# Patient Record
Sex: MALE | Race: WHITE | Employment: FULL TIME | URBAN - METROPOLITAN AREA
[De-identification: names, ages, dates, MRNs, and addresses within clinical notes are randomized per-mention and may not be internally consistent; named-entity substitution may affect disease eponyms.]

---

## 2018-08-07 ENCOUNTER — APPOINTMENT (OUTPATIENT)
Dept: CT IMAGING | Facility: HOSPITAL | Age: 43
DRG: 440 | End: 2018-08-07
Attending: EMERGENCY MEDICINE
Payer: COMMERCIAL

## 2018-08-07 ENCOUNTER — HOSPITAL ENCOUNTER (OUTPATIENT)
Age: 43
Discharge: ANOTHER HOSPITAL | End: 2018-08-07
Attending: FAMILY MEDICINE
Payer: COMMERCIAL

## 2018-08-07 VITALS
TEMPERATURE: 98 F | HEIGHT: 68 IN | OXYGEN SATURATION: 98 % | HEART RATE: 120 BPM | BODY MASS INDEX: 22.73 KG/M2 | DIASTOLIC BLOOD PRESSURE: 100 MMHG | SYSTOLIC BLOOD PRESSURE: 177 MMHG | WEIGHT: 150 LBS | RESPIRATION RATE: 16 BRPM

## 2018-08-07 DIAGNOSIS — R10.11 RUQ ABDOMINAL PAIN: Primary | ICD-10-CM

## 2018-08-07 PROBLEM — R73.9 HYPERGLYCEMIA: Status: ACTIVE | Noted: 2018-08-07

## 2018-08-07 PROBLEM — E87.6 HYPOKALEMIA: Status: ACTIVE | Noted: 2018-08-07

## 2018-08-07 PROBLEM — K85.20 ALCOHOL-INDUCED ACUTE PANCREATITIS, UNSPECIFIED COMPLICATION STATUS: Status: ACTIVE | Noted: 2018-08-07

## 2018-08-07 PROBLEM — K85.20 ALCOHOL-INDUCED ACUTE PANCREATITIS (HCC): Status: ACTIVE | Noted: 2018-08-07

## 2018-08-07 PROBLEM — R10.13 ABDOMINAL PAIN, EPIGASTRIC: Status: ACTIVE | Noted: 2018-08-07

## 2018-08-07 PROBLEM — K85.20 ALCOHOL-INDUCED ACUTE PANCREATITIS, UNSPECIFIED COMPLICATION STATUS (HCC): Status: ACTIVE | Noted: 2018-08-07

## 2018-08-07 PROBLEM — K85.20 ALCOHOL-INDUCED ACUTE PANCREATITIS: Status: ACTIVE | Noted: 2018-08-07

## 2018-08-07 PROCEDURE — 99202 OFFICE O/P NEW SF 15 MIN: CPT

## 2018-08-07 PROCEDURE — 74177 CT ABD & PELVIS W/CONTRAST: CPT | Performed by: EMERGENCY MEDICINE

## 2018-08-07 NOTE — CONSULTS
REFERRING PHYSICIAN: Dr. Esparza ref. provider found    HPI:         Thank you very much for requesting me to see the patient. As you know, Teresita Palacios is a 44year old male who presented to ER today with c/o 1 mo of upper abdominal pain.  Relates constant p injection 2 mg 2 mg Intravenous Q1H PRN   HYDROmorphone HCl (DILAUDID) 1 MG/ML injection 0.5 mg 0.5 mg Intravenous Q2H PRN   HYDROcodone-acetaminophen (NORCO) 5-325 MG per tab 1 tablet 1 tablet Oral Q4H PRN       No Known Allergies    A comprehensive 10 po

## 2018-08-07 NOTE — ED INITIAL ASSESSMENT (HPI)
Pt reports RUQ abdominal pain for \"several weeks\" becoming worse in the last couple of days. Pt states pain is worse with movement, when applying pressure to R side or when sleeping on R side. +intermittent nausea and vomiting. +body aches.  Denies fever

## 2018-08-07 NOTE — ED PROVIDER NOTES
Patient Seen in: [facility] Immediate Care In [address] [place] [address]    History   Patient presents with:  Abdomen/Flank Pain (GI/)    Stated Complaint: stomach pain    HPI    Epigastric and RUQ abd pain   X 1 week   Worse today   Dull to sharp pain   5/10 when pleasant, there is no agitation          ED Course   Labs Reviewed - No data to display    MDM     DIFFERENTIAL DIAGNOSIS: After history and physical exam differential diagnosis was considered for  Acute Cholecystitis, Acute Pancreatitis, PUD, biliary coli

## 2018-08-07 NOTE — ED PROVIDER NOTES
Patient Seen in: [facility] AND [facility] Emergency Department    History   Patient presents with:  Abdomen/Flank Pain (GI/)    Stated Complaint: mid to right abd pain x 1 month    HPI    Patient presents the emergency department complaining of 1 month of abd normal.   Neck: Normal range of motion. Neck supple. Cardiovascular: Normal rate and regular rhythm. No murmur heard. Pulmonary/Chest: Effort normal and breath sounds normal. No respiratory distress. Abdominal: Soft. He exhibits no distension.  Ther Abnormal            Final result                 Please view results for these tests on the individual orders.    [address]   RAINBOW DRAW DARK GREEN   RAINBOW DRAW LIGHT GREEN   RAINBOW [address] [facility]   LAUREN Sanders am    Follow-up:  No follow-up provider specified. Medications Prescribed:  There are no discharge medications for this patient.       Present on Admission           ICD-10-CM Noted POA    Alcohol-induced acute pancreatitis K85.20 8/7/2018 Unknown    H

## 2018-08-08 NOTE — PROGRESS NOTES
[place] [place] [facility]    Progress Note    Saige Pate Patient Status:  Inpatient    1975 MRN [id number]   Location [facility] 5SW/SE Attending Agnes Arguelles MD   Hosp Day # 1 PCP Beckie Apgar, MD       Subjective:   Saige Pate is a 44 pancreatitis  -NPO  -Normal saline at 150 cc/h  -Norco as needed for pain  -Protonix while NPO  -GI planning for eventual EGD    Alcohol abuse  -CIWA protocol out of concern for withdrawal  -Ativan ordered as needed, but has not required since admission

## 2018-08-08 NOTE — PLAN OF CARE
Problem: Patient/Family Goals  Goal: Patient/Family Long Term Goal  Patient's Long Term Goal: \"I want to stop losing weight\"    Interventions:  - follow POC  - meds as ordered  - See additional Care Plan goals for specific interventions    Outcome: Progr medication profile   Outcome: Progressing    Goal: Maintains adequate nutritional intake (undernourished)  INTERVENTIONS:  - Monitor percentage of each meal consumed  - Identify factors contributing to decreased intake, treat as appropriate  - Assist with

## 2018-08-08 NOTE — PAYOR COMM NOTE
--------------  ADMISSION REVIEW     Payor: Carolann RUBI/KAELYN  Subscriber #:  [id number]  Authorization Number: [id number]    Admit date: 8/7/18  Admit time: 3628         Patient Seen in: [facility] Emergency Department    History   Patient present no guarding. Musculoskeletal: Normal range of motion. He exhibits no tenderness. Neurological: He is alert and oriented to person, place, and time. Skin: Skin is warm and dry. No rash noted. Nursing note and vitals reviewed.       Labs Reviewed   BA nonspecific but can be seen with chronic inflammation. Hepatic steatosis.     Clinical Impression:  Alcohol-induced acute pancreatitis, unspecified complication status  (primary encounter diagnosis)  Abdominal pain, epigastric    Disposition:  Admit      HI awake, alert, and cooperative, in no acute distress. He is somewhat fidgety and anxious but able to give accurate history. VITAL SIGNS:  Blood pressure is 140/89, temperature is 98.9, pulse is 91, respiration 18, SpO2 is 97%. HEENT:  EOMI. NIRAV.   Scl is also hepatic steatosis and inflammatory extension to the right lower quadrant with mild wall thickening of the cecum, likely reactive, with primary colitis less likely. IMPRESSION AND PLAN:    1.    Acute pancreatitis, with groove pancreatitis and duo Oral Gary Gaspar RN      iopamidol (ISOVUE-M) 76 % injection 100 mL     Date Action Dose Route User    8/7/2018 1025 Given 85 mL Intravenous Lauryn Javier      dextrose 5 % and 0.45 % NaCl with KCl 20 mEq infusion     Date Action Dose Route User    8/8

## 2018-08-08 NOTE — DOWNTIME EVENT NOTE
The EMR was down for approximately 1 hour on 8/8/2018.     This writer was responsible for completing the paper charting during this time period.      The following information was re-entered into the system by Fior SIMMS     The following informati

## 2018-08-08 NOTE — PROGRESS NOTES
GI  PROGRESS NOTE    SUBJECTIVE: relates better. Willing to try clear liquid diet.        OBJECTIVE:  Temp:  [98.4 °F (36.9 °C)-99 °F (37.2 °C)] 98.4 °F (36.9 °C)  Pulse:  [81-98] 87  Resp:  [18-20] 18  BP: (126-151)/() 136/88  Exam  Gen: No acute d _______________________________________________________________

## 2018-08-08 NOTE — PLAN OF CARE
Problem: Patient/Family Goals  Goal: Patient/Family Long Term Goal  Patient's Long Term Goal: \"I want to stop losing weight\"    Interventions:  - follow POC  - meds as ordered  - See additional Care Plan goals for specific interventions   Outcome: Progre review patient's medication profile   Outcome: Progressing    Goal: Maintains adequate nutritional intake (undernourished)  INTERVENTIONS:  - Monitor percentage of each meal consumed  - Identify factors contributing to decreased intake, treat as appropriat

## 2018-08-08 NOTE — H&P
[facility]    PATIENT'S NAME: Yunior Mace   ATTENDING PHYSICIAN: Keyla Meza MD   PATIENT ACCOUNT#:   [id number]    LOCATION:  [address] [place] [address] RECORD #:   [id number]       YOB: 1975  ADMISSION DATE:       08/07/2018 is awake, alert, and cooperative, in no acute distress. He is somewhat fidgety and anxious but able to give accurate history. VITAL SIGNS:  Blood pressure is 140/89, temperature is 98.9, pulse is 91, respiration 18, SpO2 is 97%. HEENT:  CORINNA MAGUIRE There is also hepatic steatosis and inflammatory extension to the right lower quadrant with mild wall thickening of the cecum, likely reactive, with primary colitis less likely. IMPRESSION AND PLAN:    1.    Acute pancreatitis, with groove pancreatitis a

## 2018-08-08 NOTE — PLAN OF CARE
Problem: Patient/Family Goals  Goal: Patient/Family Long Term Goal  Patient's Long Term Goal: \"I want to stop losing weight\"    Interventions:  - follow POC  - meds as ordered  - See additional Care Plan goals for specific interventions    Outcome: Progr Encourage mobilization and activity  - Obtain nutritional consult as needed  - Establish a toileting routine/schedule  - Consider collaborating with pharmacy to review patient's medication profile   Outcome: Progressing    Goal: Maintains adequate nutritio

## 2018-08-09 ENCOUNTER — SURGERY (OUTPATIENT)
Age: 43
End: 2018-08-09

## 2018-08-09 ENCOUNTER — ANESTHESIA (OUTPATIENT)
Dept: ENDOSCOPY | Facility: HOSPITAL | Age: 43
DRG: 440 | End: 2018-08-09
Payer: COMMERCIAL

## 2018-08-09 ENCOUNTER — ANESTHESIA EVENT (OUTPATIENT)
Dept: ENDOSCOPY | Facility: HOSPITAL | Age: 43
DRG: 440 | End: 2018-08-09
Payer: COMMERCIAL

## 2018-08-09 RX ORDER — LIDOCAINE HYDROCHLORIDE 10 MG/ML
INJECTION, SOLUTION EPIDURAL; INFILTRATION; INTRACAUDAL; PERINEURAL AS NEEDED
Status: DISCONTINUED | OUTPATIENT
Start: 2018-08-09 | End: 2018-08-09 | Stop reason: SURG

## 2018-08-09 RX ORDER — MIDAZOLAM HYDROCHLORIDE 1 MG/ML
INJECTION INTRAMUSCULAR; INTRAVENOUS AS NEEDED
Status: DISCONTINUED | OUTPATIENT
Start: 2018-08-09 | End: 2018-08-09 | Stop reason: SURG

## 2018-08-09 RX ORDER — SODIUM CHLORIDE, SODIUM LACTATE, POTASSIUM CHLORIDE, CALCIUM CHLORIDE 600; 310; 30; 20 MG/100ML; MG/100ML; MG/100ML; MG/100ML
INJECTION, SOLUTION INTRAVENOUS CONTINUOUS PRN
Status: DISCONTINUED | OUTPATIENT
Start: 2018-08-09 | End: 2018-08-09 | Stop reason: SURG

## 2018-08-09 RX ADMIN — MIDAZOLAM HYDROCHLORIDE 2 MG: 1 INJECTION INTRAMUSCULAR; INTRAVENOUS at 14:01:00

## 2018-08-09 RX ADMIN — LIDOCAINE HYDROCHLORIDE 20 MG: 10 INJECTION, SOLUTION EPIDURAL; INFILTRATION; INTRACAUDAL; PERINEURAL at 14:03:00

## 2018-08-09 RX ADMIN — SODIUM CHLORIDE, SODIUM LACTATE, POTASSIUM CHLORIDE, CALCIUM CHLORIDE: 600; 310; 30; 20 INJECTION, SOLUTION INTRAVENOUS at 14:25:00

## 2018-08-09 RX ADMIN — SODIUM CHLORIDE, SODIUM LACTATE, POTASSIUM CHLORIDE, CALCIUM CHLORIDE: 600; 310; 30; 20 INJECTION, SOLUTION INTRAVENOUS at 13:45:00

## 2018-08-09 NOTE — INTERVAL H&P NOTE
Pre-op Diagnosis: abdominal pain    The above referenced H&P was reviewed by Marquis Hannon MD on 8/9/2018, the patient was examined and no significant changes have occurred in the patient's condition since the H&P was performed.   I discussed with the patient

## 2018-08-09 NOTE — ANESTHESIA POSTPROCEDURE EVALUATION
Patient: Taylor Caballero    Procedure Summary     Date:  08/09/18 Room / Location:  [address] [place] [address] ENDOSCOPY 05 / [address] [place] [address] ENDOSCOPY    Anesthesia Start:  1400 Anesthesia Stop:  2409    Procedure:  ESOPHAGOGASTRODUODENOSCOPY (EGD) (N/A ) Diagnosis:  (Hiatal hernia, gastritis

## 2018-08-09 NOTE — ANESTHESIA PREPROCEDURE EVALUATION
Anesthesia PreOp Note    HPI:     Tamara Nicole is a 44year old male who presents for preoperative consultation requested by: Melissa Ahn MD    Date of Surgery: 8/7/2018 - 8/9/2018    Procedure(s):  ESOPHAGOGASTRODUODENOSCOPY (EGD)  Indication: None given MG/ML injection 0.5 mg 0.5 mg Intravenous Q2H PRN Sami López MD    HYDROcodone-acetaminophen (NORCO) 5-325 MG per tab 1 tablet 1 tablet Oral Q4H PRN Sami López MD 1 tablet at 08/09/18 0553   AmLODIPine Besylate (NORVASC) tab 5 mg 5 mg Oral Daily Baub Location: Left arm Left arm Left arm Left arm   Pulse: 90 90  100   Resp: 16 16 16 19   Temp: 98.7 °F (37.1 °C) 98.4 °F (36.9 °C) 98.4 °F (36.9 °C)    TempSrc: Oral Oral Oral    SpO2: 98% 98% 98% 96%   Weight:       Height:            Anesthesia ROS/Med Hx

## 2018-08-09 NOTE — OPERATIVE REPORT
ENDOSCOPY OPERATIVE REPORT    Patient Name: Earl Davila Record #: [id number]  YOB: 1975  Date of Procedure: 8/9/2018    Preoperative Diagnosis: upper abdominal pain; pancreatitis.    Postoperative Diagnosis:    1.) 3 cm hiatal hernia stable. COMPLICATIONS: None. PLAN:  1.) await biopsy results. 2.) full liquid diet. Stephen Garcia MD  [facility].

## 2018-08-09 NOTE — PLAN OF CARE
DRUG ABUSE/DETOX    • Will have no detox symptoms and will verbalize plan for changing drug-related behavior Progressing        GASTROINTESTINAL - ADULT    • Minimal or absence of nausea and vomiting Progressing    • Maintains or returns to baseline bowel

## 2018-08-09 NOTE — H&P (VIEW-ONLY)
REFERRING PHYSICIAN: Dr. Esparza ref. provider found    HPI:         Thank you very much for requesting me to see the patient. As you know, Ayesha Garza is a 44year old male who presented to ER today with c/o 1 mo of upper abdominal pain.  Relates constant p injection 2 mg 2 mg Intravenous Q1H PRN   HYDROmorphone HCl (DILAUDID) 1 MG/ML injection 0.5 mg 0.5 mg Intravenous Q2H PRN   HYDROcodone-acetaminophen (NORCO) 5-325 MG per tab 1 tablet 1 tablet Oral Q4H PRN       No Known Allergies    A comprehensive 10 po

## 2018-08-09 NOTE — PROGRESS NOTES
[facility]    Progress Note    Spike Pereira Patient Status:  Inpatient    1975 MRN [id number]   Location [facility] 5SW/SE Attending Natalia Medina MD   Hosp Day # 2 PCP Jean-Claude Beltrán MD       Subjective:   Spike Pereira is a(n) NA  138  138  134*  135*   K  3.1*  3.6  3.6  3.8   CL  98  97  102  103   CO2  30  29  27  27   MG   --   1.7*   --    --    ALKPHO  237*  246*  175*   --    AST  70*  68*  31   --    ALT  54  53  35   --    BILT  0.9  0.9  0.9   --    TP  6.4  6.8  5.

## 2018-08-09 NOTE — PAYOR COMM NOTE
--------------  ADMISSION REVIEW     Payor: [employer]MCNP  Subscriber #:  [id number]  Authorization Number: [id number]    Admit date: 8/7/18  Admit time: 8140         Patient Seen in: [facility] Emergency Department    History   Patient present exhibits no tenderness. Neurological: He is alert and oriented to person, place, and time. Skin: Skin is warm and dry. No rash noted. Nursing note and vitals reviewed.     Labs Reviewed   BASIC METABOLIC PANEL (8) - Abnormal; Notable for the following steatosis.           Clinical Impression:  Acute pancreatitis, unspecified complication status  (primary encounter diagnosis)  Abdominal pain, epigastric    Disposition:  Admit        HISTORY AND PHYSICAL EXAMINATION    CHIEF COMPLAINT:  Epigastric abdomina palpable nodules. Carotids are 3+ bilaterally without bruits. LUNGS:  Clear to auscultation. There are no wheezes, rales, or rhonchi. There is no dullness to percussion. There is no CVA tenderness.   HEART:  A regular rate and rhythm with a forceful S1 The patient is on IV fluids for rehydration, IV and p.o. pain medications which are controlling pain, and detoxification protocol for alcohol withdrawal.  The patient has been seen by GI, and recommendation is as above plus abstinence of alcohol, which I a Family hx of IBD.       PLAN: 1.) trial of clear liquid diet.                 2.) EGD with MAC tomorrow to exclude UGI pathology -- The indication and risks of the procedure, including cardiopulmonary complications of sedation, bleeding and perforation disc

## 2018-08-09 NOTE — PLAN OF CARE
Problem: Patient/Family Goals  Goal: Patient/Family Long Term Goal  Patient's Long Term Goal: \"I want to stop losing weight\"    Interventions:  - follow POC  - meds as ordered  - See additional Care Plan goals for specific interventions    Outcome: Progr review patient's medication profile   Outcome: Progressing    Goal: Maintains adequate nutritional intake (undernourished)  INTERVENTIONS:  - Monitor percentage of each meal consumed  - Identify factors contributing to decreased intake, treat as appropriat

## 2018-08-09 NOTE — DIETARY NOTE
ADULT NUTRITION INITIAL ASSESSMENT    Pt is at moderate nutrition risk. Pt does not meet malnutrition criteria. RECOMMENDATIONS TO MD:   ·  Recommend MVi, Folic acid and Thiamine given known Etoh abuse.     · Advance diet as safe and add Oral suppleme unspecified complication status [N46.42]   PERTINENT PAST MEDICAL HISTORY: Anxiety, removal of lymph nodes, Etoh abuse, active smoker, others as documented.     ANTHROPOMETRICS:  HT: 172.7 cm (5' 8\")       WT: 66.6 kg (146 lb 14.4 oz)  BMI: Body mass index (ABW))    MONITOR AND EVALUATE/NUTRITION GOALS:  - Food and Nutrient Intake:   Monitor: for PO initiation and for PO diet advancement.   - Anthropometric Measurement:   Monitor: wt and wt change  - Nutrition Goals: halt wt loss, true wt gain, diet beyond cl

## 2018-08-10 PROBLEM — K85.20 ALCOHOL-INDUCED ACUTE PANCREATITIS (HCC): Status: RESOLVED | Noted: 2018-08-07 | Resolved: 2018-08-10

## 2018-08-10 PROBLEM — R73.9 HYPERGLYCEMIA: Status: RESOLVED | Noted: 2018-08-07 | Resolved: 2018-08-10

## 2018-08-10 PROBLEM — E87.6 HYPOKALEMIA: Status: RESOLVED | Noted: 2018-08-07 | Resolved: 2018-08-10

## 2018-08-10 PROBLEM — R10.13 ABDOMINAL PAIN, EPIGASTRIC: Status: RESOLVED | Noted: 2018-08-07 | Resolved: 2018-08-10

## 2018-08-10 PROBLEM — K85.20 ALCOHOL-INDUCED ACUTE PANCREATITIS, UNSPECIFIED COMPLICATION STATUS (HCC): Status: RESOLVED | Noted: 2018-08-07 | Resolved: 2018-08-10

## 2018-08-10 PROBLEM — K85.20 ALCOHOL-INDUCED ACUTE PANCREATITIS: Status: RESOLVED | Noted: 2018-08-07 | Resolved: 2018-08-10

## 2018-08-10 PROBLEM — K85.20 ALCOHOL-INDUCED ACUTE PANCREATITIS, UNSPECIFIED COMPLICATION STATUS: Status: RESOLVED | Noted: 2018-08-07 | Resolved: 2018-08-10

## 2018-08-10 NOTE — PLAN OF CARE
DISCHARGE PLANNING    • Discharge to home or other facility with appropriate resources Adequate for Discharge        DRUG ABUSE/DETOX    • Will have no detox symptoms and will verbalize plan for changing drug-related behavior Adequate for Discharge

## 2018-08-10 NOTE — PROGRESS NOTES
[place] [facility]    Progress Note    Saige Booker Patient Status:  Inpatient    1975 MRN [id number]   Location [facility] 5SW/SE Attending Agnes Arguelles MD   Hosp Day # 3 PCP Beckie Apgar, MD       Subjective:   Saige Pate is a(n) 175*   --    --    AST  70*  68*  31   --    --    ALT  54  53  35   --    --    BILT  0.9  0.9  0.9   --    --    TP  6.4  6.8  5.7*   --    --        Recent Labs   Lab  08/08/18   0623   LIP  58*   OLAYINKA  79       No results for input(s): TROP, CK in the l

## 2018-08-10 NOTE — PLAN OF CARE
DISCHARGE PLANNING    • Discharge to home or other facility with appropriate resources Progressing        DRUG ABUSE/DETOX    • Will have no detox symptoms and will verbalize plan for changing drug-related behavior Progressing        GASTROINTESTINAL - DANYEL

## 2018-08-12 NOTE — DISCHARGE SUMMARY
[facility]    PATIENT'S NAME: Vivien Matos   ATTENDING PHYSICIAN: Ludmila Espana MD   PATIENT ACCOUNT#:   [id number]    LOCATION:  [address] [place] [address] RECORD #:   [id number]       YOB: 1975  ADMISSION DATE:       08/07/2018 fluids, n.p.o. state, p.o. and IV push of pain medications. Pain has resolved gradually over a couple of days. He was set up for EGD by Dr. Den Reagan. EGD was performed on 08/09/2018 and revealed:    1. A 3 cm hiatal hernia.   2.   Mild pangastritis with bio

## 2018-08-14 NOTE — PAYOR COMM NOTE
REF: [id number] INITIAL CLINICALS FAXED ON 8/9/18 FOR 8/7/18 AND 8/8/18- REQUESTING ADDITIONAL DAYS -    8/9/18    Subjective:   Xavier May is a(n) 44year old male who is feeling better.   Used Norco this AM  For back pain, not belly pain  Yesterday at n 27  27   MG   --   1.7*   --    --    ALKPHO  237*  246*  175*   --    AST  70*  68*  31   --    ALT  54  53  35   --    BILT  0.9  0.9  0.9   --    TP  6.4  6.8  5.7*   --              Recent Labs   Lab  08/08/18   0623   LIP  58*   OLAYINKA  79            Re

## 2018-08-15 NOTE — PROGRESS NOTES
Here are the biopsy/pathology findings from your recent EGD (Upper  Endoscopy): negative. If you need any further assistance, please feel free to call [phone]. Thank you for letting us care for you.      Sincerely,     Michaela Gutierrez MD  [facility]

## 2019-01-15 ENCOUNTER — HOSPITAL ENCOUNTER (INPATIENT)
Facility: HOSPITAL | Age: 44
LOS: 2 days | Discharge: HOME | DRG: 440 | End: 2019-01-17
Attending: EMERGENCY MEDICINE | Admitting: INTERNAL MEDICINE
Payer: COMMERCIAL

## 2019-01-15 ENCOUNTER — APPOINTMENT (OUTPATIENT)
Dept: CT IMAGING | Facility: HOSPITAL | Age: 44
DRG: 440 | End: 2019-01-15
Attending: EMERGENCY MEDICINE
Payer: COMMERCIAL

## 2019-01-15 DIAGNOSIS — K85.90 ACUTE PANCREATITIS, UNSPECIFIED COMPLICATION STATUS, UNSPECIFIED PANCREATITIS TYPE: Primary | ICD-10-CM

## 2019-01-15 DIAGNOSIS — K29.80 DUODENITIS: ICD-10-CM

## 2019-01-15 LAB
ALBUMIN SERPL BCP-MCNC: 4.2 G/DL (ref 3.5–4.8)
ALP SERPL-CCNC: 142 U/L (ref 32–100)
ALT SERPL-CCNC: 19 U/L (ref 17–63)
ANION GAP SERPL CALC-SCNC: 12 MMOL/L (ref 0–18)
AST SERPL-CCNC: 17 U/L (ref 15–41)
BASOPHILS # BLD: 0.1 K/UL (ref 0–0.2)
BASOPHILS NFR BLD: 1 %
BILIRUB DIRECT SERPL-MCNC: 0.1 MG/DL (ref 0–0.2)
BILIRUB SERPL-MCNC: 0.6 MG/DL (ref 0.3–1.2)
BUN SERPL-MCNC: 8 MG/DL (ref 8–20)
BUN/CREAT SERPL: 11.1 (ref 10–20)
CALCIUM SERPL-MCNC: 9.4 MG/DL (ref 8.5–10.5)
CHLORIDE SERPL-SCNC: 99 MMOL/L (ref 95–110)
CO2 SERPL-SCNC: 27 MMOL/L (ref 22–32)
CREAT SERPL-MCNC: 0.72 MG/DL (ref 0.5–1.5)
EOSINOPHIL # BLD: 0.1 K/UL (ref 0–0.7)
EOSINOPHIL NFR BLD: 1 %
ERYTHROCYTE [DISTWIDTH] IN BLOOD BY AUTOMATED COUNT: 12.5 % (ref 11–15)
GLUCOSE SERPL-MCNC: 125 MG/DL (ref 70–99)
HCT VFR BLD AUTO: 50.2 % (ref 41–52)
HGB BLD-MCNC: 17.4 G/DL (ref 13.5–17.5)
LIPASE SERPL-CCNC: 122 U/L (ref 22–51)
LYMPHOCYTES # BLD: 1.1 K/UL (ref 1–4)
LYMPHOCYTES NFR BLD: 10 %
MCH RBC QN AUTO: 30.9 PG (ref 27–32)
MCHC RBC AUTO-ENTMCNC: 34.8 G/DL (ref 32–37)
MCV RBC AUTO: 88.9 FL (ref 80–100)
MONOCYTES # BLD: 0.4 K/UL (ref 0–1)
MONOCYTES NFR BLD: 4 %
NEUTROPHILS # BLD AUTO: 8.6 K/UL (ref 1.8–7.7)
NEUTROPHILS NFR BLD: 84 %
OSMOLALITY UR CALC.SUM OF ELEC: 286 MOSM/KG (ref 275–295)
PLATELET # BLD AUTO: 357 K/UL (ref 140–400)
PMV BLD AUTO: 7.9 FL (ref 7.4–10.3)
POTASSIUM SERPL-SCNC: 4.2 MMOL/L (ref 3.3–5.1)
PROT SERPL-MCNC: 7.4 G/DL (ref 5.9–8.4)
RBC # BLD AUTO: 5.64 M/UL (ref 4.5–5.9)
SODIUM SERPL-SCNC: 138 MMOL/L (ref 136–144)
WBC # BLD AUTO: 10.3 K/UL (ref 4–11)

## 2019-01-15 PROCEDURE — 80076 HEPATIC FUNCTION PANEL: CPT | Performed by: EMERGENCY MEDICINE

## 2019-01-15 PROCEDURE — 96374 THER/PROPH/DIAG INJ IV PUSH: CPT

## 2019-01-15 PROCEDURE — 96361 HYDRATE IV INFUSION ADD-ON: CPT

## 2019-01-15 PROCEDURE — 74177 CT ABD & PELVIS W/CONTRAST: CPT | Performed by: EMERGENCY MEDICINE

## 2019-01-15 PROCEDURE — 83690 ASSAY OF LIPASE: CPT | Performed by: EMERGENCY MEDICINE

## 2019-01-15 PROCEDURE — C9113 INJ PANTOPRAZOLE SODIUM, VIA: HCPCS | Performed by: INTERNAL MEDICINE

## 2019-01-15 PROCEDURE — 99285 EMERGENCY DEPT VISIT HI MDM: CPT

## 2019-01-15 PROCEDURE — 80048 BASIC METABOLIC PNL TOTAL CA: CPT | Performed by: EMERGENCY MEDICINE

## 2019-01-15 PROCEDURE — 85025 COMPLETE CBC W/AUTO DIFF WBC: CPT | Performed by: EMERGENCY MEDICINE

## 2019-01-15 PROCEDURE — 96375 TX/PRO/DX INJ NEW DRUG ADDON: CPT

## 2019-01-15 PROCEDURE — 96376 TX/PRO/DX INJ SAME DRUG ADON: CPT

## 2019-01-15 RX ORDER — MORPHINE SULFATE 4 MG/ML
4 INJECTION, SOLUTION INTRAMUSCULAR; INTRAVENOUS ONCE
Status: COMPLETED | OUTPATIENT
Start: 2019-01-15 | End: 2019-01-15

## 2019-01-15 RX ORDER — ONDANSETRON 2 MG/ML
4 INJECTION INTRAMUSCULAR; INTRAVENOUS ONCE
Status: COMPLETED | OUTPATIENT
Start: 2019-01-15 | End: 2019-01-15

## 2019-01-15 RX ORDER — DEXTROSE, SODIUM CHLORIDE, AND POTASSIUM CHLORIDE 5; .45; .15 G/100ML; G/100ML; G/100ML
INJECTION INTRAVENOUS CONTINUOUS
Status: DISCONTINUED | OUTPATIENT
Start: 2019-01-15 | End: 2019-01-15

## 2019-01-15 RX ORDER — SODIUM CHLORIDE, SODIUM LACTATE, POTASSIUM CHLORIDE, CALCIUM CHLORIDE 600; 310; 30; 20 MG/100ML; MG/100ML; MG/100ML; MG/100ML
INJECTION, SOLUTION INTRAVENOUS CONTINUOUS
Status: DISCONTINUED | OUTPATIENT
Start: 2019-01-15 | End: 2019-01-17

## 2019-01-15 RX ORDER — MORPHINE SULFATE 4 MG/ML
4 INJECTION, SOLUTION INTRAMUSCULAR; INTRAVENOUS EVERY 2 HOUR PRN
Status: DISCONTINUED | OUTPATIENT
Start: 2019-01-15 | End: 2019-01-17

## 2019-01-15 RX ORDER — ONDANSETRON 2 MG/ML
8 INJECTION INTRAMUSCULAR; INTRAVENOUS EVERY 6 HOURS PRN
Status: DISCONTINUED | OUTPATIENT
Start: 2019-01-15 | End: 2019-01-17

## 2019-01-15 RX ORDER — MORPHINE SULFATE 2 MG/ML
2 INJECTION, SOLUTION INTRAMUSCULAR; INTRAVENOUS EVERY 2 HOUR PRN
Status: DISCONTINUED | OUTPATIENT
Start: 2019-01-15 | End: 2019-01-17

## 2019-01-15 RX ORDER — SODIUM CHLORIDE 9 MG/ML
INJECTION, SOLUTION INTRAVENOUS CONTINUOUS
Status: DISCONTINUED | OUTPATIENT
Start: 2019-01-15 | End: 2019-01-15

## 2019-01-15 RX ORDER — PANTOPRAZOLE SODIUM 40 MG/1
40 INJECTION, POWDER, FOR SOLUTION INTRAVENOUS EVERY 24 HOURS
Status: DISCONTINUED | OUTPATIENT
Start: 2019-01-15 | End: 2019-01-17

## 2019-01-15 NOTE — ED NOTES
Pt is c/o right sided abdominal pain with pain radiating ro his back. Pt states he has vomited 30 times, no diarrhea, fever or urinary sx. Pt is awake and alert.

## 2019-01-15 NOTE — ED PROVIDER NOTES
Patient Seen in: [facility] AND [facility] Emergency Department    History   Patient presents with:  Abdomen/Flank Pain (GI/)    Stated Complaint: abd pain +n/v    HPI    50-year-old male with past medical history significant for anxiety, pancreatitis, duodeni kg/m²         Physical Exam    Physical Exam   Constitutional: AAOx3, well nourished, NAD  Head: Normocephalic and atraumatic.    Ears: TM's clear b/l  Nose: Nose normal.   Mouth/Throat: MMM, post OP clear with no exudates  Eyes: Conjunctivae and EOM are no RAINBOW DRAW BLUE   RAINBOW DRAW LAVENDER   RAINBOW DRAW DARK GREEN   RAINBOW DRAW LIGHT GREEN   RAINBOW DRAW GOLD   RAINBOW DRAW LAVENDER TALL (BNP)          Ct Abdomen Pelvis Iv Contrast, No Oral (er)    Result Date: 1/15/2019  CONCLUSION:   1.  Meche Garcia admission for further management. Patient required second dose of morphine and states he is pain-free currently. He no longer has nausea or vomiting.     Critical care time spent on this patient was 40 min for taking history from patient examining patient

## 2019-01-15 NOTE — ED INITIAL ASSESSMENT (HPI)
RUQ pain since last night, + n/v. No diarrhea, no fever. Hx of pancreatitis with recent hospital visit.

## 2019-01-15 NOTE — CONSULTS
[facility]    Report of Consultation    Rivka Chasepedro luis Bal Patient Status:  Inpatient    1975 MRN [id number]   Location [facility] Attending Zoe Grant MD   Hosp Day # 0 PCP Fco Robertson MD     Date of Admission:  1/15/2019 GENERAL HEALTH: denies fever or weight loss  SKIN: denies any unusual skin lesions or rashes  EYES: no visual complaints or deficits  HEENT: denies mouth sores  RESPIRATORY: no shortness of breath   CARDIOVASCULAR:no chest pain   GI: Refer to HPI,   : quadrant pain, nausea and vomiting 1 day. History of  pancreatitis. TECHNIQUE: Multidetector CT images of the abdomen and pelvis were obtained with non-ionic intravenous contrast material. Automated exposure control for dose reduction was used.  Adjustment BONES:   No significant bony lesion or fracture. ABDOMINAL WALL: No mass or hernia is perceived. OTHER: Trace free fluid in the pelvis. No free intraperitoneal air. CONCLUSION:   1.  Extensive inflammatory stranding centered in the pancreaticoduoden

## 2019-01-16 LAB
ALBUMIN SERPL BCP-MCNC: 3.2 G/DL (ref 3.5–4.8)
ALBUMIN SERPL BCP-MCNC: 3.2 G/DL (ref 3.5–4.8)
ALP SERPL-CCNC: 108 U/L (ref 32–100)
ALT SERPL-CCNC: 12 U/L (ref 17–63)
ANION GAP SERPL CALC-SCNC: 8 MMOL/L (ref 0–18)
AST SERPL-CCNC: 14 U/L (ref 15–41)
BASOPHILS # BLD: 0 K/UL (ref 0–0.2)
BASOPHILS NFR BLD: 0 %
BILIRUB DIRECT SERPL-MCNC: 0.1 MG/DL (ref 0–0.2)
BILIRUB SERPL-MCNC: 0.8 MG/DL (ref 0.3–1.2)
BUN SERPL-MCNC: 3 MG/DL (ref 8–20)
BUN/CREAT SERPL: 4 (ref 10–20)
CALCIUM SERPL-MCNC: 8.5 MG/DL (ref 8.5–10.5)
CHLORIDE SERPL-SCNC: 104 MMOL/L (ref 95–110)
CO2 SERPL-SCNC: 24 MMOL/L (ref 22–32)
CREAT SERPL-MCNC: 0.75 MG/DL (ref 0.5–1.5)
EOSINOPHIL # BLD: 0.2 K/UL (ref 0–0.7)
EOSINOPHIL NFR BLD: 3 %
ERYTHROCYTE [DISTWIDTH] IN BLOOD BY AUTOMATED COUNT: 12.2 % (ref 11–15)
GLUCOSE SERPL-MCNC: 100 MG/DL (ref 70–99)
HCT VFR BLD AUTO: 43 % (ref 41–52)
HGB BLD-MCNC: 14.7 G/DL (ref 13.5–17.5)
INR BLD: 1.1 (ref 0.9–1.2)
LIPASE SERPL-CCNC: 132 U/L (ref 22–51)
LYMPHOCYTES # BLD: 1.3 K/UL (ref 1–4)
LYMPHOCYTES NFR BLD: 21 %
MCH RBC QN AUTO: 30.6 PG (ref 27–32)
MCHC RBC AUTO-ENTMCNC: 34.1 G/DL (ref 32–37)
MCV RBC AUTO: 89.6 FL (ref 80–100)
MONOCYTES # BLD: 0.4 K/UL (ref 0–1)
MONOCYTES NFR BLD: 7 %
NEUTROPHILS # BLD AUTO: 4.5 K/UL (ref 1.8–7.7)
NEUTROPHILS NFR BLD: 70 %
OSMOLALITY UR CALC.SUM OF ELEC: 279 MOSM/KG (ref 275–295)
PHOSPHATE SERPL-MCNC: 3.3 MG/DL (ref 2.4–4.7)
PLATELET # BLD AUTO: 256 K/UL (ref 140–400)
PMV BLD AUTO: 8.2 FL (ref 7.4–10.3)
POTASSIUM SERPL-SCNC: 3.8 MMOL/L (ref 3.3–5.1)
PROT SERPL-MCNC: 5.6 G/DL (ref 5.9–8.4)
PROTHROMBIN TIME: 13.7 SECONDS (ref 11.8–14.5)
RBC # BLD AUTO: 4.8 M/UL (ref 4.5–5.9)
SODIUM SERPL-SCNC: 136 MMOL/L (ref 136–144)
WBC # BLD AUTO: 6.4 K/UL (ref 4–11)

## 2019-01-16 PROCEDURE — A4216 STERILE WATER/SALINE, 10 ML: HCPCS

## 2019-01-16 PROCEDURE — 85025 COMPLETE CBC W/AUTO DIFF WBC: CPT | Performed by: INTERNAL MEDICINE

## 2019-01-16 PROCEDURE — 85610 PROTHROMBIN TIME: CPT | Performed by: INTERNAL MEDICINE

## 2019-01-16 PROCEDURE — 80069 RENAL FUNCTION PANEL: CPT | Performed by: INTERNAL MEDICINE

## 2019-01-16 PROCEDURE — 83690 ASSAY OF LIPASE: CPT | Performed by: INTERNAL MEDICINE

## 2019-01-16 PROCEDURE — 80076 HEPATIC FUNCTION PANEL: CPT | Performed by: INTERNAL MEDICINE

## 2019-01-16 PROCEDURE — C9113 INJ PANTOPRAZOLE SODIUM, VIA: HCPCS | Performed by: INTERNAL MEDICINE

## 2019-01-16 RX ORDER — 0.9 % SODIUM CHLORIDE 0.9 %
VIAL (ML) INJECTION
Status: DISPENSED
Start: 2019-01-16 | End: 2019-01-17

## 2019-01-16 RX ORDER — 0.9 % SODIUM CHLORIDE 0.9 %
VIAL (ML) INJECTION
Status: COMPLETED
Start: 2019-01-16 | End: 2019-01-16

## 2019-01-16 NOTE — PLAN OF CARE
Problem: Patient Centered Care  Goal: Patient preferences are identified and integrated in the patient's plan of care  Interventions:  - What would you like us to know as we care for you?  \"I have had pancreatic problems in the past.\"  - Provide timely, c pre-medicate as appropriate  Outcome: Progressing  No c/o pain thus far since arriving on [facility].     Problem: SAFETY ADULT - FALL  Goal: Free from fall injury  INTERVENTIONS:  - Assess pt frequently for physical needs  - Identify cognitive and physical defic

## 2019-01-16 NOTE — PAYOR COMM NOTE
--------------  ADMISSION REVIEW     Payor: Maikol RUBI/KAELYN  Subscriber #:  [id number]  Authorization Number: [id number]    Admit date: 1/15/19  Admit time: 1       Admitting Physician: Sarika Gilbert MD  Attending Physician:  Sarika Gilbert MD  [facility] Abnormal; Notable for the following components:    Neutrophil Absolute 8.6 (*)        Ct Abdomen Pelvis Iv Contrast, No Oral (er)  Result Date: 1/15/2019  CONCLUSION:   1.  Extensive inflammatory stranding centered in the pancreaticoduodenal groove with lucina LOCATION:  [address] [place] [address]  MEDICAL RECORD #:   [id number]       YOB: 1975  ADMISSION DATE:       01/15/2019    HISTORY AND PHYSICAL EXAMINATION    PRINCIPAL DIAGNOSIS:  Right upper quadrant abdominal pain.     HISTORY OF PRESENT ILLNESS: infusion     Date Action Dose Route User    1/16/2019 0320 New Bag (none) Intravenous Suly Garcia RN    1/15/2019 2026 New Bag (none) Intravenous Adriana Malhotra RN      morphINE sulfate (PF) 4 MG/ML injection 4 mg     Date Action Dose Route User    1/1

## 2019-01-16 NOTE — PROGRESS NOTES
[facility]  Gastroenterology Progress Note    Xavier May Patient Status:  Inpatient    1975 MRN [id number]   Location [facility] 5SW/SE Attending Sung Woodruff MD   Hosp Day # 1 PCP Dora Mao MD     Subjective:  Xavier May is a followup MRCP assessment as clinically warranted. Notably, overall imaging appearance appears progressed/worsened since August, 2018. 2. Small hiatal hernia. Findings that can be seen with gastroesophageal reflux disease.   3. Trace free fluid in the pelvi

## 2019-01-16 NOTE — DIETARY NOTE
ADULT NUTRITION INITIAL ASSESSMENT    Pt is at moderate nutrition risk. Pt does not meet malnutrition criteria. RECOMMENDATIONS TO MD:  Advance diet as safe to Low fat diet, if not tolerated consider Nutrition support.       NUTRITION DIAGNOSIS/PROBLE Classification: 19-24.9 kg/m2 - WNL  IBW: 154#         90% IBW       Usual Body Wt: 146# in Aug 2018, but pt reported 160# prior        87% UBW  WEIGHT HISTORY:   Wt Readings from Last 6 Encounters:  01/16/19 : 63.2 kg (139 lb 4.8 oz)  12/04/18 : 63.5 kg (

## 2019-01-16 NOTE — H&P
[facility]    PATIENT'S NAME: Vasyl Orozco PHYSICIAN: Ifeanyi Paulson MD   PATIENT ACCOUNT#:   [id number]    LOCATION:  [address] [place] [address]  MEDICAL RECORD #:   [id number]       YOB: 1975  ADMISSION DATE:       01/15/2019 He does not drink alcohol, last drink was on Thanksgiving. PHYSICAL EXAMINATION:    GENERAL:  This is a well-nourished, well-developed, white male who is in mild distress secondary to abdominal pain.     VITAL SIGNS:  Blood pressure 118/84, pulse ox 96%, a pseudocyst or walled-off necrosis. There is a subtle heterogeneous focus of enhancement involving the posterior pancreatic head. Consider followup MRCP assessment as clinically warranted.   Notably, overall imaging appearance appears progressed/worsened

## 2019-01-16 NOTE — PROGRESS NOTES
[facility]    Progress Note    Cachorro Quezada Patient Status:  Inpatient    1975 MRN [id number]   Location [facility] 5SW/SE Attending Chato Henson MD   Hosp Day # 1 PCP Juan M Riley MD       Subjective:   Cachorro Quezada is a 44 5.6 (L) 01/16/2019    AST 14 (L) 01/16/2019    ALT 12 (L) 01/16/2019    PTT 27.8 08/07/2018    INR 1.1 01/16/2019    OLAYINKA 79 08/08/2018     (H) 01/16/2019    MG 1.7 (L) 08/07/2018    PHOS 3.3 01/16/2019       Ct Abdomen Pelvis Iv Contrast, No Oral (e

## 2019-01-17 VITALS
BODY MASS INDEX: 20.08 KG/M2 | OXYGEN SATURATION: 97 % | RESPIRATION RATE: 16 BRPM | TEMPERATURE: 98 F | HEART RATE: 68 BPM | HEIGHT: 69.6 IN | DIASTOLIC BLOOD PRESSURE: 74 MMHG | WEIGHT: 138.69 LBS | SYSTOLIC BLOOD PRESSURE: 113 MMHG

## 2019-01-17 LAB
ALBUMIN SERPL BCP-MCNC: 3.1 G/DL (ref 3.5–4.8)
ALBUMIN/GLOB SERPL: 1.3 {RATIO} (ref 1–2)
ALP SERPL-CCNC: 99 U/L (ref 32–100)
ALT SERPL-CCNC: 12 U/L (ref 17–63)
ANION GAP SERPL CALC-SCNC: 11 MMOL/L (ref 0–18)
AST SERPL-CCNC: 11 U/L (ref 15–41)
BASOPHILS # BLD: 0 K/UL (ref 0–0.2)
BASOPHILS NFR BLD: 0 %
BILIRUB SERPL-MCNC: 0.8 MG/DL (ref 0.3–1.2)
BUN SERPL-MCNC: 4 MG/DL (ref 8–20)
BUN/CREAT SERPL: 4.9 (ref 10–20)
CALCIUM SERPL-MCNC: 8.6 MG/DL (ref 8.5–10.5)
CHLORIDE SERPL-SCNC: 100 MMOL/L (ref 95–110)
CO2 SERPL-SCNC: 27 MMOL/L (ref 22–32)
CREAT SERPL-MCNC: 0.81 MG/DL (ref 0.5–1.5)
EOSINOPHIL # BLD: 0.2 K/UL (ref 0–0.7)
EOSINOPHIL NFR BLD: 3 %
ERYTHROCYTE [DISTWIDTH] IN BLOOD BY AUTOMATED COUNT: 12.2 % (ref 11–15)
GLOBULIN PLAS-MCNC: 2.3 G/DL (ref 2.5–3.7)
GLUCOSE SERPL-MCNC: 96 MG/DL (ref 70–99)
HCT VFR BLD AUTO: 41.4 % (ref 41–52)
HGB BLD-MCNC: 14.1 G/DL (ref 13.5–17.5)
LYMPHOCYTES # BLD: 1.7 K/UL (ref 1–4)
LYMPHOCYTES NFR BLD: 23 %
MCH RBC QN AUTO: 30.3 PG (ref 27–32)
MCHC RBC AUTO-ENTMCNC: 34.2 G/DL (ref 32–37)
MCV RBC AUTO: 88.6 FL (ref 80–100)
MONOCYTES # BLD: 0.6 K/UL (ref 0–1)
MONOCYTES NFR BLD: 8 %
NEUTROPHILS # BLD AUTO: 5 K/UL (ref 1.8–7.7)
NEUTROPHILS NFR BLD: 66 %
OSMOLALITY UR CALC.SUM OF ELEC: 283 MOSM/KG (ref 275–295)
PLATELET # BLD AUTO: 242 K/UL (ref 140–400)
PMV BLD AUTO: 8.6 FL (ref 7.4–10.3)
POTASSIUM SERPL-SCNC: 3.8 MMOL/L (ref 3.3–5.1)
PROT SERPL-MCNC: 5.4 G/DL (ref 5.9–8.4)
RBC # BLD AUTO: 4.67 M/UL (ref 4.5–5.9)
SODIUM SERPL-SCNC: 138 MMOL/L (ref 136–144)
WBC # BLD AUTO: 7.6 K/UL (ref 4–11)

## 2019-01-17 PROCEDURE — 85025 COMPLETE CBC W/AUTO DIFF WBC: CPT | Performed by: INTERNAL MEDICINE

## 2019-01-17 PROCEDURE — A4216 STERILE WATER/SALINE, 10 ML: HCPCS

## 2019-01-17 PROCEDURE — 80053 COMPREHEN METABOLIC PANEL: CPT | Performed by: INTERNAL MEDICINE

## 2019-01-17 RX ORDER — HYDROCODONE BITARTRATE AND ACETAMINOPHEN 5; 325 MG/1; MG/1
1-2 TABLET ORAL EVERY 4 HOURS PRN
Qty: 15 TABLET | Refills: 0 | Status: SHIPPED | OUTPATIENT
Start: 2019-01-17

## 2019-01-17 RX ORDER — 0.9 % SODIUM CHLORIDE 0.9 %
VIAL (ML) INJECTION
Status: DISCONTINUED
Start: 2019-01-17 | End: 2019-01-17

## 2019-01-17 NOTE — PLAN OF CARE
Problem: Patient Centered Care  Goal: Patient preferences are identified and integrated in the patient's plan of care  Interventions:  - What would you like us to know as we care for you?  \"I have had pancreatic problems in the past.\"  - Provide timely, c pre-medicate as appropriate  Outcome: Progressing  No c/o pain.     Problem: SAFETY ADULT - FALL  Goal: Free from fall injury  INTERVENTIONS:  - Assess pt frequently for physical needs  - Identify cognitive and physical deficits and behaviors that affect ri

## 2019-01-17 NOTE — PROGRESS NOTES
[facility]  Gastroenterology Progress Note    Ana Johnson Patient Status:  Inpatient    1975 MRN [id number]   Location [facility] 5SW/SE Attending Wynette Felty, MD   Hosp Day # 2 PCP Graham Chin MD     Subjective:  Ana Johnson is a Small hiatal hernia. Findings that can be seen with gastroesophageal reflux disease. 3. Trace free fluid in the pelvis, most likely reactive in nature. 4. Fatty liver. 5. Lesser incidental findings as above.   Dictated by (CST): Luz Griffith MD on 1/1

## 2019-01-17 NOTE — PROGRESS NOTES
[facility]    Progress Note    Teresita Palacios Patient Status:  Inpatient    1975 MRN [id number]   Location [facility] 5SW/SE Attending José Merritt MD   Hosp Day # 2 PCP Ethan Hankins MD       Subjective:   Teresita Palacios is a 44 INR 1.1 01/16/2019    OLAYINKA 80 08/08/2018     (H) 01/16/2019    MG 1.7 (L) 08/07/2018    PHOS 3.3 01/16/2019       Ct Abdomen Pelvis Iv Contrast, No Oral (er)    Result Date: 1/15/2019  CONCLUSION:   1.  Extensive inflammatory stranding centered in

## 2019-01-17 NOTE — PLAN OF CARE
Problem: Patient Centered Care  Goal: Patient preferences are identified and integrated in the patient's plan of care  Interventions:  - What would you like us to know as we care for you?  \"I have had pancreatic problems in the past.\"  - Provide timely, c FALL  Goal: Free from fall injury  INTERVENTIONS:  - Assess pt frequently for physical needs  - Identify cognitive and physical deficits and behaviors that affect risk of falls.   - [place] fall precautions as indicated by assessment.  - Educate pt/family

## 2019-01-17 NOTE — PROGRESS NOTES
Discharge paperwork and prescriptions provided to patient with Father at bedside. Picked up by father. All belongings with patient.

## 2019-01-17 NOTE — PLAN OF CARE
Problem: Patient Centered Care  Goal: Patient preferences are identified and integrated in the patient's plan of care  Interventions:  - What would you like us to know as we care for you?  \"I have had pancreatic problems in the past.\"  - Provide timely, c Free from fall injury  INTERVENTIONS:  - Assess pt frequently for physical needs  - Identify cognitive and physical deficits and behaviors that affect risk of falls.   - [place] fall precautions as indicated by assessment.  - Educate pt/family on patient

## 2019-01-17 NOTE — PAYOR COMM NOTE
--------------  CONTINUED STAY REVIEW    Payor: Ghislaine RUBI/KAELYN  Subscriber #:  [id number]  Authorization Number: [id number]    Admit date: 1/15/19  Admit time: 1    Admitting Physician: Man Jones MD  Attending Physician:  Man Jones MD  [facility]  01/17/2019     CO2 27 01/17/2019     GLU 96 01/17/2019     CA 8.6 01/17/2019     ALB 3.1 01/17/2019     ALKPHO 99 01/17/2019     BILT 0.8 01/17/2019     TP 5.4 01/17/2019     AST 11 01/17/2019     ALT 12 01/17/2019         Reviewed imaging with Dr Avelino Herrera

## 2019-01-23 NOTE — DISCHARGE SUMMARY
[facility]    PATIENT'S NAME: Aemlia Cohen   ATTENDING PHYSICIAN: Rawland Councilman, MD   PATIENT ACCOUNT#:   [id number]    LOCATION:  [address] [place] [address] RECORD #:   [id number]       YOB: 1975  ADMISSION DATE:       01/15/2019 Lucinda Salmon, Gastroenterology did not believe the collection was related to necrosis. It was felt that this abdominal pain and nausea and vomiting were related to pancreatitis with intermittent symptoms, but the patient denied alcohol use since Thanksgiving.

## 2019-05-06 ENCOUNTER — HOSPITAL ENCOUNTER (OUTPATIENT)
Dept: CT IMAGING | Facility: HOSPITAL | Age: 44
Discharge: HOME | End: 2019-05-06
Attending: INTERNAL MEDICINE
Payer: COMMERCIAL

## 2019-05-06 DIAGNOSIS — K86.1 CHRONIC PANCREATITIS, UNSPECIFIED PANCREATITIS TYPE (HCC): ICD-10-CM

## 2019-05-06 PROCEDURE — 82565 ASSAY OF CREATININE: CPT

## 2019-05-06 PROCEDURE — 74177 CT ABD & PELVIS W/CONTRAST: CPT | Performed by: INTERNAL MEDICINE

## 2020-05-23 NOTE — LETTER
[address]  Authorization for Surgical Operation and Procedure                                                                                           I hereby authorize Ronnell Goodson MD, my physician and his/her assistants (if applicable), which may include medical students, residents, and/or fellows, to perform the following surgical operation/ procedure and administer such anesthesia as may be determined necessary by my physician: Operation/Procedure name (s) ESOPHAGOGASTRODUODENOSCOPY (EGD) with pancreatic stent removal on Jarrett Bal   2.   I recognize that during the surgical operation/procedure, unforeseen conditions may necessitate additional or different procedures than those listed above. I, therefore, further authorize and request that the above-named surgeon, assistants, or designees perform such procedures as are, in their judgment, necessary and desirable. 3.   My surgeon/physician has discussed prior to my surgery the potential benefits, risks and side effects of this procedure; the likelihood of achieving goals; and potential problems that might occur during recuperation. They also discussed reasonable alternatives to the procedure, including risks, benefits, and side effects related to the alternatives and risks related to not receiving this procedure. I have had all my questions answered and I acknowledge that no guarantee has been made as to the result that may be obtained. 4.   Should the need arise during my operation/procedure, which includes change of level of care prior to discharge, I also consent to the administration of blood and/or blood products. Further, I understand that despite careful testing and screening of blood or blood products by collecting agencies, I may still be subject to ill effects as a result of receiving a blood transfusion and/or blood products.   The following are some, but not all, of the potential risks that can occur: fever and allergic reactions, hemolytic reactions, transmission of diseases such as Hepatitis, AIDS and Cytomegalovirus (CMV) and fluid overload. In the event that I wish to have an autologous transfusion of my own blood, or a directed donor transfusion, I will discuss this with my physician. Check only if Refusing Blood or Blood Products  I understand refusal of blood or blood products as deemed necessary by my physician may have serious consequences to my condition to include possible death. I hereby assume responsibility for my refusal and release the hospital, its personnel, and my physicians from any responsibility for the consequences of my refusal.    o  Refuse   5. I authorize the use of any specimen, organs, tissues, body parts or foreign objects that may be removed from my body during the operation/procedure for diagnosis, research or teaching purposes and their subsequent disposal by hospital authorities. I also authorize the release of specimen test results and/or written reports to my treating physician on the hospital medical staff or other referring or consulting physicians involved in my care, at the discretion of the Pathologist or my treating physician. 6.   I consent to the photographing or videotaping of the operations or procedures to be performed, including appropriate portions of my body for medical, scientific, or educational purposes, provided my identity is not revealed by the pictures or by descriptive texts accompanying them. If the procedure has been photographed/videotaped, the surgeon will obtain the original picture, image, videotape or CD. The hospital will not be responsible for storage, release or maintenance of the picture, image, tape or CD.    7.   I consent to the presence of a  or observers in the operating room as deemed necessary by my physician or their designees.     8.   I recognize that in the event my procedure results in extended X-Ray/fluoroscopy time, I may develop a skin reaction. 9. If I have a Do Not Attempt Resuscitation (DNAR) order in place, that status will be suspended while in the operating room, procedural suite, and during the recovery period unless otherwise explicitly stated by me (or a person authorized to consent on my behalf). The surgeon or my attending physician will determine when the applicable recovery period ends for purposes of reinstating the DNAR order. 10. Patients having a sterilization procedure: I understand that if the procedure is successful the results will be permanent and it will therefore be impossible for me to inseminate, conceive, or bear children. I also understand that the procedure is intended to result in sterility, although the result has not been guaranteed. 11. I acknowledge that my physician has explained sedation/analgesia administration to me including the risk and benefits I consent to the administration of sedation/analgesia as may be necessary or desirable in the judgment of my physician. I CERTIFY THAT I HAVE READ AND FULLY UNDERSTAND THE ABOVE CONSENT TO OPERATION and/or OTHER PROCEDURE.     _________________________________________ _________________________________     ___________________________________  Signature of Patient     Signature of Responsible Person                   Printed Name of Responsible Person                              _________________________________________ ______________________________        ___________________________________  Signature of Witness         Date  Time         Relationship to Patient    STATEMENT OF PHYSICIAN My signature below affirms that prior to the time of the procedure; I have explained to the patient and/or his/her legal representative, the risks and benefits involved in the proposed treatment and any reasonable alternative to the proposed treatment.  I have also explained the risks and benefits involved in refusal of the proposed treatment and alternatives to the proposed treatment and have answered the patient's questions.  If I have a significant financial interest in a co-management agreement or a significant financial interest in any product or implant, or other significant relationship used in this procedure/surgery, I have disclosed this and had a discussion with my patient.     _______________________________________________________________ _____________________________  Jason Barn of Physician)                                                                                         (Date)                                   (Time)  Patient Name: Destin Perez    : 1975   Printed: 2023      Medical Record #: [id number]                                              Page 1 of 1 - - -

## 2020-06-30 ENCOUNTER — HOSPITAL ENCOUNTER (EMERGENCY)
Facility: HOSPITAL | Age: 45
Discharge: HOME | End: 2020-06-30
Attending: EMERGENCY MEDICINE
Payer: COMMERCIAL

## 2020-06-30 VITALS
TEMPERATURE: 98 F | RESPIRATION RATE: 16 BRPM | HEART RATE: 99 BPM | BODY MASS INDEX: 21.22 KG/M2 | HEIGHT: 68 IN | OXYGEN SATURATION: 98 % | WEIGHT: 140 LBS | DIASTOLIC BLOOD PRESSURE: 103 MMHG | SYSTOLIC BLOOD PRESSURE: 138 MMHG

## 2020-06-30 DIAGNOSIS — K29.00 ACUTE GASTRITIS WITHOUT HEMORRHAGE, UNSPECIFIED GASTRITIS TYPE: Primary | ICD-10-CM

## 2020-06-30 LAB
ALBUMIN SERPL-MCNC: 4.1 G/DL (ref 3.4–5)
ALP LIVER SERPL-CCNC: 123 U/L (ref 45–117)
ALT SERPL-CCNC: 36 U/L (ref 16–61)
ANION GAP SERPL CALC-SCNC: 7 MMOL/L (ref 0–18)
AST SERPL-CCNC: 35 U/L (ref 15–37)
BASOPHILS # BLD AUTO: 0.07 X10(3) UL (ref 0–0.2)
BASOPHILS NFR BLD AUTO: 0.7 %
BILIRUB DIRECT SERPL-MCNC: 0.3 MG/DL (ref 0–0.2)
BILIRUB SERPL-MCNC: 0.9 MG/DL (ref 0.1–2)
BUN BLD-MCNC: 10 MG/DL (ref 7–18)
BUN/CREAT SERPL: 10.6 (ref 10–20)
CALCIUM BLD-MCNC: 8.7 MG/DL (ref 8.5–10.1)
CHLORIDE SERPL-SCNC: 104 MMOL/L (ref 98–112)
CO2 SERPL-SCNC: 30 MMOL/L (ref 21–32)
CREAT BLD-MCNC: 0.94 MG/DL (ref 0.7–1.3)
DEPRECATED RDW RBC AUTO: 41.4 FL (ref 35.1–46.3)
EOSINOPHIL # BLD AUTO: 0.05 X10(3) UL (ref 0–0.7)
EOSINOPHIL NFR BLD AUTO: 0.5 %
ERYTHROCYTE [DISTWIDTH] IN BLOOD BY AUTOMATED COUNT: 11.9 % (ref 11–15)
GLUCOSE BLD-MCNC: 88 MG/DL (ref 70–99)
HCT VFR BLD AUTO: 48.2 % (ref 39–53)
HGB BLD-MCNC: 17.2 G/DL (ref 13–17.5)
IMM GRANULOCYTES # BLD AUTO: 0.02 X10(3) UL (ref 0–1)
IMM GRANULOCYTES NFR BLD: 0.2 %
LIPASE SERPL-CCNC: 361 U/L (ref 73–393)
LYMPHOCYTES # BLD AUTO: 1.47 X10(3) UL (ref 1–4)
LYMPHOCYTES NFR BLD AUTO: 13.7 %
M PROTEIN MFR SERPL ELPH: 8 G/DL (ref 6.4–8.2)
MCH RBC QN AUTO: 33.9 PG (ref 26–34)
MCHC RBC AUTO-ENTMCNC: 35.7 G/DL (ref 31–37)
MCV RBC AUTO: 95.1 FL (ref 80–100)
MONOCYTES # BLD AUTO: 0.63 X10(3) UL (ref 0.1–1)
MONOCYTES NFR BLD AUTO: 5.9 %
NEUTROPHILS # BLD AUTO: 8.52 X10 (3) UL (ref 1.5–7.7)
NEUTROPHILS # BLD AUTO: 8.52 X10(3) UL (ref 1.5–7.7)
NEUTROPHILS NFR BLD AUTO: 79 %
OSMOLALITY SERPL CALC.SUM OF ELEC: 290 MOSM/KG (ref 275–295)
PLATELET # BLD AUTO: 316 10(3)UL (ref 150–450)
POTASSIUM SERPL-SCNC: 3.5 MMOL/L (ref 3.5–5.1)
RBC # BLD AUTO: 5.07 X10(6)UL (ref 4.3–5.7)
SODIUM SERPL-SCNC: 141 MMOL/L (ref 136–145)
WBC # BLD AUTO: 10.8 X10(3) UL (ref 4–11)

## 2020-06-30 PROCEDURE — 85025 COMPLETE CBC W/AUTO DIFF WBC: CPT

## 2020-06-30 PROCEDURE — 99285 EMERGENCY DEPT VISIT HI MDM: CPT

## 2020-06-30 PROCEDURE — 83690 ASSAY OF LIPASE: CPT

## 2020-06-30 PROCEDURE — 80048 BASIC METABOLIC PNL TOTAL CA: CPT

## 2020-06-30 PROCEDURE — 96375 TX/PRO/DX INJ NEW DRUG ADDON: CPT

## 2020-06-30 PROCEDURE — 85025 COMPLETE CBC W/AUTO DIFF WBC: CPT | Performed by: EMERGENCY MEDICINE

## 2020-06-30 PROCEDURE — 96376 TX/PRO/DX INJ SAME DRUG ADON: CPT

## 2020-06-30 PROCEDURE — 80076 HEPATIC FUNCTION PANEL: CPT | Performed by: EMERGENCY MEDICINE

## 2020-06-30 PROCEDURE — 96361 HYDRATE IV INFUSION ADD-ON: CPT

## 2020-06-30 PROCEDURE — 80076 HEPATIC FUNCTION PANEL: CPT

## 2020-06-30 PROCEDURE — 96374 THER/PROPH/DIAG INJ IV PUSH: CPT

## 2020-06-30 PROCEDURE — 80048 BASIC METABOLIC PNL TOTAL CA: CPT | Performed by: EMERGENCY MEDICINE

## 2020-06-30 PROCEDURE — S0028 INJECTION, FAMOTIDINE, 20 MG: HCPCS | Performed by: EMERGENCY MEDICINE

## 2020-06-30 PROCEDURE — 83690 ASSAY OF LIPASE: CPT | Performed by: EMERGENCY MEDICINE

## 2020-06-30 RX ORDER — FAMOTIDINE 20 MG/1
20 TABLET ORAL 2 TIMES DAILY PRN
Qty: 30 TABLET | Refills: 0 | Status: SHIPPED | OUTPATIENT
Start: 2020-06-30 | End: 2020-07-30

## 2020-06-30 RX ORDER — ONDANSETRON 2 MG/ML
4 INJECTION INTRAMUSCULAR; INTRAVENOUS ONCE
Status: COMPLETED | OUTPATIENT
Start: 2020-06-30 | End: 2020-06-30

## 2020-06-30 RX ORDER — FAMOTIDINE 10 MG/ML
20 INJECTION, SOLUTION INTRAVENOUS ONCE
Status: COMPLETED | OUTPATIENT
Start: 2020-06-30 | End: 2020-06-30

## 2020-06-30 RX ORDER — ONDANSETRON 4 MG/1
4 TABLET, ORALLY DISINTEGRATING ORAL EVERY 4 HOURS PRN
Qty: 10 TABLET | Refills: 0 | Status: SHIPPED | OUTPATIENT
Start: 2020-06-30 | End: 2020-07-07

## 2020-06-30 RX ORDER — MORPHINE SULFATE 4 MG/ML
4 INJECTION, SOLUTION INTRAMUSCULAR; INTRAVENOUS ONCE
Status: COMPLETED | OUTPATIENT
Start: 2020-06-30 | End: 2020-06-30

## 2020-06-30 RX ORDER — HYDROCODONE BITARTRATE AND ACETAMINOPHEN 5; 325 MG/1; MG/1
1 TABLET ORAL EVERY 6 HOURS PRN
Qty: 16 TABLET | Refills: 0 | Status: SHIPPED | OUTPATIENT
Start: 2020-06-30 | End: 2020-07-07

## 2020-07-01 NOTE — ED PROVIDER NOTES
Patient Seen in: [facility] AND [facility] Emergency Department      History   Patient presents with:  Abdomen/Flank Pain    Stated Complaint: abd pain     HPI    46-year-old male with past medical history significant for anxiety, duodenitis, pancreatitis, prese 142/98   Pulse 91   Temp 98.2 °F (36.8 °C) (Oral)   Resp 20   Ht 172.7 cm (5' 8\")   Wt 63.5 kg   SpO2 97%   BMI 21.29 kg/m²         Physical Exam    Physical Exam   Constitutional: AAOx3, well nourished, NAD  Head: Normocephalic and atraumatic.    Ears: TM RAINBOW DRAW LAVENDER   RAINBOW DRAW LIGHT GREEN   RAINBOW DRAW GOLD                  MDM     Patient's laboratory work-up is unremarkable. He is feeling better after morphine, Pepcid, Zofran. He likely is having symptoms of gastritis or duodenitis.   Harrison Yu

## 2020-07-01 NOTE — ED NOTES
Patient verbalizes understanding of discharge instructions and prescriptions. Patient ambulatory with a steady gait upon discharge. Patient's father to drive him home.

## 2020-07-01 NOTE — ED INITIAL ASSESSMENT (HPI)
Patient presents to ER with c/o right sided abdominal pain since noon. HX of pancreatitis.  States this feels similar

## 2020-11-03 ENCOUNTER — APPOINTMENT (OUTPATIENT)
Dept: LAB | Age: 45
End: 2020-11-03
Attending: INTERNAL MEDICINE
Payer: COMMERCIAL

## 2020-11-03 DIAGNOSIS — Z20.822 CLOSE EXPOSURE TO COVID-19 VIRUS: ICD-10-CM

## 2022-10-04 NOTE — LETTER
[address]  Authorization for Surgical Operation and Procedure                                                                                           I hereby authorize Katie Cisneros MD, my physician and his/her assistants (if applicable), which may include medical students, residents, and/or fellows, to perform the following surgical operation/ procedure and administer such anesthesia as may be determined necessary by my physician: Operation/Procedure name (s) ESOPHAGOGASTRODUODENOSCOPY (EGD) on [address]   2.   I recognize that during the surgical operation/procedure, unforeseen conditions may necessitate additional or different procedures than those listed above. I, therefore, further authorize and request that the above-named surgeon, assistants, or designees perform such procedures as are, in their judgment, necessary and desirable. 3.   My surgeon/physician has discussed prior to my surgery the potential benefits, risks and side effects of this procedure; the likelihood of achieving goals; and potential problems that might occur during recuperation. They also discussed reasonable alternatives to the procedure, including risks, benefits, and side effects related to the alternatives and risks related to not receiving this procedure. I have had all my questions answered and I acknowledge that no guarantee has been made as to the result that may be obtained. 4.   Should the need arise during my operation/procedure, which includes change of level of care prior to discharge, I also consent to the administration of blood and/or blood products. Further, I understand that despite careful testing and screening of blood or blood products by collecting agencies, I may still be subject to ill effects as a result of receiving a blood transfusion and/or blood products.   The following are some, but not all, of the potential risks that can occur: fever and Patient had a fall several weeks ago, x-ray with no fracture, arthritic changes, patient has some difficulty with movement, on Flexeril, Motrin as needed   allergic reactions, hemolytic reactions, transmission of diseases such as Hepatitis, AIDS and Cytomegalovirus (CMV) and fluid overload. In the event that I wish to have an autologous transfusion of my own blood, or a directed donor transfusion, I will discuss this with my physician. Check only if Refusing Blood or Blood Products  I understand refusal of blood or blood products as deemed necessary by my physician may have serious consequences to my condition to include possible death. I hereby assume responsibility for my refusal and release the hospital, its personnel, and my physicians from any responsibility for the consequences of my refusal.    o  Refuse   5. I authorize the use of any specimen, organs, tissues, body parts or foreign objects that may be removed from my body during the operation/procedure for diagnosis, research or teaching purposes and their subsequent disposal by hospital authorities. I also authorize the release of specimen test results and/or written reports to my treating physician on the hospital medical staff or other referring or consulting physicians involved in my care, at the discretion of the Pathologist or my treating physician. 6.   I consent to the photographing or videotaping of the operations or procedures to be performed, including appropriate portions of my body for medical, scientific, or educational purposes, provided my identity is not revealed by the pictures or by descriptive texts accompanying them. If the procedure has been photographed/videotaped, the surgeon will obtain the original picture, image, videotape or CD. The hospital will not be responsible for storage, release or maintenance of the picture, image, tape or CD.    7.   I consent to the presence of a  or observers in the operating room as deemed necessary by my physician or their designees.     8.   I recognize that in the event my procedure results in extended X-Ray/fluoroscopy time, I may develop a skin reaction. 9. If I have a Do Not Attempt Resuscitation (DNAR) order in place, that status will be suspended while in the operating room, procedural suite, and during the recovery period unless otherwise explicitly stated by me (or a person authorized to consent on my behalf). The surgeon or my attending physician will determine when the applicable recovery period ends for purposes of reinstating the DNAR order. 10. Patients having a sterilization procedure: I understand that if the procedure is successful the results will be permanent and it will therefore be impossible for me to inseminate, conceive, or bear children. I also understand that the procedure is intended to result in sterility, although the result has not been guaranteed. 11. I acknowledge that my physician has explained sedation/analgesia administration to me including the risk and benefits I consent to the administration of sedation/analgesia as may be necessary or desirable in the judgment of my physician. I CERTIFY THAT I HAVE READ AND FULLY UNDERSTAND THE ABOVE CONSENT TO OPERATION and/or OTHER PROCEDURE.     _________________________________________ _________________________________     ___________________________________  Signature of Patient     Signature of Responsible Person                   Printed Name of Responsible Person                              _________________________________________ ______________________________        ___________________________________  Signature of Witness         Date  Time         Relationship to Patient    STATEMENT OF PHYSICIAN My signature below affirms that prior to the time of the procedure; I have explained to the patient and/or his/her legal representative, the risks and benefits involved in the proposed treatment and any reasonable alternative to the proposed treatment.  I have also explained the risks and benefits involved in refusal of the proposed treatment and alternatives to the proposed treatment and have answered the patient's questions.  If I have a significant financial interest in a co-management agreement or a significant financial interest in any product or implant, or other significant relationship used in this procedure/surgery, I have disclosed this and had a discussion with my patient.     _______________________________________________________________ _____________________________  Carol Alamo of Physician)                                                                                         (Date)                                   (Time)  Patient Name: Otis Baptiste    : 1975   Printed: 2023      Medical Record #: [id number]                                              Page 1 of 1

## 2023-04-12 ENCOUNTER — HOSPITAL ENCOUNTER (INPATIENT)
Facility: HOSPITAL | Age: 48
LOS: 3 days | Discharge: HOME | End: 2023-04-15
Attending: STUDENT IN AN ORGANIZED HEALTH CARE EDUCATION/TRAINING PROGRAM | Admitting: INTERNAL MEDICINE
Payer: COMMERCIAL

## 2023-04-12 ENCOUNTER — APPOINTMENT (OUTPATIENT)
Dept: CT IMAGING | Facility: HOSPITAL | Age: 48
End: 2023-04-12
Attending: STUDENT IN AN ORGANIZED HEALTH CARE EDUCATION/TRAINING PROGRAM
Payer: COMMERCIAL

## 2023-04-12 DIAGNOSIS — K31.89 GASTRIC MASS: Primary | ICD-10-CM

## 2023-04-12 LAB
ALBUMIN SERPL-MCNC: 3.5 G/DL (ref 3.4–5)
ALBUMIN/GLOB SERPL: 0.9 {RATIO} (ref 1–2)
ALP LIVER SERPL-CCNC: 142 U/L
ALT SERPL-CCNC: 24 U/L
ANION GAP SERPL CALC-SCNC: 14 MMOL/L (ref 0–18)
AST SERPL-CCNC: 15 U/L (ref 15–37)
BASOPHILS # BLD AUTO: 0.02 X10(3) UL (ref 0–0.2)
BASOPHILS NFR BLD AUTO: 0.2 %
BILIRUB SERPL-MCNC: 0.7 MG/DL (ref 0.1–2)
BILIRUB UR QL CFM: NEGATIVE
BUN BLD-MCNC: 16 MG/DL (ref 7–18)
BUN/CREAT SERPL: 17 (ref 10–20)
CALCIUM BLD-MCNC: 9.4 MG/DL (ref 8.5–10.1)
CHLORIDE SERPL-SCNC: 97 MMOL/L (ref 98–112)
CLARITY UR: CLEAR
CO2 SERPL-SCNC: 32 MMOL/L (ref 21–32)
COLOR UR: YELLOW
CREAT BLD-MCNC: 0.94 MG/DL
DEPRECATED RDW RBC AUTO: 45.1 FL (ref 35.1–46.3)
EOSINOPHIL # BLD AUTO: 0.05 X10(3) UL (ref 0–0.7)
EOSINOPHIL NFR BLD AUTO: 0.5 %
ERYTHROCYTE [DISTWIDTH] IN BLOOD BY AUTOMATED COUNT: 12.4 % (ref 11–15)
GFR SERPLBLD BASED ON 1.73 SQ M-ARVRAT: 101 ML/MIN/1.73M2 (ref 60–?)
GLOBULIN PLAS-MCNC: 3.7 G/DL (ref 2.8–4.4)
GLUCOSE BLD-MCNC: 111 MG/DL (ref 70–99)
GLUCOSE UR-MCNC: 30 MG/DL
HCT VFR BLD AUTO: 49.1 %
HGB BLD-MCNC: 16.6 G/DL
HGB UR QL STRIP.AUTO: NEGATIVE
HYALINE CASTS #/AREA URNS AUTO: PRESENT /LPF
IMM GRANULOCYTES # BLD AUTO: 0.03 X10(3) UL (ref 0–1)
IMM GRANULOCYTES NFR BLD: 0.3 %
KETONES UR-MCNC: >150 MG/DL
LEUKOCYTE ESTERASE UR QL STRIP.AUTO: NEGATIVE
LIPASE SERPL-CCNC: 227 U/L (ref 13–75)
LYMPHOCYTES # BLD AUTO: 0.94 X10(3) UL (ref 1–4)
LYMPHOCYTES NFR BLD AUTO: 9.1 %
MCH RBC QN AUTO: 33.3 PG (ref 26–34)
MCHC RBC AUTO-ENTMCNC: 33.8 G/DL (ref 31–37)
MCV RBC AUTO: 98.6 FL
MONOCYTES # BLD AUTO: 0.75 X10(3) UL (ref 0.1–1)
MONOCYTES NFR BLD AUTO: 7.3 %
NEUTROPHILS # BLD AUTO: 8.54 X10 (3) UL (ref 1.5–7.7)
NEUTROPHILS # BLD AUTO: 8.54 X10(3) UL (ref 1.5–7.7)
NEUTROPHILS NFR BLD AUTO: 82.6 %
NITRITE UR QL STRIP.AUTO: NEGATIVE
OSMOLALITY SERPL CALC.SUM OF ELEC: 298 MOSM/KG (ref 275–295)
PH UR: 6 [PH] (ref 5–8)
PLATELET # BLD AUTO: 348 10(3)UL (ref 150–450)
POTASSIUM SERPL-SCNC: 3.2 MMOL/L (ref 3.5–5.1)
PROT SERPL-MCNC: 7.2 G/DL (ref 6.4–8.2)
PROT UR-MCNC: 200 MG/DL
RBC # BLD AUTO: 4.98 X10(6)UL
SODIUM SERPL-SCNC: 143 MMOL/L (ref 136–145)
SP GR UR STRIP: >1.03 (ref 1–1.03)
UROBILINOGEN UR STRIP-ACNC: 4
WBC # BLD AUTO: 10.3 X10(3) UL (ref 4–11)

## 2023-04-12 PROCEDURE — 74177 CT ABD & PELVIS W/CONTRAST: CPT | Performed by: STUDENT IN AN ORGANIZED HEALTH CARE EDUCATION/TRAINING PROGRAM

## 2023-04-12 RX ORDER — ONDANSETRON 2 MG/ML
4 INJECTION INTRAMUSCULAR; INTRAVENOUS ONCE
Status: COMPLETED | OUTPATIENT
Start: 2023-04-12 | End: 2023-04-12

## 2023-04-12 RX ORDER — DICYCLOMINE HCL 20 MG
20 TABLET ORAL 4 TIMES DAILY PRN
COMMUNITY

## 2023-04-12 RX ORDER — ONDANSETRON 2 MG/ML
4 INJECTION INTRAMUSCULAR; INTRAVENOUS EVERY 6 HOURS PRN
Status: DISCONTINUED | OUTPATIENT
Start: 2023-04-12 | End: 2023-04-15

## 2023-04-12 RX ORDER — SODIUM CHLORIDE 9 MG/ML
INJECTION, SOLUTION INTRAVENOUS ONCE
Status: COMPLETED | OUTPATIENT
Start: 2023-04-12 | End: 2023-04-12

## 2023-04-12 RX ORDER — MORPHINE SULFATE 4 MG/ML
4 INJECTION, SOLUTION INTRAMUSCULAR; INTRAVENOUS ONCE
Status: DISCONTINUED | OUTPATIENT
Start: 2023-04-12 | End: 2023-04-15

## 2023-04-12 RX ORDER — FAMOTIDINE 20 MG/1
20 TABLET, FILM COATED ORAL 2 TIMES DAILY
COMMUNITY
End: 2023-04-15

## 2023-04-12 RX ORDER — MORPHINE SULFATE 2 MG/ML
1 INJECTION, SOLUTION INTRAMUSCULAR; INTRAVENOUS EVERY 4 HOURS PRN
Status: DISCONTINUED | OUTPATIENT
Start: 2023-04-12 | End: 2023-04-15

## 2023-04-12 RX ORDER — PROCHLORPERAZINE EDISYLATE 5 MG/ML
5 INJECTION INTRAMUSCULAR; INTRAVENOUS EVERY 8 HOURS PRN
Status: DISCONTINUED | OUTPATIENT
Start: 2023-04-12 | End: 2023-04-15

## 2023-04-12 RX ORDER — SODIUM CHLORIDE 9 MG/ML
INJECTION, SOLUTION INTRAVENOUS CONTINUOUS
Status: DISCONTINUED | OUTPATIENT
Start: 2023-04-12 | End: 2023-04-15

## 2023-04-12 RX ORDER — MORPHINE SULFATE 4 MG/ML
4 INJECTION, SOLUTION INTRAMUSCULAR; INTRAVENOUS ONCE
Status: COMPLETED | OUTPATIENT
Start: 2023-04-12 | End: 2023-04-12

## 2023-04-12 NOTE — ED QUICK NOTES
Orders for admission, patient is aware of plan and ready to go upstairs. Any questions, please call ED RN Madan at extension [phone].      Patient Covid vaccination status: Partially vaccinated     COVID Test Ordered in ED: None    COVID Suspicion at Admission: N/A    Running Infusions:      Mental Status/LOC at time of transport: a/o x 4    Other pertinent information:   CIWA score: N/A   NIH score:  N/A

## 2023-04-12 NOTE — ED INITIAL ASSESSMENT (HPI)
Patient ambulatory to triage, c/o nausea, vomiting and lower right sided abd pain that started Monday. Patient unable to keep anything down. Hx of pancreatitis, patient stated this does not feel the same.

## 2023-04-13 ENCOUNTER — ANESTHESIA EVENT (OUTPATIENT)
Dept: ENDOSCOPY | Facility: HOSPITAL | Age: 48
End: 2023-04-13
Payer: COMMERCIAL

## 2023-04-13 ENCOUNTER — ANESTHESIA (OUTPATIENT)
Dept: ENDOSCOPY | Facility: HOSPITAL | Age: 48
End: 2023-04-13
Payer: COMMERCIAL

## 2023-04-13 LAB
ANION GAP SERPL CALC-SCNC: 14 MMOL/L (ref 0–18)
BASOPHILS # BLD AUTO: 0.02 X10(3) UL (ref 0–0.2)
BASOPHILS NFR BLD AUTO: 0.2 %
BUN BLD-MCNC: 13 MG/DL (ref 7–18)
BUN/CREAT SERPL: 21.7 (ref 10–20)
CALCIUM BLD-MCNC: 8.4 MG/DL (ref 8.5–10.1)
CHLORIDE SERPL-SCNC: 105 MMOL/L (ref 98–112)
CO2 SERPL-SCNC: 24 MMOL/L (ref 21–32)
CREAT BLD-MCNC: 0.6 MG/DL
DEPRECATED RDW RBC AUTO: 45.5 FL (ref 35.1–46.3)
EOSINOPHIL # BLD AUTO: 0.05 X10(3) UL (ref 0–0.7)
EOSINOPHIL NFR BLD AUTO: 0.5 %
ERYTHROCYTE [DISTWIDTH] IN BLOOD BY AUTOMATED COUNT: 12.2 % (ref 11–15)
GFR SERPLBLD BASED ON 1.73 SQ M-ARVRAT: 120 ML/MIN/1.73M2 (ref 60–?)
GLUCOSE BLD-MCNC: 72 MG/DL (ref 70–99)
HCT VFR BLD AUTO: 40.4 %
HGB BLD-MCNC: 13.4 G/DL
IMM GRANULOCYTES # BLD AUTO: 0.04 X10(3) UL (ref 0–1)
IMM GRANULOCYTES NFR BLD: 0.4 %
INR BLD: 0.95 (ref 0.85–1.16)
LYMPHOCYTES # BLD AUTO: 1.15 X10(3) UL (ref 1–4)
LYMPHOCYTES NFR BLD AUTO: 10.6 %
MAGNESIUM SERPL-MCNC: 1.8 MG/DL (ref 1.6–2.6)
MCH RBC QN AUTO: 33.5 PG (ref 26–34)
MCHC RBC AUTO-ENTMCNC: 33.2 G/DL (ref 31–37)
MCV RBC AUTO: 101 FL
MONOCYTES # BLD AUTO: 0.75 X10(3) UL (ref 0.1–1)
MONOCYTES NFR BLD AUTO: 6.9 %
NEUTROPHILS # BLD AUTO: 8.83 X10 (3) UL (ref 1.5–7.7)
NEUTROPHILS # BLD AUTO: 8.83 X10(3) UL (ref 1.5–7.7)
NEUTROPHILS NFR BLD AUTO: 81.4 %
OSMOLALITY SERPL CALC.SUM OF ELEC: 295 MOSM/KG (ref 275–295)
PLATELET # BLD AUTO: 248 10(3)UL (ref 150–450)
POTASSIUM SERPL-SCNC: 3.4 MMOL/L (ref 3.5–5.1)
PROTHROMBIN TIME: 12.6 SECONDS (ref 11.6–14.8)
RBC # BLD AUTO: 4 X10(6)UL
SARS-COV-2 RNA RESP QL NAA+PROBE: NOT DETECTED
SODIUM SERPL-SCNC: 143 MMOL/L (ref 136–145)
WBC # BLD AUTO: 10.8 X10(3) UL (ref 4–11)

## 2023-04-13 PROCEDURE — 0DB68ZX EXCISION OF STOMACH, VIA NATURAL OR ARTIFICIAL OPENING ENDOSCOPIC, DIAGNOSTIC: ICD-10-PCS | Performed by: INTERNAL MEDICINE

## 2023-04-13 PROCEDURE — 0DB98ZX EXCISION OF DUODENUM, VIA NATURAL OR ARTIFICIAL OPENING ENDOSCOPIC, DIAGNOSTIC: ICD-10-PCS | Performed by: INTERNAL MEDICINE

## 2023-04-13 PROCEDURE — 99223 1ST HOSP IP/OBS HIGH 75: CPT | Performed by: INTERNAL MEDICINE

## 2023-04-13 RX ORDER — LIDOCAINE HYDROCHLORIDE 20 MG/ML
INJECTION, SOLUTION EPIDURAL; INFILTRATION; INTRACAUDAL; PERINEURAL AS NEEDED
Status: DISCONTINUED | OUTPATIENT
Start: 2023-04-13 | End: 2023-04-13 | Stop reason: SURG

## 2023-04-13 RX ORDER — MAGNESIUM OXIDE 400 MG/1
400 TABLET ORAL ONCE
Status: COMPLETED | OUTPATIENT
Start: 2023-04-13 | End: 2023-04-13

## 2023-04-13 RX ORDER — SODIUM CHLORIDE, SODIUM LACTATE, POTASSIUM CHLORIDE, CALCIUM CHLORIDE 600; 310; 30; 20 MG/100ML; MG/100ML; MG/100ML; MG/100ML
INJECTION, SOLUTION INTRAVENOUS CONTINUOUS PRN
Status: DISCONTINUED | OUTPATIENT
Start: 2023-04-13 | End: 2023-04-13 | Stop reason: SURG

## 2023-04-13 RX ORDER — GLYCOPYRROLATE 0.2 MG/ML
INJECTION, SOLUTION INTRAMUSCULAR; INTRAVENOUS AS NEEDED
Status: DISCONTINUED | OUTPATIENT
Start: 2023-04-13 | End: 2023-04-13 | Stop reason: SURG

## 2023-04-13 RX ADMIN — LIDOCAINE HYDROCHLORIDE 40 MG: 20 INJECTION, SOLUTION EPIDURAL; INFILTRATION; INTRACAUDAL; PERINEURAL at 14:05:00

## 2023-04-13 RX ADMIN — GLYCOPYRROLATE 0.2 MG: 0.2 INJECTION, SOLUTION INTRAMUSCULAR; INTRAVENOUS at 14:05:00

## 2023-04-13 RX ADMIN — SODIUM CHLORIDE, SODIUM LACTATE, POTASSIUM CHLORIDE, CALCIUM CHLORIDE: 600; 310; 30; 20 INJECTION, SOLUTION INTRAVENOUS at 13:52:00

## 2023-04-13 RX ADMIN — SODIUM CHLORIDE, SODIUM LACTATE, POTASSIUM CHLORIDE, CALCIUM CHLORIDE: 600; 310; 30; 20 INJECTION, SOLUTION INTRAVENOUS at 14:20:00

## 2023-04-13 NOTE — PLAN OF CARE
AXO4. RA. On remote tele. Tolerating clear liquid diet. PRN morphine for pain. Voiding freely. Self ambulates. IV fluids infusing. Plan for GED today. Consent signed. NPO at midnight. Call light within reach. Problem: Patient Centered Care  Goal: Patient preferences are identified and integrated in the patient's plan of care  Description: Interventions:  - What would you like us to know as we care for you?   - Provide timely, complete, and accurate information to patient/family  - Incorporate patient and family knowledge, values, beliefs, and cultural backgrounds into the planning and delivery of care  - Encourage patient/family to participate in care and decision-making at the level they choose  - Honor patient and family perspectives and choices  Outcome: Progressing     Problem: RISK FOR INFECTION - ADULT  Goal: Absence of fever/infection during anticipated neutropenic period  Description: INTERVENTIONS  - Monitor WBC  - Administer growth factors as ordered  - Implement neutropenic guidelines  Outcome: Progressing     Problem: SAFETY ADULT - FALL  Goal: Free from fall injury  Description: INTERVENTIONS:  - Assess pt frequently for physical needs  - Identify cognitive and physical deficits and behaviors that affect risk of falls.   - [place] fall precautions as indicated by assessment.  - Educate pt/family on patient safety including physical limitations  - Instruct pt to call for assistance with activity based on assessment  - Modify environment to reduce risk of injury  - Provide assistive devices as appropriate  - Consider OT/PT consult to assist with strengthening/mobility  - Encourage toileting schedule  Outcome: Progressing

## 2023-04-13 NOTE — ANESTHESIA POSTPROCEDURE EVALUATION
Patient: Anthony Hester    Procedure Summary     Date: 04/13/23 Room / Location: [address] [place] [address] ENDOSCOPY 04 / 300 [facility] ENDOSCOPY    Anesthesia Start: 8915 Anesthesia Stop:     Procedure: ESOPHAGOGASTRODUODENOSCOPY (EGD) Diagnosis: (duodenitis; severe erosive esophagitis; submucosal stomach lesion)    Surgeons: Kerry Cochran MD Anesthesiologist: Jonelle Horowitz CRNA    Anesthesia Type: general, MAC ASA Status: 3          Anesthesia Type: general, MAC    Vitals Value Taken Time   /88 04/13/23 1425   Temp  04/13/23 1425   Pulse 84 04/13/23 1425   Resp 20 04/13/23 1425   SpO2 100 % 04/13/23 1425       [facility] AN Post Evaluation:   Patient Evaluated in Patient location: Endo recovery.   Patient Participation: complete - patient participated  Level of Consciousness: awake and alert  Pain Score: 0  Pain Management: adequate  Airway Patency:patent  Dental exam unchanged from preop  Yes    Cardiovascular Status: acceptable  Respiratory Status: acceptable  Postoperative Hydration acceptable      Cleve Phan CRNA  4/13/2023 2:25 PM

## 2023-04-14 LAB
MAGNESIUM SERPL-MCNC: 1.7 MG/DL (ref 1.6–2.6)
POTASSIUM SERPL-SCNC: 3.3 MMOL/L (ref 3.5–5.1)

## 2023-04-14 PROCEDURE — 99233 SBSQ HOSP IP/OBS HIGH 50: CPT | Performed by: HOSPITALIST

## 2023-04-14 RX ORDER — MAGNESIUM SULFATE HEPTAHYDRATE 40 MG/ML
2 INJECTION, SOLUTION INTRAVENOUS ONCE
Status: COMPLETED | OUTPATIENT
Start: 2023-04-14 | End: 2023-04-14

## 2023-04-14 NOTE — PLAN OF CARE
A/ox4 on RA, tolerating clears, denies nausea, received PRN Morphine for pain management, IVF continued, up self, ambulates room independently, x1 BM overnight soft/loose, patient is passing gas, voiding freely, vital signs stable, no acute changes overnight. Call light within reach, safety measures in place, frequent rounding done, plan of care continued. Problem: Patient Centered Care  Goal: Patient preferences are identified and integrated in the patient's plan of care  Description: Interventions:  - What would you like us to know as we care for you?  - Provide timely, complete, and accurate information to patient/family  - Incorporate patient and family knowledge, values, beliefs, and cultural backgrounds into the planning and delivery of care  - Encourage patient/family to participate in care and decision-making at the level they choose  - Honor patient and family perspectives and choices  Outcome: Progressing     Problem: Patient/Family Goals  Goal: Patient/Family Long Term Goal  Description: Patient's Long Term Goal:     Interventions:  -   - See additional Care Plan goals for specific interventions  Outcome: Progressing  Goal: Patient/Family Short Term Goal  Description: Patient's Short Term Goal:     Interventions:   -   - See additional Care Plan goals for specific interventions  Outcome: Progressing     Problem: RISK FOR INFECTION - ADULT  Goal: Absence of fever/infection during anticipated neutropenic period  Description: INTERVENTIONS  - Monitor WBC  - Administer growth factors as ordered  - Implement neutropenic guidelines  Outcome: Progressing     Problem: SAFETY ADULT - FALL  Goal: Free from fall injury  Description: INTERVENTIONS:  - Assess pt frequently for physical needs  - Identify cognitive and physical deficits and behaviors that affect risk of falls.   - [place] fall precautions as indicated by assessment.  - Educate pt/family on patient safety including physical limitations  - Instruct pt to call for assistance with activity based on assessment  - Modify environment to reduce risk of injury  - Provide assistive devices as appropriate  - Consider OT/PT consult to assist with strengthening/mobility  - Encourage toileting schedule  Outcome: Progressing

## 2023-04-14 NOTE — PLAN OF CARE
Plan of care reviewed with Ashley Andrea. Patient went for EGD today. Pain managed with IVP Morphine as needed. IVP Protonix BID as well for symptom management. Denies nausea. Re-started on clear liquids after procedure and tolerating. IVF. Safety measures in place and call light within reach.

## 2023-04-15 VITALS
TEMPERATURE: 99 F | WEIGHT: 120 LBS | DIASTOLIC BLOOD PRESSURE: 89 MMHG | HEIGHT: 68 IN | BODY MASS INDEX: 18.19 KG/M2 | HEART RATE: 73 BPM | SYSTOLIC BLOOD PRESSURE: 135 MMHG | OXYGEN SATURATION: 97 % | RESPIRATION RATE: 20 BRPM

## 2023-04-15 LAB
ALBUMIN SERPL-MCNC: 2.2 G/DL (ref 3.4–5)
ANION GAP SERPL CALC-SCNC: 6 MMOL/L (ref 0–18)
BASOPHILS # BLD AUTO: 0.02 X10(3) UL (ref 0–0.2)
BASOPHILS NFR BLD AUTO: 0.2 %
BUN BLD-MCNC: 5 MG/DL (ref 7–18)
BUN/CREAT SERPL: 9.3 (ref 10–20)
CALCIUM BLD-MCNC: 7.9 MG/DL (ref 8.5–10.1)
CHLORIDE SERPL-SCNC: 101 MMOL/L (ref 98–112)
CO2 SERPL-SCNC: 29 MMOL/L (ref 21–32)
CREAT BLD-MCNC: 0.54 MG/DL
DEPRECATED RDW RBC AUTO: 41.3 FL (ref 35.1–46.3)
EOSINOPHIL # BLD AUTO: 0.13 X10(3) UL (ref 0–0.7)
EOSINOPHIL NFR BLD AUTO: 1.5 %
ERYTHROCYTE [DISTWIDTH] IN BLOOD BY AUTOMATED COUNT: 11.9 % (ref 11–15)
GFR SERPLBLD BASED ON 1.73 SQ M-ARVRAT: 124 ML/MIN/1.73M2 (ref 60–?)
GLUCOSE BLD-MCNC: 97 MG/DL (ref 70–99)
HCT VFR BLD AUTO: 37.4 %
HGB BLD-MCNC: 13.1 G/DL
IMM GRANULOCYTES # BLD AUTO: 0.03 X10(3) UL (ref 0–1)
IMM GRANULOCYTES NFR BLD: 0.3 %
LYMPHOCYTES # BLD AUTO: 0.98 X10(3) UL (ref 1–4)
LYMPHOCYTES NFR BLD AUTO: 11.2 %
MAGNESIUM SERPL-MCNC: 1.7 MG/DL (ref 1.6–2.6)
MAGNESIUM SERPL-MCNC: 1.7 MG/DL (ref 1.6–2.6)
MCH RBC QN AUTO: 33.5 PG (ref 26–34)
MCHC RBC AUTO-ENTMCNC: 35 G/DL (ref 31–37)
MCV RBC AUTO: 95.7 FL
MONOCYTES # BLD AUTO: 0.59 X10(3) UL (ref 0.1–1)
MONOCYTES NFR BLD AUTO: 6.7 %
NEUTROPHILS # BLD AUTO: 7 X10 (3) UL (ref 1.5–7.7)
NEUTROPHILS # BLD AUTO: 7 X10(3) UL (ref 1.5–7.7)
NEUTROPHILS NFR BLD AUTO: 80.1 %
OSMOLALITY SERPL CALC.SUM OF ELEC: 279 MOSM/KG (ref 275–295)
PHOSPHATE SERPL-MCNC: 2.7 MG/DL (ref 2.5–4.9)
PLATELET # BLD AUTO: 193 10(3)UL (ref 150–450)
POTASSIUM SERPL-SCNC: 3.5 MMOL/L (ref 3.5–5.1)
POTASSIUM SERPL-SCNC: 3.5 MMOL/L (ref 3.5–5.1)
RBC # BLD AUTO: 3.91 X10(6)UL
SODIUM SERPL-SCNC: 136 MMOL/L (ref 136–145)
WBC # BLD AUTO: 8.8 X10(3) UL (ref 4–11)

## 2023-04-15 PROCEDURE — 99239 HOSP IP/OBS DSCHRG MGMT >30: CPT | Performed by: HOSPITALIST

## 2023-04-15 RX ORDER — HYDROCODONE BITARTRATE AND ACETAMINOPHEN 5; 325 MG/1; MG/1
1-2 TABLET ORAL EVERY 4 HOURS PRN
Qty: 15 TABLET | Refills: 0 | Status: SHIPPED | OUTPATIENT
Start: 2023-04-15

## 2023-04-15 RX ORDER — PANTOPRAZOLE SODIUM 40 MG/1
40 TABLET, DELAYED RELEASE ORAL
Qty: 60 TABLET | Refills: 0 | Status: SHIPPED | OUTPATIENT
Start: 2023-04-15

## 2023-04-15 RX ORDER — MAGNESIUM OXIDE 400 MG/1
400 TABLET ORAL ONCE
Status: COMPLETED | OUTPATIENT
Start: 2023-04-15 | End: 2023-04-15

## 2023-04-15 NOTE — PLAN OF CARE
Tolerating soft diet, morphine given for pain control, potassium and magnesium replaced per protocol, voiding freely, +BM, ambulating self care. Plan for discharge home tomorrow. Problem: Patient Centered Care  Goal: Patient preferences are identified and integrated in the patient's plan of care  Description: Interventions:  - What would you like us to know as we care for you? -  - Provide timely, complete, and accurate information to patient/family  - Incorporate patient and family knowledge, values, beliefs, and cultural backgrounds into the planning and delivery of care  - Encourage patient/family to participate in care and decision-making at the level they choose  - Honor patient and family perspectives and choices  Outcome: Progressing     Problem: Patient/Family Goals  Goal: Patient/Family Long Term Goal  Description: Patient's Long Term Goal: -    Interventions:  - -  - See additional Care Plan goals for specific interventions  Outcome: Progressing  Goal: Patient/Family Short Term Goal  Description: Patient's Short Term Goal: -    Interventions:   - -  - See additional Care Plan goals for specific interventions  Outcome: Progressing     Problem: RISK FOR INFECTION - ADULT  Goal: Absence of fever/infection during anticipated neutropenic period  Description: INTERVENTIONS  - Monitor WBC  - Administer growth factors as ordered  - Implement neutropenic guidelines  Outcome: Progressing     Problem: SAFETY ADULT - FALL  Goal: Free from fall injury  Description: INTERVENTIONS:  - Assess pt frequently for physical needs  - Identify cognitive and physical deficits and behaviors that affect risk of falls.   - [place] fall precautions as indicated by assessment.  - Educate pt/family on patient safety including physical limitations  - Instruct pt to call for assistance with activity based on assessment  - Modify environment to reduce risk of injury  - Provide assistive devices as appropriate  - Consider OT/PT consult to assist with strengthening/mobility  - Encourage toileting schedule  Outcome: Progressing

## 2023-04-15 NOTE — PLAN OF CARE
A/ox4 on RA, tolerating diet denies nausea, received PRN Morphine for pain management, up self, ambulates room independently, patient is passing gas, voiding freely, vital signs stable, no acute changes overnight. Call light within reach, safety measures in place, frequent rounding done, plan of care continued. Problem: Patient Centered Care  Goal: Patient preferences are identified and integrated in the patient's plan of care  Description: Interventions:  - What would you like us to know as we care for you?   - Provide timely, complete, and accurate information to patient/family  - Incorporate patient and family knowledge, values, beliefs, and cultural backgrounds into the planning and delivery of care  - Encourage patient/family to participate in care and decision-making at the level they choose  - Honor patient and family perspectives and choices  Outcome: Progressing     Problem: Patient/Family Goals  Goal: Patient/Family Long Term Goal  Description: Patient's Long Term Goal:     Interventions:  -   - See additional Care Plan goals for specific interventions  Outcome: Progressing  Goal: Patient/Family Short Term Goal  Description: Patient's Short Term Goal:     Interventions:   -   - See additional Care Plan goals for specific interventions  Outcome: Progressing     Problem: RISK FOR INFECTION - ADULT  Goal: Absence of fever/infection during anticipated neutropenic period  Description: INTERVENTIONS  - Monitor WBC  - Administer growth factors as ordered  - Implement neutropenic guidelines  Outcome: Progressing     Problem: SAFETY ADULT - FALL  Goal: Free from fall injury  Description: INTERVENTIONS:  - Assess pt frequently for physical needs  - Identify cognitive and physical deficits and behaviors that affect risk of falls.   - [place] fall precautions as indicated by assessment.  - Educate pt/family on patient safety including physical limitations  - Instruct pt to call for assistance with activity based on assessment  - Modify environment to reduce risk of injury  - Provide assistive devices as appropriate  - Consider OT/PT consult to assist with strengthening/mobility  - Encourage toileting schedule  Outcome: Progressing

## 2023-04-27 ENCOUNTER — ANESTHESIA EVENT (OUTPATIENT)
Dept: ENDOSCOPY | Facility: HOSPITAL | Age: 48
DRG: 438 | End: 2023-04-27
Payer: COMMERCIAL

## 2023-04-27 ENCOUNTER — HOSPITAL ENCOUNTER (INPATIENT)
Facility: HOSPITAL | Age: 48
LOS: 11 days | Discharge: HOME HEALTH | DRG: 438 | End: 2023-05-08
Attending: INTERNAL MEDICINE | Admitting: INTERNAL MEDICINE
Payer: COMMERCIAL

## 2023-04-27 ENCOUNTER — APPOINTMENT (OUTPATIENT)
Dept: GENERAL RADIOLOGY | Facility: HOSPITAL | Age: 48
DRG: 438 | End: 2023-04-27
Attending: HOSPITALIST
Payer: COMMERCIAL

## 2023-04-27 ENCOUNTER — ANESTHESIA (OUTPATIENT)
Dept: ENDOSCOPY | Facility: HOSPITAL | Age: 48
DRG: 438 | End: 2023-04-27
Payer: COMMERCIAL

## 2023-04-27 DIAGNOSIS — K31.89 GASTRIC MASS: ICD-10-CM

## 2023-04-27 PROBLEM — K31.1 GASTRIC OUTLET OBSTRUCTION: Status: ACTIVE | Noted: 2023-04-27

## 2023-04-27 PROBLEM — K31.1 GASTRIC OUTLET OBSTRUCTION (HCC): Status: ACTIVE | Noted: 2023-04-27

## 2023-04-27 LAB
AMYLASE FLD-CCNC: NORMAL U/L
CEA FLD-MCNC: 9.4 NG/ML
GLUCOSE FLD-MCNC: 46 MG/DL

## 2023-04-27 PROCEDURE — 71045 X-RAY EXAM CHEST 1 VIEW: CPT | Performed by: HOSPITALIST

## 2023-04-27 PROCEDURE — 0DH98UZ INSERTION OF FEEDING DEVICE INTO DUODENUM, VIA NATURAL OR ARTIFICIAL OPENING ENDOSCOPIC: ICD-10-PCS | Performed by: INTERNAL MEDICINE

## 2023-04-27 PROCEDURE — 0D968ZX DRAINAGE OF STOMACH, VIA NATURAL OR ARTIFICIAL OPENING ENDOSCOPIC, DIAGNOSTIC: ICD-10-PCS | Performed by: INTERNAL MEDICINE

## 2023-04-27 RX ORDER — SODIUM CHLORIDE, SODIUM LACTATE, POTASSIUM CHLORIDE, CALCIUM CHLORIDE 600; 310; 30; 20 MG/100ML; MG/100ML; MG/100ML; MG/100ML
INJECTION, SOLUTION INTRAVENOUS CONTINUOUS
Status: DISCONTINUED | OUTPATIENT
Start: 2023-04-27 | End: 2023-05-02 | Stop reason: ALTCHOICE

## 2023-04-27 RX ORDER — GLYCOPYRROLATE 0.2 MG/ML
INJECTION, SOLUTION INTRAMUSCULAR; INTRAVENOUS AS NEEDED
Status: DISCONTINUED | OUTPATIENT
Start: 2023-04-27 | End: 2023-04-27 | Stop reason: SURG

## 2023-04-27 RX ORDER — MORPHINE SULFATE 2 MG/ML
1 INJECTION, SOLUTION INTRAMUSCULAR; INTRAVENOUS EVERY 2 HOUR PRN
Status: DISCONTINUED | OUTPATIENT
Start: 2023-04-27 | End: 2023-05-08

## 2023-04-27 RX ORDER — MORPHINE SULFATE 4 MG/ML
4 INJECTION, SOLUTION INTRAMUSCULAR; INTRAVENOUS EVERY 2 HOUR PRN
Status: DISCONTINUED | OUTPATIENT
Start: 2023-04-27 | End: 2023-05-08

## 2023-04-27 RX ORDER — LIDOCAINE HYDROCHLORIDE 10 MG/ML
INJECTION, SOLUTION EPIDURAL; INFILTRATION; INTRACAUDAL; PERINEURAL AS NEEDED
Status: DISCONTINUED | OUTPATIENT
Start: 2023-04-27 | End: 2023-04-27 | Stop reason: SURG

## 2023-04-27 RX ORDER — PROCHLORPERAZINE EDISYLATE 5 MG/ML
5 INJECTION INTRAMUSCULAR; INTRAVENOUS EVERY 8 HOURS PRN
Status: DISCONTINUED | OUTPATIENT
Start: 2023-04-27 | End: 2023-05-08

## 2023-04-27 RX ORDER — HEPARIN SODIUM 5000 [USP'U]/ML
5000 INJECTION, SOLUTION INTRAVENOUS; SUBCUTANEOUS EVERY 12 HOURS SCHEDULED
Status: DISCONTINUED | OUTPATIENT
Start: 2023-04-27 | End: 2023-05-08

## 2023-04-27 RX ORDER — DEXTROSE, SODIUM CHLORIDE, AND POTASSIUM CHLORIDE 5; .45; .15 G/100ML; G/100ML; G/100ML
INJECTION INTRAVENOUS CONTINUOUS
Status: DISCONTINUED | OUTPATIENT
Start: 2023-04-27 | End: 2023-05-08

## 2023-04-27 RX ORDER — ONDANSETRON 2 MG/ML
4 INJECTION INTRAMUSCULAR; INTRAVENOUS EVERY 6 HOURS PRN
Status: DISCONTINUED | OUTPATIENT
Start: 2023-04-27 | End: 2023-05-08

## 2023-04-27 RX ORDER — MORPHINE SULFATE 2 MG/ML
2 INJECTION, SOLUTION INTRAMUSCULAR; INTRAVENOUS EVERY 2 HOUR PRN
Status: DISCONTINUED | OUTPATIENT
Start: 2023-04-27 | End: 2023-05-08

## 2023-04-27 RX ADMIN — LIDOCAINE HYDROCHLORIDE 50 MG: 10 INJECTION, SOLUTION EPIDURAL; INFILTRATION; INTRACAUDAL; PERINEURAL at 14:46:00

## 2023-04-27 RX ADMIN — GLYCOPYRROLATE 0.2 MG: 0.2 INJECTION, SOLUTION INTRAMUSCULAR; INTRAVENOUS at 14:46:00

## 2023-04-27 NOTE — ANESTHESIA POSTPROCEDURE EVALUATION
Patient: Allan Dahl    Procedure Summary     Date: 04/27/23 Room / Location: [address] [place] [address] ENDOSCOPY 01 / [facility] ENDOSCOPY    Anesthesia Start: 4058 Anesthesia Stop:     Procedures:       ENDOSCOPIC ULTRASOUND (EUS) upper with possible fine neddle aspiration      ESOPHAGOGASTRODUODENOSCOPY (EGD) Diagnosis:       Gastric mass      (esophagitis, gastric wall cyst)    Surgeons: Dean Melton MD Anesthesiologist: Spencer Roberts MD    Anesthesia Type: general, MAC ASA Status: 2          Anesthesia Type: general, MAC    Vitals Value Taken Time   BP 90/68 04/27/23 1532   Temp 98 04/27/23 1532   Pulse 115 04/27/23 1532   Resp 12 04/27/23 1532   SpO2 95 04/27/23 1532       [facility] AN Post Evaluation:   Patient Evaluated in PACU  Patient Participation: complete - patient participated  Level of Consciousness: sleepy but conscious  Pain Score: 0  Pain Management: adequate  Airway Patency:patent  Dental exam unchanged from preop  Yes    Cardiovascular Status: acceptable  Respiratory Status: acceptable  Postoperative Hydration acceptable      Haylie Simms CRNA  4/27/2023 3:32 PM

## 2023-04-27 NOTE — OPERATIVE REPORT
OPERATIVE REPORT   PATIENT NAME: Finesse Llanos  MRN: [id number]  DATE OF OPERATION: 4/27/2023  PREOPERATIVE DIAGNOSIS:   1. History of pancreatitis secondary to alcohol misuse and cigarette smoking, with abnormal CT scan that showed antral mass. Patient with persistent vomiting and weight loss. POSTOPERATIVE DIAGNOSES:  1. Gastric outlet obstruction secondary to gastric antral and duodenal bulb inflammation as well as fluid collection in the wall of the gastric antrum secondary to groove pancreatitis  PROCEDURE PERFORMED:  1. Upper endoscopy with placement of nasal duodenal feeding tube  2. Endoscopic ultrasound with fine-needle aspiration  SURGEON: Omkar Brock MD   MEDICATIONS: Zosyn 3.375 g intravenously was given as to minimize procedure related infection    ANESTHESIA: MAC anesthesia  CONSENT: The potential risks including but not limited to perforation, bleeding, infection, medication reaction, complication leading up to prolonged hospital stay needing surgery all discussed with the patient in details. He was given opportunity to ask questions and all his questions were answered. Alternatives and options were all discussed with him. He signed forms consent  SPECIMEN: Gastric/duodenal wall cyst fluid for CEA, amylase, glucose, and cytology  COMPLICATIONS: None immediately apparent  PROCEDURE AND FINDINGS:     After achieving adequate sedation and placing the patient in the left lateral decubitus position the Olympus upper scope was introduced under direct visualization in the esophagus passed into the stomach. Upon entering the stomach large amount of fluid was seen approximately 1600 cc was suctioned and the stomach was collapsed. The stomach appeared to be distended. There was edema noted in the prepyloric area however we able to advance the scope into the duodenal bulb and second portion of the duodenum without any resistance. The gastric mucosa without any ulceration.   Some mild duodenal bulb inflammation. No stricture was encountered in the duodenum. The second portion of the duodenum appeared unremarkable. The scope at this point was removed. The scope was exchanged for the Olympus linear echoendoscope. The scope was advanced into the esophagus further into the stomach and second portion of the duodenum. Inflammation was seen surrounding the pancreatic head extending from the C-sweep up to the prepyloric area with a fluid collection in the wall of the antrum measuring approximately 3 x 1 cm. The fluid collection was aspirated with a 19-gauge [employer] fine-needle aspiration, with aspiration approximately 10 cc of brownish thin fluid. The cyst collapsed post aspiration. The pancreatic body and tail appeared unremarkable without pancreatic ductal dilation. The bile duct itself was unremarkable. Given the gastric outlet obstruction and the weight loss we elected to proceed with feeding tube placement. A Dobbhoff was advanced through the left nostril. The upper scope was introduced alongside the Dobbhoff. The tip of the feeding tube was grasped with rat-tooth forceps and advanced into the distal duodenum. The scope was then removed while maintaining the feeding tube in position. IMPRESSION:  1. Findings discussed above  RECOMMENDATIONS:  1. Prophylactic antibiotics for 3 days. 2. N.p.o. except for ice chips. 3. Abdominal x-ray to determine the feeding tube position. Once tube position is confirmed to be in the small bowel start tube feeding. Bowel rest for 4 weeks and then repeat CT scan. 4. Discussed with the patient the importance of cigarette smoking cessation and alcohol abstinence.   Tristin Desai MD

## 2023-04-28 ENCOUNTER — APPOINTMENT (OUTPATIENT)
Dept: GENERAL RADIOLOGY | Facility: HOSPITAL | Age: 48
DRG: 438 | End: 2023-04-28
Attending: INTERNAL MEDICINE
Payer: COMMERCIAL

## 2023-04-28 LAB
ALBUMIN SERPL-MCNC: 2.5 G/DL (ref 3.4–5)
ALBUMIN/GLOB SERPL: 0.8 {RATIO} (ref 1–2)
ALP LIVER SERPL-CCNC: 80 U/L
ALT SERPL-CCNC: 11 U/L
ANION GAP SERPL CALC-SCNC: 9 MMOL/L (ref 0–18)
AST SERPL-CCNC: 12 U/L (ref 15–37)
BASOPHILS # BLD AUTO: 0.08 X10(3) UL (ref 0–0.2)
BASOPHILS NFR BLD AUTO: 0.8 %
BILIRUB SERPL-MCNC: 0.6 MG/DL (ref 0.1–2)
BUN BLD-MCNC: 12 MG/DL (ref 7–18)
BUN/CREAT SERPL: 14.6 (ref 10–20)
CALCIUM BLD-MCNC: 8.1 MG/DL (ref 8.5–10.1)
CHLORIDE SERPL-SCNC: 89 MMOL/L (ref 98–112)
CO2 SERPL-SCNC: 39 MMOL/L (ref 21–32)
CREAT BLD-MCNC: 0.82 MG/DL
DEPRECATED RDW RBC AUTO: 40.8 FL (ref 35.1–46.3)
EOSINOPHIL # BLD AUTO: 0.23 X10(3) UL (ref 0–0.7)
EOSINOPHIL NFR BLD AUTO: 2.4 %
ERYTHROCYTE [DISTWIDTH] IN BLOOD BY AUTOMATED COUNT: 11.8 % (ref 11–15)
GFR SERPLBLD BASED ON 1.73 SQ M-ARVRAT: 109 ML/MIN/1.73M2 (ref 60–?)
GLOBULIN PLAS-MCNC: 3 G/DL (ref 2.8–4.4)
GLUCOSE BLD-MCNC: 120 MG/DL (ref 70–99)
GLUCOSE BLDC GLUCOMTR-MCNC: 129 MG/DL (ref 70–99)
GLUCOSE BLDC GLUCOMTR-MCNC: 133 MG/DL (ref 70–99)
HCT VFR BLD AUTO: 40.2 %
HGB BLD-MCNC: 13.9 G/DL
IMM GRANULOCYTES # BLD AUTO: 0.03 X10(3) UL (ref 0–1)
IMM GRANULOCYTES NFR BLD: 0.3 %
LIPASE SERPL-CCNC: 989 U/L (ref 13–75)
LYMPHOCYTES # BLD AUTO: 1.58 X10(3) UL (ref 1–4)
LYMPHOCYTES NFR BLD AUTO: 16.5 %
MAGNESIUM SERPL-MCNC: 1.8 MG/DL (ref 1.6–2.6)
MCH RBC QN AUTO: 32.9 PG (ref 26–34)
MCHC RBC AUTO-ENTMCNC: 34.6 G/DL (ref 31–37)
MCV RBC AUTO: 95 FL
MONOCYTES # BLD AUTO: 0.66 X10(3) UL (ref 0.1–1)
MONOCYTES NFR BLD AUTO: 6.9 %
NEUTROPHILS # BLD AUTO: 6.98 X10 (3) UL (ref 1.5–7.7)
NEUTROPHILS # BLD AUTO: 6.98 X10(3) UL (ref 1.5–7.7)
NEUTROPHILS NFR BLD AUTO: 73.1 %
OSMOLALITY SERPL CALC.SUM OF ELEC: 285 MOSM/KG (ref 275–295)
PHOSPHATE SERPL-MCNC: 3.6 MG/DL (ref 2.5–4.9)
PLATELET # BLD AUTO: 314 10(3)UL (ref 150–450)
POTASSIUM SERPL-SCNC: 3.2 MMOL/L (ref 3.5–5.1)
PROT SERPL-MCNC: 5.5 G/DL (ref 6.4–8.2)
RBC # BLD AUTO: 4.23 X10(6)UL
SODIUM SERPL-SCNC: 137 MMOL/L (ref 136–145)
WBC # BLD AUTO: 9.6 X10(3) UL (ref 4–11)

## 2023-04-28 PROCEDURE — 71045 X-RAY EXAM CHEST 1 VIEW: CPT | Performed by: INTERNAL MEDICINE

## 2023-04-28 PROCEDURE — 99232 SBSQ HOSP IP/OBS MODERATE 35: CPT | Performed by: INTERNAL MEDICINE

## 2023-04-28 RX ORDER — MAGNESIUM SULFATE HEPTAHYDRATE 40 MG/ML
2 INJECTION, SOLUTION INTRAVENOUS ONCE
Status: COMPLETED | OUTPATIENT
Start: 2023-04-28 | End: 2023-04-28

## 2023-04-28 RX ORDER — SODIUM BICARBONATE 650 MG/1
325 TABLET ORAL AS NEEDED
Status: DISCONTINUED | OUTPATIENT
Start: 2023-04-28 | End: 2023-05-08

## 2023-04-28 NOTE — H&P
[facility]    PATIENT'S NAME: Nelida Munson   ATTENDING PHYSICIAN: Linda Jackson MD   PATIENT ACCOUNT#:   [id number]    [place] [address] RECORD #:   [id number]       YOB: 1975  ADMISSION DATE:       04/27/2023    HISTORY AND PHYSICAL EXAMINATION    CHIEF COMPLAINT:  Gastric outlet obstruction. HISTORY OF PRESENT ILLNESS:  Patient is a 60-year-old  male with chronic history of alcohol abuse. He has been having abdominal pain with difficulty with his oral intake, nausea and vomiting for the last several weeks. Was admitted to the hospital earlier this month and diagnosed with gastric outlet obstruction. Imaging studies at that time suggestive of gastric outlet mural mass concerning for malignancy. He had an upper endoscopy which showed no malignancy or ulceration. He had erosive esophagitis, gastritis, and duodenitis. He was stabilized and discharged home. Today, he was brought in for upper endoscopy with ultrasound-guided endoscopic procedure done by Dr. Kranthi Britton, and he was found to have perigastric antrum pseudocyst formation with external compression. He had aspiration of his pseudocyst endoscopically, and he had a Dobbhoff tube placed in the duodenum past the obstructive area. He will be admitted to the hospital for further management and prophylactic antibiotic use. PAST MEDICAL HISTORY:  Alcohol abuse and alcoholic pancreatitis. Patient denies any other medical problems. PAST SURGICAL HISTORY:  None. MEDICATIONS:  Please see medication reconciliation list.     ALLERGIES:  No known drug allergies. FAMILY HISTORY:  Mother has dementia. Father had hypertension. SOCIAL HISTORY:  Smokes 1/2 pack a day. History of heavy alcohol use but currently has not been drinking. No drug use. Lives with his family.       REVIEW OF SYSTEMS:  Patient has had several weeks to 1 month of progressive difficulty with his bowel movements with nausea, vomiting, weight loss. He has been cutting down on his alcohol intake because he simply could not tolerate it anymore. His last alcoholic drink was 5 days ago. Other 12-point review of systems is negative. Pain in the epigastric and right upper quadrant area as dull sensation. No radiation to his back. PHYSICAL EXAMINATION:    GENERAL:  Alert and oriented to time, place and person. Moderate distress. Overall, patient appears underweight and slightly cachectic. VITAL SIGNS:  Temperature 98.0, pulse 72, respiratory rate 16, blood pressure 120/100, pulse ox 100% on room air. HEENT:  Atraumatic. Oropharynx clear. Dry mucous membranes. Ears and nose normal.  Eyes:  Anicteric sclerae. NECK:  Supple. No lymphadenopathy. LUNGS:  Clear to auscultation bilaterally. Normal respiratory effort. HEART:  Regular rate and rhythm. S1 and S2 auscultated. No murmur. ABDOMEN:  Soft, scaphoid. Discomfort to palpation, epigastric area. No guarding or rebound tenderness. Positive bowel sounds. EXTREMITIES:  No peripheral edema, clubbing or cyanosis. NEUROLOGIC:  Motor and sensory intact. ASSESSMENT:    1. 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 2.   Severe gastritis and duodenitis. 3.   Alcohol abuse. PLAN:  Patient will be admitted to general medical floor, status post Dobbhoff tube placement and pseudocyst aspiration endoscopically. We will start him on IV Zosyn. Dobbhoff feeding tube per Dietary services. IV fluids. N.p.o.  Pain control. Gastroenterology consult. He will need repeat imaging studies to evaluate the pseudocyst status. Further recommendations to follow.      Dictated By Chase Monk MD  d: 04/27/2023 16:53:01  t: 04/27/2023 17:26:09  Job [id number]  TZ/

## 2023-04-28 NOTE — PLAN OF CARE
Pt. A/O x 4 on R/A, Vitals stable. Pt. has Left papa Souza,  Per Dr. Claudette Andes, Advanced 5 cm. Will order repeat x ray in the morning. PT. NPO except for ice chips. Ambulating, Voiding freely, no BM or passing gas. R-hand IV Fluids infusing. Safety measures in place . Will continue present monitoring. Problem: Patient Centered Care  Goal: Patient preferences are identified and integrated in the patient's plan of care  Description: Interventions:  - What would you like us to know as we care for you?   I'm from home - Provide timely, complete, and accurate information to patient/family  - Incorporate patient and family knowledge, values, beliefs, and cultural backgrounds into the planning and delivery of care  - Encourage patient/family to participate in care and decision-making at the level they choose  - Honor patient and family perspectives and choices  Outcome: Progressing     Problem: Patient/Family Goals  Goal: Patient/Family Long Term Goal  Description: Patient's Long Term Goal:     Interventions:  -   - See additional Care Plan goals for specific interventions  Outcome: Progressing  Goal: Patient/Family Short Term Goal  Description: Patient's Short Term Goal:     Interventions:   -   - See additional Care Plan goals for specific interventions  Outcome: Progressing     Problem: PAIN - ADULT  Goal: Verbalizes/displays adequate comfort level or patient's stated pain goal  Description: INTERVENTIONS:  - Encourage pt to monitor pain and request assistance  - Assess pain using appropriate pain scale  - Administer analgesics based on type and severity of pain and evaluate response  - Implement non-pharmacological measures as appropriate and evaluate response  - Consider cultural and social influences on pain and pain management  - Manage/alleviate anxiety  - Utilize distraction and/or relaxation techniques  - Monitor for opioid side effects  - Notify MD/LIP if interventions unsuccessful or patient reports new pain  - Anticipate increased pain with activity and pre-medicate as appropriate  Outcome: Progressing     Problem: SAFETY ADULT - FALL  Goal: Free from fall injury  Description: INTERVENTIONS:  - Assess pt frequently for physical needs  - Identify cognitive and physical deficits and behaviors that affect risk of falls.   - [place] fall precautions as indicated by assessment.  - Educate pt/family on patient safety including physical limitations  - Instruct pt to call for assistance with activity based on assessment  - Modify environment to reduce risk of injury  - Provide assistive devices as appropriate  - Consider OT/PT consult to assist with strengthening/mobility  - Encourage toileting schedule  Outcome: Progressing     Problem: DISCHARGE PLANNING  Goal: Discharge to home or other facility with appropriate resources  Description: INTERVENTIONS:  - Identify barriers to discharge w/pt and caregiver  - Include patient/family/discharge partner in discharge planning  - Arrange for needed discharge resources and transportation as appropriate  - Identify discharge learning needs (meds, wound care, etc)  - Arrange for interpreters to assist at discharge as needed  - Consider post-discharge preferences of patient/family/discharge partner  - Complete POLST form as appropriate  - Assess patient's ability to be responsible for managing their own health  - Refer to Case Management Department for coordinating discharge planning if the patient needs post-hospital services based on physician/LIP order or complex needs related to functional status, cognitive ability or social support system  Outcome: Progressing

## 2023-04-28 NOTE — CM/SW NOTE
Patient discussed in rounds. Possible need for TF at home. Tentative referrals pending in [place] for home health and home infusion to check cost/coverage. Will need final orders for TF and home health. *Need to clarify nare dobhoff as patients do not typically go home with this.        BERNADETTE Mora, [facility]    [id number]

## 2023-04-28 NOTE — PLAN OF CARE
Started tube feeding today/ titrate per order, npo, tolerating ice chips, morphine and zofran given, voiding freely, md aware of lab results, follow up labs in am. Zosyn, potassium and magnesium replaced per order. Patient updated on care plan.     Problem: Patient Centered Care  Goal: Patient preferences are identified and integrated in the patient's plan of care  Description: Interventions:  - What would you like us to know as we care for you? -  - Provide timely, complete, and accurate information to patient/family  - Incorporate patient and family knowledge, values, beliefs, and cultural backgrounds into the planning and delivery of care  - Encourage patient/family to participate in care and decision-making at the level they choose  - Honor patient and family perspectives and choices  Outcome: Progressing     Problem: Patient/Family Goals  Goal: Patient/Family Long Term Goal  Description: Patient's Long Term Goal: -    Interventions:  - -  - See additional Care Plan goals for specific interventions  Outcome: Progressing  Goal: Patient/Family Short Term Goal  Description: Patient's Short Term Goal: -    Interventions:   - -  - See additional Care Plan goals for specific interventions  Outcome: Progressing     Problem: PAIN - ADULT  Goal: Verbalizes/displays adequate comfort level or patient's stated pain goal  Description: INTERVENTIONS:  - Encourage pt to monitor pain and request assistance  - Assess pain using appropriate pain scale  - Administer analgesics based on type and severity of pain and evaluate response  - Implement non-pharmacological measures as appropriate and evaluate response  - Consider cultural and social influences on pain and pain management  - Manage/alleviate anxiety  - Utilize distraction and/or relaxation techniques  - Monitor for opioid side effects  - Notify MD/LIP if interventions unsuccessful or patient reports new pain  - Anticipate increased pain with activity and pre-medicate as appropriate  Outcome: Progressing     Problem: SAFETY ADULT - FALL  Goal: Free from fall injury  Description: INTERVENTIONS:  - Assess pt frequently for physical needs  - Identify cognitive and physical deficits and behaviors that affect risk of falls.   - [place] fall precautions as indicated by assessment.  - Educate pt/family on patient safety including physical limitations  - Instruct pt to call for assistance with activity based on assessment  - Modify environment to reduce risk of injury  - Provide assistive devices as appropriate  - Consider OT/PT consult to assist with strengthening/mobility  - Encourage toileting schedule  Outcome: Progressing     Problem: DISCHARGE PLANNING  Goal: Discharge to home or other facility with appropriate resources  Description: INTERVENTIONS:  - Identify barriers to discharge w/pt and caregiver  - Include patient/family/discharge partner in discharge planning  - Arrange for needed discharge resources and transportation as appropriate  - Identify discharge learning needs (meds, wound care, etc)  - Arrange for interpreters to assist at discharge as needed  - Consider post-discharge preferences of patient/family/discharge partner  - Complete POLST form as appropriate  - Assess patient's ability to be responsible for managing their own health  - Refer to Case Management Department for coordinating discharge planning if the patient needs post-hospital services based on physician/LIP order or complex needs related to functional status, cognitive ability or social support system  Outcome: Progressing

## 2023-04-29 LAB
ANION GAP SERPL CALC-SCNC: 6 MMOL/L (ref 0–18)
BASOPHILS # BLD AUTO: 0.04 X10(3) UL (ref 0–0.2)
BASOPHILS NFR BLD AUTO: 0.5 %
BUN BLD-MCNC: 7 MG/DL (ref 7–18)
BUN/CREAT SERPL: 11.3 (ref 10–20)
CALCIUM BLD-MCNC: 7.6 MG/DL (ref 8.5–10.1)
CHLORIDE SERPL-SCNC: 96 MMOL/L (ref 98–112)
CO2 SERPL-SCNC: 33 MMOL/L (ref 21–32)
CREAT BLD-MCNC: 0.62 MG/DL
DEPRECATED RDW RBC AUTO: 41.9 FL (ref 35.1–46.3)
EOSINOPHIL # BLD AUTO: 0.2 X10(3) UL (ref 0–0.7)
EOSINOPHIL NFR BLD AUTO: 2.6 %
ERYTHROCYTE [DISTWIDTH] IN BLOOD BY AUTOMATED COUNT: 11.6 % (ref 11–15)
GFR SERPLBLD BASED ON 1.73 SQ M-ARVRAT: 119 ML/MIN/1.73M2 (ref 60–?)
GLUCOSE BLD-MCNC: 116 MG/DL (ref 70–99)
GLUCOSE BLDC GLUCOMTR-MCNC: 107 MG/DL (ref 70–99)
GLUCOSE BLDC GLUCOMTR-MCNC: 112 MG/DL (ref 70–99)
GLUCOSE BLDC GLUCOMTR-MCNC: 128 MG/DL (ref 70–99)
GLUCOSE BLDC GLUCOMTR-MCNC: 137 MG/DL (ref 70–99)
HCT VFR BLD AUTO: 39.7 %
HGB BLD-MCNC: 13.3 G/DL
IMM GRANULOCYTES # BLD AUTO: 0.03 X10(3) UL (ref 0–1)
IMM GRANULOCYTES NFR BLD: 0.4 %
LYMPHOCYTES # BLD AUTO: 1.2 X10(3) UL (ref 1–4)
LYMPHOCYTES NFR BLD AUTO: 15.3 %
MAGNESIUM SERPL-MCNC: 2.2 MG/DL (ref 1.6–2.6)
MAGNESIUM SERPL-MCNC: 2.2 MG/DL (ref 1.6–2.6)
MCH RBC QN AUTO: 32.7 PG (ref 26–34)
MCHC RBC AUTO-ENTMCNC: 33.5 G/DL (ref 31–37)
MCV RBC AUTO: 97.5 FL
MONOCYTES # BLD AUTO: 0.47 X10(3) UL (ref 0.1–1)
MONOCYTES NFR BLD AUTO: 6 %
NEUTROPHILS # BLD AUTO: 5.9 X10 (3) UL (ref 1.5–7.7)
NEUTROPHILS # BLD AUTO: 5.9 X10(3) UL (ref 1.5–7.7)
NEUTROPHILS NFR BLD AUTO: 75.2 %
OSMOLALITY SERPL CALC.SUM OF ELEC: 279 MOSM/KG (ref 275–295)
PHOSPHATE SERPL-MCNC: 3.1 MG/DL (ref 2.5–4.9)
PLATELET # BLD AUTO: 271 10(3)UL (ref 150–450)
POTASSIUM SERPL-SCNC: 3.1 MMOL/L (ref 3.5–5.1)
POTASSIUM SERPL-SCNC: 3.1 MMOL/L (ref 3.5–5.1)
RBC # BLD AUTO: 4.07 X10(6)UL
SODIUM SERPL-SCNC: 135 MMOL/L (ref 136–145)
WBC # BLD AUTO: 7.8 X10(3) UL (ref 4–11)

## 2023-04-29 PROCEDURE — 99233 SBSQ HOSP IP/OBS HIGH 50: CPT | Performed by: INTERNAL MEDICINE

## 2023-04-29 RX ORDER — POTASSIUM CHLORIDE 1.5 G/1.77G
40 POWDER, FOR SOLUTION ORAL ONCE
Status: DISCONTINUED | OUTPATIENT
Start: 2023-04-29 | End: 2023-05-02 | Stop reason: ALTCHOICE

## 2023-04-29 RX ORDER — TRAMADOL HYDROCHLORIDE 50 MG/1
50 TABLET ORAL EVERY 6 HOURS PRN
Status: DISCONTINUED | OUTPATIENT
Start: 2023-04-29 | End: 2023-05-08

## 2023-04-29 NOTE — PLAN OF CARE
Patient c/o increased pain, discomfort and nausea today. Requesting to not increase tube feeds over current rate of 40ml/hr as he suspects this was the cause of his discomfort. Dr. Mely Black notified and agreed to maintain current rate for now. Problem: Patient Centered Care  Goal: Patient preferences are identified and integrated in the patient's plan of care  Description: Interventions:  - What would you like us to know as we care for you?  N/A  - Provide timely, complete, and accurate information to patient/family  - Incorporate patient and family knowledge, values, beliefs, and cultural backgrounds into the planning and delivery of care  - Encourage patient/family to participate in care and decision-making at the level they choose  - Honor patient and family perspectives and choices  Outcome: Progressing     Problem: Patient/Family Goals  Goal: Patient/Family Long Term Goal  Description: Patient's Long Term Goal: get tube out    Interventions:  - home with tube feeds, per GI  - See additional Care Plan goals for specific interventions  Outcome: Progressing  Goal: Patient/Family Short Term Goal  Description: Patient's Short Term Goal: go home, control pain    Interventions:   - PRN pain medication  - home 4/30 per GI  - See additional Care Plan goals for specific interventions  Outcome: Progressing     Problem: PAIN - ADULT  Goal: Verbalizes/displays adequate comfort level or patient's stated pain goal  Description: INTERVENTIONS:  - Encourage pt to monitor pain and request assistance  - Assess pain using appropriate pain scale  - Administer analgesics based on type and severity of pain and evaluate response  - Implement non-pharmacological measures as appropriate and evaluate response  - Consider cultural and social influences on pain and pain management  - Manage/alleviate anxiety  - Utilize distraction and/or relaxation techniques  - Monitor for opioid side effects  - Notify MD/LIP if interventions unsuccessful or patient reports new pain  - Anticipate increased pain with activity and pre-medicate as appropriate  Outcome: Progressing     Problem: SAFETY ADULT - FALL  Goal: Free from fall injury  Description: INTERVENTIONS:  - Assess pt frequently for physical needs  - Identify cognitive and physical deficits and behaviors that affect risk of falls.   - [place] fall precautions as indicated by assessment.  - Educate pt/family on patient safety including physical limitations  - Instruct pt to call for assistance with activity based on assessment  - Modify environment to reduce risk of injury  - Provide assistive devices as appropriate  - Consider OT/PT consult to assist with strengthening/mobility  - Encourage toileting schedule  Outcome: Progressing     Problem: DISCHARGE PLANNING  Goal: Discharge to home or other facility with appropriate resources  Description: INTERVENTIONS:  - Identify barriers to discharge w/pt and caregiver  - Include patient/family/discharge partner in discharge planning  - Arrange for needed discharge resources and transportation as appropriate  - Identify discharge learning needs (meds, wound care, etc)  - Arrange for interpreters to assist at discharge as needed  - Consider post-discharge preferences of patient/family/discharge partner  - Complete POLST form as appropriate  - Assess patient's ability to be responsible for managing their own health  - Refer to Case Management Department for coordinating discharge planning if the patient needs post-hospital services based on physician/LIP order or complex needs related to functional status, cognitive ability or social support system  Outcome: Progressing

## 2023-04-29 NOTE — PLAN OF CARE
Problem: Patient Centered Care  Goal: Patient preferences are identified and integrated in the patient's plan of care  Description: Interventions:  - What would you like us to know as we care for you?   - Provide timely, complete, and accurate information to patient/family  - Incorporate patient and family knowledge, values, beliefs, and cultural backgrounds into the planning and delivery of care  - Encourage patient/family to participate in care and decision-making at the level they choose  - Honor patient and family perspectives and choices  Outcome: Progressing    Problem: PAIN - ADULT  Goal: Verbalizes/displays adequate comfort level or patient's stated pain goal  Description: INTERVENTIONS:  - Encourage pt to monitor pain and request assistance  - Assess pain using appropriate pain scale  - Administer analgesics based on type and severity of pain and evaluate response  - Implement non-pharmacological measures as appropriate and evaluate response  - Consider cultural and social influences on pain and pain management  - Manage/alleviate anxiety  - Utilize distraction and/or relaxation techniques  - Monitor for opioid side effects  - Notify MD/LIP if interventions unsuccessful or patient reports new pain  - Anticipate increased pain with activity and pre-medicate as appropriate  Outcome: Progressing     Problem: SAFETY ADULT - FALL  Goal: Free from fall injury  Description: INTERVENTIONS:  - Assess pt frequently for physical needs  - Identify cognitive and physical deficits and behaviors that affect risk of falls.   - [place] fall precautions as indicated by assessment.  - Educate pt/family on patient safety including physical limitations  - Instruct pt to call for assistance with activity based on assessment  - Modify environment to reduce risk of injury  - Provide assistive devices as appropriate  - Consider OT/PT consult to assist with strengthening/mobility  - Encourage toileting schedule  Outcome: Progressing     Problem: DISCHARGE PLANNING  Goal: Discharge to home or other facility with appropriate resources  Description: INTERVENTIONS:  - Identify barriers to discharge w/pt and caregiver  - Include patient/family/discharge partner in discharge planning  - Arrange for needed discharge resources and transportation as appropriate  - Identify discharge learning needs (meds, wound care, etc)  - Arrange for interpreters to assist at discharge as needed  - Consider post-discharge preferences of patient/family/discharge partner  - Complete POLST form as appropriate  - Assess patient's ability to be responsible for managing their own health  - Refer to Case Management Department for coordinating discharge planning if the patient needs post-hospital services based on physician/LIP order or complex needs related to functional status, cognitive ability or social support system  Outcome: Progressing     Pt A/O x 4, on RA. Increased tube feed to 30ml at 2200, pt tolerating well. Next increase will be at 8am to 40ml. Q6 Accucheck. Pain managed with Morphine Prn. IV ABX continued. Call light within reach, pt calls appropriately.

## 2023-04-30 LAB
ALBUMIN SERPL-MCNC: 2.4 G/DL (ref 3.4–5)
ALBUMIN/GLOB SERPL: 0.8 {RATIO} (ref 1–2)
ALP LIVER SERPL-CCNC: 75 U/L
ALT SERPL-CCNC: 12 U/L
ANION GAP SERPL CALC-SCNC: 4 MMOL/L (ref 0–18)
AST SERPL-CCNC: 10 U/L (ref 15–37)
BASOPHILS # BLD AUTO: 0.02 X10(3) UL (ref 0–0.2)
BASOPHILS NFR BLD AUTO: 0.3 %
BILIRUB SERPL-MCNC: 0.3 MG/DL (ref 0.1–2)
BUN BLD-MCNC: 2 MG/DL (ref 7–18)
BUN/CREAT SERPL: 3.2 (ref 10–20)
CALCIUM BLD-MCNC: 8.5 MG/DL (ref 8.5–10.1)
CHLORIDE SERPL-SCNC: 101 MMOL/L (ref 98–112)
CO2 SERPL-SCNC: 32 MMOL/L (ref 21–32)
CREAT BLD-MCNC: 0.62 MG/DL
DEPRECATED RDW RBC AUTO: 40.9 FL (ref 35.1–46.3)
EOSINOPHIL # BLD AUTO: 0.2 X10(3) UL (ref 0–0.7)
EOSINOPHIL NFR BLD AUTO: 3.3 %
ERYTHROCYTE [DISTWIDTH] IN BLOOD BY AUTOMATED COUNT: 11.5 % (ref 11–15)
GFR SERPLBLD BASED ON 1.73 SQ M-ARVRAT: 119 ML/MIN/1.73M2 (ref 60–?)
GLOBULIN PLAS-MCNC: 2.9 G/DL (ref 2.8–4.4)
GLUCOSE BLD-MCNC: 116 MG/DL (ref 70–99)
GLUCOSE BLDC GLUCOMTR-MCNC: 110 MG/DL (ref 70–99)
GLUCOSE BLDC GLUCOMTR-MCNC: 96 MG/DL (ref 70–99)
HCT VFR BLD AUTO: 39.6 %
HGB BLD-MCNC: 13.5 G/DL
IMM GRANULOCYTES # BLD AUTO: 0.04 X10(3) UL (ref 0–1)
IMM GRANULOCYTES NFR BLD: 0.7 %
LYMPHOCYTES # BLD AUTO: 1.07 X10(3) UL (ref 1–4)
LYMPHOCYTES NFR BLD AUTO: 17.4 %
MAGNESIUM SERPL-MCNC: 1.9 MG/DL (ref 1.6–2.6)
MCH RBC QN AUTO: 32.8 PG (ref 26–34)
MCHC RBC AUTO-ENTMCNC: 34.1 G/DL (ref 31–37)
MCV RBC AUTO: 96.4 FL
MONOCYTES # BLD AUTO: 0.54 X10(3) UL (ref 0.1–1)
MONOCYTES NFR BLD AUTO: 8.8 %
NEUTROPHILS # BLD AUTO: 4.27 X10 (3) UL (ref 1.5–7.7)
NEUTROPHILS # BLD AUTO: 4.27 X10(3) UL (ref 1.5–7.7)
NEUTROPHILS NFR BLD AUTO: 69.5 %
OSMOLALITY SERPL CALC.SUM OF ELEC: 281 MOSM/KG (ref 275–295)
PHOSPHATE SERPL-MCNC: 3.1 MG/DL (ref 2.5–4.9)
PLATELET # BLD AUTO: 243 10(3)UL (ref 150–450)
POTASSIUM SERPL-SCNC: 3.8 MMOL/L (ref 3.5–5.1)
POTASSIUM SERPL-SCNC: 3.8 MMOL/L (ref 3.5–5.1)
PROT SERPL-MCNC: 5.3 G/DL (ref 6.4–8.2)
RBC # BLD AUTO: 4.11 X10(6)UL
SODIUM SERPL-SCNC: 137 MMOL/L (ref 136–145)
WBC # BLD AUTO: 6.1 X10(3) UL (ref 4–11)

## 2023-04-30 PROCEDURE — 99233 SBSQ HOSP IP/OBS HIGH 50: CPT | Performed by: INTERNAL MEDICINE

## 2023-04-30 RX ORDER — LISINOPRIL 10 MG/1
10 TABLET ORAL DAILY
Status: DISCONTINUED | OUTPATIENT
Start: 2023-04-30 | End: 2023-05-03

## 2023-04-30 RX ORDER — TRAMADOL HYDROCHLORIDE 50 MG/1
50 TABLET ORAL EVERY 6 HOURS PRN
Qty: 10 TABLET | Refills: 0 | Status: SHIPPED | OUTPATIENT
Start: 2023-04-30

## 2023-04-30 NOTE — PLAN OF CARE
Patient alert and oriented. Pain intermittent with cramping to abdomen. [place] to left nare, tube feed restarted, declining adjustment to higher rate due to bloating, IVF discontinued and water flushes increased per dietary recommendations. BM x2. Tolerating ice chips. Up ad abdiaziz. Call light within reach, using appropriately. Problem: PAIN - ADULT  Goal: Verbalizes/displays adequate comfort level or patient's stated pain goal  Description: INTERVENTIONS:  - Encourage pt to monitor pain and request assistance  - Assess pain using appropriate pain scale  - Administer analgesics based on type and severity of pain and evaluate response  - Implement non-pharmacological measures as appropriate and evaluate response  - Consider cultural and social influences on pain and pain management  - Manage/alleviate anxiety  - Utilize distraction and/or relaxation techniques  - Monitor for opioid side effects  - Notify MD/LIP if interventions unsuccessful or patient reports new pain  - Anticipate increased pain with activity and pre-medicate as appropriate  Outcome: Progressing     Problem: SAFETY ADULT - FALL  Goal: Free from fall injury  Description: INTERVENTIONS:  - Assess pt frequently for physical needs  - Identify cognitive and physical deficits and behaviors that affect risk of falls.   - [place] fall precautions as indicated by assessment.  - Educate pt/family on patient safety including physical limitations  - Instruct pt to call for assistance with activity based on assessment  - Modify environment to reduce risk of injury  - Provide assistive devices as appropriate  - Consider OT/PT consult to assist with strengthening/mobility  - Encourage toileting schedule  Outcome: Progressing     Problem: DISCHARGE PLANNING  Goal: Discharge to home or other facility with appropriate resources  Description: INTERVENTIONS:  - Identify barriers to discharge w/pt and caregiver  - Include patient/family/discharge partner in discharge planning  - Arrange for needed discharge resources and transportation as appropriate  - Identify discharge learning needs (meds, wound care, etc)  - Arrange for interpreters to assist at discharge as needed  - Consider post-discharge preferences of patient/family/discharge partner  - Complete POLST form as appropriate  - Assess patient's ability to be responsible for managing their own health  - Refer to Case Management Department for coordinating discharge planning if the patient needs post-hospital services based on physician/LIP order or complex needs related to functional status, cognitive ability or social support system  Outcome: Progressing

## 2023-04-30 NOTE — PLAN OF CARE
Problem: Patient Centered Care  Goal: Patient preferences are identified and integrated in the patient's plan of care  Description: Interventions:  - What would you like us to know as we care   - Provide timely, complete, and accurate information to patient/family  - Incorporate patient and family knowledge, values, beliefs, and cultural backgrounds into the planning and delivery of care  - Encourage patient/family to participate in care and decision-making at the level they choose  - Honor patient and family perspectives and choices  Outcome: Progressing     Problem: PAIN - ADULT  Goal: Verbalizes/displays adequate comfort level or patient's stated pain goal  Description: INTERVENTIONS:  - Encourage pt to monitor pain and request assistance  - Assess pain using appropriate pain scale  - Administer analgesics based on type and severity of pain and evaluate response  - Implement non-pharmacological measures as appropriate and evaluate response  - Consider cultural and social influences on pain and pain management  - Manage/alleviate anxiety  - Utilize distraction and/or relaxation techniques  - Monitor for opioid side effects  - Notify MD/LIP if interventions unsuccessful or patient reports new pain  - Anticipate increased pain with activity and pre-medicate as appropriate  Outcome: Progressing     Problem: SAFETY ADULT - FALL  Goal: Free from fall injury  Description: INTERVENTIONS:  - Assess pt frequently for physical needs  - Identify cognitive and physical deficits and behaviors that affect risk of falls.   - [place] fall precautions as indicated by assessment.  - Educate pt/family on patient safety including physical limitations  - Instruct pt to call for assistance with activity based on assessment  - Modify environment to reduce risk of injury  - Provide assistive devices as appropriate  - Consider OT/PT consult to assist with strengthening/mobility  - Encourage toileting schedule  Outcome: Progressing Problem: DISCHARGE PLANNING  Goal: Discharge to home or other facility with appropriate resources  Description: INTERVENTIONS:  - Identify barriers to discharge w/pt and caregiver  - Include patient/family/discharge partner in discharge planning  - Arrange for needed discharge resources and transportation as appropriate  - Identify discharge learning needs (meds, wound care, etc)  - Arrange for interpreters to assist at discharge as needed  - Consider post-discharge preferences of patient/family/discharge partner  - Complete POLST form as appropriate  - Assess patient's ability to be responsible for managing their own health  - Refer to Case Management Department for coordinating discharge planning if the patient needs post-hospital services based on physician/LIP order or complex needs related to functional status, cognitive ability or social support system  Outcome: Progressing     Pt A/O x 4, on RA. Independent in room. Tube feed at 40 ml/hr. this morning, pt c/o feeling full/discomfort. MD notified. Tube feeds for now. Pain managed with Morphine IVP. Zofran given Prn for nausea. IVF and IV Abx continued. Call light within reach; pt calls appropriately.

## 2023-05-01 LAB
BASOPHILS # BLD AUTO: 0.04 X10(3) UL (ref 0–0.2)
BASOPHILS NFR BLD AUTO: 0.6 %
DEPRECATED RDW RBC AUTO: 41.7 FL (ref 35.1–46.3)
EOSINOPHIL # BLD AUTO: 0.23 X10(3) UL (ref 0–0.7)
EOSINOPHIL NFR BLD AUTO: 3.6 %
ERYTHROCYTE [DISTWIDTH] IN BLOOD BY AUTOMATED COUNT: 11.7 % (ref 11–15)
HCT VFR BLD AUTO: 44.2 %
HGB BLD-MCNC: 15 G/DL
IMM GRANULOCYTES # BLD AUTO: 0.02 X10(3) UL (ref 0–1)
IMM GRANULOCYTES NFR BLD: 0.3 %
LYMPHOCYTES # BLD AUTO: 1.46 X10(3) UL (ref 1–4)
LYMPHOCYTES NFR BLD AUTO: 23 %
MCH RBC QN AUTO: 32.9 PG (ref 26–34)
MCHC RBC AUTO-ENTMCNC: 33.9 G/DL (ref 31–37)
MCV RBC AUTO: 96.9 FL
MONOCYTES # BLD AUTO: 0.56 X10(3) UL (ref 0.1–1)
MONOCYTES NFR BLD AUTO: 8.8 %
NEUTROPHILS # BLD AUTO: 4.05 X10 (3) UL (ref 1.5–7.7)
NEUTROPHILS # BLD AUTO: 4.05 X10(3) UL (ref 1.5–7.7)
NEUTROPHILS NFR BLD AUTO: 63.7 %
PLATELET # BLD AUTO: 263 10(3)UL (ref 150–450)
RBC # BLD AUTO: 4.56 X10(6)UL
WBC # BLD AUTO: 6.4 X10(3) UL (ref 4–11)

## 2023-05-01 PROCEDURE — 99232 SBSQ HOSP IP/OBS MODERATE 35: CPT | Performed by: HOSPITALIST

## 2023-05-01 NOTE — PLAN OF CARE
Problem: Patient Centered Care  Goal: Patient preferences are identified and integrated in the patient's plan of care  Description: Interventions:  - Provide timely, complete, and accurate information to patient/family  - Incorporate patient and family knowledge, values, beliefs, and cultural backgrounds into the planning and delivery of care  - Encourage patient/family to participate in care and decision-making at the level they choose  - Honor patient and family perspectives and choices  Outcome: Progressing       Problem: PAIN - ADULT  Goal: Verbalizes/displays adequate comfort level or patient's stated pain goal  Description: INTERVENTIONS:  - Encourage pt to monitor pain and request assistance  - Assess pain using appropriate pain scale  - Administer analgesics based on type and severity of pain and evaluate response  - Implement non-pharmacological measures as appropriate and evaluate response  - Consider cultural and social influences on pain and pain management  - Manage/alleviate anxiety  - Utilize distraction and/or relaxation techniques  - Monitor for opioid side effects  - Notify MD/LIP if interventions unsuccessful or patient reports new pain  - Anticipate increased pain with activity and pre-medicate as appropriate  Outcome: Progressing     Problem: SAFETY ADULT - FALL  Goal: Free from fall injury  Description: INTERVENTIONS:  - Assess pt frequently for physical needs  - Identify cognitive and physical deficits and behaviors that affect risk of falls.   - [place] fall precautions as indicated by assessment.  - Educate pt/family on patient safety including physical limitations  - Instruct pt to call for assistance with activity based on assessment  - Modify environment to reduce risk of injury  - Provide assistive devices as appropriate  - Consider OT/PT consult to assist with strengthening/mobility  - Encourage toileting schedule  Outcome: Progressing     Problem: DISCHARGE PLANNING  Goal: Discharge to home or other facility with appropriate resources  Description: INTERVENTIONS:  - Identify barriers to discharge w/pt and caregiver  - Include patient/family/discharge partner in discharge planning  - Arrange for needed discharge resources and transportation as appropriate  - Identify discharge learning needs (meds, wound care, etc)  - Arrange for interpreters to assist at discharge as needed  - Consider post-discharge preferences of patient/family/discharge partner  - Complete POLST form as appropriate  - Assess patient's ability to be responsible for managing their own health  - Refer to Case Management Department for coordinating discharge planning if the patient needs post-hospital services based on physician/LIP order or complex needs related to functional status, cognitive ability or social support system  Outcome: Progressing    A/O x 4, on RA. Ambulates w/ no device. Elevated BP,  notified MD and obtained order for Lisinopril- see Mar. Pt described worsening discomfort & nausea with tube feeding (30ml/hr), held TF overnight - MD aware. PRN Morphine and Zofran given. IVF at running, IV ABX on.

## 2023-05-01 NOTE — CM/SW NOTE
05/01/23 1200   CM/SW Referral Data   Referral Source Social Work (self-referral)   Reason for Referral Discharge planning   Informant Patient   Medical Hx   Does patient have an established PCP? Yes   Patient Info   Patient's Current Mental Status at Time of Assessment Alert;Oriented   Patient's [address]   Number of Levels in Home 1   Number of Stair in Home 0   Patient lives with Alone   Patient Status Prior to Admission   Independent with ADLs and Mobility Yes   Discharge Needs   Anticipated D/C needs Home health care; Infusion care       Patient discussed in rounds, patient likely to DC with NG, TF at home. As it is very rare for patient's to DC home with NG TF, it will be difficult to secure an accepting home health RN. SW met with patient at bedside, introduced self and role. Patient alert and oriented at time of visit. Patient lives alone at (address correct on face sheet). Patient independent with ADL's at time of visit. Patient states that his parents live close by. Confirmed that Dr. Harriett Lauren is his active PCP. Patient aware that he will DC home with TF. SW discussed home TF process, notified patient that he will be given training by home health RN, but will mostly be independently responsible for TF regimen and care. Patient expressed understanding. Patient does not have further questions or concerns at this time. Per review of [facility], patient accepted by [facility], will need final TF home order to check cost coverage. No accepting home health at this time, extended referral. SW requested [facility] and Leann Day to assist with finding a home health agency. PLAN: Home alone, will have TF via NG. Referrals pending in [facility] for home health (orders/f2f pl) and home infusion, need final TF orders to check coverage. *May have barriers to home health due to NG.       BERNADETTE Kulkarni, [facility]    [id number]

## 2023-05-02 LAB
ANION GAP SERPL CALC-SCNC: 6 MMOL/L (ref 0–18)
BASOPHILS # BLD AUTO: 0.05 X10(3) UL (ref 0–0.2)
BASOPHILS NFR BLD AUTO: 0.8 %
BUN BLD-MCNC: 4 MG/DL (ref 7–18)
BUN/CREAT SERPL: 5.5 (ref 10–20)
CALCIUM BLD-MCNC: 8.9 MG/DL (ref 8.5–10.1)
CHLORIDE SERPL-SCNC: 99 MMOL/L (ref 98–112)
CO2 SERPL-SCNC: 34 MMOL/L (ref 21–32)
CREAT BLD-MCNC: 0.73 MG/DL
DEPRECATED RDW RBC AUTO: 40.9 FL (ref 35.1–46.3)
EOSINOPHIL # BLD AUTO: 0.25 X10(3) UL (ref 0–0.7)
EOSINOPHIL NFR BLD AUTO: 4.1 %
ERYTHROCYTE [DISTWIDTH] IN BLOOD BY AUTOMATED COUNT: 11.7 % (ref 11–15)
GFR SERPLBLD BASED ON 1.73 SQ M-ARVRAT: 113 ML/MIN/1.73M2 (ref 60–?)
GLUCOSE BLD-MCNC: 84 MG/DL (ref 70–99)
HCT VFR BLD AUTO: 40.9 %
HGB BLD-MCNC: 14 G/DL
IMM GRANULOCYTES # BLD AUTO: 0.02 X10(3) UL (ref 0–1)
IMM GRANULOCYTES NFR BLD: 0.3 %
LYMPHOCYTES # BLD AUTO: 1.43 X10(3) UL (ref 1–4)
LYMPHOCYTES NFR BLD AUTO: 23.5 %
MAGNESIUM SERPL-MCNC: 2 MG/DL (ref 1.6–2.6)
MCH RBC QN AUTO: 32.7 PG (ref 26–34)
MCHC RBC AUTO-ENTMCNC: 34.2 G/DL (ref 31–37)
MCV RBC AUTO: 95.6 FL
MONOCYTES # BLD AUTO: 0.66 X10(3) UL (ref 0.1–1)
MONOCYTES NFR BLD AUTO: 10.8 %
NEUTROPHILS # BLD AUTO: 3.68 X10 (3) UL (ref 1.5–7.7)
NEUTROPHILS # BLD AUTO: 3.68 X10(3) UL (ref 1.5–7.7)
NEUTROPHILS NFR BLD AUTO: 60.5 %
OSMOLALITY SERPL CALC.SUM OF ELEC: 284 MOSM/KG (ref 275–295)
PHOSPHATE SERPL-MCNC: 4.4 MG/DL (ref 2.5–4.9)
PLATELET # BLD AUTO: 263 10(3)UL (ref 150–450)
POTASSIUM SERPL-SCNC: 3.4 MMOL/L (ref 3.5–5.1)
RBC # BLD AUTO: 4.28 X10(6)UL
SODIUM SERPL-SCNC: 139 MMOL/L (ref 136–145)
WBC # BLD AUTO: 6.1 X10(3) UL (ref 4–11)

## 2023-05-02 PROCEDURE — 99232 SBSQ HOSP IP/OBS MODERATE 35: CPT | Performed by: HOSPITALIST

## 2023-05-02 NOTE — PLAN OF CARE
Pt is A/O X 4 on R/A. Voiding freely . Passing gas. Ambulating self . NPO , Mray green came out as Pt vomited. PRN Zofran given. Glynn Davila is aware and will see pt. tomorrow. Pt appears to be sleeping peacefully . R-Hand IV Infusing. Safety measures in place. Will continue present monitoring.     Problem: Patient Centered Care  Goal: Patient preferences are identified and integrated in the patient's plan of care  Description: Interventions:  - What would you like us to know as we care for you?   - Provide timely, complete, and accurate information to patient/family  - Incorporate patient and family knowledge, values, beliefs, and cultural backgrounds into the planning and delivery of care  - Encourage patient/family to participate in care and decision-making at the level they choose  - Honor patient and family perspectives and choices  Outcome: Progressing     Problem: Patient/Family Goals  Goal: Patient/Family Long Term Goal  Description: Patient's Long Term Goal:     Interventions:  -   - See additional Care Plan goals for specific interventions  Outcome: Progressing  Goal: Patient/Family Short Term Goal  Description: Patient's Short Term Goal:     Interventions:   -   - See additional Care Plan goals for specific interventions  Outcome: Progressing     Problem: PAIN - ADULT  Goal: Verbalizes/displays adequate comfort level or patient's stated pain goal  Description: INTERVENTIONS:  - Encourage pt to monitor pain and request assistance  - Assess pain using appropriate pain scale  - Administer analgesics based on type and severity of pain and evaluate response  - Implement non-pharmacological measures as appropriate and evaluate response  - Consider cultural and social influences on pain and pain management  - Manage/alleviate anxiety  - Utilize distraction and/or relaxation techniques  - Monitor for opioid side effects  - Notify MD/LIP if interventions unsuccessful or patient reports new pain  - Anticipate increased pain with activity and pre-medicate as appropriate  Outcome: Progressing     Problem: SAFETY ADULT - FALL  Goal: Free from fall injury  Description: INTERVENTIONS:  - Assess pt frequently for physical needs  - Identify cognitive and physical deficits and behaviors that affect risk of falls.   - [place] fall precautions as indicated by assessment.  - Educate pt/family on patient safety including physical limitations  - Instruct pt to call for assistance with activity based on assessment  - Modify environment to reduce risk of injury  - Provide assistive devices as appropriate  - Consider OT/PT consult to assist with strengthening/mobility  - Encourage toileting schedule  Outcome: Progressing     Problem: DISCHARGE PLANNING  Goal: Discharge to home or other facility with appropriate resources  Description: INTERVENTIONS:  - Identify barriers to discharge w/pt and caregiver  - Include patient/family/discharge partner in discharge planning  - Arrange for needed discharge resources and transportation as appropriate  - Identify discharge learning needs (meds, wound care, etc)  - Arrange for interpreters to assist at discharge as needed  - Consider post-discharge preferences of patient/family/discharge partner  - Complete POLST form as appropriate  - Assess patient's ability to be responsible for managing their own health  - Refer to Case Management Department for coordinating discharge planning if the patient needs post-hospital services based on physician/LIP order or complex needs related to functional status, cognitive ability or social support system  Outcome: Progressing

## 2023-05-02 NOTE — PLAN OF CARE
Tolerating clear liquids, little nausea in afternoon but passed, no medication given, up to chair and ambulating self care, no complaints of pain/ little cramping, voiding freely, +bm and gas, potassium replaced per protocol, zosyn. Possible tube placement tomorrow for tube feeding if not tolerating diet. Follow blood work in am.  Patient aware of plan care, all questions answered.        Large emesis,   zofran given      Problem: Patient Centered Care  Goal: Patient preferences are identified and integrated in the patient's plan of care  Description: Interventions:  - What would you like us to know as we care for you? -  - Provide timely, complete, and accurate information to patient/family  - Incorporate patient and family knowledge, values, beliefs, and cultural backgrounds into the planning and delivery of care  - Encourage patient/family to participate in care and decision-making at the level they choose  - Honor patient and family perspectives and choices  Outcome: Progressing     Problem: Patient/Family Goals  Goal: Patient/Family Long Term Goal  Description: Patient's Long Term Goal: -    Interventions:  - -  - See additional Care Plan goals for specific interventions  Outcome: Progressing  Goal: Patient/Family Short Term Goal  Description: Patient's Short Term Goal: -    Interventions:   - -  - See additional Care Plan goals for specific interventions  Outcome: Progressing     Problem: PAIN - ADULT  Goal: Verbalizes/displays adequate comfort level or patient's stated pain goal  Description: INTERVENTIONS:  - Encourage pt to monitor pain and request assistance  - Assess pain using appropriate pain scale  - Administer analgesics based on type and severity of pain and evaluate response  - Implement non-pharmacological measures as appropriate and evaluate response  - Consider cultural and social influences on pain and pain management  - Manage/alleviate anxiety  - Utilize distraction and/or relaxation techniques  - Monitor for opioid side effects  - Notify MD/LIP if interventions unsuccessful or patient reports new pain  - Anticipate increased pain with activity and pre-medicate as appropriate  Outcome: Progressing     Problem: SAFETY ADULT - FALL  Goal: Free from fall injury  Description: INTERVENTIONS:  - Assess pt frequently for physical needs  - Identify cognitive and physical deficits and behaviors that affect risk of falls.   - [place] fall precautions as indicated by assessment.  - Educate pt/family on patient safety including physical limitations  - Instruct pt to call for assistance with activity based on assessment  - Modify environment to reduce risk of injury  - Provide assistive devices as appropriate  - Consider OT/PT consult to assist with strengthening/mobility  - Encourage toileting schedule  Outcome: Progressing     Problem: DISCHARGE PLANNING  Goal: Discharge to home or other facility with appropriate resources  Description: INTERVENTIONS:  - Identify barriers to discharge w/pt and caregiver  - Include patient/family/discharge partner in discharge planning  - Arrange for needed discharge resources and transportation as appropriate  - Identify discharge learning needs (meds, wound care, etc)  - Arrange for interpreters to assist at discharge as needed  - Consider post-discharge preferences of patient/family/discharge partner  - Complete POLST form as appropriate  - Assess patient's ability to be responsible for managing their own health  - Refer to Case Management Department for coordinating discharge planning if the patient needs post-hospital services based on physician/LIP order or complex needs related to functional status, cognitive ability or social support system  Outcome: Progressing

## 2023-05-03 ENCOUNTER — ANESTHESIA EVENT (OUTPATIENT)
Dept: ENDOSCOPY | Facility: HOSPITAL | Age: 48
DRG: 438 | End: 2023-05-03
Payer: COMMERCIAL

## 2023-05-03 ENCOUNTER — APPOINTMENT (OUTPATIENT)
Dept: GENERAL RADIOLOGY | Facility: HOSPITAL | Age: 48
DRG: 438 | End: 2023-05-03
Attending: INTERNAL MEDICINE
Payer: COMMERCIAL

## 2023-05-03 ENCOUNTER — ANESTHESIA (OUTPATIENT)
Dept: ENDOSCOPY | Facility: HOSPITAL | Age: 48
DRG: 438 | End: 2023-05-03
Payer: COMMERCIAL

## 2023-05-03 ENCOUNTER — APPOINTMENT (OUTPATIENT)
Dept: CT IMAGING | Facility: HOSPITAL | Age: 48
DRG: 438 | End: 2023-05-03
Attending: INTERNAL MEDICINE
Payer: COMMERCIAL

## 2023-05-03 LAB
ANION GAP SERPL CALC-SCNC: 8 MMOL/L (ref 0–18)
BASOPHILS # BLD AUTO: 0.06 X10(3) UL (ref 0–0.2)
BASOPHILS NFR BLD AUTO: 1 %
BUN BLD-MCNC: 6 MG/DL (ref 7–18)
BUN/CREAT SERPL: 6.3 (ref 10–20)
CALCIUM BLD-MCNC: 9 MG/DL (ref 8.5–10.1)
CHLORIDE SERPL-SCNC: 98 MMOL/L (ref 98–112)
CO2 SERPL-SCNC: 37 MMOL/L (ref 21–32)
CREAT BLD-MCNC: 0.96 MG/DL
DEPRECATED RDW RBC AUTO: 43.5 FL (ref 35.1–46.3)
EOSINOPHIL # BLD AUTO: 0.11 X10(3) UL (ref 0–0.7)
EOSINOPHIL NFR BLD AUTO: 1.8 %
ERYTHROCYTE [DISTWIDTH] IN BLOOD BY AUTOMATED COUNT: 11.9 % (ref 11–15)
GFR SERPLBLD BASED ON 1.73 SQ M-ARVRAT: 98 ML/MIN/1.73M2 (ref 60–?)
GLUCOSE BLD-MCNC: 106 MG/DL (ref 70–99)
GLUCOSE BLDC GLUCOMTR-MCNC: 112 MG/DL (ref 70–99)
GLUCOSE BLDC GLUCOMTR-MCNC: 69 MG/DL (ref 70–99)
HBV SURFACE AG SER-ACNC: <0.1 [IU]/L
HBV SURFACE AG SERPL QL IA: NONREACTIVE
HCT VFR BLD AUTO: 45.9 %
HCV AB SERPL QL IA: NONREACTIVE
HGB BLD-MCNC: 15.4 G/DL
HIV 1+2 AB+HIV1 P24 AG SERPL QL IA: NONREACTIVE
IMM GRANULOCYTES # BLD AUTO: 0.02 X10(3) UL (ref 0–1)
IMM GRANULOCYTES NFR BLD: 0.3 %
LYMPHOCYTES # BLD AUTO: 1.7 X10(3) UL (ref 1–4)
LYMPHOCYTES NFR BLD AUTO: 27.9 %
MAGNESIUM SERPL-MCNC: 2.1 MG/DL (ref 1.6–2.6)
MCH RBC QN AUTO: 32.7 PG (ref 26–34)
MCHC RBC AUTO-ENTMCNC: 33.6 G/DL (ref 31–37)
MCV RBC AUTO: 97.5 FL
MONOCYTES # BLD AUTO: 0.88 X10(3) UL (ref 0.1–1)
MONOCYTES NFR BLD AUTO: 14.4 %
NEUTROPHILS # BLD AUTO: 3.33 X10 (3) UL (ref 1.5–7.7)
NEUTROPHILS # BLD AUTO: 3.33 X10(3) UL (ref 1.5–7.7)
NEUTROPHILS NFR BLD AUTO: 54.6 %
OSMOLALITY SERPL CALC.SUM OF ELEC: 294 MOSM/KG (ref 275–295)
PHOSPHATE SERPL-MCNC: 4.9 MG/DL (ref 2.5–4.9)
PLATELET # BLD AUTO: 305 10(3)UL (ref 150–450)
POTASSIUM SERPL-SCNC: 3.2 MMOL/L (ref 3.5–5.1)
POTASSIUM SERPL-SCNC: 3.2 MMOL/L (ref 3.5–5.1)
RBC # BLD AUTO: 4.71 X10(6)UL
SODIUM SERPL-SCNC: 143 MMOL/L (ref 136–145)
WBC # BLD AUTO: 6.1 X10(3) UL (ref 4–11)

## 2023-05-03 PROCEDURE — 74018 RADEX ABDOMEN 1 VIEW: CPT | Performed by: INTERNAL MEDICINE

## 2023-05-03 PROCEDURE — 74160 CT ABDOMEN W/CONTRAST: CPT | Performed by: INTERNAL MEDICINE

## 2023-05-03 PROCEDURE — 99233 SBSQ HOSP IP/OBS HIGH 50: CPT | Performed by: INTERNAL MEDICINE

## 2023-05-03 PROCEDURE — 0DHA8UZ INSERTION OF FEEDING DEVICE INTO JEJUNUM, VIA NATURAL OR ARTIFICIAL OPENING ENDOSCOPIC: ICD-10-PCS | Performed by: INTERNAL MEDICINE

## 2023-05-03 RX ORDER — SODIUM CHLORIDE, SODIUM LACTATE, POTASSIUM CHLORIDE, CALCIUM CHLORIDE 600; 310; 30; 20 MG/100ML; MG/100ML; MG/100ML; MG/100ML
INJECTION, SOLUTION INTRAVENOUS CONTINUOUS PRN
Status: DISCONTINUED | OUTPATIENT
Start: 2023-05-03 | End: 2023-05-03 | Stop reason: SURG

## 2023-05-03 RX ORDER — LIDOCAINE HYDROCHLORIDE 10 MG/ML
INJECTION, SOLUTION EPIDURAL; INFILTRATION; INTRACAUDAL; PERINEURAL AS NEEDED
Status: DISCONTINUED | OUTPATIENT
Start: 2023-05-03 | End: 2023-05-03 | Stop reason: SURG

## 2023-05-03 RX ORDER — DEXTROSE MONOHYDRATE 25 G/50ML
INJECTION, SOLUTION INTRAVENOUS
Status: COMPLETED
Start: 2023-05-03 | End: 2023-05-03

## 2023-05-03 RX ADMIN — LIDOCAINE HYDROCHLORIDE 50 MG: 10 INJECTION, SOLUTION EPIDURAL; INFILTRATION; INTRACAUDAL; PERINEURAL at 13:43:00

## 2023-05-03 RX ADMIN — SODIUM CHLORIDE, SODIUM LACTATE, POTASSIUM CHLORIDE, CALCIUM CHLORIDE: 600; 310; 30; 20 INJECTION, SOLUTION INTRAVENOUS at 13:41:00

## 2023-05-03 NOTE — CM/SW NOTE
Patient accepted by [facility] for home TF. Will need finalized TF order and home regimine to complete set up.        BERNADETTE Chavira, [facility]    [id number]

## 2023-05-03 NOTE — OPERATIVE REPORT
OPERATIVE REPORT   PATIENT NAME: Donia Olszewski  MRN: [id number]  DATE OF OPERATION:   5/3/2023  PREOPERATIVE DIAGNOSIS:   1. 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 POSTOPERATIVE DIAGNOSES:  1. Erosive esophagitis, LA grade D  2. Distended stomach with large amount of retained gastric secretions. 3. Gastric antral and duodenal bulb edema without fixed fibrotic stricture  PROCEDURE PERFORMED: Upper endoscopy with placement of 10 Cymraes nasojejunal tube  SURGEON: Reanna Correia MD   MEDICATIONS:  none    ANESTHESIA: MAC  CONSENT: Informed and obtained from the patient  SPECIMEN: None  COMPLICATIONS: None immediately apparent  PROCEDURE AND FINDINGS:     The Olympus upper scope was introduced under direct visualization in the esophagus passed into the stomach and further into the second/third portion of the duodenum. Erosive esophagitis LA grade D was noted in the distal part of the esophagus. Upon entering the stomach large amount of retained liquid/gastric secretion encountered. Approximately 1 L of fluid was aspirated collapsing the stomach. Edema in the antrum was noted, without any fixed fibrotic stricture. The overlying gastric mucosa appeared to be slightly hyperemic. I was able to advance the scope across the pyloric channel into the second/third portion of the duodenal junction. Sharp angulation was encountered during advancement of the scope across the pyloric channel. Edema and slight thickening of the C-sweep duodenal mucosa was noted without obstruction. At this point the scope was removed    And the Olympus therapeutic double channel scope was introduced under direct visualization in the esophagus further into the stomach and distal duodenum. A 10 Cymraes nasojejunal feeding tube was advanced under direct endoscopic visualization. Exchange maneuver was performed while maintaining the tube in place.   The tube was then transferred to the left nostril and secured in position. The tube was flushed with water without any resistance. IMPRESSION:  1. Findings described above  RECOMMENDATIONS:  1. We will obtain abdominal x-ray to assess tube position. 2. Start tube feeding once tube position confirmed to be in the small bowel. 3. Monitor for recurrent signs of gastric outlet obstruction. If recurrent symptoms we might consider endoscopic percutaneous gastrostomy tube for venting with jejunal tube feeding. 4. Repeat CT scan in 4 weeks.   5. PPI bid  Leslee Vines MD

## 2023-05-03 NOTE — PLAN OF CARE
Patient NPO, naso jejunal tube placed, placement verified with x-ray, tube feeding started at 20 ml/hr (see orders regarding increasing the rate), pt can ice chips as needed, K 3.2 replaced per protocol, continue IV fluids and IV ABX. Problem: Patient Centered Care  Goal: Patient preferences are identified and integrated in the patient's plan of care  Description: Interventions:  - What would you like us to know as we care for you?  From home with parents  - Provide timely, complete, and accurate information to patient/family  - Incorporate patient and family knowledge, values, beliefs, and cultural backgrounds into the planning and delivery of care  - Encourage patient/family to participate in care and decision-making at the level they choose  - Honor patient and family perspectives and choices  Outcome: Progressing

## 2023-05-03 NOTE — PLAN OF CARE
Patient is alert and oriented x4, on RA. Getting heparin subcutaneous. Protonix added and given for heart burn. Trialing clear liquid, monitoring NV, antiemetic Zofran Prn given. Voiding up to bathroom, 1x-assist/self. Pain/cramping managed with morphine prn. IV Zosyn abx. IVF infusing with D5.45 with 20K at 50 ml/hr. Plan pending. Problem: Patient Centered Care  Goal: Patient preferences are identified and integrated in the patient's plan of care  Description: Interventions:  - What would you like us to know as we care for you?  From home with parents  - Provide timely, complete, and accurate information to patient/family  - Incorporate patient and family knowledge, values, beliefs, and cultural backgrounds into the planning and delivery of care  - Encourage patient/family to participate in care and decision-making at the level they choose  - Honor patient and family perspectives and choices  Outcome: Progressing     Problem: Patient/Family Goals  Goal: Patient/Family Long Term Goal  Description: Patient's Long Term Goal: To manage condition, get better, and go home     Interventions:  -Monitor labs, results and vitals  -Administer medication as prescribed and prn  -Pain management   -Prevent infection  -Nausea management   -IVF  -IV abx  -Diet tolerance  -I&Os  -Safety, diet, act ab, and hygiene  -Consults  -Imaging/tests/procedure  -Follow plan of care  -Monitor for worsening condition or new onset of symptoms  - See additional Care Plan goals for specific interventions  Outcome: Progressing  Goal: Patient/Family Short Term Goal  Description: Patient's Short Term Goal: To manage NV, diet tolerance    Interventions:   -NV monitoring  -GI consult  -Diet tolerance with dietary on consult  -Tube feeding and Dobbhoff potential   -Antiemetics prn  -Follow plan of care  - See additional Care Plan goals for specific interventions  Outcome: Progressing     Problem: PAIN - ADULT  Goal: Verbalizes/displays adequate comfort level or patient's stated pain goal  Description: INTERVENTIONS:  - Encourage pt to monitor pain and request assistance  - Assess pain using appropriate pain scale  - Administer analgesics based on type and severity of pain and evaluate response  - Implement non-pharmacological measures as appropriate and evaluate response  - Consider cultural and social influences on pain and pain management  - Manage/alleviate anxiety  - Utilize distraction and/or relaxation techniques  - Monitor for opioid side effects  - Notify MD/LIP if interventions unsuccessful or patient reports new pain  - Anticipate increased pain with activity and pre-medicate as appropriate  Outcome: Progressing     Problem: SAFETY ADULT - FALL  Goal: Free from fall injury  Description: INTERVENTIONS:  - Assess pt frequently for physical needs  - Identify cognitive and physical deficits and behaviors that affect risk of falls.   - [place] fall precautions as indicated by assessment.  - Educate pt/family on patient safety including physical limitations  - Instruct pt to call for assistance with activity based on assessment  - Modify environment to reduce risk of injury  - Provide assistive devices as appropriate  - Consider OT/PT consult to assist with strengthening/mobility  - Encourage toileting schedule  Outcome: Progressing     Problem: RISK FOR INFECTION - ADULT  Goal: Absence of fever/infection during anticipated neutropenic period  Description: INTERVENTIONS  - Monitor WBC  - Administer growth factors as ordered  - Implement neutropenic guidelines  Outcome: Progressing     Problem: GASTROINTESTINAL - ADULT  Goal: Minimal or absence of nausea and vomiting  Description: INTERVENTIONS:  - Maintain adequate hydration with IV or PO as ordered and tolerated  - Nasogastric tube to low intermittent suction as ordered  - Evaluate effectiveness of ordered antiemetic medications  - Provide nonpharmacologic comfort measures as appropriate  - Advance diet as tolerated, if ordered  - Obtain nutritional consult as needed  - Evaluate fluid balance  Outcome: Progressing  Goal: Maintains or returns to baseline bowel function  Description: INTERVENTIONS:  - Assess bowel function  - Maintain adequate hydration with IV or PO as ordered and tolerated  - Evaluate effectiveness of GI medications  - Encourage mobilization and activity  - Obtain nutritional consult as needed  - Establish a toileting routine/schedule  - Consider collaborating with pharmacy to review patient's medication profile  Outcome: Progressing  Goal: Maintains adequate nutritional intake (undernourished)  Description: INTERVENTIONS:  - Monitor percentage of each meal consumed  - Identify factors contributing to decreased intake, treat as appropriate  - Assist with meals as needed  - Monitor I&O, WT and lab values  - Obtain nutritional consult as needed  - Optimize oral hygiene and moisture  - Encourage food from home; allow for food preferences  - Enhance eating environment  Outcome: Progressing     Problem: METABOLIC/FLUID AND ELECTROLYTES - ADULT  Goal: Electrolytes maintained within normal limits  Description: INTERVENTIONS:  - Monitor labs and rhythm and assess patient for signs and symptoms of electrolyte imbalances  - Administer electrolyte replacement as ordered  - Monitor response to electrolyte replacements, including rhythm and repeat lab results as appropriate  - Fluid restriction as ordered  - Instruct patient on fluid and nutrition restrictions as appropriate  Outcome: Progressing     Problem: SKIN/TISSUE INTEGRITY - ADULT  Goal: Skin integrity remains intact  Description: INTERVENTIONS  - Assess and document risk factors for pressure ulcer development  - Assess and document skin integrity  - Monitor for areas of redness and/or skin breakdown  - Initiate interventions, skin care algorithm/standards of care as needed  Outcome: Progressing  Goal: Incision(s), wounds(s) or drain site(s) healing without S/S of infection  Description: INTERVENTIONS:  - Assess and document risk factors for pressure ulcer development  - Assess and document skin integrity  - Assess and document dressing/incision, wound bed, drain sites and surrounding tissue  - Implement wound care per orders  - Initiate isolation precautions as appropriate  - Initiate Pressure Ulcer prevention bundle as indicated  Outcome: Progressing     Problem: HEMATOLOGIC - ADULT  Goal: Maintains hematologic stability  Description: INTERVENTIONS  - Assess for signs and symptoms of bleeding or hemorrhage  - Monitor labs and vital signs for trends  - Administer supportive blood products/factors, fluids and medications as ordered and appropriate  - Administer supportive blood products/factors as ordered and appropriate  Outcome: Progressing  Goal: Free from bleeding injury  Description: (Example usage: patient with low platelets)  INTERVENTIONS:  - Avoid intramuscular injections, enemas and rectal medication administration  - Ensure safe mobilization of patient  - Hold pressure on venipuncture sites to achieve adequate hemostasis  - Assess for signs and symptoms of internal bleeding  - Monitor lab trends  - Patient is to report abnormal signs of bleeding to staff  - Avoid use of toothpicks and dental floss  - Use electric shaver for shaving  - Use soft bristle tooth brush  - Limit straining and forceful nose blowing  Outcome: Progressing

## 2023-05-04 ENCOUNTER — ANESTHESIA (OUTPATIENT)
Dept: ENDOSCOPY | Facility: HOSPITAL | Age: 48
DRG: 438 | End: 2023-05-04
Payer: COMMERCIAL

## 2023-05-04 ENCOUNTER — APPOINTMENT (OUTPATIENT)
Dept: GENERAL RADIOLOGY | Facility: HOSPITAL | Age: 48
DRG: 438 | End: 2023-05-04
Attending: INTERNAL MEDICINE
Payer: COMMERCIAL

## 2023-05-04 ENCOUNTER — ANESTHESIA EVENT (OUTPATIENT)
Dept: ENDOSCOPY | Facility: HOSPITAL | Age: 48
DRG: 438 | End: 2023-05-04
Payer: COMMERCIAL

## 2023-05-04 LAB
ANION GAP SERPL CALC-SCNC: 7 MMOL/L (ref 0–18)
BASOPHILS # BLD AUTO: 0.06 X10(3) UL (ref 0–0.2)
BASOPHILS NFR BLD AUTO: 1.1 %
BUN BLD-MCNC: 7 MG/DL (ref 7–18)
BUN/CREAT SERPL: 8.4 (ref 10–20)
CALCIUM BLD-MCNC: 8.3 MG/DL (ref 8.5–10.1)
CHLORIDE SERPL-SCNC: 101 MMOL/L (ref 98–112)
CO2 SERPL-SCNC: 33 MMOL/L (ref 21–32)
CREAT BLD-MCNC: 0.83 MG/DL
DEPRECATED RDW RBC AUTO: 42.5 FL (ref 35.1–46.3)
EOSINOPHIL # BLD AUTO: 0.32 X10(3) UL (ref 0–0.7)
EOSINOPHIL NFR BLD AUTO: 6 %
ERYTHROCYTE [DISTWIDTH] IN BLOOD BY AUTOMATED COUNT: 12 % (ref 11–15)
GFR SERPLBLD BASED ON 1.73 SQ M-ARVRAT: 109 ML/MIN/1.73M2 (ref 60–?)
GLUCOSE BLD-MCNC: 82 MG/DL (ref 70–99)
GLUCOSE BLDC GLUCOMTR-MCNC: 76 MG/DL (ref 70–99)
GLUCOSE BLDC GLUCOMTR-MCNC: 77 MG/DL (ref 70–99)
GLUCOSE BLDC GLUCOMTR-MCNC: 81 MG/DL (ref 70–99)
HCT VFR BLD AUTO: 39.6 %
HGB BLD-MCNC: 13.3 G/DL
IMM GRANULOCYTES # BLD AUTO: 0.01 X10(3) UL (ref 0–1)
IMM GRANULOCYTES NFR BLD: 0.2 %
LYMPHOCYTES # BLD AUTO: 1.92 X10(3) UL (ref 1–4)
LYMPHOCYTES NFR BLD AUTO: 36 %
MCH RBC QN AUTO: 32.3 PG (ref 26–34)
MCHC RBC AUTO-ENTMCNC: 33.6 G/DL (ref 31–37)
MCV RBC AUTO: 96.1 FL
MONOCYTES # BLD AUTO: 0.77 X10(3) UL (ref 0.1–1)
MONOCYTES NFR BLD AUTO: 14.4 %
NEUTROPHILS # BLD AUTO: 2.26 X10 (3) UL (ref 1.5–7.7)
NEUTROPHILS # BLD AUTO: 2.26 X10(3) UL (ref 1.5–7.7)
NEUTROPHILS NFR BLD AUTO: 42.3 %
OSMOLALITY SERPL CALC.SUM OF ELEC: 289 MOSM/KG (ref 275–295)
PLATELET # BLD AUTO: 269 10(3)UL (ref 150–450)
POTASSIUM SERPL-SCNC: 3.2 MMOL/L (ref 3.5–5.1)
POTASSIUM SERPL-SCNC: 3.2 MMOL/L (ref 3.5–5.1)
RBC # BLD AUTO: 4.12 X10(6)UL
SODIUM SERPL-SCNC: 141 MMOL/L (ref 136–145)
WBC # BLD AUTO: 5.3 X10(3) UL (ref 4–11)

## 2023-05-04 PROCEDURE — 0F7D8DZ DILATION OF PANCREATIC DUCT WITH INTRALUMINAL DEVICE, VIA NATURAL OR ARTIFICIAL OPENING ENDOSCOPIC: ICD-10-PCS | Performed by: INTERNAL MEDICINE

## 2023-05-04 PROCEDURE — 0D9680Z DRAINAGE OF STOMACH WITH DRAINAGE DEVICE, VIA NATURAL OR ARTIFICIAL OPENING ENDOSCOPIC: ICD-10-PCS | Performed by: INTERNAL MEDICINE

## 2023-05-04 PROCEDURE — 0DHA8UZ INSERTION OF FEEDING DEVICE INTO JEJUNUM, VIA NATURAL OR ARTIFICIAL OPENING ENDOSCOPIC: ICD-10-PCS | Performed by: INTERNAL MEDICINE

## 2023-05-04 PROCEDURE — 99233 SBSQ HOSP IP/OBS HIGH 50: CPT | Performed by: INTERNAL MEDICINE

## 2023-05-04 PROCEDURE — 74330 X-RAY BILE/PANC ENDOSCOPY: CPT | Performed by: INTERNAL MEDICINE

## 2023-05-04 DEVICE — ZIMMON PANCREATIC STENT
Type: IMPLANTABLE DEVICE | Status: FUNCTIONAL
Brand: ZIMMON

## 2023-05-04 RX ORDER — HYDROMORPHONE HYDROCHLORIDE 1 MG/ML
0.2 INJECTION, SOLUTION INTRAMUSCULAR; INTRAVENOUS; SUBCUTANEOUS EVERY 5 MIN PRN
Status: DISCONTINUED | OUTPATIENT
Start: 2023-05-04 | End: 2023-05-04 | Stop reason: HOSPADM

## 2023-05-04 RX ORDER — MAGNESIUM SULFATE HEPTAHYDRATE 500 MG/ML
INJECTION, SOLUTION INTRAMUSCULAR; INTRAVENOUS AS NEEDED
Status: DISCONTINUED | OUTPATIENT
Start: 2023-05-04 | End: 2023-05-04 | Stop reason: SURG

## 2023-05-04 RX ORDER — MORPHINE SULFATE 4 MG/ML
2 INJECTION, SOLUTION INTRAMUSCULAR; INTRAVENOUS EVERY 10 MIN PRN
Status: DISCONTINUED | OUTPATIENT
Start: 2023-05-04 | End: 2023-05-04 | Stop reason: HOSPADM

## 2023-05-04 RX ORDER — HYDROMORPHONE HYDROCHLORIDE 1 MG/ML
0.6 INJECTION, SOLUTION INTRAMUSCULAR; INTRAVENOUS; SUBCUTANEOUS EVERY 5 MIN PRN
Status: DISCONTINUED | OUTPATIENT
Start: 2023-05-04 | End: 2023-05-04 | Stop reason: HOSPADM

## 2023-05-04 RX ORDER — MORPHINE SULFATE 4 MG/ML
4 INJECTION, SOLUTION INTRAMUSCULAR; INTRAVENOUS EVERY 10 MIN PRN
Status: DISCONTINUED | OUTPATIENT
Start: 2023-05-04 | End: 2023-05-04 | Stop reason: HOSPADM

## 2023-05-04 RX ORDER — NALOXONE HYDROCHLORIDE 0.4 MG/ML
80 INJECTION, SOLUTION INTRAMUSCULAR; INTRAVENOUS; SUBCUTANEOUS AS NEEDED
Status: DISCONTINUED | OUTPATIENT
Start: 2023-05-04 | End: 2023-05-04 | Stop reason: HOSPADM

## 2023-05-04 RX ORDER — DEXAMETHASONE SODIUM PHOSPHATE 4 MG/ML
VIAL (ML) INJECTION AS NEEDED
Status: DISCONTINUED | OUTPATIENT
Start: 2023-05-04 | End: 2023-05-04 | Stop reason: SURG

## 2023-05-04 RX ORDER — PHENYLEPHRINE HCL 10 MG/ML
VIAL (ML) INJECTION AS NEEDED
Status: DISCONTINUED | OUTPATIENT
Start: 2023-05-04 | End: 2023-05-04 | Stop reason: SURG

## 2023-05-04 RX ORDER — ONDANSETRON 2 MG/ML
INJECTION INTRAMUSCULAR; INTRAVENOUS AS NEEDED
Status: DISCONTINUED | OUTPATIENT
Start: 2023-05-04 | End: 2023-05-04 | Stop reason: SURG

## 2023-05-04 RX ORDER — MORPHINE SULFATE 10 MG/ML
6 INJECTION, SOLUTION INTRAMUSCULAR; INTRAVENOUS EVERY 10 MIN PRN
Status: DISCONTINUED | OUTPATIENT
Start: 2023-05-04 | End: 2023-05-04 | Stop reason: HOSPADM

## 2023-05-04 RX ORDER — LIDOCAINE HYDROCHLORIDE 10 MG/ML
INJECTION, SOLUTION EPIDURAL; INFILTRATION; INTRACAUDAL; PERINEURAL AS NEEDED
Status: DISCONTINUED | OUTPATIENT
Start: 2023-05-04 | End: 2023-05-04 | Stop reason: SURG

## 2023-05-04 RX ORDER — SODIUM CHLORIDE, SODIUM LACTATE, POTASSIUM CHLORIDE, CALCIUM CHLORIDE 600; 310; 30; 20 MG/100ML; MG/100ML; MG/100ML; MG/100ML
INJECTION, SOLUTION INTRAVENOUS CONTINUOUS PRN
Status: DISCONTINUED | OUTPATIENT
Start: 2023-05-04 | End: 2023-05-04 | Stop reason: SURG

## 2023-05-04 RX ORDER — ROCURONIUM BROMIDE 10 MG/ML
INJECTION, SOLUTION INTRAVENOUS AS NEEDED
Status: DISCONTINUED | OUTPATIENT
Start: 2023-05-04 | End: 2023-05-04 | Stop reason: SURG

## 2023-05-04 RX ORDER — ONDANSETRON 2 MG/ML
4 INJECTION INTRAMUSCULAR; INTRAVENOUS EVERY 6 HOURS PRN
Status: DISCONTINUED | OUTPATIENT
Start: 2023-05-04 | End: 2023-05-04 | Stop reason: HOSPADM

## 2023-05-04 RX ORDER — PROCHLORPERAZINE EDISYLATE 5 MG/ML
5 INJECTION INTRAMUSCULAR; INTRAVENOUS EVERY 8 HOURS PRN
Status: DISCONTINUED | OUTPATIENT
Start: 2023-05-04 | End: 2023-05-04 | Stop reason: HOSPADM

## 2023-05-04 RX ORDER — HYDROMORPHONE HYDROCHLORIDE 1 MG/ML
0.4 INJECTION, SOLUTION INTRAMUSCULAR; INTRAVENOUS; SUBCUTANEOUS EVERY 5 MIN PRN
Status: DISCONTINUED | OUTPATIENT
Start: 2023-05-04 | End: 2023-05-04 | Stop reason: HOSPADM

## 2023-05-04 RX ORDER — SODIUM CHLORIDE, SODIUM LACTATE, POTASSIUM CHLORIDE, CALCIUM CHLORIDE 600; 310; 30; 20 MG/100ML; MG/100ML; MG/100ML; MG/100ML
INJECTION, SOLUTION INTRAVENOUS CONTINUOUS
Status: DISCONTINUED | OUTPATIENT
Start: 2023-05-04 | End: 2023-05-04 | Stop reason: HOSPADM

## 2023-05-04 RX ADMIN — PHENYLEPHRINE HCL 100 MCG: 10 MG/ML VIAL (ML) INJECTION at 17:16:00

## 2023-05-04 RX ADMIN — LIDOCAINE HYDROCHLORIDE 50 MG: 10 INJECTION, SOLUTION EPIDURAL; INFILTRATION; INTRACAUDAL; PERINEURAL at 16:10:00

## 2023-05-04 RX ADMIN — MAGNESIUM SULFATE HEPTAHYDRATE 0.5 G: 500 INJECTION, SOLUTION INTRAMUSCULAR; INTRAVENOUS at 16:10:00

## 2023-05-04 RX ADMIN — ROCURONIUM BROMIDE 10 MG: 10 INJECTION, SOLUTION INTRAVENOUS at 16:10:00

## 2023-05-04 RX ADMIN — LIDOCAINE HYDROCHLORIDE 50 MG: 10 INJECTION, SOLUTION EPIDURAL; INFILTRATION; INTRACAUDAL; PERINEURAL at 16:04:00

## 2023-05-04 RX ADMIN — SODIUM CHLORIDE, SODIUM LACTATE, POTASSIUM CHLORIDE, CALCIUM CHLORIDE: 600; 310; 30; 20 INJECTION, SOLUTION INTRAVENOUS at 16:00:00

## 2023-05-04 RX ADMIN — MAGNESIUM SULFATE HEPTAHYDRATE 0.5 G: 500 INJECTION, SOLUTION INTRAMUSCULAR; INTRAVENOUS at 16:20:00

## 2023-05-04 RX ADMIN — SODIUM CHLORIDE, SODIUM LACTATE, POTASSIUM CHLORIDE, CALCIUM CHLORIDE: 600; 310; 30; 20 INJECTION, SOLUTION INTRAVENOUS at 16:48:00

## 2023-05-04 RX ADMIN — ONDANSETRON 4 MG: 2 INJECTION INTRAMUSCULAR; INTRAVENOUS at 16:04:00

## 2023-05-04 RX ADMIN — ROCURONIUM BROMIDE 20 MG: 10 INJECTION, SOLUTION INTRAVENOUS at 16:19:00

## 2023-05-04 RX ADMIN — MAGNESIUM SULFATE HEPTAHYDRATE 1 G: 500 INJECTION, SOLUTION INTRAMUSCULAR; INTRAVENOUS at 16:41:00

## 2023-05-04 RX ADMIN — DEXAMETHASONE SODIUM PHOSPHATE 8 MG: 4 MG/ML VIAL (ML) INJECTION at 16:13:00

## 2023-05-04 NOTE — PROGRESS NOTES
05/04/23 1408   Clinical Encounter Type   Routine Visit Introduction   Scientology Encounters   Spiritual Requests During Visit / Hospitalization Declines  Visit     Discussion:  offered visit due to pt's length of stay. Pt seen bedside, sitting up in bed, with visitor present. Pt declined visit.  provided written information on role and advised on  availability, for pt to request visit as needed.  follow-up visit available prn and can be contacted at [phone].     Silke Joy Chaplain Resident

## 2023-05-04 NOTE — OPERATIVE REPORT
OPERATIVE REPORT   PATIENT NAME: Deonte Rosa  MRN: [id number]  DATE OF OPERATION:  5/4/2023  PREOPERATIVE DIAGNOSIS:   1. 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 Follow-up CT scan after placement of nasojejunal tube for feeding revealed persisting gastric ulcer obstruction with progression of the gastric wall inflammation and groove pancreatitis. Scheduled today for ERCP to rule out disruption of the pancreatic duct and possible pancreatic duct stent placement as well as percutaneous endoscopic gastrostomy tube for venting and jejunostomy feeding tube extension placement. POSTOPERATIVE DIAGNOSES:  1. Erosive esophagitis. 2. Gastric obstruction with aspiration approximately 2500 cc of gastric content. 3. Gastric antral wall and duodenal bulb edema. 4. Prominent pancreatic duct without obvious leak  PROCEDURE PERFORMED:     ERCP with pancreatic sphincterotomy and placement of 7 Fr-7 cm long single pigtail,   Upper endoscopy and placement 24 Fr G tube and 12 Fr J-tube feeding extension   SURGEON: Mis Reilly MD   MEDICATIONS:  Ancef 1 gm, IVPB x 1, indocin 100 mg suppositories given to minimize procedure related pancreatitis    ANESTHESIA: general anesthesia   CONSENT:  The procedure and risks of procedure was discussed with the patient in detail including but not limited to bleeding, perforation, medication reaction, infection and pancreatitis which can be mild to severe with prolonged hospital stay leading up to complications and even death. Alternatives and options, as well as rationale was discussed. Patient verbalizes understanding. All questions answered.     SPECIMEN: none   COMPLICATIONS: None immediately apparent  PROCEDURE AND FINDINGS:   After patient the patient in the prone position the Olympus therapeutic duodenoscope was introduced under direct visualization in the esophagus passed into the stomach. The stomach appeared to be distended. Large amount of gastric content was encountered. The stomach was decompressed after aspirating approximately 2500 cc. The antral wall appeared to be edematous. Were able to navigate the scope across the pylorus into the second portion of the duodenum with some challenges. The major papilla appeared unremarkable. The minor papilla appeared to be prominent with surrounding edema and inflammation. No stricture was noted. Utilizing the true [address] catheter preloaded with 0.025 revolution angled tip Jagwire the major papilla was cannulated and retrograde contrast injection revealed prominent pancreatic duct up to the body and tail. No obvious extravasation of contrast seen. The catheter was advanced towards the tail of the pancreas and the catheter was then pulled back into sphincterotomy position. Pancreatic sphincterotomy was performed. Next over the guidewire for a 7 Western Cira- 7 cm long single pigtail pancreatic duct stent was placed with good contrast and pancreatic juice drainage. Next the scope was then removed. The patient was placed in supine position and the Olympus standard adult upper scope was introduced into the esophagus passed into the stomach. The site for the G-tube placement was selected by observing the gastric wall indentation and transillumination. The area was cleaned with antiseptic solution, infiltrated with lidocaine. The needle containing lidocaine was then advanced into the gastric lumen while applying negative pressure, bubbles were seen once the needle tip entered the gastric lumen. Next a small incision was made in the needle with trocar was advanced into the gastric lumen. The needle was removed leaving the trocar in place.   A guidewire was advanced into the gastric lumen grasped with snare polypectomy and pulled through the patient mouth. 25 [place] gastrostomy tube was connected to the guidewire and pulled through the abdominal wall incision site where the internal bolster rested against internal gastric wall. Next the 12 [place] jejunostomy tube was advanced through the 24 Western Cira G-tube. A 0.035 mm straight tip guidewire was advanced through the J-tube. The J-tube tip suture was grasped with rat-tooth forceps and the scope was advanced into the duodenum along with the G-tube. At this point under fluoroscopy a guidewire was advanced deep into the proximal jejunum. The scope was pulled back into the stomach and the J-tube was advanced further beyond the ligament of Treitz and secured in position. Contrast was injected through the jejunostomy tube and seen filling the jejunum. The J-tube was then flushed with water. Topical antibiotics were applied at the G-tube site. The scope was then removed. IMPRESSION:  1. Findings described above  RECOMMENDATIONS:  1. Connect G tube port to gravity  2. We will start J-tube feeding in am if doing well. 3. Most likely repeat CT scan in 4 weeks and assess the response to above treatment. We will decide on the timing of repeat ERCP, stent removal and advancing diet after follow-up imaging.   Valerie Lopez MD

## 2023-05-04 NOTE — ANESTHESIA PROCEDURE NOTES
Airway  Date/Time: 5/4/2023 4:12 PM  Urgency: elective    Airway not difficult    General Information and Staff    Patient location during procedure: endo  Resident/CRNA: Rimma Zaman CRNA  Performed: CRNA   Performed by: Rimma Zaman CRNA  Authorized by: Rimma Zaman CRNA      Indications and Patient Condition  Indications for airway management: airway protection and anesthesia  Sedation level: deep  Preoxygenated: yes  Patient position: sniffing  Mask difficulty assessment: 0 - not attempted    Final Airway Details  Final airway type: endotracheal airway      Successful airway: ETT  Cuffed: yes   Successful intubation technique: Video laryngoscopy  Facilitating devices/methods: cricoid pressure  Endotracheal tube insertion site: oral  Blade: GlideScope  Blade size: #4  ETT size (mm): 7.5    Cormack-Lehane Classification: grade I - full view of glottis  Placement verified by: chest auscultation   Cuff volume (mL): 8  Measured from: teeth  ETT to teeth (cm): 22    Additional Comments  Atraumatic insertion, dentition and soft structures as preop. Pediatric endo bite block placed by Endo RN. No bile or colored fluid noted during video laryngoscopy.

## 2023-05-04 NOTE — PLAN OF CARE
Patient is alert and oriented x4, on RA. Heparin subcutaneous on hold per MD. Nasoduodenal tube in place, receiving tube feeding, Jevity 1.2 at 30ml/hr now, increased by order by 10ml/hr Q10 till goal rate of 70m/hr, was at 20ml/hr at start of shift. Monitoring NV. Voiding up to bathroom, 1x-assist/self. Pain/cramping managed with morphine prn. IV Zosyn abx, finished dose overnight. IVF infusing with D5.45 with 20K at 50 ml/hr. Finish replacing K during shift. Went down for CT abdomen with contrast at start of shift. Plan pending. Problem: Patient Centered Care  Goal: Patient preferences are identified and integrated in the patient's plan of care  Description: Interventions:  - What would you like us to know as we care for you?  From home with parents  - Provide timely, complete, and accurate information to patient/family  - Incorporate patient and family knowledge, values, beliefs, and cultural backgrounds into the planning and delivery of care  - Encourage patient/family to participate in care and decision-making at the level they choose  - Honor patient and family perspectives and choices  Outcome: Progressing     Problem: Patient/Family Goals  Goal: Patient/Family Long Term Goal  Description: Patient's Long Term Goal: To manage condition, get better, and go home     Interventions:  -Monitor labs, results and vitals  -Administer medication as prescribed and prn  -Pain management   -Prevent infection  -Nausea management   -IVF  -IV abx  -Diet tolerance  -I&Os  -Safety, diet, act ab, and hygiene  -Consults  -Imaging/tests/procedure  -Follow plan of care  -Monitor for worsening condition or new onset of symptoms  - See additional Care Plan goals for specific interventions  Outcome: Progressing  Goal: Patient/Family Short Term Goal  Description: Patient's Short Term Goal: To manage NV, diet tolerance    Interventions:   -NV monitoring  -GI consult  -Diet tolerance with dietary on consult  -Tube feeding and [place] potential   -Antiemetics prn  -Follow plan of care  - See additional Care Plan goals for specific interventions  Outcome: Progressing     Problem: PAIN - ADULT  Goal: Verbalizes/displays adequate comfort level or patient's stated pain goal  Description: INTERVENTIONS:  - Encourage pt to monitor pain and request assistance  - Assess pain using appropriate pain scale  - Administer analgesics based on type and severity of pain and evaluate response  - Implement non-pharmacological measures as appropriate and evaluate response  - Consider cultural and social influences on pain and pain management  - Manage/alleviate anxiety  - Utilize distraction and/or relaxation techniques  - Monitor for opioid side effects  - Notify MD/LIP if interventions unsuccessful or patient reports new pain  - Anticipate increased pain with activity and pre-medicate as appropriate  Outcome: Progressing     Problem: SAFETY ADULT - FALL  Goal: Free from fall injury  Description: INTERVENTIONS:  - Assess pt frequently for physical needs  - Identify cognitive and physical deficits and behaviors that affect risk of falls.   - [place] fall precautions as indicated by assessment.  - Educate pt/family on patient safety including physical limitations  - Instruct pt to call for assistance with activity based on assessment  - Modify environment to reduce risk of injury  - Provide assistive devices as appropriate  - Consider OT/PT consult to assist with strengthening/mobility  - Encourage toileting schedule  Outcome: Progressing     Problem: RISK FOR INFECTION - ADULT  Goal: Absence of fever/infection during anticipated neutropenic period  Description: INTERVENTIONS  - Monitor WBC  - Administer growth factors as ordered  - Implement neutropenic guidelines  Outcome: Progressing     Problem: GASTROINTESTINAL - ADULT  Goal: Minimal or absence of nausea and vomiting  Description: INTERVENTIONS:  - Maintain adequate hydration with IV or PO as ordered and tolerated  - Nasogastric tube to low intermittent suction as ordered  - Evaluate effectiveness of ordered antiemetic medications  - Provide nonpharmacologic comfort measures as appropriate  - Advance diet as tolerated, if ordered  - Obtain nutritional consult as needed  - Evaluate fluid balance  Outcome: Progressing  Goal: Maintains or returns to baseline bowel function  Description: INTERVENTIONS:  - Assess bowel function  - Maintain adequate hydration with IV or PO as ordered and tolerated  - Evaluate effectiveness of GI medications  - Encourage mobilization and activity  - Obtain nutritional consult as needed  - Establish a toileting routine/schedule  - Consider collaborating with pharmacy to review patient's medication profile  Outcome: Progressing  Goal: Maintains adequate nutritional intake (undernourished)  Description: INTERVENTIONS:  - Monitor percentage of each meal consumed  - Identify factors contributing to decreased intake, treat as appropriate  - Assist with meals as needed  - Monitor I&O, WT and lab values  - Obtain nutritional consult as needed  - Optimize oral hygiene and moisture  - Encourage food from home; allow for food preferences  - Enhance eating environment  Outcome: Progressing     Problem: METABOLIC/FLUID AND ELECTROLYTES - ADULT  Goal: Electrolytes maintained within normal limits  Description: INTERVENTIONS:  - Monitor labs and rhythm and assess patient for signs and symptoms of electrolyte imbalances  - Administer electrolyte replacement as ordered  - Monitor response to electrolyte replacements, including rhythm and repeat lab results as appropriate  - Fluid restriction as ordered  - Instruct patient on fluid and nutrition restrictions as appropriate  Outcome: Progressing     Problem: SKIN/TISSUE INTEGRITY - ADULT  Goal: Skin integrity remains intact  Description: INTERVENTIONS  - Assess and document risk factors for pressure ulcer development  - Assess and document skin integrity  - Monitor for areas of redness and/or skin breakdown  - Initiate interventions, skin care algorithm/standards of care as needed  Outcome: Progressing  Goal: Incision(s), wounds(s) or drain site(s) healing without S/S of infection  Description: INTERVENTIONS:  - Assess and document risk factors for pressure ulcer development  - Assess and document skin integrity  - Assess and document dressing/incision, wound bed, drain sites and surrounding tissue  - Implement wound care per orders  - Initiate isolation precautions as appropriate  - Initiate Pressure Ulcer prevention bundle as indicated  Outcome: Progressing     Problem: HEMATOLOGIC - ADULT  Goal: Maintains hematologic stability  Description: INTERVENTIONS  - Assess for signs and symptoms of bleeding or hemorrhage  - Monitor labs and vital signs for trends  - Administer supportive blood products/factors, fluids and medications as ordered and appropriate  - Administer supportive blood products/factors as ordered and appropriate  Outcome: Progressing  Goal: Free from bleeding injury  Description: (Example usage: patient with low platelets)  INTERVENTIONS:  - Avoid intramuscular injections, enemas and rectal medication administration  - Ensure safe mobilization of patient  - Hold pressure on venipuncture sites to achieve adequate hemostasis  - Assess for signs and symptoms of internal bleeding  - Monitor lab trends  - Patient is to report abnormal signs of bleeding to staff  - Avoid use of toothpicks and dental floss  - Use electric shaver for shaving  - Use soft bristle tooth brush  - Limit straining and forceful nose blowing  Outcome: Progressing

## 2023-05-04 NOTE — ANESTHESIA POSTPROCEDURE EVALUATION
Patient: Ceasar Boston    Procedure Summary     Date: 05/04/23 Room / Location: [address] [place] [address] ENDOSCOPY 01 / [address] [place] [address] ENDOSCOPY    Anesthesia Start: 6800 Anesthesia Stop: 3972    Procedures:       ENDOSCOPIC RETROGRADE CHOLANGIOPANCREATOGRAPHY (ERCP)      PERCUTANEOUS ENDOSCOPIC GASTROSTOMY PLACEMENT/JEJUNOSTOMY TUBE PLACEMENT Diagnosis: (dilated pancreatic duct, stent placement, gastric outlet obstruction, esophagitis, jejnunal tube placement)    Surgeons: Sharon Martinez MD Anesthesiologist: Brittany Pearce CRNA    Anesthesia Type: general ASA Status: 3          Anesthesia Type: general    Vitals Value Taken Time   /92 05/04/23 1802   Temp 97 05/04/23 1803   Pulse 71 05/04/23 1802   Resp 9 05/04/23 1802   SpO2 100 % 05/04/23 1802   Vitals shown include unvalidated device data.     [address] [place] [address] AN Post Evaluation:   Patient Evaluated in PACU  Patient Participation: complete - patient participated  Level of Consciousness: awake  Pain Score: 0  Pain Management: adequate  Airway Patency:patent  Dental exam unchanged from preop  Yes    Cardiovascular Status: acceptable  Respiratory Status: acceptable  Postoperative Hydration acceptable      Alex Gonzalez CRNA  5/4/2023 6:03 PM

## 2023-05-05 LAB
ANION GAP SERPL CALC-SCNC: 7 MMOL/L (ref 0–18)
BASOPHILS # BLD AUTO: 0.02 X10(3) UL (ref 0–0.2)
BASOPHILS NFR BLD AUTO: 0.3 %
BUN BLD-MCNC: 9 MG/DL (ref 7–18)
BUN/CREAT SERPL: 13 (ref 10–20)
CALCIUM BLD-MCNC: 8.4 MG/DL (ref 8.5–10.1)
CHLORIDE SERPL-SCNC: 103 MMOL/L (ref 98–112)
CO2 SERPL-SCNC: 29 MMOL/L (ref 21–32)
CREAT BLD-MCNC: 0.69 MG/DL
DEPRECATED RDW RBC AUTO: 41.1 FL (ref 35.1–46.3)
EOSINOPHIL # BLD AUTO: 0.01 X10(3) UL (ref 0–0.7)
EOSINOPHIL NFR BLD AUTO: 0.2 %
ERYTHROCYTE [DISTWIDTH] IN BLOOD BY AUTOMATED COUNT: 11.6 % (ref 11–15)
GFR SERPLBLD BASED ON 1.73 SQ M-ARVRAT: 115 ML/MIN/1.73M2 (ref 60–?)
GLUCOSE BLD-MCNC: 103 MG/DL (ref 70–99)
GLUCOSE BLDC GLUCOMTR-MCNC: 102 MG/DL (ref 70–99)
GLUCOSE BLDC GLUCOMTR-MCNC: 81 MG/DL (ref 70–99)
HCT VFR BLD AUTO: 40 %
HGB BLD-MCNC: 13.4 G/DL
IMM GRANULOCYTES # BLD AUTO: 0.03 X10(3) UL (ref 0–1)
IMM GRANULOCYTES NFR BLD: 0.5 %
LYMPHOCYTES # BLD AUTO: 0.85 X10(3) UL (ref 1–4)
LYMPHOCYTES NFR BLD AUTO: 13.6 %
MCH RBC QN AUTO: 32.3 PG (ref 26–34)
MCHC RBC AUTO-ENTMCNC: 33.5 G/DL (ref 31–37)
MCV RBC AUTO: 96.4 FL
MONOCYTES # BLD AUTO: 0.69 X10(3) UL (ref 0.1–1)
MONOCYTES NFR BLD AUTO: 11.1 %
NEUTROPHILS # BLD AUTO: 4.64 X10 (3) UL (ref 1.5–7.7)
NEUTROPHILS # BLD AUTO: 4.64 X10(3) UL (ref 1.5–7.7)
NEUTROPHILS NFR BLD AUTO: 74.3 %
OSMOLALITY SERPL CALC.SUM OF ELEC: 287 MOSM/KG (ref 275–295)
PLATELET # BLD AUTO: 306 10(3)UL (ref 150–450)
POTASSIUM SERPL-SCNC: 3.9 MMOL/L (ref 3.5–5.1)
POTASSIUM SERPL-SCNC: 3.9 MMOL/L (ref 3.5–5.1)
RBC # BLD AUTO: 4.15 X10(6)UL
SODIUM SERPL-SCNC: 139 MMOL/L (ref 136–145)
WBC # BLD AUTO: 6.2 X10(3) UL (ref 4–11)

## 2023-05-05 PROCEDURE — 99233 SBSQ HOSP IP/OBS HIGH 50: CPT | Performed by: INTERNAL MEDICINE

## 2023-05-05 NOTE — PLAN OF CARE
Patient alert, K replaced per protocol, pt had endo procedure, placement of J-tube, NPO today may have ice chips, tube feeding to start tomorrow after GI approval, J-tube connected to stone bag to gravity, medicated for pain as ordered, continue IV fluids. Problem: Patient Centered Care  Goal: Patient preferences are identified and integrated in the patient's plan of care  Description: Interventions:  - What would you like us to know as we care for you?  From home with parents  - Provide timely, complete, and accurate information to patient/family  - Incorporate patient and family knowledge, values, beliefs, and cultural backgrounds into the planning and delivery of care  - Encourage patient/family to participate in care and decision-making at the level they choose  - Honor patient and family perspectives and choices  Outcome: Progressing

## 2023-05-05 NOTE — PLAN OF CARE
Patient alert oriented times 4. Ambulating and vioding freely. Pt had endo procedure, placement of J-tube 05/04/2023, Tube feeding to started today at 20ml/hr. No complains of N/V/D. J-tube connected to stone bag to gravity, Morphine for pain as ordered, continue IV fluids. Problem: Patient Centered Care  Goal: Patient preferences are identified and integrated in the patient's plan of care  Description: Interventions:  - What would you like us to know as we care for you?  From home with parents  - Provide timely, complete, and accurate information to patient/family  - Incorporate patient and family knowledge, values, beliefs, and cultural backgrounds into the planning and delivery of care  - Encourage patient/family to participate in care and decision-making at the level they choose  - Honor patient and family perspectives and choices  Outcome: Progressing     Problem: Patient/Family Goals  Goal: Patient/Family Long Term Goal  Description: Patient's Long Term Goal: To manage condition, get better, and go home     Interventions:  -Monitor labs, results and vitals  -Administer medication as prescribed and prn  -Pain management   -Prevent infection  -Nausea management   -IVF  -IV abx  -Diet tolerance  -I&Os  -Safety, diet, act ab, and hygiene  -Consults  -Imaging/tests/procedure  -Follow plan of care  -Monitor for worsening condition or new onset of symptoms  - See additional Care Plan goals for specific interventions  Outcome: Progressing  Goal: Patient/Family Short Term Goal  Description: Patient's Short Term Goal: To manage NV, diet tolerance    Interventions:   -NV monitoring  -GI consult  -Diet tolerance with dietary on consult  -Tube feeding and [place] potential   -Antiemetics prn  -Follow plan of care  - See additional Care Plan goals for specific interventions  Outcome: Progressing     Problem: PAIN - ADULT  Goal: Verbalizes/displays adequate comfort level or patient's stated pain goal  Description: INTERVENTIONS:  - Encourage pt to monitor pain and request assistance  - Assess pain using appropriate pain scale  - Administer analgesics based on type and severity of pain and evaluate response  - Implement non-pharmacological measures as appropriate and evaluate response  - Consider cultural and social influences on pain and pain management  - Manage/alleviate anxiety  - Utilize distraction and/or relaxation techniques  - Monitor for opioid side effects  - Notify MD/LIP if interventions unsuccessful or patient reports new pain  - Anticipate increased pain with activity and pre-medicate as appropriate  Outcome: Progressing     Problem: SAFETY ADULT - FALL  Goal: Free from fall injury  Description: INTERVENTIONS:  - Assess pt frequently for physical needs  - Identify cognitive and physical deficits and behaviors that affect risk of falls.   - [place] fall precautions as indicated by assessment.  - Educate pt/family on patient safety including physical limitations  - Instruct pt to call for assistance with activity based on assessment  - Modify environment to reduce risk of injury  - Provide assistive devices as appropriate  - Consider OT/PT consult to assist with strengthening/mobility  - Encourage toileting schedule  Outcome: Progressing     Problem: DISCHARGE PLANNING  Goal: Discharge to home or other facility with appropriate resources  Description: INTERVENTIONS:  - Identify barriers to discharge w/pt and caregiver  - Include patient/family/discharge partner in discharge planning  - Arrange for needed discharge resources and transportation as appropriate  - Identify discharge learning needs (meds, wound care, etc)  - Arrange for interpreters to assist at discharge as needed  - Consider post-discharge preferences of patient/family/discharge partner  - Complete POLST form as appropriate  - Assess patient's ability to be responsible for managing their own health  - Refer to Case Management Department for coordinating discharge planning if the patient needs post-hospital services based on physician/LIP order or complex needs related to functional status, cognitive ability or social support system  Outcome: Progressing     Problem: RISK FOR INFECTION - ADULT  Goal: Absence of fever/infection during anticipated neutropenic period  Description: INTERVENTIONS  - Monitor WBC  - Administer growth factors as ordered  - Implement neutropenic guidelines  Outcome: Progressing     Problem: GASTROINTESTINAL - ADULT  Goal: Minimal or absence of nausea and vomiting  Description: INTERVENTIONS:  - Maintain adequate hydration with IV or PO as ordered and tolerated  - Nasogastric tube to low intermittent suction as ordered  - Evaluate effectiveness of ordered antiemetic medications  - Provide nonpharmacologic comfort measures as appropriate  - Advance diet as tolerated, if ordered  - Obtain nutritional consult as needed  - Evaluate fluid balance  Outcome: Progressing  Goal: Maintains or returns to baseline bowel function  Description: INTERVENTIONS:  - Assess bowel function  - Maintain adequate hydration with IV or PO as ordered and tolerated  - Evaluate effectiveness of GI medications  - Encourage mobilization and activity  - Obtain nutritional consult as needed  - Establish a toileting routine/schedule  - Consider collaborating with pharmacy to review patient's medication profile  Outcome: Progressing  Goal: Maintains adequate nutritional intake (undernourished)  Description: INTERVENTIONS:  - Monitor percentage of each meal consumed  - Identify factors contributing to decreased intake, treat as appropriate  - Assist with meals as needed  - Monitor I&O, WT and lab values  - Obtain nutritional consult as needed  - Optimize oral hygiene and moisture  - Encourage food from home; allow for food preferences  - Enhance eating environment  Outcome: Progressing     Problem: METABOLIC/FLUID AND ELECTROLYTES - ADULT  Goal: Electrolytes maintained within normal limits  Description: INTERVENTIONS:  - Monitor labs and rhythm and assess patient for signs and symptoms of electrolyte imbalances  - Administer electrolyte replacement as ordered  - Monitor response to electrolyte replacements, including rhythm and repeat lab results as appropriate  - Fluid restriction as ordered  - Instruct patient on fluid and nutrition restrictions as appropriate  Outcome: Progressing     Problem: SKIN/TISSUE INTEGRITY - ADULT  Goal: Skin integrity remains intact  Description: INTERVENTIONS  - Assess and document risk factors for pressure ulcer development  - Assess and document skin integrity  - Monitor for areas of redness and/or skin breakdown  - Initiate interventions, skin care algorithm/standards of care as needed  Outcome: Progressing  Goal: Incision(s), wounds(s) or drain site(s) healing without S/S of infection  Description: INTERVENTIONS:  - Assess and document risk factors for pressure ulcer development  - Assess and document skin integrity  - Assess and document dressing/incision, wound bed, drain sites and surrounding tissue  - Implement wound care per orders  - Initiate isolation precautions as appropriate  - Initiate Pressure Ulcer prevention bundle as indicated  Outcome: Progressing     Problem: HEMATOLOGIC - ADULT  Goal: Maintains hematologic stability  Description: INTERVENTIONS  - Assess for signs and symptoms of bleeding or hemorrhage  - Monitor labs and vital signs for trends  - Administer supportive blood products/factors, fluids and medications as ordered and appropriate  - Administer supportive blood products/factors as ordered and appropriate  Outcome: Progressing  Goal: Free from bleeding injury  Description: (Example usage: patient with low platelets)  INTERVENTIONS:  - Avoid intramuscular injections, enemas and rectal medication administration  - Ensure safe mobilization of patient  - Hold pressure on venipuncture sites to achieve adequate hemostasis  - Assess for signs and symptoms of internal bleeding  - Monitor lab trends  - Patient is to report abnormal signs of bleeding to staff  - Avoid use of toothpicks and dental floss  - Use electric shaver for shaving  - Use soft bristle tooth brush  - Limit straining and forceful nose blowing  Outcome: Progressing

## 2023-05-05 NOTE — PLAN OF CARE
Problem: Patient Centered Care  Goal: Patient preferences are identified and integrated in the patient's plan of care  Description: Interventions:  - What would you like us to know as we care for you?  From home with parents  - Provide timely, complete, and accurate information to patient/family  - Incorporate patient and family knowledge, values, beliefs, and cultural backgrounds into the planning and delivery of care  - Encourage patient/family to participate in care and decision-making at the level they choose  - Honor patient and family perspectives and choices  Outcome: Progressing     Problem: Patient/Family Goals  Goal: Patient/Family Long Term Goal  Description: Patient's Long Term Goal: To manage condition, get better, and go home     Interventions:  -Monitor labs, results and vitals  -Administer medication as prescribed and prn  -Pain management   -Prevent infection  -Nausea management   -IVF  -IV abx  -Diet tolerance  -I&Os  -Safety, diet, act ab, and hygiene  -Consults  -Imaging/tests/procedure  -Follow plan of care  -Monitor for worsening condition or new onset of symptoms  - See additional Care Plan goals for specific interventions  Outcome: Progressing  Goal: Patient/Family Short Term Goal  Description: Patient's Short Term Goal: To manage NV, diet tolerance    Interventions:   -NV monitoring  -GI consult  -Diet tolerance with dietary on consult  -Tube feeding and Dobbhoff potential   -Antiemetics prn  -Follow plan of care  - See additional Care Plan goals for specific interventions  Outcome: Progressing     Problem: PAIN - ADULT  Goal: Verbalizes/displays adequate comfort level or patient's stated pain goal  Description: INTERVENTIONS:  - Encourage pt to monitor pain and request assistance  - Assess pain using appropriate pain scale  - Administer analgesics based on type and severity of pain and evaluate response  - Implement non-pharmacological measures as appropriate and evaluate response  - Consider cultural and social influences on pain and pain management  - Manage/alleviate anxiety  - Utilize distraction and/or relaxation techniques  - Monitor for opioid side effects  - Notify MD/LIP if interventions unsuccessful or patient reports new pain  - Anticipate increased pain with activity and pre-medicate as appropriate  Outcome: Progressing     Problem: SAFETY ADULT - FALL  Goal: Free from fall injury  Description: INTERVENTIONS:  - Assess pt frequently for physical needs  - Identify cognitive and physical deficits and behaviors that affect risk of falls.   - [place] fall precautions as indicated by assessment.  - Educate pt/family on patient safety including physical limitations  - Instruct pt to call for assistance with activity based on assessment  - Modify environment to reduce risk of injury  - Provide assistive devices as appropriate  - Consider OT/PT consult to assist with strengthening/mobility  - Encourage toileting schedule  Outcome: Progressing     Problem: DISCHARGE PLANNING  Goal: Discharge to home or other facility with appropriate resources  Description: INTERVENTIONS:  - Identify barriers to discharge w/pt and caregiver  - Include patient/family/discharge partner in discharge planning  - Arrange for needed discharge resources and transportation as appropriate  - Identify discharge learning needs (meds, wound care, etc)  - Arrange for interpreters to assist at discharge as needed  - Consider post-discharge preferences of patient/family/discharge partner  - Complete POLST form as appropriate  - Assess patient's ability to be responsible for managing their own health  - Refer to Case Management Department for coordinating discharge planning if the patient needs post-hospital services based on physician/LIP order or complex needs related to functional status, cognitive ability or social support system  Outcome: Progressing     Problem: RISK FOR INFECTION - ADULT  Goal: Absence of fever/infection during anticipated neutropenic period  Description: INTERVENTIONS  - Monitor WBC  - Administer growth factors as ordered  - Implement neutropenic guidelines  Outcome: Progressing     Problem: GASTROINTESTINAL - ADULT  Goal: Minimal or absence of nausea and vomiting  Description: INTERVENTIONS:  - Maintain adequate hydration with IV or PO as ordered and tolerated  - Nasogastric tube to low intermittent suction as ordered  - Evaluate effectiveness of ordered antiemetic medications  - Provide nonpharmacologic comfort measures as appropriate  - Advance diet as tolerated, if ordered  - Obtain nutritional consult as needed  - Evaluate fluid balance  Outcome: Progressing  Goal: Maintains or returns to baseline bowel function  Description: INTERVENTIONS:  - Assess bowel function  - Maintain adequate hydration with IV or PO as ordered and tolerated  - Evaluate effectiveness of GI medications  - Encourage mobilization and activity  - Obtain nutritional consult as needed  - Establish a toileting routine/schedule  - Consider collaborating with pharmacy to review patient's medication profile  Outcome: Progressing  Goal: Maintains adequate nutritional intake (undernourished)  Description: INTERVENTIONS:  - Monitor percentage of each meal consumed  - Identify factors contributing to decreased intake, treat as appropriate  - Assist with meals as needed  - Monitor I&O, WT and lab values  - Obtain nutritional consult as needed  - Optimize oral hygiene and moisture  - Encourage food from home; allow for food preferences  - Enhance eating environment  Outcome: Progressing     Problem: METABOLIC/FLUID AND ELECTROLYTES - ADULT  Goal: Electrolytes maintained within normal limits  Description: INTERVENTIONS:  - Monitor labs and rhythm and assess patient for signs and symptoms of electrolyte imbalances  - Administer electrolyte replacement as ordered  - Monitor response to electrolyte replacements, including rhythm and repeat lab results as appropriate  - Fluid restriction as ordered  - Instruct patient on fluid and nutrition restrictions as appropriate  Outcome: Progressing     Problem: SKIN/TISSUE INTEGRITY - ADULT  Goal: Skin integrity remains intact  Description: INTERVENTIONS  - Assess and document risk factors for pressure ulcer development  - Assess and document skin integrity  - Monitor for areas of redness and/or skin breakdown  - Initiate interventions, skin care algorithm/standards of care as needed  Outcome: Progressing  Goal: Incision(s), wounds(s) or drain site(s) healing without S/S of infection  Description: INTERVENTIONS:  - Assess and document risk factors for pressure ulcer development  - Assess and document skin integrity  - Assess and document dressing/incision, wound bed, drain sites and surrounding tissue  - Implement wound care per orders  - Initiate isolation precautions as appropriate  - Initiate Pressure Ulcer prevention bundle as indicated  Outcome: Progressing     Problem: HEMATOLOGIC - ADULT  Goal: Maintains hematologic stability  Description: INTERVENTIONS  - Assess for signs and symptoms of bleeding or hemorrhage  - Monitor labs and vital signs for trends  - Administer supportive blood products/factors, fluids and medications as ordered and appropriate  - Administer supportive blood products/factors as ordered and appropriate  Outcome: Progressing  Goal: Free from bleeding injury  Description: (Example usage: patient with low platelets)  INTERVENTIONS:  - Avoid intramuscular injections, enemas and rectal medication administration  - Ensure safe mobilization of patient  - Hold pressure on venipuncture sites to achieve adequate hemostasis  - Assess for signs and symptoms of internal bleeding  - Monitor lab trends  - Patient is to report abnormal signs of bleeding to staff  - Avoid use of toothpicks and dental floss  - Use electric shaver for shaving  - Use soft bristle tooth brush  - Limit straining and forceful nose blowing  Outcome: Progressing     GJ-tube in place connected to stone bag draining to gravity. PRN pain medication given with relief. Plan to start tube feedings tomorrow.

## 2023-05-06 LAB
ANION GAP SERPL CALC-SCNC: 5 MMOL/L (ref 0–18)
BASOPHILS # BLD AUTO: 0.02 X10(3) UL (ref 0–0.2)
BASOPHILS NFR BLD AUTO: 0.3 %
BUN BLD-MCNC: 12 MG/DL (ref 7–18)
BUN/CREAT SERPL: 19.7 (ref 10–20)
CALCIUM BLD-MCNC: 7.9 MG/DL (ref 8.5–10.1)
CHLORIDE SERPL-SCNC: 103 MMOL/L (ref 98–112)
CO2 SERPL-SCNC: 31 MMOL/L (ref 21–32)
CREAT BLD-MCNC: 0.61 MG/DL
DEPRECATED RDW RBC AUTO: 42.8 FL (ref 35.1–46.3)
EOSINOPHIL # BLD AUTO: 0.18 X10(3) UL (ref 0–0.7)
EOSINOPHIL NFR BLD AUTO: 2.9 %
ERYTHROCYTE [DISTWIDTH] IN BLOOD BY AUTOMATED COUNT: 11.9 % (ref 11–15)
GFR SERPLBLD BASED ON 1.73 SQ M-ARVRAT: 119 ML/MIN/1.73M2 (ref 60–?)
GLUCOSE BLD-MCNC: 100 MG/DL (ref 70–99)
GLUCOSE BLDC GLUCOMTR-MCNC: 82 MG/DL (ref 70–99)
GLUCOSE BLDC GLUCOMTR-MCNC: 92 MG/DL (ref 70–99)
GLUCOSE BLDC GLUCOMTR-MCNC: 95 MG/DL (ref 70–99)
HCT VFR BLD AUTO: 37.2 %
HGB BLD-MCNC: 12.4 G/DL
IMM GRANULOCYTES # BLD AUTO: 0.02 X10(3) UL (ref 0–1)
IMM GRANULOCYTES NFR BLD: 0.3 %
LYMPHOCYTES # BLD AUTO: 1.36 X10(3) UL (ref 1–4)
LYMPHOCYTES NFR BLD AUTO: 21.9 %
MCH RBC QN AUTO: 32.5 PG (ref 26–34)
MCHC RBC AUTO-ENTMCNC: 33.3 G/DL (ref 31–37)
MCV RBC AUTO: 97.6 FL
MONOCYTES # BLD AUTO: 0.98 X10(3) UL (ref 0.1–1)
MONOCYTES NFR BLD AUTO: 15.8 %
NEUTROPHILS # BLD AUTO: 3.64 X10 (3) UL (ref 1.5–7.7)
NEUTROPHILS # BLD AUTO: 3.64 X10(3) UL (ref 1.5–7.7)
NEUTROPHILS NFR BLD AUTO: 58.8 %
OSMOLALITY SERPL CALC.SUM OF ELEC: 288 MOSM/KG (ref 275–295)
PLATELET # BLD AUTO: 265 10(3)UL (ref 150–450)
POTASSIUM SERPL-SCNC: 3.6 MMOL/L (ref 3.5–5.1)
RBC # BLD AUTO: 3.81 X10(6)UL
SODIUM SERPL-SCNC: 139 MMOL/L (ref 136–145)
WBC # BLD AUTO: 6.2 X10(3) UL (ref 4–11)

## 2023-05-06 PROCEDURE — 99233 SBSQ HOSP IP/OBS HIGH 50: CPT | Performed by: INTERNAL MEDICINE

## 2023-05-06 NOTE — PLAN OF CARE
Patient alert, oriented, tube feeding increased to 50 ml/hr (goal is 70), pt tolerates feeding well, medicated for pain as needed with good results, pt up to bathroom, self, family present at bedside at times. Problem: Patient Centered Care  Goal: Patient preferences are identified and integrated in the patient's plan of care  Description: Interventions:  - What would you like us to know as we care for you?  From home with parents  - Provide timely, complete, and accurate information to patient/family  - Incorporate patient and family knowledge, values, beliefs, and cultural backgrounds into the planning and delivery of care  - Encourage patient/family to participate in care and decision-making at the level they choose  - Honor patient and family perspectives and choices  Outcome: Progressing

## 2023-05-06 NOTE — PLAN OF CARE
Problem: Patient Centered Care  Goal: Patient preferences are identified and integrated in the patient's plan of care  Description: Interventions:  - What would you like us to know as we care for you?  From home with parents  - Provide timely, complete, and accurate information to patient/family  - Incorporate patient and family knowledge, values, beliefs, and cultural backgrounds into the planning and delivery of care  - Encourage patient/family to participate in care and decision-making at the level they choose  - Honor patient and family perspectives and choices  Outcome: Progressing     Problem: Patient/Family Goals  Goal: Patient/Family Long Term Goal  Description: Patient's Long Term Goal: To manage condition, get better, and go home     Interventions:  -Monitor labs, results and vitals  -Administer medication as prescribed and prn  -Pain management   -Prevent infection  -Nausea management   -IVF  -IV abx  -Diet tolerance  -I&Os  -Safety, diet, act ab, and hygiene  -Consults  -Imaging/tests/procedure  -Follow plan of care  -Monitor for worsening condition or new onset of symptoms  - See additional Care Plan goals for specific interventions  Outcome: Progressing  Goal: Patient/Family Short Term Goal  Description: Patient's Short Term Goal: To manage NV, diet tolerance    Interventions:   -NV monitoring  -GI consult  -Diet tolerance with dietary on consult  -Tube feeding and Dobbhoff potential   -Antiemetics prn  -Follow plan of care  - See additional Care Plan goals for specific interventions  Outcome: Progressing     Problem: PAIN - ADULT  Goal: Verbalizes/displays adequate comfort level or patient's stated pain goal  Description: INTERVENTIONS:  - Encourage pt to monitor pain and request assistance  - Assess pain using appropriate pain scale  - Administer analgesics based on type and severity of pain and evaluate response  - Implement non-pharmacological measures as appropriate and evaluate response  - Consider cultural and social influences on pain and pain management  - Manage/alleviate anxiety  - Utilize distraction and/or relaxation techniques  - Monitor for opioid side effects  - Notify MD/LIP if interventions unsuccessful or patient reports new pain  - Anticipate increased pain with activity and pre-medicate as appropriate  Outcome: Progressing     Problem: SAFETY ADULT - FALL  Goal: Free from fall injury  Description: INTERVENTIONS:  - Assess pt frequently for physical needs  - Identify cognitive and physical deficits and behaviors that affect risk of falls.   - [place] fall precautions as indicated by assessment.  - Educate pt/family on patient safety including physical limitations  - Instruct pt to call for assistance with activity based on assessment  - Modify environment to reduce risk of injury  - Provide assistive devices as appropriate  - Consider OT/PT consult to assist with strengthening/mobility  - Encourage toileting schedule  Outcome: Progressing     Problem: DISCHARGE PLANNING  Goal: Discharge to home or other facility with appropriate resources  Description: INTERVENTIONS:  - Identify barriers to discharge w/pt and caregiver  - Include patient/family/discharge partner in discharge planning  - Arrange for needed discharge resources and transportation as appropriate  - Identify discharge learning needs (meds, wound care, etc)  - Arrange for interpreters to assist at discharge as needed  - Consider post-discharge preferences of patient/family/discharge partner  - Complete POLST form as appropriate  - Assess patient's ability to be responsible for managing their own health  - Refer to Case Management Department for coordinating discharge planning if the patient needs post-hospital services based on physician/LIP order or complex needs related to functional status, cognitive ability or social support system  Outcome: Progressing     Problem: RISK FOR INFECTION - ADULT  Goal: Absence of fever/infection during anticipated neutropenic period  Description: INTERVENTIONS  - Monitor WBC  - Administer growth factors as ordered  - Implement neutropenic guidelines  Outcome: Progressing     Problem: GASTROINTESTINAL - ADULT  Goal: Minimal or absence of nausea and vomiting  Description: INTERVENTIONS:  - Maintain adequate hydration with IV or PO as ordered and tolerated  - Nasogastric tube to low intermittent suction as ordered  - Evaluate effectiveness of ordered antiemetic medications  - Provide nonpharmacologic comfort measures as appropriate  - Advance diet as tolerated, if ordered  - Obtain nutritional consult as needed  - Evaluate fluid balance  Outcome: Progressing  Goal: Maintains or returns to baseline bowel function  Description: INTERVENTIONS:  - Assess bowel function  - Maintain adequate hydration with IV or PO as ordered and tolerated  - Evaluate effectiveness of GI medications  - Encourage mobilization and activity  - Obtain nutritional consult as needed  - Establish a toileting routine/schedule  - Consider collaborating with pharmacy to review patient's medication profile  Outcome: Progressing  Goal: Maintains adequate nutritional intake (undernourished)  Description: INTERVENTIONS:  - Monitor percentage of each meal consumed  - Identify factors contributing to decreased intake, treat as appropriate  - Assist with meals as needed  - Monitor I&O, WT and lab values  - Obtain nutritional consult as needed  - Optimize oral hygiene and moisture  - Encourage food from home; allow for food preferences  - Enhance eating environment  Outcome: Progressing     Problem: METABOLIC/FLUID AND ELECTROLYTES - ADULT  Goal: Electrolytes maintained within normal limits  Description: INTERVENTIONS:  - Monitor labs and rhythm and assess patient for signs and symptoms of electrolyte imbalances  - Administer electrolyte replacement as ordered  - Monitor response to electrolyte replacements, including rhythm and repeat lab results as appropriate  - Fluid restriction as ordered  - Instruct patient on fluid and nutrition restrictions as appropriate  Outcome: Progressing     Problem: SKIN/TISSUE INTEGRITY - ADULT  Goal: Skin integrity remains intact  Description: INTERVENTIONS  - Assess and document risk factors for pressure ulcer development  - Assess and document skin integrity  - Monitor for areas of redness and/or skin breakdown  - Initiate interventions, skin care algorithm/standards of care as needed  Outcome: Progressing  Goal: Incision(s), wounds(s) or drain site(s) healing without S/S of infection  Description: INTERVENTIONS:  - Assess and document risk factors for pressure ulcer development  - Assess and document skin integrity  - Assess and document dressing/incision, wound bed, drain sites and surrounding tissue  - Implement wound care per orders  - Initiate isolation precautions as appropriate  - Initiate Pressure Ulcer prevention bundle as indicated  Outcome: Progressing     Problem: HEMATOLOGIC - ADULT  Goal: Maintains hematologic stability  Description: INTERVENTIONS  - Assess for signs and symptoms of bleeding or hemorrhage  - Monitor labs and vital signs for trends  - Administer supportive blood products/factors, fluids and medications as ordered and appropriate  - Administer supportive blood products/factors as ordered and appropriate  Outcome: Progressing  Goal: Free from bleeding injury  Description: (Example usage: patient with low platelets)  INTERVENTIONS:  - Avoid intramuscular injections, enemas and rectal medication administration  - Ensure safe mobilization of patient  - Hold pressure on venipuncture sites to achieve adequate hemostasis  - Assess for signs and symptoms of internal bleeding  - Monitor lab trends  - Patient is to report abnormal signs of bleeding to staff  - Avoid use of toothpicks and dental floss  - Use electric shaver for shaving  - Use soft bristle tooth brush  - Limit straining and forceful nose blowing  Outcome: Progressing     Pt resting in bed. Independent in room. Morphine prn for pain. Tube feeding in place, increased to 30ml/hr per order. Accuchecks q6hr. No gas. Dressing to GJtube c/d/I. Abdominal binder in place. Safety measures maintained. Frequent rounding being done.

## 2023-05-07 LAB
ANION GAP SERPL CALC-SCNC: 6 MMOL/L (ref 0–18)
BASOPHILS # BLD AUTO: 0.03 X10(3) UL (ref 0–0.2)
BASOPHILS NFR BLD AUTO: 0.6 %
BUN BLD-MCNC: 9 MG/DL (ref 7–18)
BUN/CREAT SERPL: 15.8 (ref 10–20)
CALCIUM BLD-MCNC: 8 MG/DL (ref 8.5–10.1)
CHLORIDE SERPL-SCNC: 105 MMOL/L (ref 98–112)
CO2 SERPL-SCNC: 28 MMOL/L (ref 21–32)
CREAT BLD-MCNC: 0.57 MG/DL
DEPRECATED RDW RBC AUTO: 42.6 FL (ref 35.1–46.3)
EOSINOPHIL # BLD AUTO: 0.31 X10(3) UL (ref 0–0.7)
EOSINOPHIL NFR BLD AUTO: 6.2 %
ERYTHROCYTE [DISTWIDTH] IN BLOOD BY AUTOMATED COUNT: 12.1 % (ref 11–15)
GFR SERPLBLD BASED ON 1.73 SQ M-ARVRAT: 122 ML/MIN/1.73M2 (ref 60–?)
GLUCOSE BLD-MCNC: 92 MG/DL (ref 70–99)
GLUCOSE BLDC GLUCOMTR-MCNC: 103 MG/DL (ref 70–99)
GLUCOSE BLDC GLUCOMTR-MCNC: 91 MG/DL (ref 70–99)
GLUCOSE BLDC GLUCOMTR-MCNC: 92 MG/DL (ref 70–99)
GLUCOSE BLDC GLUCOMTR-MCNC: 94 MG/DL (ref 70–99)
HCT VFR BLD AUTO: 36.6 %
HGB BLD-MCNC: 12.3 G/DL
IMM GRANULOCYTES # BLD AUTO: 0.02 X10(3) UL (ref 0–1)
IMM GRANULOCYTES NFR BLD: 0.4 %
LYMPHOCYTES # BLD AUTO: 1.1 X10(3) UL (ref 1–4)
LYMPHOCYTES NFR BLD AUTO: 22.1 %
MCH RBC QN AUTO: 32.5 PG (ref 26–34)
MCHC RBC AUTO-ENTMCNC: 33.6 G/DL (ref 31–37)
MCV RBC AUTO: 96.6 FL
MONOCYTES # BLD AUTO: 0.76 X10(3) UL (ref 0.1–1)
MONOCYTES NFR BLD AUTO: 15.3 %
NEUTROPHILS # BLD AUTO: 2.76 X10 (3) UL (ref 1.5–7.7)
NEUTROPHILS # BLD AUTO: 2.76 X10(3) UL (ref 1.5–7.7)
NEUTROPHILS NFR BLD AUTO: 55.4 %
OSMOLALITY SERPL CALC.SUM OF ELEC: 286 MOSM/KG (ref 275–295)
PLATELET # BLD AUTO: 269 10(3)UL (ref 150–450)
POTASSIUM SERPL-SCNC: 3.6 MMOL/L (ref 3.5–5.1)
RBC # BLD AUTO: 3.79 X10(6)UL
SODIUM SERPL-SCNC: 139 MMOL/L (ref 136–145)
WBC # BLD AUTO: 5 X10(3) UL (ref 4–11)

## 2023-05-07 PROCEDURE — 99232 SBSQ HOSP IP/OBS MODERATE 35: CPT | Performed by: INTERNAL MEDICINE

## 2023-05-07 NOTE — DISCHARGE INSTRUCTIONS
Home enteral feeds of Jevity 1.5 (or Nutren 1.5-equivalent) at 75 ml/hr per G/J-tube . Cyclic feeds O95 hrs (on 3PM, off 7AM daily) or total volume = 1185 ml (=5 containers daily). Goal rate provides 1775 kcal, 76 grams protein, 900 ml total free water, and 100% RDI's, 100% calorie needs and 100% of protein needs. Provide H2O flushes of 200 ml q 4 hours (1200 ml from [facility], 2100 ml total free H2O daily). J-tube for feedings. Medications given in liquid form or crushed via J-tube. Venting g-tube. Feeding rate can be advanced/adjusted as tolerated for increased time off pump:   85 ml/hr x14 hrs (on 5PM, off 7AM daily) or total volume = 1185 ml (=5 containers daily). 100 ml/hr x12 hrs (on 7PM, off 7AM daily) or total volume = 1185 ml (=5 containers daily). Follow up with physicians as instructed, call their office to schedule an appointment. Seek medical attention if you develop any chest pain or shortness of breath; severe nausea vomiting, diarrhea or constipation; severe pain that is not controlled with your pain medication; increased redness, warmth and inflammation at or around drain incision site; drainage from drain incision site that is foul smelling or purulent; temperature greater than 100.5. [facility] will contact you to schedule a date and time for a nurse to come out to visit you at home. [employer] will deliver equipment and formula.

## 2023-05-07 NOTE — PLAN OF CARE
Patient alert, tube feeding changed to jevity 1.5, and rate to 75 ml/hr, flushes 200 ml Q 4 hr,  plan to discharge home when tolerating feeding well, iV fluids stopped  Problem: Patient Centered Care  Goal: Patient preferences are identified and integrated in the patient's plan of care  Description: Interventions:  - What would you like us to know as we care for you?  From home with parents  - Provide timely, complete, and accurate information to patient/family  - Incorporate patient and family knowledge, values, beliefs, and cultural backgrounds into the planning and delivery of care  - Encourage patient/family to participate in care and decision-making at the level they choose  - Honor patient and family perspectives and choices  Outcome: Progressing

## 2023-05-07 NOTE — CM/SW NOTE
1711:  TF order updated and sent to [address] [place] [address] Dr Tatiana Phillips for review. SW sent [address] [place] [address] Dr Simpson a message about pt's medical clearance and requested that his can be arranged for Monday DC.     ----------------  1117:  MD informed SW pt is cleared for DC today, pt is eager to DC. SW inquired about updated TF orders - messaged ISAIAH [facility] to ask about updated TF orders. She stated she'll review pt's chart and let SW know when updated TF orders are in place. SW to send TF order to [facility] once available. This will likely not be arranged by today (will try), anticipated for Monday DC due to weekend delay in arrangements. [facility]  [address]  Phone: [phone]  Fax: [phone]    [facility]  [address] [facility], [address], [address]  Phone: [phone]  Fax: [phone]    TANYA/RISHI to remain available for support and/or discharge planning.      Lakeisha Rivera, [facility] - [id number]  (05/07/23, [id number])

## 2023-05-07 NOTE — PLAN OF CARE
Problem: Patient Centered Care  Goal: Patient preferences are identified and integrated in the patient's plan of care  Description: Interventions:  - What would you like us to know as we care for you?  From home with parents  - Provide timely, complete, and accurate information to patient/family  - Incorporate patient and family knowledge, values, beliefs, and cultural backgrounds into the planning and delivery of care  - Encourage patient/family to participate in care and decision-making at the level they choose  - Honor patient and family perspectives and choices  Outcome: Progressing     Problem: Patient/Family Goals  Goal: Patient/Family Long Term Goal  Description: Patient's Long Term Goal: To manage condition, get better, and go home     Interventions:  -Monitor labs, results and vitals  -Administer medication as prescribed and prn  -Pain management   -Prevent infection  -Nausea management   -IVF  -IV abx  -Diet tolerance  -I&Os  -Safety, diet, act ab, and hygiene  -Consults  -Imaging/tests/procedure  -Follow plan of care  -Monitor for worsening condition or new onset of symptoms  - See additional Care Plan goals for specific interventions  Outcome: Progressing  Goal: Patient/Family Short Term Goal  Description: Patient's Short Term Goal: To manage NV, diet tolerance    Interventions:   -NV monitoring  -GI consult  -Diet tolerance with dietary on consult  -Tube feeding and Dobbhoff potential   -Antiemetics prn  -Follow plan of care  - See additional Care Plan goals for specific interventions  Outcome: Progressing     Problem: PAIN - ADULT  Goal: Verbalizes/displays adequate comfort level or patient's stated pain goal  Description: INTERVENTIONS:  - Encourage pt to monitor pain and request assistance  - Assess pain using appropriate pain scale  - Administer analgesics based on type and severity of pain and evaluate response  - Implement non-pharmacological measures as appropriate and evaluate response  - Consider cultural and social influences on pain and pain management  - Manage/alleviate anxiety  - Utilize distraction and/or relaxation techniques  - Monitor for opioid side effects  - Notify MD/LIP if interventions unsuccessful or patient reports new pain  - Anticipate increased pain with activity and pre-medicate as appropriate  Outcome: Progressing     Problem: SAFETY ADULT - FALL  Goal: Free from fall injury  Description: INTERVENTIONS:  - Assess pt frequently for physical needs  - Identify cognitive and physical deficits and behaviors that affect risk of falls.   - [place] fall precautions as indicated by assessment.  - Educate pt/family on patient safety including physical limitations  - Instruct pt to call for assistance with activity based on assessment  - Modify environment to reduce risk of injury  - Provide assistive devices as appropriate  - Consider OT/PT consult to assist with strengthening/mobility  - Encourage toileting schedule  Outcome: Progressing     Problem: RISK FOR INFECTION - ADULT  Goal: Absence of fever/infection during anticipated neutropenic period  Description: INTERVENTIONS  - Monitor WBC  - Administer growth factors as ordered  - Implement neutropenic guidelines  Outcome: Progressing     Problem: GASTROINTESTINAL - ADULT  Goal: Minimal or absence of nausea and vomiting  Description: INTERVENTIONS:  - Maintain adequate hydration with IV or PO as ordered and tolerated  - Nasogastric tube to low intermittent suction as ordered  - Evaluate effectiveness of ordered antiemetic medications  - Provide nonpharmacologic comfort measures as appropriate  - Advance diet as tolerated, if ordered  - Obtain nutritional consult as needed  - Evaluate fluid balance  Outcome: Progressing     Problem: METABOLIC/FLUID AND ELECTROLYTES - ADULT  Goal: Electrolytes maintained within normal limits  Description: INTERVENTIONS:  - Monitor labs and rhythm and assess patient for signs and symptoms of electrolyte imbalances  - Administer electrolyte replacement as ordered  - Monitor response to electrolyte replacements, including rhythm and repeat lab results as appropriate  - Fluid restriction as ordered  - Instruct patient on fluid and nutrition restrictions as appropriate  Outcome: Progressing     Problem: SKIN/TISSUE INTEGRITY - ADULT  Goal: Skin integrity remains intact  Description: INTERVENTIONS  - Assess and document risk factors for pressure ulcer development  - Assess and document skin integrity  - Monitor for areas of redness and/or skin breakdown  - Initiate interventions, skin care algorithm/standards of care as needed  Outcome: Progressing  Goal: Incision(s), wounds(s) or drain site(s) healing without S/S of infection  Description: INTERVENTIONS:  - Assess and document risk factors for pressure ulcer development  - Assess and document skin integrity  - Assess and document dressing/incision, wound bed, drain sites and surrounding tissue  - Implement wound care per orders  - Initiate isolation precautions as appropriate  - Initiate Pressure Ulcer prevention bundle as indicated  Outcome: Progressing     Problem: HEMATOLOGIC - ADULT  Goal: Maintains hematologic stability  Description: INTERVENTIONS  - Assess for signs and symptoms of bleeding or hemorrhage  - Monitor labs and vital signs for trends  - Administer supportive blood products/factors, fluids and medications as ordered and appropriate  - Administer supportive blood products/factors as ordered and appropriate  Outcome: Progressing  Goal: Free from bleeding injury  Description: (Example usage: patient with low platelets)  INTERVENTIONS:  - Avoid intramuscular injections, enemas and rectal medication administration  - Ensure safe mobilization of patient  - Hold pressure on venipuncture sites to achieve adequate hemostasis  - Assess for signs and symptoms of internal bleeding  - Monitor lab trends  - Patient is to report abnormal signs of bleeding to staff  - Avoid use of toothpicks and dental floss  - Use electric shaver for shaving  - Use soft bristle tooth brush  - Limit straining and forceful nose blowing  Outcome: Progressing     Pt independent in room. Denies nausea or SOB. Morphine prn for pain/spasms. NPO. Tube feeding continued, increased to 60 in AM-tolerating. Accuchecks Q 6hr, Dressing to drain site c/d/I. Abdominal binder in use. Voiding without difficulty. Gtube to gravity-thick/clear drainage. Safety measures in place. frequent rounding being done.

## 2023-05-08 VITALS
OXYGEN SATURATION: 100 % | HEART RATE: 85 BPM | TEMPERATURE: 98 F | RESPIRATION RATE: 18 BRPM | SYSTOLIC BLOOD PRESSURE: 132 MMHG | WEIGHT: 104.25 LBS | DIASTOLIC BLOOD PRESSURE: 88 MMHG | BODY MASS INDEX: 15.8 KG/M2 | HEIGHT: 68 IN

## 2023-05-08 LAB
ANION GAP SERPL CALC-SCNC: 5 MMOL/L (ref 0–18)
BASOPHILS # BLD AUTO: 0.05 X10(3) UL (ref 0–0.2)
BASOPHILS NFR BLD AUTO: 0.8 %
BUN BLD-MCNC: 9 MG/DL (ref 7–18)
BUN/CREAT SERPL: 20.5 (ref 10–20)
CALCIUM BLD-MCNC: 8.2 MG/DL (ref 8.5–10.1)
CHLORIDE SERPL-SCNC: 106 MMOL/L (ref 98–112)
CO2 SERPL-SCNC: 28 MMOL/L (ref 21–32)
CREAT BLD-MCNC: 0.44 MG/DL
DEPRECATED RDW RBC AUTO: 42.1 FL (ref 35.1–46.3)
EOSINOPHIL # BLD AUTO: 0.42 X10(3) UL (ref 0–0.7)
EOSINOPHIL NFR BLD AUTO: 6.4 %
ERYTHROCYTE [DISTWIDTH] IN BLOOD BY AUTOMATED COUNT: 11.6 % (ref 11–15)
GFR SERPLBLD BASED ON 1.73 SQ M-ARVRAT: 132 ML/MIN/1.73M2 (ref 60–?)
GLUCOSE BLD-MCNC: 79 MG/DL (ref 70–99)
HCT VFR BLD AUTO: 38.9 %
HGB BLD-MCNC: 12.8 G/DL
IMM GRANULOCYTES # BLD AUTO: 0.04 X10(3) UL (ref 0–1)
IMM GRANULOCYTES NFR BLD: 0.6 %
LYMPHOCYTES # BLD AUTO: 1.29 X10(3) UL (ref 1–4)
LYMPHOCYTES NFR BLD AUTO: 19.5 %
MCH RBC QN AUTO: 32.2 PG (ref 26–34)
MCHC RBC AUTO-ENTMCNC: 32.9 G/DL (ref 31–37)
MCV RBC AUTO: 97.7 FL
MONOCYTES # BLD AUTO: 0.67 X10(3) UL (ref 0.1–1)
MONOCYTES NFR BLD AUTO: 10.2 %
NEUTROPHILS # BLD AUTO: 4.13 X10 (3) UL (ref 1.5–7.7)
NEUTROPHILS # BLD AUTO: 4.13 X10(3) UL (ref 1.5–7.7)
NEUTROPHILS NFR BLD AUTO: 62.5 %
OSMOLALITY SERPL CALC.SUM OF ELEC: 286 MOSM/KG (ref 275–295)
PLATELET # BLD AUTO: 316 10(3)UL (ref 150–450)
POTASSIUM SERPL-SCNC: 3.7 MMOL/L (ref 3.5–5.1)
RBC # BLD AUTO: 3.98 X10(6)UL
SODIUM SERPL-SCNC: 139 MMOL/L (ref 136–145)
WBC # BLD AUTO: 6.6 X10(3) UL (ref 4–11)

## 2023-05-08 PROCEDURE — 99239 HOSP IP/OBS DSCHRG MGMT >30: CPT | Performed by: INTERNAL MEDICINE

## 2023-05-08 NOTE — CM/SW NOTE
Notified [facility] and [address] today, requested update on arrangements for delivery. Awaiting review and response. 926am  Notified by [employer] [place] at [employer] that she is working on scheduling a bedside teach and train for patient. [facility] ([place] states that as indicated last week, they do not have Jevity 1.5 formula. Inquired if it can be substituted for Jevity 1.2. TANYA sent message to ISAIAH.    954am  Notified by RD that formula can be sub for Nurtren 1.5- orders updated by RD. TANYA sent to [facility] OF [facility] and notified of change. 1109am  Notified by [employer] [place] at [employer] that RN is coming to do bedside teach and train at [facility].     1234pm  Spoke with Marlena Scott at [facility] to notify of DC today. 242pm  Patient completed bedside teach and train with [employer]. Aware of DC today, no further questions or concerns. PLAN: Home with [facility] and [employer].      BERNADETTE Beck, [facility]    [phone]

## 2023-05-08 NOTE — PLAN OF CARE
Problem: Patient Centered Care  Goal: Patient preferences are identified and integrated in the patient's plan of care  Description: Interventions:  - What would you like us to know as we care for you?  From home with parents  - Provide timely, complete, and accurate information to patient/family  - Incorporate patient and family knowledge, values, beliefs, and cultural backgrounds into the planning and delivery of care  - Encourage patient/family to participate in care and decision-making at the level they choose  - Honor patient and family perspectives and choices  Outcome: Progressing     Problem: Patient/Family Goals  Goal: Patient/Family Long Term Goal  Description: Patient's Long Term Goal: To manage condition, get better, and go home     Interventions:  -Monitor labs, results and vitals  -Administer medication as prescribed and prn  -Pain management   -Prevent infection  -Nausea management   -IVF  -IV abx  -Diet tolerance  -I&Os  -Safety, diet, act ab, and hygiene  -Consults  -Imaging/tests/procedure  -Follow plan of care  -Monitor for worsening condition or new onset of symptoms  - See additional Care Plan goals for specific interventions  Outcome: Progressing  Goal: Patient/Family Short Term Goal  Description: Patient's Short Term Goal: To manage NV, diet tolerance    Interventions:   -NV monitoring  -GI consult  -Diet tolerance with dietary on consult  -Tube feeding and Dobbhoff potential   -Antiemetics prn  -Follow plan of care  - See additional Care Plan goals for specific interventions  Outcome: Progressing     Problem: PAIN - ADULT  Goal: Verbalizes/displays adequate comfort level or patient's stated pain goal  Description: INTERVENTIONS:  - Encourage pt to monitor pain and request assistance  - Assess pain using appropriate pain scale  - Administer analgesics based on type and severity of pain and evaluate response  - Implement non-pharmacological measures as appropriate and evaluate response  - Consider cultural and social influences on pain and pain management  - Manage/alleviate anxiety  - Utilize distraction and/or relaxation techniques  - Monitor for opioid side effects  - Notify MD/LIP if interventions unsuccessful or patient reports new pain  - Anticipate increased pain with activity and pre-medicate as appropriate  Outcome: Progressing     Problem: SAFETY ADULT - FALL  Goal: Free from fall injury  Description: INTERVENTIONS:  - Assess pt frequently for physical needs  - Identify cognitive and physical deficits and behaviors that affect risk of falls.   - [place] fall precautions as indicated by assessment.  - Educate pt/family on patient safety including physical limitations  - Instruct pt to call for assistance with activity based on assessment  - Modify environment to reduce risk of injury  - Provide assistive devices as appropriate  - Consider OT/PT consult to assist with strengthening/mobility  - Encourage toileting schedule  Outcome: Progressing     Problem: DISCHARGE PLANNING  Goal: Discharge to home or other facility with appropriate resources  Description: INTERVENTIONS:  - Identify barriers to discharge w/pt and caregiver  - Include patient/family/discharge partner in discharge planning  - Arrange for needed discharge resources and transportation as appropriate  - Identify discharge learning needs (meds, wound care, etc)  - Arrange for interpreters to assist at discharge as needed  - Consider post-discharge preferences of patient/family/discharge partner  - Complete POLST form as appropriate  - Assess patient's ability to be responsible for managing their own health  - Refer to Case Management Department for coordinating discharge planning if the patient needs post-hospital services based on physician/LIP order or complex needs related to functional status, cognitive ability or social support system  Outcome: Progressing     Problem: RISK FOR INFECTION - ADULT  Goal: Absence of fever/infection during anticipated neutropenic period  Description: INTERVENTIONS  - Monitor WBC  - Administer growth factors as ordered  - Implement neutropenic guidelines  Outcome: Progressing     Problem: GASTROINTESTINAL - ADULT  Goal: Minimal or absence of nausea and vomiting  Description: INTERVENTIONS:  - Maintain adequate hydration with IV or PO as ordered and tolerated  - Nasogastric tube to low intermittent suction as ordered  - Evaluate effectiveness of ordered antiemetic medications  - Provide nonpharmacologic comfort measures as appropriate  - Advance diet as tolerated, if ordered  - Obtain nutritional consult as needed  - Evaluate fluid balance  Outcome: Progressing  Goal: Maintains or returns to baseline bowel function  Description: INTERVENTIONS:  - Assess bowel function  - Maintain adequate hydration with IV or PO as ordered and tolerated  - Evaluate effectiveness of GI medications  - Encourage mobilization and activity  - Obtain nutritional consult as needed  - Establish a toileting routine/schedule  - Consider collaborating with pharmacy to review patient's medication profile  Outcome: Progressing  Goal: Maintains adequate nutritional intake (undernourished)  Description: INTERVENTIONS:  - Monitor percentage of each meal consumed  - Identify factors contributing to decreased intake, treat as appropriate  - Assist with meals as needed  - Monitor I&O, WT and lab values  - Obtain nutritional consult as needed  - Optimize oral hygiene and moisture  - Encourage food from home; allow for food preferences  - Enhance eating environment  Outcome: Progressing     Problem: METABOLIC/FLUID AND ELECTROLYTES - ADULT  Goal: Electrolytes maintained within normal limits  Description: INTERVENTIONS:  - Monitor labs and rhythm and assess patient for signs and symptoms of electrolyte imbalances  - Administer electrolyte replacement as ordered  - Monitor response to electrolyte replacements, including rhythm and repeat lab results as appropriate  - Fluid restriction as ordered  - Instruct patient on fluid and nutrition restrictions as appropriate  Outcome: Progressing     Problem: SKIN/TISSUE INTEGRITY - ADULT  Goal: Skin integrity remains intact  Description: INTERVENTIONS  - Assess and document risk factors for pressure ulcer development  - Assess and document skin integrity  - Monitor for areas of redness and/or skin breakdown  - Initiate interventions, skin care algorithm/standards of care as needed  Outcome: Progressing  Goal: Incision(s), wounds(s) or drain site(s) healing without S/S of infection  Description: INTERVENTIONS:  - Assess and document risk factors for pressure ulcer development  - Assess and document skin integrity  - Assess and document dressing/incision, wound bed, drain sites and surrounding tissue  - Implement wound care per orders  - Initiate isolation precautions as appropriate  - Initiate Pressure Ulcer prevention bundle as indicated  Outcome: Progressing     Problem: HEMATOLOGIC - ADULT  Goal: Maintains hematologic stability  Description: INTERVENTIONS  - Assess for signs and symptoms of bleeding or hemorrhage  - Monitor labs and vital signs for trends  - Administer supportive blood products/factors, fluids and medications as ordered and appropriate  - Administer supportive blood products/factors as ordered and appropriate  Outcome: Progressing  Goal: Free from bleeding injury  Description: (Example usage: patient with low platelets)  INTERVENTIONS:  - Avoid intramuscular injections, enemas and rectal medication administration  - Ensure safe mobilization of patient  - Hold pressure on venipuncture sites to achieve adequate hemostasis  - Assess for signs and symptoms of internal bleeding  - Monitor lab trends  - Patient is to report abnormal signs of bleeding to staff  - Avoid use of toothpicks and dental floss  - Use electric shaver for shaving  - Use soft bristle tooth brush  - Limit straining and forceful nose blowing  Outcome: Progressing     Pt independent in room. Denies nausea, SOB. Pt states had mild cramping in AM. Tube feeding in place at goal rate. Pt states he stopped feeds around 545 because he felt full, did not tell RN. Tube flushed and capped in AM. Gtube to gravity-clear thick output. Mild cramping-pt did not want anything for it. Safety measure in place. Frequent rounding being done.

## 2023-05-09 NOTE — PAYOR COMM NOTE
--------------  DISCHARGE REVIEW    Payor: [employer]ila [employer] POS/KAELYN  Subscriber #:  [id number]  Authorization Number: [id number]    Admit date: 4/27/23  Admit time:   6:47 PM  Discharge Date: 5/8/2023  3:00 PM     Admitting Physician: Daniela Riggs MD  Attending Physician:  No att. providers found  Primary Care Physician: Mario Castellano MD

## 2023-05-19 ENCOUNTER — APPOINTMENT (OUTPATIENT)
Dept: GENERAL RADIOLOGY | Facility: HOSPITAL | Age: 48
End: 2023-05-19
Attending: EMERGENCY MEDICINE
Payer: COMMERCIAL

## 2023-05-19 ENCOUNTER — HOSPITAL ENCOUNTER (INPATIENT)
Facility: HOSPITAL | Age: 48
LOS: 10 days | Discharge: HOME HEALTH | End: 2023-05-29
Attending: EMERGENCY MEDICINE | Admitting: HOSPITALIST
Payer: COMMERCIAL

## 2023-05-19 ENCOUNTER — APPOINTMENT (OUTPATIENT)
Dept: CT IMAGING | Facility: HOSPITAL | Age: 48
End: 2023-05-19
Attending: EMERGENCY MEDICINE
Payer: COMMERCIAL

## 2023-05-19 DIAGNOSIS — I95.9 HYPOTENSION, UNSPECIFIED HYPOTENSION TYPE: ICD-10-CM

## 2023-05-19 DIAGNOSIS — R94.31 PROLONGED QT INTERVAL: Primary | ICD-10-CM

## 2023-05-19 DIAGNOSIS — R42 LIGHTHEADED: ICD-10-CM

## 2023-05-19 DIAGNOSIS — R53.1 WEAKNESS GENERALIZED: ICD-10-CM

## 2023-05-19 DIAGNOSIS — S09.90XA TRAUMATIC INJURY OF HEAD, INITIAL ENCOUNTER: ICD-10-CM

## 2023-05-19 LAB
ALBUMIN SERPL-MCNC: 3.1 G/DL (ref 3.4–5)
ALBUMIN/GLOB SERPL: 0.7 {RATIO} (ref 1–2)
ALP LIVER SERPL-CCNC: 124 U/L
ALT SERPL-CCNC: 19 U/L
ANION GAP SERPL CALC-SCNC: 4 MMOL/L (ref 0–18)
ANTIBODY SCREEN: NEGATIVE
AST SERPL-CCNC: 22 U/L (ref 15–37)
ATRIAL RATE: 101 BPM
BASOPHILS # BLD AUTO: 0.06 X10(3) UL (ref 0–0.2)
BASOPHILS NFR BLD AUTO: 0.4 %
BILIRUB SERPL-MCNC: 0.5 MG/DL (ref 0.1–2)
BILIRUB UR QL: NEGATIVE
BUN BLD-MCNC: 93 MG/DL (ref 7–18)
BUN BLD-MCNC: 96 MG/DL (ref 7–18)
BUN/CREAT SERPL: 33 (ref 10–20)
BUN/CREAT SERPL: 35 (ref 10–20)
CALCIUM BLD-MCNC: 10.2 MG/DL (ref 8.5–10.1)
CALCIUM BLD-MCNC: 8.9 MG/DL (ref 8.5–10.1)
CHLORIDE SERPL-SCNC: 66 MMOL/L (ref 98–112)
CHLORIDE SERPL-SCNC: <50 MMOL/L (ref 98–112)
CLARITY UR: CLEAR
CO2 SERPL-SCNC: 60 MMOL/L (ref 21–32)
CO2 SERPL-SCNC: 64 MMOL/L (ref 21–32)
CREAT BLD-MCNC: 2.66 MG/DL
CREAT BLD-MCNC: 2.91 MG/DL
DEPRECATED RDW RBC AUTO: 37.2 FL (ref 35.1–46.3)
EOSINOPHIL # BLD AUTO: 0.42 X10(3) UL (ref 0–0.7)
EOSINOPHIL NFR BLD AUTO: 2.8 %
ERYTHROCYTE [DISTWIDTH] IN BLOOD BY AUTOMATED COUNT: 11.5 % (ref 11–15)
GFR SERPLBLD BASED ON 1.73 SQ M-ARVRAT: 26 ML/MIN/1.73M2 (ref 60–?)
GFR SERPLBLD BASED ON 1.73 SQ M-ARVRAT: 29 ML/MIN/1.73M2 (ref 60–?)
GLOBULIN PLAS-MCNC: 4.6 G/DL (ref 2.8–4.4)
GLUCOSE BLD-MCNC: 141 MG/DL (ref 70–99)
GLUCOSE BLD-MCNC: 143 MG/DL (ref 70–99)
GLUCOSE BLDC GLUCOMTR-MCNC: 170 MG/DL (ref 70–99)
GLUCOSE UR-MCNC: NORMAL MG/DL
HCT VFR BLD AUTO: 45 %
HGB BLD-MCNC: 16.2 G/DL
HGB UR QL STRIP.AUTO: NEGATIVE
HYALINE CASTS #/AREA URNS AUTO: PRESENT /LPF
IMM GRANULOCYTES # BLD AUTO: 0.07 X10(3) UL (ref 0–1)
IMM GRANULOCYTES NFR BLD: 0.5 %
INR BLD: 0.96 (ref 0.85–1.16)
KETONES UR-MCNC: NEGATIVE MG/DL
LACTATE SERPL-SCNC: 1.5 MMOL/L (ref 0.4–2)
LACTATE SERPL-SCNC: 3 MMOL/L (ref 0.4–2)
LEUKOCYTE ESTERASE UR QL STRIP.AUTO: NEGATIVE
LIPASE SERPL-CCNC: 614 U/L (ref 13–75)
LYMPHOCYTES # BLD AUTO: 1.37 X10(3) UL (ref 1–4)
LYMPHOCYTES NFR BLD AUTO: 9.2 %
MAGNESIUM SERPL-MCNC: 3.6 MG/DL (ref 1.6–2.6)
MCH RBC QN AUTO: 32.3 PG (ref 26–34)
MCHC RBC AUTO-ENTMCNC: 36 G/DL (ref 31–37)
MCV RBC AUTO: 89.6 FL
MONOCYTES # BLD AUTO: 1.3 X10(3) UL (ref 0.1–1)
MONOCYTES NFR BLD AUTO: 8.7 %
NEUTROPHILS # BLD AUTO: 11.64 X10 (3) UL (ref 1.5–7.7)
NEUTROPHILS # BLD AUTO: 11.64 X10(3) UL (ref 1.5–7.7)
NEUTROPHILS NFR BLD AUTO: 78.4 %
NITRITE UR QL STRIP.AUTO: NEGATIVE
OSMOLALITY SERPL CALC.SUM OF ELEC: 284 MOSM/KG (ref 275–295)
OSMOLALITY SERPL CALC.SUM OF ELEC: 301 MOSM/KG (ref 275–295)
P AXIS: 83 DEGREES
P-R INTERVAL: 122 MS
PH UR: 8.5 [PH] (ref 5–8)
PLATELET # BLD AUTO: 476 10(3)UL (ref 150–450)
POTASSIUM SERPL-SCNC: 2.3 MMOL/L (ref 3.5–5.1)
POTASSIUM SERPL-SCNC: 2.3 MMOL/L (ref 3.5–5.1)
PROT SERPL-MCNC: 7.7 G/DL (ref 6.4–8.2)
PROT UR-MCNC: 70 MG/DL
PROTHROMBIN TIME: 12.7 SECONDS (ref 11.6–14.8)
Q-T INTERVAL: 464 MS
QRS DURATION: 74 MS
QTC CALCULATION (BEZET): 601 MS
R AXIS: -45 DEGREES
RBC # BLD AUTO: 5.02 X10(6)UL
RH BLOOD TYPE: POSITIVE
RH BLOOD TYPE: POSITIVE
SARS-COV-2 RNA RESP QL NAA+PROBE: NOT DETECTED
SODIUM SERPL-SCNC: 121 MMOL/L (ref 136–145)
SODIUM SERPL-SCNC: 130 MMOL/L (ref 136–145)
SP GR UR STRIP: 1.01 (ref 1–1.03)
T AXIS: 73 DEGREES
TROPONIN I HIGH SENSITIVITY: 17 NG/L
TROPONIN I HIGH SENSITIVITY: 17 NG/L
UROBILINOGEN UR STRIP-ACNC: NORMAL
VENTRICULAR RATE: 101 BPM
WBC # BLD AUTO: 14.9 X10(3) UL (ref 4–11)

## 2023-05-19 PROCEDURE — 71045 X-RAY EXAM CHEST 1 VIEW: CPT | Performed by: EMERGENCY MEDICINE

## 2023-05-19 PROCEDURE — 70450 CT HEAD/BRAIN W/O DYE: CPT | Performed by: EMERGENCY MEDICINE

## 2023-05-19 PROCEDURE — 99254 IP/OBS CNSLTJ NEW/EST MOD 60: CPT | Performed by: SURGERY

## 2023-05-19 PROCEDURE — 99223 1ST HOSP IP/OBS HIGH 75: CPT | Performed by: INTERNAL MEDICINE

## 2023-05-19 PROCEDURE — 99223 1ST HOSP IP/OBS HIGH 75: CPT | Performed by: HOSPITALIST

## 2023-05-19 PROCEDURE — 74176 CT ABD & PELVIS W/O CONTRAST: CPT | Performed by: EMERGENCY MEDICINE

## 2023-05-19 RX ORDER — SODIUM CHLORIDE, SODIUM LACTATE, POTASSIUM CHLORIDE, CALCIUM CHLORIDE 600; 310; 30; 20 MG/100ML; MG/100ML; MG/100ML; MG/100ML
INJECTION, SOLUTION INTRAVENOUS ONCE
Status: COMPLETED | OUTPATIENT
Start: 2023-05-19 | End: 2023-05-19

## 2023-05-19 RX ORDER — MORPHINE SULFATE 2 MG/ML
2 INJECTION, SOLUTION INTRAMUSCULAR; INTRAVENOUS EVERY 2 HOUR PRN
Status: DISCONTINUED | OUTPATIENT
Start: 2023-05-19 | End: 2023-05-29

## 2023-05-19 RX ORDER — POTASSIUM CHLORIDE 20 MEQ/1
40 TABLET, EXTENDED RELEASE ORAL ONCE
Status: DISCONTINUED | OUTPATIENT
Start: 2023-05-19 | End: 2023-05-19

## 2023-05-19 RX ORDER — POTASSIUM CHLORIDE 14.9 MG/ML
20 INJECTION INTRAVENOUS ONCE
Status: DISCONTINUED | OUTPATIENT
Start: 2023-05-19 | End: 2023-05-19

## 2023-05-19 RX ORDER — ONDANSETRON 2 MG/ML
4 INJECTION INTRAMUSCULAR; INTRAVENOUS EVERY 6 HOURS PRN
Status: DISCONTINUED | OUTPATIENT
Start: 2023-05-19 | End: 2023-05-29

## 2023-05-19 RX ORDER — DEXTROSE MONOHYDRATE 50 MG/ML
INJECTION, SOLUTION INTRAVENOUS CONTINUOUS
Status: DISCONTINUED | OUTPATIENT
Start: 2023-05-19 | End: 2023-05-20

## 2023-05-19 RX ORDER — MORPHINE SULFATE 4 MG/ML
4 INJECTION, SOLUTION INTRAMUSCULAR; INTRAVENOUS ONCE
Status: COMPLETED | OUTPATIENT
Start: 2023-05-19 | End: 2023-05-19

## 2023-05-19 RX ORDER — PROCHLORPERAZINE EDISYLATE 5 MG/ML
5 INJECTION INTRAMUSCULAR; INTRAVENOUS EVERY 8 HOURS PRN
Status: DISCONTINUED | OUTPATIENT
Start: 2023-05-19 | End: 2023-05-29

## 2023-05-19 RX ORDER — DEXTROSE, SODIUM CHLORIDE, AND POTASSIUM CHLORIDE 5; .45; .15 G/100ML; G/100ML; G/100ML
INJECTION INTRAVENOUS CONTINUOUS
Status: DISCONTINUED | OUTPATIENT
Start: 2023-05-19 | End: 2023-05-19

## 2023-05-19 RX ORDER — MORPHINE SULFATE 2 MG/ML
1 INJECTION, SOLUTION INTRAMUSCULAR; INTRAVENOUS EVERY 2 HOUR PRN
Status: DISCONTINUED | OUTPATIENT
Start: 2023-05-19 | End: 2023-05-29

## 2023-05-19 RX ORDER — POTASSIUM CHLORIDE 29.8 MG/ML
40 INJECTION INTRAVENOUS ONCE
Status: DISCONTINUED | OUTPATIENT
Start: 2023-05-19 | End: 2023-05-19

## 2023-05-19 RX ORDER — ACETAMINOPHEN 500 MG
500 TABLET ORAL EVERY 4 HOURS PRN
Status: DISCONTINUED | OUTPATIENT
Start: 2023-05-19 | End: 2023-05-29

## 2023-05-19 RX ORDER — MORPHINE SULFATE 4 MG/ML
4 INJECTION, SOLUTION INTRAMUSCULAR; INTRAVENOUS EVERY 2 HOUR PRN
Status: DISCONTINUED | OUTPATIENT
Start: 2023-05-19 | End: 2023-05-29

## 2023-05-19 RX ORDER — POTASSIUM CHLORIDE 14.9 MG/ML
20 INJECTION INTRAVENOUS ONCE
Status: COMPLETED | OUTPATIENT
Start: 2023-05-19 | End: 2023-05-19

## 2023-05-19 NOTE — ED QUICK NOTES
Pt reports he fell last night & hit head. Reports he hit his forehead on either the steps or vacuum. Pt unsure if he had any LOC, unable to get up for about 20 min due to weakness.

## 2023-05-19 NOTE — ED QUICK NOTES
Orders for admission, patient is aware of plan and ready to go upstairs. Any questions, please call ED RN Tab Corey at extension [phone].      Patient Covid vaccination status: Partially vaccinated     COVID Test Ordered in ED: Rapid SARS-CoV-2 by PCR    COVID Suspicion at Admission: N/A    Running Infusions:      Mental Status/LOC at time of transport: x4    Other pertinent information:   CIWA score: N/A   NIH score:  N/A

## 2023-05-19 NOTE — H&P
[facility]    PATIENT'S NAME: Keisha Hernandez   ATTENDING PHYSICIAN: Tessy Christianson MD   PATIENT ACCOUNT#:   [id number]    LOCATION:  [place] [address]  MEDICAL RECORD #:   [id number]       YOB: 1975  ADMISSION DATE:       05/19/2023    HISTORY AND PHYSICAL EXAMINATION    (Addendum added 05/19/2023)    CHIEF COMPLAINT:  Profound hypokalemia, dehydration, contraction alkalosis, and possible early sepsis. 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 Initial endoscopy showed no evidence of malignancy or ulceration. Imaging studies showed groove pancreatitis with external compression of the gastric outlet antrum. He had repeat upper endoscopy with Dobbhoff tube to the duodenum and drainage of the periantral pseudocyst fluid. He continued to have distention of his gastric area. He had repeat upper endoscopy, suction of gastric content, plus venting gastric tube and jejunal feeding tube insertion. The patient was discharged home in a relatively stable condition. Imaging studies as of May 2 have shown increased and worsening groove pancreatitis with lymphadenopathy and infiltration of the periantral area of the stomach. Today came back to the emergency department for evaluation with progressive hiccups, progressive nausea, generalized weakness, and fatigue. CBC showed white blood cell count of 14.9 with elevated platelets at 533. Chemistry showed glucose 143, sodium 121, potassium 2.3, chloride less than 50, bicarb 64, anion gap still pending. His GFR is 26 which is way below his baseline. Lactic acid is 3. Blood cultures were obtained. Chest x-ray and CT scan of brain negative. The patient was started empirically on IV fluid sepsis bolus and potassium replacement. He will be admitted to hospital for further management.     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 He had history of alcohol abuse and alcoholic pancreatitis in the past.  Denies any other medical problems. PAST SURGICAL HISTORY:  As mentioned above. MEDICATIONS:  Please see medication reconciliation list.    ALLERGIES:  No known drug allergies. FAMILY HISTORY:  Mother had dementia. Father had hypertension. SOCIAL HISTORY:  Ex-tobacco and heavy alcohol use. No current tobacco, alcohol, or drug use per his report. Lives with his family. REVIEW OF SYSTEMS:  The patient said the venting G-tube will fill around 1/2 bag every day. Also, he has been able to use the J-tube for feeding. He has been having progressive weakness, fatigue, generalized body aches, increased hiccups, and increased abdominal discomfort for the last 2 to 3 days. He was discharged from the hospital on May 8. Lipase today is 614. PHYSICAL EXAMINATION:    GENERAL:  Alert. Oriented to time, place, and person. Seems cachectic. Underweight and malnourished. Moderate distress. VITAL SIGNS:  Temperature 97.9, pulse 90, respiratory rate 16, blood pressure upon arrival 80/51, pulse ox 100% on room air. HEENT:  Atraumatic. Oropharynx clear, dry mucous membranes. Ears, nose normal.  Eyes:  Anicteric sclerae. Pupils equal, round, reactive. NECK:  Supple. No lymphadenopathy. Trachea midline. Full range of motion. LUNGS:  Clear to auscultation bilaterally. Normal respiratory effort. HEART:  Regular rate and rhythm. S1, S2 auscultated. No murmur. ABDOMEN:  With generalized guarding. G and J tubes in place without complications. Discomfort to palpation. Hypoactive bowel sounds. EXTREMITIES:  No peripheral edema, clubbing, or cyanosis. NEUROLOGIC:  Motor and sensory intact. ASSESSMENT:    1.   Dehydration with acute kidney injury.   2.   Severe contraction alkalosis with possible compensatory respiratory acidosis. 3.   Profound hypokalemia. 4.   Possible early sepsis. 5.   Groove pancreatitis with continued elevated lipase. PLAN:  The patient will be admitted to telemetry floor. Continue to monitor his rhythm. He does have QT prolongation on his EKG. Replace his potassium, aggressive IV fluid replacement, pain control, obtain nephrology and gastroenterology consults. The patient will probably need repeat imaging studies of his abdomen. We will leave it up to Gastroenterology if there is need for antibiotics. Overall poor prognosis and possible referral to tertiary care center. Further recommendations to follow. ADDENDUM ([id number]):    ASSESSMENT AND PLAN:    CT scan of the abdomen came back with findings consistent with perforation of the distal gastric antrum. Patient will be kept n.p.o.  Started on IV Zosyn. Will obtain General Surgery consult.     Dictated By Asher Nava MD  d: 05/19/2023 11:49:54  t: 05/19/2023 12:23:49  Job [id number]  FB/    cc: Dolly Benoit MD

## 2023-05-19 NOTE — ED QUICK NOTES
Pt unable to tolerate PO potassium replacement and IV potassium slowed down to 45 mL/hr. Dr. Saman Montes updated.

## 2023-05-19 NOTE — ED INITIAL ASSESSMENT (HPI)
Pt to ED A&O x 4 w/ c/o: Generalized weakness, fatigue & dizziness x a few weeks. Pt had G/J tube placed 2 weeks ago. Pt was DCd from here on 05/09/23 and has been becoming progressively more weak. (+) n/v, denies diarrhea. Denies fevers/chills.

## 2023-05-20 ENCOUNTER — APPOINTMENT (OUTPATIENT)
Dept: CT IMAGING | Facility: HOSPITAL | Age: 48
End: 2023-05-20
Attending: HOSPITALIST
Payer: COMMERCIAL

## 2023-05-20 LAB
ALBUMIN SERPL-MCNC: 2.3 G/DL (ref 3.4–5)
ANION GAP SERPL CALC-SCNC: 7 MMOL/L (ref 0–18)
ANION GAP SERPL CALC-SCNC: 8 MMOL/L (ref 0–18)
ANION GAP SERPL CALC-SCNC: 8 MMOL/L (ref 0–18)
ATRIAL RATE: 78 BPM
BASOPHILS # BLD AUTO: 0.06 X10(3) UL (ref 0–0.2)
BASOPHILS NFR BLD AUTO: 0.6 %
BUN BLD-MCNC: 70 MG/DL (ref 7–18)
BUN BLD-MCNC: 80 MG/DL (ref 7–18)
BUN BLD-MCNC: 87 MG/DL (ref 7–18)
BUN/CREAT SERPL: 29.5 (ref 10–20)
BUN/CREAT SERPL: 31.1 (ref 10–20)
BUN/CREAT SERPL: 33.9 (ref 10–20)
CALCIUM BLD-MCNC: 8.5 MG/DL (ref 8.5–10.1)
CALCIUM BLD-MCNC: 8.6 MG/DL (ref 8.5–10.1)
CALCIUM BLD-MCNC: 8.9 MG/DL (ref 8.5–10.1)
CHLORIDE SERPL-SCNC: 68 MMOL/L (ref 98–112)
CHLORIDE SERPL-SCNC: 70 MMOL/L (ref 98–112)
CHLORIDE SERPL-SCNC: 75 MMOL/L (ref 98–112)
CO2 SERPL-SCNC: 45 MMOL/L (ref 21–32)
CO2 SERPL-SCNC: 51 MMOL/L (ref 21–32)
CO2 SERPL-SCNC: 53 MMOL/L (ref 21–32)
CREAT BLD-MCNC: 2.37 MG/DL
CREAT BLD-MCNC: 2.57 MG/DL
CREAT BLD-MCNC: 2.57 MG/DL
DEPRECATED RDW RBC AUTO: 39.3 FL (ref 35.1–46.3)
EOSINOPHIL # BLD AUTO: 1.26 X10(3) UL (ref 0–0.7)
EOSINOPHIL NFR BLD AUTO: 12.5 %
ERYTHROCYTE [DISTWIDTH] IN BLOOD BY AUTOMATED COUNT: 11.6 % (ref 11–15)
GFR SERPLBLD BASED ON 1.73 SQ M-ARVRAT: 30 ML/MIN/1.73M2 (ref 60–?)
GFR SERPLBLD BASED ON 1.73 SQ M-ARVRAT: 30 ML/MIN/1.73M2 (ref 60–?)
GFR SERPLBLD BASED ON 1.73 SQ M-ARVRAT: 33 ML/MIN/1.73M2 (ref 60–?)
GLUCOSE BLD-MCNC: 113 MG/DL (ref 70–99)
GLUCOSE BLD-MCNC: 116 MG/DL (ref 70–99)
GLUCOSE BLD-MCNC: 83 MG/DL (ref 70–99)
HCT VFR BLD AUTO: 37.3 %
HGB BLD-MCNC: 12.8 G/DL
IMM GRANULOCYTES # BLD AUTO: 0.05 X10(3) UL (ref 0–1)
IMM GRANULOCYTES NFR BLD: 0.5 %
LYMPHOCYTES # BLD AUTO: 1.34 X10(3) UL (ref 1–4)
LYMPHOCYTES NFR BLD AUTO: 13.3 %
MAGNESIUM SERPL-MCNC: 2.9 MG/DL (ref 1.6–2.6)
MCH RBC QN AUTO: 31.8 PG (ref 26–34)
MCHC RBC AUTO-ENTMCNC: 34.3 G/DL (ref 31–37)
MCV RBC AUTO: 92.6 FL
MONOCYTES # BLD AUTO: 0.83 X10(3) UL (ref 0.1–1)
MONOCYTES NFR BLD AUTO: 8.2 %
NEUTROPHILS # BLD AUTO: 6.56 X10 (3) UL (ref 1.5–7.7)
NEUTROPHILS # BLD AUTO: 6.56 X10(3) UL (ref 1.5–7.7)
NEUTROPHILS NFR BLD AUTO: 64.9 %
OSMOLALITY SERPL CALC.SUM OF ELEC: 284 MOSM/KG (ref 275–295)
OSMOLALITY SERPL CALC.SUM OF ELEC: 293 MOSM/KG (ref 275–295)
OSMOLALITY SERPL CALC.SUM OF ELEC: 295 MOSM/KG (ref 275–295)
P-R INTERVAL: 84 MS
PHOSPHATE SERPL-MCNC: 3.8 MG/DL (ref 2.5–4.9)
PLATELET # BLD AUTO: 338 10(3)UL (ref 150–450)
POTASSIUM SERPL-SCNC: 2.6 MMOL/L (ref 3.5–5.1)
POTASSIUM SERPL-SCNC: 2.8 MMOL/L (ref 3.5–5.1)
POTASSIUM SERPL-SCNC: 2.8 MMOL/L (ref 3.5–5.1)
POTASSIUM SERPL-SCNC: 3.2 MMOL/L (ref 3.5–5.1)
Q-T INTERVAL: 474 MS
QRS DURATION: 80 MS
QTC CALCULATION (BEZET): 540 MS
R AXIS: -51 DEGREES
RBC # BLD AUTO: 4.03 X10(6)UL
SODIUM SERPL-SCNC: 127 MMOL/L (ref 136–145)
SODIUM SERPL-SCNC: 129 MMOL/L (ref 136–145)
SODIUM SERPL-SCNC: 129 MMOL/L (ref 136–145)
T AXIS: 61 DEGREES
VENTRICULAR RATE: 78 BPM
WBC # BLD AUTO: 10.1 X10(3) UL (ref 4–11)

## 2023-05-20 PROCEDURE — 99233 SBSQ HOSP IP/OBS HIGH 50: CPT | Performed by: HOSPITALIST

## 2023-05-20 PROCEDURE — 99232 SBSQ HOSP IP/OBS MODERATE 35: CPT | Performed by: SURGERY

## 2023-05-20 PROCEDURE — 74176 CT ABD & PELVIS W/O CONTRAST: CPT | Performed by: HOSPITALIST

## 2023-05-20 PROCEDURE — 99233 SBSQ HOSP IP/OBS HIGH 50: CPT | Performed by: INTERNAL MEDICINE

## 2023-05-20 RX ORDER — POTASSIUM CHLORIDE 1.5 G/1.77G
40 POWDER, FOR SOLUTION ORAL ONCE
Status: COMPLETED | OUTPATIENT
Start: 2023-05-20 | End: 2023-05-20

## 2023-05-20 RX ORDER — SODIUM CHLORIDE 450 MG/100ML
INJECTION, SOLUTION INTRAVENOUS CONTINUOUS
Status: DISCONTINUED | OUTPATIENT
Start: 2023-05-20 | End: 2023-05-20

## 2023-05-20 RX ORDER — SODIUM CHLORIDE 9 MG/ML
INJECTION, SOLUTION INTRAVENOUS CONTINUOUS
Status: DISCONTINUED | OUTPATIENT
Start: 2023-05-20 | End: 2023-05-25

## 2023-05-20 RX ORDER — POTASSIUM CHLORIDE 20 MEQ/1
40 TABLET, EXTENDED RELEASE ORAL ONCE
Status: COMPLETED | OUTPATIENT
Start: 2023-05-20 | End: 2023-05-20

## 2023-05-20 RX ORDER — TRAMADOL HYDROCHLORIDE 50 MG/1
50 TABLET ORAL EVERY 6 HOURS PRN
Status: DISCONTINUED | OUTPATIENT
Start: 2023-05-20 | End: 2023-05-29

## 2023-05-20 NOTE — PLAN OF CARE
Patient is a/ox4. RA. Up with standby assist. NPO except ice chips. IVF @ 100 ml/hr. Potassium replacement per nephrology. EKG and CT today; see results. G-J tube in place to gravity drain. IV antibiotics continued. Problem: Patient Centered Care  Goal: Patient preferences are identified and integrated in the patient's plan of care  Description: Interventions:  - What would you like us to know as we care for you?  \"I'm tired of this g-tube not connecting right\"  - Provide timely, complete, and accurate information to patient/family  - Incorporate patient and family knowledge, values, beliefs, and cultural backgrounds into the planning and delivery of care  - Encourage patient/family to participate in care and decision-making at the level they choose  - Honor patient and family perspectives and choices  5/20/2023 1819 by Fazal Baron  Outcome: Progressing  5/20/2023 1819 by Fazal Baron  Outcome: Progressing     Problem: PAIN - ADULT  Goal: Verbalizes/displays adequate comfort level or patient's stated pain goal  Description: INTERVENTIONS:  - Encourage pt to monitor pain and request assistance  - Assess pain using appropriate pain scale  - Administer analgesics based on type and severity of pain and evaluate response  - Implement non-pharmacological measures as appropriate and evaluate response  - Consider cultural and social influences on pain and pain management  - Manage/alleviate anxiety  - Utilize distraction and/or relaxation techniques  - Monitor for opioid side effects  - Notify MD/LIP if interventions unsuccessful or patient reports new pain  - Anticipate increased pain with activity and pre-medicate as appropriate  5/20/2023 1819 by Fazal Baron  Outcome: Progressing  5/20/2023 1819 by Fazal Baron  Outcome: Progressing     Problem: SAFETY ADULT - FALL  Goal: Free from fall injury  Description: INTERVENTIONS:  - Assess pt frequently for physical needs  - Identify cognitive and physical deficits and behaviors that affect risk of falls.   - [place] fall precautions as indicated by assessment.  - Educate pt/family on patient safety including physical limitations  - Instruct pt to call for assistance with activity based on assessment  - Modify environment to reduce risk of injury  - Provide assistive devices as appropriate  - Consider OT/PT consult to assist with strengthening/mobility  - Encourage toileting schedule  5/20/2023 1819 by Michael Amor  Outcome: Progressing  5/20/2023 1819 by Michael Amor  Outcome: Progressing     Problem: DISCHARGE PLANNING  Goal: Discharge to home or other facility with appropriate resources  Description: INTERVENTIONS:  - Identify barriers to discharge w/pt and caregiver  - Include patient/family/discharge partner in discharge planning  - Arrange for needed discharge resources and transportation as appropriate  - Identify discharge learning needs (meds, wound care, etc)  - Arrange for interpreters to assist at discharge as needed  - Consider post-discharge preferences of patient/family/discharge partner  - Complete POLST form as appropriate  - Assess patient's ability to be responsible for managing their own health  - Refer to Case Management Department for coordinating discharge planning if the patient needs post-hospital services based on physician/LIP order or complex needs related to functional status, cognitive ability or social support system  5/20/2023 1819 by Michael Amor  Outcome: Progressing  5/20/2023 1819 by Michael Amor  Outcome: Progressing     Problem: GASTROINTESTINAL - ADULT  Goal: Minimal or absence of nausea and vomiting  Description: INTERVENTIONS:  - Maintain adequate hydration with IV or PO as ordered and tolerated  - Nasogastric tube to low intermittent suction as ordered  - Evaluate effectiveness of ordered antiemetic medications  - Provide nonpharmacologic comfort measures as appropriate  - Advance diet as tolerated, if ordered  - Obtain nutritional consult as needed  - Evaluate fluid balance  Outcome: Progressing  Goal: Maintains or returns to baseline bowel function  Description: INTERVENTIONS:  - Assess bowel function  - Maintain adequate hydration with IV or PO as ordered and tolerated  - Evaluate effectiveness of GI medications  - Encourage mobilization and activity  - Obtain nutritional consult as needed  - Establish a toileting routine/schedule  - Consider collaborating with pharmacy to review patient's medication profile  Outcome: Progressing

## 2023-05-20 NOTE — PLAN OF CARE
From home alone. A&Ox4. RA. Urinal at bedside. Standby assist. - Patient does not use ambulatory aids at home. Bed in lowest position and locked. Call light in hand. Problem: Patient Centered Care  Goal: Patient preferences are identified and integrated in the patient's plan of care  Description: Interventions:  - What would you like us to know as we care for you? From home alone. - Provide timely, complete, and accurate information to patient/family  - Incorporate patient and family knowledge, values, beliefs, and cultural backgrounds into the planning and delivery of care  - Encourage patient/family to participate in care and decision-making at the level they choose  - Honor patient and family perspectives and choices  Outcome: Progressing     Problem: PAIN - ADULT  Goal: Verbalizes/displays adequate comfort level or patient's stated pain goal  Description: INTERVENTIONS:  - Encourage pt to monitor pain and request assistance  - Assess pain using appropriate pain scale  - Administer analgesics based on type and severity of pain and evaluate response  - Implement non-pharmacological measures as appropriate and evaluate response  - Consider cultural and social influences on pain and pain management  - Manage/alleviate anxiety  - Utilize distraction and/or relaxation techniques  - Monitor for opioid side effects  - Notify MD/LIP if interventions unsuccessful or patient reports new pain  - Anticipate increased pain with activity and pre-medicate as appropriate  Outcome: Progressing     Problem: SAFETY ADULT - FALL  Goal: Free from fall injury  Description: INTERVENTIONS:  - Assess pt frequently for physical needs  - Identify cognitive and physical deficits and behaviors that affect risk of falls.   - [place] fall precautions as indicated by assessment.  - Educate pt/family on patient safety including physical limitations  - Instruct pt to call for assistance with activity based on assessment  - Modify environment to reduce risk of injury  - Provide assistive devices as appropriate  - Consider OT/PT consult to assist with strengthening/mobility  - Encourage toileting schedule  Outcome: Progressing     Problem: DISCHARGE PLANNING  Goal: Discharge to home or other facility with appropriate resources  Description: INTERVENTIONS:  - Identify barriers to discharge w/pt and caregiver  - Include patient/family/discharge partner in discharge planning  - Arrange for needed discharge resources and transportation as appropriate  - Identify discharge learning needs (meds, wound care, etc)  - Arrange for interpreters to assist at discharge as needed  - Consider post-discharge preferences of patient/family/discharge partner  - Complete POLST form as appropriate  - Assess patient's ability to be responsible for managing their own health  - Refer to Case Management Department for coordinating discharge planning if the patient needs post-hospital services based on physician/LIP order or complex needs related to functional status, cognitive ability or social support system  Outcome: Progressing

## 2023-05-20 NOTE — PLAN OF CARE
Problem: Patient Centered Care  Goal: Patient preferences are identified and integrated in the patient's plan of care  Description: Interventions:  - What would you like us to know as we care for you? I am from home alone  - Provide timely, complete, and accurate information to patient/family  - Incorporate patient and family knowledge, values, beliefs, and cultural backgrounds into the planning and delivery of care  - Encourage patient/family to participate in care and decision-making at the level they choose  - Honor patient and family perspectives and choices  Outcome: Progressing     Problem: PAIN - ADULT  Goal: Verbalizes/displays adequate comfort level or patient's stated pain goal  Description: INTERVENTIONS:  - Encourage pt to monitor pain and request assistance  - Assess pain using appropriate pain scale  - Administer analgesics based on type and severity of pain and evaluate response  - Implement non-pharmacological measures as appropriate and evaluate response  - Consider cultural and social influences on pain and pain management  - Manage/alleviate anxiety  - Utilize distraction and/or relaxation techniques  - Monitor for opioid side effects  - Notify MD/LIP if interventions unsuccessful or patient reports new pain  - Anticipate increased pain with activity and pre-medicate as appropriate  Outcome: Progressing     Problem: SAFETY ADULT - FALL  Goal: Free from fall injury  Description: INTERVENTIONS:  - Assess pt frequently for physical needs  - Identify cognitive and physical deficits and behaviors that affect risk of falls.   - [place] fall precautions as indicated by assessment.  - Educate pt/family on patient safety including physical limitations  - Instruct pt to call for assistance with activity based on assessment  - Modify environment to reduce risk of injury  - Provide assistive devices as appropriate  - Consider OT/PT consult to assist with strengthening/mobility  - Encourage toileting schedule  Outcome: Progressing     Problem: DISCHARGE PLANNING  Goal: Discharge to home or other facility with appropriate resources  Description: INTERVENTIONS:  - Identify barriers to discharge w/pt and caregiver  - Include patient/family/discharge partner in discharge planning  - Arrange for needed discharge resources and transportation as appropriate  - Identify discharge learning needs (meds, wound care, etc)  - Arrange for interpreters to assist at discharge as needed  - Consider post-discharge preferences of patient/family/discharge partner  - Complete POLST form as appropriate  - Assess patient's ability to be responsible for managing their own health  - Refer to Case Management Department for coordinating discharge planning if the patient needs post-hospital services based on physician/LIP order or complex needs related to functional status, cognitive ability or social support system  Outcome: Progressing   Patient a&ox4. Sating well on RA. SR on tele. G/J tube draining well, however bag had leakage; bag and tubing exchanged overnight. UA sent. Potassium replaced overnight. NPO. Morphine given for pain management. All needs met at this time.

## 2023-05-21 ENCOUNTER — APPOINTMENT (OUTPATIENT)
Dept: CT IMAGING | Facility: HOSPITAL | Age: 48
End: 2023-05-21
Attending: INTERNAL MEDICINE
Payer: COMMERCIAL

## 2023-05-21 LAB
ANION GAP SERPL CALC-SCNC: 9 MMOL/L (ref 0–18)
BASOPHILS # BLD AUTO: 0.06 X10(3) UL (ref 0–0.2)
BASOPHILS NFR BLD AUTO: 0.6 %
BUN BLD-MCNC: 62 MG/DL (ref 7–18)
BUN/CREAT SERPL: 27.8 (ref 10–20)
CALCIUM BLD-MCNC: 8.3 MG/DL (ref 8.5–10.1)
CHLORIDE SERPL-SCNC: 85 MMOL/L (ref 98–112)
CO2 SERPL-SCNC: 38 MMOL/L (ref 21–32)
CREAT BLD-MCNC: 2.23 MG/DL
DEPRECATED RDW RBC AUTO: 38.7 FL (ref 35.1–46.3)
EOSINOPHIL # BLD AUTO: 1.55 X10(3) UL (ref 0–0.7)
EOSINOPHIL NFR BLD AUTO: 15.6 %
ERYTHROCYTE [DISTWIDTH] IN BLOOD BY AUTOMATED COUNT: 11.4 % (ref 11–15)
GFR SERPLBLD BASED ON 1.73 SQ M-ARVRAT: 36 ML/MIN/1.73M2 (ref 60–?)
GLUCOSE BLD-MCNC: 80 MG/DL (ref 70–99)
HCT VFR BLD AUTO: 34.4 %
HGB BLD-MCNC: 11.7 G/DL
IMM GRANULOCYTES # BLD AUTO: 0.03 X10(3) UL (ref 0–1)
IMM GRANULOCYTES NFR BLD: 0.3 %
LYMPHOCYTES # BLD AUTO: 0.94 X10(3) UL (ref 1–4)
LYMPHOCYTES NFR BLD AUTO: 9.5 %
MAGNESIUM SERPL-MCNC: 1.9 MG/DL (ref 1.6–2.6)
MCH RBC QN AUTO: 31.4 PG (ref 26–34)
MCHC RBC AUTO-ENTMCNC: 34 G/DL (ref 31–37)
MCV RBC AUTO: 92.2 FL
MONOCYTES # BLD AUTO: 0.81 X10(3) UL (ref 0.1–1)
MONOCYTES NFR BLD AUTO: 8.1 %
NEUTROPHILS # BLD AUTO: 6.55 X10 (3) UL (ref 1.5–7.7)
NEUTROPHILS # BLD AUTO: 6.55 X10(3) UL (ref 1.5–7.7)
NEUTROPHILS NFR BLD AUTO: 65.9 %
OSMOLALITY SERPL CALC.SUM OF ELEC: 291 MOSM/KG (ref 275–295)
PHOSPHATE SERPL-MCNC: 3.1 MG/DL (ref 2.5–4.9)
PLATELET # BLD AUTO: 334 10(3)UL (ref 150–450)
POTASSIUM SERPL-SCNC: 3.6 MMOL/L (ref 3.5–5.1)
POTASSIUM SERPL-SCNC: 3.6 MMOL/L (ref 3.5–5.1)
RBC # BLD AUTO: 3.73 X10(6)UL
SODIUM SERPL-SCNC: 132 MMOL/L (ref 136–145)
WBC # BLD AUTO: 9.9 X10(3) UL (ref 4–11)

## 2023-05-21 PROCEDURE — 99233 SBSQ HOSP IP/OBS HIGH 50: CPT | Performed by: HOSPITALIST

## 2023-05-21 PROCEDURE — 99233 SBSQ HOSP IP/OBS HIGH 50: CPT | Performed by: INTERNAL MEDICINE

## 2023-05-21 PROCEDURE — 99232 SBSQ HOSP IP/OBS MODERATE 35: CPT | Performed by: SURGERY

## 2023-05-21 RX ORDER — SODIUM BICARBONATE 650 MG/1
325 TABLET ORAL AS NEEDED
Status: DISCONTINUED | OUTPATIENT
Start: 2023-05-21 | End: 2023-05-29

## 2023-05-21 RX ORDER — POTASSIUM CHLORIDE 1.5 G/1.77G
40 POWDER, FOR SOLUTION ORAL ONCE
Status: DISCONTINUED | OUTPATIENT
Start: 2023-05-21 | End: 2023-05-21

## 2023-05-21 RX ORDER — POTASSIUM CHLORIDE 20 MEQ/1
40 TABLET, EXTENDED RELEASE ORAL ONCE
Status: DISCONTINUED | OUTPATIENT
Start: 2023-05-21 | End: 2023-05-21

## 2023-05-21 RX ORDER — POTASSIUM CHLORIDE 1.5 G/1.77G
40 POWDER, FOR SOLUTION ORAL 2 TIMES DAILY
Status: DISCONTINUED | OUTPATIENT
Start: 2023-05-21 | End: 2023-05-21

## 2023-05-21 NOTE — PLAN OF CARE
AOX4. On RA. PRN IV morphine given for ABD pain. K+ electrolyte replaced. Meds given via G/J tube. NPO, ice chips allowed. Call light within reach. Problem: PAIN - ADULT  Goal: Verbalizes/displays adequate comfort level or patient's stated pain goal  Description: INTERVENTIONS:  - Encourage pt to monitor pain and request assistance  - Assess pain using appropriate pain scale  - Administer analgesics based on type and severity of pain and evaluate response  - Implement non-pharmacological measures as appropriate and evaluate response  - Consider cultural and social influences on pain and pain management  - Manage/alleviate anxiety  - Utilize distraction and/or relaxation techniques  - Monitor for opioid side effects  - Notify MD/LIP if interventions unsuccessful or patient reports new pain  - Anticipate increased pain with activity and pre-medicate as appropriate  Outcome: Progressing     Problem: SAFETY ADULT - FALL  Goal: Free from fall injury  Description: INTERVENTIONS:  - Assess pt frequently for physical needs  - Identify cognitive and physical deficits and behaviors that affect risk of falls.   - [place] fall precautions as indicated by assessment.  - Educate pt/family on patient safety including physical limitations  - Instruct pt to call for assistance with activity based on assessment  - Modify environment to reduce risk of injury  - Provide assistive devices as appropriate  - Consider OT/PT consult to assist with strengthening/mobility  - Encourage toileting schedule  Outcome: Progressing     Problem: DISCHARGE PLANNING  Goal: Discharge to home or other facility with appropriate resources  Description: INTERVENTIONS:  - Identify barriers to discharge w/pt and caregiver  - Include patient/family/discharge partner in discharge planning  - Arrange for needed discharge resources and transportation as appropriate  - Identify discharge learning needs (meds, wound care, etc)  - Arrange for interpreters to assist at discharge as needed  - Consider post-discharge preferences of patient/family/discharge partner  - Complete POLST form as appropriate  - Assess patient's ability to be responsible for managing their own health  - Refer to Case Management Department for coordinating discharge planning if the patient needs post-hospital services based on physician/LIP order or complex needs related to functional status, cognitive ability or social support system  Outcome: Progressing     Problem: GASTROINTESTINAL - ADULT  Goal: Minimal or absence of nausea and vomiting  Description: INTERVENTIONS:  - Maintain adequate hydration with IV or PO as ordered and tolerated  - Nasogastric tube to low intermittent suction as ordered  - Evaluate effectiveness of ordered antiemetic medications  - Provide nonpharmacologic comfort measures as appropriate  - Advance diet as tolerated, if ordered  - Obtain nutritional consult as needed  - Evaluate fluid balance  Outcome: Progressing  Goal: Maintains or returns to baseline bowel function  Description: INTERVENTIONS:  - Assess bowel function  - Maintain adequate hydration with IV or PO as ordered and tolerated  - Evaluate effectiveness of GI medications  - Encourage mobilization and activity  - Obtain nutritional consult as needed  - Establish a toileting routine/schedule  - Consider collaborating with pharmacy to review patient's medication profile  Outcome: Progressing

## 2023-05-21 NOTE — PLAN OF CARE
AxOx4, RA, NSR, G/J tube with drain (drain attachment keeps popping off. Cont x2, Amb SBA. NPO ex ice chips. NO surgery planned. Restarting feeding throught J TUBE and to Monitor any leakage through G tube. Keep rate ONLY at 10cc/hr. Dietician to determine formula. Per pt \"Jev 1.5, cyclic from 7OU-9FV (total volume of 1185 mL/day). It was difficult to figure our how much water flushes he was receiving. \"   Problem: Patient Centered Care  Goal: Patient preferences are identified and integrated in the patient's plan of care  Description: Interventions:  - What would you like us to know as we care for you? From home Alone.  Tube drain keeps popping off  - Provide timely, complete, and accurate information to patient/family  - Incorporate patient and family knowledge, values, beliefs, and cultural backgrounds into the planning and delivery of care  - Encourage patient/family to participate in care and decision-making at the level they choose  - Honor patient and family perspectives and choices  Outcome: Progressing     Problem: PAIN - ADULT  Goal: Verbalizes/displays adequate comfort level or patient's stated pain goal  Description: INTERVENTIONS:  - Encourage pt to monitor pain and request assistance  - Assess pain using appropriate pain scale  - Administer analgesics based on type and severity of pain and evaluate response  - Implement non-pharmacological measures as appropriate and evaluate response  - Consider cultural and social influences on pain and pain management  - Manage/alleviate anxiety  - Utilize distraction and/or relaxation techniques  - Monitor for opioid side effects  - Notify MD/LIP if interventions unsuccessful or patient reports new pain  - Anticipate increased pain with activity and pre-medicate as appropriate  Outcome: Progressing     Problem: SAFETY ADULT - FALL  Goal: Free from fall injury  Description: INTERVENTIONS:  - Assess pt frequently for physical needs  - Identify cognitive and physical deficits and behaviors that affect risk of falls.   - [place] fall precautions as indicated by assessment.  - Educate pt/family on patient safety including physical limitations  - Instruct pt to call for assistance with activity based on assessment  - Modify environment to reduce risk of injury  - Provide assistive devices as appropriate  - Consider OT/PT consult to assist with strengthening/mobility  - Encourage toileting schedule  Outcome: Progressing     Problem: DISCHARGE PLANNING  Goal: Discharge to home or other facility with appropriate resources  Description: INTERVENTIONS:  - Identify barriers to discharge w/pt and caregiver  - Include patient/family/discharge partner in discharge planning  - Arrange for needed discharge resources and transportation as appropriate  - Identify discharge learning needs (meds, wound care, etc)  - Arrange for interpreters to assist at discharge as needed  - Consider post-discharge preferences of patient/family/discharge partner  - Complete POLST form as appropriate  - Assess patient's ability to be responsible for managing their own health  - Refer to Case Management Department for coordinating discharge planning if the patient needs post-hospital services based on physician/LIP order or complex needs related to functional status, cognitive ability or social support system  Outcome: Progressing     Problem: GASTROINTESTINAL - ADULT  Goal: Minimal or absence of nausea and vomiting  Description: INTERVENTIONS:  - Maintain adequate hydration with IV or PO as ordered and tolerated  - Nasogastric tube to low intermittent suction as ordered  - Evaluate effectiveness of ordered antiemetic medications  - Provide nonpharmacologic comfort measures as appropriate  - Advance diet as tolerated, if ordered  - Obtain nutritional consult as needed  - Evaluate fluid balance  Outcome: Progressing  Goal: Maintains or returns to baseline bowel function  Description: INTERVENTIONS:  - Assess bowel function  - Maintain adequate hydration with IV or PO as ordered and tolerated  - Evaluate effectiveness of GI medications  - Encourage mobilization and activity  - Obtain nutritional consult as needed  - Establish a toileting routine/schedule  - Consider collaborating with pharmacy to review patient's medication profile  Outcome: Progressing

## 2023-05-22 LAB
ALBUMIN SERPL-MCNC: 1.7 G/DL (ref 3.4–5)
ALBUMIN/GLOB SERPL: 0.5 {RATIO} (ref 1–2)
ALP LIVER SERPL-CCNC: 148 U/L
ALT SERPL-CCNC: 11 U/L
ANION GAP SERPL CALC-SCNC: 6 MMOL/L (ref 0–18)
AST SERPL-CCNC: 14 U/L (ref 15–37)
ATRIAL RATE: 96 BPM
BASOPHILS # BLD AUTO: 0.03 X10(3) UL (ref 0–0.2)
BASOPHILS NFR BLD AUTO: 0.3 %
BILIRUB SERPL-MCNC: 0.6 MG/DL (ref 0.1–2)
BUN BLD-MCNC: 43 MG/DL (ref 7–18)
BUN/CREAT SERPL: 20.9 (ref 10–20)
CALCIUM BLD-MCNC: 8 MG/DL (ref 8.5–10.1)
CHLORIDE SERPL-SCNC: 92 MMOL/L (ref 98–112)
CO2 SERPL-SCNC: 34 MMOL/L (ref 21–32)
CREAT BLD-MCNC: 2.06 MG/DL
DEPRECATED RDW RBC AUTO: 38.5 FL (ref 35.1–46.3)
EOSINOPHIL # BLD AUTO: 2.17 X10(3) UL (ref 0–0.7)
EOSINOPHIL NFR BLD AUTO: 24.4 %
ERYTHROCYTE [DISTWIDTH] IN BLOOD BY AUTOMATED COUNT: 11.5 % (ref 11–15)
GFR SERPLBLD BASED ON 1.73 SQ M-ARVRAT: 39 ML/MIN/1.73M2 (ref 60–?)
GLOBULIN PLAS-MCNC: 3.2 G/DL (ref 2.8–4.4)
GLUCOSE BLD-MCNC: 125 MG/DL (ref 70–99)
HCT VFR BLD AUTO: 31.7 %
HGB BLD-MCNC: 11 G/DL
IMM GRANULOCYTES # BLD AUTO: 0.03 X10(3) UL (ref 0–1)
IMM GRANULOCYTES NFR BLD: 0.3 %
LYMPHOCYTES # BLD AUTO: 0.83 X10(3) UL (ref 1–4)
LYMPHOCYTES NFR BLD AUTO: 9.3 %
MCH RBC QN AUTO: 31.7 PG (ref 26–34)
MCHC RBC AUTO-ENTMCNC: 34.7 G/DL (ref 31–37)
MCV RBC AUTO: 91.4 FL
MONOCYTES # BLD AUTO: 0.87 X10(3) UL (ref 0.1–1)
MONOCYTES NFR BLD AUTO: 9.8 %
NEUTROPHILS # BLD AUTO: 4.97 X10 (3) UL (ref 1.5–7.7)
NEUTROPHILS # BLD AUTO: 4.97 X10(3) UL (ref 1.5–7.7)
NEUTROPHILS NFR BLD AUTO: 55.9 %
OSMOLALITY SERPL CALC.SUM OF ELEC: 286 MOSM/KG (ref 275–295)
P AXIS: 63 DEGREES
P-R INTERVAL: 130 MS
PLATELET # BLD AUTO: 309 10(3)UL (ref 150–450)
POTASSIUM SERPL-SCNC: 3.3 MMOL/L (ref 3.5–5.1)
POTASSIUM SERPL-SCNC: 3.8 MMOL/L (ref 3.5–5.1)
PROT SERPL-MCNC: 4.9 G/DL (ref 6.4–8.2)
Q-T INTERVAL: 360 MS
QRS DURATION: 80 MS
QTC CALCULATION (BEZET): 454 MS
R AXIS: -34 DEGREES
RBC # BLD AUTO: 3.47 X10(6)UL
SODIUM SERPL-SCNC: 132 MMOL/L (ref 136–145)
T AXIS: 47 DEGREES
VENTRICULAR RATE: 96 BPM
WBC # BLD AUTO: 8.9 X10(3) UL (ref 4–11)

## 2023-05-22 PROCEDURE — 99233 SBSQ HOSP IP/OBS HIGH 50: CPT | Performed by: HOSPITALIST

## 2023-05-22 PROCEDURE — 99232 SBSQ HOSP IP/OBS MODERATE 35: CPT | Performed by: SURGERY

## 2023-05-22 PROCEDURE — 99233 SBSQ HOSP IP/OBS HIGH 50: CPT | Performed by: INTERNAL MEDICINE

## 2023-05-22 NOTE — PLAN OF CARE
AOX4, on RA. Jevity 1.5 infusing, increased Q4 hrs per order. Denies pain. NPO, ice chips allowed. Call light within reach. Problem: Patient Centered Care  Goal: Patient preferences are identified and integrated in the patient's plan of care  Description: Interventions:  - What would you like us to know as we care for you? From home Alone. Tube drain keeps popping off  - Provide timely, complete, and accurate information to patient/family  - Incorporate patient and family knowledge, values, beliefs, and cultural backgrounds into the planning and delivery of care  - Encourage patient/family to participate in care and decision-making at the level they choose  - Honor patient and family perspectives and choices  Outcome: Progressing     Problem: PAIN - ADULT  Goal: Verbalizes/displays adequate comfort level or patient's stated pain goal  Description: INTERVENTIONS:  - Encourage pt to monitor pain and request assistance  - Assess pain using appropriate pain scale  - Administer analgesics based on type and severity of pain and evaluate response  - Implement non-pharmacological measures as appropriate and evaluate response  - Consider cultural and social influences on pain and pain management  - Manage/alleviate anxiety  - Utilize distraction and/or relaxation techniques  - Monitor for opioid side effects  - Notify MD/LIP if interventions unsuccessful or patient reports new pain  - Anticipate increased pain with activity and pre-medicate as appropriate  Outcome: Progressing     Problem: SAFETY ADULT - FALL  Goal: Free from fall injury  Description: INTERVENTIONS:  - Assess pt frequently for physical needs  - Identify cognitive and physical deficits and behaviors that affect risk of falls.   - [place] fall precautions as indicated by assessment.  - Educate pt/family on patient safety including physical limitations  - Instruct pt to call for assistance with activity based on assessment  - Modify environment to reduce risk of injury  - Provide assistive devices as appropriate  - Consider OT/PT consult to assist with strengthening/mobility  - Encourage toileting schedule  Outcome: Progressing     Problem: DISCHARGE PLANNING  Goal: Discharge to home or other facility with appropriate resources  Description: INTERVENTIONS:  - Identify barriers to discharge w/pt and caregiver  - Include patient/family/discharge partner in discharge planning  - Arrange for needed discharge resources and transportation as appropriate  - Identify discharge learning needs (meds, wound care, etc)  - Arrange for interpreters to assist at discharge as needed  - Consider post-discharge preferences of patient/family/discharge partner  - Complete POLST form as appropriate  - Assess patient's ability to be responsible for managing their own health  - Refer to Case Management Department for coordinating discharge planning if the patient needs post-hospital services based on physician/LIP order or complex needs related to functional status, cognitive ability or social support system  Outcome: Progressing     Problem: GASTROINTESTINAL - ADULT  Goal: Minimal or absence of nausea and vomiting  Description: INTERVENTIONS:  - Maintain adequate hydration with IV or PO as ordered and tolerated  - Nasogastric tube to low intermittent suction as ordered  - Evaluate effectiveness of ordered antiemetic medications  - Provide nonpharmacologic comfort measures as appropriate  - Advance diet as tolerated, if ordered  - Obtain nutritional consult as needed  - Evaluate fluid balance  Outcome: Progressing  Goal: Maintains or returns to baseline bowel function  Description: INTERVENTIONS:  - Assess bowel function  - Maintain adequate hydration with IV or PO as ordered and tolerated  - Evaluate effectiveness of GI medications  - Encourage mobilization and activity  - Obtain nutritional consult as needed  - Establish a toileting routine/schedule  - Consider collaborating with pharmacy to review patient's medication profile  Outcome: Progressing

## 2023-05-22 NOTE — CM/SW NOTE
Department  notified of request for [facility] , aidin referrals started. Assigned CM/SW to follow up with pt/family on further discharge planning.      Julio César Richter   May 22, 2023   12:23

## 2023-05-22 NOTE — CM/SW NOTE
Met with patient and his dad Marion rOtiz for assessment. Patient was recently discharged from [facility] w/ a new GJ tube. Patient was set up with [address] [place] [address] for tube feeds. Patient lives alone in a 2 level home, patient stays on the first floor. Patient is typically independent but reports he has been weaker since last admission. He has 3 sons who stay w/ him every other weekend. .    Discussed discharge plan, patient hopes to return home once stable. Patient reports he only received 1 RN visit from [facility] after last admission and would like more services, message sent to [facility]. MD/RN to order PT/OT evaluations, will need F2F vs DES pending recommendations.     Herrera Velarde, [facility] Yolanda Dang

## 2023-05-22 NOTE — PLAN OF CARE
Alert x4 denies pain or discomfort. J/gtube intact. Drainage green in color no feeding in drainage. 60ml jevity. Tolerating well. Potassium replaced via iv. Remains on iv zosyn. Ekg done. Temp 100.9 tylenol given at this time. patient denies any discomfort. Caps to gtube loose needs to be reinforced with tape to prevent leakage. Problem: Patient Centered Care  Goal: Patient preferences are identified and integrated in the patient's plan of care  Description: Interventions:  - What would you like us to know as we care for you? From home Alone. Tube drain keeps popping off  - Provide timely, complete, and accurate information to patient/family  - Incorporate patient and family knowledge, values, beliefs, and cultural backgrounds into the planning and delivery of care  - Encourage patient/family to participate in care and decision-making at the level they choose  - Honor patient and family perspectives and choices  Outcome: Progressing     Problem: PAIN - ADULT  Goal: Verbalizes/displays adequate comfort level or patient's stated pain goal  Description: INTERVENTIONS:  - Encourage pt to monitor pain and request assistance  - Assess pain using appropriate pain scale  - Administer analgesics based on type and severity of pain and evaluate response  - Implement non-pharmacological measures as appropriate and evaluate response  - Consider cultural and social influences on pain and pain management  - Manage/alleviate anxiety  - Utilize distraction and/or relaxation techniques  - Monitor for opioid side effects  - Notify MD/LIP if interventions unsuccessful or patient reports new pain  - Anticipate increased pain with activity and pre-medicate as appropriate  Outcome: Progressing     Problem: SAFETY ADULT - FALL  Goal: Free from fall injury  Description: INTERVENTIONS:  - Assess pt frequently for physical needs  - Identify cognitive and physical deficits and behaviors that affect risk of falls.   - [place] fall precautions as indicated by assessment.  - Educate pt/family on patient safety including physical limitations  - Instruct pt to call for assistance with activity based on assessment  - Modify environment to reduce risk of injury  - Provide assistive devices as appropriate  - Consider OT/PT consult to assist with strengthening/mobility  - Encourage toileting schedule  Outcome: Progressing     Problem: DISCHARGE PLANNING  Goal: Discharge to home or other facility with appropriate resources  Description: INTERVENTIONS:  - Identify barriers to discharge w/pt and caregiver  - Include patient/family/discharge partner in discharge planning  - Arrange for needed discharge resources and transportation as appropriate  - Identify discharge learning needs (meds, wound care, etc)  - Arrange for interpreters to assist at discharge as needed  - Consider post-discharge preferences of patient/family/discharge partner  - Complete POLST form as appropriate  - Assess patient's ability to be responsible for managing their own health  - Refer to Case Management Department for coordinating discharge planning if the patient needs post-hospital services based on physician/LIP order or complex needs related to functional status, cognitive ability or social support system  Outcome: Progressing     Problem: GASTROINTESTINAL - ADULT  Goal: Minimal or absence of nausea and vomiting  Description: INTERVENTIONS:  - Maintain adequate hydration with IV or PO as ordered and tolerated  - Nasogastric tube to low intermittent suction as ordered  - Evaluate effectiveness of ordered antiemetic medications  - Provide nonpharmacologic comfort measures as appropriate  - Advance diet as tolerated, if ordered  - Obtain nutritional consult as needed  - Evaluate fluid balance  Outcome: Progressing  Goal: Maintains or returns to baseline bowel function  Description: INTERVENTIONS:  - Assess bowel function  - Maintain adequate hydration with IV or PO as ordered and tolerated  - Evaluate effectiveness of GI medications  - Encourage mobilization and activity  - Obtain nutritional consult as needed  - Establish a toileting routine/schedule  - Consider collaborating with pharmacy to review patient's medication profile  Outcome: Progressing

## 2023-05-23 LAB
ANION GAP SERPL CALC-SCNC: 4 MMOL/L (ref 0–18)
BASOPHILS # BLD AUTO: 0.02 X10(3) UL (ref 0–0.2)
BASOPHILS NFR BLD AUTO: 0.2 %
BUN BLD-MCNC: 31 MG/DL (ref 7–18)
BUN/CREAT SERPL: 16.5 (ref 10–20)
CALCIUM BLD-MCNC: 7.4 MG/DL (ref 8.5–10.1)
CHLORIDE SERPL-SCNC: 102 MMOL/L (ref 98–112)
CO2 SERPL-SCNC: 31 MMOL/L (ref 21–32)
CREAT BLD-MCNC: 1.88 MG/DL
DEPRECATED RDW RBC AUTO: 39.3 FL (ref 35.1–46.3)
EOSINOPHIL # BLD AUTO: 2.11 X10(3) UL (ref 0–0.7)
EOSINOPHIL NFR BLD AUTO: 22.7 %
ERYTHROCYTE [DISTWIDTH] IN BLOOD BY AUTOMATED COUNT: 11.7 % (ref 11–15)
GFR SERPLBLD BASED ON 1.73 SQ M-ARVRAT: 44 ML/MIN/1.73M2 (ref 60–?)
GLUCOSE BLD-MCNC: 91 MG/DL (ref 70–99)
GLUCOSE BLDC GLUCOMTR-MCNC: 115 MG/DL (ref 70–99)
GLUCOSE BLDC GLUCOMTR-MCNC: 116 MG/DL (ref 70–99)
GLUCOSE BLDC GLUCOMTR-MCNC: 121 MG/DL (ref 70–99)
GLUCOSE BLDC GLUCOMTR-MCNC: 135 MG/DL (ref 70–99)
GLUCOSE BLDC GLUCOMTR-MCNC: 151 MG/DL (ref 70–99)
GLUCOSE BLDC GLUCOMTR-MCNC: 68 MG/DL (ref 70–99)
GLUCOSE BLDC GLUCOMTR-MCNC: 79 MG/DL (ref 70–99)
HCT VFR BLD AUTO: 30.4 %
HGB BLD-MCNC: 10.5 G/DL
IMM GRANULOCYTES # BLD AUTO: 0.05 X10(3) UL (ref 0–1)
IMM GRANULOCYTES NFR BLD: 0.5 %
LYMPHOCYTES # BLD AUTO: 0.55 X10(3) UL (ref 1–4)
LYMPHOCYTES NFR BLD AUTO: 5.9 %
MAGNESIUM SERPL-MCNC: 1.4 MG/DL (ref 1.6–2.6)
MCH RBC QN AUTO: 31.6 PG (ref 26–34)
MCHC RBC AUTO-ENTMCNC: 34.5 G/DL (ref 31–37)
MCV RBC AUTO: 91.6 FL
MONOCYTES # BLD AUTO: 0.75 X10(3) UL (ref 0.1–1)
MONOCYTES NFR BLD AUTO: 8.1 %
NEUTROPHILS # BLD AUTO: 5.8 X10 (3) UL (ref 1.5–7.7)
NEUTROPHILS # BLD AUTO: 5.8 X10(3) UL (ref 1.5–7.7)
NEUTROPHILS NFR BLD AUTO: 62.6 %
OSMOLALITY SERPL CALC.SUM OF ELEC: 290 MOSM/KG (ref 275–295)
PHOSPHATE SERPL-MCNC: 2.4 MG/DL (ref 2.5–4.9)
PLATELET # BLD AUTO: 277 10(3)UL (ref 150–450)
POTASSIUM SERPL-SCNC: 3.6 MMOL/L (ref 3.5–5.1)
RBC # BLD AUTO: 3.32 X10(6)UL
SODIUM SERPL-SCNC: 137 MMOL/L (ref 136–145)
WBC # BLD AUTO: 9.3 X10(3) UL (ref 4–11)

## 2023-05-23 PROCEDURE — 99233 SBSQ HOSP IP/OBS HIGH 50: CPT | Performed by: INTERNAL MEDICINE

## 2023-05-23 PROCEDURE — 99233 SBSQ HOSP IP/OBS HIGH 50: CPT | Performed by: HOSPITALIST

## 2023-05-23 RX ORDER — MAGNESIUM OXIDE 400 MG/1
800 TABLET ORAL ONCE
Status: COMPLETED | OUTPATIENT
Start: 2023-05-23 | End: 2023-05-23

## 2023-05-23 RX ORDER — MAGNESIUM SULFATE HEPTAHYDRATE 40 MG/ML
2 INJECTION, SOLUTION INTRAVENOUS ONCE
Status: COMPLETED | OUTPATIENT
Start: 2023-05-23 | End: 2023-05-23

## 2023-05-23 RX ORDER — DEXTROSE MONOHYDRATE 25 G/50ML
50 INJECTION, SOLUTION INTRAVENOUS AS NEEDED
Status: DISCONTINUED | OUTPATIENT
Start: 2023-05-23 | End: 2023-05-29

## 2023-05-23 NOTE — OCCUPATIONAL THERAPY NOTE
05/23/23 1303   VISIT TYPE   OT Inpatient Visit Type (Documentation Required) Attempted Evaluation  (Pt is up ad abdiaziz; independent with ADL and mobility.  DC IP OT.)   OT Follow Up   Follow Up Needed (Documentation Required) No   OT Follow-up Date   (N/A, DC IP OT)       Thank you for your referral.    Niru Hinkle, OTR/L  [address]

## 2023-05-23 NOTE — PLAN OF CARE
Patient is aox4; RA; standby. J/G-tube intact; gastric port taped off. Feedings at 75 ml/hr, stopped at 1200 ml; reports stomach discomfort; PRN Tylenol given via JTube. IV Zosyn given. PT/OT needed for eval.     Problem: Patient Centered Care  Goal: Patient preferences are identified and integrated in the patient's plan of care  Description: Interventions:  - What would you like us to know as we care for you? From home Alone. Tube drain keeps popping off  - Provide timely, complete, and accurate information to patient/family  - Incorporate patient and family knowledge, values, beliefs, and cultural backgrounds into the planning and delivery of care  - Encourage patient/family to participate in care and decision-making at the level they choose  - Honor patient and family perspectives and choices  Outcome: Progressing     Problem: PAIN - ADULT  Goal: Verbalizes/displays adequate comfort level or patient's stated pain goal  Description: INTERVENTIONS:  - Encourage pt to monitor pain and request assistance  - Assess pain using appropriate pain scale  - Administer analgesics based on type and severity of pain and evaluate response  - Implement non-pharmacological measures as appropriate and evaluate response  - Consider cultural and social influences on pain and pain management  - Manage/alleviate anxiety  - Utilize distraction and/or relaxation techniques  - Monitor for opioid side effects  - Notify MD/LIP if interventions unsuccessful or patient reports new pain  - Anticipate increased pain with activity and pre-medicate as appropriate  Outcome: Progressing     Problem: SAFETY ADULT - FALL  Goal: Free from fall injury  Description: INTERVENTIONS:  - Assess pt frequently for physical needs  - Identify cognitive and physical deficits and behaviors that affect risk of falls.   - [place] fall precautions as indicated by assessment.  - Educate pt/family on patient safety including physical limitations  - Instruct pt to call for assistance with activity based on assessment  - Modify environment to reduce risk of injury  - Provide assistive devices as appropriate  - Consider OT/PT consult to assist with strengthening/mobility  - Encourage toileting schedule  Outcome: Progressing     Problem: DISCHARGE PLANNING  Goal: Discharge to home or other facility with appropriate resources  Description: INTERVENTIONS:  - Identify barriers to discharge w/pt and caregiver  - Include patient/family/discharge partner in discharge planning  - Arrange for needed discharge resources and transportation as appropriate  - Identify discharge learning needs (meds, wound care, etc)  - Arrange for interpreters to assist at discharge as needed  - Consider post-discharge preferences of patient/family/discharge partner  - Complete POLST form as appropriate  - Assess patient's ability to be responsible for managing their own health  - Refer to Case Management Department for coordinating discharge planning if the patient needs post-hospital services based on physician/LIP order or complex needs related to functional status, cognitive ability or social support system  Outcome: Progressing     Problem: GASTROINTESTINAL - ADULT  Goal: Minimal or absence of nausea and vomiting  Description: INTERVENTIONS:  - Maintain adequate hydration with IV or PO as ordered and tolerated  - Nasogastric tube to low intermittent suction as ordered  - Evaluate effectiveness of ordered antiemetic medications  - Provide nonpharmacologic comfort measures as appropriate  - Advance diet as tolerated, if ordered  - Obtain nutritional consult as needed  - Evaluate fluid balance  Outcome: Progressing  Goal: Maintains or returns to baseline bowel function  Description: INTERVENTIONS:  - Assess bowel function  - Maintain adequate hydration with IV or PO as ordered and tolerated  - Evaluate effectiveness of GI medications  - Encourage mobilization and activity  - Obtain nutritional consult as needed  - Establish a toileting routine/schedule  - Consider collaborating with pharmacy to review patient's medication profile  Outcome: Progressing

## 2023-05-23 NOTE — PLAN OF CARE
Patient alert x4 denies pain or discomfort. Jtube expressing bile at times. GI notified-monitor for now. Jtube flushed no residual. Feeding started at 3pm tolerating well. Remains on iv abx. Problem: Patient Centered Care  Goal: Patient preferences are identified and integrated in the patient's plan of care  Description: Interventions:  - What would you like us to know as we care for you? From home Alone. Tube drain keeps popping off  - Provide timely, complete, and accurate information to patient/family  - Incorporate patient and family knowledge, values, beliefs, and cultural backgrounds into the planning and delivery of care  - Encourage patient/family to participate in care and decision-making at the level they choose  - Honor patient and family perspectives and choices  Outcome: Progressing     Problem: PAIN - ADULT  Goal: Verbalizes/displays adequate comfort level or patient's stated pain goal  Description: INTERVENTIONS:  - Encourage pt to monitor pain and request assistance  - Assess pain using appropriate pain scale  - Administer analgesics based on type and severity of pain and evaluate response  - Implement non-pharmacological measures as appropriate and evaluate response  - Consider cultural and social influences on pain and pain management  - Manage/alleviate anxiety  - Utilize distraction and/or relaxation techniques  - Monitor for opioid side effects  - Notify MD/LIP if interventions unsuccessful or patient reports new pain  - Anticipate increased pain with activity and pre-medicate as appropriate  Outcome: Progressing     Problem: SAFETY ADULT - FALL  Goal: Free from fall injury  Description: INTERVENTIONS:  - Assess pt frequently for physical needs  - Identify cognitive and physical deficits and behaviors that affect risk of falls.   - [place] fall precautions as indicated by assessment.  - Educate pt/family on patient safety including physical limitations  - Instruct pt to call for assistance with activity based on assessment  - Modify environment to reduce risk of injury  - Provide assistive devices as appropriate  - Consider OT/PT consult to assist with strengthening/mobility  - Encourage toileting schedule  Outcome: Progressing     Problem: DISCHARGE PLANNING  Goal: Discharge to home or other facility with appropriate resources  Description: INTERVENTIONS:  - Identify barriers to discharge w/pt and caregiver  - Include patient/family/discharge partner in discharge planning  - Arrange for needed discharge resources and transportation as appropriate  - Identify discharge learning needs (meds, wound care, etc)  - Arrange for interpreters to assist at discharge as needed  - Consider post-discharge preferences of patient/family/discharge partner  - Complete POLST form as appropriate  - Assess patient's ability to be responsible for managing their own health  - Refer to Case Management Department for coordinating discharge planning if the patient needs post-hospital services based on physician/LIP order or complex needs related to functional status, cognitive ability or social support system  Outcome: Progressing     Problem: GASTROINTESTINAL - ADULT  Goal: Minimal or absence of nausea and vomiting  Description: INTERVENTIONS:  - Maintain adequate hydration with IV or PO as ordered and tolerated  - Nasogastric tube to low intermittent suction as ordered  - Evaluate effectiveness of ordered antiemetic medications  - Provide nonpharmacologic comfort measures as appropriate  - Advance diet as tolerated, if ordered  - Obtain nutritional consult as needed  - Evaluate fluid balance  Outcome: Progressing  Goal: Maintains or returns to baseline bowel function  Description: INTERVENTIONS:  - Assess bowel function  - Maintain adequate hydration with IV or PO as ordered and tolerated  - Evaluate effectiveness of GI medications  - Encourage mobilization and activity  - Obtain nutritional consult as needed  - Establish a toileting routine/schedule  - Consider collaborating with pharmacy to review patient's medication profile  Outcome: Progressing

## 2023-05-24 ENCOUNTER — APPOINTMENT (OUTPATIENT)
Dept: CT IMAGING | Facility: HOSPITAL | Age: 48
End: 2023-05-24
Attending: HOSPITALIST
Payer: COMMERCIAL

## 2023-05-24 LAB
ALBUMIN SERPL-MCNC: 1.7 G/DL (ref 3.4–5)
ALBUMIN/GLOB SERPL: 0.5 {RATIO} (ref 1–2)
ALP LIVER SERPL-CCNC: 131 U/L
ALT SERPL-CCNC: 10 U/L
ANION GAP SERPL CALC-SCNC: 6 MMOL/L (ref 0–18)
AST SERPL-CCNC: 11 U/L (ref 15–37)
BASOPHILS # BLD AUTO: 0.03 X10(3) UL (ref 0–0.2)
BASOPHILS # BLD: 0.11 X10(3) UL (ref 0–0.2)
BASOPHILS NFR BLD AUTO: 0.3 %
BASOPHILS NFR BLD: 1 %
BILIRUB SERPL-MCNC: 0.3 MG/DL (ref 0.1–2)
BUN BLD-MCNC: 25 MG/DL (ref 7–18)
BUN/CREAT SERPL: 13 (ref 10–20)
CALCIUM BLD-MCNC: 7.1 MG/DL (ref 8.5–10.1)
CHLORIDE SERPL-SCNC: 107 MMOL/L (ref 98–112)
CO2 SERPL-SCNC: 27 MMOL/L (ref 21–32)
CREAT BLD-MCNC: 1.93 MG/DL
DEPRECATED RDW RBC AUTO: 39.4 FL (ref 35.1–46.3)
EOSINOPHIL # BLD AUTO: 2.46 X10(3) UL (ref 0–0.7)
EOSINOPHIL # BLD: 1.93 X10(3) UL (ref 0–0.7)
EOSINOPHIL NFR BLD AUTO: 22.9 %
EOSINOPHIL NFR BLD: 18 %
ERYTHROCYTE [DISTWIDTH] IN BLOOD BY AUTOMATED COUNT: 11.5 % (ref 11–15)
GFR SERPLBLD BASED ON 1.73 SQ M-ARVRAT: 42 ML/MIN/1.73M2 (ref 60–?)
GLOBULIN PLAS-MCNC: 3.6 G/DL (ref 2.8–4.4)
GLUCOSE BLD-MCNC: 91 MG/DL (ref 70–99)
GLUCOSE BLDC GLUCOMTR-MCNC: 127 MG/DL (ref 70–99)
GLUCOSE BLDC GLUCOMTR-MCNC: 80 MG/DL (ref 70–99)
GLUCOSE BLDC GLUCOMTR-MCNC: 81 MG/DL (ref 70–99)
GLUCOSE BLDC GLUCOMTR-MCNC: 98 MG/DL (ref 70–99)
HCT VFR BLD AUTO: 34.3 %
HGB BLD-MCNC: 11.6 G/DL
IMM GRANULOCYTES # BLD AUTO: 0.04 X10(3) UL (ref 0–1)
IMM GRANULOCYTES NFR BLD: 0.4 %
LYMPHOCYTES # BLD AUTO: 0.87 X10(3) UL (ref 1–4)
LYMPHOCYTES NFR BLD AUTO: 8.1 %
LYMPHOCYTES NFR BLD: 0.21 X10(3) UL (ref 1–4)
LYMPHOCYTES NFR BLD: 2 %
MAGNESIUM SERPL-MCNC: 2.1 MG/DL (ref 1.6–2.6)
MCH RBC QN AUTO: 31.4 PG (ref 26–34)
MCHC RBC AUTO-ENTMCNC: 33.8 G/DL (ref 31–37)
MCV RBC AUTO: 92.7 FL
MONOCYTES # BLD AUTO: 0.78 X10(3) UL (ref 0.1–1)
MONOCYTES # BLD: 0.54 X10(3) UL (ref 0.1–1)
MONOCYTES NFR BLD AUTO: 7.3 %
MONOCYTES NFR BLD: 5 %
MORPHOLOGY: NORMAL
NEUTROPHILS # BLD AUTO: 6.56 X10 (3) UL (ref 1.5–7.7)
NEUTROPHILS # BLD AUTO: 6.56 X10(3) UL (ref 1.5–7.7)
NEUTROPHILS NFR BLD AUTO: 61 %
NEUTROPHILS NFR BLD: 74 %
NEUTS HYPERSEG # BLD: 7.92 X10(3) UL (ref 1.5–7.7)
OSMOLALITY SERPL CALC.SUM OF ELEC: 294 MOSM/KG (ref 275–295)
PHOSPHATE SERPL-MCNC: 2.5 MG/DL (ref 2.5–4.9)
PLATELET # BLD AUTO: 338 10(3)UL (ref 150–450)
PLATELET MORPHOLOGY: NORMAL
POTASSIUM SERPL-SCNC: 3.3 MMOL/L (ref 3.5–5.1)
POTASSIUM SERPL-SCNC: 3.3 MMOL/L (ref 3.5–5.1)
PROT SERPL-MCNC: 5.3 G/DL (ref 6.4–8.2)
RBC # BLD AUTO: 3.7 X10(6)UL
SODIUM SERPL-SCNC: 140 MMOL/L (ref 136–145)
TOTAL CELLS COUNTED BLD: 100
WBC # BLD AUTO: 10.7 X10(3) UL (ref 4–11)

## 2023-05-24 PROCEDURE — 74176 CT ABD & PELVIS W/O CONTRAST: CPT | Performed by: HOSPITALIST

## 2023-05-24 PROCEDURE — 99233 SBSQ HOSP IP/OBS HIGH 50: CPT | Performed by: HOSPITALIST

## 2023-05-24 PROCEDURE — 99233 SBSQ HOSP IP/OBS HIGH 50: CPT | Performed by: INTERNAL MEDICINE

## 2023-05-24 RX ORDER — POTASSIUM CHLORIDE 14.9 MG/ML
20 INJECTION INTRAVENOUS ONCE
Status: COMPLETED | OUTPATIENT
Start: 2023-05-24 | End: 2023-05-24

## 2023-05-24 NOTE — PLAN OF CARE
Jannet Davis is A&Ox4. Patient NPO on IV fluids and IV abx. Patient had fever today and ID aware. Tylenol given per orders. Patient see by ID and repeat CT abdomen order and blood cultures. Problem: Patient Centered Care  Goal: Patient preferences are identified and integrated in the patient's plan of care  Description: Interventions:  - What would you like us to know as we care for you? From home Alone. Tube drain keeps popping off  - Provide timely, complete, and accurate information to patient/family  - Incorporate patient and family knowledge, values, beliefs, and cultural backgrounds into the planning and delivery of care  - Encourage patient/family to participate in care and decision-making at the level they choose  - Honor patient and family perspectives and choices  Outcome: Progressing     Problem: PAIN - ADULT  Goal: Verbalizes/displays adequate comfort level or patient's stated pain goal  Description: INTERVENTIONS:  - Encourage pt to monitor pain and request assistance  - Assess pain using appropriate pain scale  - Administer analgesics based on type and severity of pain and evaluate response  - Implement non-pharmacological measures as appropriate and evaluate response  - Consider cultural and social influences on pain and pain management  - Manage/alleviate anxiety  - Utilize distraction and/or relaxation techniques  - Monitor for opioid side effects  - Notify MD/LIP if interventions unsuccessful or patient reports new pain  - Anticipate increased pain with activity and pre-medicate as appropriate  Outcome: Progressing     Problem: SAFETY ADULT - FALL  Goal: Free from fall injury  Description: INTERVENTIONS:  - Assess pt frequently for physical needs  - Identify cognitive and physical deficits and behaviors that affect risk of falls.   - [place] fall precautions as indicated by assessment.  - Educate pt/family on patient safety including physical limitations  - Instruct pt to call for assistance with activity based on assessment  - Modify environment to reduce risk of injury  - Provide assistive devices as appropriate  - Consider OT/PT consult to assist with strengthening/mobility  - Encourage toileting schedule  Outcome: Progressing     Problem: DISCHARGE PLANNING  Goal: Discharge to home or other facility with appropriate resources  Description: INTERVENTIONS:  - Identify barriers to discharge w/pt and caregiver  - Include patient/family/discharge partner in discharge planning  - Arrange for needed discharge resources and transportation as appropriate  - Identify discharge learning needs (meds, wound care, etc)  - Arrange for interpreters to assist at discharge as needed  - Consider post-discharge preferences of patient/family/discharge partner  - Complete POLST form as appropriate  - Assess patient's ability to be responsible for managing their own health  - Refer to Case Management Department for coordinating discharge planning if the patient needs post-hospital services based on physician/LIP order or complex needs related to functional status, cognitive ability or social support system  Outcome: Progressing     Problem: GASTROINTESTINAL - ADULT  Goal: Minimal or absence of nausea and vomiting  Description: INTERVENTIONS:  - Maintain adequate hydration with IV or PO as ordered and tolerated  - Nasogastric tube to low intermittent suction as ordered  - Evaluate effectiveness of ordered antiemetic medications  - Provide nonpharmacologic comfort measures as appropriate  - Advance diet as tolerated, if ordered  - Obtain nutritional consult as needed  - Evaluate fluid balance  Outcome: Progressing  Goal: Maintains or returns to baseline bowel function  Description: INTERVENTIONS:  - Assess bowel function  - Maintain adequate hydration with IV or PO as ordered and tolerated  - Evaluate effectiveness of GI medications  - Encourage mobilization and activity  - Obtain nutritional consult as needed  - Establish a toileting routine/schedule  - Consider collaborating with pharmacy to review patient's medication profile  Outcome: Progressing

## 2023-05-24 NOTE — PLAN OF CARE
Patient tolerated feeding all night. No n/v/abdominal pain. Feeding stopped at 7 AM per order. Given IV antibiotics for pancreatitis. Pt continue to have low grade fever. No chills or rigors reported. G/J Tube intact. Problem: Patient Centered Care  Goal: Patient preferences are identified and integrated in the patient's plan of care  Description: Interventions:  - What would you like us to know as we care for you? From home Alone. Tube drain keeps popping off  - Provide timely, complete, and accurate information to patient/family  - Incorporate patient and family knowledge, values, beliefs, and cultural backgrounds into the planning and delivery of care  - Encourage patient/family to participate in care and decision-making at the level they choose  - Honor patient and family perspectives and choices  Outcome: Progressing     Problem: PAIN - ADULT  Goal: Verbalizes/displays adequate comfort level or patient's stated pain goal  Description: INTERVENTIONS:  - Encourage pt to monitor pain and request assistance  - Assess pain using appropriate pain scale  - Administer analgesics based on type and severity of pain and evaluate response  - Implement non-pharmacological measures as appropriate and evaluate response  - Consider cultural and social influences on pain and pain management  - Manage/alleviate anxiety  - Utilize distraction and/or relaxation techniques  - Monitor for opioid side effects  - Notify MD/LIP if interventions unsuccessful or patient reports new pain  - Anticipate increased pain with activity and pre-medicate as appropriate  Outcome: Progressing     Problem: SAFETY ADULT - FALL  Goal: Free from fall injury  Description: INTERVENTIONS:  - Assess pt frequently for physical needs  - Identify cognitive and physical deficits and behaviors that affect risk of falls.   - [place] fall precautions as indicated by assessment.  - Educate pt/family on patient safety including physical limitations  - Instruct pt to call for assistance with activity based on assessment  - Modify environment to reduce risk of injury  - Provide assistive devices as appropriate  - Consider OT/PT consult to assist with strengthening/mobility  - Encourage toileting schedule  Outcome: Progressing     Problem: DISCHARGE PLANNING  Goal: Discharge to home or other facility with appropriate resources  Description: INTERVENTIONS:  - Identify barriers to discharge w/pt and caregiver  - Include patient/family/discharge partner in discharge planning  - Arrange for needed discharge resources and transportation as appropriate  - Identify discharge learning needs (meds, wound care, etc)  - Arrange for interpreters to assist at discharge as needed  - Consider post-discharge preferences of patient/family/discharge partner  - Complete POLST form as appropriate  - Assess patient's ability to be responsible for managing their own health  - Refer to Case Management Department for coordinating discharge planning if the patient needs post-hospital services based on physician/LIP order or complex needs related to functional status, cognitive ability or social support system  Outcome: Progressing     Problem: GASTROINTESTINAL - ADULT  Goal: Minimal or absence of nausea and vomiting  Description: INTERVENTIONS:  - Maintain adequate hydration with IV or PO as ordered and tolerated  - Nasogastric tube to low intermittent suction as ordered  - Evaluate effectiveness of ordered antiemetic medications  - Provide nonpharmacologic comfort measures as appropriate  - Advance diet as tolerated, if ordered  - Obtain nutritional consult as needed  - Evaluate fluid balance  Outcome: Progressing  Goal: Maintains or returns to baseline bowel function  Description: INTERVENTIONS:  - Assess bowel function  - Maintain adequate hydration with IV or PO as ordered and tolerated  - Evaluate effectiveness of GI medications  - Encourage mobilization and activity  - Obtain nutritional consult as needed  - Establish a toileting routine/schedule  - Consider collaborating with pharmacy to review patient's medication profile  Outcome: Progressing

## 2023-05-25 LAB
ALBUMIN SERPL-MCNC: 1.4 G/DL (ref 3.4–5)
ALBUMIN/GLOB SERPL: 0.4 {RATIO} (ref 1–2)
ALP LIVER SERPL-CCNC: 100 U/L
ALT SERPL-CCNC: 10 U/L
ANION GAP SERPL CALC-SCNC: 7 MMOL/L (ref 0–18)
AST SERPL-CCNC: 8 U/L (ref 15–37)
BASOPHILS # BLD AUTO: 0.02 X10(3) UL (ref 0–0.2)
BASOPHILS NFR BLD AUTO: 0.2 %
BILIRUB SERPL-MCNC: 0.3 MG/DL (ref 0.1–2)
BUN BLD-MCNC: 20 MG/DL (ref 7–18)
BUN/CREAT SERPL: 11.4 (ref 10–20)
CALCIUM BLD-MCNC: 7.2 MG/DL (ref 8.5–10.1)
CHLORIDE SERPL-SCNC: 111 MMOL/L (ref 98–112)
CO2 SERPL-SCNC: 22 MMOL/L (ref 21–32)
CREAT BLD-MCNC: 1.75 MG/DL
DEPRECATED RDW RBC AUTO: 41 FL (ref 35.1–46.3)
EOSINOPHIL # BLD AUTO: 2.5 X10(3) UL (ref 0–0.7)
EOSINOPHIL NFR BLD AUTO: 25.7 %
ERYTHROCYTE [DISTWIDTH] IN BLOOD BY AUTOMATED COUNT: 11.9 % (ref 11–15)
GFR SERPLBLD BASED ON 1.73 SQ M-ARVRAT: 48 ML/MIN/1.73M2 (ref 60–?)
GLOBULIN PLAS-MCNC: 3.3 G/DL (ref 2.8–4.4)
GLUCOSE BLD-MCNC: 104 MG/DL (ref 70–99)
GLUCOSE BLDC GLUCOMTR-MCNC: 102 MG/DL (ref 70–99)
GLUCOSE BLDC GLUCOMTR-MCNC: 137 MG/DL (ref 70–99)
GLUCOSE BLDC GLUCOMTR-MCNC: 82 MG/DL (ref 70–99)
GLUCOSE BLDC GLUCOMTR-MCNC: 88 MG/DL (ref 70–99)
HCT VFR BLD AUTO: 30.7 %
HGB BLD-MCNC: 10.3 G/DL
IMM GRANULOCYTES # BLD AUTO: 0.03 X10(3) UL (ref 0–1)
IMM GRANULOCYTES NFR BLD: 0.3 %
LYMPHOCYTES # BLD AUTO: 0.86 X10(3) UL (ref 1–4)
LYMPHOCYTES NFR BLD AUTO: 8.9 %
MAGNESIUM SERPL-MCNC: 1.9 MG/DL (ref 1.6–2.6)
MCH RBC QN AUTO: 31.7 PG (ref 26–34)
MCHC RBC AUTO-ENTMCNC: 33.6 G/DL (ref 31–37)
MCV RBC AUTO: 94.5 FL
MONOCYTES # BLD AUTO: 0.84 X10(3) UL (ref 0.1–1)
MONOCYTES NFR BLD AUTO: 8.7 %
NEUTROPHILS # BLD AUTO: 5.46 X10 (3) UL (ref 1.5–7.7)
NEUTROPHILS # BLD AUTO: 5.46 X10(3) UL (ref 1.5–7.7)
NEUTROPHILS NFR BLD AUTO: 56.2 %
OSMOLALITY SERPL CALC.SUM OF ELEC: 293 MOSM/KG (ref 275–295)
PHOSPHATE SERPL-MCNC: 3.3 MG/DL (ref 2.5–4.9)
PLATELET # BLD AUTO: 336 10(3)UL (ref 150–450)
POTASSIUM SERPL-SCNC: 4 MMOL/L (ref 3.5–5.1)
POTASSIUM SERPL-SCNC: 4 MMOL/L (ref 3.5–5.1)
PROT SERPL-MCNC: 4.7 G/DL (ref 6.4–8.2)
RBC # BLD AUTO: 3.25 X10(6)UL
SODIUM SERPL-SCNC: 140 MMOL/L (ref 136–145)
WBC # BLD AUTO: 9.7 X10(3) UL (ref 4–11)

## 2023-05-25 PROCEDURE — 99233 SBSQ HOSP IP/OBS HIGH 50: CPT | Performed by: HOSPITALIST

## 2023-05-25 PROCEDURE — 99232 SBSQ HOSP IP/OBS MODERATE 35: CPT | Performed by: INTERNAL MEDICINE

## 2023-05-25 NOTE — PLAN OF CARE
Kraig Degree is A&Ox4. Patient NPO on IV abx. Intermitted tube feedings. See orders. CT scan done. See results. Blood cultures pending. Problem: Patient Centered Care  Goal: Patient preferences are identified and integrated in the patient's plan of care  Description: Interventions:  - What would you like us to know as we care for you? From home Alone. Tube drain keeps popping off  - Provide timely, complete, and accurate information to patient/family  - Incorporate patient and family knowledge, values, beliefs, and cultural backgrounds into the planning and delivery of care  - Encourage patient/family to participate in care and decision-making at the level they choose  - Honor patient and family perspectives and choices  Outcome: Progressing     Problem: PAIN - ADULT  Goal: Verbalizes/displays adequate comfort level or patient's stated pain goal  Description: INTERVENTIONS:  - Encourage pt to monitor pain and request assistance  - Assess pain using appropriate pain scale  - Administer analgesics based on type and severity of pain and evaluate response  - Implement non-pharmacological measures as appropriate and evaluate response  - Consider cultural and social influences on pain and pain management  - Manage/alleviate anxiety  - Utilize distraction and/or relaxation techniques  - Monitor for opioid side effects  - Notify MD/LIP if interventions unsuccessful or patient reports new pain  - Anticipate increased pain with activity and pre-medicate as appropriate  Outcome: Progressing     Problem: SAFETY ADULT - FALL  Goal: Free from fall injury  Description: INTERVENTIONS:  - Assess pt frequently for physical needs  - Identify cognitive and physical deficits and behaviors that affect risk of falls.   - [place] fall precautions as indicated by assessment.  - Educate pt/family on patient safety including physical limitations  - Instruct pt to call for assistance with activity based on assessment  - Modify environment to reduce risk of injury  - Provide assistive devices as appropriate  - Consider OT/PT consult to assist with strengthening/mobility  - Encourage toileting schedule  Outcome: Progressing     Problem: DISCHARGE PLANNING  Goal: Discharge to home or other facility with appropriate resources  Description: INTERVENTIONS:  - Identify barriers to discharge w/pt and caregiver  - Include patient/family/discharge partner in discharge planning  - Arrange for needed discharge resources and transportation as appropriate  - Identify discharge learning needs (meds, wound care, etc)  - Arrange for interpreters to assist at discharge as needed  - Consider post-discharge preferences of patient/family/discharge partner  - Complete POLST form as appropriate  - Assess patient's ability to be responsible for managing their own health  - Refer to Case Management Department for coordinating discharge planning if the patient needs post-hospital services based on physician/LIP order or complex needs related to functional status, cognitive ability or social support system  Outcome: Progressing     Problem: GASTROINTESTINAL - ADULT  Goal: Minimal or absence of nausea and vomiting  Description: INTERVENTIONS:  - Maintain adequate hydration with IV or PO as ordered and tolerated  - Nasogastric tube to low intermittent suction as ordered  - Evaluate effectiveness of ordered antiemetic medications  - Provide nonpharmacologic comfort measures as appropriate  - Advance diet as tolerated, if ordered  - Obtain nutritional consult as needed  - Evaluate fluid balance  Outcome: Progressing  Goal: Maintains or returns to baseline bowel function  Description: INTERVENTIONS:  - Assess bowel function  - Maintain adequate hydration with IV or PO as ordered and tolerated  - Evaluate effectiveness of GI medications  - Encourage mobilization and activity  - Obtain nutritional consult as needed  - Establish a toileting routine/schedule  - Consider collaborating with pharmacy to review patient's medication profile  Outcome: Progressing

## 2023-05-25 NOTE — PLAN OF CARE
Problem: Patient Centered Care  Goal: Patient preferences are identified and integrated in the patient's plan of care  Description: Interventions:  - What would you like us to know as we care for you? From home Alone. Tube drain keeps popping off  - Provide timely, complete, and accurate information to patient/family  - Incorporate patient and family knowledge, values, beliefs, and cultural backgrounds into the planning and delivery of care  - Encourage patient/family to participate in care and decision-making at the level they choose  - Honor patient and family perspectives and choices  Outcome: Progressing     Problem: PAIN - ADULT  Goal: Verbalizes/displays adequate comfort level or patient's stated pain goal  Description: INTERVENTIONS:  - Encourage pt to monitor pain and request assistance  - Assess pain using appropriate pain scale  - Administer analgesics based on type and severity of pain and evaluate response  - Implement non-pharmacological measures as appropriate and evaluate response  - Consider cultural and social influences on pain and pain management  - Manage/alleviate anxiety  - Utilize distraction and/or relaxation techniques  - Monitor for opioid side effects  - Notify MD/LIP if interventions unsuccessful or patient reports new pain  - Anticipate increased pain with activity and pre-medicate as appropriate  Outcome: Progressing     Problem: SAFETY ADULT - FALL  Goal: Free from fall injury  Description: INTERVENTIONS:  - Assess pt frequently for physical needs  - Identify cognitive and physical deficits and behaviors that affect risk of falls.   - [place] fall precautions as indicated by assessment.  - Educate pt/family on patient safety including physical limitations  - Instruct pt to call for assistance with activity based on assessment  - Modify environment to reduce risk of injury  - Provide assistive devices as appropriate  - Consider OT/PT consult to assist with strengthening/mobility  - Encourage toileting schedule  Outcome: Progressing     Problem: DISCHARGE PLANNING  Goal: Discharge to home or other facility with appropriate resources  Description: INTERVENTIONS:  - Identify barriers to discharge w/pt and caregiver  - Include patient/family/discharge partner in discharge planning  - Arrange for needed discharge resources and transportation as appropriate  - Identify discharge learning needs (meds, wound care, etc)  - Arrange for interpreters to assist at discharge as needed  - Consider post-discharge preferences of patient/family/discharge partner  - Complete POLST form as appropriate  - Assess patient's ability to be responsible for managing their own health  - Refer to Case Management Department for coordinating discharge planning if the patient needs post-hospital services based on physician/LIP order or complex needs related to functional status, cognitive ability or social support system  Outcome: Progressing     Problem: GASTROINTESTINAL - ADULT  Goal: Minimal or absence of nausea and vomiting  Description: INTERVENTIONS:  - Maintain adequate hydration with IV or PO as ordered and tolerated  - Nasogastric tube to low intermittent suction as ordered  - Evaluate effectiveness of ordered antiemetic medications  - Provide nonpharmacologic comfort measures as appropriate  - Advance diet as tolerated, if ordered  - Obtain nutritional consult as needed  - Evaluate fluid balance  Outcome: Progressing  Goal: Maintains or returns to baseline bowel function  Description: INTERVENTIONS:  - Assess bowel function  - Maintain adequate hydration with IV or PO as ordered and tolerated  - Evaluate effectiveness of GI medications  - Encourage mobilization and activity  - Obtain nutritional consult as needed  - Establish a toileting routine/schedule  - Consider collaborating with pharmacy to review patient's medication profile  Outcome: Progressing     Patient a/o x4, room air, ambulating/self care. Cont.  TF Jevity 1.5 @ 75mL, IVF cont. @ 100ml/hr, IV abx. Zofran PRN given x1 for c/o nausea. G/J tube intact. Safety measure in place, call light within reach.

## 2023-05-26 LAB
ALBUMIN SERPL-MCNC: 1.6 G/DL (ref 3.4–5)
ALBUMIN/GLOB SERPL: 0.4 {RATIO} (ref 1–2)
ALP LIVER SERPL-CCNC: 99 U/L
ALT SERPL-CCNC: 10 U/L
ANION GAP SERPL CALC-SCNC: 6 MMOL/L (ref 0–18)
AST SERPL-CCNC: 10 U/L (ref 15–37)
BASOPHILS # BLD AUTO: 0.03 X10(3) UL (ref 0–0.2)
BASOPHILS NFR BLD AUTO: 0.3 %
BILIRUB SERPL-MCNC: 0.2 MG/DL (ref 0.1–2)
BUN BLD-MCNC: 22 MG/DL (ref 7–18)
BUN/CREAT SERPL: 11.5 (ref 10–20)
CALCIUM BLD-MCNC: 7.7 MG/DL (ref 8.5–10.1)
CHLORIDE SERPL-SCNC: 108 MMOL/L (ref 98–112)
CO2 SERPL-SCNC: 25 MMOL/L (ref 21–32)
CREAT BLD-MCNC: 1.91 MG/DL
DEPRECATED RDW RBC AUTO: 42.4 FL (ref 35.1–46.3)
EOSINOPHIL # BLD AUTO: 2.25 X10(3) UL (ref 0–0.7)
EOSINOPHIL NFR BLD AUTO: 19.8 %
ERYTHROCYTE [DISTWIDTH] IN BLOOD BY AUTOMATED COUNT: 11.9 % (ref 11–15)
GFR SERPLBLD BASED ON 1.73 SQ M-ARVRAT: 43 ML/MIN/1.73M2 (ref 60–?)
GLOBULIN PLAS-MCNC: 3.8 G/DL (ref 2.8–4.4)
GLUCOSE BLD-MCNC: 80 MG/DL (ref 70–99)
GLUCOSE BLDC GLUCOMTR-MCNC: 104 MG/DL (ref 70–99)
GLUCOSE BLDC GLUCOMTR-MCNC: 115 MG/DL (ref 70–99)
GLUCOSE BLDC GLUCOMTR-MCNC: 121 MG/DL (ref 70–99)
GLUCOSE BLDC GLUCOMTR-MCNC: 72 MG/DL (ref 70–99)
HCT VFR BLD AUTO: 36.6 %
HGB BLD-MCNC: 11.8 G/DL
IMM GRANULOCYTES # BLD AUTO: 0.05 X10(3) UL (ref 0–1)
IMM GRANULOCYTES NFR BLD: 0.4 %
LYMPHOCYTES # BLD AUTO: 1.19 X10(3) UL (ref 1–4)
LYMPHOCYTES NFR BLD AUTO: 10.5 %
MAGNESIUM SERPL-MCNC: 1.7 MG/DL (ref 1.6–2.6)
MCH RBC QN AUTO: 31.1 PG (ref 26–34)
MCHC RBC AUTO-ENTMCNC: 32.2 G/DL (ref 31–37)
MCV RBC AUTO: 96.6 FL
MONOCYTES # BLD AUTO: 1.02 X10(3) UL (ref 0.1–1)
MONOCYTES NFR BLD AUTO: 9 %
NEUTROPHILS # BLD AUTO: 6.83 X10 (3) UL (ref 1.5–7.7)
NEUTROPHILS # BLD AUTO: 6.83 X10(3) UL (ref 1.5–7.7)
NEUTROPHILS NFR BLD AUTO: 60 %
OSMOLALITY SERPL CALC.SUM OF ELEC: 290 MOSM/KG (ref 275–295)
PLATELET # BLD AUTO: 434 10(3)UL (ref 150–450)
POTASSIUM SERPL-SCNC: 4.1 MMOL/L (ref 3.5–5.1)
PROT SERPL-MCNC: 5.4 G/DL (ref 6.4–8.2)
RBC # BLD AUTO: 3.79 X10(6)UL
SODIUM SERPL-SCNC: 139 MMOL/L (ref 136–145)
WBC # BLD AUTO: 11.4 X10(3) UL (ref 4–11)

## 2023-05-26 PROCEDURE — 99233 SBSQ HOSP IP/OBS HIGH 50: CPT | Performed by: HOSPITALIST

## 2023-05-26 RX ORDER — MAGNESIUM SULFATE HEPTAHYDRATE 40 MG/ML
2 INJECTION, SOLUTION INTRAVENOUS ONCE
Status: COMPLETED | OUTPATIENT
Start: 2023-05-26 | End: 2023-05-26

## 2023-05-26 NOTE — CM/SW NOTE
Clinical update provided to [facility] and [address] [place] [address]. Both are reserved in [facility]. If the pt's tube feeds remain the same an order will need to be entered, \"resume home tube feeds. \"     Plan: home with [facility] and [address] [place] [address] for TF when medically cleared.       Fer GrossmanN[facility] ext [phone]

## 2023-05-26 NOTE — PLAN OF CARE
Mr. Kathryn Harry is A&O x 4. Lives at home alone with Helishopter. Independent assist with ADLs. Continent of bowel, J-tube intact and with clear light clear cream output. VSS:  stable, low grade temp 99.9 and tachycardic, MD aware. Denies pain. Plan:  Gtube feeding 3pm-7am, IV ABX, nephrology and ID on consult. Will continue to monitor and treat. Problem: Patient Centered Care  Goal: Patient preferences are identified and integrated in the patient's plan of care  Description: Interventions:  - What would you like us to know as we care for you? From home Alone.  Tube drain keeps popping off  - Provide timely, complete, and accurate information to patient/family  - Incorporate patient and family knowledge, values, beliefs, and cultural backgrounds into the planning and delivery of care  - Encourage patient/family to participate in care and decision-making at the level they choose  - Honor patient and family perspectives and choices  Outcome: Progressing     Problem: PAIN - ADULT  Goal: Verbalizes/displays adequate comfort level or patient's stated pain goal  Description: INTERVENTIONS:  - Encourage pt to monitor pain and request assistance  - Assess pain using appropriate pain scale  - Administer analgesics based on type and severity of pain and evaluate response  - Implement non-pharmacological measures as appropriate and evaluate response  - Consider cultural and social influences on pain and pain management  - Manage/alleviate anxiety  - Utilize distraction and/or relaxation techniques  - Monitor for opioid side effects  - Notify MD/LIP if interventions unsuccessful or patient reports new pain  - Anticipate increased pain with activity and pre-medicate as appropriate  Outcome: Progressing     Problem: SAFETY ADULT - FALL  Goal: Free from fall injury  Description: INTERVENTIONS:  - Assess pt frequently for physical needs  - Identify cognitive and physical deficits and behaviors that affect risk of falls.  - [place] fall precautions as indicated by assessment.  - Educate pt/family on patient safety including physical limitations  - Instruct pt to call for assistance with activity based on assessment  - Modify environment to reduce risk of injury  - Provide assistive devices as appropriate  - Consider OT/PT consult to assist with strengthening/mobility  - Encourage toileting schedule  Outcome: Progressing     Problem: DISCHARGE PLANNING  Goal: Discharge to home or other facility with appropriate resources  Description: INTERVENTIONS:  - Identify barriers to discharge w/pt and caregiver  - Include patient/family/discharge partner in discharge planning  - Arrange for needed discharge resources and transportation as appropriate  - Identify discharge learning needs (meds, wound care, etc)  - Arrange for interpreters to assist at discharge as needed  - Consider post-discharge preferences of patient/family/discharge partner  - Complete POLST form as appropriate  - Assess patient's ability to be responsible for managing their own health  - Refer to Case Management Department for coordinating discharge planning if the patient needs post-hospital services based on physician/LIP order or complex needs related to functional status, cognitive ability or social support system  Outcome: Progressing     Problem: GASTROINTESTINAL - ADULT  Goal: Minimal or absence of nausea and vomiting  Description: INTERVENTIONS:  - Maintain adequate hydration with IV or PO as ordered and tolerated  - Nasogastric tube to low intermittent suction as ordered  - Evaluate effectiveness of ordered antiemetic medications  - Provide nonpharmacologic comfort measures as appropriate  - Advance diet as tolerated, if ordered  - Obtain nutritional consult as needed  - Evaluate fluid balance  Outcome: Progressing  Goal: Maintains or returns to baseline bowel function  Description: INTERVENTIONS:  - Assess bowel function  - Maintain adequate hydration with IV or PO as ordered and tolerated  - Evaluate effectiveness of GI medications  - Encourage mobilization and activity  - Obtain nutritional consult as needed  - Establish a toileting routine/schedule  - Consider collaborating with pharmacy to review patient's medication profile  Outcome: Progressing  Goal: Maintains adequate nutritional intake (undernourished)  Description: INTERVENTIONS:  - Monitor percentage of each meal consumed  - Identify factors contributing to decreased intake, treat as appropriate  - Assist with meals as needed  - Monitor I&O, WT and lab values  - Obtain nutritional consult as needed  - Optimize oral hygiene and moisture  - Encourage food from home; allow for food preferences  - Enhance eating environment  Outcome: Progressing     Problem: METABOLIC/FLUID AND ELECTROLYTES - ADULT  Goal: Glucose maintained within prescribed range  Description: INTERVENTIONS:  - Monitor Blood Glucose as ordered  - Assess for signs and symptoms of hyperglycemia and hypoglycemia  - Administer ordered medications to maintain glucose within target range  - Assess barriers to adequate nutritional intake and initiate nutrition consult as needed  - Instruct patient on self management of diabetes  Outcome: Progressing  Goal: Electrolytes maintained within normal limits  Description: INTERVENTIONS:  - Monitor labs and rhythm and assess patient for signs and symptoms of electrolyte imbalances  - Administer electrolyte replacement as ordered  - Monitor response to electrolyte replacements, including rhythm and repeat lab results as appropriate  - Fluid restriction as ordered  - Instruct patient on fluid and nutrition restrictions as appropriate  Outcome: Progressing     Problem: SKIN/TISSUE INTEGRITY - ADULT  Goal: Incision(s), wounds(s) or drain site(s) healing without S/S of infection  Description: INTERVENTIONS:  - Assess and document risk factors for pressure ulcer development  - Assess and document skin integrity  - Assess and document dressing/incision, wound bed, drain sites and surrounding tissue  - Implement wound care per orders  - Initiate isolation precautions as appropriate  - Initiate Pressure Ulcer prevention bundle as indicated  Outcome: Progressing     Problem: Impaired Swallowing  Goal: Minimize aspiration risk  Description: Interventions:  - Patient should be alert and upright for all feedings (90 degrees preferred)  - Offer food and liquids at a slow rate  - No straws  - Encourage small bites of food and small sips of liquid  - Offer pills one at a time, crush or deliver with applesauce as needed  - Discontinue feeding and notify MD (or speech pathologist) if coughing or persistent throat clearing or wet/gurgly vocal quality is noted  Outcome: Progressing

## 2023-05-26 NOTE — PLAN OF CARE
Problem: Patient Centered Care  Goal: Patient preferences are identified and integrated in the patient's plan of care  Description: Interventions:  - What would you like us to know as we care for you? From home Alone. Tube drain keeps popping off  - Provide timely, complete, and accurate information to patient/family  - Incorporate patient and family knowledge, values, beliefs, and cultural backgrounds into the planning and delivery of care  - Encourage patient/family to participate in care and decision-making at the level they choose  - Honor patient and family perspectives and choices  Outcome: Progressing     Problem: PAIN - ADULT  Goal: Verbalizes/displays adequate comfort level or patient's stated pain goal  Description: INTERVENTIONS:  - Encourage pt to monitor pain and request assistance  - Assess pain using appropriate pain scale  - Administer analgesics based on type and severity of pain and evaluate response  - Implement non-pharmacological measures as appropriate and evaluate response  - Consider cultural and social influences on pain and pain management  - Manage/alleviate anxiety  - Utilize distraction and/or relaxation techniques  - Monitor for opioid side effects  - Notify MD/LIP if interventions unsuccessful or patient reports new pain  - Anticipate increased pain with activity and pre-medicate as appropriate  Outcome: Progressing     Problem: SAFETY ADULT - FALL  Goal: Free from fall injury  Description: INTERVENTIONS:  - Assess pt frequently for physical needs  - Identify cognitive and physical deficits and behaviors that affect risk of falls.   - [place] fall precautions as indicated by assessment.  - Educate pt/family on patient safety including physical limitations  - Instruct pt to call for assistance with activity based on assessment  - Modify environment to reduce risk of injury  - Provide assistive devices as appropriate  - Consider OT/PT consult to assist with strengthening/mobility  - Encourage toileting schedule  Outcome: Progressing     Problem: DISCHARGE PLANNING  Goal: Discharge to home or other facility with appropriate resources  Description: INTERVENTIONS:  - Identify barriers to discharge w/pt and caregiver  - Include patient/family/discharge partner in discharge planning  - Arrange for needed discharge resources and transportation as appropriate  - Identify discharge learning needs (meds, wound care, etc)  - Arrange for interpreters to assist at discharge as needed  - Consider post-discharge preferences of patient/family/discharge partner  - Complete POLST form as appropriate  - Assess patient's ability to be responsible for managing their own health  - Refer to Case Management Department for coordinating discharge planning if the patient needs post-hospital services based on physician/LIP order or complex needs related to functional status, cognitive ability or social support system  Outcome: Progressing     Problem: GASTROINTESTINAL - ADULT  Goal: Minimal or absence of nausea and vomiting  Description: INTERVENTIONS:  - Maintain adequate hydration with IV or PO as ordered and tolerated  - Nasogastric tube to low intermittent suction as ordered  - Evaluate effectiveness of ordered antiemetic medications  - Provide nonpharmacologic comfort measures as appropriate  - Advance diet as tolerated, if ordered  - Obtain nutritional consult as needed  - Evaluate fluid balance  Outcome: Progressing  Goal: Maintains or returns to baseline bowel function  Description: INTERVENTIONS:  - Assess bowel function  - Maintain adequate hydration with IV or PO as ordered and tolerated  - Evaluate effectiveness of GI medications  - Encourage mobilization and activity  - Obtain nutritional consult as needed  - Establish a toileting routine/schedule  - Consider collaborating with pharmacy to review patient's medication profile  Outcome: Progressing     Patient a/ox4, room air, NPO x ice chips. Cont.  IV abx, IVF discontinued. G/J tube intact. Intermittent TF @75ml, 190 q4h flushes. BC no growth x1 day. Tramadol PRN given for c/o stomach pain. Patient needs met, safety measure in place. Call light within reach.

## 2023-05-27 LAB
ALBUMIN SERPL-MCNC: 1.4 G/DL (ref 3.4–5)
ANION GAP SERPL CALC-SCNC: 6 MMOL/L (ref 0–18)
BASOPHILS # BLD AUTO: 0.03 X10(3) UL (ref 0–0.2)
BASOPHILS NFR BLD AUTO: 0.3 %
BUN BLD-MCNC: 20 MG/DL (ref 7–18)
BUN/CREAT SERPL: 12.5 (ref 10–20)
CALCIUM BLD-MCNC: 7.7 MG/DL (ref 8.5–10.1)
CHLORIDE SERPL-SCNC: 112 MMOL/L (ref 98–112)
CO2 SERPL-SCNC: 23 MMOL/L (ref 21–32)
CREAT BLD-MCNC: 1.6 MG/DL
DEPRECATED RDW RBC AUTO: 41.3 FL (ref 35.1–46.3)
EOSINOPHIL # BLD AUTO: 2.07 X10(3) UL (ref 0–0.7)
EOSINOPHIL NFR BLD AUTO: 20.5 %
ERYTHROCYTE [DISTWIDTH] IN BLOOD BY AUTOMATED COUNT: 11.9 % (ref 11–15)
GFR SERPLBLD BASED ON 1.73 SQ M-ARVRAT: 53 ML/MIN/1.73M2 (ref 60–?)
GLUCOSE BLD-MCNC: 136 MG/DL (ref 70–99)
GLUCOSE BLDC GLUCOMTR-MCNC: 104 MG/DL (ref 70–99)
GLUCOSE BLDC GLUCOMTR-MCNC: 121 MG/DL (ref 70–99)
GLUCOSE BLDC GLUCOMTR-MCNC: 126 MG/DL (ref 70–99)
GLUCOSE BLDC GLUCOMTR-MCNC: 78 MG/DL (ref 70–99)
HCT VFR BLD AUTO: 30.8 %
HGB BLD-MCNC: 10.1 G/DL
IMM GRANULOCYTES # BLD AUTO: 0.03 X10(3) UL (ref 0–1)
IMM GRANULOCYTES NFR BLD: 0.3 %
LYMPHOCYTES # BLD AUTO: 1.1 X10(3) UL (ref 1–4)
LYMPHOCYTES NFR BLD AUTO: 10.9 %
MAGNESIUM SERPL-MCNC: 2.3 MG/DL (ref 1.6–2.6)
MCH RBC QN AUTO: 30.9 PG (ref 26–34)
MCHC RBC AUTO-ENTMCNC: 32.8 G/DL (ref 31–37)
MCV RBC AUTO: 94.2 FL
MONOCYTES # BLD AUTO: 0.82 X10(3) UL (ref 0.1–1)
MONOCYTES NFR BLD AUTO: 8.1 %
NEUTROPHILS # BLD AUTO: 6.04 X10 (3) UL (ref 1.5–7.7)
NEUTROPHILS # BLD AUTO: 6.04 X10(3) UL (ref 1.5–7.7)
NEUTROPHILS NFR BLD AUTO: 59.9 %
OSMOLALITY SERPL CALC.SUM OF ELEC: 297 MOSM/KG (ref 275–295)
PHOSPHATE SERPL-MCNC: 3.8 MG/DL (ref 2.5–4.9)
PLATELET # BLD AUTO: 383 10(3)UL (ref 150–450)
POTASSIUM SERPL-SCNC: 4 MMOL/L (ref 3.5–5.1)
RBC # BLD AUTO: 3.27 X10(6)UL
SODIUM SERPL-SCNC: 141 MMOL/L (ref 136–145)
WBC # BLD AUTO: 10.1 X10(3) UL (ref 4–11)

## 2023-05-27 PROCEDURE — 99233 SBSQ HOSP IP/OBS HIGH 50: CPT | Performed by: HOSPITALIST

## 2023-05-27 NOTE — PLAN OF CARE
Mr. Saige Ross is A&O x 4. Lives at home alone with [employer]. Independent assist with ambulating and ADLs. Continent of bowel and bladder; Gjtube intact and with clear cream colored sludgy return. VSS:  stable. Denies pain. Plan:  monitor while off ABX, Gtube feedings. Will continue to monitor and treat. Problem: Patient Centered Care  Goal: Patient preferences are identified and integrated in the patient's plan of care  Description: Interventions:  - What would you like us to know as we care for you? From home Alone. Tube drain keeps popping off  - Provide timely, complete, and accurate information to patient/family  - Incorporate patient and family knowledge, values, beliefs, and cultural backgrounds into the planning and delivery of care  - Encourage patient/family to participate in care and decision-making at the level they choose  - Honor patient and family perspectives and choices  Outcome: Progressing     Problem: PAIN - ADULT  Goal: Verbalizes/displays adequate comfort level or patient's stated pain goal  Description: INTERVENTIONS:  - Encourage pt to monitor pain and request assistance  - Assess pain using appropriate pain scale  - Administer analgesics based on type and severity of pain and evaluate response  - Implement non-pharmacological measures as appropriate and evaluate response  - Consider cultural and social influences on pain and pain management  - Manage/alleviate anxiety  - Utilize distraction and/or relaxation techniques  - Monitor for opioid side effects  - Notify MD/LIP if interventions unsuccessful or patient reports new pain  - Anticipate increased pain with activity and pre-medicate as appropriate  Outcome: Progressing     Problem: SAFETY ADULT - FALL  Goal: Free from fall injury  Description: INTERVENTIONS:  - Assess pt frequently for physical needs  - Identify cognitive and physical deficits and behaviors that affect risk of falls.   - [place] fall precautions as indicated by assessment.  - Educate pt/family on patient safety including physical limitations  - Instruct pt to call for assistance with activity based on assessment  - Modify environment to reduce risk of injury  - Provide assistive devices as appropriate  - Consider OT/PT consult to assist with strengthening/mobility  - Encourage toileting schedule  Outcome: Progressing     Problem: DISCHARGE PLANNING  Goal: Discharge to home or other facility with appropriate resources  Description: INTERVENTIONS:  - Identify barriers to discharge w/pt and caregiver  - Include patient/family/discharge partner in discharge planning  - Arrange for needed discharge resources and transportation as appropriate  - Identify discharge learning needs (meds, wound care, etc)  - Arrange for interpreters to assist at discharge as needed  - Consider post-discharge preferences of patient/family/discharge partner  - Complete POLST form as appropriate  - Assess patient's ability to be responsible for managing their own health  - Refer to Case Management Department for coordinating discharge planning if the patient needs post-hospital services based on physician/LIP order or complex needs related to functional status, cognitive ability or social support system  Outcome: Progressing     Problem: GASTROINTESTINAL - ADULT  Goal: Minimal or absence of nausea and vomiting  Description: INTERVENTIONS:  - Maintain adequate hydration with IV or PO as ordered and tolerated  - Nasogastric tube to low intermittent suction as ordered  - Evaluate effectiveness of ordered antiemetic medications  - Provide nonpharmacologic comfort measures as appropriate  - Advance diet as tolerated, if ordered  - Obtain nutritional consult as needed  - Evaluate fluid balance  Outcome: Progressing  Goal: Maintains or returns to baseline bowel function  Description: INTERVENTIONS:  - Assess bowel function  - Maintain adequate hydration with IV or PO as ordered and tolerated  - Evaluate effectiveness of GI medications  - Encourage mobilization and activity  - Obtain nutritional consult as needed  - Establish a toileting routine/schedule  - Consider collaborating with pharmacy to review patient's medication profile  Outcome: Progressing  Goal: Maintains adequate nutritional intake (undernourished)  Description: INTERVENTIONS:  - Monitor percentage of each meal consumed  - Identify factors contributing to decreased intake, treat as appropriate  - Assist with meals as needed  - Monitor I&O, WT and lab values  - Obtain nutritional consult as needed  - Optimize oral hygiene and moisture  - Encourage food from home; allow for food preferences  - Enhance eating environment  Outcome: Progressing     Problem: METABOLIC/FLUID AND ELECTROLYTES - ADULT  Goal: Glucose maintained within prescribed range  Description: INTERVENTIONS:  - Monitor Blood Glucose as ordered  - Assess for signs and symptoms of hyperglycemia and hypoglycemia  - Administer ordered medications to maintain glucose within target range  - Assess barriers to adequate nutritional intake and initiate nutrition consult as needed  - Instruct patient on self management of diabetes  Outcome: Progressing  Goal: Electrolytes maintained within normal limits  Description: INTERVENTIONS:  - Monitor labs and rhythm and assess patient for signs and symptoms of electrolyte imbalances  - Administer electrolyte replacement as ordered  - Monitor response to electrolyte replacements, including rhythm and repeat lab results as appropriate  - Fluid restriction as ordered  - Instruct patient on fluid and nutrition restrictions as appropriate  Outcome: Progressing     Problem: SKIN/TISSUE INTEGRITY - ADULT  Goal: Incision(s), wounds(s) or drain site(s) healing without S/S of infection  Description: INTERVENTIONS:  - Assess and document risk factors for pressure ulcer development  - Assess and document skin integrity  - Assess and document dressing/incision, wound bed, drain sites and surrounding tissue  - Implement wound care per orders  - Initiate isolation precautions as appropriate  - Initiate Pressure Ulcer prevention bundle as indicated  Outcome: Progressing     Problem: Impaired Swallowing  Goal: Minimize aspiration risk  Description: Interventions:  - Patient should be alert and upright for all feedings (90 degrees preferred)  - Offer food and liquids at a slow rate  - No straws  - Encourage small bites of food and small sips of liquid  - Offer pills one at a time, crush or deliver with applesauce as needed  - Discontinue feeding and notify MD (or speech pathologist) if coughing or persistent throat clearing or wet/gurgly vocal quality is noted  Outcome: Progressing

## 2023-05-27 NOTE — PLAN OF CARE
Roger Navarrete is A/Ox4, RA, VSS. NPO except ice chips. Tube feed running without issue. Meds per MAR, no acute events. Safety measures in place. Problem: Patient Centered Care  Goal: Patient preferences are identified and integrated in the patient's plan of care  Description: Interventions:  - What would you like us to know as we care for you? From home Alone. Tube drain keeps popping off  - Provide timely, complete, and accurate information to patient/family  - Incorporate patient and family knowledge, values, beliefs, and cultural backgrounds into the planning and delivery of care  - Encourage patient/family to participate in care and decision-making at the level they choose  - Honor patient and family perspectives and choices  Outcome: Progressing     Problem: PAIN - ADULT  Goal: Verbalizes/displays adequate comfort level or patient's stated pain goal  Description: INTERVENTIONS:  - Encourage pt to monitor pain and request assistance  - Assess pain using appropriate pain scale  - Administer analgesics based on type and severity of pain and evaluate response  - Implement non-pharmacological measures as appropriate and evaluate response  - Consider cultural and social influences on pain and pain management  - Manage/alleviate anxiety  - Utilize distraction and/or relaxation techniques  - Monitor for opioid side effects  - Notify MD/LIP if interventions unsuccessful or patient reports new pain  - Anticipate increased pain with activity and pre-medicate as appropriate  Outcome: Progressing     Problem: SAFETY ADULT - FALL  Goal: Free from fall injury  Description: INTERVENTIONS:  - Assess pt frequently for physical needs  - Identify cognitive and physical deficits and behaviors that affect risk of falls.   - [place] fall precautions as indicated by assessment.  - Educate pt/family on patient safety including physical limitations  - Instruct pt to call for assistance with activity based on assessment  - Modify environment to reduce risk of injury  - Provide assistive devices as appropriate  - Consider OT/PT consult to assist with strengthening/mobility  - Encourage toileting schedule  Outcome: Progressing     Problem: DISCHARGE PLANNING  Goal: Discharge to home or other facility with appropriate resources  Description: INTERVENTIONS:  - Identify barriers to discharge w/pt and caregiver  - Include patient/family/discharge partner in discharge planning  - Arrange for needed discharge resources and transportation as appropriate  - Identify discharge learning needs (meds, wound care, etc)  - Arrange for interpreters to assist at discharge as needed  - Consider post-discharge preferences of patient/family/discharge partner  - Complete POLST form as appropriate  - Assess patient's ability to be responsible for managing their own health  - Refer to Case Management Department for coordinating discharge planning if the patient needs post-hospital services based on physician/LIP order or complex needs related to functional status, cognitive ability or social support system  Outcome: Progressing     Problem: GASTROINTESTINAL - ADULT  Goal: Minimal or absence of nausea and vomiting  Description: INTERVENTIONS:  - Maintain adequate hydration with IV or PO as ordered and tolerated  - Nasogastric tube to low intermittent suction as ordered  - Evaluate effectiveness of ordered antiemetic medications  - Provide nonpharmacologic comfort measures as appropriate  - Advance diet as tolerated, if ordered  - Obtain nutritional consult as needed  - Evaluate fluid balance  Outcome: Progressing  Goal: Maintains or returns to baseline bowel function  Description: INTERVENTIONS:  - Assess bowel function  - Maintain adequate hydration with IV or PO as ordered and tolerated  - Evaluate effectiveness of GI medications  - Encourage mobilization and activity  - Obtain nutritional consult as needed  - Establish a toileting routine/schedule  - Consider collaborating with pharmacy to review patient's medication profile  Outcome: Progressing  Goal: Maintains adequate nutritional intake (undernourished)  Description: INTERVENTIONS:  - Monitor percentage of each meal consumed  - Identify factors contributing to decreased intake, treat as appropriate  - Assist with meals as needed  - Monitor I&O, WT and lab values  - Obtain nutritional consult as needed  - Optimize oral hygiene and moisture  - Encourage food from home; allow for food preferences  - Enhance eating environment  Outcome: Progressing     Problem: METABOLIC/FLUID AND ELECTROLYTES - ADULT  Goal: Glucose maintained within prescribed range  Description: INTERVENTIONS:  - Monitor Blood Glucose as ordered  - Assess for signs and symptoms of hyperglycemia and hypoglycemia  - Administer ordered medications to maintain glucose within target range  - Assess barriers to adequate nutritional intake and initiate nutrition consult as needed  - Instruct patient on self management of diabetes  Outcome: Progressing  Goal: Electrolytes maintained within normal limits  Description: INTERVENTIONS:  - Monitor labs and rhythm and assess patient for signs and symptoms of electrolyte imbalances  - Administer electrolyte replacement as ordered  - Monitor response to electrolyte replacements, including rhythm and repeat lab results as appropriate  - Fluid restriction as ordered  - Instruct patient on fluid and nutrition restrictions as appropriate  Outcome: Progressing     Problem: SKIN/TISSUE INTEGRITY - ADULT  Goal: Incision(s), wounds(s) or drain site(s) healing without S/S of infection  Description: INTERVENTIONS:  - Assess and document risk factors for pressure ulcer development  - Assess and document skin integrity  - Assess and document dressing/incision, wound bed, drain sites and surrounding tissue  - Implement wound care per orders  - Initiate isolation precautions as appropriate  - Initiate Pressure Ulcer prevention bundle as indicated  Outcome: Progressing     Problem: Impaired Swallowing  Goal: Minimize aspiration risk  Description: Interventions:  - Patient should be alert and upright for all feedings (90 degrees preferred)  - Offer food and liquids at a slow rate  - No straws  - Encourage small bites of food and small sips of liquid  - Offer pills one at a time, crush or deliver with applesauce as needed  - Discontinue feeding and notify MD (or speech pathologist) if coughing or persistent throat clearing or wet/gurgly vocal quality is noted  Outcome: Progressing

## 2023-05-28 LAB
ALBUMIN SERPL-MCNC: 1.4 G/DL (ref 3.4–5)
ANION GAP SERPL CALC-SCNC: 2 MMOL/L (ref 0–18)
BASOPHILS # BLD AUTO: 0.03 X10(3) UL (ref 0–0.2)
BASOPHILS NFR BLD AUTO: 0.3 %
BUN BLD-MCNC: 17 MG/DL (ref 7–18)
BUN/CREAT SERPL: 12 (ref 10–20)
CALCIUM BLD-MCNC: 7.8 MG/DL (ref 8.5–10.1)
CHLORIDE SERPL-SCNC: 111 MMOL/L (ref 98–112)
CO2 SERPL-SCNC: 27 MMOL/L (ref 21–32)
CREAT BLD-MCNC: 1.42 MG/DL
DEPRECATED RDW RBC AUTO: 40.8 FL (ref 35.1–46.3)
EOSINOPHIL # BLD AUTO: 2.01 X10(3) UL (ref 0–0.7)
EOSINOPHIL NFR BLD AUTO: 18.6 %
ERYTHROCYTE [DISTWIDTH] IN BLOOD BY AUTOMATED COUNT: 11.9 % (ref 11–15)
GFR SERPLBLD BASED ON 1.73 SQ M-ARVRAT: 61 ML/MIN/1.73M2 (ref 60–?)
GLUCOSE BLD-MCNC: 110 MG/DL (ref 70–99)
GLUCOSE BLDC GLUCOMTR-MCNC: 107 MG/DL (ref 70–99)
GLUCOSE BLDC GLUCOMTR-MCNC: 113 MG/DL (ref 70–99)
GLUCOSE BLDC GLUCOMTR-MCNC: 123 MG/DL (ref 70–99)
GLUCOSE BLDC GLUCOMTR-MCNC: 133 MG/DL (ref 70–99)
GLUCOSE BLDC GLUCOMTR-MCNC: 75 MG/DL (ref 70–99)
HCT VFR BLD AUTO: 30.9 %
HGB BLD-MCNC: 10.2 G/DL
IMM GRANULOCYTES # BLD AUTO: 0.04 X10(3) UL (ref 0–1)
IMM GRANULOCYTES NFR BLD: 0.4 %
LYMPHOCYTES # BLD AUTO: 1.25 X10(3) UL (ref 1–4)
LYMPHOCYTES NFR BLD AUTO: 11.5 %
MCH RBC QN AUTO: 31.1 PG (ref 26–34)
MCHC RBC AUTO-ENTMCNC: 33 G/DL (ref 31–37)
MCV RBC AUTO: 94.2 FL
MONOCYTES # BLD AUTO: 0.82 X10(3) UL (ref 0.1–1)
MONOCYTES NFR BLD AUTO: 7.6 %
NEUTROPHILS # BLD AUTO: 6.68 X10 (3) UL (ref 1.5–7.7)
NEUTROPHILS # BLD AUTO: 6.68 X10(3) UL (ref 1.5–7.7)
NEUTROPHILS NFR BLD AUTO: 61.6 %
OSMOLALITY SERPL CALC.SUM OF ELEC: 292 MOSM/KG (ref 275–295)
PHOSPHATE SERPL-MCNC: 4.1 MG/DL (ref 2.5–4.9)
PLATELET # BLD AUTO: 426 10(3)UL (ref 150–450)
POTASSIUM SERPL-SCNC: 3.9 MMOL/L (ref 3.5–5.1)
RBC # BLD AUTO: 3.28 X10(6)UL
SODIUM SERPL-SCNC: 140 MMOL/L (ref 136–145)
WBC # BLD AUTO: 10.8 X10(3) UL (ref 4–11)

## 2023-05-28 PROCEDURE — 99233 SBSQ HOSP IP/OBS HIGH 50: CPT | Performed by: HOSPITALIST

## 2023-05-28 NOTE — PLAN OF CARE
Pt received in no acute distress, no c/o, afebrile, tolerating tube feedings, plan is recheck labs in am and possible DC home tomorrow if medically cleared. Call light within pt reach, hourly rounding maintained. Problem: Patient Centered Care  Goal: Patient preferences are identified and integrated in the patient's plan of care  Description: Interventions:  - What would you like us to know as we care for you? From home Alone. Tube drain keeps popping off  - Provide timely, complete, and accurate information to patient/family  - Incorporate patient and family knowledge, values, beliefs, and cultural backgrounds into the planning and delivery of care  - Encourage patient/family to participate in care and decision-making at the level they choose  - Honor patient and family perspectives and choices  Outcome: Progressing     Problem: PAIN - ADULT  Goal: Verbalizes/displays adequate comfort level or patient's stated pain goal  Description: INTERVENTIONS:  - Encourage pt to monitor pain and request assistance  - Assess pain using appropriate pain scale  - Administer analgesics based on type and severity of pain and evaluate response  - Implement non-pharmacological measures as appropriate and evaluate response  - Consider cultural and social influences on pain and pain management  - Manage/alleviate anxiety  - Utilize distraction and/or relaxation techniques  - Monitor for opioid side effects  - Notify MD/LIP if interventions unsuccessful or patient reports new pain  - Anticipate increased pain with activity and pre-medicate as appropriate  Outcome: Progressing     Problem: SAFETY ADULT - FALL  Goal: Free from fall injury  Description: INTERVENTIONS:  - Assess pt frequently for physical needs  - Identify cognitive and physical deficits and behaviors that affect risk of falls.   - [place] fall precautions as indicated by assessment.  - Educate pt/family on patient safety including physical limitations  - Instruct pt to call for assistance with activity based on assessment  - Modify environment to reduce risk of injury  - Provide assistive devices as appropriate  - Consider OT/PT consult to assist with strengthening/mobility  - Encourage toileting schedule  Outcome: Progressing     Problem: DISCHARGE PLANNING  Goal: Discharge to home or other facility with appropriate resources  Description: INTERVENTIONS:  - Identify barriers to discharge w/pt and caregiver  - Include patient/family/discharge partner in discharge planning  - Arrange for needed discharge resources and transportation as appropriate  - Identify discharge learning needs (meds, wound care, etc)  - Arrange for interpreters to assist at discharge as needed  - Consider post-discharge preferences of patient/family/discharge partner  - Complete POLST form as appropriate  - Assess patient's ability to be responsible for managing their own health  - Refer to Case Management Department for coordinating discharge planning if the patient needs post-hospital services based on physician/LIP order or complex needs related to functional status, cognitive ability or social support system  Outcome: Progressing     Problem: GASTROINTESTINAL - ADULT  Goal: Minimal or absence of nausea and vomiting  Description: INTERVENTIONS:  - Maintain adequate hydration with IV or PO as ordered and tolerated  - Nasogastric tube to low intermittent suction as ordered  - Evaluate effectiveness of ordered antiemetic medications  - Provide nonpharmacologic comfort measures as appropriate  - Advance diet as tolerated, if ordered  - Obtain nutritional consult as needed  - Evaluate fluid balance  Outcome: Progressing  Goal: Maintains or returns to baseline bowel function  Description: INTERVENTIONS:  - Assess bowel function  - Maintain adequate hydration with IV or PO as ordered and tolerated  - Evaluate effectiveness of GI medications  - Encourage mobilization and activity  - Obtain nutritional consult as needed  - Establish a toileting routine/schedule  - Consider collaborating with pharmacy to review patient's medication profile  Outcome: Progressing  Goal: Maintains adequate nutritional intake (undernourished)  Description: INTERVENTIONS:  - Monitor percentage of each meal consumed  - Identify factors contributing to decreased intake, treat as appropriate  - Assist with meals as needed  - Monitor I&O, WT and lab values  - Obtain nutritional consult as needed  - Optimize oral hygiene and moisture  - Encourage food from home; allow for food preferences  - Enhance eating environment  Outcome: Progressing     Problem: METABOLIC/FLUID AND ELECTROLYTES - ADULT  Goal: Glucose maintained within prescribed range  Description: INTERVENTIONS:  - Monitor Blood Glucose as ordered  - Assess for signs and symptoms of hyperglycemia and hypoglycemia  - Administer ordered medications to maintain glucose within target range  - Assess barriers to adequate nutritional intake and initiate nutrition consult as needed  - Instruct patient on self management of diabetes  Outcome: Progressing  Goal: Electrolytes maintained within normal limits  Description: INTERVENTIONS:  - Monitor labs and rhythm and assess patient for signs and symptoms of electrolyte imbalances  - Administer electrolyte replacement as ordered  - Monitor response to electrolyte replacements, including rhythm and repeat lab results as appropriate  - Fluid restriction as ordered  - Instruct patient on fluid and nutrition restrictions as appropriate  Outcome: Progressing     Problem: SKIN/TISSUE INTEGRITY - ADULT  Goal: Incision(s), wounds(s) or drain site(s) healing without S/S of infection  Description: INTERVENTIONS:  - Assess and document risk factors for pressure ulcer development  - Assess and document skin integrity  - Assess and document dressing/incision, wound bed, drain sites and surrounding tissue  - Implement wound care per orders  - Initiate isolation precautions as appropriate  - Initiate Pressure Ulcer prevention bundle as indicated  Outcome: Progressing    Problem: Impaired Swallowing  Goal: Minimize aspiration risk  Description: Interventions:  - Patient should be alert and upright for all feedings (90 degrees preferred)  - Offer food and liquids at a slow rate  - No straws  - Encourage small bites of food and small sips of liquid  - Offer pills one at a time, crush or deliver with applesauce as needed  - Discontinue feeding and notify MD (or speech pathologist) if coughing or persistent throat clearing or wet/gurgly vocal quality is noted  Outcome: Progressing

## 2023-05-28 NOTE — PLAN OF CARE
Pt is A&Ox4. RA. NPO ( Ice chips is fine). Q6 BG. Gjtube intact. Clear green light output. Voiding freely. Denies pain. Up by standby assist, call light within reach, frequent rounding. Safety measures in place. Problem: Patient Centered Care  Goal: Patient preferences are identified and integrated in the patient's plan of care  Description: Interventions:  - What would you like us to know as we care for you? From home Alone. Tube drain keeps popping off  - Provide timely, complete, and accurate information to patient/family  - Incorporate patient and family knowledge, values, beliefs, and cultural backgrounds into the planning and delivery of care  - Encourage patient/family to participate in care and decision-making at the level they choose  - Honor patient and family perspectives and choices  Outcome: Progressing     Problem: PAIN - ADULT  Goal: Verbalizes/displays adequate comfort level or patient's stated pain goal  Description: INTERVENTIONS:  - Encourage pt to monitor pain and request assistance  - Assess pain using appropriate pain scale  - Administer analgesics based on type and severity of pain and evaluate response  - Implement non-pharmacological measures as appropriate and evaluate response  - Consider cultural and social influences on pain and pain management  - Manage/alleviate anxiety  - Utilize distraction and/or relaxation techniques  - Monitor for opioid side effects  - Notify MD/LIP if interventions unsuccessful or patient reports new pain  - Anticipate increased pain with activity and pre-medicate as appropriate  Outcome: Progressing     Problem: SAFETY ADULT - FALL  Goal: Free from fall injury  Description: INTERVENTIONS:  - Assess pt frequently for physical needs  - Identify cognitive and physical deficits and behaviors that affect risk of falls.   - [place] fall precautions as indicated by assessment.  - Educate pt/family on patient safety including physical limitations  - Instruct pt to call for assistance with activity based on assessment  - Modify environment to reduce risk of injury  - Provide assistive devices as appropriate  - Consider OT/PT consult to assist with strengthening/mobility  - Encourage toileting schedule  Outcome: Progressing     Problem: DISCHARGE PLANNING  Goal: Discharge to home or other facility with appropriate resources  Description: INTERVENTIONS:  - Identify barriers to discharge w/pt and caregiver  - Include patient/family/discharge partner in discharge planning  - Arrange for needed discharge resources and transportation as appropriate  - Identify discharge learning needs (meds, wound care, etc)  - Arrange for interpreters to assist at discharge as needed  - Consider post-discharge preferences of patient/family/discharge partner  - Complete POLST form as appropriate  - Assess patient's ability to be responsible for managing their own health  - Refer to Case Management Department for coordinating discharge planning if the patient needs post-hospital services based on physician/LIP order or complex needs related to functional status, cognitive ability or social support system  Outcome: Progressing     Problem: GASTROINTESTINAL - ADULT  Goal: Minimal or absence of nausea and vomiting  Description: INTERVENTIONS:  - Maintain adequate hydration with IV or PO as ordered and tolerated  - Nasogastric tube to low intermittent suction as ordered  - Evaluate effectiveness of ordered antiemetic medications  - Provide nonpharmacologic comfort measures as appropriate  - Advance diet as tolerated, if ordered  - Obtain nutritional consult as needed  - Evaluate fluid balance  Outcome: Progressing     Problem: METABOLIC/FLUID AND ELECTROLYTES - ADULT  Goal: Glucose maintained within prescribed range  Description: INTERVENTIONS:  - Monitor Blood Glucose as ordered  - Assess for signs and symptoms of hyperglycemia and hypoglycemia  - Administer ordered medications to maintain glucose within target range  - Assess barriers to adequate nutritional intake and initiate nutrition consult as needed  - Instruct patient on self management of diabetes  Outcome: Progressing     Problem: Impaired Swallowing  Goal: Minimize aspiration risk  Description: Interventions:  - Patient should be alert and upright for all feedings (90 degrees preferred)  - Offer food and liquids at a slow rate  - No straws  - Encourage small bites of food and small sips of liquid  - Offer pills one at a time, crush or deliver with applesauce as needed  - Discontinue feeding and notify MD (or speech pathologist) if coughing or persistent throat clearing or wet/gurgly vocal quality is noted  Outcome: Progressing

## 2023-05-29 VITALS
OXYGEN SATURATION: 99 % | HEIGHT: 68 IN | SYSTOLIC BLOOD PRESSURE: 129 MMHG | RESPIRATION RATE: 16 BRPM | HEART RATE: 106 BPM | DIASTOLIC BLOOD PRESSURE: 95 MMHG | BODY MASS INDEX: 19.17 KG/M2 | WEIGHT: 126.5 LBS | TEMPERATURE: 98 F

## 2023-05-29 LAB
ALBUMIN SERPL-MCNC: 1.5 G/DL (ref 3.4–5)
ANION GAP SERPL CALC-SCNC: 3 MMOL/L (ref 0–18)
BASOPHILS # BLD AUTO: 0.04 X10(3) UL (ref 0–0.2)
BASOPHILS NFR BLD AUTO: 0.4 %
BUN BLD-MCNC: 17 MG/DL (ref 7–18)
BUN/CREAT SERPL: 13.5 (ref 10–20)
CALCIUM BLD-MCNC: 7.5 MG/DL (ref 8.5–10.1)
CHLORIDE SERPL-SCNC: 110 MMOL/L (ref 98–112)
CO2 SERPL-SCNC: 28 MMOL/L (ref 21–32)
CREAT BLD-MCNC: 1.26 MG/DL
DEPRECATED RDW RBC AUTO: 41.2 FL (ref 35.1–46.3)
EOSINOPHIL # BLD AUTO: 1.31 X10(3) UL (ref 0–0.7)
EOSINOPHIL NFR BLD AUTO: 13.3 %
ERYTHROCYTE [DISTWIDTH] IN BLOOD BY AUTOMATED COUNT: 11.9 % (ref 11–15)
GFR SERPLBLD BASED ON 1.73 SQ M-ARVRAT: 71 ML/MIN/1.73M2 (ref 60–?)
GLUCOSE BLD-MCNC: 114 MG/DL (ref 70–99)
GLUCOSE BLDC GLUCOMTR-MCNC: 122 MG/DL (ref 70–99)
GLUCOSE BLDC GLUCOMTR-MCNC: 77 MG/DL (ref 70–99)
HCT VFR BLD AUTO: 28.5 %
HGB BLD-MCNC: 9.5 G/DL
IMM GRANULOCYTES # BLD AUTO: 0.04 X10(3) UL (ref 0–1)
IMM GRANULOCYTES NFR BLD: 0.4 %
LYMPHOCYTES # BLD AUTO: 1.31 X10(3) UL (ref 1–4)
LYMPHOCYTES NFR BLD AUTO: 13.3 %
MAGNESIUM SERPL-MCNC: 1.8 MG/DL (ref 1.6–2.6)
MCH RBC QN AUTO: 31.1 PG (ref 26–34)
MCHC RBC AUTO-ENTMCNC: 33.3 G/DL (ref 31–37)
MCV RBC AUTO: 93.4 FL
MONOCYTES # BLD AUTO: 0.8 X10(3) UL (ref 0.1–1)
MONOCYTES NFR BLD AUTO: 8.1 %
NEUTROPHILS # BLD AUTO: 6.34 X10 (3) UL (ref 1.5–7.7)
NEUTROPHILS # BLD AUTO: 6.34 X10(3) UL (ref 1.5–7.7)
NEUTROPHILS NFR BLD AUTO: 64.5 %
OSMOLALITY SERPL CALC.SUM OF ELEC: 294 MOSM/KG (ref 275–295)
PHOSPHATE SERPL-MCNC: 4.3 MG/DL (ref 2.5–4.9)
PLATELET # BLD AUTO: 441 10(3)UL (ref 150–450)
POTASSIUM SERPL-SCNC: 3.8 MMOL/L (ref 3.5–5.1)
RBC # BLD AUTO: 3.05 X10(6)UL
SODIUM SERPL-SCNC: 141 MMOL/L (ref 136–145)
WBC # BLD AUTO: 9.8 X10(3) UL (ref 4–11)

## 2023-05-29 PROCEDURE — 99239 HOSP IP/OBS DSCHRG MGMT >30: CPT | Performed by: HOSPITALIST

## 2023-05-29 RX ORDER — MAGNESIUM SULFATE HEPTAHYDRATE 40 MG/ML
2 INJECTION, SOLUTION INTRAVENOUS ONCE
Status: COMPLETED | OUTPATIENT
Start: 2023-05-29 | End: 2023-05-29

## 2023-05-29 RX ORDER — SODIUM BICARBONATE 325 MG/1
325 TABLET ORAL AS NEEDED
Qty: 30 TABLET | Refills: 0 | Status: ON HOLD | OUTPATIENT
Start: 2023-05-29

## 2023-05-29 RX ORDER — TRAMADOL HYDROCHLORIDE 50 MG/1
50 TABLET ORAL EVERY 6 HOURS PRN
Qty: 30 TABLET | Refills: 0 | Status: ON HOLD | OUTPATIENT
Start: 2023-05-29

## 2023-05-29 NOTE — PROGRESS NOTES
Discharge instructions reviewed with patient, all questions answered to patient's satisfaction. All personal belongings packed and taken with patient. Patient escorted to private vehicle.      Ashwin Tatum RN

## 2023-05-29 NOTE — CM/SW NOTE
05/29/23 1200   Discharge disposition   Expected discharge disposition Home-Health   Post Acute Care Provider [address]  ([address] [place] [address])   DME/Infusion Providers [address] [facility] [address] [place]   Discharge transportation Private car     MD dc order entered. Spoke with [facility] pharmacist.  Pt is currently on Jevity 1.5 with protein. Returned call received from [facility] enteral pharmacy. Call placed to Dietician, spoke with Mei.  She confirms pt is discharging on home tubefeeds. [facility] and [address] [place] [address] notified of dc today via [employer]. Resume order, tube feed order and dietician note added to Lucila Hoskins RN, BSN  Nurse   [phone]

## 2023-05-29 NOTE — PLAN OF CARE
Problem: Patient Centered Care  Goal: Patient preferences are identified and integrated in the patient's plan of care  Description: Interventions:  - What would you like us to know as we care for you? From home Alone. Tube drain keeps popping off  - Provide timely, complete, and accurate information to patient/family  - Incorporate patient and family knowledge, values, beliefs, and cultural backgrounds into the planning and delivery of care  - Encourage patient/family to participate in care and decision-making at the level they choose  - Honor patient and family perspectives and choices  Outcome: Adequate for Discharge     Problem: PAIN - ADULT  Goal: Verbalizes/displays adequate comfort level or patient's stated pain goal  Description: INTERVENTIONS:  - Encourage pt to monitor pain and request assistance  - Assess pain using appropriate pain scale  - Administer analgesics based on type and severity of pain and evaluate response  - Implement non-pharmacological measures as appropriate and evaluate response  - Consider cultural and social influences on pain and pain management  - Manage/alleviate anxiety  - Utilize distraction and/or relaxation techniques  - Monitor for opioid side effects  - Notify MD/LIP if interventions unsuccessful or patient reports new pain  - Anticipate increased pain with activity and pre-medicate as appropriate  Outcome: Adequate for Discharge     Problem: SAFETY ADULT - FALL  Goal: Free from fall injury  Description: INTERVENTIONS:  - Assess pt frequently for physical needs  - Identify cognitive and physical deficits and behaviors that affect risk of falls.   - [place] fall precautions as indicated by assessment.  - Educate pt/family on patient safety including physical limitations  - Instruct pt to call for assistance with activity based on assessment  - Modify environment to reduce risk of injury  - Provide assistive devices as appropriate  - Consider OT/PT consult to assist with strengthening/mobility  - Encourage toileting schedule  Outcome: Adequate for Discharge     Problem: DISCHARGE PLANNING  Goal: Discharge to home or other facility with appropriate resources  Description: INTERVENTIONS:  - Identify barriers to discharge w/pt and caregiver  - Include patient/family/discharge partner in discharge planning  - Arrange for needed discharge resources and transportation as appropriate  - Identify discharge learning needs (meds, wound care, etc)  - Arrange for interpreters to assist at discharge as needed  - Consider post-discharge preferences of patient/family/discharge partner  - Complete POLST form as appropriate  - Assess patient's ability to be responsible for managing their own health  - Refer to Case Management Department for coordinating discharge planning if the patient needs post-hospital services based on physician/LIP order or complex needs related to functional status, cognitive ability or social support system  Outcome: Adequate for Discharge     Problem: GASTROINTESTINAL - ADULT  Goal: Minimal or absence of nausea and vomiting  Description: INTERVENTIONS:  - Maintain adequate hydration with IV or PO as ordered and tolerated  - Nasogastric tube to low intermittent suction as ordered  - Evaluate effectiveness of ordered antiemetic medications  - Provide nonpharmacologic comfort measures as appropriate  - Advance diet as tolerated, if ordered  - Obtain nutritional consult as needed  - Evaluate fluid balance  Outcome: Adequate for Discharge  Goal: Maintains or returns to baseline bowel function  Description: INTERVENTIONS:  - Assess bowel function  - Maintain adequate hydration with IV or PO as ordered and tolerated  - Evaluate effectiveness of GI medications  - Encourage mobilization and activity  - Obtain nutritional consult as needed  - Establish a toileting routine/schedule  - Consider collaborating with pharmacy to review patient's medication profile  Outcome: Adequate for Discharge  Goal: Maintains adequate nutritional intake (undernourished)  Description: INTERVENTIONS:  - Monitor percentage of each meal consumed  - Identify factors contributing to decreased intake, treat as appropriate  - Assist with meals as needed  - Monitor I&O, WT and lab values  - Obtain nutritional consult as needed  - Optimize oral hygiene and moisture  - Encourage food from home; allow for food preferences  - Enhance eating environment  Outcome: Adequate for Discharge     Problem: METABOLIC/FLUID AND ELECTROLYTES - ADULT  Goal: Glucose maintained within prescribed range  Description: INTERVENTIONS:  - Monitor Blood Glucose as ordered  - Assess for signs and symptoms of hyperglycemia and hypoglycemia  - Administer ordered medications to maintain glucose within target range  - Assess barriers to adequate nutritional intake and initiate nutrition consult as needed  - Instruct patient on self management of diabetes  Outcome: Adequate for Discharge  Goal: Electrolytes maintained within normal limits  Description: INTERVENTIONS:  - Monitor labs and rhythm and assess patient for signs and symptoms of electrolyte imbalances  - Administer electrolyte replacement as ordered  - Monitor response to electrolyte replacements, including rhythm and repeat lab results as appropriate  - Fluid restriction as ordered  - Instruct patient on fluid and nutrition restrictions as appropriate  Outcome: Adequate for Discharge     Problem: SKIN/TISSUE INTEGRITY - ADULT  Goal: Incision(s), wounds(s) or drain site(s) healing without S/S of infection  Description: INTERVENTIONS:  - Assess and document risk factors for pressure ulcer development  - Assess and document skin integrity  - Assess and document dressing/incision, wound bed, drain sites and surrounding tissue  - Implement wound care per orders  - Initiate isolation precautions as appropriate  - Initiate Pressure Ulcer prevention bundle as indicated  Outcome: Adequate for Discharge     Problem: Impaired Swallowing  Goal: Minimize aspiration risk  Description: Interventions:  - Patient should be alert and upright for all feedings (90 degrees preferred)  - Offer food and liquids at a slow rate  - No straws  - Encourage small bites of food and small sips of liquid  - Offer pills one at a time, crush or deliver with applesauce as needed  - Discontinue feeding and notify MD (or speech pathologist) if coughing or persistent throat clearing or wet/gurgly vocal quality is noted  Outcome: Adequate for Discharge

## 2023-06-01 ENCOUNTER — HOSPITAL ENCOUNTER (INPATIENT)
Facility: HOSPITAL | Age: 48
LOS: 20 days | Discharge: HOME | End: 2023-06-23
Attending: STUDENT IN AN ORGANIZED HEALTH CARE EDUCATION/TRAINING PROGRAM | Admitting: HOSPITALIST
Payer: COMMERCIAL

## 2023-06-01 ENCOUNTER — APPOINTMENT (OUTPATIENT)
Dept: CT IMAGING | Facility: HOSPITAL | Age: 48
End: 2023-06-01
Attending: STUDENT IN AN ORGANIZED HEALTH CARE EDUCATION/TRAINING PROGRAM
Payer: COMMERCIAL

## 2023-06-01 DIAGNOSIS — K94.13 MALFUNCTION OF JEJUNOSTOMY TUBE (HCC): Primary | ICD-10-CM

## 2023-06-01 LAB
ANION GAP SERPL CALC-SCNC: 6 MMOL/L (ref 0–18)
ATRIAL RATE: 104 BPM
BASOPHILS # BLD AUTO: 0.05 X10(3) UL (ref 0–0.2)
BASOPHILS NFR BLD AUTO: 0.5 %
BUN BLD-MCNC: 17 MG/DL (ref 7–18)
BUN/CREAT SERPL: 15 (ref 10–20)
CALCIUM BLD-MCNC: 8.5 MG/DL (ref 8.5–10.1)
CHLORIDE SERPL-SCNC: 102 MMOL/L (ref 98–112)
CO2 SERPL-SCNC: 34 MMOL/L (ref 21–32)
CREAT BLD-MCNC: 1.13 MG/DL
DEPRECATED RDW RBC AUTO: 41.7 FL (ref 35.1–46.3)
EOSINOPHIL # BLD AUTO: 0.77 X10(3) UL (ref 0–0.7)
EOSINOPHIL NFR BLD AUTO: 7.7 %
ERYTHROCYTE [DISTWIDTH] IN BLOOD BY AUTOMATED COUNT: 12.1 % (ref 11–15)
GFR SERPLBLD BASED ON 1.73 SQ M-ARVRAT: 81 ML/MIN/1.73M2 (ref 60–?)
GLUCOSE BLD-MCNC: 72 MG/DL (ref 70–99)
HCT VFR BLD AUTO: 32.5 %
HGB BLD-MCNC: 10.7 G/DL
IMM GRANULOCYTES # BLD AUTO: 0.05 X10(3) UL (ref 0–1)
IMM GRANULOCYTES NFR BLD: 0.5 %
LYMPHOCYTES # BLD AUTO: 1.63 X10(3) UL (ref 1–4)
LYMPHOCYTES NFR BLD AUTO: 16.3 %
MCH RBC QN AUTO: 31.5 PG (ref 26–34)
MCHC RBC AUTO-ENTMCNC: 32.9 G/DL (ref 31–37)
MCV RBC AUTO: 95.6 FL
MONOCYTES # BLD AUTO: 0.69 X10(3) UL (ref 0.1–1)
MONOCYTES NFR BLD AUTO: 6.9 %
NEUTROPHILS # BLD AUTO: 6.82 X10 (3) UL (ref 1.5–7.7)
NEUTROPHILS # BLD AUTO: 6.82 X10(3) UL (ref 1.5–7.7)
NEUTROPHILS NFR BLD AUTO: 68.1 %
OSMOLALITY SERPL CALC.SUM OF ELEC: 294 MOSM/KG (ref 275–295)
P AXIS: 73 DEGREES
P-R INTERVAL: 126 MS
PLATELET # BLD AUTO: 545 10(3)UL (ref 150–450)
POTASSIUM SERPL-SCNC: 3.5 MMOL/L (ref 3.5–5.1)
Q-T INTERVAL: 340 MS
QRS DURATION: 66 MS
QTC CALCULATION (BEZET): 447 MS
R AXIS: -35 DEGREES
RBC # BLD AUTO: 3.4 X10(6)UL
SODIUM SERPL-SCNC: 142 MMOL/L (ref 136–145)
T AXIS: 63 DEGREES
VENTRICULAR RATE: 104 BPM
WBC # BLD AUTO: 10 X10(3) UL (ref 4–11)

## 2023-06-01 PROCEDURE — 74177 CT ABD & PELVIS W/CONTRAST: CPT | Performed by: STUDENT IN AN ORGANIZED HEALTH CARE EDUCATION/TRAINING PROGRAM

## 2023-06-01 RX ORDER — SODIUM BICARBONATE 650 MG/1
325 TABLET ORAL ONCE
Status: COMPLETED | OUTPATIENT
Start: 2023-06-01 | End: 2023-06-01

## 2023-06-02 ENCOUNTER — APPOINTMENT (OUTPATIENT)
Dept: GENERAL RADIOLOGY | Facility: HOSPITAL | Age: 48
End: 2023-06-02
Attending: INTERNAL MEDICINE
Payer: COMMERCIAL

## 2023-06-02 ENCOUNTER — APPOINTMENT (OUTPATIENT)
Dept: PICC SERVICES | Facility: HOSPITAL | Age: 48
End: 2023-06-02
Attending: INTERNAL MEDICINE
Payer: COMMERCIAL

## 2023-06-02 LAB — LIPASE SERPL-CCNC: 313 U/L (ref 13–75)

## 2023-06-02 PROCEDURE — 99232 SBSQ HOSP IP/OBS MODERATE 35: CPT | Performed by: INTERNAL MEDICINE

## 2023-06-02 PROCEDURE — 74248 X-RAY SM INT F-THRU STD: CPT | Performed by: INTERNAL MEDICINE

## 2023-06-02 PROCEDURE — B548ZZA ULTRASONOGRAPHY OF SUPERIOR VENA CAVA, GUIDANCE: ICD-10-PCS | Performed by: INTERNAL MEDICINE

## 2023-06-02 PROCEDURE — 02HV33Z INSERTION OF INFUSION DEVICE INTO SUPERIOR VENA CAVA, PERCUTANEOUS APPROACH: ICD-10-PCS | Performed by: INTERNAL MEDICINE

## 2023-06-02 PROCEDURE — 74240 X-RAY XM UPR GI TRC 1CNTRST: CPT | Performed by: INTERNAL MEDICINE

## 2023-06-02 PROCEDURE — 74018 RADEX ABDOMEN 1 VIEW: CPT | Performed by: INTERNAL MEDICINE

## 2023-06-02 RX ORDER — LIDOCAINE HYDROCHLORIDE 10 MG/ML
5 INJECTION, SOLUTION EPIDURAL; INFILTRATION; INTRACAUDAL; PERINEURAL
Status: COMPLETED | OUTPATIENT
Start: 2023-06-02 | End: 2023-06-02

## 2023-06-02 RX ORDER — DEXTROSE MONOHYDRATE, SODIUM CHLORIDE, AND POTASSIUM CHLORIDE 50; 1.49; 9 G/1000ML; G/1000ML; G/1000ML
INJECTION, SOLUTION INTRAVENOUS CONTINUOUS
Status: DISCONTINUED | OUTPATIENT
Start: 2023-06-02 | End: 2023-06-06

## 2023-06-02 RX ORDER — DEXTROSE MONOHYDRATE, SODIUM CHLORIDE, AND POTASSIUM CHLORIDE 50; 1.49; 9 G/1000ML; G/1000ML; G/1000ML
INJECTION, SOLUTION INTRAVENOUS CONTINUOUS
Status: DISCONTINUED | OUTPATIENT
Start: 2023-06-02 | End: 2023-06-02

## 2023-06-02 RX ORDER — ONDANSETRON 2 MG/ML
4 INJECTION INTRAMUSCULAR; INTRAVENOUS EVERY 6 HOURS PRN
Status: DISCONTINUED | OUTPATIENT
Start: 2023-06-02 | End: 2023-06-14

## 2023-06-02 NOTE — PLAN OF CARE
Patient's vital signs have been stable at this moment in time. Bed locked in lowest position with bed alarm on. Call light is within reach. Frequent rounding by nursing staff. Problem: Patient Centered Care  Goal: Patient preferences are identified and integrated in the patient's plan of care  Description: Interventions:  - What would you like us to know as we care for you?  \"I live home alone\"  - Provide timely, complete, and accurate information to patient/family  - Incorporate patient and family knowledge, values, beliefs, and cultural backgrounds into the planning and delivery of care  - Encourage patient/family to participate in care and decision-making at the level they choose  - Honor patient and family perspectives and choices  Outcome: Progressing     Problem: Patient/Family Goals  Goal: Patient/Family Long Term Goal  Description: Patient's Long Term Goal: Unclogged G-Tube    Interventions:  - Monitor vitals  - Monitor appropriate labs  - Administer medications as ordered  - Follow MD's orders     - See additional Care Plan goals for specific interventions  Outcome: Progressing  Goal: Patient/Family Short Term Goal  Description: Patient's Short Term Goal: Free of pancreatitis    Interventions:   - - Monitor vitals  - Monitor appropriate labs  - Administer medications as ordered  - Follow MD's orders     - See additional Care Plan goals for specific interventions  Outcome: Progressing     Problem: GASTROINTESTINAL - ADULT  Goal: Maintains or returns to baseline bowel function  Description: INTERVENTIONS:  - Assess bowel function  - Maintain adequate hydration with IV or PO as ordered and tolerated  - Evaluate effectiveness of GI medications  - Encourage mobilization and activity  - Obtain nutritional consult as needed  - Establish a toileting routine/schedule  - Consider collaborating with pharmacy to review patient's medication profile  Outcome: Progressing  Goal: Maintains adequate nutritional intake (undernourished)  Description: INTERVENTIONS:  - Monitor percentage of each meal consumed  - Identify factors contributing to decreased intake, treat as appropriate  - Assist with meals as needed  - Monitor I&O, WT and lab values  - Obtain nutritional consult as needed  - Optimize oral hygiene and moisture  - Encourage food from home; allow for food preferences  - Enhance eating environment  Outcome: Progressing     Problem: METABOLIC/FLUID AND ELECTROLYTES - ADULT  Goal: Electrolytes maintained within normal limits  Description: INTERVENTIONS:  - Monitor labs and rhythm and assess patient for signs and symptoms of electrolyte imbalances  - Administer electrolyte replacement as ordered  - Monitor response to electrolyte replacements, including rhythm and repeat lab results as appropriate  - Fluid restriction as ordered  - Instruct patient on fluid and nutrition restrictions as appropriate  Outcome: Progressing  Goal: Hemodynamic stability and optimal renal function maintained  Description: INTERVENTIONS:  - Monitor labs and assess for signs and symptoms of volume excess or deficit  - Monitor intake, output and patient weight  - Monitor urine specific gravity, serum osmolarity and serum sodium as indicated or ordered  - Monitor response to interventions for patient's volume status, including labs, urine output, blood pressure (other measures as available)  - Encourage oral intake as appropriate  - Instruct patient on fluid and nutrition restrictions as appropriate  Outcome: Progressing     Problem: Impaired Functional Mobility  Goal: Achieve highest/safest level of mobility/gait  Description: Interventions:  - Assess patient's functional ability and stability  - Promote increasing activity/tolerance for mobility and gait  - Educate and engage patient/family in tolerated activity level and precautions  - Recommend use of chair position in bed 3 times per day  Outcome: Progressing     Problem: Impaired Activities of Daily Living  Goal: Achieve highest/safest level of independence in self care  Description: Interventions:  - Assess ability and encourage patient to participate in ADLs to maximize function  - Promote sitting position while performing ADLs such as feeding, grooming, and bathing  - Educate and encourage patient/family in tolerated functional activity level and precautions during self-care  - Encourage patient to incorporate impaired side during daily activities to promote function  Outcome: Progressing

## 2023-06-02 NOTE — ED QUICK NOTES
Orders for admission, patient is aware of plan and ready to go upstairs. Any questions, please call ED RN Regina Askew at extension [phone].      Patient Covid vaccination status: Partially vaccinated     COVID Test Ordered in ED: None    COVID Suspicion at Admission: N/A    Running Infusions:  None    Mental Status/LOC at time of transport: a&ox3    Other pertinent information: none  CIWA score: N/A   NIH score:  N/A

## 2023-06-02 NOTE — PROGRESS NOTES
ADMISSION NOTE    53year old male with alcohol induced groove pancreatitis, and gastric outlet obstruction, s/p placement of decompressing Gtube and Jtube for feeding. presents with clogged J tube. Available medical records partially reviewed. Dictation to follow.     Tressa Hawkins M.D.  6/1/2023

## 2023-06-02 NOTE — PROGRESS NOTES
06/02/23 1728   Clinical Encounter Type   Visited With Patient   Referral From Other (Comment)  ([facility])   Referral To    Taxonomy   Intended Effects Aligning care plan with patient's values   Methods Encourage self reflection   Interventions Active listening     Discussion: Pt seen bedside briefly, in bed, alert, looking at phone.  introduced self and inquired about pt's interest in [facility]. Pt definitively answered that he is not interested.  follow-up visit available prn and can be contacted at [phone].     Kathryn Vargas, Chaplain Resident

## 2023-06-02 NOTE — H&P
[facility]    PATIENT'S NAME: [facility], [address] [facility] PHYSICIAN: Soha Meza MD   PATIENT ACCOUNT#:   [id number]    LOCATION:  [address] [place] [address] RECORD #:   [id number]       YOB: 1975  ADMISSION DATE:       06/01/2023    HISTORY AND PHYSICAL EXAMINATION    DATE OF EXAMINATION:  06/01/2023    CHIEF COMPLAINT:  \"My J-tube is clogged. \"    HISTORY OF PRESENT ILLNESS:  This is an unfortunate 28-year-old gentleman with a past medical history of alcohol-induced groove pancreatitis who was hospitalized in April with gastric outlet obstruction. Imaging studies showed groove pancreatitis with external compression of the gastric outlet antrum. He subsequently had placement of a venting gastric tube and a jejunal feeding tube insertion. He was admitted to our institution again last month on May 19. At that time he was dehydrated, very hypokalemic, and was hypotensive. He was found to have an antral fluid collection which was not clearly felt to be an abscess. He tolerated J-tube feedings and was discharged home. He presents to the emergency room today because the jejunostomy tube is clogged. He tried opening it with hot water and that did not help. He presented to the emergency room, where they tried pancrelipase, coke, and sodium bicarbonate, none of which helped to open up the tube. He was therefore admitted for further evaluation by Dr. Theresa Jean, who could potentially replace the G and J-tubes and remove pancreatic stent as well. Labs in the emergency room included a glucose of 72, sodium of 142, potassium of 3.5, chloride of 102, CO2 of 34, BUN of 17 with a creatinine of 1.13. Calcium 8.5. Anion gap of 6. Lipase 313, which is down. White count 10 with a hemoglobin of 10.7 and a platelet count of 910,961; there were 68% neutrophils. The patient reports that despite tube feedings, he has been losing weight.   He is very discouraged by what he feels to be a lack of progress in his condition. He has not had significant pain, however. 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 History of alcohol abuse with alcoholic pancreatitis in the past.  Patient denies any other chronic medical problems. MEDICATIONS:  Prior to admission included omeprazole 20 mL per G-tube daily; tramadol 50 mg every 6 hours as needed for pain; pancrelipase 10,000/32,000 one capsule per J-tube as needed to unclog; sodium bicarbonate 325 mg as needed to unclog the G or J-tubes. ALLERGIES:  No known drug allergies. FAMILY HISTORY:  The patient's mother had dementia, father had hypertension. SOCIAL HISTORY:  The patient has a history of heavy alcohol use in the past.  Previous tobacco user. No current tobacco, alcohol, or drug use. Lives with family. REVIEW OF SYSTEMS:  Twelve systems were reviewed. Patient states he has had continued weakness and weight loss despite J-tube feedings at goal.  He currently is not having significant abdominal discomfort. There were no additional pertinent positives or negatives on the 12-point review of systems except as listed in the History of Present Illness. PHYSICAL EXAMINATION:    GENERAL:  He was a discouraged-appearing 80-year-old gentleman who was tired and frustrated, but cooperative with the interview and exam.  VITAL SIGNS:  Temperature was 98.3, pulse 93, respiratory rate 17, blood pressure 126/90, O2 saturation 98% on room air. HEENT:  Normocephalic, atraumatic. Pupils equal, reactive. Sclerae anicteric. There was no sinus tenderness. Mucous membranes were moist.  NECK:  Supple. LUNGS:  Essentially clear with easy respiratory excursions. Breath sounds, however, were diminished and there was prolonged expiratory phase. No wheezes or rhonchi.   HEART:  Regular rate and rhythm. Normal S1, S2.  ABDOMEN:  Slightly distended. Bowel sounds were present. There was no significant tenderness. No guarding. No rebound. G and J-tube sites were in place. G-tube was to drainage. J-tube was clogged. EXTREMITIES:  No clubbing, cyanosis, calf tenderness, palpable cords. SKIN:  Warm and dry without any significant rashes. NEUROLOGIC:  The patient was awake, alert, oriented x3 with no focal motor or sensory deficits. PSYCHIATRIC:  His mood and affect were pleasant and cooperative. BACK:  There was no costovertebral angle tenderness noted. LABORATORY DATA:  Previously mentioned. ASSESSMENT AND PLAN:    1. Plugged J-tube. Patient will be admitted, kept n.p.o. Dr. Vijaya Ko will evaluate for possible removal of current G and J-tubes and replacement of same, also possible removal of pancreatic duct stent. Continue hydration and monitor. 2.   Chronic pancreatitis at this point. Overall, lipase level is trending downward. 3.   Anemia. Stable to slightly improved. 4. DVT prophylaxis. SCDs. Will hold on any anticoagulation as the patient will likely undergo a GI procedure today. Consider prophylactic anticoagulation afterwards. 5.   GI prophylaxis. Protonix. 6.   The patient's current clinical status and proposed treatment plan were discussed with him. All of his questions were answered and he agreed with the plan of care as outlined above.      Dictated By Melanie Sims MD  d: 06/02/2023 05:27:16  t: 06/02/2023 08:01:43  Job [id number]  ZDE/    cc: Kalli Cruz MD

## 2023-06-02 NOTE — CM/SW NOTE
06/02/23 1400   CM/SW Screening   Referral Source Physician;Nurse;Patient;    [address] staff; Chart review;Nursing rounds   Patient's Current Mental Status at Time of Assessment Alert;Oriented   Patient's [address]   Number of Levels in Home 2   Number of Stair in Home pt stated minimal stairs   Patient lives with Alone   Patient Status Prior to Admission   Independent with ADLs and Mobility No   Pt. requires assistance with   LONG TERM ACUTE CARE [facility] RN)   Services in place prior to admission [address] [facility] Provider [facility]   Discharge Needs   Anticipated D/C needs Home health care  (PT wants new HH)     CM spoke w/pt who verified address and ph#.  Per pt had home health nurse who only came once. [facility]. Pt will cancel [facility] if he needs further HH. Pt agrees with tentative HH referral via aidin. Face to face order pending, CM/SW  to f/u. CM/SW to remain available for support and/or discharge planning.     Mansoor Villarreal RN, [facility]    Ext.  [phone]

## 2023-06-02 NOTE — PLAN OF CARE
Problem: Patient Centered Care  Goal: Patient preferences are identified and integrated in the patient's plan of care  Description: Interventions:  - What would you like us to know as we care for you?   - Provide timely, complete, and accurate information to patient/family  - Incorporate patient and family knowledge, values, beliefs, and cultural backgrounds into the planning and delivery of care  - Encourage patient/family to participate in care and decision-making at the level they choose  - Honor patient and family perspectives and choices  Outcome: Progressing     Problem: Patient/Family Goals  Goal: Patient/Family Long Term Goal  Description: Patient's Long Term Goal: Unclogged G-Tube    Interventions:  - Monitor vitals  - Monitor appropriate labs  - Administer medications as ordered  - Follow MD's orders     - See additional Care Plan goals for specific interventions  Outcome: Progressing  Goal: Patient/Family Short Term Goal  Description: Patient's Short Term Goal: Free of pancreatitis    Interventions:   - - Monitor vitals  - Monitor appropriate labs  - Administer medications as ordered  - Follow MD's orders     - See additional Care Plan goals for specific interventions  Outcome: Progressing     Problem: GASTROINTESTINAL - ADULT  Goal: Maintains or returns to baseline bowel function  Description: INTERVENTIONS:  - Assess bowel function  - Maintain adequate hydration with IV or PO as ordered and tolerated  - Evaluate effectiveness of GI medications  - Encourage mobilization and activity  - Obtain nutritional consult as needed  - Establish a toileting routine/schedule  - Consider collaborating with pharmacy to review patient's medication profile  Outcome: Progressing  Goal: Maintains adequate nutritional intake (undernourished)  Description: INTERVENTIONS:  - Monitor percentage of each meal consumed  - Identify factors contributing to decreased intake, treat as appropriate  - Assist with meals as needed  - Monitor I&O, WT and lab values  - Obtain nutritional consult as needed  - Optimize oral hygiene and moisture  - Encourage food from home; allow for food preferences  - Enhance eating environment  Outcome: Progressing     Problem: METABOLIC/FLUID AND ELECTROLYTES - ADULT  Goal: Electrolytes maintained within normal limits  Description: INTERVENTIONS:  - Monitor labs and rhythm and assess patient for signs and symptoms of electrolyte imbalances  - Administer electrolyte replacement as ordered  - Monitor response to electrolyte replacements, including rhythm and repeat lab results as appropriate  - Fluid restriction as ordered  - Instruct patient on fluid and nutrition restrictions as appropriate  Outcome: Progressing  Goal: Hemodynamic stability and optimal renal function maintained  Description: INTERVENTIONS:  - Monitor labs and assess for signs and symptoms of volume excess or deficit  - Monitor intake, output and patient weight  - Monitor urine specific gravity, serum osmolarity and serum sodium as indicated or ordered  - Monitor response to interventions for patient's volume status, including labs, urine output, blood pressure (other measures as available)  - Encourage oral intake as appropriate  - Instruct patient on fluid and nutrition restrictions as appropriate  Outcome: Progressing     Problem: Impaired Functional Mobility  Goal: Achieve highest/safest level of mobility/gait  Description: Interventions:  - Assess patient's functional ability and stability  - Promote increasing activity/tolerance for mobility and gait  - Educate and engage patient/family in tolerated activity level and precautions  - Recommend use of chair position in bed 3 times per day  Outcome: Progressing     Problem: Impaired Activities of Daily Living  Goal: Achieve highest/safest level of independence in self care  Description: Interventions:  - Assess ability and encourage patient to participate in ADLs to maximize function  - Promote sitting position while performing ADLs such as feeding, grooming, and bathing  - Educate and encourage patient/family in tolerated functional activity level and precautions during self-care  - Encourage patient to incorporate impaired side during daily activities to promote function  Outcome: Progressing     Patient is presently resting in the bed. Alert x 4. Vital signs taken and stable. Intravenous fluids infusing and tolerated. Call light within reach at all times. Currently NPO per order. J-tube is present and attached to stone bag with no output. Patient denied pain or discomfort at current time. Call light within reach at all times.

## 2023-06-03 ENCOUNTER — APPOINTMENT (OUTPATIENT)
Dept: GENERAL RADIOLOGY | Facility: HOSPITAL | Age: 48
End: 2023-06-03
Attending: INTERNAL MEDICINE
Payer: COMMERCIAL

## 2023-06-03 LAB
ALBUMIN SERPL-MCNC: 1.7 G/DL (ref 3.4–5)
ALBUMIN/GLOB SERPL: 0.4 {RATIO} (ref 1–2)
ALP LIVER SERPL-CCNC: 84 U/L
ALT SERPL-CCNC: 11 U/L
ANION GAP SERPL CALC-SCNC: 1 MMOL/L (ref 0–18)
AST SERPL-CCNC: 14 U/L (ref 15–37)
BASOPHILS # BLD AUTO: 0.08 X10(3) UL (ref 0–0.2)
BASOPHILS NFR BLD AUTO: 0.9 %
BILIRUB SERPL-MCNC: 0.3 MG/DL (ref 0.1–2)
BUN BLD-MCNC: 8 MG/DL (ref 7–18)
BUN/CREAT SERPL: 9.2 (ref 10–20)
CALCIUM BLD-MCNC: 8.1 MG/DL (ref 8.5–10.1)
CHLORIDE SERPL-SCNC: 111 MMOL/L (ref 98–112)
CO2 SERPL-SCNC: 29 MMOL/L (ref 21–32)
CREAT BLD-MCNC: 0.87 MG/DL
DEPRECATED RDW RBC AUTO: 40 FL (ref 35.1–46.3)
EOSINOPHIL # BLD AUTO: 1.08 X10(3) UL (ref 0–0.7)
EOSINOPHIL NFR BLD AUTO: 11.8 %
ERYTHROCYTE [DISTWIDTH] IN BLOOD BY AUTOMATED COUNT: 11.8 % (ref 11–15)
GFR SERPLBLD BASED ON 1.73 SQ M-ARVRAT: 107 ML/MIN/1.73M2 (ref 60–?)
GLOBULIN PLAS-MCNC: 4 G/DL (ref 2.8–4.4)
GLUCOSE BLD-MCNC: 93 MG/DL (ref 70–99)
GLUCOSE BLDC GLUCOMTR-MCNC: 75 MG/DL (ref 70–99)
GLUCOSE BLDC GLUCOMTR-MCNC: 81 MG/DL (ref 70–99)
HCT VFR BLD AUTO: 28.8 %
HGB BLD-MCNC: 9.6 G/DL
IMM GRANULOCYTES # BLD AUTO: 0.03 X10(3) UL (ref 0–1)
IMM GRANULOCYTES NFR BLD: 0.3 %
LYMPHOCYTES # BLD AUTO: 1.17 X10(3) UL (ref 1–4)
LYMPHOCYTES NFR BLD AUTO: 12.8 %
MAGNESIUM SERPL-MCNC: 1.6 MG/DL (ref 1.6–2.6)
MCH RBC QN AUTO: 31 PG (ref 26–34)
MCHC RBC AUTO-ENTMCNC: 33.3 G/DL (ref 31–37)
MCV RBC AUTO: 92.9 FL
MONOCYTES # BLD AUTO: 0.56 X10(3) UL (ref 0.1–1)
MONOCYTES NFR BLD AUTO: 6.1 %
NEUTROPHILS # BLD AUTO: 6.21 X10 (3) UL (ref 1.5–7.7)
NEUTROPHILS # BLD AUTO: 6.21 X10(3) UL (ref 1.5–7.7)
NEUTROPHILS NFR BLD AUTO: 68.1 %
OSMOLALITY SERPL CALC.SUM OF ELEC: 290 MOSM/KG (ref 275–295)
PHOSPHATE SERPL-MCNC: 3.7 MG/DL (ref 2.5–4.9)
PLATELET # BLD AUTO: 396 10(3)UL (ref 150–450)
POTASSIUM SERPL-SCNC: 3.8 MMOL/L (ref 3.5–5.1)
PROT SERPL-MCNC: 5.7 G/DL (ref 6.4–8.2)
RBC # BLD AUTO: 3.1 X10(6)UL
SODIUM SERPL-SCNC: 141 MMOL/L (ref 136–145)
WBC # BLD AUTO: 9.1 X10(3) UL (ref 4–11)

## 2023-06-03 PROCEDURE — 99233 SBSQ HOSP IP/OBS HIGH 50: CPT | Performed by: HOSPITALIST

## 2023-06-03 PROCEDURE — 74018 RADEX ABDOMEN 1 VIEW: CPT | Performed by: INTERNAL MEDICINE

## 2023-06-03 RX ORDER — MAGNESIUM SULFATE HEPTAHYDRATE 40 MG/ML
2 INJECTION, SOLUTION INTRAVENOUS ONCE
Status: COMPLETED | OUTPATIENT
Start: 2023-06-03 | End: 2023-06-03

## 2023-06-03 NOTE — PLAN OF CARE
Problem: Patient Centered Care  Goal: Patient preferences are identified and integrated in the patient's plan of care  Description: Interventions:  - What would you like us to know as we care for you?  From home alone  - Provide timely, complete, and accurate information to patient/family  - Incorporate patient and family knowledge, values, beliefs, and cultural backgrounds into the planning and delivery of care  - Encourage patient/family to participate in care and decision-making at the level they choose  - Honor patient and family perspectives and choices  Outcome: Progressing     Problem: Patient/Family Goals  Goal: Patient/Family Long Term Goal  Description: Patient's Long Term Goal: Unclogged G-Tube    Interventions:  - Monitor vital signs  - Monitor appropriate labs  - Pain management  - Administer medications per order  - Follow MD orders  - Diagnostics per order  - Update / inform patient and family on plan of care  - Discharge planning  - See additional Care Plan goals for specific interventions  Outcome: Progressing  Goal: Patient/Family Short Term Goal  Description: Patient's Short Term Goal: Improve pancreatitis    Interventions:   - Monitor vital signs  - Monitor appropriate labs  - Pain management  - Administer medications per order  - Follow MD orders  - Diagnostics per order  - Update / inform patient and family on plan of care  - See additional Care Plan goals for specific interventions  Outcome: Progressing     Problem: GASTROINTESTINAL - ADULT  Goal: Maintains or returns to baseline bowel function  Description: INTERVENTIONS:  - Assess bowel function  - Maintain adequate hydration with IV or PO as ordered and tolerated  - Evaluate effectiveness of GI medications  - Encourage mobilization and activity  - Obtain nutritional consult as needed  - Establish a toileting routine/schedule  - Consider collaborating with pharmacy to review patient's medication profile  Outcome: Progressing  Goal: Maintains adequate nutritional intake (undernourished)  Description: INTERVENTIONS:  - Monitor percentage of each meal consumed  - Identify factors contributing to decreased intake, treat as appropriate  - Assist with meals as needed  - Monitor I&O, WT and lab values  - Obtain nutritional consult as needed  - Optimize oral hygiene and moisture  - Encourage food from home; allow for food preferences  - Enhance eating environment  Outcome: Progressing     Problem: METABOLIC/FLUID AND ELECTROLYTES - ADULT  Goal: Electrolytes maintained within normal limits  Description: INTERVENTIONS:  - Monitor labs and rhythm and assess patient for signs and symptoms of electrolyte imbalances  - Administer electrolyte replacement as ordered  - Monitor response to electrolyte replacements, including rhythm and repeat lab results as appropriate  - Fluid restriction as ordered  - Instruct patient on fluid and nutrition restrictions as appropriate  Outcome: Progressing  Goal: Hemodynamic stability and optimal renal function maintained  Description: INTERVENTIONS:  - Monitor labs and assess for signs and symptoms of volume excess or deficit  - Monitor intake, output and patient weight  - Monitor urine specific gravity, serum osmolarity and serum sodium as indicated or ordered  - Monitor response to interventions for patient's volume status, including labs, urine output, blood pressure (other measures as available)  - Encourage oral intake as appropriate  - Instruct patient on fluid and nutrition restrictions as appropriate  Outcome: Progressing     Problem: Impaired Functional Mobility  Goal: Achieve highest/safest level of mobility/gait  Description: Interventions:  - Assess patient's functional ability and stability  - Promote increasing activity/tolerance for mobility and gait  - Educate and engage patient/family in tolerated activity level and precautions  Outcome: Progressing     Problem: Impaired Activities of Daily Living  Goal: Achieve highest/safest level of independence in self care  Description: Interventions:  - Assess ability and encourage patient to participate in ADLs to maximize function  - Promote sitting position while performing ADLs such as feeding, grooming, and bathing  - Educate and encourage patient/family in tolerated functional activity level and precautions during self-care  Outcome: Progressing     Problem: SKIN/TISSUE INTEGRITY - ADULT  Goal: Skin integrity remains intact  Description: INTERVENTIONS  - Assess and document risk factors for pressure ulcer development  - Assess and document skin integrity  - Monitor for areas of redness and/or skin breakdown  - Initiate interventions, skin care algorithm/standards of care as needed  Outcome: Progressing     Problem: PAIN - ADULT  Goal: Verbalizes/displays adequate comfort level or patient's stated pain goal  Description: INTERVENTIONS:  - Encourage pt to monitor pain and request assistance  - Assess pain using appropriate pain scale  - Administer analgesics based on type and severity of pain and evaluate response  - Implement non-pharmacological measures as appropriate and evaluate response  - Consider cultural and social influences on pain and pain management  - Manage/alleviate anxiety  - Utilize distraction and/or relaxation techniques  - Monitor for opioid side effects  - Notify MD/LIP if interventions unsuccessful or patient reports new pain  - Anticipate increased pain with activity and pre-medicate as appropriate  Outcome: Progressing     Problem: SAFETY ADULT - FALL  Goal: Free from fall injury  Description: INTERVENTIONS:  - Assess pt frequently for physical needs  - Identify cognitive and physical deficits and behaviors that affect risk of falls.   - [place] fall precautions as indicated by assessment.  - Educate pt/family on patient safety including physical limitations  - Instruct pt to call for assistance with activity based on assessment  - Modify environment to reduce risk of injury  - Provide assistive devices as appropriate  - Consider OT/PT consult to assist with strengthening/mobility  - Encourage toileting schedule  Outcome: Progressing     Problem: DISCHARGE PLANNING  Goal: Discharge to home or other facility with appropriate resources  Description: INTERVENTIONS:  - Identify barriers to discharge w/pt and caregiver  - Include patient/family/discharge partner in discharge planning  - Arrange for needed discharge resources and transportation as appropriate  - Identify discharge learning needs (meds, wound care, etc)  - Arrange for interpreters to assist at discharge as needed  - Consider post-discharge preferences of patient/family/discharge partner  - Complete POLST form as appropriate  - Assess patient's ability to be responsible for managing their own health  - Refer to Case Management Department for coordinating discharge planning if the patient needs post-hospital services based on physician/LIP order or complex needs related to functional status, cognitive ability or social support system  Outcome: Progressing      Monitoring vital signs- stable at this time. No acute changes noted at this moment. Safety and fall precautions maintained- I-bed awareness on, bed locked in lowest position, call light within reach. Frequent rounding by nursing staff.

## 2023-06-03 NOTE — PLAN OF CARE
Pt updated on plan of care. Had no c/o pain at this time. VS stable at this time. TPN scheduled to be started this evening. Continues on IV fluids and NPO status. Call light within reach. Problem: Patient Centered Care  Goal: Patient preferences are identified and integrated in the patient's plan of care  Description: Interventions:  - What would you like us to know as we care for you?  From home alone  - Provide timely, complete, and accurate information to patient/family  - Incorporate patient and family knowledge, values, beliefs, and cultural backgrounds into the planning and delivery of care  - Encourage patient/family to participate in care and decision-making at the level they choose  - Honor patient and family perspectives and choices  Outcome: Progressing     Problem: Patient/Family Goals  Goal: Patient/Family Long Term Goal  Description: Patient's Long Term Goal: Unclogged G-Tube    Interventions:  - Monitor vital signs  - Monitor appropriate labs  - Pain management  - Administer medications per order  - Follow MD orders  - Diagnostics per order  - Update / inform patient and family on plan of care  - Discharge planning  - See additional Care Plan goals for specific interventions  Outcome: Progressing  Goal: Patient/Family Short Term Goal  Description: Patient's Short Term Goal: Improve pancreatitis    Interventions:   - Monitor vital signs  - Monitor appropriate labs  - Pain management  - Administer medications per order  - Follow MD orders  - Diagnostics per order  - Update / inform patient and family on plan of care  - See additional Care Plan goals for specific interventions  Outcome: Progressing     Problem: GASTROINTESTINAL - ADULT  Goal: Maintains or returns to baseline bowel function  Description: INTERVENTIONS:  - Assess bowel function  - Maintain adequate hydration with IV or PO as ordered and tolerated  - Evaluate effectiveness of GI medications  - Encourage mobilization and activity  - Obtain nutritional consult as needed  - Establish a toileting routine/schedule  - Consider collaborating with pharmacy to review patient's medication profile  Outcome: Progressing  Goal: Maintains adequate nutritional intake (undernourished)  Description: INTERVENTIONS:  - Monitor percentage of each meal consumed  - Identify factors contributing to decreased intake, treat as appropriate  - Assist with meals as needed  - Monitor I&O, WT and lab values  - Obtain nutritional consult as needed  - Optimize oral hygiene and moisture  - Encourage food from home; allow for food preferences  - Enhance eating environment  Outcome: Progressing     Problem: METABOLIC/FLUID AND ELECTROLYTES - ADULT  Goal: Electrolytes maintained within normal limits  Description: INTERVENTIONS:  - Monitor labs and rhythm and assess patient for signs and symptoms of electrolyte imbalances  - Administer electrolyte replacement as ordered  - Monitor response to electrolyte replacements, including rhythm and repeat lab results as appropriate  - Fluid restriction as ordered  - Instruct patient on fluid and nutrition restrictions as appropriate  Outcome: Progressing  Goal: Hemodynamic stability and optimal renal function maintained  Description: INTERVENTIONS:  - Monitor labs and assess for signs and symptoms of volume excess or deficit  - Monitor intake, output and patient weight  - Monitor urine specific gravity, serum osmolarity and serum sodium as indicated or ordered  - Monitor response to interventions for patient's volume status, including labs, urine output, blood pressure (other measures as available)  - Encourage oral intake as appropriate  - Instruct patient on fluid and nutrition restrictions as appropriate  Outcome: Progressing     Problem: Impaired Functional Mobility  Goal: Achieve highest/safest level of mobility/gait  Description: Interventions:  - Assess patient's functional ability and stability  - Promote increasing activity/tolerance for mobility and gait  - Educate and engage patient/family in tolerated activity level and precautions    Outcome: Progressing     Problem: Impaired Activities of Daily Living  Goal: Achieve highest/safest level of independence in self care  Description: Interventions:  - Assess ability and encourage patient to participate in ADLs to maximize function  - Promote sitting position while performing ADLs such as feeding, grooming, and bathing  - Educate and encourage patient/family in tolerated functional activity level and precautions during self-care    Outcome: Progressing     Problem: SKIN/TISSUE INTEGRITY - ADULT  Goal: Skin integrity remains intact  Description: INTERVENTIONS  - Assess and document risk factors for pressure ulcer development  - Assess and document skin integrity  - Monitor for areas of redness and/or skin breakdown  - Initiate interventions, skin care algorithm/standards of care as needed  Outcome: Progressing     Problem: PAIN - ADULT  Goal: Verbalizes/displays adequate comfort level or patient's stated pain goal  Description: INTERVENTIONS:  - Encourage pt to monitor pain and request assistance  - Assess pain using appropriate pain scale  - Administer analgesics based on type and severity of pain and evaluate response  - Implement non-pharmacological measures as appropriate and evaluate response  - Consider cultural and social influences on pain and pain management  - Manage/alleviate anxiety  - Utilize distraction and/or relaxation techniques  - Monitor for opioid side effects  - Notify MD/LIP if interventions unsuccessful or patient reports new pain  - Anticipate increased pain with activity and pre-medicate as appropriate  Outcome: Progressing     Problem: SAFETY ADULT - FALL  Goal: Free from fall injury  Description: INTERVENTIONS:  - Assess pt frequently for physical needs  - Identify cognitive and physical deficits and behaviors that affect risk of falls.   - [place] fall precautions as indicated by assessment.  - Educate pt/family on patient safety including physical limitations  - Instruct pt to call for assistance with activity based on assessment  - Modify environment to reduce risk of injury  - Provide assistive devices as appropriate  - Consider OT/PT consult to assist with strengthening/mobility  - Encourage toileting schedule  Outcome: Progressing     Problem: DISCHARGE PLANNING  Goal: Discharge to home or other facility with appropriate resources  Description: INTERVENTIONS:  - Identify barriers to discharge w/pt and caregiver  - Include patient/family/discharge partner in discharge planning  - Arrange for needed discharge resources and transportation as appropriate  - Identify discharge learning needs (meds, wound care, etc)  - Arrange for interpreters to assist at discharge as needed  - Consider post-discharge preferences of patient/family/discharge partner  - Complete POLST form as appropriate  - Assess patient's ability to be responsible for managing their own health  - Refer to Case Management Department for coordinating discharge planning if the patient needs post-hospital services based on physician/LIP order or complex needs related to functional status, cognitive ability or social support system  Outcome: Progressing

## 2023-06-03 NOTE — PLAN OF CARE
A/O X4. Easily irritated. PICC insertion to R arm + precaution. Band in place. Unit draw. Fluids infusing at 100ml. TPN to start tomorrow. Strict NPO. Plan of care ongoing. Call light within reach. Problem: Patient Centered Care  Goal: Patient preferences are identified and integrated in the patient's plan of care  Description: Interventions:  - What would you like us to know as we care for you?  FROM HOME   - Provide timely, complete, and accurate information to patient/family  - Incorporate patient and family knowledge, values, beliefs, and cultural backgrounds into the planning and delivery of care  - Encourage patient/family to participate in care and decision-making at the level they choose  - Honor patient and family perspectives and choices  Outcome: Progressing     Problem: Patient/Family Goals  Goal: Patient/Family Long Term Goal  Description: Patient's Long Term Goal: Unclogged G-Tube    Interventions:  - Monitor vitals  - Monitor appropriate labs  - Administer medications as ordered  - Follow MD's orders     - See additional Care Plan goals for specific interventions  Outcome: Progressing  Goal: Patient/Family Short Term Goal  Description: Patient's Short Term Goal: Free of pancreatitis    Interventions:   - - Monitor vitals  - Monitor appropriate labs  - Administer medications as ordered  - Follow MD's orders     - See additional Care Plan goals for specific interventions  Outcome: Progressing     Problem: GASTROINTESTINAL - ADULT  Goal: Maintains or returns to baseline bowel function  Description: INTERVENTIONS:  - Assess bowel function  - Maintain adequate hydration with IV or PO as ordered and tolerated  - Evaluate effectiveness of GI medications  - Encourage mobilization and activity  - Obtain nutritional consult as needed  - Establish a toileting routine/schedule  - Consider collaborating with pharmacy to review patient's medication profile  Outcome: Progressing  Goal: Maintains adequate nutritional intake (undernourished)  Description: INTERVENTIONS:  - Monitor percentage of each meal consumed  - Identify factors contributing to decreased intake, treat as appropriate  - Assist with meals as needed  - Monitor I&O, WT and lab values  - Obtain nutritional consult as needed  - Optimize oral hygiene and moisture  - Encourage food from home; allow for food preferences  - Enhance eating environment  Outcome: Progressing     Problem: METABOLIC/FLUID AND ELECTROLYTES - ADULT  Goal: Electrolytes maintained within normal limits  Description: INTERVENTIONS:  - Monitor labs and rhythm and assess patient for signs and symptoms of electrolyte imbalances  - Administer electrolyte replacement as ordered  - Monitor response to electrolyte replacements, including rhythm and repeat lab results as appropriate  - Fluid restriction as ordered  - Instruct patient on fluid and nutrition restrictions as appropriate  Outcome: Progressing  Goal: Hemodynamic stability and optimal renal function maintained  Description: INTERVENTIONS:  - Monitor labs and assess for signs and symptoms of volume excess or deficit  - Monitor intake, output and patient weight  - Monitor urine specific gravity, serum osmolarity and serum sodium as indicated or ordered  - Monitor response to interventions for patient's volume status, including labs, urine output, blood pressure (other measures as available)  - Encourage oral intake as appropriate  - Instruct patient on fluid and nutrition restrictions as appropriate  Outcome: Progressing     Problem: Impaired Functional Mobility  Goal: Achieve highest/safest level of mobility/gait  Description: Interventions:  - Assess patient's functional ability and stability  - Promote increasing activity/tolerance for mobility and gait  - Educate and engage patient/family in tolerated activity level and precautions    Outcome: Progressing     Problem: Impaired Activities of Daily Living  Goal: Achieve highest/safest level of independence in self care  Description: Interventions:  - Assess ability and encourage patient to participate in ADLs to maximize function  - Promote sitting position while performing ADLs such as feeding, grooming, and bathing  - Educate and encourage patient/family in tolerated functional activity level and precautions during self-care    Outcome: Progressing

## 2023-06-04 LAB
ALBUMIN SERPL-MCNC: 1.7 G/DL (ref 3.4–5)
ALBUMIN/GLOB SERPL: 0.5 {RATIO} (ref 1–2)
ALP LIVER SERPL-CCNC: 80 U/L
ALT SERPL-CCNC: 10 U/L
ANION GAP SERPL CALC-SCNC: 2 MMOL/L (ref 0–18)
AST SERPL-CCNC: 11 U/L (ref 15–37)
BILIRUB SERPL-MCNC: 0.3 MG/DL (ref 0.1–2)
BUN BLD-MCNC: 8 MG/DL (ref 7–18)
BUN/CREAT SERPL: 9.3 (ref 10–20)
CALCIUM BLD-MCNC: 8.1 MG/DL (ref 8.5–10.1)
CHLORIDE SERPL-SCNC: 110 MMOL/L (ref 98–112)
CO2 SERPL-SCNC: 28 MMOL/L (ref 21–32)
CREAT BLD-MCNC: 0.86 MG/DL
DEPRECATED RDW RBC AUTO: 39.6 FL (ref 35.1–46.3)
ERYTHROCYTE [DISTWIDTH] IN BLOOD BY AUTOMATED COUNT: 11.7 % (ref 11–15)
GFR SERPLBLD BASED ON 1.73 SQ M-ARVRAT: 107 ML/MIN/1.73M2 (ref 60–?)
GLOBULIN PLAS-MCNC: 3.7 G/DL (ref 2.8–4.4)
GLUCOSE BLD-MCNC: 126 MG/DL (ref 70–99)
GLUCOSE BLDC GLUCOMTR-MCNC: 119 MG/DL (ref 70–99)
GLUCOSE BLDC GLUCOMTR-MCNC: 125 MG/DL (ref 70–99)
GLUCOSE BLDC GLUCOMTR-MCNC: 79 MG/DL (ref 70–99)
GLUCOSE BLDC GLUCOMTR-MCNC: 97 MG/DL (ref 70–99)
HCT VFR BLD AUTO: 27.9 %
HGB BLD-MCNC: 9.1 G/DL
MAGNESIUM SERPL-MCNC: 2 MG/DL (ref 1.6–2.6)
MCH RBC QN AUTO: 30.4 PG (ref 26–34)
MCHC RBC AUTO-ENTMCNC: 32.6 G/DL (ref 31–37)
MCV RBC AUTO: 93.3 FL
OSMOLALITY SERPL CALC.SUM OF ELEC: 290 MOSM/KG (ref 275–295)
PHOSPHATE SERPL-MCNC: 3.6 MG/DL (ref 2.5–4.9)
PLATELET # BLD AUTO: 363 10(3)UL (ref 150–450)
POTASSIUM SERPL-SCNC: 3.9 MMOL/L (ref 3.5–5.1)
PROT SERPL-MCNC: 5.4 G/DL (ref 6.4–8.2)
RBC # BLD AUTO: 2.99 X10(6)UL
SODIUM SERPL-SCNC: 140 MMOL/L (ref 136–145)
TRIGL SERPL-MCNC: 100 MG/DL (ref 30–149)
WBC # BLD AUTO: 7.7 X10(3) UL (ref 4–11)

## 2023-06-04 PROCEDURE — 99233 SBSQ HOSP IP/OBS HIGH 50: CPT | Performed by: HOSPITALIST

## 2023-06-04 PROCEDURE — 3E0436Z INTRODUCTION OF NUTRITIONAL SUBSTANCE INTO CENTRAL VEIN, PERCUTANEOUS APPROACH: ICD-10-PCS | Performed by: HOSPITALIST

## 2023-06-04 RX ORDER — MORPHINE SULFATE 4 MG/ML
4 INJECTION, SOLUTION INTRAMUSCULAR; INTRAVENOUS EVERY 2 HOUR PRN
Status: DISCONTINUED | OUTPATIENT
Start: 2023-06-04 | End: 2023-06-14

## 2023-06-04 RX ORDER — MORPHINE SULFATE 2 MG/ML
2 INJECTION, SOLUTION INTRAMUSCULAR; INTRAVENOUS ONCE
Status: COMPLETED | OUTPATIENT
Start: 2023-06-04 | End: 2023-06-04

## 2023-06-04 RX ORDER — MORPHINE SULFATE 2 MG/ML
1 INJECTION, SOLUTION INTRAMUSCULAR; INTRAVENOUS EVERY 2 HOUR PRN
Status: DISCONTINUED | OUTPATIENT
Start: 2023-06-04 | End: 2023-06-14

## 2023-06-04 RX ORDER — MORPHINE SULFATE 2 MG/ML
2 INJECTION, SOLUTION INTRAMUSCULAR; INTRAVENOUS EVERY 2 HOUR PRN
Status: DISCONTINUED | OUTPATIENT
Start: 2023-06-04 | End: 2023-06-14

## 2023-06-04 NOTE — PLAN OF CARE
Pt updated on plan of care. PRN medications given. Pt currently on IV fluids and NPO status. VS stable at this time. Call light within reach. Problem: Patient Centered Care  Goal: Patient preferences are identified and integrated in the patient's plan of care  Description: Interventions:  - What would you like us to know as we care for you?  From home alone  - Provide timely, complete, and accurate information to patient/family  - Incorporate patient and family knowledge, values, beliefs, and cultural backgrounds into the planning and delivery of care  - Encourage patient/family to participate in care and decision-making at the level they choose  - Honor patient and family perspectives and choices  Outcome: Progressing     Problem: Patient/Family Goals  Goal: Patient/Family Long Term Goal  Description: Patient's Long Term Goal: Unclogged G-Tube    Interventions:  - Monitor vital signs  - Monitor appropriate labs  - Pain management  - Administer medications per order  - Follow MD orders  - Diagnostics per order  - Update / inform patient and family on plan of care  - Discharge planning  - See additional Care Plan goals for specific interventions  Outcome: Progressing  Goal: Patient/Family Short Term Goal  Description: Patient's Short Term Goal: Improve pancreatitis    Interventions:   - Monitor vital signs  - Monitor appropriate labs  - Pain management  - Administer medications per order  - Follow MD orders  - Diagnostics per order  - Update / inform patient and family on plan of care  - See additional Care Plan goals for specific interventions  Outcome: Progressing     Problem: GASTROINTESTINAL - ADULT  Goal: Maintains or returns to baseline bowel function  Description: INTERVENTIONS:  - Assess bowel function  - Maintain adequate hydration with IV or PO as ordered and tolerated  - Evaluate effectiveness of GI medications  - Encourage mobilization and activity  - Obtain nutritional consult as needed  - Establish a toileting routine/schedule  - Consider collaborating with pharmacy to review patient's medication profile  Outcome: Progressing  Goal: Maintains adequate nutritional intake (undernourished)  Description: INTERVENTIONS:  - Monitor percentage of each meal consumed  - Identify factors contributing to decreased intake, treat as appropriate  - Assist with meals as needed  - Monitor I&O, WT and lab values  - Obtain nutritional consult as needed  - Optimize oral hygiene and moisture  - Encourage food from home; allow for food preferences  - Enhance eating environment  Outcome: Progressing     Problem: METABOLIC/FLUID AND ELECTROLYTES - ADULT  Goal: Electrolytes maintained within normal limits  Description: INTERVENTIONS:  - Monitor labs and rhythm and assess patient for signs and symptoms of electrolyte imbalances  - Administer electrolyte replacement as ordered  - Monitor response to electrolyte replacements, including rhythm and repeat lab results as appropriate  - Fluid restriction as ordered  - Instruct patient on fluid and nutrition restrictions as appropriate  Outcome: Progressing  Goal: Hemodynamic stability and optimal renal function maintained  Description: INTERVENTIONS:  - Monitor labs and assess for signs and symptoms of volume excess or deficit  - Monitor intake, output and patient weight  - Monitor urine specific gravity, serum osmolarity and serum sodium as indicated or ordered  - Monitor response to interventions for patient's volume status, including labs, urine output, blood pressure (other measures as available)  - Encourage oral intake as appropriate  - Instruct patient on fluid and nutrition restrictions as appropriate  Outcome: Progressing     Problem: Impaired Functional Mobility  Goal: Achieve highest/safest level of mobility/gait  Description: Interventions:  - Assess patient's functional ability and stability  - Promote increasing activity/tolerance for mobility and gait  - Educate and engage patient/family in tolerated activity level and precautions    Outcome: Progressing     Problem: Impaired Activities of Daily Living  Goal: Achieve highest/safest level of independence in self care  Description: Interventions:  - Assess ability and encourage patient to participate in ADLs to maximize function  - Promote sitting position while performing ADLs such as feeding, grooming, and bathing  - Educate and encourage patient/family in tolerated functional activity level and precautions during self-care    Outcome: Progressing     Problem: SKIN/TISSUE INTEGRITY - ADULT  Goal: Skin integrity remains intact  Description: INTERVENTIONS  - Assess and document risk factors for pressure ulcer development  - Assess and document skin integrity  - Monitor for areas of redness and/or skin breakdown  - Initiate interventions, skin care algorithm/standards of care as needed  Outcome: Progressing     Problem: PAIN - ADULT  Goal: Verbalizes/displays adequate comfort level or patient's stated pain goal  Description: INTERVENTIONS:  - Encourage pt to monitor pain and request assistance  - Assess pain using appropriate pain scale  - Administer analgesics based on type and severity of pain and evaluate response  - Implement non-pharmacological measures as appropriate and evaluate response  - Consider cultural and social influences on pain and pain management  - Manage/alleviate anxiety  - Utilize distraction and/or relaxation techniques  - Monitor for opioid side effects  - Notify MD/LIP if interventions unsuccessful or patient reports new pain  - Anticipate increased pain with activity and pre-medicate as appropriate  Outcome: Progressing     Problem: SAFETY ADULT - FALL  Goal: Free from fall injury  Description: INTERVENTIONS:  - Assess pt frequently for physical needs  - Identify cognitive and physical deficits and behaviors that affect risk of falls.   - [place] fall precautions as indicated by assessment.  - Educate pt/family on patient safety including physical limitations  - Instruct pt to call for assistance with activity based on assessment  - Modify environment to reduce risk of injury  - Provide assistive devices as appropriate  - Consider OT/PT consult to assist with strengthening/mobility  - Encourage toileting schedule  Outcome: Progressing     Problem: DISCHARGE PLANNING  Goal: Discharge to home or other facility with appropriate resources  Description: INTERVENTIONS:  - Identify barriers to discharge w/pt and caregiver  - Include patient/family/discharge partner in discharge planning  - Arrange for needed discharge resources and transportation as appropriate  - Identify discharge learning needs (meds, wound care, etc)  - Arrange for interpreters to assist at discharge as needed  - Consider post-discharge preferences of patient/family/discharge partner  - Complete POLST form as appropriate  - Assess patient's ability to be responsible for managing their own health  - Refer to Case Management Department for coordinating discharge planning if the patient needs post-hospital services based on physician/LIP order or complex needs related to functional status, cognitive ability or social support system  Outcome: Progressing

## 2023-06-04 NOTE — PLAN OF CARE
Problem: Patient Centered Care  Goal: Patient preferences are identified and integrated in the patient's plan of care  Description: Interventions:  - What would you like us to know as we care for you?  From home alone  - Provide timely, complete, and accurate information to patient/family  - Incorporate patient and family knowledge, values, beliefs, and cultural backgrounds into the planning and delivery of care  - Encourage patient/family to participate in care and decision-making at the level they choose  - Honor patient and family perspectives and choices  Outcome: Progressing     Problem: Patient/Family Goals  Goal: Patient/Family Long Term Goal  Description: Patient's Long Term Goal: Unclogged G-Tube    Interventions:  - Monitor vital signs  - Monitor appropriate labs  - Pain management  - Administer medications per order  - Follow MD orders  - Diagnostics per order  - Update / inform patient and family on plan of care  - Discharge planning  - See additional Care Plan goals for specific interventions  Outcome: Progressing  Goal: Patient/Family Short Term Goal  Description: Patient's Short Term Goal: Improve pancreatitis    Interventions:   - Monitor vital signs  - Monitor appropriate labs  - Pain management  - Administer medications per order  - Follow MD orders  - Diagnostics per order  - Update / inform patient and family on plan of care  - See additional Care Plan goals for specific interventions  Outcome: Progressing     Problem: GASTROINTESTINAL - ADULT  Goal: Maintains or returns to baseline bowel function  Description: INTERVENTIONS:  - Assess bowel function  - Maintain adequate hydration with IV or PO as ordered and tolerated  - Evaluate effectiveness of GI medications  - Encourage mobilization and activity  - Obtain nutritional consult as needed  - Establish a toileting routine/schedule  - Consider collaborating with pharmacy to review patient's medication profile  Outcome: Progressing  Goal: Maintains adequate nutritional intake (undernourished)  Description: INTERVENTIONS:  - Monitor percentage of each meal consumed  - Identify factors contributing to decreased intake, treat as appropriate  - Assist with meals as needed  - Monitor I&O, WT and lab values  - Obtain nutritional consult as needed  - Optimize oral hygiene and moisture  - Encourage food from home; allow for food preferences  - Enhance eating environment  Outcome: Progressing     Problem: METABOLIC/FLUID AND ELECTROLYTES - ADULT  Goal: Electrolytes maintained within normal limits  Description: INTERVENTIONS:  - Monitor labs and rhythm and assess patient for signs and symptoms of electrolyte imbalances  - Administer electrolyte replacement as ordered  - Monitor response to electrolyte replacements, including rhythm and repeat lab results as appropriate  - Fluid restriction as ordered  - Instruct patient on fluid and nutrition restrictions as appropriate  Outcome: Progressing  Goal: Hemodynamic stability and optimal renal function maintained  Description: INTERVENTIONS:  - Monitor labs and assess for signs and symptoms of volume excess or deficit  - Monitor intake, output and patient weight  - Monitor urine specific gravity, serum osmolarity and serum sodium as indicated or ordered  - Monitor response to interventions for patient's volume status, including labs, urine output, blood pressure (other measures as available)  - Encourage oral intake as appropriate  - Instruct patient on fluid and nutrition restrictions as appropriate  Outcome: Progressing     Problem: Impaired Functional Mobility  Goal: Achieve highest/safest level of mobility/gait  Description: Interventions:  - Assess patient's functional ability and stability  - Promote increasing activity/tolerance for mobility and gait  - Educate and engage patient/family in tolerated activity level and precautions  Outcome: Progressing     Problem: Impaired Activities of Daily Living  Goal: Achieve highest/safest level of independence in self care  Description: Interventions:  - Assess ability and encourage patient to participate in ADLs to maximize function  - Promote sitting position while performing ADLs such as feeding, grooming, and bathing  - Educate and encourage patient/family in tolerated functional activity level and precautions during self-care  Outcome: Progressing     Problem: SKIN/TISSUE INTEGRITY - ADULT  Goal: Skin integrity remains intact  Description: INTERVENTIONS  - Assess and document risk factors for pressure ulcer development  - Assess and document skin integrity  - Monitor for areas of redness and/or skin breakdown  - Initiate interventions, skin care algorithm/standards of care as needed  Outcome: Progressing     Problem: PAIN - ADULT  Goal: Verbalizes/displays adequate comfort level or patient's stated pain goal  Description: INTERVENTIONS:  - Encourage pt to monitor pain and request assistance  - Assess pain using appropriate pain scale  - Administer analgesics based on type and severity of pain and evaluate response  - Implement non-pharmacological measures as appropriate and evaluate response  - Consider cultural and social influences on pain and pain management  - Manage/alleviate anxiety  - Utilize distraction and/or relaxation techniques  - Monitor for opioid side effects  - Notify MD/LIP if interventions unsuccessful or patient reports new pain  - Anticipate increased pain with activity and pre-medicate as appropriate  Outcome: Progressing     Problem: SAFETY ADULT - FALL  Goal: Free from fall injury  Description: INTERVENTIONS:  - Assess pt frequently for physical needs  - Identify cognitive and physical deficits and behaviors that affect risk of falls.   - [place] fall precautions as indicated by assessment.  - Educate pt/family on patient safety including physical limitations  - Instruct pt to call for assistance with activity based on assessment  - Modify environment to reduce risk of injury  - Provide assistive devices as appropriate  - Consider OT/PT consult to assist with strengthening/mobility  - Encourage toileting schedule  Outcome: Progressing     Problem: DISCHARGE PLANNING  Goal: Discharge to home or other facility with appropriate resources  Description: INTERVENTIONS:  - Identify barriers to discharge w/pt and caregiver  - Include patient/family/discharge partner in discharge planning  - Arrange for needed discharge resources and transportation as appropriate  - Identify discharge learning needs (meds, wound care, etc)  - Arrange for interpreters to assist at discharge as needed  - Consider post-discharge preferences of patient/family/discharge partner  - Complete POLST form as appropriate  - Assess patient's ability to be responsible for managing their own health  - Refer to Case Management Department for coordinating discharge planning if the patient needs post-hospital services based on physician/LIP order or complex needs related to functional status, cognitive ability or social support system  Outcome: Progressing         Monitoring vital signs- stable at this time. No acute changes noted at this moment. Receiving TPN per order. Safety and fall precautions maintained- I-bed awareness on, bed locked in lowest position, call light within reach. Frequent rounding by nursing staff.

## 2023-06-05 LAB
ANION GAP SERPL CALC-SCNC: 7 MMOL/L (ref 0–18)
BUN BLD-MCNC: 16 MG/DL (ref 7–18)
BUN/CREAT SERPL: 20.5 (ref 10–20)
CALCIUM BLD-MCNC: 8.3 MG/DL (ref 8.5–10.1)
CHLORIDE SERPL-SCNC: 106 MMOL/L (ref 98–112)
CO2 SERPL-SCNC: 29 MMOL/L (ref 21–32)
CREAT BLD-MCNC: 0.78 MG/DL
DEPRECATED RDW RBC AUTO: 40 FL (ref 35.1–46.3)
ERYTHROCYTE [DISTWIDTH] IN BLOOD BY AUTOMATED COUNT: 11.8 % (ref 11–15)
GFR SERPLBLD BASED ON 1.73 SQ M-ARVRAT: 111 ML/MIN/1.73M2 (ref 60–?)
GLUCOSE BLD-MCNC: 99 MG/DL (ref 70–99)
GLUCOSE BLDC GLUCOMTR-MCNC: 101 MG/DL (ref 70–99)
GLUCOSE BLDC GLUCOMTR-MCNC: 107 MG/DL (ref 70–99)
GLUCOSE BLDC GLUCOMTR-MCNC: 119 MG/DL (ref 70–99)
GLUCOSE BLDC GLUCOMTR-MCNC: 88 MG/DL (ref 70–99)
HCT VFR BLD AUTO: 27.8 %
HGB BLD-MCNC: 9.2 G/DL
MAGNESIUM SERPL-MCNC: 1.8 MG/DL (ref 1.6–2.6)
MCH RBC QN AUTO: 30.5 PG (ref 26–34)
MCHC RBC AUTO-ENTMCNC: 33.1 G/DL (ref 31–37)
MCV RBC AUTO: 92.1 FL
OSMOLALITY SERPL CALC.SUM OF ELEC: 295 MOSM/KG (ref 275–295)
PHOSPHATE SERPL-MCNC: 4.8 MG/DL (ref 2.5–4.9)
PLATELET # BLD AUTO: 360 10(3)UL (ref 150–450)
POTASSIUM SERPL-SCNC: 4.5 MMOL/L (ref 3.5–5.1)
RBC # BLD AUTO: 3.02 X10(6)UL
SODIUM SERPL-SCNC: 142 MMOL/L (ref 136–145)
WBC # BLD AUTO: 6.6 X10(3) UL (ref 4–11)

## 2023-06-05 PROCEDURE — 99233 SBSQ HOSP IP/OBS HIGH 50: CPT | Performed by: HOSPITALIST

## 2023-06-05 NOTE — PLAN OF CARE
Problem: Patient Centered Care  Goal: Patient preferences are identified and integrated in the patient's plan of care  Description: Interventions:  - What would you like us to know as we care for you?  From home alone  - Provide timely, complete, and accurate information to patient/family  - Incorporate patient and family knowledge, values, beliefs, and cultural backgrounds into the planning and delivery of care  - Encourage patient/family to participate in care and decision-making at the level they choose  - Honor patient and family perspectives and choices  Outcome: Progressing     Problem: Patient/Family Goals  Goal: Patient/Family Long Term Goal  Description: Patient's Long Term Goal: Unclogged G-Tube    Interventions:  - Monitor vital signs  - Monitor appropriate labs  - Pain management  - Administer medications per order  - Follow MD orders  - Diagnostics per order  - Update / inform patient and family on plan of care  - Discharge planning  - See additional Care Plan goals for specific interventions  Outcome: Progressing  Goal: Patient/Family Short Term Goal  Description: Patient's Short Term Goal: Improve pancreatitis    Interventions:   - Monitor vital signs  - Monitor appropriate labs  - Pain management  - Administer medications per order  - Follow MD orders  - Diagnostics per order  - Update / inform patient and family on plan of care  - See additional Care Plan goals for specific interventions  Outcome: Progressing     Problem: GASTROINTESTINAL - ADULT  Goal: Maintains or returns to baseline bowel function  Description: INTERVENTIONS:  - Assess bowel function  - Maintain adequate hydration with IV or PO as ordered and tolerated  - Evaluate effectiveness of GI medications  - Encourage mobilization and activity  - Obtain nutritional consult as needed  - Establish a toileting routine/schedule  - Consider collaborating with pharmacy to review patient's medication profile  Outcome: Progressing  Goal: Maintains adequate nutritional intake (undernourished)  Description: INTERVENTIONS:  - Monitor percentage of each meal consumed  - Identify factors contributing to decreased intake, treat as appropriate  - Assist with meals as needed  - Monitor I&O, WT and lab values  - Obtain nutritional consult as needed  - Optimize oral hygiene and moisture  - Encourage food from home; allow for food preferences  - Enhance eating environment  Outcome: Progressing     Problem: METABOLIC/FLUID AND ELECTROLYTES - ADULT  Goal: Electrolytes maintained within normal limits  Description: INTERVENTIONS:  - Monitor labs and rhythm and assess patient for signs and symptoms of electrolyte imbalances  - Administer electrolyte replacement as ordered  - Monitor response to electrolyte replacements, including rhythm and repeat lab results as appropriate  - Fluid restriction as ordered  - Instruct patient on fluid and nutrition restrictions as appropriate  Outcome: Progressing  Goal: Hemodynamic stability and optimal renal function maintained  Description: INTERVENTIONS:  - Monitor labs and assess for signs and symptoms of volume excess or deficit  - Monitor intake, output and patient weight  - Monitor urine specific gravity, serum osmolarity and serum sodium as indicated or ordered  - Monitor response to interventions for patient's volume status, including labs, urine output, blood pressure (other measures as available)  - Encourage oral intake as appropriate  - Instruct patient on fluid and nutrition restrictions as appropriate  Outcome: Progressing     Problem: Impaired Functional Mobility  Goal: Achieve highest/safest level of mobility/gait  Description: Interventions:  - Assess patient's functional ability and stability  - Promote increasing activity/tolerance for mobility and gait  - Educate and engage patient/family in tolerated activity level and precautions  Outcome: Progressing     Problem: Impaired Activities of Daily Living  Goal: Achieve highest/safest level of independence in self care  Description: Interventions:  - Assess ability and encourage patient to participate in ADLs to maximize function  - Promote sitting position while performing ADLs such as feeding, grooming, and bathing  - Educate and encourage patient/family in tolerated functional activity level and precautions during self-care  Outcome: Progressing     Problem: SKIN/TISSUE INTEGRITY - ADULT  Goal: Skin integrity remains intact  Description: INTERVENTIONS  - Assess and document risk factors for pressure ulcer development  - Assess and document skin integrity  - Monitor for areas of redness and/or skin breakdown  - Initiate interventions, skin care algorithm/standards of care as needed  Outcome: Progressing     Problem: PAIN - ADULT  Goal: Verbalizes/displays adequate comfort level or patient's stated pain goal  Description: INTERVENTIONS:  - Encourage pt to monitor pain and request assistance  - Assess pain using appropriate pain scale  - Administer analgesics based on type and severity of pain and evaluate response  - Implement non-pharmacological measures as appropriate and evaluate response  - Consider cultural and social influences on pain and pain management  - Manage/alleviate anxiety  - Utilize distraction and/or relaxation techniques  - Monitor for opioid side effects  - Notify MD/LIP if interventions unsuccessful or patient reports new pain  - Anticipate increased pain with activity and pre-medicate as appropriate  Outcome: Progressing     Problem: SAFETY ADULT - FALL  Goal: Free from fall injury  Description: INTERVENTIONS:  - Assess pt frequently for physical needs  - Identify cognitive and physical deficits and behaviors that affect risk of falls.   - [place] fall precautions as indicated by assessment.  - Educate pt/family on patient safety including physical limitations  - Instruct pt to call for assistance with activity based on assessment  - Modify environment to reduce risk of injury  - Provide assistive devices as appropriate  - Consider OT/PT consult to assist with strengthening/mobility  - Encourage toileting schedule  Outcome: Progressing     Problem: DISCHARGE PLANNING  Goal: Discharge to home or other facility with appropriate resources  Description: INTERVENTIONS:  - Identify barriers to discharge w/pt and caregiver  - Include patient/family/discharge partner in discharge planning  - Arrange for needed discharge resources and transportation as appropriate  - Identify discharge learning needs (meds, wound care, etc)  - Arrange for interpreters to assist at discharge as needed  - Consider post-discharge preferences of patient/family/discharge partner  - Complete POLST form as appropriate  - Assess patient's ability to be responsible for managing their own health  - Refer to Case Management Department for coordinating discharge planning if the patient needs post-hospital services based on physician/LIP order or complex needs related to functional status, cognitive ability or social support system  Outcome: Progressing     Monitoring vital signs- stable at this time. No acute changes noted at this moment. Receiving TPN per order. Safety and fall precautions maintained- I-bed awareness on, bed locked in lowest position, call light within reach. Frequent rounding by nursing staff.

## 2023-06-05 NOTE — PLAN OF CARE
Pt remains on TPN and Accu check Q6. G/J tube split gauze changed today. Pt walked independently to [facility] and around unit; tolerated well. Problem: Patient Centered Care  Goal: Patient preferences are identified and integrated in the patient's plan of care  Description: Interventions:  - What would you like us to know as we care for you?  From home alone  - Provide timely, complete, and accurate information to patient/family  - Incorporate patient and family knowledge, values, beliefs, and cultural backgrounds into the planning and delivery of care  - Encourage patient/family to participate in care and decision-making at the level they choose  - Honor patient and family perspectives and choices  Outcome: Progressing     Problem: Patient/Family Goals  Goal: Patient/Family Long Term Goal  Description: Patient's Long Term Goal: Unclogged G-Tube    Interventions:  - Monitor vital signs  - Monitor appropriate labs  - Pain management  - Administer medications per order  - Follow MD orders  - Diagnostics per order  - Update / inform patient and family on plan of care  - Discharge planning  - See additional Care Plan goals for specific interventions  Outcome: Progressing  Goal: Patient/Family Short Term Goal  Description: Patient's Short Term Goal: Improve pancreatitis    Interventions:   - Monitor vital signs  - Monitor appropriate labs  - Pain management  - Administer medications per order  - Follow MD orders  - Diagnostics per order  - Update / inform patient and family on plan of care  - See additional Care Plan goals for specific interventions  Outcome: Progressing     Problem: GASTROINTESTINAL - ADULT  Goal: Maintains or returns to baseline bowel function  Description: INTERVENTIONS:  - Assess bowel function  - Maintain adequate hydration with IV or PO as ordered and tolerated  - Evaluate effectiveness of GI medications  - Encourage mobilization and activity  - Obtain nutritional consult as needed  - Establish a toileting routine/schedule  - Consider collaborating with pharmacy to review patient's medication profile  Outcome: Not Progressing  Goal: Maintains adequate nutritional intake (undernourished)  Description: INTERVENTIONS:  - Monitor percentage of each meal consumed  - Identify factors contributing to decreased intake, treat as appropriate  - Assist with meals as needed  - Monitor I&O, WT and lab values  - Obtain nutritional consult as needed  - Optimize oral hygiene and moisture  - Encourage food from home; allow for food preferences  - Enhance eating environment  Outcome: Progressing     Problem: METABOLIC/FLUID AND ELECTROLYTES - ADULT  Goal: Electrolytes maintained within normal limits  Description: INTERVENTIONS:  - Monitor labs and rhythm and assess patient for signs and symptoms of electrolyte imbalances  - Administer electrolyte replacement as ordered  - Monitor response to electrolyte replacements, including rhythm and repeat lab results as appropriate  - Fluid restriction as ordered  - Instruct patient on fluid and nutrition restrictions as appropriate  Outcome: Progressing  Goal: Hemodynamic stability and optimal renal function maintained  Description: INTERVENTIONS:  - Monitor labs and assess for signs and symptoms of volume excess or deficit  - Monitor intake, output and patient weight  - Monitor urine specific gravity, serum osmolarity and serum sodium as indicated or ordered  - Monitor response to interventions for patient's volume status, including labs, urine output, blood pressure (other measures as available)  - Encourage oral intake as appropriate  - Instruct patient on fluid and nutrition restrictions as appropriate  Outcome: Progressing     Problem: Impaired Functional Mobility  Goal: Achieve highest/safest level of mobility/gait  Description: Interventions:  - Assess patient's functional ability and stability  - Promote increasing activity/tolerance for mobility and gait  - Educate and engage patient/family in tolerated activity level and precautions  Outcome: Progressing     Problem: Impaired Activities of Daily Living  Goal: Achieve highest/safest level of independence in self care  Description: Interventions:  - Assess ability and encourage patient to participate in ADLs to maximize function  - Promote sitting position while performing ADLs such as feeding, grooming, and bathing  - Educate and encourage patient/family in tolerated functional activity level and precautions during self-care  - Encourage patient to incorporate impaired side during daily activities to promote function  Outcome: Progressing     Problem: SKIN/TISSUE INTEGRITY - ADULT  Goal: Skin integrity remains intact  Description: INTERVENTIONS  - Assess and document risk factors for pressure ulcer development  - Assess and document skin integrity  - Monitor for areas of redness and/or skin breakdown  - Initiate interventions, skin care algorithm/standards of care as needed  Outcome: Progressing     Problem: PAIN - ADULT  Goal: Verbalizes/displays adequate comfort level or patient's stated pain goal  Description: INTERVENTIONS:  - Encourage pt to monitor pain and request assistance  - Assess pain using appropriate pain scale  - Administer analgesics based on type and severity of pain and evaluate response  - Implement non-pharmacological measures as appropriate and evaluate response  - Consider cultural and social influences on pain and pain management  - Manage/alleviate anxiety  - Utilize distraction and/or relaxation techniques  - Monitor for opioid side effects  - Notify MD/LIP if interventions unsuccessful or patient reports new pain  - Anticipate increased pain with activity and pre-medicate as appropriate  Outcome: Progressing     Problem: SAFETY ADULT - FALL  Goal: Free from fall injury  Description: INTERVENTIONS:  - Assess pt frequently for physical needs  - Identify cognitive and physical deficits and behaviors that affect risk of falls.   - [place] fall precautions as indicated by assessment.  - Educate pt/family on patient safety including physical limitations  - Instruct pt to call for assistance with activity based on assessment  - Modify environment to reduce risk of injury  - Provide assistive devices as appropriate  - Consider OT/PT consult to assist with strengthening/mobility  - Encourage toileting schedule  Outcome: Progressing     Problem: DISCHARGE PLANNING  Goal: Discharge to home or other facility with appropriate resources  Description: INTERVENTIONS:  - Identify barriers to discharge w/pt and caregiver  - Include patient/family/discharge partner in discharge planning  - Arrange for needed discharge resources and transportation as appropriate  - Identify discharge learning needs (meds, wound care, etc)  - Arrange for interpreters to assist at discharge as needed  - Consider post-discharge preferences of patient/family/discharge partner  - Complete POLST form as appropriate  - Assess patient's ability to be responsible for managing their own health  - Refer to Case Management Department for coordinating discharge planning if the patient needs post-hospital services based on physician/LIP order or complex needs related to functional status, cognitive ability or social support system  Outcome: Progressing

## 2023-06-05 NOTE — PLAN OF CARE
A/OX4. C/O PAIN TO R LOWER ABD, MORPHINE GIVEN. ACCU Q 6. TPN INFUSING AT 83.3ML. R ARM PRECAUTIONS. NPO EXCEPT ICE CHIPS. PLAN OF CARE ONGOING. Problem: Patient Centered Care  Goal: Patient preferences are identified and integrated in the patient's plan of care  Description: Interventions:  - What would you like us to know as we care for you?  From home alone  - Provide timely, complete, and accurate information to patient/family  - Incorporate patient and family knowledge, values, beliefs, and cultural backgrounds into the planning and delivery of care  - Encourage patient/family to participate in care and decision-making at the level they choose  - Honor patient and family perspectives and choices  Outcome: Progressing     Problem: Patient/Family Goals  Goal: Patient/Family Long Term Goal  Description: Patient's Long Term Goal: Unclogged G-Tube    Interventions:  - Monitor vital signs  - Monitor appropriate labs  - Pain management  - Administer medications per order  - Follow MD orders  - Diagnostics per order  - Update / inform patient and family on plan of care  - Discharge planning  - See additional Care Plan goals for specific interventions  Outcome: Progressing  Goal: Patient/Family Short Term Goal  Description: Patient's Short Term Goal: Improve pancreatitis    Interventions:   - Monitor vital signs  - Monitor appropriate labs  - Pain management  - Administer medications per order  - Follow MD orders  - Diagnostics per order  - Update / inform patient and family on plan of care  - See additional Care Plan goals for specific interventions  Outcome: Progressing     Problem: GASTROINTESTINAL - ADULT  Goal: Maintains or returns to baseline bowel function  Description: INTERVENTIONS:  - Assess bowel function  - Maintain adequate hydration with IV or PO as ordered and tolerated  - Evaluate effectiveness of GI medications  - Encourage mobilization and activity  - Obtain nutritional consult as needed  - Establish a toileting routine/schedule  - Consider collaborating with pharmacy to review patient's medication profile  Outcome: Progressing  Goal: Maintains adequate nutritional intake (undernourished)  Description: INTERVENTIONS:  - Monitor percentage of each meal consumed  - Identify factors contributing to decreased intake, treat as appropriate  - Assist with meals as needed  - Monitor I&O, WT and lab values  - Obtain nutritional consult as needed  - Optimize oral hygiene and moisture  - Encourage food from home; allow for food preferences  - Enhance eating environment  Outcome: Progressing     Problem: METABOLIC/FLUID AND ELECTROLYTES - ADULT  Goal: Electrolytes maintained within normal limits  Description: INTERVENTIONS:  - Monitor labs and rhythm and assess patient for signs and symptoms of electrolyte imbalances  - Administer electrolyte replacement as ordered  - Monitor response to electrolyte replacements, including rhythm and repeat lab results as appropriate  - Fluid restriction as ordered  - Instruct patient on fluid and nutrition restrictions as appropriate  Outcome: Progressing  Goal: Hemodynamic stability and optimal renal function maintained  Description: INTERVENTIONS:  - Monitor labs and assess for signs and symptoms of volume excess or deficit  - Monitor intake, output and patient weight  - Monitor urine specific gravity, serum osmolarity and serum sodium as indicated or ordered  - Monitor response to interventions for patient's volume status, including labs, urine output, blood pressure (other measures as available)  - Encourage oral intake as appropriate  - Instruct patient on fluid and nutrition restrictions as appropriate  Outcome: Progressing     Problem: Impaired Functional Mobility  Goal: Achieve highest/safest level of mobility/gait  Description: Interventions:  - Assess patient's functional ability and stability  - Promote increasing activity/tolerance for mobility and gait  - Educate and engage patient/family in tolerated activity level and precautions    Outcome: Progressing     Problem: Impaired Activities of Daily Living  Goal: Achieve highest/safest level of independence in self care  Description: Interventions:  - Assess ability and encourage patient to participate in ADLs to maximize function  - Promote sitting position while performing ADLs such as feeding, grooming, and bathing  - Educate and encourage patient/family in tolerated functional activity level and precautions during self-care    Outcome: Progressing     Problem: SKIN/TISSUE INTEGRITY - ADULT  Goal: Skin integrity remains intact  Description: INTERVENTIONS  - Assess and document risk factors for pressure ulcer development  - Assess and document skin integrity  - Monitor for areas of redness and/or skin breakdown  - Initiate interventions, skin care algorithm/standards of care as needed  Outcome: Progressing     Problem: PAIN - ADULT  Goal: Verbalizes/displays adequate comfort level or patient's stated pain goal  Description: INTERVENTIONS:  - Encourage pt to monitor pain and request assistance  - Assess pain using appropriate pain scale  - Administer analgesics based on type and severity of pain and evaluate response  - Implement non-pharmacological measures as appropriate and evaluate response  - Consider cultural and social influences on pain and pain management  - Manage/alleviate anxiety  - Utilize distraction and/or relaxation techniques  - Monitor for opioid side effects  - Notify MD/LIP if interventions unsuccessful or patient reports new pain  - Anticipate increased pain with activity and pre-medicate as appropriate  Outcome: Progressing     Problem: SAFETY ADULT - FALL  Goal: Free from fall injury  Description: INTERVENTIONS:  - Assess pt frequently for physical needs  - Identify cognitive and physical deficits and behaviors that affect risk of falls.   - [place] fall precautions as indicated by assessment.  - Educate pt/family on patient safety including physical limitations  - Instruct pt to call for assistance with activity based on assessment  - Modify environment to reduce risk of injury  - Provide assistive devices as appropriate  - Consider OT/PT consult to assist with strengthening/mobility  - Encourage toileting schedule  Outcome: Progressing     Problem: DISCHARGE PLANNING  Goal: Discharge to home or other facility with appropriate resources  Description: INTERVENTIONS:  - Identify barriers to discharge w/pt and caregiver  - Include patient/family/discharge partner in discharge planning  - Arrange for needed discharge resources and transportation as appropriate  - Identify discharge learning needs (meds, wound care, etc)  - Arrange for interpreters to assist at discharge as needed  - Consider post-discharge preferences of patient/family/discharge partner  - Complete POLST form as appropriate  - Assess patient's ability to be responsible for managing their own health  - Refer to Case Management Department for coordinating discharge planning if the patient needs post-hospital services based on physician/LIP order or complex needs related to functional status, cognitive ability or social support system  Outcome: Progressing

## 2023-06-06 LAB
ANION GAP SERPL CALC-SCNC: 3 MMOL/L (ref 0–18)
BUN BLD-MCNC: 19 MG/DL (ref 7–18)
BUN/CREAT SERPL: 24.4 (ref 10–20)
CALCIUM BLD-MCNC: 8.2 MG/DL (ref 8.5–10.1)
CHLORIDE SERPL-SCNC: 106 MMOL/L (ref 98–112)
CO2 SERPL-SCNC: 28 MMOL/L (ref 21–32)
CREAT BLD-MCNC: 0.78 MG/DL
DEPRECATED RDW RBC AUTO: 39.5 FL (ref 35.1–46.3)
ERYTHROCYTE [DISTWIDTH] IN BLOOD BY AUTOMATED COUNT: 11.9 % (ref 11–15)
GFR SERPLBLD BASED ON 1.73 SQ M-ARVRAT: 111 ML/MIN/1.73M2 (ref 60–?)
GLUCOSE BLD-MCNC: 80 MG/DL (ref 70–99)
GLUCOSE BLDC GLUCOMTR-MCNC: 100 MG/DL (ref 70–99)
GLUCOSE BLDC GLUCOMTR-MCNC: 101 MG/DL (ref 70–99)
GLUCOSE BLDC GLUCOMTR-MCNC: 111 MG/DL (ref 70–99)
GLUCOSE BLDC GLUCOMTR-MCNC: 133 MG/DL (ref 70–99)
HCT VFR BLD AUTO: 26.8 %
HGB BLD-MCNC: 9.1 G/DL
MAGNESIUM SERPL-MCNC: 1.7 MG/DL (ref 1.6–2.6)
MCH RBC QN AUTO: 31 PG (ref 26–34)
MCHC RBC AUTO-ENTMCNC: 34 G/DL (ref 31–37)
MCV RBC AUTO: 91.2 FL
OSMOLALITY SERPL CALC.SUM OF ELEC: 285 MOSM/KG (ref 275–295)
PHOSPHATE SERPL-MCNC: 4.4 MG/DL (ref 2.5–4.9)
PLATELET # BLD AUTO: 348 10(3)UL (ref 150–450)
POTASSIUM SERPL-SCNC: 4.3 MMOL/L (ref 3.5–5.1)
RBC # BLD AUTO: 2.94 X10(6)UL
SODIUM SERPL-SCNC: 137 MMOL/L (ref 136–145)
WBC # BLD AUTO: 7 X10(3) UL (ref 4–11)

## 2023-06-06 PROCEDURE — 99233 SBSQ HOSP IP/OBS HIGH 50: CPT | Performed by: HOSPITALIST

## 2023-06-06 RX ORDER — MAGNESIUM SULFATE HEPTAHYDRATE 40 MG/ML
2 INJECTION, SOLUTION INTRAVENOUS ONCE
Status: COMPLETED | OUTPATIENT
Start: 2023-06-06 | End: 2023-06-06

## 2023-06-06 NOTE — PLAN OF CARE
Problem: Patient Centered Care  Goal: Patient preferences are identified and integrated in the patient's plan of care  Description: Interventions:  - What would you like us to know as we care for you?  From home alone  - Provide timely, complete, and accurate information to patient/family  - Incorporate patient and family knowledge, values, beliefs, and cultural backgrounds into the planning and delivery of care  - Encourage patient/family to participate in care and decision-making at the level they choose  - Honor patient and family perspectives and choices  Outcome: Progressing     Problem: Patient/Family Goals  Goal: Patient/Family Long Term Goal  Description: Patient's Long Term Goal: Unclogged G-Tube    Interventions:  - Monitor vital signs  - Monitor appropriate labs  - Pain management  - Administer medications per order  - Follow MD orders  - Diagnostics per order  - Update / inform patient and family on plan of care  - Discharge planning  - See additional Care Plan goals for specific interventions  Outcome: Progressing  Goal: Patient/Family Short Term Goal  Description: Patient's Short Term Goal: Improve pancreatitis    Interventions:   - Monitor vital signs  - Monitor appropriate labs  - Pain management  - Administer medications per order  - Follow MD orders  - Diagnostics per order  - Update / inform patient and family on plan of care  - See additional Care Plan goals for specific interventions  Outcome: Progressing     Problem: GASTROINTESTINAL - ADULT  Goal: Maintains or returns to baseline bowel function  Description: INTERVENTIONS:  - Assess bowel function  - Maintain adequate hydration with IV or PO as ordered and tolerated  - Evaluate effectiveness of GI medications  - Encourage mobilization and activity  - Obtain nutritional consult as needed  - Establish a toileting routine/schedule  - Consider collaborating with pharmacy to review patient's medication profile  Outcome: Progressing  Goal: Maintains adequate nutritional intake (undernourished)  Description: INTERVENTIONS:  - Monitor percentage of each meal consumed  - Identify factors contributing to decreased intake, treat as appropriate  - Assist with meals as needed  - Monitor I&O, WT and lab values  - Obtain nutritional consult as needed  - Optimize oral hygiene and moisture  - Encourage food from home; allow for food preferences  - Enhance eating environment  Outcome: Progressing     Problem: METABOLIC/FLUID AND ELECTROLYTES - ADULT  Goal: Electrolytes maintained within normal limits  Description: INTERVENTIONS:  - Monitor labs and rhythm and assess patient for signs and symptoms of electrolyte imbalances  - Administer electrolyte replacement as ordered  - Monitor response to electrolyte replacements, including rhythm and repeat lab results as appropriate  - Fluid restriction as ordered  - Instruct patient on fluid and nutrition restrictions as appropriate  Outcome: Progressing  Goal: Hemodynamic stability and optimal renal function maintained  Description: INTERVENTIONS:  - Monitor labs and assess for signs and symptoms of volume excess or deficit  - Monitor intake, output and patient weight  - Monitor urine specific gravity, serum osmolarity and serum sodium as indicated or ordered  - Monitor response to interventions for patient's volume status, including labs, urine output, blood pressure (other measures as available)  - Encourage oral intake as appropriate  - Instruct patient on fluid and nutrition restrictions as appropriate  Outcome: Progressing     Problem: Impaired Functional Mobility  Goal: Achieve highest/safest level of mobility/gait  Description: Interventions:  - Assess patient's functional ability and stability  - Promote increasing activity/tolerance for mobility and gait  - Educate and engage patient/family in tolerated activity level and precautions    Outcome: Progressing     Problem: Impaired Activities of Daily Living  Goal: Achieve highest/safest level of independence in self care  Description: Interventions:  - Assess ability and encourage patient to participate in ADLs to maximize function  - Promote sitting position while performing ADLs such as feeding, grooming, and bathing  - Educate and encourage patient/family in tolerated functional activity level and precautions during self-care    Outcome: Progressing     Problem: SKIN/TISSUE INTEGRITY - ADULT  Goal: Skin integrity remains intact  Description: INTERVENTIONS  - Assess and document risk factors for pressure ulcer development  - Assess and document skin integrity  - Monitor for areas of redness and/or skin breakdown  - Initiate interventions, skin care algorithm/standards of care as needed  Outcome: Progressing     Problem: PAIN - ADULT  Goal: Verbalizes/displays adequate comfort level or patient's stated pain goal  Description: INTERVENTIONS:  - Encourage pt to monitor pain and request assistance  - Assess pain using appropriate pain scale  - Administer analgesics based on type and severity of pain and evaluate response  - Implement non-pharmacological measures as appropriate and evaluate response  - Consider cultural and social influences on pain and pain management  - Manage/alleviate anxiety  - Utilize distraction and/or relaxation techniques  - Monitor for opioid side effects  - Notify MD/LIP if interventions unsuccessful or patient reports new pain  - Anticipate increased pain with activity and pre-medicate as appropriate  Outcome: Progressing     Problem: SAFETY ADULT - FALL  Goal: Free from fall injury  Description: INTERVENTIONS:  - Assess pt frequently for physical needs  - Identify cognitive and physical deficits and behaviors that affect risk of falls.   - [place] fall precautions as indicated by assessment.  - Educate pt/family on patient safety including physical limitations  - Instruct pt to call for assistance with activity based on assessment  - Modify environment to reduce risk of injury  - Provide assistive devices as appropriate  - Consider OT/PT consult to assist with strengthening/mobility  - Encourage toileting schedule  Outcome: Progressing     Problem: DISCHARGE PLANNING  Goal: Discharge to home or other facility with appropriate resources  Description: INTERVENTIONS:  - Identify barriers to discharge w/pt and caregiver  - Include patient/family/discharge partner in discharge planning  - Arrange for needed discharge resources and transportation as appropriate  - Identify discharge learning needs (meds, wound care, etc)  - Arrange for interpreters to assist at discharge as needed  - Consider post-discharge preferences of patient/family/discharge partner  - Complete POLST form as appropriate  - Assess patient's ability to be responsible for managing their own health  - Refer to Case Management Department for coordinating discharge planning if the patient needs post-hospital services based on physician/LIP order or complex needs related to functional status, cognitive ability or social support system  Outcome: Progressing    Patient resting in bed, A&Ox4, VS Stable, Mg Protocol implemented, Mild pain but refused pain meds, still NPO, all needs met, call light within reach.

## 2023-06-06 NOTE — PLAN OF CARE
Problem: Patient Centered Care  Goal: Patient preferences are identified and integrated in the patient's plan of care  Description: Interventions:  - What would you like us to know as we care for you?  From home alone  - Provide timely, complete, and accurate information to patient/family  - Incorporate patient and family knowledge, values, beliefs, and cultural backgrounds into the planning and delivery of care  - Encourage patient/family to participate in care and decision-making at the level they choose  - Honor patient and family perspectives and choices  Outcome: Progressing     Problem: Patient/Family Goals  Goal: Patient/Family Long Term Goal  Description: Patient's Long Term Goal: Unclogged G-Tube    Interventions:  - Monitor vital signs  - Monitor appropriate labs  - Pain management  - Administer medications per order  - Follow MD orders  - Diagnostics per order  - Update / inform patient and family on plan of care  - Discharge planning  - See additional Care Plan goals for specific interventions  Outcome: Progressing  Goal: Patient/Family Short Term Goal  Description: Patient's Short Term Goal: Improve pancreatitis    Interventions:   - Monitor vital signs  - Monitor appropriate labs  - Pain management  - Administer medications per order  - Follow MD orders  - Diagnostics per order  - Update / inform patient and family on plan of care  - See additional Care Plan goals for specific interventions  Outcome: Progressing     Problem: GASTROINTESTINAL - ADULT  Goal: Maintains or returns to baseline bowel function  Description: INTERVENTIONS:  - Assess bowel function  - Maintain adequate hydration with IV or PO as ordered and tolerated  - Evaluate effectiveness of GI medications  - Encourage mobilization and activity  - Obtain nutritional consult as needed  - Establish a toileting routine/schedule  - Consider collaborating with pharmacy to review patient's medication profile  Outcome: Progressing  Goal: Maintains adequate nutritional intake (undernourished)  Description: INTERVENTIONS:  - Monitor percentage of each meal consumed  - Identify factors contributing to decreased intake, treat as appropriate  - Assist with meals as needed  - Monitor I&O, WT and lab values  - Obtain nutritional consult as needed  - Optimize oral hygiene and moisture  - Encourage food from home; allow for food preferences  - Enhance eating environment  Outcome: Progressing     Problem: METABOLIC/FLUID AND ELECTROLYTES - ADULT  Goal: Electrolytes maintained within normal limits  Description: INTERVENTIONS:  - Monitor labs and rhythm and assess patient for signs and symptoms of electrolyte imbalances  - Administer electrolyte replacement as ordered  - Monitor response to electrolyte replacements, including rhythm and repeat lab results as appropriate  - Fluid restriction as ordered  - Instruct patient on fluid and nutrition restrictions as appropriate  Outcome: Progressing  Goal: Hemodynamic stability and optimal renal function maintained  Description: INTERVENTIONS:  - Monitor labs and assess for signs and symptoms of volume excess or deficit  - Monitor intake, output and patient weight  - Monitor urine specific gravity, serum osmolarity and serum sodium as indicated or ordered  - Monitor response to interventions for patient's volume status, including labs, urine output, blood pressure (other measures as available)  - Encourage oral intake as appropriate  - Instruct patient on fluid and nutrition restrictions as appropriate  Outcome: Progressing     Problem: Impaired Functional Mobility  Goal: Achieve highest/safest level of mobility/gait  Description: Interventions:  - Assess patient's functional ability and stability  - Promote increasing activity/tolerance for mobility and gait  - Educate and engage patient/family in tolerated activity level and precautions    Outcome: Progressing     Problem: Impaired Activities of Daily Living  Goal: Achieve highest/safest level of independence in self care  Description: Interventions:  - Assess ability and encourage patient to participate in ADLs to maximize function  - Promote sitting position while performing ADLs such as feeding, grooming, and bathing  - Educate and encourage patient/family in tolerated functional activity level and precautions during self-care    Outcome: Progressing     Problem: SKIN/TISSUE INTEGRITY - ADULT  Goal: Skin integrity remains intact  Description: INTERVENTIONS  - Assess and document risk factors for pressure ulcer development  - Assess and document skin integrity  - Monitor for areas of redness and/or skin breakdown  - Initiate interventions, skin care algorithm/standards of care as needed  Outcome: Progressing     Problem: PAIN - ADULT  Goal: Verbalizes/displays adequate comfort level or patient's stated pain goal  Description: INTERVENTIONS:  - Encourage pt to monitor pain and request assistance  - Assess pain using appropriate pain scale  - Administer analgesics based on type and severity of pain and evaluate response  - Implement non-pharmacological measures as appropriate and evaluate response  - Consider cultural and social influences on pain and pain management  - Manage/alleviate anxiety  - Utilize distraction and/or relaxation techniques  - Monitor for opioid side effects  - Notify MD/LIP if interventions unsuccessful or patient reports new pain  - Anticipate increased pain with activity and pre-medicate as appropriate  Outcome: Progressing     Problem: SAFETY ADULT - FALL  Goal: Free from fall injury  Description: INTERVENTIONS:  - Assess pt frequently for physical needs  - Identify cognitive and physical deficits and behaviors that affect risk of falls.   - [place] fall precautions as indicated by assessment.  - Educate pt/family on patient safety including physical limitations  - Instruct pt to call for assistance with activity based on assessment  - Modify environment to reduce risk of injury  - Provide assistive devices as appropriate  - Consider OT/PT consult to assist with strengthening/mobility  - Encourage toileting schedule  Outcome: Progressing     Problem: DISCHARGE PLANNING  Goal: Discharge to home or other facility with appropriate resources  Description: INTERVENTIONS:  - Identify barriers to discharge w/pt and caregiver  - Include patient/family/discharge partner in discharge planning  - Arrange for needed discharge resources and transportation as appropriate  - Identify discharge learning needs (meds, wound care, etc)  - Arrange for interpreters to assist at discharge as needed  - Consider post-discharge preferences of patient/family/discharge partner  - Complete POLST form as appropriate  - Assess patient's ability to be responsible for managing their own health  - Refer to Case Management Department for coordinating discharge planning if the patient needs post-hospital services based on physician/LIP order or complex needs related to functional status, cognitive ability or social support system  Outcome: Progressing

## 2023-06-07 LAB
ALBUMIN SERPL-MCNC: 1.9 G/DL (ref 3.4–5)
ALP LIVER SERPL-CCNC: 116 U/L
ALT SERPL-CCNC: 14 U/L
ANION GAP SERPL CALC-SCNC: 6 MMOL/L (ref 0–18)
AST SERPL-CCNC: 12 U/L (ref 15–37)
BILIRUB DIRECT SERPL-MCNC: <0.1 MG/DL (ref 0–0.2)
BILIRUB SERPL-MCNC: 0.2 MG/DL (ref 0.1–2)
BUN BLD-MCNC: 19 MG/DL (ref 7–18)
BUN/CREAT SERPL: 26.4 (ref 10–20)
CALCIUM BLD-MCNC: 8.1 MG/DL (ref 8.5–10.1)
CHLORIDE SERPL-SCNC: 105 MMOL/L (ref 98–112)
CO2 SERPL-SCNC: 28 MMOL/L (ref 21–32)
CREAT BLD-MCNC: 0.72 MG/DL
DEPRECATED RDW RBC AUTO: 39.4 FL (ref 35.1–46.3)
ERYTHROCYTE [DISTWIDTH] IN BLOOD BY AUTOMATED COUNT: 11.9 % (ref 11–15)
GFR SERPLBLD BASED ON 1.73 SQ M-ARVRAT: 113 ML/MIN/1.73M2 (ref 60–?)
GLUCOSE BLD-MCNC: 83 MG/DL (ref 70–99)
GLUCOSE BLDC GLUCOMTR-MCNC: 104 MG/DL (ref 70–99)
GLUCOSE BLDC GLUCOMTR-MCNC: 107 MG/DL (ref 70–99)
GLUCOSE BLDC GLUCOMTR-MCNC: 117 MG/DL (ref 70–99)
GLUCOSE BLDC GLUCOMTR-MCNC: 96 MG/DL (ref 70–99)
HCT VFR BLD AUTO: 27.9 %
HGB BLD-MCNC: 9.2 G/DL
MAGNESIUM SERPL-MCNC: 2.1 MG/DL (ref 1.6–2.6)
MAGNESIUM SERPL-MCNC: 2.2 MG/DL (ref 1.6–2.6)
MCH RBC QN AUTO: 30.1 PG (ref 26–34)
MCHC RBC AUTO-ENTMCNC: 33 G/DL (ref 31–37)
MCV RBC AUTO: 91.2 FL
OSMOLALITY SERPL CALC.SUM OF ELEC: 289 MOSM/KG (ref 275–295)
PHOSPHATE SERPL-MCNC: 4.4 MG/DL (ref 2.5–4.9)
PLATELET # BLD AUTO: 367 10(3)UL (ref 150–450)
POTASSIUM SERPL-SCNC: 4.3 MMOL/L (ref 3.5–5.1)
PROT SERPL-MCNC: 5.9 G/DL (ref 6.4–8.2)
RBC # BLD AUTO: 3.06 X10(6)UL
SODIUM SERPL-SCNC: 139 MMOL/L (ref 136–145)
WBC # BLD AUTO: 7.3 X10(3) UL (ref 4–11)

## 2023-06-07 PROCEDURE — 99233 SBSQ HOSP IP/OBS HIGH 50: CPT | Performed by: HOSPITALIST

## 2023-06-07 NOTE — PLAN OF CARE
Phone call made to Dr. Douglas Mayer and I informed him that Aida Coyne in [facility] reported that it is outside of their scope to administer oral contrast by G-tube. Dr. Douglas Mayer informed me that he wanted Aida Coyne to give patient the oral contrast by G-tube in their department and Aida Coyne can call him or send him a message by [employer]. I spoke to Aida Coyne and informed him that Dr. Douglas Mayer would like for oral contrast to be administered in CT-scan department by G-tube. Aida Coyne was informed that Dr. Ash Sidhu wanted him to contact him by phone call or [employer].

## 2023-06-07 NOTE — PLAN OF CARE
Problem: Patient Centered Care  Goal: Patient preferences are identified and integrated in the patient's plan of care  Description: Interventions:  - What would you like us to know as we care for you?  From home alone  - Provide timely, complete, and accurate information to patient/family  - Incorporate patient and family knowledge, values, beliefs, and cultural backgrounds into the planning and delivery of care  - Encourage patient/family to participate in care and decision-making at the level they choose  - Honor patient and family perspectives and choices  Outcome: Progressing     Problem: Patient/Family Goals  Goal: Patient/Family Long Term Goal  Description: Patient's Long Term Goal: Unclogged G-Tube    Interventions:  - Monitor vital signs  - Monitor appropriate labs  - Pain management  - Administer medications per order  - Follow MD orders  - Diagnostics per order  - Update / inform patient and family on plan of care  - Discharge planning  - See additional Care Plan goals for specific interventions  Outcome: Progressing  Goal: Patient/Family Short Term Goal  Description: Patient's Short Term Goal: Improve pancreatitis    Interventions:   - Monitor vital signs  - Monitor appropriate labs  - Pain management  - Administer medications per order  - Follow MD orders  - Diagnostics per order  - Update / inform patient and family on plan of care  - See additional Care Plan goals for specific interventions  Outcome: Progressing     Problem: GASTROINTESTINAL - ADULT  Goal: Maintains or returns to baseline bowel function  Description: INTERVENTIONS:  - Assess bowel function  - Maintain adequate hydration with IV or PO as ordered and tolerated  - Evaluate effectiveness of GI medications  - Encourage mobilization and activity  - Obtain nutritional consult as needed  - Establish a toileting routine/schedule  - Consider collaborating with pharmacy to review patient's medication profile  Outcome: Progressing  Goal: Maintains adequate nutritional intake (undernourished)  Description: INTERVENTIONS:  - Monitor percentage of each meal consumed  - Identify factors contributing to decreased intake, treat as appropriate  - Assist with meals as needed  - Monitor I&O, WT and lab values  - Obtain nutritional consult as needed  - Optimize oral hygiene and moisture  - Encourage food from home; allow for food preferences  - Enhance eating environment  Outcome: Progressing     Problem: METABOLIC/FLUID AND ELECTROLYTES - ADULT  Goal: Electrolytes maintained within normal limits  Description: INTERVENTIONS:  - Monitor labs and rhythm and assess patient for signs and symptoms of electrolyte imbalances  - Administer electrolyte replacement as ordered  - Monitor response to electrolyte replacements, including rhythm and repeat lab results as appropriate  - Fluid restriction as ordered  - Instruct patient on fluid and nutrition restrictions as appropriate  Outcome: Progressing  Goal: Hemodynamic stability and optimal renal function maintained  Description: INTERVENTIONS:  - Monitor labs and assess for signs and symptoms of volume excess or deficit  - Monitor intake, output and patient weight  - Monitor urine specific gravity, serum osmolarity and serum sodium as indicated or ordered  - Monitor response to interventions for patient's volume status, including labs, urine output, blood pressure (other measures as available)  - Encourage oral intake as appropriate  - Instruct patient on fluid and nutrition restrictions as appropriate  Outcome: Progressing     Problem: Impaired Functional Mobility  Goal: Achieve highest/safest level of mobility/gait  Description: Interventions:  - Assess patient's functional ability and stability  - Promote increasing activity/tolerance for mobility and gait  - Educate and engage patient/family in tolerated activity level and precautions    Outcome: Progressing     Problem: Impaired Activities of Daily Living  Goal: Achieve highest/safest level of independence in self care  Description: Interventions:  - Assess ability and encourage patient to participate in ADLs to maximize function  - Promote sitting position while performing ADLs such as feeding, grooming, and bathing  - Educate and encourage patient/family in tolerated functional activity level and precautions during self-care    Outcome: Progressing     Problem: SKIN/TISSUE INTEGRITY - ADULT  Goal: Skin integrity remains intact  Description: INTERVENTIONS  - Assess and document risk factors for pressure ulcer development  - Assess and document skin integrity  - Monitor for areas of redness and/or skin breakdown  - Initiate interventions, skin care algorithm/standards of care as needed  Outcome: Progressing     Problem: PAIN - ADULT  Goal: Verbalizes/displays adequate comfort level or patient's stated pain goal  Description: INTERVENTIONS:  - Encourage pt to monitor pain and request assistance  - Assess pain using appropriate pain scale  - Administer analgesics based on type and severity of pain and evaluate response  - Implement non-pharmacological measures as appropriate and evaluate response  - Consider cultural and social influences on pain and pain management  - Manage/alleviate anxiety  - Utilize distraction and/or relaxation techniques  - Monitor for opioid side effects  - Notify MD/LIP if interventions unsuccessful or patient reports new pain  - Anticipate increased pain with activity and pre-medicate as appropriate  Outcome: Progressing     Problem: SAFETY ADULT - FALL  Goal: Free from fall injury  Description: INTERVENTIONS:  - Assess pt frequently for physical needs  - Identify cognitive and physical deficits and behaviors that affect risk of falls.   - [place] fall precautions as indicated by assessment.  - Educate pt/family on patient safety including physical limitations  - Instruct pt to call for assistance with activity based on assessment  - Modify environment to reduce risk of injury  - Provide assistive devices as appropriate  - Consider OT/PT consult to assist with strengthening/mobility  - Encourage toileting schedule  Outcome: Progressing     Problem: DISCHARGE PLANNING  Goal: Discharge to home or other facility with appropriate resources  Description: INTERVENTIONS:  - Identify barriers to discharge w/pt and caregiver  - Include patient/family/discharge partner in discharge planning  - Arrange for needed discharge resources and transportation as appropriate  - Identify discharge learning needs (meds, wound care, etc)  - Arrange for interpreters to assist at discharge as needed  - Consider post-discharge preferences of patient/family/discharge partner  - Complete POLST form as appropriate  - Assess patient's ability to be responsible for managing their own health  - Refer to Case Management Department for coordinating discharge planning if the patient needs post-hospital services based on physician/LIP order or complex needs related to functional status, cognitive ability or social support system  Outcome: Progressing     Pt resting in bed, A&Ox4, No reports of pain, TPN still running @ 83. 3mL/hr, kept NPO except ice, Order for General Surgery Consult and CT of Abdomen & Pelvis with contrast, plan pending outcomes of that. All needs met, call light within reach.

## 2023-06-07 NOTE — PLAN OF CARE
Spoke to Giovani Fischer from [address] [place] [address] Dept regarding orders from Dr. Aman Sigala for contrast administration through G-Tube. Relayed to Giovani Fischer that, per Dr. Aman Sigala, their dept is responsible for administering this contrast not nursing. Giovani Fischer instructed that I discard the contrast received on floor.

## 2023-06-07 NOTE — PLAN OF CARE
Problem: Patient Centered Care  Goal: Patient preferences are identified and integrated in the patient's plan of care  Description: Interventions:  - What would you like us to know as we care for you?  From home alone  - Provide timely, complete, and accurate information to patient/family  - Incorporate patient and family knowledge, values, beliefs, and cultural backgrounds into the planning and delivery of care  - Encourage patient/family to participate in care and decision-making at the level they choose  - Honor patient and family perspectives and choices  Outcome: Progressing     Problem: Patient/Family Goals  Goal: Patient/Family Long Term Goal  Description: Patient's Long Term Goal: Unclogged G-Tube    Interventions:  - Monitor vital signs  - Monitor appropriate labs  - Pain management  - Administer medications per order  - Follow MD orders  - Diagnostics per order  - Update / inform patient and family on plan of care  - Discharge planning  - See additional Care Plan goals for specific interventions  Outcome: Progressing  Goal: Patient/Family Short Term Goal  Description: Patient's Short Term Goal: Improve pancreatitis    Interventions:   - Monitor vital signs  - Monitor appropriate labs  - Pain management  - Administer medications per order  - Follow MD orders  - Diagnostics per order  - Update / inform patient and family on plan of care  - See additional Care Plan goals for specific interventions  Outcome: Progressing     Problem: GASTROINTESTINAL - ADULT  Goal: Maintains or returns to baseline bowel function  Description: INTERVENTIONS:  - Assess bowel function  - Maintain adequate hydration with IV or PO as ordered and tolerated  - Evaluate effectiveness of GI medications  - Encourage mobilization and activity  - Obtain nutritional consult as needed  - Establish a toileting routine/schedule  - Consider collaborating with pharmacy to review patient's medication profile  Outcome: Progressing  Goal: Maintains adequate nutritional intake (undernourished)  Description: INTERVENTIONS:  - Monitor percentage of each meal consumed  - Identify factors contributing to decreased intake, treat as appropriate  - Assist with meals as needed  - Monitor I&O, WT and lab values  - Obtain nutritional consult as needed  - Optimize oral hygiene and moisture  - Encourage food from home; allow for food preferences  - Enhance eating environment  Outcome: Progressing     Problem: METABOLIC/FLUID AND ELECTROLYTES - ADULT  Goal: Electrolytes maintained within normal limits  Description: INTERVENTIONS:  - Monitor labs and rhythm and assess patient for signs and symptoms of electrolyte imbalances  - Administer electrolyte replacement as ordered  - Monitor response to electrolyte replacements, including rhythm and repeat lab results as appropriate  - Fluid restriction as ordered  - Instruct patient on fluid and nutrition restrictions as appropriate  Outcome: Progressing  Goal: Hemodynamic stability and optimal renal function maintained  Description: INTERVENTIONS:  - Monitor labs and assess for signs and symptoms of volume excess or deficit  - Monitor intake, output and patient weight  - Monitor urine specific gravity, serum osmolarity and serum sodium as indicated or ordered  - Monitor response to interventions for patient's volume status, including labs, urine output, blood pressure (other measures as available)  - Encourage oral intake as appropriate  - Instruct patient on fluid and nutrition restrictions as appropriate  Outcome: Progressing     Problem: Impaired Functional Mobility  Goal: Achieve highest/safest level of mobility/gait  Description: Interventions:  - Assess patient's functional ability and stability  - Promote increasing activity/tolerance for mobility and gait  - Educate and engage patient/family in tolerated activity level and precautions    Outcome: Progressing     Problem: Impaired Activities of Daily Living  Goal: Achieve highest/safest level of independence in self care  Description: Interventions:  - Assess ability and encourage patient to participate in ADLs to maximize function  - Promote sitting position while performing ADLs such as feeding, grooming, and bathing  - Educate and encourage patient/family in tolerated functional activity level and precautions during self-care    Outcome: Progressing     Problem: SKIN/TISSUE INTEGRITY - ADULT  Goal: Skin integrity remains intact  Description: INTERVENTIONS  - Assess and document risk factors for pressure ulcer development  - Assess and document skin integrity  - Monitor for areas of redness and/or skin breakdown  - Initiate interventions, skin care algorithm/standards of care as needed  Outcome: Progressing     Problem: PAIN - ADULT  Goal: Verbalizes/displays adequate comfort level or patient's stated pain goal  Description: INTERVENTIONS:  - Encourage pt to monitor pain and request assistance  - Assess pain using appropriate pain scale  - Administer analgesics based on type and severity of pain and evaluate response  - Implement non-pharmacological measures as appropriate and evaluate response  - Consider cultural and social influences on pain and pain management  - Manage/alleviate anxiety  - Utilize distraction and/or relaxation techniques  - Monitor for opioid side effects  - Notify MD/LIP if interventions unsuccessful or patient reports new pain  - Anticipate increased pain with activity and pre-medicate as appropriate  Outcome: Progressing     Problem: SAFETY ADULT - FALL  Goal: Free from fall injury  Description: INTERVENTIONS:  - Assess pt frequently for physical needs  - Identify cognitive and physical deficits and behaviors that affect risk of falls.   - [place] fall precautions as indicated by assessment.  - Educate pt/family on patient safety including physical limitations  - Instruct pt to call for assistance with activity based on assessment  - Modify environment to reduce risk of injury  - Provide assistive devices as appropriate  - Consider OT/PT consult to assist with strengthening/mobility  - Encourage toileting schedule  Outcome: Progressing     Problem: DISCHARGE PLANNING  Goal: Discharge to home or other facility with appropriate resources  Description: INTERVENTIONS:  - Identify barriers to discharge w/pt and caregiver  - Include patient/family/discharge partner in discharge planning  - Arrange for needed discharge resources and transportation as appropriate  - Identify discharge learning needs (meds, wound care, etc)  - Arrange for interpreters to assist at discharge as needed  - Consider post-discharge preferences of patient/family/discharge partner  - Complete POLST form as appropriate  - Assess patient's ability to be responsible for managing their own health  - Refer to Case Management Department for coordinating discharge planning if the patient needs post-hospital services based on physician/LIP order or complex needs related to functional status, cognitive ability or social support system  Outcome: Progressing

## 2023-06-08 ENCOUNTER — APPOINTMENT (OUTPATIENT)
Dept: GENERAL RADIOLOGY | Facility: HOSPITAL | Age: 48
End: 2023-06-08
Attending: SPECIALIST
Payer: COMMERCIAL

## 2023-06-08 LAB
ANION GAP SERPL CALC-SCNC: 6 MMOL/L (ref 0–18)
BUN BLD-MCNC: 21 MG/DL (ref 7–18)
BUN/CREAT SERPL: 30.9 (ref 10–20)
CALCIUM BLD-MCNC: 8.5 MG/DL (ref 8.5–10.1)
CHLORIDE SERPL-SCNC: 105 MMOL/L (ref 98–112)
CO2 SERPL-SCNC: 28 MMOL/L (ref 21–32)
CREAT BLD-MCNC: 0.68 MG/DL
DEPRECATED RDW RBC AUTO: 40.8 FL (ref 35.1–46.3)
ERYTHROCYTE [DISTWIDTH] IN BLOOD BY AUTOMATED COUNT: 12.1 % (ref 11–15)
GFR SERPLBLD BASED ON 1.73 SQ M-ARVRAT: 115 ML/MIN/1.73M2 (ref 60–?)
GLUCOSE BLD-MCNC: 81 MG/DL (ref 70–99)
GLUCOSE BLDC GLUCOMTR-MCNC: 124 MG/DL (ref 70–99)
GLUCOSE BLDC GLUCOMTR-MCNC: 96 MG/DL (ref 70–99)
GLUCOSE BLDC GLUCOMTR-MCNC: 97 MG/DL (ref 70–99)
GLUCOSE BLDC GLUCOMTR-MCNC: 98 MG/DL (ref 70–99)
HCT VFR BLD AUTO: 28.3 %
HGB BLD-MCNC: 9.3 G/DL
MAGNESIUM SERPL-MCNC: 2 MG/DL (ref 1.6–2.6)
MCH RBC QN AUTO: 30.2 PG (ref 26–34)
MCHC RBC AUTO-ENTMCNC: 32.9 G/DL (ref 31–37)
MCV RBC AUTO: 91.9 FL
OSMOLALITY SERPL CALC.SUM OF ELEC: 290 MOSM/KG (ref 275–295)
PHOSPHATE SERPL-MCNC: 4.4 MG/DL (ref 2.5–4.9)
PLATELET # BLD AUTO: 404 10(3)UL (ref 150–450)
POTASSIUM SERPL-SCNC: 4.4 MMOL/L (ref 3.5–5.1)
RBC # BLD AUTO: 3.08 X10(6)UL
SODIUM SERPL-SCNC: 139 MMOL/L (ref 136–145)
WBC # BLD AUTO: 6.3 X10(3) UL (ref 4–11)

## 2023-06-08 PROCEDURE — 74240 X-RAY XM UPR GI TRC 1CNTRST: CPT | Performed by: SPECIALIST

## 2023-06-08 PROCEDURE — 99233 SBSQ HOSP IP/OBS HIGH 50: CPT | Performed by: HOSPITALIST

## 2023-06-08 NOTE — PLAN OF CARE
Problem: Patient Centered Care  Goal: Patient preferences are identified and integrated in the patient's plan of care  Description: Interventions:  - What would you like us to know as we care for you?  From home alone  - Provide timely, complete, and accurate information to patient/family  - Incorporate patient and family knowledge, values, beliefs, and cultural backgrounds into the planning and delivery of care  - Encourage patient/family to participate in care and decision-making at the level they choose  - Honor patient and family perspectives and choices  Outcome: Progressing     Problem: GASTROINTESTINAL - ADULT  Goal: Maintains or returns to baseline bowel function  Description: INTERVENTIONS:  - Assess bowel function  - Maintain adequate hydration with IV or PO as ordered and tolerated  - Evaluate effectiveness of GI medications  - Encourage mobilization and activity  - Obtain nutritional consult as needed  - Establish a toileting routine/schedule  - Consider collaborating with pharmacy to review patient's medication profile  Outcome: Progressing  Goal: Maintains adequate nutritional intake (undernourished)  Description: INTERVENTIONS:  - Monitor percentage of each meal consumed  - Identify factors contributing to decreased intake, treat as appropriate  - Assist with meals as needed  - Monitor I&O, WT and lab values  - Obtain nutritional consult as needed  - Optimize oral hygiene and moisture  - Encourage food from home; allow for food preferences  - Enhance eating environment  Outcome: Progressing     Problem: METABOLIC/FLUID AND ELECTROLYTES - ADULT  Goal: Electrolytes maintained within normal limits  Description: INTERVENTIONS:  - Monitor labs and rhythm and assess patient for signs and symptoms of electrolyte imbalances  - Administer electrolyte replacement as ordered  - Monitor response to electrolyte replacements, including rhythm and repeat lab results as appropriate  - Fluid restriction as ordered  - Instruct patient on fluid and nutrition restrictions as appropriate  Outcome: Progressing  Goal: Hemodynamic stability and optimal renal function maintained  Description: INTERVENTIONS:  - Monitor labs and assess for signs and symptoms of volume excess or deficit  - Monitor intake, output and patient weight  - Monitor urine specific gravity, serum osmolarity and serum sodium as indicated or ordered  - Monitor response to interventions for patient's volume status, including labs, urine output, blood pressure (other measures as available)  - Encourage oral intake as appropriate  - Instruct patient on fluid and nutrition restrictions as appropriate  Outcome: Progressing     Problem: Impaired Functional Mobility  Goal: Achieve highest/safest level of mobility/gait  Description: Interventions:  - Assess patient's functional ability and stability  - Promote increasing activity/tolerance for mobility and gait  - Educate and engage patient/family in tolerated activity level and precautions    Outcome: Progressing     Problem: Impaired Activities of Daily Living  Goal: Achieve highest/safest level of independence in self care  Description: Interventions:  - Assess ability and encourage patient to participate in ADLs to maximize function  - Promote sitting position while performing ADLs such as feeding, grooming, and bathing  - Educate and encourage patient/family in tolerated functional activity level and precautions during self-care    Outcome: Progressing     Problem: SKIN/TISSUE INTEGRITY - ADULT  Goal: Skin integrity remains intact  Description: INTERVENTIONS  - Assess and document risk factors for pressure ulcer development  - Assess and document skin integrity  - Monitor for areas of redness and/or skin breakdown  - Initiate interventions, skin care algorithm/standards of care as needed  Outcome: Progressing     Problem: PAIN - ADULT  Goal: Verbalizes/displays adequate comfort level or patient's stated pain goal  Description: INTERVENTIONS:  - Encourage pt to monitor pain and request assistance  - Assess pain using appropriate pain scale  - Administer analgesics based on type and severity of pain and evaluate response  - Implement non-pharmacological measures as appropriate and evaluate response  - Consider cultural and social influences on pain and pain management  - Manage/alleviate anxiety  - Utilize distraction and/or relaxation techniques  - Monitor for opioid side effects  - Notify MD/LIP if interventions unsuccessful or patient reports new pain  - Anticipate increased pain with activity and pre-medicate as appropriate  Outcome: Progressing     Problem: SAFETY ADULT - FALL  Goal: Free from fall injury  Description: INTERVENTIONS:  - Assess pt frequently for physical needs  - Identify cognitive and physical deficits and behaviors that affect risk of falls.   - [place] fall precautions as indicated by assessment.  - Educate pt/family on patient safety including physical limitations  - Instruct pt to call for assistance with activity based on assessment  - Modify environment to reduce risk of injury  - Provide assistive devices as appropriate  - Consider OT/PT consult to assist with strengthening/mobility  - Encourage toileting schedule  Outcome: Progressing     Problem: DISCHARGE PLANNING  Goal: Discharge to home or other facility with appropriate resources  Description: INTERVENTIONS:  - Identify barriers to discharge w/pt and caregiver  - Include patient/family/discharge partner in discharge planning  - Arrange for needed discharge resources and transportation as appropriate  - Identify discharge learning needs (meds, wound care, etc)  - Arrange for interpreters to assist at discharge as needed  - Consider post-discharge preferences of patient/family/discharge partner  - Complete POLST form as appropriate  - Assess patient's ability to be responsible for managing their own health  - Refer to Case Management Department for coordinating discharge planning if the patient needs post-hospital services based on physician/LIP order or complex needs related to functional status, cognitive ability or social support system  Outcome: Progressing

## 2023-06-08 NOTE — PLAN OF CARE
Patient is alert and oriented per baseline. Vital signs stable with pain controlled with current plan; see MAR. GI study complete, awaiting orders from MD. Education provided at bedside. GJ drain in place, draining scant white liquid. TPN administered per order. Call light visible and within reach. Problem: Patient Centered Care  Goal: Patient preferences are identified and integrated in the patient's plan of care  Description: Interventions:  - What would you like us to know as we care for you?  From home alone  - Provide timely, complete, and accurate information to patient/family  - Incorporate patient and family knowledge, values, beliefs, and cultural backgrounds into the planning and delivery of care  - Encourage patient/family to participate in care and decision-making at the level they choose  - Honor patient and family perspectives and choices  Outcome: Progressing     Problem: Patient/Family Goals  Goal: Patient/Family Long Term Goal  Description: Patient's Long Term Goal: Unclogged G-Tube    Interventions:  - Monitor vital signs  - Monitor appropriate labs  - Pain management  - Administer medications per order  - Follow MD orders  - Diagnostics per order  - Update / inform patient and family on plan of care  - Discharge planning  - See additional Care Plan goals for specific interventions  Outcome: Progressing  Goal: Patient/Family Short Term Goal  Description: Patient's Short Term Goal: Improve pancreatitis    Interventions:   - Monitor vital signs  - Monitor appropriate labs  - Pain management  - Administer medications per order  - Follow MD orders  - Diagnostics per order  - Update / inform patient and family on plan of care  - See additional Care Plan goals for specific interventions  Outcome: Progressing     Problem: GASTROINTESTINAL - ADULT  Goal: Maintains or returns to baseline bowel function  Description: INTERVENTIONS:  - Assess bowel function  - Maintain adequate hydration with IV or PO as ordered and tolerated  - Evaluate effectiveness of GI medications  - Encourage mobilization and activity  - Obtain nutritional consult as needed  - Establish a toileting routine/schedule  - Consider collaborating with pharmacy to review patient's medication profile  Outcome: Progressing  Goal: Maintains adequate nutritional intake (undernourished)  Description: INTERVENTIONS:  - Monitor percentage of each meal consumed  - Identify factors contributing to decreased intake, treat as appropriate  - Assist with meals as needed  - Monitor I&O, WT and lab values  - Obtain nutritional consult as needed  - Optimize oral hygiene and moisture  - Encourage food from home; allow for food preferences  - Enhance eating environment  Outcome: Progressing     Problem: METABOLIC/FLUID AND ELECTROLYTES - ADULT  Goal: Electrolytes maintained within normal limits  Description: INTERVENTIONS:  - Monitor labs and rhythm and assess patient for signs and symptoms of electrolyte imbalances  - Administer electrolyte replacement as ordered  - Monitor response to electrolyte replacements, including rhythm and repeat lab results as appropriate  - Fluid restriction as ordered  - Instruct patient on fluid and nutrition restrictions as appropriate  Outcome: Progressing  Goal: Hemodynamic stability and optimal renal function maintained  Description: INTERVENTIONS:  - Monitor labs and assess for signs and symptoms of volume excess or deficit  - Monitor intake, output and patient weight  - Monitor urine specific gravity, serum osmolarity and serum sodium as indicated or ordered  - Monitor response to interventions for patient's volume status, including labs, urine output, blood pressure (other measures as available)  - Encourage oral intake as appropriate  - Instruct patient on fluid and nutrition restrictions as appropriate  Outcome: Progressing     Problem: Impaired Functional Mobility  Goal: Achieve highest/safest level of mobility/gait  Description: Interventions:  - Assess patient's functional ability and stability  - Promote increasing activity/tolerance for mobility and gait  - Educate and engage patient/family in tolerated activity level and precautions  - Recommend patient transfer to bedside chair toward strongest side  Outcome: Progressing     Problem: Impaired Activities of Daily Living  Goal: Achieve highest/safest level of independence in self care  Description: Interventions:  - Assess ability and encourage patient to participate in ADLs to maximize function  - Promote sitting position while performing ADLs such as feeding, grooming, and bathing  - Educate and encourage patient/family in tolerated functional activity level and precautions during self-care  - Encourage patient to incorporate impaired side during daily activities to promote function  Outcome: Progressing     Problem: SKIN/TISSUE INTEGRITY - ADULT  Goal: Skin integrity remains intact  Description: INTERVENTIONS  - Assess and document risk factors for pressure ulcer development  - Assess and document skin integrity  - Monitor for areas of redness and/or skin breakdown  - Initiate interventions, skin care algorithm/standards of care as needed  Outcome: Progressing     Problem: PAIN - ADULT  Goal: Verbalizes/displays adequate comfort level or patient's stated pain goal  Description: INTERVENTIONS:  - Encourage pt to monitor pain and request assistance  - Assess pain using appropriate pain scale  - Administer analgesics based on type and severity of pain and evaluate response  - Implement non-pharmacological measures as appropriate and evaluate response  - Consider cultural and social influences on pain and pain management  - Manage/alleviate anxiety  - Utilize distraction and/or relaxation techniques  - Monitor for opioid side effects  - Notify MD/LIP if interventions unsuccessful or patient reports new pain  - Anticipate increased pain with activity and pre-medicate as appropriate  Outcome: Progressing     Problem: SAFETY ADULT - FALL  Goal: Free from fall injury  Description: INTERVENTIONS:  - Assess pt frequently for physical needs  - Identify cognitive and physical deficits and behaviors that affect risk of falls.   - [place] fall precautions as indicated by assessment.  - Educate pt/family on patient safety including physical limitations  - Instruct pt to call for assistance with activity based on assessment  - Modify environment to reduce risk of injury  - Provide assistive devices as appropriate  - Consider OT/PT consult to assist with strengthening/mobility  - Encourage toileting schedule  Outcome: Progressing     Problem: DISCHARGE PLANNING  Goal: Discharge to home or other facility with appropriate resources  Description: INTERVENTIONS:  - Identify barriers to discharge w/pt and caregiver  - Include patient/family/discharge partner in discharge planning  - Arrange for needed discharge resources and transportation as appropriate  - Identify discharge learning needs (meds, wound care, etc)  - Arrange for interpreters to assist at discharge as needed  - Consider post-discharge preferences of patient/family/discharge partner  - Complete POLST form as appropriate  - Assess patient's ability to be responsible for managing their own health  - Refer to Case Management Department for coordinating discharge planning if the patient needs post-hospital services based on physician/LIP order or complex needs related to functional status, cognitive ability or social support system  Outcome: Progressing

## 2023-06-09 LAB
ANION GAP SERPL CALC-SCNC: 5 MMOL/L (ref 0–18)
BUN BLD-MCNC: 22 MG/DL (ref 7–18)
BUN/CREAT SERPL: 31.4 (ref 10–20)
CALCIUM BLD-MCNC: 8.3 MG/DL (ref 8.5–10.1)
CHLORIDE SERPL-SCNC: 105 MMOL/L (ref 98–112)
CO2 SERPL-SCNC: 28 MMOL/L (ref 21–32)
CREAT BLD-MCNC: 0.7 MG/DL
GFR SERPLBLD BASED ON 1.73 SQ M-ARVRAT: 114 ML/MIN/1.73M2 (ref 60–?)
GLUCOSE BLD-MCNC: 103 MG/DL (ref 70–99)
GLUCOSE BLDC GLUCOMTR-MCNC: 110 MG/DL (ref 70–99)
GLUCOSE BLDC GLUCOMTR-MCNC: 113 MG/DL (ref 70–99)
GLUCOSE BLDC GLUCOMTR-MCNC: 126 MG/DL (ref 70–99)
GLUCOSE BLDC GLUCOMTR-MCNC: 76 MG/DL (ref 70–99)
MAGNESIUM SERPL-MCNC: 2 MG/DL (ref 1.6–2.6)
OSMOLALITY SERPL CALC.SUM OF ELEC: 290 MOSM/KG (ref 275–295)
PHOSPHATE SERPL-MCNC: 4 MG/DL (ref 2.5–4.9)
POTASSIUM SERPL-SCNC: 4.2 MMOL/L (ref 3.5–5.1)
SODIUM SERPL-SCNC: 138 MMOL/L (ref 136–145)

## 2023-06-09 PROCEDURE — 99233 SBSQ HOSP IP/OBS HIGH 50: CPT | Performed by: HOSPITALIST

## 2023-06-09 RX ORDER — HEPARIN SODIUM 5000 [USP'U]/ML
5000 INJECTION, SOLUTION INTRAVENOUS; SUBCUTANEOUS EVERY 12 HOURS SCHEDULED
Status: DISCONTINUED | OUTPATIENT
Start: 2023-06-09 | End: 2023-06-14 | Stop reason: ALTCHOICE

## 2023-06-09 NOTE — PLAN OF CARE
Pt resting comfortably at this time. Frequent rounding maintained. Patient educated on all medications administered. Bed in lowest position, bed alarm and i bed awareness activated, fall precautions in place and call light within reach at all times. All patients questions answered and needs addressed promptly. Problem: Patient Centered Care  Goal: Patient preferences are identified and integrated in the patient's plan of care  Description: Interventions:  - What would you like us to know as we care for you?  From home alone  - Provide timely, complete, and accurate information to patient/family  - Incorporate patient and family knowledge, values, beliefs, and cultural backgrounds into the planning and delivery of care  - Encourage patient/family to participate in care and decision-making at the level they choose  - Honor patient and family perspectives and choices  Outcome: Progressing     Problem: Patient/Family Goals  Goal: Patient/Family Long Term Goal  Description: Patient's Long Term Goal: Unclogged G-Tube    Interventions:  - Monitor vital signs  - Monitor appropriate labs  - Pain management  - Administer medications per order  - Follow MD orders  - Diagnostics per order  - Update / inform patient and family on plan of care  - Discharge planning  - See additional Care Plan goals for specific interventions  Outcome: Progressing  Goal: Patient/Family Short Term Goal  Description: Patient's Short Term Goal: Improve pancreatitis    Interventions:   - Monitor vital signs  - Monitor appropriate labs  - Pain management  - Administer medications per order  - Follow MD orders  - Diagnostics per order  - Update / inform patient and family on plan of care  - See additional Care Plan goals for specific interventions  Outcome: Progressing     Problem: GASTROINTESTINAL - ADULT  Goal: Maintains or returns to baseline bowel function  Description: INTERVENTIONS:  - Assess bowel function  - Maintain adequate hydration with IV or PO as ordered and tolerated  - Evaluate effectiveness of GI medications  - Encourage mobilization and activity  - Obtain nutritional consult as needed  - Establish a toileting routine/schedule  - Consider collaborating with pharmacy to review patient's medication profile  Outcome: Progressing  Goal: Maintains adequate nutritional intake (undernourished)  Description: INTERVENTIONS:  - Monitor percentage of each meal consumed  - Identify factors contributing to decreased intake, treat as appropriate  - Assist with meals as needed  - Monitor I&O, WT and lab values  - Obtain nutritional consult as needed  - Optimize oral hygiene and moisture  - Encourage food from home; allow for food preferences  - Enhance eating environment  Outcome: Progressing     Problem: METABOLIC/FLUID AND ELECTROLYTES - ADULT  Goal: Electrolytes maintained within normal limits  Description: INTERVENTIONS:  - Monitor labs and rhythm and assess patient for signs and symptoms of electrolyte imbalances  - Administer electrolyte replacement as ordered  - Monitor response to electrolyte replacements, including rhythm and repeat lab results as appropriate  - Fluid restriction as ordered  - Instruct patient on fluid and nutrition restrictions as appropriate  Outcome: Progressing  Goal: Hemodynamic stability and optimal renal function maintained  Description: INTERVENTIONS:  - Monitor labs and assess for signs and symptoms of volume excess or deficit  - Monitor intake, output and patient weight  - Monitor urine specific gravity, serum osmolarity and serum sodium as indicated or ordered  - Monitor response to interventions for patient's volume status, including labs, urine output, blood pressure (other measures as available)  - Encourage oral intake as appropriate  - Instruct patient on fluid and nutrition restrictions as appropriate  Outcome: Progressing     Problem: Impaired Functional Mobility  Goal: Achieve highest/safest level of mobility/gait  Description: Interventions:  - Assess patient's functional ability and stability  - Promote increasing activity/tolerance for mobility and gait  - Educate and engage patient/family in tolerated activity level and precautions    Outcome: Progressing     Problem: Impaired Activities of Daily Living  Goal: Achieve highest/safest level of independence in self care  Description: Interventions:  - Assess ability and encourage patient to participate in ADLs to maximize function  - Promote sitting position while performing ADLs such as feeding, grooming, and bathing  - Educate and encourage patient/family in tolerated functional activity level and precautions during self-care  Outcome: Progressing     Problem: SKIN/TISSUE INTEGRITY - ADULT  Goal: Skin integrity remains intact  Description: INTERVENTIONS  - Assess and document risk factors for pressure ulcer development  - Assess and document skin integrity  - Monitor for areas of redness and/or skin breakdown  - Initiate interventions, skin care algorithm/standards of care as needed  Outcome: Progressing     Problem: PAIN - ADULT  Goal: Verbalizes/displays adequate comfort level or patient's stated pain goal  Description: INTERVENTIONS:  - Encourage pt to monitor pain and request assistance  - Assess pain using appropriate pain scale  - Administer analgesics based on type and severity of pain and evaluate response  - Implement non-pharmacological measures as appropriate and evaluate response  - Consider cultural and social influences on pain and pain management  - Manage/alleviate anxiety  - Utilize distraction and/or relaxation techniques  - Monitor for opioid side effects  - Notify MD/LIP if interventions unsuccessful or patient reports new pain  - Anticipate increased pain with activity and pre-medicate as appropriate  Outcome: Progressing     Problem: SAFETY ADULT - FALL  Goal: Free from fall injury  Description: INTERVENTIONS:  - Assess pt frequently for physical needs  - Identify cognitive and physical deficits and behaviors that affect risk of falls.   - [place] fall precautions as indicated by assessment.  - Educate pt/family on patient safety including physical limitations  - Instruct pt to call for assistance with activity based on assessment  - Modify environment to reduce risk of injury  - Provide assistive devices as appropriate  - Consider OT/PT consult to assist with strengthening/mobility  - Encourage toileting schedule  Outcome: Progressing     Problem: DISCHARGE PLANNING  Goal: Discharge to home or other facility with appropriate resources  Description: INTERVENTIONS:  - Identify barriers to discharge w/pt and caregiver  - Include patient/family/discharge partner in discharge planning  - Arrange for needed discharge resources and transportation as appropriate  - Identify discharge learning needs (meds, wound care, etc)  - Arrange for interpreters to assist at discharge as needed  - Consider post-discharge preferences of patient/family/discharge partner  - Complete POLST form as appropriate  - Assess patient's ability to be responsible for managing their own health  - Refer to Case Management Department for coordinating discharge planning if the patient needs post-hospital services based on physician/LIP order or complex needs related to functional status, cognitive ability or social support system  Outcome: Progressing

## 2023-06-09 NOTE — PLAN OF CARE
Patient is alert and oriented per baseline. Vital signs stable with pain controlled on current plan; see MAR. GJ tube in place and draining scant, white liquids. Patient updated on plan of care and provided with bedside education. Call light visible and within reach. Problem: Patient Centered Care  Goal: Patient preferences are identified and integrated in the patient's plan of care  Description: Interventions:  - What would you like us to know as we care for you?  From home alone  - Provide timely, complete, and accurate information to patient/family  - Incorporate patient and family knowledge, values, beliefs, and cultural backgrounds into the planning and delivery of care  - Encourage patient/family to participate in care and decision-making at the level they choose  - Honor patient and family perspectives and choices  Outcome: Progressing     Problem: Patient/Family Goals  Goal: Patient/Family Long Term Goal  Description: Patient's Long Term Goal: Unclogged G-Tube    Interventions:  - Monitor vital signs  - Monitor appropriate labs  - Pain management  - Administer medications per order  - Follow MD orders  - Diagnostics per order  - Update / inform patient and family on plan of care  - Discharge planning  - See additional Care Plan goals for specific interventions  Outcome: Progressing  Goal: Patient/Family Short Term Goal  Description: Patient's Short Term Goal: Improve pancreatitis    Interventions:   - Monitor vital signs  - Monitor appropriate labs  - Pain management  - Administer medications per order  - Follow MD orders  - Diagnostics per order  - Update / inform patient and family on plan of care  - See additional Care Plan goals for specific interventions  Outcome: Progressing     Problem: GASTROINTESTINAL - ADULT  Goal: Maintains or returns to baseline bowel function  Description: INTERVENTIONS:  - Assess bowel function  - Maintain adequate hydration with IV or PO as ordered and tolerated  - Evaluate effectiveness of GI medications  - Encourage mobilization and activity  - Obtain nutritional consult as needed  - Establish a toileting routine/schedule  - Consider collaborating with pharmacy to review patient's medication profile  Outcome: Progressing  Goal: Maintains adequate nutritional intake (undernourished)  Description: INTERVENTIONS:  - Monitor percentage of each meal consumed  - Identify factors contributing to decreased intake, treat as appropriate  - Assist with meals as needed  - Monitor I&O, WT and lab values  - Obtain nutritional consult as needed  - Optimize oral hygiene and moisture  - Encourage food from home; allow for food preferences  - Enhance eating environment  Outcome: Progressing     Problem: METABOLIC/FLUID AND ELECTROLYTES - ADULT  Goal: Electrolytes maintained within normal limits  Description: INTERVENTIONS:  - Monitor labs and rhythm and assess patient for signs and symptoms of electrolyte imbalances  - Administer electrolyte replacement as ordered  - Monitor response to electrolyte replacements, including rhythm and repeat lab results as appropriate  - Fluid restriction as ordered  - Instruct patient on fluid and nutrition restrictions as appropriate  Outcome: Progressing  Goal: Hemodynamic stability and optimal renal function maintained  Description: INTERVENTIONS:  - Monitor labs and assess for signs and symptoms of volume excess or deficit  - Monitor intake, output and patient weight  - Monitor urine specific gravity, serum osmolarity and serum sodium as indicated or ordered  - Monitor response to interventions for patient's volume status, including labs, urine output, blood pressure (other measures as available)  - Encourage oral intake as appropriate  - Instruct patient on fluid and nutrition restrictions as appropriate  Outcome: Progressing     Problem: Impaired Functional Mobility  Goal: Achieve highest/safest level of mobility/gait  Description: Interventions:  - Assess patient's functional ability and stability  - Promote increasing activity/tolerance for mobility and gait  - Educate and engage patient/family in tolerated activity level and precautions  - Recommend patient transfer to bedside chair toward strongest side  Outcome: Progressing     Problem: Impaired Activities of Daily Living  Goal: Achieve highest/safest level of independence in self care  Description: Interventions:  - Assess ability and encourage patient to participate in ADLs to maximize function  - Promote sitting position while performing ADLs such as feeding, grooming, and bathing  - Educate and encourage patient/family in tolerated functional activity level and precautions during self-care  - Encourage patient to incorporate impaired side during daily activities to promote function  Outcome: Progressing     Problem: SKIN/TISSUE INTEGRITY - ADULT  Goal: Skin integrity remains intact  Description: INTERVENTIONS  - Assess and document risk factors for pressure ulcer development  - Assess and document skin integrity  - Monitor for areas of redness and/or skin breakdown  - Initiate interventions, skin care algorithm/standards of care as needed  Outcome: Progressing     Problem: PAIN - ADULT  Goal: Verbalizes/displays adequate comfort level or patient's stated pain goal  Description: INTERVENTIONS:  - Encourage pt to monitor pain and request assistance  - Assess pain using appropriate pain scale  - Administer analgesics based on type and severity of pain and evaluate response  - Implement non-pharmacological measures as appropriate and evaluate response  - Consider cultural and social influences on pain and pain management  - Manage/alleviate anxiety  - Utilize distraction and/or relaxation techniques  - Monitor for opioid side effects  - Notify MD/LIP if interventions unsuccessful or patient reports new pain  - Anticipate increased pain with activity and pre-medicate as appropriate  Outcome: Progressing Problem: SAFETY ADULT - FALL  Goal: Free from fall injury  Description: INTERVENTIONS:  - Assess pt frequently for physical needs  - Identify cognitive and physical deficits and behaviors that affect risk of falls.   - [place] fall precautions as indicated by assessment.  - Educate pt/family on patient safety including physical limitations  - Instruct pt to call for assistance with activity based on assessment  - Modify environment to reduce risk of injury  - Provide assistive devices as appropriate  - Consider OT/PT consult to assist with strengthening/mobility  - Encourage toileting schedule  Outcome: Progressing     Problem: DISCHARGE PLANNING  Goal: Discharge to home or other facility with appropriate resources  Description: INTERVENTIONS:  - Identify barriers to discharge w/pt and caregiver  - Include patient/family/discharge partner in discharge planning  - Arrange for needed discharge resources and transportation as appropriate  - Identify discharge learning needs (meds, wound care, etc)  - Arrange for interpreters to assist at discharge as needed  - Consider post-discharge preferences of patient/family/discharge partner  - Complete POLST form as appropriate  - Assess patient's ability to be responsible for managing their own health  - Refer to Case Management Department for coordinating discharge planning if the patient needs post-hospital services based on physician/LIP order or complex needs related to functional status, cognitive ability or social support system  Outcome: Progressing

## 2023-06-10 LAB
ANION GAP SERPL CALC-SCNC: 8 MMOL/L (ref 0–18)
BUN BLD-MCNC: 24 MG/DL (ref 7–18)
BUN/CREAT SERPL: 32.4 (ref 10–20)
CALCIUM BLD-MCNC: 8.5 MG/DL (ref 8.5–10.1)
CHLORIDE SERPL-SCNC: 105 MMOL/L (ref 98–112)
CO2 SERPL-SCNC: 27 MMOL/L (ref 21–32)
CREAT BLD-MCNC: 0.74 MG/DL
GFR SERPLBLD BASED ON 1.73 SQ M-ARVRAT: 112 ML/MIN/1.73M2 (ref 60–?)
GLUCOSE BLD-MCNC: 102 MG/DL (ref 70–99)
GLUCOSE BLDC GLUCOMTR-MCNC: 118 MG/DL (ref 70–99)
GLUCOSE BLDC GLUCOMTR-MCNC: 129 MG/DL (ref 70–99)
GLUCOSE BLDC GLUCOMTR-MCNC: 90 MG/DL (ref 70–99)
GLUCOSE BLDC GLUCOMTR-MCNC: 93 MG/DL (ref 70–99)
MAGNESIUM SERPL-MCNC: 2.1 MG/DL (ref 1.6–2.6)
OSMOLALITY SERPL CALC.SUM OF ELEC: 294 MOSM/KG (ref 275–295)
PHOSPHATE SERPL-MCNC: 3.9 MG/DL (ref 2.5–4.9)
POTASSIUM SERPL-SCNC: 4.1 MMOL/L (ref 3.5–5.1)
SODIUM SERPL-SCNC: 140 MMOL/L (ref 136–145)

## 2023-06-10 PROCEDURE — 99232 SBSQ HOSP IP/OBS MODERATE 35: CPT | Performed by: HOSPITALIST

## 2023-06-10 NOTE — PLAN OF CARE
Problem: Patient Centered Care  Goal: Patient preferences are identified and integrated in the patient's plan of care  Description: Interventions:  - What would you like us to know as we care for you?  From home alone  - Provide timely, complete, and accurate information to patient/family  - Incorporate patient and family knowledge, values, beliefs, and cultural backgrounds into the planning and delivery of care  - Encourage patient/family to participate in care and decision-making at the level they choose  - Honor patient and family perspectives and choices  Outcome: Progressing     Problem: Patient/Family Goals  Goal: Patient/Family Long Term Goal  Description: Patient's Long Term Goal: Unclogged G-Tube    Interventions:  - Monitor vital signs  - Monitor appropriate labs  - Pain management  - Administer medications per order  - Follow MD orders  - Diagnostics per order  - Update / inform patient and family on plan of care  - Discharge planning  - See additional Care Plan goals for specific interventions  Outcome: Progressing  Goal: Patient/Family Short Term Goal  Description: Patient's Short Term Goal: Improve pancreatitis    Interventions:   - Monitor vital signs  - Monitor appropriate labs  - Pain management  - Administer medications per order  - Follow MD orders  - Diagnostics per order  - Update / inform patient and family on plan of care  - See additional Care Plan goals for specific interventions  Outcome: Progressing     Problem: GASTROINTESTINAL - ADULT  Goal: Maintains or returns to baseline bowel function  Description: INTERVENTIONS:  - Assess bowel function  - Maintain adequate hydration with IV or PO as ordered and tolerated  - Evaluate effectiveness of GI medications  - Encourage mobilization and activity  - Obtain nutritional consult as needed  - Establish a toileting routine/schedule  - Consider collaborating with pharmacy to review patient's medication profile  Outcome: Progressing  Goal: Maintains adequate nutritional intake (undernourished)  Description: INTERVENTIONS:  - Monitor percentage of each meal consumed  - Identify factors contributing to decreased intake, treat as appropriate  - Assist with meals as needed  - Monitor I&O, WT and lab values  - Obtain nutritional consult as needed  - Optimize oral hygiene and moisture  - Encourage food from home; allow for food preferences  - Enhance eating environment  Outcome: Progressing     Problem: METABOLIC/FLUID AND ELECTROLYTES - ADULT  Goal: Electrolytes maintained within normal limits  Description: INTERVENTIONS:  - Monitor labs and rhythm and assess patient for signs and symptoms of electrolyte imbalances  - Administer electrolyte replacement as ordered  - Monitor response to electrolyte replacements, including rhythm and repeat lab results as appropriate  - Fluid restriction as ordered  - Instruct patient on fluid and nutrition restrictions as appropriate  Outcome: Progressing  Goal: Hemodynamic stability and optimal renal function maintained  Description: INTERVENTIONS:  - Monitor labs and assess for signs and symptoms of volume excess or deficit  - Monitor intake, output and patient weight  - Monitor urine specific gravity, serum osmolarity and serum sodium as indicated or ordered  - Monitor response to interventions for patient's volume status, including labs, urine output, blood pressure (other measures as available)  - Encourage oral intake as appropriate  - Instruct patient on fluid and nutrition restrictions as appropriate  Outcome: Progressing     Problem: Impaired Functional Mobility  Goal: Achieve highest/safest level of mobility/gait  Description: Interventions:  - Assess patient's functional ability and stability  - Promote increasing activity/tolerance for mobility and gait  - Educate and engage patient/family in tolerated activity level and precautions  - Recommend use of chair position in bed 3 times per day  Outcome: Progressing     Problem: Impaired Activities of Daily Living  Goal: Achieve highest/safest level of independence in self care  Description: Interventions:  - Assess ability and encourage patient to participate in ADLs to maximize function  - Promote sitting position while performing ADLs such as feeding, grooming, and bathing  - Educate and encourage patient/family in tolerated functional activity level and precautions during self-care  - Encourage patient to incorporate impaired side during daily activities to promote function  Outcome: Progressing     Problem: SKIN/TISSUE INTEGRITY - ADULT  Goal: Skin integrity remains intact  Description: INTERVENTIONS  - Assess and document risk factors for pressure ulcer development  - Assess and document skin integrity  - Monitor for areas of redness and/or skin breakdown  - Initiate interventions, skin care algorithm/standards of care as needed  Outcome: Progressing     Problem: PAIN - ADULT  Goal: Verbalizes/displays adequate comfort level or patient's stated pain goal  Description: INTERVENTIONS:  - Encourage pt to monitor pain and request assistance  - Assess pain using appropriate pain scale  - Administer analgesics based on type and severity of pain and evaluate response  - Implement non-pharmacological measures as appropriate and evaluate response  - Consider cultural and social influences on pain and pain management  - Manage/alleviate anxiety  - Utilize distraction and/or relaxation techniques  - Monitor for opioid side effects  - Notify MD/LIP if interventions unsuccessful or patient reports new pain  - Anticipate increased pain with activity and pre-medicate as appropriate  Outcome: Progressing     Problem: SAFETY ADULT - FALL  Goal: Free from fall injury  Description: INTERVENTIONS:  - Assess pt frequently for physical needs  - Identify cognitive and physical deficits and behaviors that affect risk of falls.   - [place] fall precautions as indicated by assessment.  - Educate pt/family on patient safety including physical limitations  - Instruct pt to call for assistance with activity based on assessment  - Modify environment to reduce risk of injury  - Provide assistive devices as appropriate  - Consider OT/PT consult to assist with strengthening/mobility  - Encourage toileting schedule  Outcome: Progressing     Problem: DISCHARGE PLANNING  Goal: Discharge to home or other facility with appropriate resources  Description: INTERVENTIONS:  - Identify barriers to discharge w/pt and caregiver  - Include patient/family/discharge partner in discharge planning  - Arrange for needed discharge resources and transportation as appropriate  - Identify discharge learning needs (meds, wound care, etc)  - Arrange for interpreters to assist at discharge as needed  - Consider post-discharge preferences of patient/family/discharge partner  - Complete POLST form as appropriate  - Assess patient's ability to be responsible for managing their own health  - Refer to Case Management Department for coordinating discharge planning if the patient needs post-hospital services based on physician/LIP order or complex needs related to functional status, cognitive ability or social support system  Outcome: Progressing     Patient is presently resting in the the bed. Alert x 4. Vital signs taken and stable. Currently NPO. TPN infusing and tolerated. Patient is medicated as needed for pain or discomfort. GJ tube is present and attached to a bag. Labs ordered for AM. Call light within reach at all times.

## 2023-06-10 NOTE — PLAN OF CARE
Problem: ALTERED NUTRIENT INTAKE - ADULT  Goal: Nutrient intake appropriate for improving, restoring or maintaining nutritional needs  Description: INTERVENTIONS:  - Assess nutritional status and recommend course of action  - Monitor labs, and treatment plans  - Recommend appropriate diets, oral nutritional supplements, and vitamin/mineral supplements  -  monitor, and adjust TPN/PPN based on assessed needs  - Provide specific nutrition education as appropriate  Outcome: Progressing

## 2023-06-10 NOTE — PLAN OF CARE
Patient vital signs stable. TPN running. PRN Morphine given for pain. No acute changes. Problem: Patient Centered Care  Goal: Patient preferences are identified and integrated in the patient's plan of care  Description: Interventions:  - What would you like us to know as we care for you?  From home alone  - Provide timely, complete, and accurate information to patient/family  - Incorporate patient and family knowledge, values, beliefs, and cultural backgrounds into the planning and delivery of care  - Encourage patient/family to participate in care and decision-making at the level they choose  - Honor patient and family perspectives and choices  Outcome: Progressing     Problem: Patient/Family Goals  Goal: Patient/Family Long Term Goal  Description: Patient's Long Term Goal: Unclogged G-Tube    Interventions:  - Monitor vital signs  - Monitor appropriate labs  - Pain management  - Administer medications per order  - Follow MD orders  - Diagnostics per order  - Update / inform patient and family on plan of care  - Discharge planning  - See additional Care Plan goals for specific interventions  Outcome: Progressing  Goal: Patient/Family Short Term Goal  Description: Patient's Short Term Goal: Improve pancreatitis    Interventions:   - Monitor vital signs  - Monitor appropriate labs  - Pain management  - Administer medications per order  - Follow MD orders  - Diagnostics per order  - Update / inform patient and family on plan of care  - See additional Care Plan goals for specific interventions  Outcome: Progressing     Problem: GASTROINTESTINAL - ADULT  Goal: Maintains or returns to baseline bowel function  Description: INTERVENTIONS:  - Assess bowel function  - Maintain adequate hydration with IV or PO as ordered and tolerated  - Evaluate effectiveness of GI medications  - Encourage mobilization and activity  - Obtain nutritional consult as needed  - Establish a toileting routine/schedule  - Consider collaborating with pharmacy to review patient's medication profile  Outcome: Progressing  Goal: Maintains adequate nutritional intake (undernourished)  Description: INTERVENTIONS:  - Monitor percentage of each meal consumed  - Identify factors contributing to decreased intake, treat as appropriate  - Assist with meals as needed  - Monitor I&O, WT and lab values  - Obtain nutritional consult as needed  - Optimize oral hygiene and moisture  - Encourage food from home; allow for food preferences  - Enhance eating environment  Outcome: Progressing     Problem: METABOLIC/FLUID AND ELECTROLYTES - ADULT  Goal: Electrolytes maintained within normal limits  Description: INTERVENTIONS:  - Monitor labs and rhythm and assess patient for signs and symptoms of electrolyte imbalances  - Administer electrolyte replacement as ordered  - Monitor response to electrolyte replacements, including rhythm and repeat lab results as appropriate  - Fluid restriction as ordered  - Instruct patient on fluid and nutrition restrictions as appropriate  Outcome: Progressing  Goal: Hemodynamic stability and optimal renal function maintained  Description: INTERVENTIONS:  - Monitor labs and assess for signs and symptoms of volume excess or deficit  - Monitor intake, output and patient weight  - Monitor urine specific gravity, serum osmolarity and serum sodium as indicated or ordered  - Monitor response to interventions for patient's volume status, including labs, urine output, blood pressure (other measures as available)  - Encourage oral intake as appropriate  - Instruct patient on fluid and nutrition restrictions as appropriate  Outcome: Progressing     Problem: Impaired Functional Mobility  Goal: Achieve highest/safest level of mobility/gait  Description: Interventions:  - Assess patient's functional ability and stability  - Promote increasing activity/tolerance for mobility and gait  - Educate and engage patient/family in tolerated activity level and precautions  - Recommend use of chair position in bed 3 times per day  Outcome: Progressing     Problem: Impaired Activities of Daily Living  Goal: Achieve highest/safest level of independence in self care  Description: Interventions:  - Assess ability and encourage patient to participate in ADLs to maximize function  - Promote sitting position while performing ADLs such as feeding, grooming, and bathing  - Educate and encourage patient/family in tolerated functional activity level and precautions during self-care  - Encourage patient to incorporate impaired side during daily activities to promote function  Outcome: Progressing     Problem: SKIN/TISSUE INTEGRITY - ADULT  Goal: Skin integrity remains intact  Description: INTERVENTIONS  - Assess and document risk factors for pressure ulcer development  - Assess and document skin integrity  - Monitor for areas of redness and/or skin breakdown  - Initiate interventions, skin care algorithm/standards of care as needed  Outcome: Progressing     Problem: PAIN - ADULT  Goal: Verbalizes/displays adequate comfort level or patient's stated pain goal  Description: INTERVENTIONS:  - Encourage pt to monitor pain and request assistance  - Assess pain using appropriate pain scale  - Administer analgesics based on type and severity of pain and evaluate response  - Implement non-pharmacological measures as appropriate and evaluate response  - Consider cultural and social influences on pain and pain management  - Manage/alleviate anxiety  - Utilize distraction and/or relaxation techniques  - Monitor for opioid side effects  - Notify MD/LIP if interventions unsuccessful or patient reports new pain  - Anticipate increased pain with activity and pre-medicate as appropriate  Outcome: Progressing     Problem: SAFETY ADULT - FALL  Goal: Free from fall injury  Description: INTERVENTIONS:  - Assess pt frequently for physical needs  - Identify cognitive and physical deficits and behaviors that affect risk of falls.   - [place] fall precautions as indicated by assessment.  - Educate pt/family on patient safety including physical limitations  - Instruct pt to call for assistance with activity based on assessment  - Modify environment to reduce risk of injury  - Provide assistive devices as appropriate  - Consider OT/PT consult to assist with strengthening/mobility  - Encourage toileting schedule  Outcome: Progressing     Problem: DISCHARGE PLANNING  Goal: Discharge to home or other facility with appropriate resources  Description: INTERVENTIONS:  - Identify barriers to discharge w/pt and caregiver  - Include patient/family/discharge partner in discharge planning  - Arrange for needed discharge resources and transportation as appropriate  - Identify discharge learning needs (meds, wound care, etc)  - Arrange for interpreters to assist at discharge as needed  - Consider post-discharge preferences of patient/family/discharge partner  - Complete POLST form as appropriate  - Assess patient's ability to be responsible for managing their own health  - Refer to Case Management Department for coordinating discharge planning if the patient needs post-hospital services based on physician/LIP order or complex needs related to functional status, cognitive ability or social support system  Outcome: Progressing

## 2023-06-11 LAB
ALBUMIN SERPL-MCNC: 2.2 G/DL (ref 3.4–5)
ALBUMIN/GLOB SERPL: 0.5 {RATIO} (ref 1–2)
ALP LIVER SERPL-CCNC: 108 U/L
ALT SERPL-CCNC: 40 U/L
ANION GAP SERPL CALC-SCNC: 8 MMOL/L (ref 0–18)
ANION GAP SERPL CALC-SCNC: 8 MMOL/L (ref 0–18)
AST SERPL-CCNC: 26 U/L (ref 15–37)
BASOPHILS # BLD AUTO: 0.06 X10(3) UL (ref 0–0.2)
BASOPHILS NFR BLD AUTO: 1.2 %
BILIRUB SERPL-MCNC: 0.2 MG/DL (ref 0.1–2)
BUN BLD-MCNC: 22 MG/DL (ref 7–18)
BUN BLD-MCNC: 22 MG/DL (ref 7–18)
BUN/CREAT SERPL: 33.3 (ref 10–20)
BUN/CREAT SERPL: 33.3 (ref 10–20)
CALCIUM BLD-MCNC: 8.6 MG/DL (ref 8.5–10.1)
CALCIUM BLD-MCNC: 8.6 MG/DL (ref 8.5–10.1)
CHLORIDE SERPL-SCNC: 105 MMOL/L (ref 98–112)
CHLORIDE SERPL-SCNC: 105 MMOL/L (ref 98–112)
CO2 SERPL-SCNC: 27 MMOL/L (ref 21–32)
CO2 SERPL-SCNC: 27 MMOL/L (ref 21–32)
CREAT BLD-MCNC: 0.66 MG/DL
CREAT BLD-MCNC: 0.66 MG/DL
DEPRECATED RDW RBC AUTO: 41.8 FL (ref 35.1–46.3)
EOSINOPHIL # BLD AUTO: 0.59 X10(3) UL (ref 0–0.7)
EOSINOPHIL NFR BLD AUTO: 11.4 %
ERYTHROCYTE [DISTWIDTH] IN BLOOD BY AUTOMATED COUNT: 12.3 % (ref 11–15)
GFR SERPLBLD BASED ON 1.73 SQ M-ARVRAT: 116 ML/MIN/1.73M2 (ref 60–?)
GFR SERPLBLD BASED ON 1.73 SQ M-ARVRAT: 116 ML/MIN/1.73M2 (ref 60–?)
GLOBULIN PLAS-MCNC: 4.2 G/DL (ref 2.8–4.4)
GLUCOSE BLD-MCNC: 125 MG/DL (ref 70–99)
GLUCOSE BLD-MCNC: 125 MG/DL (ref 70–99)
GLUCOSE BLDC GLUCOMTR-MCNC: 109 MG/DL (ref 70–99)
GLUCOSE BLDC GLUCOMTR-MCNC: 121 MG/DL (ref 70–99)
HCT VFR BLD AUTO: 27.1 %
HGB BLD-MCNC: 8.8 G/DL
IMM GRANULOCYTES # BLD AUTO: 0.01 X10(3) UL (ref 0–1)
IMM GRANULOCYTES NFR BLD: 0.2 %
LYMPHOCYTES # BLD AUTO: 1.21 X10(3) UL (ref 1–4)
LYMPHOCYTES NFR BLD AUTO: 23.4 %
MAGNESIUM SERPL-MCNC: 1.9 MG/DL (ref 1.6–2.6)
MCH RBC QN AUTO: 30.1 PG (ref 26–34)
MCHC RBC AUTO-ENTMCNC: 32.5 G/DL (ref 31–37)
MCV RBC AUTO: 92.8 FL
MONOCYTES # BLD AUTO: 0.51 X10(3) UL (ref 0.1–1)
MONOCYTES NFR BLD AUTO: 9.9 %
NEUTROPHILS # BLD AUTO: 2.78 X10 (3) UL (ref 1.5–7.7)
NEUTROPHILS # BLD AUTO: 2.78 X10(3) UL (ref 1.5–7.7)
NEUTROPHILS NFR BLD AUTO: 53.9 %
OSMOLALITY SERPL CALC.SUM OF ELEC: 295 MOSM/KG (ref 275–295)
OSMOLALITY SERPL CALC.SUM OF ELEC: 295 MOSM/KG (ref 275–295)
PHOSPHATE SERPL-MCNC: 3.7 MG/DL (ref 2.5–4.9)
PLATELET # BLD AUTO: 382 10(3)UL (ref 150–450)
POTASSIUM SERPL-SCNC: 4.1 MMOL/L (ref 3.5–5.1)
POTASSIUM SERPL-SCNC: 4.1 MMOL/L (ref 3.5–5.1)
PROT SERPL-MCNC: 6.4 G/DL (ref 6.4–8.2)
RBC # BLD AUTO: 2.92 X10(6)UL
SODIUM SERPL-SCNC: 140 MMOL/L (ref 136–145)
SODIUM SERPL-SCNC: 140 MMOL/L (ref 136–145)
TRIGL SERPL-MCNC: 57 MG/DL (ref 30–149)
WBC # BLD AUTO: 5.2 X10(3) UL (ref 4–11)

## 2023-06-11 PROCEDURE — 99233 SBSQ HOSP IP/OBS HIGH 50: CPT | Performed by: HOSPITALIST

## 2023-06-11 NOTE — PLAN OF CARE
Problem: Patient Centered Care  Goal: Patient preferences are identified and integrated in the patient's plan of care  Description: Interventions:  - What would you like us to know as we care for you?  From home alone  - Provide timely, complete, and accurate information to patient/family  - Incorporate patient and family knowledge, values, beliefs, and cultural backgrounds into the planning and delivery of care  - Encourage patient/family to participate in care and decision-making at the level they choose  - Honor patient and family perspectives and choices  Outcome: Progressing     Problem: Patient/Family Goals  Goal: Patient/Family Long Term Goal  Description: Patient's Long Term Goal: Unclogged G-Tube    Interventions:  - Monitor vital signs  - Monitor appropriate labs  - Pain management  - Administer medications per order  - Follow MD orders  - Diagnostics per order  - Update / inform patient and family on plan of care  - Discharge planning  - See additional Care Plan goals for specific interventions  Outcome: Progressing  Goal: Patient/Family Short Term Goal  Description: Patient's Short Term Goal: Improve pancreatitis    Interventions:   - Monitor vital signs  - Monitor appropriate labs  - Pain management  - Administer medications per order  - Follow MD orders  - Diagnostics per order  - Update / inform patient and family on plan of care  - See additional Care Plan goals for specific interventions  Outcome: Progressing     Problem: GASTROINTESTINAL - ADULT  Goal: Maintains or returns to baseline bowel function  Description: INTERVENTIONS:  - Assess bowel function  - Maintain adequate hydration with IV or PO as ordered and tolerated  - Evaluate effectiveness of GI medications  - Encourage mobilization and activity  - Obtain nutritional consult as needed  - Establish a toileting routine/schedule  - Consider collaborating with pharmacy to review patient's medication profile  Outcome: Progressing  Goal: Maintains adequate nutritional intake (undernourished)  Description: INTERVENTIONS:  - Monitor percentage of each meal consumed  - Identify factors contributing to decreased intake, treat as appropriate  - Assist with meals as needed  - Monitor I&O, WT and lab values  - Obtain nutritional consult as needed  - Optimize oral hygiene and moisture  - Encourage food from home; allow for food preferences  - Enhance eating environment  Outcome: Progressing     Problem: METABOLIC/FLUID AND ELECTROLYTES - ADULT  Goal: Electrolytes maintained within normal limits  Description: INTERVENTIONS:  - Monitor labs and rhythm and assess patient for signs and symptoms of electrolyte imbalances  - Administer electrolyte replacement as ordered  - Monitor response to electrolyte replacements, including rhythm and repeat lab results as appropriate  - Fluid restriction as ordered  - Instruct patient on fluid and nutrition restrictions as appropriate  Outcome: Progressing  Goal: Hemodynamic stability and optimal renal function maintained  Description: INTERVENTIONS:  - Monitor labs and assess for signs and symptoms of volume excess or deficit  - Monitor intake, output and patient weight  - Monitor urine specific gravity, serum osmolarity and serum sodium as indicated or ordered  - Monitor response to interventions for patient's volume status, including labs, urine output, blood pressure (other measures as available)  - Encourage oral intake as appropriate  - Instruct patient on fluid and nutrition restrictions as appropriate  Outcome: Progressing     Problem: Impaired Functional Mobility  Goal: Achieve highest/safest level of mobility/gait  Description: Interventions:  - Assess patient's functional ability and stability  - Promote increasing activity/tolerance for mobility and gait  - Educate and engage patient/family in tolerated activity level and precautions  - Recommend use of chair position in bed 3 times per day  Outcome: Progressing     Problem: Impaired Activities of Daily Living  Goal: Achieve highest/safest level of independence in self care  Description: Interventions:  - Assess ability and encourage patient to participate in ADLs to maximize function  - Promote sitting position while performing ADLs such as feeding, grooming, and bathing  - Educate and encourage patient/family in tolerated functional activity level and precautions during self-care  - Provide support under elbow of weak side to prevent shoulder subluxation  Outcome: Progressing     Problem: SKIN/TISSUE INTEGRITY - ADULT  Goal: Skin integrity remains intact  Description: INTERVENTIONS  - Assess and document risk factors for pressure ulcer development  - Assess and document skin integrity  - Monitor for areas of redness and/or skin breakdown  - Initiate interventions, skin care algorithm/standards of care as needed  Outcome: Progressing     Problem: PAIN - ADULT  Goal: Verbalizes/displays adequate comfort level or patient's stated pain goal  Description: INTERVENTIONS:  - Encourage pt to monitor pain and request assistance  - Assess pain using appropriate pain scale  - Administer analgesics based on type and severity of pain and evaluate response  - Implement non-pharmacological measures as appropriate and evaluate response  - Consider cultural and social influences on pain and pain management  - Manage/alleviate anxiety  - Utilize distraction and/or relaxation techniques  - Monitor for opioid side effects  - Notify MD/LIP if interventions unsuccessful or patient reports new pain  - Anticipate increased pain with activity and pre-medicate as appropriate  Outcome: Progressing     Problem: SAFETY ADULT - FALL  Goal: Free from fall injury  Description: INTERVENTIONS:  - Assess pt frequently for physical needs  - Identify cognitive and physical deficits and behaviors that affect risk of falls.   - [place] fall precautions as indicated by assessment.  - Educate pt/family on patient safety including physical limitations  - Instruct pt to call for assistance with activity based on assessment  - Modify environment to reduce risk of injury  - Provide assistive devices as appropriate  - Consider OT/PT consult to assist with strengthening/mobility  - Encourage toileting schedule  Outcome: Progressing     Problem: DISCHARGE PLANNING  Goal: Discharge to home or other facility with appropriate resources  Description: INTERVENTIONS:  - Identify barriers to discharge w/pt and caregiver  - Include patient/family/discharge partner in discharge planning  - Arrange for needed discharge resources and transportation as appropriate  - Identify discharge learning needs (meds, wound care, etc)  - Arrange for interpreters to assist at discharge as needed  - Consider post-discharge preferences of patient/family/discharge partner  - Complete POLST form as appropriate  - Assess patient's ability to be responsible for managing their own health  - Refer to Case Management Department for coordinating discharge planning if the patient needs post-hospital services based on physician/LIP order or complex needs related to functional status, cognitive ability or social support system  Outcome: Progressing     Patient is presently resting in the bed. Alert x 4. Vital signs taken and stable. Patient denied pain or discomfort. Patient remains NPO. TPN infusing and tolerated. Self care and independent. Patient is medicated as needed for pain or discomfort. Patient seen by Dr. Anabel Khan and will have surgery on 6/14/2023. EGD ordered per GI Consult. Consent signed for EGD and placed in chart. Call light within reach at all times.

## 2023-06-11 NOTE — PLAN OF CARE
A/o x4. Morphine given for pain. Accu q6. Refuses Heparin SubQ. Daily weight/ I&Os. Self in room. TPN infusing at 83.3ml/hr. R arm precautions. Surgery upcoming Tuesday. Plan of care ongoing. Call light within reach. Problem: Patient Centered Care  Goal: Patient preferences are identified and integrated in the patient's plan of care  Description: Interventions:  - What would you like us to know as we care for you?  From home alone  - Provide timely, complete, and accurate information to patient/family  - Incorporate patient and family knowledge, values, beliefs, and cultural backgrounds into the planning and delivery of care  - Encourage patient/family to participate in care and decision-making at the level they choose  - Honor patient and family perspectives and choices  Outcome: Progressing     Problem: Patient/Family Goals  Goal: Patient/Family Long Term Goal  Description: Patient's Long Term Goal: Unclogged G-Tube    Interventions:  - Monitor vital signs  - Monitor appropriate labs  - Pain management  - Administer medications per order  - Follow MD orders  - Diagnostics per order  - Update / inform patient and family on plan of care  - Discharge planning  - See additional Care Plan goals for specific interventions  Outcome: Progressing  Goal: Patient/Family Short Term Goal  Description: Patient's Short Term Goal: Improve pancreatitis    Interventions:   - Monitor vital signs  - Monitor appropriate labs  - Pain management  - Administer medications per order  - Follow MD orders  - Diagnostics per order  - Update / inform patient and family on plan of care  - See additional Care Plan goals for specific interventions  Outcome: Progressing     Problem: GASTROINTESTINAL - ADULT  Goal: Maintains or returns to baseline bowel function  Description: INTERVENTIONS:  - Assess bowel function  - Maintain adequate hydration with IV or PO as ordered and tolerated  - Evaluate effectiveness of GI medications  - Encourage mobilization and activity  - Obtain nutritional consult as needed  - Establish a toileting routine/schedule  - Consider collaborating with pharmacy to review patient's medication profile  Outcome: Progressing  Goal: Maintains adequate nutritional intake (undernourished)  Description: INTERVENTIONS:  - Monitor percentage of each meal consumed  - Identify factors contributing to decreased intake, treat as appropriate  - Assist with meals as needed  - Monitor I&O, WT and lab values  - Obtain nutritional consult as needed  - Optimize oral hygiene and moisture  - Encourage food from home; allow for food preferences  - Enhance eating environment  Outcome: Progressing     Problem: METABOLIC/FLUID AND ELECTROLYTES - ADULT  Goal: Electrolytes maintained within normal limits  Description: INTERVENTIONS:  - Monitor labs and rhythm and assess patient for signs and symptoms of electrolyte imbalances  - Administer electrolyte replacement as ordered  - Monitor response to electrolyte replacements, including rhythm and repeat lab results as appropriate  - Fluid restriction as ordered  - Instruct patient on fluid and nutrition restrictions as appropriate  Outcome: Progressing  Goal: Hemodynamic stability and optimal renal function maintained  Description: INTERVENTIONS:  - Monitor labs and assess for signs and symptoms of volume excess or deficit  - Monitor intake, output and patient weight  - Monitor urine specific gravity, serum osmolarity and serum sodium as indicated or ordered  - Monitor response to interventions for patient's volume status, including labs, urine output, blood pressure (other measures as available)  - Encourage oral intake as appropriate  - Instruct patient on fluid and nutrition restrictions as appropriate  Outcome: Progressing     Problem: Impaired Functional Mobility  Goal: Achieve highest/safest level of mobility/gait  Description: Interventions:  - Assess patient's functional ability and stability  - Promote increasing activity/tolerance for mobility and gait  - Educate and engage patient/family in tolerated activity level and precautions  - Recommend patient transfer to bedside chair toward strongest side  Outcome: Progressing     Problem: Impaired Activities of Daily Living  Goal: Achieve highest/safest level of independence in self care  Description: Interventions:  - Assess ability and encourage patient to participate in ADLs to maximize function  - Promote sitting position while performing ADLs such as feeding, grooming, and bathing  - Educate and encourage patient/family in tolerated functional activity level and precautions during self-care    Outcome: Progressing     Problem: SKIN/TISSUE INTEGRITY - ADULT  Goal: Skin integrity remains intact  Description: INTERVENTIONS  - Assess and document risk factors for pressure ulcer development  - Assess and document skin integrity  - Monitor for areas of redness and/or skin breakdown  - Initiate interventions, skin care algorithm/standards of care as needed  Outcome: Progressing     Problem: PAIN - ADULT  Goal: Verbalizes/displays adequate comfort level or patient's stated pain goal  Description: INTERVENTIONS:  - Encourage pt to monitor pain and request assistance  - Assess pain using appropriate pain scale  - Administer analgesics based on type and severity of pain and evaluate response  - Implement non-pharmacological measures as appropriate and evaluate response  - Consider cultural and social influences on pain and pain management  - Manage/alleviate anxiety  - Utilize distraction and/or relaxation techniques  - Monitor for opioid side effects  - Notify MD/LIP if interventions unsuccessful or patient reports new pain  - Anticipate increased pain with activity and pre-medicate as appropriate  Outcome: Progressing     Problem: SAFETY ADULT - FALL  Goal: Free from fall injury  Description: INTERVENTIONS:  - Assess pt frequently for physical needs  - Identify cognitive and physical deficits and behaviors that affect risk of falls.   - [place] fall precautions as indicated by assessment.  - Educate pt/family on patient safety including physical limitations  - Instruct pt to call for assistance with activity based on assessment  - Modify environment to reduce risk of injury  - Provide assistive devices as appropriate  - Consider OT/PT consult to assist with strengthening/mobility  - Encourage toileting schedule  Outcome: Progressing     Problem: DISCHARGE PLANNING  Goal: Discharge to home or other facility with appropriate resources  Description: INTERVENTIONS:  - Identify barriers to discharge w/pt and caregiver  - Include patient/family/discharge partner in discharge planning  - Arrange for needed discharge resources and transportation as appropriate  - Identify discharge learning needs (meds, wound care, etc)  - Arrange for interpreters to assist at discharge as needed  - Consider post-discharge preferences of patient/family/discharge partner  - Complete POLST form as appropriate  - Assess patient's ability to be responsible for managing their own health  - Refer to Case Management Department for coordinating discharge planning if the patient needs post-hospital services based on physician/LIP order or complex needs related to functional status, cognitive ability or social support system  Outcome: Progressing

## 2023-06-12 ENCOUNTER — ANESTHESIA (OUTPATIENT)
Dept: ENDOSCOPY | Facility: HOSPITAL | Age: 48
End: 2023-06-12
Payer: COMMERCIAL

## 2023-06-12 ENCOUNTER — ANESTHESIA EVENT (OUTPATIENT)
Dept: ENDOSCOPY | Facility: HOSPITAL | Age: 48
End: 2023-06-12
Payer: COMMERCIAL

## 2023-06-12 ENCOUNTER — TELEPHONE (OUTPATIENT)
Dept: CASE MANAGEMENT | Facility: HOSPITAL | Age: 48
End: 2023-06-12

## 2023-06-12 LAB
ANION GAP SERPL CALC-SCNC: 6 MMOL/L (ref 0–18)
BUN BLD-MCNC: 23 MG/DL (ref 7–18)
BUN/CREAT SERPL: 29.9 (ref 10–20)
CALCIUM BLD-MCNC: 8.8 MG/DL (ref 8.5–10.1)
CHLORIDE SERPL-SCNC: 104 MMOL/L (ref 98–112)
CO2 SERPL-SCNC: 29 MMOL/L (ref 21–32)
CREAT BLD-MCNC: 0.77 MG/DL
DEPRECATED RDW RBC AUTO: 42 FL (ref 35.1–46.3)
ERYTHROCYTE [DISTWIDTH] IN BLOOD BY AUTOMATED COUNT: 12.3 % (ref 11–15)
GFR SERPLBLD BASED ON 1.73 SQ M-ARVRAT: 111 ML/MIN/1.73M2 (ref 60–?)
GLUCOSE BLD-MCNC: 111 MG/DL (ref 70–99)
GLUCOSE BLDC GLUCOMTR-MCNC: 104 MG/DL (ref 70–99)
HCT VFR BLD AUTO: 27.5 %
HGB BLD-MCNC: 9.1 G/DL
MAGNESIUM SERPL-MCNC: 2.1 MG/DL (ref 1.6–2.6)
MCH RBC QN AUTO: 30.8 PG (ref 26–34)
MCHC RBC AUTO-ENTMCNC: 33.1 G/DL (ref 31–37)
MCV RBC AUTO: 93.2 FL
OSMOLALITY SERPL CALC.SUM OF ELEC: 292 MOSM/KG (ref 275–295)
PHOSPHATE SERPL-MCNC: 3.7 MG/DL (ref 2.5–4.9)
PLATELET # BLD AUTO: 338 10(3)UL (ref 150–450)
POTASSIUM SERPL-SCNC: 4.4 MMOL/L (ref 3.5–5.1)
RBC # BLD AUTO: 2.95 X10(6)UL
SODIUM SERPL-SCNC: 139 MMOL/L (ref 136–145)
TRIGL SERPL-MCNC: 54 MG/DL (ref 30–149)
WBC # BLD AUTO: 6.7 X10(3) UL (ref 4–11)

## 2023-06-12 PROCEDURE — 99233 SBSQ HOSP IP/OBS HIGH 50: CPT | Performed by: HOSPITALIST

## 2023-06-12 PROCEDURE — 0FPD8DZ REMOVAL OF INTRALUMINAL DEVICE FROM PANCREATIC DUCT, VIA NATURAL OR ARTIFICIAL OPENING ENDOSCOPIC: ICD-10-PCS | Performed by: INTERNAL MEDICINE

## 2023-06-12 RX ORDER — SODIUM CHLORIDE, SODIUM LACTATE, POTASSIUM CHLORIDE, CALCIUM CHLORIDE 600; 310; 30; 20 MG/100ML; MG/100ML; MG/100ML; MG/100ML
INJECTION, SOLUTION INTRAVENOUS CONTINUOUS PRN
Status: DISCONTINUED | OUTPATIENT
Start: 2023-06-12 | End: 2023-06-12 | Stop reason: SURG

## 2023-06-12 RX ORDER — LIDOCAINE HYDROCHLORIDE 10 MG/ML
INJECTION, SOLUTION EPIDURAL; INFILTRATION; INTRACAUDAL; PERINEURAL AS NEEDED
Status: DISCONTINUED | OUTPATIENT
Start: 2023-06-12 | End: 2023-06-12 | Stop reason: SURG

## 2023-06-12 RX ADMIN — LIDOCAINE HYDROCHLORIDE 50 MG: 10 INJECTION, SOLUTION EPIDURAL; INFILTRATION; INTRACAUDAL; PERINEURAL at 17:27:00

## 2023-06-12 RX ADMIN — SODIUM CHLORIDE, SODIUM LACTATE, POTASSIUM CHLORIDE, CALCIUM CHLORIDE: 600; 310; 30; 20 INJECTION, SOLUTION INTRAVENOUS at 17:24:00

## 2023-06-12 NOTE — CM/SW NOTE
Deisy CERVANTES case manager with [facility] calling to offer discharge planning assistance. If needed please call back at [phone].

## 2023-06-12 NOTE — OPERATIVE REPORT
OPERATIVE REPORT   PATIENT NAME: Amilcar Willis  MRN: [id number]  DATE OF OPERATION: 6/1/2023 - 6/12/2023  PREOPERATIVE DIAGNOSIS:   1. Low pancreatitis with gastric outlet obstruction. Patient failed conservative management. He underwent an ERCP with placement of pancreatic duct stent few weeks ago. He is scheduled to have gastrojejunal bypass and exploration the day after tomorrow he is here for pancreatic duct stent as well as jejunostomy feeding tube removal  POSTOPERATIVE DIAGNOSES:  1. Antral wall edema. 2. Removal of the jejunal tube feeding extension. 3. Gastrostomy tube in good position. 4. Removal of pancreatic duct stent  PROCEDURE PERFORMED: Upper endoscopy with removal of pancreatic duct stent  SURGEON: Olga Lidia Byers MD   MEDICATIONS: None    ANESTHESIA: MAC  CONSENT: Informed and obtained from the patient  SPECIMEN: None  COMPLICATIONS: None immediately apparent  PROCEDURE AND FINDINGS:   After the risks and benefits of the procedure were discussed with the patient and all questions were answered, the patient signed informed consent for the procedure. I discussed the rationale for the procedure as well as the risks and benefits, with the risks including but not limited to bleeding, perforation, medication adverse event and missed lesions. He was placed in the left lateral position and once an adequate level of  sedation was achieved, the lubricated tip of an Olympus video gastroscope was introduced into the mouth and the esophagus was intubated under direct visualization. A complete examination of the esophagus, stomach and duodenum was performed including retroflexion in the stomach to view the cardia and fundus. The endoscope was straightened and removed, and the procedure was completed. The patient tolerated the procedure well. There were no immediate complications. The patient was given a written copy of their results at discharge.     Of note the jejunostomy feeding tube extension was removed. Gastrostomy tube adapter was placed and connected to gravity. FINDINGS:  1. Normal esophagus with no evidence of esophagitis, stricture, ulceration, mass or other abnormalities. The squamocolumnar junction was intact. The esophagogastric junction was patent. Small sliding hiatal hernia was seen  2. The internal bolster of the gastrostomy tube appeared to be in good position. The proximal aspect of the stomach appeared unremarkable. There was edema noted of the antral wall as seen before. The wall of the gastric antrum appeared to be soft from touch. Superficial erythema noted. I was able to advance the scope into the second portion of the duodenum. 3.  Mild duodenitis was noted of the bulb and C-sweep without edema. The previously placed pancreatic duct stent was noted with his distal pigtail and in the duodenum. The distal end was grasped with a snare and pulled out slowly. The stent was inspected and appeared to be intact. The scope was then removed after suctioning the gastric content    IMPRESSION:  1. Findings as discussed above  RECOMMENDATIONS:  1. Resume TPN  2.  Further management as per surgical team  Joon Dickson MD

## 2023-06-12 NOTE — PLAN OF CARE
A. o x4. Npo except ice chips. EGD scheduled, unk time yet. TPN infusing at 83.3ml. refuses heparin. Pain controlled with morphine. Surgery Tuesday. Plan of care ongoing. Problem: Patient Centered Care  Goal: Patient preferences are identified and integrated in the patient's plan of care  Description: Interventions:  - What would you like us to know as we care for you?  From home alone  - Provide timely, complete, and accurate information to patient/family  - Incorporate patient and family knowledge, values, beliefs, and cultural backgrounds into the planning and delivery of care  - Encourage patient/family to participate in care and decision-making at the level they choose  - Honor patient and family perspectives and choices  Outcome: Progressing     Problem: Patient/Family Goals  Goal: Patient/Family Long Term Goal  Description: Patient's Long Term Goal: Unclogged G-Tube    Interventions:  - Monitor vital signs  - Monitor appropriate labs  - Pain management  - Administer medications per order  - Follow MD orders  - Diagnostics per order  - Update / inform patient and family on plan of care  - Discharge planning  - See additional Care Plan goals for specific interventions  Outcome: Progressing  Goal: Patient/Family Short Term Goal  Description: Patient's Short Term Goal: Improve pancreatitis    Interventions:   - Monitor vital signs  - Monitor appropriate labs  - Pain management  - Administer medications per order  - Follow MD orders  - Diagnostics per order  - Update / inform patient and family on plan of care  - See additional Care Plan goals for specific interventions  Outcome: Progressing     Problem: GASTROINTESTINAL - ADULT  Goal: Maintains or returns to baseline bowel function  Description: INTERVENTIONS:  - Assess bowel function  - Maintain adequate hydration with IV or PO as ordered and tolerated  - Evaluate effectiveness of GI medications  - Encourage mobilization and activity  - Obtain nutritional consult as needed  - Establish a toileting routine/schedule  - Consider collaborating with pharmacy to review patient's medication profile  Outcome: Progressing  Goal: Maintains adequate nutritional intake (undernourished)  Description: INTERVENTIONS:  - Monitor percentage of each meal consumed  - Identify factors contributing to decreased intake, treat as appropriate  - Assist with meals as needed  - Monitor I&O, WT and lab values  - Obtain nutritional consult as needed  - Optimize oral hygiene and moisture  - Encourage food from home; allow for food preferences  - Enhance eating environment  Outcome: Progressing     Problem: METABOLIC/FLUID AND ELECTROLYTES - ADULT  Goal: Electrolytes maintained within normal limits  Description: INTERVENTIONS:  - Monitor labs and rhythm and assess patient for signs and symptoms of electrolyte imbalances  - Administer electrolyte replacement as ordered  - Monitor response to electrolyte replacements, including rhythm and repeat lab results as appropriate  - Fluid restriction as ordered  - Instruct patient on fluid and nutrition restrictions as appropriate  Outcome: Progressing  Goal: Hemodynamic stability and optimal renal function maintained  Description: INTERVENTIONS:  - Monitor labs and assess for signs and symptoms of volume excess or deficit  - Monitor intake, output and patient weight  - Monitor urine specific gravity, serum osmolarity and serum sodium as indicated or ordered  - Monitor response to interventions for patient's volume status, including labs, urine output, blood pressure (other measures as available)  - Encourage oral intake as appropriate  - Instruct patient on fluid and nutrition restrictions as appropriate  Outcome: Progressing     Problem: Impaired Functional Mobility  Goal: Achieve highest/safest level of mobility/gait  Description: Interventions:  - Assess patient's functional ability and stability  - Promote increasing activity/tolerance for mobility and gait  - Educate and engage patient/family in tolerated activity level and precautions    Outcome: Progressing     Problem: Impaired Activities of Daily Living  Goal: Achieve highest/safest level of independence in self care  Description: Interventions:  - Assess ability and encourage patient to participate in ADLs to maximize function  - Promote sitting position while performing ADLs such as feeding, grooming, and bathing  - Educate and encourage patient/family in tolerated functional activity level and precautions during self-care    Outcome: Progressing     Problem: SKIN/TISSUE INTEGRITY - ADULT  Goal: Skin integrity remains intact  Description: INTERVENTIONS  - Assess and document risk factors for pressure ulcer development  - Assess and document skin integrity  - Monitor for areas of redness and/or skin breakdown  - Initiate interventions, skin care algorithm/standards of care as needed  Outcome: Progressing     Problem: PAIN - ADULT  Goal: Verbalizes/displays adequate comfort level or patient's stated pain goal  Description: INTERVENTIONS:  - Encourage pt to monitor pain and request assistance  - Assess pain using appropriate pain scale  - Administer analgesics based on type and severity of pain and evaluate response  - Implement non-pharmacological measures as appropriate and evaluate response  - Consider cultural and social influences on pain and pain management  - Manage/alleviate anxiety  - Utilize distraction and/or relaxation techniques  - Monitor for opioid side effects  - Notify MD/LIP if interventions unsuccessful or patient reports new pain  - Anticipate increased pain with activity and pre-medicate as appropriate  Outcome: Progressing     Problem: SAFETY ADULT - FALL  Goal: Free from fall injury  Description: INTERVENTIONS:  - Assess pt frequently for physical needs  - Identify cognitive and physical deficits and behaviors that affect risk of falls.   - [place] fall precautions as indicated by assessment.  - Educate pt/family on patient safety including physical limitations  - Instruct pt to call for assistance with activity based on assessment  - Modify environment to reduce risk of injury  - Provide assistive devices as appropriate  - Consider OT/PT consult to assist with strengthening/mobility  - Encourage toileting schedule  Outcome: Progressing     Problem: DISCHARGE PLANNING  Goal: Discharge to home or other facility with appropriate resources  Description: INTERVENTIONS:  - Identify barriers to discharge w/pt and caregiver  - Include patient/family/discharge partner in discharge planning  - Arrange for needed discharge resources and transportation as appropriate  - Identify discharge learning needs (meds, wound care, etc)  - Arrange for interpreters to assist at discharge as needed  - Consider post-discharge preferences of patient/family/discharge partner  - Complete POLST form as appropriate  - Assess patient's ability to be responsible for managing their own health  - Refer to Case Management Department for coordinating discharge planning if the patient needs post-hospital services based on physician/LIP order or complex needs related to functional status, cognitive ability or social support system  Outcome: Progressing

## 2023-06-13 LAB
ANION GAP SERPL CALC-SCNC: 4 MMOL/L (ref 0–18)
ANTIBODY SCREEN: NEGATIVE
BUN BLD-MCNC: 23 MG/DL (ref 7–18)
BUN/CREAT SERPL: 31.1 (ref 10–20)
CALCIUM BLD-MCNC: 8.5 MG/DL (ref 8.5–10.1)
CHLORIDE SERPL-SCNC: 107 MMOL/L (ref 98–112)
CO2 SERPL-SCNC: 28 MMOL/L (ref 21–32)
CREAT BLD-MCNC: 0.74 MG/DL
DEPRECATED RDW RBC AUTO: 40.8 FL (ref 35.1–46.3)
ERYTHROCYTE [DISTWIDTH] IN BLOOD BY AUTOMATED COUNT: 12.3 % (ref 11–15)
GFR SERPLBLD BASED ON 1.73 SQ M-ARVRAT: 112 ML/MIN/1.73M2 (ref 60–?)
GLUCOSE BLD-MCNC: 86 MG/DL (ref 70–99)
HCT VFR BLD AUTO: 26.4 %
HGB BLD-MCNC: 9 G/DL
MAGNESIUM SERPL-MCNC: 2 MG/DL (ref 1.6–2.6)
MCH RBC QN AUTO: 30.9 PG (ref 26–34)
MCHC RBC AUTO-ENTMCNC: 34.1 G/DL (ref 31–37)
MCV RBC AUTO: 90.7 FL
OSMOLALITY SERPL CALC.SUM OF ELEC: 291 MOSM/KG (ref 275–295)
PHOSPHATE SERPL-MCNC: 4.3 MG/DL (ref 2.5–4.9)
PLATELET # BLD AUTO: 346 10(3)UL (ref 150–450)
POTASSIUM SERPL-SCNC: 4.2 MMOL/L (ref 3.5–5.1)
RBC # BLD AUTO: 2.91 X10(6)UL
RH BLOOD TYPE: POSITIVE
SODIUM SERPL-SCNC: 139 MMOL/L (ref 136–145)
WBC # BLD AUTO: 4.9 X10(3) UL (ref 4–11)

## 2023-06-13 PROCEDURE — 99233 SBSQ HOSP IP/OBS HIGH 50: CPT | Performed by: HOSPITALIST

## 2023-06-13 NOTE — PLAN OF CARE
Problem: Patient Centered Care  Goal: Patient preferences are identified and integrated in the patient's plan of care  Description: Interventions:  - What would you like us to know as we care for you?  From home alone  - Provide timely, complete, and accurate information to patient/family  - Incorporate patient and family knowledge, values, beliefs, and cultural backgrounds into the planning and delivery of care  - Encourage patient/family to participate in care and decision-making at the level they choose  - Honor patient and family perspectives and choices  Outcome: Progressing     Problem: Patient/Family Goals  Goal: Patient/Family Long Term Goal  Description: Patient's Long Term Goal: Unclogged G-Tube    Interventions:  - Monitor vital signs  - Monitor appropriate labs  - Pain management  - Administer medications per order  - Follow MD orders  - Diagnostics per order  - Update / inform patient and family on plan of care  - Discharge planning  - See additional Care Plan goals for specific interventions  Outcome: Progressing  Goal: Patient/Family Short Term Goal  Description: Patient's Short Term Goal: Improve pancreatitis    Interventions:   - Monitor vital signs  - Monitor appropriate labs  - Pain management  - Administer medications per order  - Follow MD orders  - Diagnostics per order  - Update / inform patient and family on plan of care  - See additional Care Plan goals for specific interventions  Outcome: Progressing     Problem: GASTROINTESTINAL - ADULT  Goal: Maintains or returns to baseline bowel function  Description: INTERVENTIONS:  - Assess bowel function  - Maintain adequate hydration with IV or PO as ordered and tolerated  - Evaluate effectiveness of GI medications  - Encourage mobilization and activity  - Obtain nutritional consult as needed  - Establish a toileting routine/schedule  - Consider collaborating with pharmacy to review patient's medication profile  Outcome: Progressing  Goal: Maintains adequate nutritional intake (undernourished)  Description: INTERVENTIONS:  - Monitor percentage of each meal consumed  - Identify factors contributing to decreased intake, treat as appropriate  - Assist with meals as needed  - Monitor I&O, WT and lab values  - Obtain nutritional consult as needed  - Optimize oral hygiene and moisture  - Encourage food from home; allow for food preferences  - Enhance eating environment  Outcome: Progressing     Problem: METABOLIC/FLUID AND ELECTROLYTES - ADULT  Goal: Electrolytes maintained within normal limits  Description: INTERVENTIONS:  - Monitor labs and rhythm and assess patient for signs and symptoms of electrolyte imbalances  - Administer electrolyte replacement as ordered  - Monitor response to electrolyte replacements, including rhythm and repeat lab results as appropriate  - Fluid restriction as ordered  - Instruct patient on fluid and nutrition restrictions as appropriate  Outcome: Progressing  Goal: Hemodynamic stability and optimal renal function maintained  Description: INTERVENTIONS:  - Monitor labs and assess for signs and symptoms of volume excess or deficit  - Monitor intake, output and patient weight  - Monitor urine specific gravity, serum osmolarity and serum sodium as indicated or ordered  - Monitor response to interventions for patient's volume status, including labs, urine output, blood pressure (other measures as available)  - Encourage oral intake as appropriate  - Instruct patient on fluid and nutrition restrictions as appropriate  Outcome: Progressing     Problem: Impaired Functional Mobility  Goal: Achieve highest/safest level of mobility/gait  Description: Interventions:  - Assess patient's functional ability and stability  - Promote increasing activity/tolerance for mobility and gait  - Educate and engage patient/family in tolerated activity level and precautions    Outcome: Progressing     Problem: Impaired Activities of Daily Living  Goal: Achieve highest/safest level of independence in self care  Description: Interventions:  - Assess ability and encourage patient to participate in ADLs to maximize function  - Promote sitting position while performing ADLs such as feeding, grooming, and bathing  - Educate and encourage patient/family in tolerated functional activity level and precautions during self-care    Outcome: Progressing     Problem: SKIN/TISSUE INTEGRITY - ADULT  Goal: Skin integrity remains intact  Description: INTERVENTIONS  - Assess and document risk factors for pressure ulcer development  - Assess and document skin integrity  - Monitor for areas of redness and/or skin breakdown  - Initiate interventions, skin care algorithm/standards of care as needed  Outcome: Progressing     Problem: PAIN - ADULT  Goal: Verbalizes/displays adequate comfort level or patient's stated pain goal  Description: INTERVENTIONS:  - Encourage pt to monitor pain and request assistance  - Assess pain using appropriate pain scale  - Administer analgesics based on type and severity of pain and evaluate response  - Implement non-pharmacological measures as appropriate and evaluate response  - Consider cultural and social influences on pain and pain management  - Manage/alleviate anxiety  - Utilize distraction and/or relaxation techniques  - Monitor for opioid side effects  - Notify MD/LIP if interventions unsuccessful or patient reports new pain  - Anticipate increased pain with activity and pre-medicate as appropriate  Outcome: Progressing     Problem: SAFETY ADULT - FALL  Goal: Free from fall injury  Description: INTERVENTIONS:  - Assess pt frequently for physical needs  - Identify cognitive and physical deficits and behaviors that affect risk of falls.   - [place] fall precautions as indicated by assessment.  - Educate pt/family on patient safety including physical limitations  - Instruct pt to call for assistance with activity based on assessment  - Modify environment to reduce risk of injury  - Provide assistive devices as appropriate  - Consider OT/PT consult to assist with strengthening/mobility  - Encourage toileting schedule  Outcome: Progressing     Problem: DISCHARGE PLANNING  Goal: Discharge to home or other facility with appropriate resources  Description: INTERVENTIONS:  - Identify barriers to discharge w/pt and caregiver  - Include patient/family/discharge partner in discharge planning  - Arrange for needed discharge resources and transportation as appropriate  - Identify discharge learning needs (meds, wound care, etc)  - Arrange for interpreters to assist at discharge as needed  - Consider post-discharge preferences of patient/family/discharge partner  - Complete POLST form as appropriate  - Assess patient's ability to be responsible for managing their own health  - Refer to Case Management Department for coordinating discharge planning if the patient needs post-hospital services based on physician/LIP order or complex needs related to functional status, cognitive ability or social support system  Outcome: Progressing   Patient a/ox4; VSS; pain control with Morphine. Went down for EGD; G tube draining to gravity. Call light within reach.

## 2023-06-13 NOTE — PLAN OF CARE
Patient resting overnight. No acute changes. Prn morphine given for pain management. Maintaining NPO status. Safety precautions in place. Problem: Patient Centered Care  Goal: Patient preferences are identified and integrated in the patient's plan of care  Description: Interventions:  - What would you like us to know as we care for you?  From home alone  - Provide timely, complete, and accurate information to patient/family  - Incorporate patient and family knowledge, values, beliefs, and cultural backgrounds into the planning and delivery of care  - Encourage patient/family to participate in care and decision-making at the level they choose  - Honor patient and family perspectives and choices  Outcome: Progressing     Problem: Patient/Family Goals  Goal: Patient/Family Long Term Goal  Description: Patient's Long Term Goal: Unclogged G-Tube    Interventions:  - Monitor vital signs  - Monitor appropriate labs  - Pain management  - Administer medications per order  - Follow MD orders  - Diagnostics per order  - Update / inform patient and family on plan of care  - Discharge planning  - See additional Care Plan goals for specific interventions  Outcome: Progressing  Goal: Patient/Family Short Term Goal  Description: Patient's Short Term Goal: Improve pancreatitis    Interventions:   - Monitor vital signs  - Monitor appropriate labs  - Pain management  - Administer medications per order  - Follow MD orders  - Diagnostics per order  - Update / inform patient and family on plan of care  - See additional Care Plan goals for specific interventions  Outcome: Progressing     Problem: GASTROINTESTINAL - ADULT  Goal: Maintains or returns to baseline bowel function  Description: INTERVENTIONS:  - Assess bowel function  - Maintain adequate hydration with IV or PO as ordered and tolerated  - Evaluate effectiveness of GI medications  - Encourage mobilization and activity  - Obtain nutritional consult as needed  - Establish a toileting routine/schedule  - Consider collaborating with pharmacy to review patient's medication profile  Outcome: Progressing  Goal: Maintains adequate nutritional intake (undernourished)  Description: INTERVENTIONS:  - Monitor percentage of each meal consumed  - Identify factors contributing to decreased intake, treat as appropriate  - Assist with meals as needed  - Monitor I&O, WT and lab values  - Obtain nutritional consult as needed  - Optimize oral hygiene and moisture  - Encourage food from home; allow for food preferences  - Enhance eating environment  Outcome: Progressing     Problem: METABOLIC/FLUID AND ELECTROLYTES - ADULT  Goal: Electrolytes maintained within normal limits  Description: INTERVENTIONS:  - Monitor labs and rhythm and assess patient for signs and symptoms of electrolyte imbalances  - Administer electrolyte replacement as ordered  - Monitor response to electrolyte replacements, including rhythm and repeat lab results as appropriate  - Fluid restriction as ordered  - Instruct patient on fluid and nutrition restrictions as appropriate  Outcome: Progressing  Goal: Hemodynamic stability and optimal renal function maintained  Description: INTERVENTIONS:  - Monitor labs and assess for signs and symptoms of volume excess or deficit  - Monitor intake, output and patient weight  - Monitor urine specific gravity, serum osmolarity and serum sodium as indicated or ordered  - Monitor response to interventions for patient's volume status, including labs, urine output, blood pressure (other measures as available)  - Encourage oral intake as appropriate  - Instruct patient on fluid and nutrition restrictions as appropriate  Outcome: Progressing     Problem: Impaired Functional Mobility  Goal: Achieve highest/safest level of mobility/gait  Description: Interventions:  - Assess patient's functional ability and stability  - Promote increasing activity/tolerance for mobility and gait  - Educate and engage patient/family in tolerated activity level and precautions    Outcome: Progressing     Problem: Impaired Activities of Daily Living  Goal: Achieve highest/safest level of independence in self care  Description: Interventions:  - Assess ability and encourage patient to participate in ADLs to maximize function  - Promote sitting position while performing ADLs such as feeding, grooming, and bathing  - Educate and encourage patient/family in tolerated functional activity level and precautions during self-care    Outcome: Progressing     Problem: SKIN/TISSUE INTEGRITY - ADULT  Goal: Skin integrity remains intact  Description: INTERVENTIONS  - Assess and document risk factors for pressure ulcer development  - Assess and document skin integrity  - Monitor for areas of redness and/or skin breakdown  - Initiate interventions, skin care algorithm/standards of care as needed  Outcome: Progressing     Problem: PAIN - ADULT  Goal: Verbalizes/displays adequate comfort level or patient's stated pain goal  Description: INTERVENTIONS:  - Encourage pt to monitor pain and request assistance  - Assess pain using appropriate pain scale  - Administer analgesics based on type and severity of pain and evaluate response  - Implement non-pharmacological measures as appropriate and evaluate response  - Consider cultural and social influences on pain and pain management  - Manage/alleviate anxiety  - Utilize distraction and/or relaxation techniques  - Monitor for opioid side effects  - Notify MD/LIP if interventions unsuccessful or patient reports new pain  - Anticipate increased pain with activity and pre-medicate as appropriate  Outcome: Progressing     Problem: SAFETY ADULT - FALL  Goal: Free from fall injury  Description: INTERVENTIONS:  - Assess pt frequently for physical needs  - Identify cognitive and physical deficits and behaviors that affect risk of falls.   - [place] fall precautions as indicated by assessment.  - Educate pt/family on patient safety including physical limitations  - Instruct pt to call for assistance with activity based on assessment  - Modify environment to reduce risk of injury  - Provide assistive devices as appropriate  - Consider OT/PT consult to assist with strengthening/mobility  - Encourage toileting schedule  Outcome: Progressing     Problem: DISCHARGE PLANNING  Goal: Discharge to home or other facility with appropriate resources  Description: INTERVENTIONS:  - Identify barriers to discharge w/pt and caregiver  - Include patient/family/discharge partner in discharge planning  - Arrange for needed discharge resources and transportation as appropriate  - Identify discharge learning needs (meds, wound care, etc)  - Arrange for interpreters to assist at discharge as needed  - Consider post-discharge preferences of patient/family/discharge partner  - Complete POLST form as appropriate  - Assess patient's ability to be responsible for managing their own health  - Refer to Case Management Department for coordinating discharge planning if the patient needs post-hospital services based on physician/LIP order or complex needs related to functional status, cognitive ability or social support system  Outcome: Progressing

## 2023-06-13 NOTE — PLAN OF CARE
Problem: Patient Centered Care  Goal: Patient preferences are identified and integrated in the patient's plan of care  Description: Interventions:  - What would you like us to know as we care for you?  From home alone  - Provide timely, complete, and accurate information to patient/family  - Incorporate patient and family knowledge, values, beliefs, and cultural backgrounds into the planning and delivery of care  - Encourage patient/family to participate in care and decision-making at the level they choose  - Honor patient and family perspectives and choices  Outcome: Progressing     Problem: Patient/Family Goals  Goal: Patient/Family Long Term Goal  Description: Patient's Long Term Goal: Unclogged G-Tube    Interventions:  - Monitor vital signs  - Monitor appropriate labs  - Pain management  - Administer medications per order  - Follow MD orders  - Diagnostics per order  - Update / inform patient and family on plan of care  - Discharge planning  - See additional Care Plan goals for specific interventions  Outcome: Progressing  Goal: Patient/Family Short Term Goal  Description: Patient's Short Term Goal: Improve pancreatitis    Interventions:   - Monitor vital signs  - Monitor appropriate labs  - Pain management  - Administer medications per order  - Follow MD orders  - Diagnostics per order  - Update / inform patient and family on plan of care  - See additional Care Plan goals for specific interventions  Outcome: Progressing     Problem: METABOLIC/FLUID AND ELECTROLYTES - ADULT  Goal: Electrolytes maintained within normal limits  Description: INTERVENTIONS:  - Monitor labs and rhythm and assess patient for signs and symptoms of electrolyte imbalances  - Administer electrolyte replacement as ordered  - Monitor response to electrolyte replacements, including rhythm and repeat lab results as appropriate  - Fluid restriction as ordered  - Instruct patient on fluid and nutrition restrictions as appropriate  Outcome: Progressing  Goal: Hemodynamic stability and optimal renal function maintained  Description: INTERVENTIONS:  - Monitor labs and assess for signs and symptoms of volume excess or deficit  - Monitor intake, output and patient weight  - Monitor urine specific gravity, serum osmolarity and serum sodium as indicated or ordered  - Monitor response to interventions for patient's volume status, including labs, urine output, blood pressure (other measures as available)  - Encourage oral intake as appropriate  - Instruct patient on fluid and nutrition restrictions as appropriate  Outcome: Progressing     Problem: Impaired Functional Mobility  Goal: Achieve highest/safest level of mobility/gait  Description: Interventions:  - Assess patient's functional ability and stability  - Promote increasing activity/tolerance for mobility and gait  - Educate and engage patient/family in tolerated activity level and precautions    Outcome: Progressing     Problem: Impaired Activities of Daily Living  Goal: Achieve highest/safest level of independence in self care  Description: Interventions:  - Assess ability and encourage patient to participate in ADLs to maximize function  - Promote sitting position while performing ADLs such as feeding, grooming, and bathing  - Educate and encourage patient/family in tolerated functional activity level and precautions during self-care    Outcome: Progressing     Problem: SKIN/TISSUE INTEGRITY - ADULT  Goal: Skin integrity remains intact  Description: INTERVENTIONS  - Assess and document risk factors for pressure ulcer development  - Assess and document skin integrity  - Monitor for areas of redness and/or skin breakdown  - Initiate interventions, skin care algorithm/standards of care as needed  Outcome: Progressing     Problem: PAIN - ADULT  Goal: Verbalizes/displays adequate comfort level or patient's stated pain goal  Description: INTERVENTIONS:  - Encourage pt to monitor pain and request assistance  - Assess pain using appropriate pain scale  - Administer analgesics based on type and severity of pain and evaluate response  - Implement non-pharmacological measures as appropriate and evaluate response  - Consider cultural and social influences on pain and pain management  - Manage/alleviate anxiety  - Utilize distraction and/or relaxation techniques  - Monitor for opioid side effects  - Notify MD/LIP if interventions unsuccessful or patient reports new pain  - Anticipate increased pain with activity and pre-medicate as appropriate  Outcome: Progressing     Problem: SAFETY ADULT - FALL  Goal: Free from fall injury  Description: INTERVENTIONS:  - Assess pt frequently for physical needs  - Identify cognitive and physical deficits and behaviors that affect risk of falls.   - [place] fall precautions as indicated by assessment.  - Educate pt/family on patient safety including physical limitations  - Instruct pt to call for assistance with activity based on assessment  - Modify environment to reduce risk of injury  - Provide assistive devices as appropriate  - Consider OT/PT consult to assist with strengthening/mobility  - Encourage toileting schedule  Outcome: Progressing     Problem: GASTROINTESTINAL - ADULT  Goal: Maintains or returns to baseline bowel function  Description: INTERVENTIONS:  - Assess bowel function  - Maintain adequate hydration with IV or PO as ordered and tolerated  - Evaluate effectiveness of GI medications  - Encourage mobilization and activity  - Obtain nutritional consult as needed  - Establish a toileting routine/schedule  - Consider collaborating with pharmacy to review patient's medication profile  Outcome: Not Progressing  Goal: Maintains adequate nutritional intake (undernourished)  Description: INTERVENTIONS:  - Monitor percentage of each meal consumed  - Identify factors contributing to decreased intake, treat as appropriate  - Assist with meals as needed  - Monitor I&O, WT and lab values  - Obtain nutritional consult as needed  - Optimize oral hygiene and moisture  - Encourage food from home; allow for food preferences  - Enhance eating environment  Outcome: Not Progressing     Problem: DISCHARGE PLANNING  Goal: Discharge to home or other facility with appropriate resources  Description: INTERVENTIONS:  - Identify barriers to discharge w/pt and caregiver  - Include patient/family/discharge partner in discharge planning  - Arrange for needed discharge resources and transportation as appropriate  - Identify discharge learning needs (meds, wound care, etc)  - Arrange for interpreters to assist at discharge as needed  - Consider post-discharge preferences of patient/family/discharge partner  - Complete POLST form as appropriate  - Assess patient's ability to be responsible for managing their own health  - Refer to Case Management Department for coordinating discharge planning if the patient needs post-hospital services based on physician/LIP order or complex needs related to functional status, cognitive ability or social support system  Outcome: Not Progressing  Patient NPO with TPN infusing per orders. Plan for procedure tomorrow, consents obtained.

## 2023-06-14 ENCOUNTER — ANESTHESIA (OUTPATIENT)
Dept: SURGERY | Facility: HOSPITAL | Age: 48
End: 2023-06-14
Payer: COMMERCIAL

## 2023-06-14 ENCOUNTER — ANESTHESIA EVENT (OUTPATIENT)
Dept: SURGERY | Facility: HOSPITAL | Age: 48
End: 2023-06-14
Payer: COMMERCIAL

## 2023-06-14 LAB
ANION GAP SERPL CALC-SCNC: 7 MMOL/L (ref 0–18)
BUN BLD-MCNC: 23 MG/DL (ref 7–18)
BUN/CREAT SERPL: 31.1 (ref 10–20)
CALCIUM BLD-MCNC: 8.6 MG/DL (ref 8.5–10.1)
CHLORIDE SERPL-SCNC: 108 MMOL/L (ref 98–112)
CO2 SERPL-SCNC: 26 MMOL/L (ref 21–32)
CREAT BLD-MCNC: 0.74 MG/DL
DEPRECATED HBV CORE AB SER IA-ACNC: 566.1 NG/ML
DEPRECATED RDW RBC AUTO: 41.1 FL (ref 35.1–46.3)
ERYTHROCYTE [DISTWIDTH] IN BLOOD BY AUTOMATED COUNT: 12.3 % (ref 11–15)
GFR SERPLBLD BASED ON 1.73 SQ M-ARVRAT: 112 ML/MIN/1.73M2 (ref 60–?)
GLUCOSE BLD-MCNC: 90 MG/DL (ref 70–99)
HCT VFR BLD AUTO: 27.3 %
HGB BLD-MCNC: 9 G/DL
IRON SATN MFR SERPL: 24 %
IRON SERPL-MCNC: 78 UG/DL
MAGNESIUM SERPL-MCNC: 2 MG/DL (ref 1.6–2.6)
MCH RBC QN AUTO: 30.2 PG (ref 26–34)
MCHC RBC AUTO-ENTMCNC: 33 G/DL (ref 31–37)
MCV RBC AUTO: 91.6 FL
OSMOLALITY SERPL CALC.SUM OF ELEC: 295 MOSM/KG (ref 275–295)
PHOSPHATE SERPL-MCNC: 4.5 MG/DL (ref 2.5–4.9)
PLATELET # BLD AUTO: 347 10(3)UL (ref 150–450)
POTASSIUM SERPL-SCNC: 4.1 MMOL/L (ref 3.5–5.1)
RBC # BLD AUTO: 2.98 X10(6)UL
SODIUM SERPL-SCNC: 141 MMOL/L (ref 136–145)
TIBC SERPL-MCNC: 319 UG/DL (ref 240–450)
TRANSFERRIN SERPL-MCNC: 214 MG/DL (ref 200–360)
WBC # BLD AUTO: 4.1 X10(3) UL (ref 4–11)

## 2023-06-14 PROCEDURE — 0D160ZA BYPASS STOMACH TO JEJUNUM, OPEN APPROACH: ICD-10-PCS | Performed by: SPECIALIST

## 2023-06-14 PROCEDURE — 0DP67UZ REMOVAL OF FEEDING DEVICE FROM STOMACH, VIA NATURAL OR ARTIFICIAL OPENING: ICD-10-PCS | Performed by: SPECIALIST

## 2023-06-14 PROCEDURE — 99233 SBSQ HOSP IP/OBS HIGH 50: CPT | Performed by: HOSPITALIST

## 2023-06-14 RX ORDER — HYDROMORPHONE HYDROCHLORIDE 1 MG/ML
0.5 INJECTION, SOLUTION INTRAMUSCULAR; INTRAVENOUS; SUBCUTANEOUS EVERY 2 HOUR PRN
Status: DISCONTINUED | OUTPATIENT
Start: 2023-06-14 | End: 2023-06-15

## 2023-06-14 RX ORDER — HYDROMORPHONE HYDROCHLORIDE 1 MG/ML
0.6 INJECTION, SOLUTION INTRAMUSCULAR; INTRAVENOUS; SUBCUTANEOUS EVERY 5 MIN PRN
Status: DISCONTINUED | OUTPATIENT
Start: 2023-06-14 | End: 2023-06-14 | Stop reason: HOSPADM

## 2023-06-14 RX ORDER — DEXAMETHASONE SODIUM PHOSPHATE 4 MG/ML
VIAL (ML) INJECTION AS NEEDED
Status: DISCONTINUED | OUTPATIENT
Start: 2023-06-14 | End: 2023-06-14 | Stop reason: SURG

## 2023-06-14 RX ORDER — SODIUM CHLORIDE, SODIUM LACTATE, POTASSIUM CHLORIDE, CALCIUM CHLORIDE 600; 310; 30; 20 MG/100ML; MG/100ML; MG/100ML; MG/100ML
INJECTION, SOLUTION INTRAVENOUS CONTINUOUS
Status: DISCONTINUED | OUTPATIENT
Start: 2023-06-14 | End: 2023-06-14 | Stop reason: HOSPADM

## 2023-06-14 RX ORDER — HYDROMORPHONE HYDROCHLORIDE 1 MG/ML
0.4 INJECTION, SOLUTION INTRAMUSCULAR; INTRAVENOUS; SUBCUTANEOUS EVERY 5 MIN PRN
Status: DISCONTINUED | OUTPATIENT
Start: 2023-06-14 | End: 2023-06-14 | Stop reason: HOSPADM

## 2023-06-14 RX ORDER — SODIUM CHLORIDE, SODIUM LACTATE, POTASSIUM CHLORIDE, CALCIUM CHLORIDE 600; 310; 30; 20 MG/100ML; MG/100ML; MG/100ML; MG/100ML
INJECTION, SOLUTION INTRAVENOUS CONTINUOUS
Status: DISCONTINUED | OUTPATIENT
Start: 2023-06-14 | End: 2023-06-14 | Stop reason: ALTCHOICE

## 2023-06-14 RX ORDER — MORPHINE SULFATE 4 MG/ML
4 INJECTION, SOLUTION INTRAMUSCULAR; INTRAVENOUS EVERY 4 HOURS PRN
Status: DISCONTINUED | OUTPATIENT
Start: 2023-06-14 | End: 2023-06-15

## 2023-06-14 RX ORDER — OXYCODONE HYDROCHLORIDE 5 MG/1
15 TABLET ORAL EVERY 4 HOURS PRN
Status: DISCONTINUED | OUTPATIENT
Start: 2023-06-14 | End: 2023-06-15

## 2023-06-14 RX ORDER — MORPHINE SULFATE 2 MG/ML
2 INJECTION, SOLUTION INTRAMUSCULAR; INTRAVENOUS EVERY 4 HOURS PRN
Status: DISCONTINUED | OUTPATIENT
Start: 2023-06-14 | End: 2023-06-15

## 2023-06-14 RX ORDER — GLYCOPYRROLATE 0.2 MG/ML
INJECTION, SOLUTION INTRAMUSCULAR; INTRAVENOUS AS NEEDED
Status: DISCONTINUED | OUTPATIENT
Start: 2023-06-14 | End: 2023-06-14 | Stop reason: SURG

## 2023-06-14 RX ORDER — MORPHINE SULFATE 2 MG/ML
1 INJECTION, SOLUTION INTRAMUSCULAR; INTRAVENOUS EVERY 4 HOURS PRN
Status: DISCONTINUED | OUTPATIENT
Start: 2023-06-14 | End: 2023-06-15

## 2023-06-14 RX ORDER — ESMOLOL HYDROCHLORIDE 10 MG/ML
INJECTION INTRAVENOUS AS NEEDED
Status: DISCONTINUED | OUTPATIENT
Start: 2023-06-14 | End: 2023-06-14 | Stop reason: SURG

## 2023-06-14 RX ORDER — CEFAZOLIN SODIUM/WATER 2 G/20 ML
SYRINGE (ML) INTRAVENOUS AS NEEDED
Status: DISCONTINUED | OUTPATIENT
Start: 2023-06-14 | End: 2023-06-14 | Stop reason: SURG

## 2023-06-14 RX ORDER — MIDAZOLAM HYDROCHLORIDE 1 MG/ML
INJECTION INTRAMUSCULAR; INTRAVENOUS AS NEEDED
Status: DISCONTINUED | OUTPATIENT
Start: 2023-06-14 | End: 2023-06-14 | Stop reason: SURG

## 2023-06-14 RX ORDER — MORPHINE SULFATE 4 MG/ML
4 INJECTION, SOLUTION INTRAMUSCULAR; INTRAVENOUS EVERY 10 MIN PRN
Status: DISCONTINUED | OUTPATIENT
Start: 2023-06-14 | End: 2023-06-14 | Stop reason: HOSPADM

## 2023-06-14 RX ORDER — SODIUM CHLORIDE 9 MG/ML
INJECTION, SOLUTION INTRAVENOUS CONTINUOUS PRN
Status: DISCONTINUED | OUTPATIENT
Start: 2023-06-14 | End: 2023-06-14 | Stop reason: SURG

## 2023-06-14 RX ORDER — DEXTROSE MONOHYDRATE, SODIUM CHLORIDE, AND POTASSIUM CHLORIDE 50; 1.49; 4.5 G/1000ML; G/1000ML; G/1000ML
INJECTION, SOLUTION INTRAVENOUS CONTINUOUS
Status: DISCONTINUED | OUTPATIENT
Start: 2023-06-14 | End: 2023-06-17

## 2023-06-14 RX ORDER — ROCURONIUM BROMIDE 10 MG/ML
INJECTION, SOLUTION INTRAVENOUS AS NEEDED
Status: DISCONTINUED | OUTPATIENT
Start: 2023-06-14 | End: 2023-06-14 | Stop reason: SURG

## 2023-06-14 RX ORDER — NEOSTIGMINE METHYLSULFATE 1 MG/ML
INJECTION, SOLUTION INTRAVENOUS AS NEEDED
Status: DISCONTINUED | OUTPATIENT
Start: 2023-06-14 | End: 2023-06-14 | Stop reason: SURG

## 2023-06-14 RX ORDER — ONDANSETRON 2 MG/ML
INJECTION INTRAMUSCULAR; INTRAVENOUS AS NEEDED
Status: DISCONTINUED | OUTPATIENT
Start: 2023-06-14 | End: 2023-06-14 | Stop reason: SURG

## 2023-06-14 RX ORDER — HYDROMORPHONE HYDROCHLORIDE 1 MG/ML
0.2 INJECTION, SOLUTION INTRAMUSCULAR; INTRAVENOUS; SUBCUTANEOUS EVERY 5 MIN PRN
Status: DISCONTINUED | OUTPATIENT
Start: 2023-06-14 | End: 2023-06-14 | Stop reason: HOSPADM

## 2023-06-14 RX ORDER — ENOXAPARIN SODIUM 100 MG/ML
40 INJECTION SUBCUTANEOUS DAILY
Status: DISCONTINUED | OUTPATIENT
Start: 2023-06-15 | End: 2023-06-23

## 2023-06-14 RX ORDER — MORPHINE SULFATE 4 MG/ML
2 INJECTION, SOLUTION INTRAMUSCULAR; INTRAVENOUS EVERY 10 MIN PRN
Status: DISCONTINUED | OUTPATIENT
Start: 2023-06-14 | End: 2023-06-14 | Stop reason: HOSPADM

## 2023-06-14 RX ORDER — MORPHINE SULFATE 10 MG/ML
6 INJECTION, SOLUTION INTRAMUSCULAR; INTRAVENOUS EVERY 10 MIN PRN
Status: DISCONTINUED | OUTPATIENT
Start: 2023-06-14 | End: 2023-06-14 | Stop reason: HOSPADM

## 2023-06-14 RX ORDER — NALOXONE HYDROCHLORIDE 0.4 MG/ML
80 INJECTION, SOLUTION INTRAMUSCULAR; INTRAVENOUS; SUBCUTANEOUS AS NEEDED
Status: DISCONTINUED | OUTPATIENT
Start: 2023-06-14 | End: 2023-06-14 | Stop reason: HOSPADM

## 2023-06-14 RX ORDER — LIDOCAINE HYDROCHLORIDE 10 MG/ML
INJECTION, SOLUTION EPIDURAL; INFILTRATION; INTRACAUDAL; PERINEURAL AS NEEDED
Status: DISCONTINUED | OUTPATIENT
Start: 2023-06-14 | End: 2023-06-14 | Stop reason: SURG

## 2023-06-14 RX ORDER — HYDROMORPHONE HYDROCHLORIDE 1 MG/ML
1 INJECTION, SOLUTION INTRAMUSCULAR; INTRAVENOUS; SUBCUTANEOUS EVERY 2 HOUR PRN
Status: DISCONTINUED | OUTPATIENT
Start: 2023-06-14 | End: 2023-06-15

## 2023-06-14 RX ORDER — OXYCODONE HYDROCHLORIDE 5 MG/1
10 TABLET ORAL EVERY 4 HOURS PRN
Status: DISCONTINUED | OUTPATIENT
Start: 2023-06-14 | End: 2023-06-15

## 2023-06-14 RX ADMIN — ROCURONIUM BROMIDE 30 MG: 10 INJECTION, SOLUTION INTRAVENOUS at 11:39:00

## 2023-06-14 RX ADMIN — ROCURONIUM BROMIDE 10 MG: 10 INJECTION, SOLUTION INTRAVENOUS at 11:20:00

## 2023-06-14 RX ADMIN — LIDOCAINE HYDROCHLORIDE 50 MG: 10 INJECTION, SOLUTION EPIDURAL; INFILTRATION; INTRACAUDAL; PERINEURAL at 11:20:00

## 2023-06-14 RX ADMIN — NEOSTIGMINE METHYLSULFATE 3 MG: 1 INJECTION, SOLUTION INTRAVENOUS at 12:56:00

## 2023-06-14 RX ADMIN — SODIUM CHLORIDE: 9 INJECTION, SOLUTION INTRAVENOUS at 11:35:00

## 2023-06-14 RX ADMIN — SODIUM CHLORIDE, SODIUM LACTATE, POTASSIUM CHLORIDE, CALCIUM CHLORIDE: 600; 310; 30; 20 INJECTION, SOLUTION INTRAVENOUS at 13:05:00

## 2023-06-14 RX ADMIN — GLYCOPYRROLATE 0.4 MG: 0.2 INJECTION, SOLUTION INTRAMUSCULAR; INTRAVENOUS at 12:56:00

## 2023-06-14 RX ADMIN — CEFAZOLIN SODIUM/WATER 2 G: 2 G/20 ML SYRINGE (ML) INTRAVENOUS at 11:36:00

## 2023-06-14 RX ADMIN — ONDANSETRON 4 MG: 2 INJECTION INTRAMUSCULAR; INTRAVENOUS at 12:50:00

## 2023-06-14 RX ADMIN — DEXAMETHASONE SODIUM PHOSPHATE 4 MG: 4 MG/ML VIAL (ML) INJECTION at 12:45:00

## 2023-06-14 RX ADMIN — ESMOLOL HYDROCHLORIDE 10 MG: 10 INJECTION INTRAVENOUS at 11:43:00

## 2023-06-14 RX ADMIN — ESMOLOL HYDROCHLORIDE 10 MG: 10 INJECTION INTRAVENOUS at 11:52:00

## 2023-06-14 RX ADMIN — MIDAZOLAM HYDROCHLORIDE 2 MG: 1 INJECTION INTRAMUSCULAR; INTRAVENOUS at 11:20:00

## 2023-06-14 RX ADMIN — SODIUM CHLORIDE: 9 INJECTION, SOLUTION INTRAVENOUS at 13:05:00

## 2023-06-14 RX ADMIN — SODIUM CHLORIDE, SODIUM LACTATE, POTASSIUM CHLORIDE, CALCIUM CHLORIDE: 600; 310; 30; 20 INJECTION, SOLUTION INTRAVENOUS at 11:15:00

## 2023-06-14 NOTE — PLAN OF CARE
Received from PACU alert and oriented x4. Mid abdominal dressing is clean, dry and intact. Patient in severe pain; did not tolerate having blankets removed and touch or the stethoscope to body for assessment. Patient was given dilaudid that helped with intensity of pain. Right nare NGT (51cm) to low continuous suction. Patient on strict NPO. Double lumen PICC with no blood return or patency issues. IVF given. Glasses returned from pre-op holding. Plan of care reviewed with patient. Problem: Patient Centered Care  Goal: Patient preferences are identified and integrated in the patient's plan of care  Description: Interventions:  - What would you like us to know as we care for you?  From home alone  - Provide timely, complete, and accurate information to patient/family  - Incorporate patient and family knowledge, values, beliefs, and cultural backgrounds into the planning and delivery of care  - Encourage patient/family to participate in care and decision-making at the level they choose  - Honor patient and family perspectives and choices  Outcome: Progressing

## 2023-06-14 NOTE — ANESTHESIA PROCEDURE NOTES
Airway  Date/Time: 6/14/2023 11:21 AM  Urgency: Elective    Airway not difficult    General Information and Staff    Patient location during procedure: OR  Anesthesiologist: Stephanie Romo MD  Performed: anesthesiologist   Performed by: Stephanie Romo MD  Authorized by: Stephanie Romo MD      Indications and Patient Condition  Indications for airway management: anesthesia  Spontaneous ventilation: present  Sedation level: deep  Preoxygenated: yes  Patient position: sniffing  Mask difficulty assessment: 1 - vent by mask    Final Airway Details  Final airway type: endotracheal airway      Successful airway: ETT  Cuffed: yes   Successful intubation technique: direct laryngoscopy  Endotracheal tube insertion site: oral  Blade: N[place]  Blade size: #3  ETT size (mm): 7.5    Cormack-Lehane Classification: grade IIA - partial view of glottis  Placement verified by: capnometry   Measured from: teeth  ETT to teeth (cm): 23  Number of attempts at approach: 1

## 2023-06-14 NOTE — PLAN OF CARE
No acute changes overnight. Prn morphine given for pain management. Maintaining NPO status. Plan for procedure this morning. Problem: Patient Centered Care  Goal: Patient preferences are identified and integrated in the patient's plan of care  Description: Interventions:  - What would you like us to know as we care for you?  From home alone  - Provide timely, complete, and accurate information to patient/family  - Incorporate patient and family knowledge, values, beliefs, and cultural backgrounds into the planning and delivery of care  - Encourage patient/family to participate in care and decision-making at the level they choose  - Honor patient and family perspectives and choices  Outcome: Progressing     Problem: Patient/Family Goals  Goal: Patient/Family Long Term Goal  Description: Patient's Long Term Goal: Unclogged G-Tube    Interventions:  - Monitor vital signs  - Monitor appropriate labs  - Pain management  - Administer medications per order  - Follow MD orders  - Diagnostics per order  - Update / inform patient and family on plan of care  - Discharge planning  - See additional Care Plan goals for specific interventions  Outcome: Progressing  Goal: Patient/Family Short Term Goal  Description: Patient's Short Term Goal: Improve pancreatitis    Interventions:   - Monitor vital signs  - Monitor appropriate labs  - Pain management  - Administer medications per order  - Follow MD orders  - Diagnostics per order  - Update / inform patient and family on plan of care  - See additional Care Plan goals for specific interventions  Outcome: Progressing     Problem: GASTROINTESTINAL - ADULT  Goal: Maintains or returns to baseline bowel function  Description: INTERVENTIONS:  - Assess bowel function  - Maintain adequate hydration with IV or PO as ordered and tolerated  - Evaluate effectiveness of GI medications  - Encourage mobilization and activity  - Obtain nutritional consult as needed  - Establish a toileting routine/schedule  - Consider collaborating with pharmacy to review patient's medication profile  Outcome: Progressing  Goal: Maintains adequate nutritional intake (undernourished)  Description: INTERVENTIONS:  - Monitor percentage of each meal consumed  - Identify factors contributing to decreased intake, treat as appropriate  - Assist with meals as needed  - Monitor I&O, WT and lab values  - Obtain nutritional consult as needed  - Optimize oral hygiene and moisture  - Encourage food from home; allow for food preferences  - Enhance eating environment  Outcome: Progressing     Problem: METABOLIC/FLUID AND ELECTROLYTES - ADULT  Goal: Electrolytes maintained within normal limits  Description: INTERVENTIONS:  - Monitor labs and rhythm and assess patient for signs and symptoms of electrolyte imbalances  - Administer electrolyte replacement as ordered  - Monitor response to electrolyte replacements, including rhythm and repeat lab results as appropriate  - Fluid restriction as ordered  - Instruct patient on fluid and nutrition restrictions as appropriate  Outcome: Progressing  Goal: Hemodynamic stability and optimal renal function maintained  Description: INTERVENTIONS:  - Monitor labs and assess for signs and symptoms of volume excess or deficit  - Monitor intake, output and patient weight  - Monitor urine specific gravity, serum osmolarity and serum sodium as indicated or ordered  - Monitor response to interventions for patient's volume status, including labs, urine output, blood pressure (other measures as available)  - Encourage oral intake as appropriate  - Instruct patient on fluid and nutrition restrictions as appropriate  Outcome: Progressing     Problem: Impaired Functional Mobility  Goal: Achieve highest/safest level of mobility/gait  Description: Interventions:  - Assess patient's functional ability and stability  - Promote increasing activity/tolerance for mobility and gait  - Educate and engage patient/family in tolerated activity level and precautions    Outcome: Progressing     Problem: Impaired Activities of Daily Living  Goal: Achieve highest/safest level of independence in self care  Description: Interventions:  - Assess ability and encourage patient to participate in ADLs to maximize function  - Promote sitting position while performing ADLs such as feeding, grooming, and bathing  - Educate and encourage patient/family in tolerated functional activity level and precautions during self-care    Outcome: Progressing     Problem: SKIN/TISSUE INTEGRITY - ADULT  Goal: Skin integrity remains intact  Description: INTERVENTIONS  - Assess and document risk factors for pressure ulcer development  - Assess and document skin integrity  - Monitor for areas of redness and/or skin breakdown  - Initiate interventions, skin care algorithm/standards of care as needed  Outcome: Progressing     Problem: PAIN - ADULT  Goal: Verbalizes/displays adequate comfort level or patient's stated pain goal  Description: INTERVENTIONS:  - Encourage pt to monitor pain and request assistance  - Assess pain using appropriate pain scale  - Administer analgesics based on type and severity of pain and evaluate response  - Implement non-pharmacological measures as appropriate and evaluate response  - Consider cultural and social influences on pain and pain management  - Manage/alleviate anxiety  - Utilize distraction and/or relaxation techniques  - Monitor for opioid side effects  - Notify MD/LIP if interventions unsuccessful or patient reports new pain  - Anticipate increased pain with activity and pre-medicate as appropriate  Outcome: Progressing     Problem: SAFETY ADULT - FALL  Goal: Free from fall injury  Description: INTERVENTIONS:  - Assess pt frequently for physical needs  - Identify cognitive and physical deficits and behaviors that affect risk of falls.   - [place] fall precautions as indicated by assessment.  - Educate pt/family on patient safety including physical limitations  - Instruct pt to call for assistance with activity based on assessment  - Modify environment to reduce risk of injury  - Provide assistive devices as appropriate  - Consider OT/PT consult to assist with strengthening/mobility  - Encourage toileting schedule  Outcome: Progressing     Problem: DISCHARGE PLANNING  Goal: Discharge to home or other facility with appropriate resources  Description: INTERVENTIONS:  - Identify barriers to discharge w/pt and caregiver  - Include patient/family/discharge partner in discharge planning  - Arrange for needed discharge resources and transportation as appropriate  - Identify discharge learning needs (meds, wound care, etc)  - Arrange for interpreters to assist at discharge as needed  - Consider post-discharge preferences of patient/family/discharge partner  - Complete POLST form as appropriate  - Assess patient's ability to be responsible for managing their own health  - Refer to Case Management Department for coordinating discharge planning if the patient needs post-hospital services based on physician/LIP order or complex needs related to functional status, cognitive ability or social support system  Outcome: Progressing

## 2023-06-14 NOTE — ANESTHESIA PROCEDURE NOTES
Peripheral IV  Date/Time: 6/14/2023 11:35 AM  Inserted by: Brenna Keller MD    Placement  Needle size: 18 G  Laterality: left  Location: hand  Local anesthetic: none  Site prep: alcohol  Technique: anatomical landmarks  Attempts: 1

## 2023-06-14 NOTE — PLAN OF CARE
No acute changes. Went for exploratory laparotomy today. [place] [facility] [place]'s belongings brought down by Willie GAUTAM [employer]ure to room 456. Problem: Patient Centered Care  Goal: Patient preferences are identified and integrated in the patient's plan of care  Description: Interventions:  - What would you like us to know as we care for you?  From home alone  - Provide timely, complete, and accurate information to patient/family  - Incorporate patient and family knowledge, values, beliefs, and cultural backgrounds into the planning and delivery of care  - Encourage patient/family to participate in care and decision-making at the level they choose  - Honor patient and family perspectives and choices  Outcome: Progressing     Problem: Patient/Family Goals  Goal: Patient/Family Long Term Goal  Description: Patient's Long Term Goal: Unclogged G-Tube    Interventions:  - Monitor vital signs  - Monitor appropriate labs  - Pain management  - Administer medications per order  - Follow MD orders  - Diagnostics per order  - Update / inform patient and family on plan of care  - Discharge planning  - See additional Care Plan goals for specific interventions  Outcome: Progressing  Goal: Patient/Family Short Term Goal  Description: Patient's Short Term Goal: Improve pancreatitis    Interventions:   - Monitor vital signs  - Monitor appropriate labs  - Pain management  - Administer medications per order  - Follow MD orders  - Diagnostics per order  - Update / inform patient and family on plan of care  - See additional Care Plan goals for specific interventions  Outcome: Progressing     Problem: GASTROINTESTINAL - ADULT  Goal: Maintains or returns to baseline bowel function  Description: INTERVENTIONS:  - Assess bowel function  - Maintain adequate hydration with IV or PO as ordered and tolerated  - Evaluate effectiveness of GI medications  - Encourage mobilization and activity  - Obtain nutritional consult as needed  - Establish a toileting routine/schedule  - Consider collaborating with pharmacy to review patient's medication profile  Outcome: Progressing  Goal: Maintains adequate nutritional intake (undernourished)  Description: INTERVENTIONS:  - Monitor percentage of each meal consumed  - Identify factors contributing to decreased intake, treat as appropriate  - Assist with meals as needed  - Monitor I&O, WT and lab values  - Obtain nutritional consult as needed  - Optimize oral hygiene and moisture  - Encourage food from home; allow for food preferences  - Enhance eating environment  Outcome: Progressing     Problem: METABOLIC/FLUID AND ELECTROLYTES - ADULT  Goal: Electrolytes maintained within normal limits  Description: INTERVENTIONS:  - Monitor labs and rhythm and assess patient for signs and symptoms of electrolyte imbalances  - Administer electrolyte replacement as ordered  - Monitor response to electrolyte replacements, including rhythm and repeat lab results as appropriate  - Fluid restriction as ordered  - Instruct patient on fluid and nutrition restrictions as appropriate  Outcome: Progressing  Goal: Hemodynamic stability and optimal renal function maintained  Description: INTERVENTIONS:  - Monitor labs and assess for signs and symptoms of volume excess or deficit  - Monitor intake, output and patient weight  - Monitor urine specific gravity, serum osmolarity and serum sodium as indicated or ordered  - Monitor response to interventions for patient's volume status, including labs, urine output, blood pressure (other measures as available)  - Encourage oral intake as appropriate  - Instruct patient on fluid and nutrition restrictions as appropriate  Outcome: Progressing     Problem: Impaired Functional Mobility  Goal: Achieve highest/safest level of mobility/gait  Description: Interventions:  - Assess patient's functional ability and stability  - Promote increasing activity/tolerance for mobility and gait  - Educate and engage patient/family in tolerated activity level and precautions  Outcome: Progressing     Problem: Impaired Activities of Daily Living  Goal: Achieve highest/safest level of independence in self care  Description: Interventions:  - Assess ability and encourage patient to participate in ADLs to maximize function  - Promote sitting position while performing ADLs such as feeding, grooming, and bathing  - Educate and encourage patient/family in tolerated functional activity level and precautions during self-care  Outcome: Progressing     Problem: SKIN/TISSUE INTEGRITY - ADULT  Goal: Skin integrity remains intact  Description: INTERVENTIONS  - Assess and document risk factors for pressure ulcer development  - Assess and document skin integrity  - Monitor for areas of redness and/or skin breakdown  - Initiate interventions, skin care algorithm/standards of care as needed  Outcome: Progressing     Problem: PAIN - ADULT  Goal: Verbalizes/displays adequate comfort level or patient's stated pain goal  Description: INTERVENTIONS:  - Encourage pt to monitor pain and request assistance  - Assess pain using appropriate pain scale  - Administer analgesics based on type and severity of pain and evaluate response  - Implement non-pharmacological measures as appropriate and evaluate response  - Consider cultural and social influences on pain and pain management  - Manage/alleviate anxiety  - Utilize distraction and/or relaxation techniques  - Monitor for opioid side effects  - Notify MD/LIP if interventions unsuccessful or patient reports new pain  - Anticipate increased pain with activity and pre-medicate as appropriate  Outcome: Progressing     Problem: SAFETY ADULT - FALL  Goal: Free from fall injury  Description: INTERVENTIONS:  - Assess pt frequently for physical needs  - Identify cognitive and physical deficits and behaviors that affect risk of falls.   - [place] fall precautions as indicated by assessment.  - Educate pt/family on patient safety including physical limitations  - Instruct pt to call for assistance with activity based on assessment  - Modify environment to reduce risk of injury  - Provide assistive devices as appropriate  - Consider OT/PT consult to assist with strengthening/mobility  - Encourage toileting schedule  Outcome: Progressing     Problem: DISCHARGE PLANNING  Goal: Discharge to home or other facility with appropriate resources  Description: INTERVENTIONS:  - Identify barriers to discharge w/pt and caregiver  - Include patient/family/discharge partner in discharge planning  - Arrange for needed discharge resources and transportation as appropriate  - Identify discharge learning needs (meds, wound care, etc)  - Arrange for interpreters to assist at discharge as needed  - Consider post-discharge preferences of patient/family/discharge partner  - Complete POLST form as appropriate  - Assess patient's ability to be responsible for managing their own health  - Refer to Case Management Department for coordinating discharge planning if the patient needs post-hospital services based on physician/LIP order or complex needs related to functional status, cognitive ability or social support system  Outcome: Progressing

## 2023-06-14 NOTE — BRIEF OP NOTE
2  Patients Name: Marques Leggett  Attending Physician: Genaro Holland MD  Operating Physician: Leah Mcmahon MD  CSN: [id number]     Location:  OR  MRN: [id number]    YOB: 1975  Admission Date: 6/1/2023  Operation Date: 6/14/2023    Brief Operative Report    Pre-Operative Diagnosis: Gastric outlet obstruction    Post-Operative Diagnosis: Same as above.     Procedure Performed: Exploratory laparotomy, gastric jejunostomy, entero-enterostomy, removal of gastrostomy tube     Surgeon: Leah Mcmahon MD    Assistants: Raven Gutierrez SA    Anesthesia: General    Attending:  Brady Hassan    EBL: 30cc  Findings: gastric outlet obstruction    Specimens:  * No specimens in log *    Drains: none    Complications: None    Disposition: stable     Leah Mcmahon MD  6/14/2023  1:10 PM

## 2023-06-15 LAB
ALBUMIN SERPL-MCNC: 2.2 G/DL (ref 3.4–5)
ALBUMIN/GLOB SERPL: 0.5 {RATIO} (ref 1–2)
ALP LIVER SERPL-CCNC: 99 U/L
ALT SERPL-CCNC: 37 U/L
AMYLASE SERPL-CCNC: 222 U/L (ref 25–115)
ANION GAP SERPL CALC-SCNC: 10 MMOL/L (ref 0–18)
ANION GAP SERPL CALC-SCNC: 10 MMOL/L (ref 0–18)
AST SERPL-CCNC: 18 U/L (ref 15–37)
BASOPHILS # BLD AUTO: 0.01 X10(3) UL (ref 0–0.2)
BASOPHILS NFR BLD AUTO: 0.1 %
BILIRUB SERPL-MCNC: 0.3 MG/DL (ref 0.1–2)
BUN BLD-MCNC: 21 MG/DL (ref 7–18)
BUN BLD-MCNC: 21 MG/DL (ref 7–18)
BUN/CREAT SERPL: 28 (ref 10–20)
BUN/CREAT SERPL: 28 (ref 10–20)
CALCIUM BLD-MCNC: 7.9 MG/DL (ref 8.5–10.1)
CALCIUM BLD-MCNC: 7.9 MG/DL (ref 8.5–10.1)
CHLORIDE SERPL-SCNC: 107 MMOL/L (ref 98–112)
CHLORIDE SERPL-SCNC: 107 MMOL/L (ref 98–112)
CO2 SERPL-SCNC: 22 MMOL/L (ref 21–32)
CO2 SERPL-SCNC: 22 MMOL/L (ref 21–32)
CREAT BLD-MCNC: 0.75 MG/DL
CREAT BLD-MCNC: 0.75 MG/DL
DEPRECATED RDW RBC AUTO: 41.1 FL (ref 35.1–46.3)
EOSINOPHIL # BLD AUTO: 0.02 X10(3) UL (ref 0–0.7)
EOSINOPHIL NFR BLD AUTO: 0.2 %
ERYTHROCYTE [DISTWIDTH] IN BLOOD BY AUTOMATED COUNT: 12.5 % (ref 11–15)
GFR SERPLBLD BASED ON 1.73 SQ M-ARVRAT: 112 ML/MIN/1.73M2 (ref 60–?)
GFR SERPLBLD BASED ON 1.73 SQ M-ARVRAT: 112 ML/MIN/1.73M2 (ref 60–?)
GLOBULIN PLAS-MCNC: 4.1 G/DL (ref 2.8–4.4)
GLUCOSE BLD-MCNC: 167 MG/DL (ref 70–99)
GLUCOSE BLD-MCNC: 167 MG/DL (ref 70–99)
GLUCOSE BLDC GLUCOMTR-MCNC: 119 MG/DL (ref 70–99)
HCT VFR BLD AUTO: 26.8 %
HGB BLD-MCNC: 9.1 G/DL
IMM GRANULOCYTES # BLD AUTO: 0.04 X10(3) UL (ref 0–1)
IMM GRANULOCYTES NFR BLD: 0.4 %
LYMPHOCYTES # BLD AUTO: 1.02 X10(3) UL (ref 1–4)
LYMPHOCYTES NFR BLD AUTO: 10 %
MAGNESIUM SERPL-MCNC: 1.7 MG/DL (ref 1.6–2.6)
MCH RBC QN AUTO: 30.8 PG (ref 26–34)
MCHC RBC AUTO-ENTMCNC: 34 G/DL (ref 31–37)
MCV RBC AUTO: 90.8 FL
MONOCYTES # BLD AUTO: 1.12 X10(3) UL (ref 0.1–1)
MONOCYTES NFR BLD AUTO: 11 %
NEUTROPHILS # BLD AUTO: 7.98 X10 (3) UL (ref 1.5–7.7)
NEUTROPHILS # BLD AUTO: 7.98 X10(3) UL (ref 1.5–7.7)
NEUTROPHILS NFR BLD AUTO: 78.3 %
OSMOLALITY SERPL CALC.SUM OF ELEC: 295 MOSM/KG (ref 275–295)
OSMOLALITY SERPL CALC.SUM OF ELEC: 295 MOSM/KG (ref 275–295)
PHOSPHATE SERPL-MCNC: 2.1 MG/DL (ref 2.5–4.9)
PLATELET # BLD AUTO: 356 10(3)UL (ref 150–450)
POTASSIUM SERPL-SCNC: 4.4 MMOL/L (ref 3.5–5.1)
POTASSIUM SERPL-SCNC: 4.4 MMOL/L (ref 3.5–5.1)
PROT SERPL-MCNC: 6.3 G/DL (ref 6.4–8.2)
RBC # BLD AUTO: 2.95 X10(6)UL
SODIUM SERPL-SCNC: 139 MMOL/L (ref 136–145)
SODIUM SERPL-SCNC: 139 MMOL/L (ref 136–145)
WBC # BLD AUTO: 10.2 X10(3) UL (ref 4–11)

## 2023-06-15 PROCEDURE — 99233 SBSQ HOSP IP/OBS HIGH 50: CPT | Performed by: HOSPITALIST

## 2023-06-15 RX ORDER — MAGNESIUM SULFATE HEPTAHYDRATE 40 MG/ML
2 INJECTION, SOLUTION INTRAVENOUS ONCE
Status: COMPLETED | OUTPATIENT
Start: 2023-06-15 | End: 2023-06-15

## 2023-06-15 RX ORDER — MORPHINE SULFATE 4 MG/ML
8 INJECTION, SOLUTION INTRAMUSCULAR; INTRAVENOUS EVERY 2 HOUR PRN
Status: DISCONTINUED | OUTPATIENT
Start: 2023-06-15 | End: 2023-06-15

## 2023-06-15 RX ORDER — SODIUM CHLORIDE 9 MG/ML
INJECTION, SOLUTION INTRAVENOUS CONTINUOUS
Status: DISCONTINUED | OUTPATIENT
Start: 2023-06-15 | End: 2023-06-15

## 2023-06-15 RX ORDER — MORPHINE SULFATE 4 MG/ML
4 INJECTION, SOLUTION INTRAMUSCULAR; INTRAVENOUS EVERY 2 HOUR PRN
Status: DISCONTINUED | OUTPATIENT
Start: 2023-06-15 | End: 2023-06-15

## 2023-06-15 RX ORDER — DIPHENHYDRAMINE HYDROCHLORIDE 50 MG/ML
12.5 INJECTION INTRAMUSCULAR; INTRAVENOUS EVERY 4 HOURS PRN
Status: DISCONTINUED | OUTPATIENT
Start: 2023-06-15 | End: 2023-06-15

## 2023-06-15 RX ORDER — MORPHINE SULFATE 2 MG/ML
2 INJECTION, SOLUTION INTRAMUSCULAR; INTRAVENOUS EVERY 2 HOUR PRN
Status: DISCONTINUED | OUTPATIENT
Start: 2023-06-15 | End: 2023-06-15

## 2023-06-15 RX ORDER — ONDANSETRON 2 MG/ML
4 INJECTION INTRAMUSCULAR; INTRAVENOUS EVERY 6 HOURS PRN
Status: DISCONTINUED | OUTPATIENT
Start: 2023-06-15 | End: 2023-06-23

## 2023-06-15 RX ORDER — NALOXONE HYDROCHLORIDE 0.4 MG/ML
0.08 INJECTION, SOLUTION INTRAMUSCULAR; INTRAVENOUS; SUBCUTANEOUS
Status: DISCONTINUED | OUTPATIENT
Start: 2023-06-15 | End: 2023-06-23

## 2023-06-15 RX ORDER — DIPHENHYDRAMINE HYDROCHLORIDE 50 MG/ML
12.5 INJECTION INTRAMUSCULAR; INTRAVENOUS EVERY 4 HOURS PRN
Status: DISCONTINUED | OUTPATIENT
Start: 2023-06-15 | End: 2023-06-23

## 2023-06-15 RX ORDER — HYDROMORPHONE HYDROCHLORIDE 1 MG/ML
0.5 INJECTION, SOLUTION INTRAMUSCULAR; INTRAVENOUS; SUBCUTANEOUS EVERY 2 HOUR PRN
Status: DISCONTINUED | OUTPATIENT
Start: 2023-06-15 | End: 2023-06-15

## 2023-06-15 RX ORDER — HYDROMORPHONE HYDROCHLORIDE 1 MG/ML
2 INJECTION, SOLUTION INTRAMUSCULAR; INTRAVENOUS; SUBCUTANEOUS EVERY 2 HOUR PRN
Status: DISCONTINUED | OUTPATIENT
Start: 2023-06-15 | End: 2023-06-15

## 2023-06-15 RX ORDER — ONDANSETRON 2 MG/ML
4 INJECTION INTRAMUSCULAR; INTRAVENOUS EVERY 6 HOURS PRN
Status: DISCONTINUED | OUTPATIENT
Start: 2023-06-15 | End: 2023-06-15

## 2023-06-15 RX ORDER — NALOXONE HYDROCHLORIDE 0.4 MG/ML
0.08 INJECTION, SOLUTION INTRAMUSCULAR; INTRAVENOUS; SUBCUTANEOUS
Status: DISCONTINUED | OUTPATIENT
Start: 2023-06-15 | End: 2023-06-15

## 2023-06-15 RX ORDER — HYDROMORPHONE HYDROCHLORIDE 1 MG/ML
1 INJECTION, SOLUTION INTRAMUSCULAR; INTRAVENOUS; SUBCUTANEOUS EVERY 2 HOUR PRN
Status: DISCONTINUED | OUTPATIENT
Start: 2023-06-15 | End: 2023-06-15

## 2023-06-15 RX ORDER — MORPHINE SULFATE 2 MG/ML
1 INJECTION, SOLUTION INTRAMUSCULAR; INTRAVENOUS EVERY 2 HOUR PRN
Status: DISCONTINUED | OUTPATIENT
Start: 2023-06-15 | End: 2023-06-15

## 2023-06-15 NOTE — CM/SW NOTE
Patient discussed in rounds. Patient recently discharged from [address] [place] [address] on 5/29 with [facility] for tube feedings. Patient was readmitted on 6/1, he had one visit from home health. Return referral sent to [facility] to inquire status, DES entered. Patient expressed frustration w/ [facility] during initial CM assessment. Pending patient needs, will need to confirm if patient would like to return home w/ [place] or [facility]. Plan: home w/ continued gtube feedings vs home w/ TPN vs home w/ no services pending advancing diet.     Franklyn Favre, [address]

## 2023-06-15 NOTE — PLAN OF CARE
NG/LCS, NPO, tolerating some ice chips, TPN, dilaudid pca added for pain control, no complaints of nausea, mag and phos replaced per protocol, stone, repositioned, tele ordered. Patient updated on care plan. Problem: Patient Centered Care  Goal: Patient preferences are identified and integrated in the patient's plan of care  Description: Interventions:  - What would you like us to know as we care for you?  From home alone  - Provide timely, complete, and accurate information to patient/family  - Incorporate patient and family knowledge, values, beliefs, and cultural backgrounds into the planning and delivery of care  - Encourage patient/family to participate in care and decision-making at the level they choose  - Honor patient and family perspectives and choices  Outcome: Progressing     Problem: Patient/Family Goals  Goal: Patient/Family Long Term Goal  Description: Patient's Long Term Goal: Unclogged G-Tube    Interventions:  - Monitor vital signs  - Monitor appropriate labs  - Pain management  - Administer medications per order  - Follow MD orders  - Diagnostics per order  - Update / inform patient and family on plan of care  - Discharge planning  - See additional Care Plan goals for specific interventions  Outcome: Progressing  Goal: Patient/Family Short Term Goal  Description: Patient's Short Term Goal: Improve pancreatitis    Interventions:   - Monitor vital signs  - Monitor appropriate labs  - Pain management  - Administer medications per order  - Follow MD orders  - Diagnostics per order  - Update / inform patient and family on plan of care  - See additional Care Plan goals for specific interventions  Outcome: Progressing     Problem: GASTROINTESTINAL - ADULT  Goal: Maintains or returns to baseline bowel function  Description: INTERVENTIONS:  - Assess bowel function  - Maintain adequate hydration with IV or PO as ordered and tolerated  - Evaluate effectiveness of GI medications  - Encourage mobilization and activity  - Obtain nutritional consult as needed  - Establish a toileting routine/schedule  - Consider collaborating with pharmacy to review patient's medication profile  Outcome: Progressing  Goal: Maintains adequate nutritional intake (undernourished)  Description: INTERVENTIONS:  - Monitor percentage of each meal consumed  - Identify factors contributing to decreased intake, treat as appropriate  - Assist with meals as needed  - Monitor I&O, WT and lab values  - Obtain nutritional consult as needed  - Optimize oral hygiene and moisture  - Encourage food from home; allow for food preferences  - Enhance eating environment  Outcome: Progressing     Problem: METABOLIC/FLUID AND ELECTROLYTES - ADULT  Goal: Electrolytes maintained within normal limits  Description: INTERVENTIONS:  - Monitor labs and rhythm and assess patient for signs and symptoms of electrolyte imbalances  - Administer electrolyte replacement as ordered  - Monitor response to electrolyte replacements, including rhythm and repeat lab results as appropriate  - Fluid restriction as ordered  - Instruct patient on fluid and nutrition restrictions as appropriate  Outcome: Progressing  Goal: Hemodynamic stability and optimal renal function maintained  Description: INTERVENTIONS:  - Monitor labs and assess for signs and symptoms of volume excess or deficit  - Monitor intake, output and patient weight  - Monitor urine specific gravity, serum osmolarity and serum sodium as indicated or ordered  - Monitor response to interventions for patient's volume status, including labs, urine output, blood pressure (other measures as available)  - Encourage oral intake as appropriate  - Instruct patient on fluid and nutrition restrictions as appropriate  Outcome: Progressing     Problem: Impaired Functional Mobility  Goal: Achieve highest/safest level of mobility/gait  Description: Interventions:  - Assess patient's functional ability and stability  - Promote increasing activity/tolerance for mobility and gait  - Educate and engage patient/family in tolerated activity level and precautions    Outcome: Progressing     Problem: Impaired Activities of Daily Living  Goal: Achieve highest/safest level of independence in self care  Description: Interventions:  - Assess ability and encourage patient to participate in ADLs to maximize function  - Promote sitting position while performing ADLs such as feeding, grooming, and bathing  - Educate and encourage patient/family in tolerated functional activity level and precautions during self-care    Outcome: Progressing     Problem: SKIN/TISSUE INTEGRITY - ADULT  Goal: Skin integrity remains intact  Description: INTERVENTIONS  - Assess and document risk factors for pressure ulcer development  - Assess and document skin integrity  - Monitor for areas of redness and/or skin breakdown  - Initiate interventions, skin care algorithm/standards of care as needed  Outcome: Progressing     Problem: PAIN - ADULT  Goal: Verbalizes/displays adequate comfort level or patient's stated pain goal  Description: INTERVENTIONS:  - Encourage pt to monitor pain and request assistance  - Assess pain using appropriate pain scale  - Administer analgesics based on type and severity of pain and evaluate response  - Implement non-pharmacological measures as appropriate and evaluate response  - Consider cultural and social influences on pain and pain management  - Manage/alleviate anxiety  - Utilize distraction and/or relaxation techniques  - Monitor for opioid side effects  - Notify MD/LIP if interventions unsuccessful or patient reports new pain  - Anticipate increased pain with activity and pre-medicate as appropriate  Outcome: Progressing     Problem: SAFETY ADULT - FALL  Goal: Free from fall injury  Description: INTERVENTIONS:  - Assess pt frequently for physical needs  - Identify cognitive and physical deficits and behaviors that affect risk of falls.   - [place] fall precautions as indicated by assessment.  - Educate pt/family on patient safety including physical limitations  - Instruct pt to call for assistance with activity based on assessment  - Modify environment to reduce risk of injury  - Provide assistive devices as appropriate  - Consider OT/PT consult to assist with strengthening/mobility  - Encourage toileting schedule  Outcome: Progressing     Problem: DISCHARGE PLANNING  Goal: Discharge to home or other facility with appropriate resources  Description: INTERVENTIONS:  - Identify barriers to discharge w/pt and caregiver  - Include patient/family/discharge partner in discharge planning  - Arrange for needed discharge resources and transportation as appropriate  - Identify discharge learning needs (meds, wound care, etc)  - Arrange for interpreters to assist at discharge as needed  - Consider post-discharge preferences of patient/family/discharge partner  - Complete POLST form as appropriate  - Assess patient's ability to be responsible for managing their own health  - Refer to Case Management Department for coordinating discharge planning if the patient needs post-hospital services based on physician/LIP order or complex needs related to functional status, cognitive ability or social support system  Outcome: Progressing

## 2023-06-15 NOTE — OPERATIVE REPORT
[facility]    PATIENT'S NAME: [facility], [address] [facility]   ATTENDING PHYSICIAN: Haleigh Arcos MD   OPERATING PHYSICIAN: Xi Tsang MD   PATIENT ACCOUNT#:   [de-identified]    LOCATION:  [address] [place] [address] #:   [id number]       YOB: 1975  ADMISSION DATE:       06/01/2023      OPERATION DATE:  06/14/2023    OPERATIVE REPORT      PREOPERATIVE DIAGNOSIS:  Gastric outlet obstruction. POSTOPERATIVE DIAGNOSIS:  Gastric outlet obstruction. PROCEDURE:  Exploratory laparotomy, gastrojejunostomy, antecolic enteroenterostomy, removal of gastrostomy tube. ASSISTANT:  Patricia Shukla. JOSÉ Garcia. ANESTHESIA:  General.    BLOOD LOSS:  3 mL. SPECIMENS:  None. DRAINS:  None. COMPLICATIONS:  None. DISPOSITION:  Stable. INDICATIONS:  The patient had a history of a gastric outlet obstruction. 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 He has had extensive workup. Had pancreatic stents and a PEG done with a jejunal tube extension. This had not been draining well and we did an upper GI which still showed high-grade gastric obstruction. With this in mind, it was felt that he should undergo gastrojejunostomy and possible biopsy of tissue and removal of the gastrostomy tube. The pancreatic stent was removed past 48 hours and the jejunal portion also removed. FINDINGS:  Gastric outlet obstruction. OPERATIVE TECHNIQUE:  Patient came in the operating room, underwent general endotracheal anesthesia. [place] catheter was placed. The gastrostomy was pulled out before prepping and draping. We then prepped and draped the abdomen. Began by making an upper midline incision, continued dissection until we entered the peritoneal cavity. We could see some adhesions where the G-tube was to the stomach. These were taken down. Then formal exploration was carried out. Stomach was fairly dilated.   The liver looked normal.  We felt around the head of the pancreas, it was thickened. We could actually see the second portion of duodenum, did appear a little dilated but as stated, there was no any abnormal tissue noted. With this in mind, we elected to start a gastrojejunostomy. We picked a dependent portion of the stomach and then took a portion about 50 to 60 cm from the ligament of Treitz of the jejunum. We then did a gastrojejunostomy, hand-sewn in 2 layers. We then did an enteroenterostomy also in 2 layers distal to this. Both lumens were very patent. We checked for any residual leakage. There was none. We irrigated the abdomen. We closed the abdomen with running PDS sutures and staples. No complications. Patient tolerated the procedure well. The patient left the operating room in stable condition.     Dictated By Madison Quevedo MD  d: 06/14/2023 13:16:05  t: 06/14/2023 19:26:39  [facility] [id number]  [facility]/

## 2023-06-15 NOTE — CM/SW NOTE
Department  notified of request for [facility], aidin referrals started. Assigned CM/SW to follow up with pt/family on further discharge planning.      Roro Hercules  [facility]

## 2023-06-15 NOTE — PLAN OF CARE
AXO4. RA. NPO strict. NGT to low continues suction. Midline incision with dressing in place. R picc line with TPN infusing. IV  fluids infusing. PRN morphine and dilaudid given for pain. Carlton in place and draining. Upx1-2 w/walker. Bed in lowest position. Call light within reach. Safety measures in place.     Problem: GASTROINTESTINAL - ADULT  Goal: Maintains or returns to baseline bowel function  Description: INTERVENTIONS:  - Assess bowel function  - Maintain adequate hydration with IV or PO as ordered and tolerated  - Evaluate effectiveness of GI medications  - Encourage mobilization and activity  - Obtain nutritional consult as needed  - Establish a toileting routine/schedule  - Consider collaborating with pharmacy to review patient's medication profile  Outcome: Progressing  Goal: Maintains adequate nutritional intake (undernourished)  Description: INTERVENTIONS:  - Monitor percentage of each meal consumed  - Identify factors contributing to decreased intake, treat as appropriate  - Assist with meals as needed  - Monitor I&O, WT and lab values  - Obtain nutritional consult as needed  - Optimize oral hygiene and moisture  - Encourage food from home; allow for food preferences  - Enhance eating environment  Outcome: Progressing     Problem: METABOLIC/FLUID AND ELECTROLYTES - ADULT  Goal: Electrolytes maintained within normal limits  Description: INTERVENTIONS:  - Monitor labs and rhythm and assess patient for signs and symptoms of electrolyte imbalances  - Administer electrolyte replacement as ordered  - Monitor response to electrolyte replacements, including rhythm and repeat lab results as appropriate  - Fluid restriction as ordered  - Instruct patient on fluid and nutrition restrictions as appropriate  Outcome: Progressing  Goal: Hemodynamic stability and optimal renal function maintained  Description: INTERVENTIONS:  - Monitor labs and assess for signs and symptoms of volume excess or deficit  - Monitor intake, output and patient weight  - Monitor urine specific gravity, serum osmolarity and serum sodium as indicated or ordered  - Monitor response to interventions for patient's volume status, including labs, urine output, blood pressure (other measures as available)  - Encourage oral intake as appropriate  - Instruct patient on fluid and nutrition restrictions as appropriate  Outcome: Progressing     Problem: Impaired Functional Mobility  Goal: Achieve highest/safest level of mobility/gait  Description: Interventions:  - Assess patient's functional ability and stability  - Promote increasing activity/tolerance for mobility and gait  - Educate and engage patient/family in tolerated activity level and precautions    Outcome: Progressing     Problem: Impaired Activities of Daily Living  Goal: Achieve highest/safest level of independence in self care  Description: Interventions:  - Assess ability and encourage patient to participate in ADLs to maximize function  - Promote sitting position while performing ADLs such as feeding, grooming, and bathing  - Educate and encourage patient/family in tolerated functional activity level and precautions during self-care}  Outcome: Progressing     Problem: SKIN/TISSUE INTEGRITY - ADULT  Goal: Skin integrity remains intact  Description: INTERVENTIONS  - Assess and document risk factors for pressure ulcer development  - Assess and document skin integrity  - Monitor for areas of redness and/or skin breakdown  - Initiate interventions, skin care algorithm/standards of care as needed  Outcome: Progressing     Problem: PAIN - ADULT  Goal: Verbalizes/displays adequate comfort level or patient's stated pain goal  Description: INTERVENTIONS:  - Encourage pt to monitor pain and request assistance  - Assess pain using appropriate pain scale  - Administer analgesics based on type and severity of pain and evaluate response  - Implement non-pharmacological measures as appropriate and evaluate response  - Consider cultural and social influences on pain and pain management  - Manage/alleviate anxiety  - Utilize distraction and/or relaxation techniques  - Monitor for opioid side effects  - Notify MD/LIP if interventions unsuccessful or patient reports new pain  - Anticipate increased pain with activity and pre-medicate as appropriate  Outcome: Progressing     Problem: SAFETY ADULT - FALL  Goal: Free from fall injury  Description: INTERVENTIONS:  - Assess pt frequently for physical needs  - Identify cognitive and physical deficits and behaviors that affect risk of falls.   - [place] fall precautions as indicated by assessment.  - Educate pt/family on patient safety including physical limitations  - Instruct pt to call for assistance with activity based on assessment  - Modify environment to reduce risk of injury  - Provide assistive devices as appropriate  - Consider OT/PT consult to assist with strengthening/mobility  - Encourage toileting schedule  Outcome: Progressing

## 2023-06-16 LAB
AMYLASE SERPL-CCNC: 139 U/L (ref 25–115)
ANION GAP SERPL CALC-SCNC: 6 MMOL/L (ref 0–18)
BUN BLD-MCNC: 18 MG/DL (ref 7–18)
BUN/CREAT SERPL: 30 (ref 10–20)
CALCIUM BLD-MCNC: 8.3 MG/DL (ref 8.5–10.1)
CHLORIDE SERPL-SCNC: 104 MMOL/L (ref 98–112)
CO2 SERPL-SCNC: 27 MMOL/L (ref 21–32)
CREAT BLD-MCNC: 0.6 MG/DL
GFR SERPLBLD BASED ON 1.73 SQ M-ARVRAT: 120 ML/MIN/1.73M2 (ref 60–?)
GLUCOSE BLD-MCNC: 116 MG/DL (ref 70–99)
GLUCOSE BLDC GLUCOMTR-MCNC: 106 MG/DL (ref 70–99)
GLUCOSE BLDC GLUCOMTR-MCNC: 114 MG/DL (ref 70–99)
GLUCOSE BLDC GLUCOMTR-MCNC: 124 MG/DL (ref 70–99)
GLUCOSE BLDC GLUCOMTR-MCNC: 129 MG/DL (ref 70–99)
HCT VFR BLD AUTO: 27.8 %
HGB BLD-MCNC: 9.4 G/DL
MAGNESIUM SERPL-MCNC: 1.6 MG/DL (ref 1.6–2.6)
MAGNESIUM SERPL-MCNC: 1.7 MG/DL (ref 1.6–2.6)
OSMOLALITY SERPL CALC.SUM OF ELEC: 287 MOSM/KG (ref 275–295)
PHOSPHATE SERPL-MCNC: 3 MG/DL (ref 2.5–4.9)
PHOSPHATE SERPL-MCNC: 3 MG/DL (ref 2.5–4.9)
POTASSIUM SERPL-SCNC: 4.5 MMOL/L (ref 3.5–5.1)
SODIUM SERPL-SCNC: 137 MMOL/L (ref 136–145)

## 2023-06-16 PROCEDURE — 99233 SBSQ HOSP IP/OBS HIGH 50: CPT | Performed by: HOSPITALIST

## 2023-06-16 RX ORDER — METOPROLOL TARTRATE 5 MG/5ML
5 INJECTION INTRAVENOUS EVERY 6 HOURS
Status: DISCONTINUED | OUTPATIENT
Start: 2023-06-16 | End: 2023-06-23

## 2023-06-16 RX ORDER — MAGNESIUM SULFATE HEPTAHYDRATE 40 MG/ML
2 INJECTION, SOLUTION INTRAVENOUS ONCE
Status: COMPLETED | OUTPATIENT
Start: 2023-06-16 | End: 2023-06-16

## 2023-06-16 NOTE — PLAN OF CARE
AXO4. RA. Remote tele. Acuchecks q 6hr. NPO w/icechips. NGT to low continues suction. Midline incision with dressing in place. R picc line with TPN infusing. IV  fluids infusing. PCA dilaudid at 0.3 mg for pain management. Carl[place] in place and draining. Bed in lowest position. Call light within reach. Safety measures in place. HR in the 120s sustaining. MD paged. 1L bolus given per order. No changes. MD notified. Dilaudid PCA dose increase per MD order.   Problem: GASTROINTESTINAL - ADULT  Goal: Maintains or returns to baseline bowel function  Description: INTERVENTIONS:  - Assess bowel function  - Maintain adequate hydration with IV or PO as ordered and tolerated  - Evaluate effectiveness of GI medications  - Encourage mobilization and activity  - Obtain nutritional consult as needed  - Establish a toileting routine/schedule  - Consider collaborating with pharmacy to review patient's medication profile  Outcome: Progressing  Goal: Maintains adequate nutritional intake (undernourished)  Description: INTERVENTIONS:  - Monitor percentage of each meal consumed  - Identify factors contributing to decreased intake, treat as appropriate  - Assist with meals as needed  - Monitor I&O, WT and lab values  - Obtain nutritional consult as needed  - Optimize oral hygiene and moisture  - Encourage food from home; allow for food preferences  - Enhance eating environment  Outcome: Progressing     Problem: METABOLIC/FLUID AND ELECTROLYTES - ADULT  Goal: Electrolytes maintained within normal limits  Description: INTERVENTIONS:  - Monitor labs and rhythm and assess patient for signs and symptoms of electrolyte imbalances  - Administer electrolyte replacement as ordered  - Monitor response to electrolyte replacements, including rhythm and repeat lab results as appropriate  - Fluid restriction as ordered  - Instruct patient on fluid and nutrition restrictions as appropriate  Outcome: Progressing  Goal: Hemodynamic stability and optimal renal function maintained  Description: INTERVENTIONS:  - Monitor labs and assess for signs and symptoms of volume excess or deficit  - Monitor intake, output and patient weight  - Monitor urine specific gravity, serum osmolarity and serum sodium as indicated or ordered  - Monitor response to interventions for patient's volume status, including labs, urine output, blood pressure (other measures as available)  - Encourage oral intake as appropriate  - Instruct patient on fluid and nutrition restrictions as appropriate  Outcome: Progressing     Problem: Impaired Functional Mobility  Goal: Achieve highest/safest level of mobility/gait  Description: Interventions:  - Assess patient's functional ability and stability  - Promote increasing activity/tolerance for mobility and gait  - Educate and engage patient/family in tolerated activity level and precautions    Outcome: Progressing     Problem: Impaired Activities of Daily Living  Goal: Achieve highest/safest level of independence in self care  Description: Interventions:  - Assess ability and encourage patient to participate in ADLs to maximize function  - Promote sitting position while performing ADLs such as feeding, grooming, and bathing  - Educate and encourage patient/family in tolerated functional activity level and precautions during self-care    Outcome: Progressing     Problem: SKIN/TISSUE INTEGRITY - ADULT  Goal: Skin integrity remains intact  Description: INTERVENTIONS  - Assess and document risk factors for pressure ulcer development  - Assess and document skin integrity  - Monitor for areas of redness and/or skin breakdown  - Initiate interventions, skin care algorithm/standards of care as needed  Outcome: Progressing     Problem: PAIN - ADULT  Goal: Verbalizes/displays adequate comfort level or patient's stated pain goal  Description: INTERVENTIONS:  - Encourage pt to monitor pain and request assistance  - Assess pain using appropriate pain scale  - Administer analgesics based on type and severity of pain and evaluate response  - Implement non-pharmacological measures as appropriate and evaluate response  - Consider cultural and social influences on pain and pain management  - Manage/alleviate anxiety  - Utilize distraction and/or relaxation techniques  - Monitor for opioid side effects  - Notify MD/LIP if interventions unsuccessful or patient reports new pain  - Anticipate increased pain with activity and pre-medicate as appropriate  Outcome: Progressing     Problem: DISCHARGE PLANNING  Goal: Discharge to home or other facility with appropriate resources  Description: INTERVENTIONS:  - Identify barriers to discharge w/pt and caregiver  - Include patient/family/discharge partner in discharge planning  - Arrange for needed discharge resources and transportation as appropriate  - Identify discharge learning needs (meds, wound care, etc)  - Arrange for interpreters to assist at discharge as needed  - Consider post-discharge preferences of patient/family/discharge partner  - Complete POLST form as appropriate  - Assess patient's ability to be responsible for managing their own health  - Refer to Case Management Department for coordinating discharge planning if the patient needs post-hospital services based on physician/LIP order or complex needs related to functional status, cognitive ability or social support system  Outcome: Progressing     Problem: SAFETY ADULT - FALL  Goal: Free from fall injury  Description: INTERVENTIONS:  - Assess pt frequently for physical needs  - Identify cognitive and physical deficits and behaviors that affect risk of falls.   - [place] fall precautions as indicated by assessment.  - Educate pt/family on patient safety including physical limitations  - Instruct pt to call for assistance with activity based on assessment  - Modify environment to reduce risk of injury  - Provide assistive devices as appropriate  - Consider OT/PT consult to assist with strengthening/mobility  - Encourage toileting schedule  Outcome: Progressing

## 2023-06-16 NOTE — PLAN OF CARE
NPO/tolerating ice chips, NG/LCS, TPN, IVF, tele/running md lesley aware, dilaudid PCA for pain control, no gas or belching yet, no nausea,  stone discontinue/awaiting void, magnesium replaced per protocol. Up to bathroom with standby assist, declined to sit in chair, plan for PT to see patient tomorrow. Labs in am.  Patient updated on care plan. Problem: Patient Centered Care  Goal: Patient preferences are identified and integrated in the patient's plan of care  Description: Interventions:  - What would you like us to know as we care for you?  From home alone  - Provide timely, complete, and accurate information to patient/family  - Incorporate patient and family knowledge, values, beliefs, and cultural backgrounds into the planning and delivery of care  - Encourage patient/family to participate in care and decision-making at the level they choose  - Honor patient and family perspectives and choices  Outcome: Progressing     Problem: Patient/Family Goals  Goal: Patient/Family Long Term Goal  Description: Patient's Long Term Goal: Unclogged G-Tube    Interventions:  - Monitor vital signs  - Monitor appropriate labs  - Pain management  - Administer medications per order  - Follow MD orders  - Diagnostics per order  - Update / inform patient and family on plan of care  - Discharge planning  - See additional Care Plan goals for specific interventions  Outcome: Progressing  Goal: Patient/Family Short Term Goal  Description: Patient's Short Term Goal: Improve pancreatitis    Interventions:   - Monitor vital signs  - Monitor appropriate labs  - Pain management  - Administer medications per order  - Follow MD orders  - Diagnostics per order  - Update / inform patient and family on plan of care  - See additional Care Plan goals for specific interventions  Outcome: Progressing     Problem: GASTROINTESTINAL - ADULT  Goal: Maintains or returns to baseline bowel function  Description: INTERVENTIONS:  - Assess bowel function  - Maintain adequate hydration with IV or PO as ordered and tolerated  - Evaluate effectiveness of GI medications  - Encourage mobilization and activity  - Obtain nutritional consult as needed  - Establish a toileting routine/schedule  - Consider collaborating with pharmacy to review patient's medication profile  Outcome: Progressing  Goal: Maintains adequate nutritional intake (undernourished)  Description: INTERVENTIONS:  - Monitor percentage of each meal consumed  - Identify factors contributing to decreased intake, treat as appropriate  - Assist with meals as needed  - Monitor I&O, WT and lab values  - Obtain nutritional consult as needed  - Optimize oral hygiene and moisture  - Encourage food from home; allow for food preferences  - Enhance eating environment  Outcome: Progressing     Problem: METABOLIC/FLUID AND ELECTROLYTES - ADULT  Goal: Electrolytes maintained within normal limits  Description: INTERVENTIONS:  - Monitor labs and rhythm and assess patient for signs and symptoms of electrolyte imbalances  - Administer electrolyte replacement as ordered  - Monitor response to electrolyte replacements, including rhythm and repeat lab results as appropriate  - Fluid restriction as ordered  - Instruct patient on fluid and nutrition restrictions as appropriate  Outcome: Progressing  Goal: Hemodynamic stability and optimal renal function maintained  Description: INTERVENTIONS:  - Monitor labs and assess for signs and symptoms of volume excess or deficit  - Monitor intake, output and patient weight  - Monitor urine specific gravity, serum osmolarity and serum sodium as indicated or ordered  - Monitor response to interventions for patient's volume status, including labs, urine output, blood pressure (other measures as available)  - Encourage oral intake as appropriate  - Instruct patient on fluid and nutrition restrictions as appropriate  Outcome: Progressing     Problem: Impaired Functional Mobility  Goal: Achieve highest/safest level of mobility/gait  Description: Interventions:  - Assess patient's functional ability and stability  - Promote increasing activity/tolerance for mobility and gait  - Educate and engage patient/family in tolerated activity level and precautions    Outcome: Progressing     Problem: Impaired Activities of Daily Living  Goal: Achieve highest/safest level of independence in self care  Description: Interventions:  - Assess ability and encourage patient to participate in ADLs to maximize function  - Promote sitting position while performing ADLs such as feeding, grooming, and bathing  - Educate and encourage patient/family in tolerated functional activity level and precautions during self-care    Outcome: Progressing     Problem: SKIN/TISSUE INTEGRITY - ADULT  Goal: Skin integrity remains intact  Description: INTERVENTIONS  - Assess and document risk factors for pressure ulcer development  - Assess and document skin integrity  - Monitor for areas of redness and/or skin breakdown  - Initiate interventions, skin care algorithm/standards of care as needed  Outcome: Progressing     Problem: PAIN - ADULT  Goal: Verbalizes/displays adequate comfort level or patient's stated pain goal  Description: INTERVENTIONS:  - Encourage pt to monitor pain and request assistance  - Assess pain using appropriate pain scale  - Administer analgesics based on type and severity of pain and evaluate response  - Implement non-pharmacological measures as appropriate and evaluate response  - Consider cultural and social influences on pain and pain management  - Manage/alleviate anxiety  - Utilize distraction and/or relaxation techniques  - Monitor for opioid side effects  - Notify MD/LIP if interventions unsuccessful or patient reports new pain  - Anticipate increased pain with activity and pre-medicate as appropriate  Outcome: Progressing     Problem: SAFETY ADULT - FALL  Goal: Free from fall injury  Description: INTERVENTIONS:  - Assess pt frequently for physical needs  - Identify cognitive and physical deficits and behaviors that affect risk of falls.   - [place] fall precautions as indicated by assessment.  - Educate pt/family on patient safety including physical limitations  - Instruct pt to call for assistance with activity based on assessment  - Modify environment to reduce risk of injury  - Provide assistive devices as appropriate  - Consider OT/PT consult to assist with strengthening/mobility  - Encourage toileting schedule  Outcome: Progressing     Problem: DISCHARGE PLANNING  Goal: Discharge to home or other facility with appropriate resources  Description: INTERVENTIONS:  - Identify barriers to discharge w/pt and caregiver  - Include patient/family/discharge partner in discharge planning  - Arrange for needed discharge resources and transportation as appropriate  - Identify discharge learning needs (meds, wound care, etc)  - Arrange for interpreters to assist at discharge as needed  - Consider post-discharge preferences of patient/family/discharge partner  - Complete POLST form as appropriate  - Assess patient's ability to be responsible for managing their own health  - Refer to Case Management Department for coordinating discharge planning if the patient needs post-hospital services based on physician/LIP order or complex needs related to functional status, cognitive ability or social support system  Outcome: Progressing

## 2023-06-17 LAB
ANION GAP SERPL CALC-SCNC: 4 MMOL/L (ref 0–18)
BUN BLD-MCNC: 20 MG/DL (ref 7–18)
BUN/CREAT SERPL: 31.3 (ref 10–20)
CALCIUM BLD-MCNC: 8 MG/DL (ref 8.5–10.1)
CHLORIDE SERPL-SCNC: 99 MMOL/L (ref 98–112)
CO2 SERPL-SCNC: 29 MMOL/L (ref 21–32)
CREAT BLD-MCNC: 0.64 MG/DL
DEPRECATED RDW RBC AUTO: 41.6 FL (ref 35.1–46.3)
ERYTHROCYTE [DISTWIDTH] IN BLOOD BY AUTOMATED COUNT: 12.7 % (ref 11–15)
GFR SERPLBLD BASED ON 1.73 SQ M-ARVRAT: 118 ML/MIN/1.73M2 (ref 60–?)
GLUCOSE BLD-MCNC: 132 MG/DL (ref 70–99)
GLUCOSE BLDC GLUCOMTR-MCNC: 105 MG/DL (ref 70–99)
GLUCOSE BLDC GLUCOMTR-MCNC: 105 MG/DL (ref 70–99)
GLUCOSE BLDC GLUCOMTR-MCNC: 124 MG/DL (ref 70–99)
GLUCOSE BLDC GLUCOMTR-MCNC: 159 MG/DL (ref 70–99)
HCT VFR BLD AUTO: 24.2 %
HGB BLD-MCNC: 8.2 G/DL
MAGNESIUM SERPL-MCNC: 1.9 MG/DL (ref 1.6–2.6)
MCH RBC QN AUTO: 30.6 PG (ref 26–34)
MCHC RBC AUTO-ENTMCNC: 33.9 G/DL (ref 31–37)
MCV RBC AUTO: 90.3 FL
OSMOLALITY SERPL CALC.SUM OF ELEC: 278 MOSM/KG (ref 275–295)
PHOSPHATE SERPL-MCNC: 4 MG/DL (ref 2.5–4.9)
PLATELET # BLD AUTO: 266 10(3)UL (ref 150–450)
POTASSIUM SERPL-SCNC: 4.2 MMOL/L (ref 3.5–5.1)
RBC # BLD AUTO: 2.68 X10(6)UL
SODIUM SERPL-SCNC: 132 MMOL/L (ref 136–145)
WBC # BLD AUTO: 8.8 X10(3) UL (ref 4–11)

## 2023-06-17 PROCEDURE — 99233 SBSQ HOSP IP/OBS HIGH 50: CPT | Performed by: HOSPITALIST

## 2023-06-17 PROCEDURE — 99024 POSTOP FOLLOW-UP VISIT: CPT | Performed by: SURGERY

## 2023-06-17 RX ORDER — FUROSEMIDE 10 MG/ML
20 INJECTION INTRAMUSCULAR; INTRAVENOUS ONCE
Status: COMPLETED | OUTPATIENT
Start: 2023-06-17 | End: 2023-06-17

## 2023-06-17 RX ORDER — ONDANSETRON 2 MG/ML
4 INJECTION INTRAMUSCULAR; INTRAVENOUS EVERY 6 HOURS PRN
Status: DISCONTINUED | OUTPATIENT
Start: 2023-06-17 | End: 2023-06-17

## 2023-06-17 NOTE — PLAN OF CARE
Pt A&Ox4. Room air. Tele in place, tachy with exertion, MD aware and gave metoprolol IV. [place] removed today & voiding. NG to low continuous suction. IVF, TPN, dilaudid PCA infusing. NPO diet with ice chips. Wound dressing C/D/I. Call light within reach. Problem: Patient Centered Care  Goal: Patient preferences are identified and integrated in the patient's plan of care  Description: Interventions:  - What would you like us to know as we care for you?  From home alone  - Provide timely, complete, and accurate information to patient/family  - Incorporate patient and family knowledge, values, beliefs, and cultural backgrounds into the planning and delivery of care  - Encourage patient/family to participate in care and decision-making at the level they choose  - Honor patient and family perspectives and choices  6/17/2023 0306 by Luh Bright RN  Outcome: Progressing  6/17/2023 0306 by Luh Bright RN  Outcome: Progressing     Problem: Patient/Family Goals  Goal: Patient/Family Long Term Goal  Description: Patient's Long Term Goal: Unclogged G-Tube    Interventions:  - Monitor vital signs  - Monitor appropriate labs  - Pain management  - Administer medications per order  - Follow MD orders  - Diagnostics per order  - Update / inform patient and family on plan of care  - Discharge planning  - See additional Care Plan goals for specific interventions  6/17/2023 0306 by Luh Bright RN  Outcome: Progressing  6/17/2023 0306 by Luh Bright RN  Outcome: Progressing  Goal: Patient/Family Short Term Goal  Description: Patient's Short Term Goal: Improve pancreatitis    Interventions:   - Monitor vital signs  - Monitor appropriate labs  - Pain management  - Administer medications per order  - Follow MD orders  - Diagnostics per order  - Update / inform patient and family on plan of care  - See additional Care Plan goals for specific interventions  6/17/2023 0306 by Luh Bright RN  Outcome: Progressing  6/17/2023 7491 by Homero Tsai RN  Outcome: Progressing     Problem: GASTROINTESTINAL - ADULT  Goal: Maintains or returns to baseline bowel function  Description: INTERVENTIONS:  - Assess bowel function  - Maintain adequate hydration with IV or PO as ordered and tolerated  - Evaluate effectiveness of GI medications  - Encourage mobilization and activity  - Obtain nutritional consult as needed  - Establish a toileting routine/schedule  - Consider collaborating with pharmacy to review patient's medication profile  6/17/2023 0306 by Homero Tsai RN  Outcome: Progressing  6/17/2023 0306 by Homero Tsai RN  Outcome: Progressing  Goal: Maintains adequate nutritional intake (undernourished)  Description: INTERVENTIONS:  - Monitor percentage of each meal consumed  - Identify factors contributing to decreased intake, treat as appropriate  - Assist with meals as needed  - Monitor I&O, WT and lab values  - Obtain nutritional consult as needed  - Optimize oral hygiene and moisture  - Encourage food from home; allow for food preferences  - Enhance eating environment  6/17/2023 0306 by Homero Tsai RN  Outcome: Progressing  6/17/2023 0306 by Homero Tsai RN  Outcome: Progressing     Problem: METABOLIC/FLUID AND ELECTROLYTES - ADULT  Goal: Electrolytes maintained within normal limits  Description: INTERVENTIONS:  - Monitor labs and rhythm and assess patient for signs and symptoms of electrolyte imbalances  - Administer electrolyte replacement as ordered  - Monitor response to electrolyte replacements, including rhythm and repeat lab results as appropriate  - Fluid restriction as ordered  - Instruct patient on fluid and nutrition restrictions as appropriate  6/17/2023 0306 by Homero Tsai RN  Outcome: Progressing  6/17/2023 0306 by Homero Tsai RN  Outcome: Progressing  Goal: Hemodynamic stability and optimal renal function maintained  Description: INTERVENTIONS:  - Monitor labs and assess for signs and symptoms of volume excess or deficit  - Monitor intake, output and patient weight  - Monitor urine specific gravity, serum osmolarity and serum sodium as indicated or ordered  - Monitor response to interventions for patient's volume status, including labs, urine output, blood pressure (other measures as available)  - Encourage oral intake as appropriate  - Instruct patient on fluid and nutrition restrictions as appropriate  6/17/2023 0306 by Caprice Lilly RN  Outcome: Progressing  6/17/2023 0306 by Caprice Lilly RN  Outcome: Progressing     Problem: Impaired Functional Mobility  Goal: Achieve highest/safest level of mobility/gait  Description: Interventions:  - Assess patient's functional ability and stability  - Promote increasing activity/tolerance for mobility and gait  - Educate and engage patient/family in tolerated activity level and precautions  - Recommend use of  RW for transfers and ambulation  6/17/2023 0306 by Caprice Lilly RN  Outcome: Progressing  6/17/2023 0306 by Caprice Lilly RN  Outcome: Progressing     Problem: Impaired Activities of Daily Living  Goal: Achieve highest/safest level of independence in self care  Description: Interventions:  - Assess ability and encourage patient to participate in ADLs to maximize function  - Promote sitting position while performing ADLs such as feeding, grooming, and bathing  - Educate and encourage patient/family in tolerated functional activity level and precautions during self-care  - Encourage patient to incorporate impaired side during daily activities to promote function  6/17/2023 0306 by Caprice Lilly RN  Outcome: Progressing  6/17/2023 0306 by Caprice Lilly RN  Outcome: Progressing     Problem: SKIN/TISSUE INTEGRITY - ADULT  Goal: Skin integrity remains intact  Description: INTERVENTIONS  - Assess and document risk factors for pressure ulcer development  - Assess and document skin integrity  - Monitor for areas of redness and/or skin breakdown  - Initiate interventions, skin care algorithm/standards of care as needed  6/17/2023 0306 by Larisa Long RN  Outcome: Progressing  6/17/2023 0306 by Larisa Long RN  Outcome: Progressing     Problem: PAIN - ADULT  Goal: Verbalizes/displays adequate comfort level or patient's stated pain goal  Description: INTERVENTIONS:  - Encourage pt to monitor pain and request assistance  - Assess pain using appropriate pain scale  - Administer analgesics based on type and severity of pain and evaluate response  - Implement non-pharmacological measures as appropriate and evaluate response  - Consider cultural and social influences on pain and pain management  - Manage/alleviate anxiety  - Utilize distraction and/or relaxation techniques  - Monitor for opioid side effects  - Notify MD/LIP if interventions unsuccessful or patient reports new pain  - Anticipate increased pain with activity and pre-medicate as appropriate  6/17/2023 0306 by Larisa Long RN  Outcome: Progressing  6/17/2023 0306 by Larisa Long RN  Outcome: Progressing     Problem: SAFETY ADULT - FALL  Goal: Free from fall injury  Description: INTERVENTIONS:  - Assess pt frequently for physical needs  - Identify cognitive and physical deficits and behaviors that affect risk of falls.   - [place] fall precautions as indicated by assessment.  - Educate pt/family on patient safety including physical limitations  - Instruct pt to call for assistance with activity based on assessment  - Modify environment to reduce risk of injury  - Provide assistive devices as appropriate  - Consider OT/PT consult to assist with strengthening/mobility  - Encourage toileting schedule  6/17/2023 0306 by Larisa Long RN  Outcome: Progressing  6/17/2023 0306 by Larisa Long RN  Outcome: Progressing     Problem: DISCHARGE PLANNING  Goal: Discharge to home or other facility with appropriate resources  Description: INTERVENTIONS:  - Identify barriers to discharge w/pt and caregiver  - Include patient/family/discharge partner in discharge planning  - Arrange for needed discharge resources and transportation as appropriate  - Identify discharge learning needs (meds, wound care, etc)  - Arrange for interpreters to assist at discharge as needed  - Consider post-discharge preferences of patient/family/discharge partner  - Complete POLST form as appropriate  - Assess patient's ability to be responsible for managing their own health  - Refer to Case Management Department for coordinating discharge planning if the patient needs post-hospital services based on physician/LIP order or complex needs related to functional status, cognitive ability or social support system  6/17/2023 0306 by Joycelyn Carlton, RN  Outcome: Progressing  6/17/2023 0306 by Joycelyn Carlton, RN  Outcome: Progressing

## 2023-06-17 NOTE — PLAN OF CARE
NPO, tolerating ice chips, NG/LCS, brown output, TPN, Dilaudid PCA for pain control, no gas, voiding freely, up with PT/ ambulated short distance, declined to walk or up to chair during the day. Plan for SBFT on Monday. Patient updated on care plan. Problem: Patient Centered Care  Goal: Patient preferences are identified and integrated in the patient's plan of care  Description: Interventions:  - What would you like us to know as we care for you?  From home alone  - Provide timely, complete, and accurate information to patient/family  - Incorporate patient and family knowledge, values, beliefs, and cultural backgrounds into the planning and delivery of care  - Encourage patient/family to participate in care and decision-making at the level they choose  - Honor patient and family perspectives and choices  Outcome: Progressing     Problem: Patient/Family Goals  Goal: Patient/Family Long Term Goal  Description: Patient's Long Term Goal: Unclogged G-Tube    Interventions:  - Monitor vital signs  - Monitor appropriate labs  - Pain management  - Administer medications per order  - Follow MD orders  - Diagnostics per order  - Update / inform patient and family on plan of care  - Discharge planning  - See additional Care Plan goals for specific interventions  Outcome: Progressing  Goal: Patient/Family Short Term Goal  Description: Patient's Short Term Goal: Improve pancreatitis    Interventions:   - Monitor vital signs  - Monitor appropriate labs  - Pain management  - Administer medications per order  - Follow MD orders  - Diagnostics per order  - Update / inform patient and family on plan of care  - See additional Care Plan goals for specific interventions  Outcome: Progressing     Problem: GASTROINTESTINAL - ADULT  Goal: Maintains or returns to baseline bowel function  Description: INTERVENTIONS:  - Assess bowel function  - Maintain adequate hydration with IV or PO as ordered and tolerated  - Evaluate effectiveness of GI medications  - Encourage mobilization and activity  - Obtain nutritional consult as needed  - Establish a toileting routine/schedule  - Consider collaborating with pharmacy to review patient's medication profile  Outcome: Progressing  Goal: Maintains adequate nutritional intake (undernourished)  Description: INTERVENTIONS:  - Monitor percentage of each meal consumed  - Identify factors contributing to decreased intake, treat as appropriate  - Assist with meals as needed  - Monitor I&O, WT and lab values  - Obtain nutritional consult as needed  - Optimize oral hygiene and moisture  - Encourage food from home; allow for food preferences  - Enhance eating environment  Outcome: Progressing     Problem: METABOLIC/FLUID AND ELECTROLYTES - ADULT  Goal: Electrolytes maintained within normal limits  Description: INTERVENTIONS:  - Monitor labs and rhythm and assess patient for signs and symptoms of electrolyte imbalances  - Administer electrolyte replacement as ordered  - Monitor response to electrolyte replacements, including rhythm and repeat lab results as appropriate  - Fluid restriction as ordered  - Instruct patient on fluid and nutrition restrictions as appropriate  Outcome: Progressing  Goal: Hemodynamic stability and optimal renal function maintained  Description: INTERVENTIONS:  - Monitor labs and assess for signs and symptoms of volume excess or deficit  - Monitor intake, output and patient weight  - Monitor urine specific gravity, serum osmolarity and serum sodium as indicated or ordered  - Monitor response to interventions for patient's volume status, including labs, urine output, blood pressure (other measures as available)  - Encourage oral intake as appropriate  - Instruct patient on fluid and nutrition restrictions as appropriate  Outcome: Progressing     Problem: Impaired Functional Mobility  Goal: Achieve highest/safest level of mobility/gait  Description: Interventions:  - Assess patient's functional ability and stability  - Promote increasing activity/tolerance for mobility and gait  - Educate and engage patient/family in tolerated activity level and precautions  Outcome: Progressing     Problem: Impaired Activities of Daily Living  Goal: Achieve highest/safest level of independence in self care  Description: Interventions:  - Assess ability and encourage patient to participate in ADLs to maximize function  - Promote sitting position while performing ADLs such as feeding, grooming, and bathing  - Educate and encourage patient/family in tolerated functional activity level and precautions during self-care    Outcome: Progressing     Problem: SKIN/TISSUE INTEGRITY - ADULT  Goal: Skin integrity remains intact  Description: INTERVENTIONS  - Assess and document risk factors for pressure ulcer development  - Assess and document skin integrity  - Monitor for areas of redness and/or skin breakdown  - Initiate interventions, skin care algorithm/standards of care as needed  Outcome: Progressing     Problem: PAIN - ADULT  Goal: Verbalizes/displays adequate comfort level or patient's stated pain goal  Description: INTERVENTIONS:  - Encourage pt to monitor pain and request assistance  - Assess pain using appropriate pain scale  - Administer analgesics based on type and severity of pain and evaluate response  - Implement non-pharmacological measures as appropriate and evaluate response  - Consider cultural and social influences on pain and pain management  - Manage/alleviate anxiety  - Utilize distraction and/or relaxation techniques  - Monitor for opioid side effects  - Notify MD/LIP if interventions unsuccessful or patient reports new pain  - Anticipate increased pain with activity and pre-medicate as appropriate  Outcome: Progressing     Problem: SAFETY ADULT - FALL  Goal: Free from fall injury  Description: INTERVENTIONS:  - Assess pt frequently for physical needs  - Identify cognitive and physical deficits and behaviors that affect risk of falls.   - [place] fall precautions as indicated by assessment.  - Educate pt/family on patient safety including physical limitations  - Instruct pt to call for assistance with activity based on assessment  - Modify environment to reduce risk of injury  - Provide assistive devices as appropriate  - Consider OT/PT consult to assist with strengthening/mobility  - Encourage toileting schedule  Outcome: Progressing     Problem: DISCHARGE PLANNING  Goal: Discharge to home or other facility with appropriate resources  Description: INTERVENTIONS:  - Identify barriers to discharge w/pt and caregiver  - Include patient/family/discharge partner in discharge planning  - Arrange for needed discharge resources and transportation as appropriate  - Identify discharge learning needs (meds, wound care, etc)  - Arrange for interpreters to assist at discharge as needed  - Consider post-discharge preferences of patient/family/discharge partner  - Complete POLST form as appropriate  - Assess patient's ability to be responsible for managing their own health  - Refer to Case Management Department for coordinating discharge planning if the patient needs post-hospital services based on physician/LIP order or complex needs related to functional status, cognitive ability or social support system  Outcome: Progressing

## 2023-06-18 LAB
ANION GAP SERPL CALC-SCNC: 5 MMOL/L (ref 0–18)
BUN BLD-MCNC: 26 MG/DL (ref 7–18)
BUN/CREAT SERPL: 37.1 (ref 10–20)
CALCIUM BLD-MCNC: 8.4 MG/DL (ref 8.5–10.1)
CHLORIDE SERPL-SCNC: 96 MMOL/L (ref 98–112)
CO2 SERPL-SCNC: 36 MMOL/L (ref 21–32)
CREAT BLD-MCNC: 0.7 MG/DL
DEPRECATED RDW RBC AUTO: 41 FL (ref 35.1–46.3)
ERYTHROCYTE [DISTWIDTH] IN BLOOD BY AUTOMATED COUNT: 12.5 % (ref 11–15)
GFR SERPLBLD BASED ON 1.73 SQ M-ARVRAT: 114 ML/MIN/1.73M2 (ref 60–?)
GLUCOSE BLD-MCNC: 129 MG/DL (ref 70–99)
GLUCOSE BLDC GLUCOMTR-MCNC: 117 MG/DL (ref 70–99)
GLUCOSE BLDC GLUCOMTR-MCNC: 128 MG/DL (ref 70–99)
GLUCOSE BLDC GLUCOMTR-MCNC: 132 MG/DL (ref 70–99)
GLUCOSE BLDC GLUCOMTR-MCNC: 152 MG/DL (ref 70–99)
HCT VFR BLD AUTO: 23.3 %
HGB BLD-MCNC: 7.9 G/DL
MAGNESIUM SERPL-MCNC: 2 MG/DL (ref 1.6–2.6)
MCH RBC QN AUTO: 30.3 PG (ref 26–34)
MCHC RBC AUTO-ENTMCNC: 33.9 G/DL (ref 31–37)
MCV RBC AUTO: 89.3 FL
OSMOLALITY SERPL CALC.SUM OF ELEC: 290 MOSM/KG (ref 275–295)
PHOSPHATE SERPL-MCNC: 5 MG/DL (ref 2.5–4.9)
PLATELET # BLD AUTO: 255 10(3)UL (ref 150–450)
POTASSIUM SERPL-SCNC: 3.4 MMOL/L (ref 3.5–5.1)
RBC # BLD AUTO: 2.61 X10(6)UL
SODIUM SERPL-SCNC: 137 MMOL/L (ref 136–145)
TRIGL SERPL-MCNC: 49 MG/DL (ref 30–149)
WBC # BLD AUTO: 7.3 X10(3) UL (ref 4–11)

## 2023-06-18 PROCEDURE — 99232 SBSQ HOSP IP/OBS MODERATE 35: CPT | Performed by: HOSPITALIST

## 2023-06-18 PROCEDURE — 99024 POSTOP FOLLOW-UP VISIT: CPT | Performed by: SURGERY

## 2023-06-18 NOTE — PLAN OF CARE
TPN infusing through PICC. PCA for pain control. NPO, but ice chips ok. NGT to LCS. Not passing gas yet. Voiding freely. Ambulating per self in room. Surgical dressing to abdomen dry, intact. Problem: Patient Centered Care  Goal: Patient preferences are identified and integrated in the patient's plan of care  Description: Interventions:  - What would you like us to know as we care for you?  From home alone  - Provide timely, complete, and accurate information to patient/family  - Incorporate patient and family knowledge, values, beliefs, and cultural backgrounds into the planning and delivery of care  - Encourage patient/family to participate in care and decision-making at the level they choose  - Honor patient and family perspectives and choices  6/18/2023 1703 by Joi Le RN  Outcome: Progressing  6/18/2023 1426 by Joi Le RN  Outcome: Progressing     Problem: Patient/Family Goals  Goal: Patient/Family Long Term Goal  Description: Patient's Long Term Goal: Unclogged G-Tube    Interventions:  - Monitor vital signs  - Monitor appropriate labs  - Pain management  - Administer medications per order  - Follow MD orders  - Diagnostics per order  - Update / inform patient and family on plan of care  - Discharge planning  - See additional Care Plan goals for specific interventions  6/18/2023 1703 by Joi Le RN  Outcome: Progressing  6/18/2023 1426 by Joi Le RN  Outcome: Progressing  Goal: Patient/Family Short Term Goal  Description: Patient's Short Term Goal: Improve pancreatitis    Interventions:   - Monitor vital signs  - Monitor appropriate labs  - Pain management  - Administer medications per order  - Follow MD orders  - Diagnostics per order  - Update / inform patient and family on plan of care  - See additional Care Plan goals for specific interventions  6/18/2023 1703 by Joi Le RN  Outcome: Progressing  6/18/2023 1426 by Joi Le RN  Outcome: Progressing Problem: GASTROINTESTINAL - ADULT  Goal: Maintains or returns to baseline bowel function  Description: INTERVENTIONS:  - Assess bowel function  - Maintain adequate hydration with IV or PO as ordered and tolerated  - Evaluate effectiveness of GI medications  - Encourage mobilization and activity  - Obtain nutritional consult as needed  - Establish a toileting routine/schedule  - Consider collaborating with pharmacy to review patient's medication profile  6/18/2023 1703 by Sunny Guzman RN  Outcome: Progressing  6/18/2023 1426 by Sunny Guzman RN  Outcome: Progressing  Goal: Maintains adequate nutritional intake (undernourished)  Description: INTERVENTIONS:  - Monitor percentage of each meal consumed  - Identify factors contributing to decreased intake, treat as appropriate  - Assist with meals as needed  - Monitor I&O, WT and lab values  - Obtain nutritional consult as needed  - Optimize oral hygiene and moisture  - Encourage food from home; allow for food preferences  - Enhance eating environment  6/18/2023 1703 by Sunny Guzman RN  Outcome: Progressing  6/18/2023 1426 by Sunny Guzman RN  Outcome: Progressing  Goal: Minimal or absence of nausea and vomiting  Description: INTERVENTIONS:  - Maintain adequate hydration with IV or PO as ordered and tolerated  - Nasogastric tube to low intermittent suction as ordered  - Evaluate effectiveness of ordered antiemetic medications  - Provide nonpharmacologic comfort measures as appropriate  - Advance diet as tolerated, if ordered  - Obtain nutritional consult as needed  - Evaluate fluid balance  6/18/2023 1703 by Sunny Guzman RN  Outcome: Progressing  6/18/2023 1426 by Sunny Guzman RN  Outcome: Progressing     Problem: METABOLIC/FLUID AND ELECTROLYTES - ADULT  Goal: Electrolytes maintained within normal limits  Description: INTERVENTIONS:  - Monitor labs and rhythm and assess patient for signs and symptoms of electrolyte imbalances  - Administer electrolyte replacement as ordered  - Monitor response to electrolyte replacements, including rhythm and repeat lab results as appropriate  - Fluid restriction as ordered  - Instruct patient on fluid and nutrition restrictions as appropriate  6/18/2023 1703 by Samir Nieto RN  Outcome: Progressing  6/18/2023 1426 by Samir Nieto RN  Outcome: Progressing  Goal: Hemodynamic stability and optimal renal function maintained  Description: INTERVENTIONS:  - Monitor labs and assess for signs and symptoms of volume excess or deficit  - Monitor intake, output and patient weight  - Monitor urine specific gravity, serum osmolarity and serum sodium as indicated or ordered  - Monitor response to interventions for patient's volume status, including labs, urine output, blood pressure (other measures as available)  - Encourage oral intake as appropriate  - Instruct patient on fluid and nutrition restrictions as appropriate  6/18/2023 1703 by Samir Nieto RN  Outcome: Progressing  6/18/2023 1426 by Samir Nieto RN  Outcome: Progressing  Goal: Glucose maintained within prescribed range  Description: INTERVENTIONS:  - Monitor Blood Glucose as ordered  - Assess for signs and symptoms of hyperglycemia and hypoglycemia  - Administer ordered medications to maintain glucose within target range  - Assess barriers to adequate nutritional intake and initiate nutrition consult as needed  - Instruct patient on self management of diabetes  6/18/2023 1703 by Samir Nieto RN  Outcome: Progressing  6/18/2023 1426 by Samir Nieto RN  Outcome: Progressing     Problem: Impaired Functional Mobility  Goal: Achieve highest/safest level of mobility/gait  Description: Interventions:  - Assess patient's functional ability and stability  - Promote increasing activity/tolerance for mobility and gait  - Educate and engage patient/family in tolerated activity level and precautions  6/18/2023 1703 by Samir Nieto RN  Outcome: Progressing  6/18/2023 1426 by Samir Rast, RN  Outcome: Progressing     Problem: Impaired Activities of Daily Living  Goal: Achieve highest/safest level of independence in self care  Description: Interventions:  - Assess ability and encourage patient to participate in ADLs to maximize function  - Promote sitting position while performing ADLs such as feeding, grooming, and bathing  - Educate and encourage patient/family in tolerated functional activity level and precautions during self-care  6/18/2023 1703 by Samir Nieto RN  Outcome: Progressing  6/18/2023 1426 by Samir Nieto RN  Outcome: Progressing     Problem: SKIN/TISSUE INTEGRITY - ADULT  Goal: Skin integrity remains intact  Description: INTERVENTIONS  - Assess and document risk factors for pressure ulcer development  - Assess and document skin integrity  - Monitor for areas of redness and/or skin breakdown  - Initiate interventions, skin care algorithm/standards of care as needed  6/18/2023 1703 by Samir Nieto RN  Outcome: Progressing  6/18/2023 1426 by Samir Nieto RN  Outcome: Progressing  Goal: Incision(s), wounds(s) or drain site(s) healing without S/S of infection  Description: INTERVENTIONS:  - Assess and document risk factors for pressure ulcer development  - Assess and document skin integrity  - Assess and document dressing/incision, wound bed, drain sites and surrounding tissue  - Implement wound care per orders  - Initiate isolation precautions as appropriate  - Initiate Pressure Ulcer prevention bundle as indicated  6/18/2023 1703 by Samir Nieto RN  Outcome: Progressing  6/18/2023 1426 by Samir Nieto RN  Outcome: Progressing     Problem: PAIN - ADULT  Goal: Verbalizes/displays adequate comfort level or patient's stated pain goal  Description: INTERVENTIONS:  - Encourage pt to monitor pain and request assistance  - Assess pain using appropriate pain scale  - Administer analgesics based on type and severity of pain and evaluate response  - Implement non-pharmacological measures as appropriate and evaluate response  - Consider cultural and social influences on pain and pain management  - Manage/alleviate anxiety  - Utilize distraction and/or relaxation techniques  - Monitor for opioid side effects  - Notify MD/LIP if interventions unsuccessful or patient reports new pain  - Anticipate increased pain with activity and pre-medicate as appropriate  6/18/2023 1703 by Chapincito Rodarte RN  Outcome: Progressing  6/18/2023 1426 by Chapincito Rodarte RN  Outcome: Progressing     Problem: SAFETY ADULT - FALL  Goal: Free from fall injury  Description: INTERVENTIONS:  - Assess pt frequently for physical needs  - Identify cognitive and physical deficits and behaviors that affect risk of falls.   - [place] fall precautions as indicated by assessment.  - Educate pt/family on patient safety including physical limitations  - Instruct pt to call for assistance with activity based on assessment  - Modify environment to reduce risk of injury  - Provide assistive devices as appropriate  - Consider OT/PT consult to assist with strengthening/mobility  - Encourage toileting schedule  6/18/2023 1703 by Chapincito Rodarte RN  Outcome: Progressing  6/18/2023 1426 by Chapincito Rodarte RN  Outcome: Progressing     Problem: DISCHARGE PLANNING  Goal: Discharge to home or other facility with appropriate resources  Description: INTERVENTIONS:  - Identify barriers to discharge w/pt and caregiver  - Include patient/family/discharge partner in discharge planning  - Arrange for needed discharge resources and transportation as appropriate  - Identify discharge learning needs (meds, wound care, etc)  - Arrange for interpreters to assist at discharge as needed  - Consider post-discharge preferences of patient/family/discharge partner  - Complete POLST form as appropriate  - Assess patient's ability to be responsible for managing their own health  - Refer to Case Management Department for coordinating discharge planning if the patient needs post-hospital services based on physician/LIP order or complex needs related to functional status, cognitive ability or social support system  6/18/2023 1703 by Hira Estrada RN  Outcome: Progressing  6/18/2023 1426 by Hira Estrada RN  Outcome: Progressing     Problem: RISK FOR INFECTION - ADULT  Goal: Absence of fever/infection during anticipated neutropenic period  Description: INTERVENTIONS  - Monitor WBC  - Administer growth factors as ordered  - Implement neutropenic guidelines  6/18/2023 1703 by Hira Estrada RN  Outcome: Progressing  6/18/2023 1426 by Hira Estrada RN  Outcome: Progressing     Problem: CARDIOVASCULAR - ADULT  Goal: Maintains optimal cardiac output and hemodynamic stability  Description: INTERVENTIONS:  - Monitor vital signs, rhythm, and trends  - Monitor for bleeding, hypotension and signs of decreased cardiac output  - Evaluate effectiveness of vasoactive medications to optimize hemodynamic stability  - Monitor arterial and/or venous puncture sites for bleeding and/or hematoma  - Assess quality of pulses, skin color and temperature  - Assess for signs of decreased coronary artery perfusion - ex.  Angina  - Evaluate fluid balance, assess for edema, trend weights  6/18/2023 1703 by Hira Estrada RN  Outcome: Progressing  6/18/2023 1426 by Hira Estrada RN  Outcome: Progressing  Goal: Absence of cardiac arrhythmias or at baseline  Description: INTERVENTIONS:  - Continuous cardiac monitoring, monitor vital signs, obtain 12 lead EKG if indicated  - Evaluate effectiveness of antiarrhythmic and heart rate control medications as ordered  - Initiate emergency measures for life threatening arrhythmias  - Monitor electrolytes and administer replacement therapy as ordered  6/18/2023 1703 by Hira Estrada RN  Outcome: Progressing  6/18/2023 1426 by Hira Estrada RN  Outcome: Progressing     Problem: HEMATOLOGIC - ADULT  Goal: Maintains hematologic stability  Description: INTERVENTIONS  - Assess for signs and symptoms of bleeding or hemorrhage  - Monitor labs and vital signs for trends  - Administer supportive blood products/factors, fluids and medications as ordered and appropriate  - Administer supportive blood products/factors as ordered and appropriate  6/18/2023 1703 by Amy Diaz RN  Outcome: Progressing  6/18/2023 1426 by Amy Diaz RN  Outcome: Progressing  Goal: Free from bleeding injury  Description: (Example usage: patient with low platelets)  INTERVENTIONS:  - Avoid intramuscular injections, enemas and rectal medication administration  - Ensure safe mobilization of patient  - Hold pressure on venipuncture sites to achieve adequate hemostasis  - Assess for signs and symptoms of internal bleeding  - Monitor lab trends  - Patient is to report abnormal signs of bleeding to staff  - Avoid use of toothpicks and dental floss  - Use electric shaver for shaving  - Use soft bristle tooth brush  - Limit straining and forceful nose blowing  6/18/2023 1703 by Amy Diaz RN  Outcome: Progressing  6/18/2023 1426 by Amy Diaz RN  Outcome: Progressing     Problem: MUSCULOSKELETAL - ADULT  Goal: Return mobility to safest level of function  Description: INTERVENTIONS:  - Assess patient stability and activity tolerance for standing, transferring and ambulating w/ or w/o assistive devices  - Assist with transfers and ambulation using safe patient handling equipment as needed  - Ensure adequate protection for wounds/incisions during mobilization  - Obtain PT/OT consults as needed  - Advance activity as appropriate  - Communicate ordered activity level and limitations with patient/family  6/18/2023 1703 by Amy Diaz RN  Outcome: Progressing  6/18/2023 1426 by Amy Diaz RN  Outcome: Progressing

## 2023-06-18 NOTE — PLAN OF CARE
Patient is alert and oriented x4, on remote tele, on RA. Q4 VS and sedation level monitoring. Getting lovenox and metoprolol Q6. NPO, ice chips. Monitoring NV and gas formation. NGT to LCS, monitoring I&Os. Q6 sugars obtained. Voiding via urinal. 1x-assist/self in room. Encouraging ambulation and IS use. Abdomen incision in place, CDI. Pain managed with Dilaudid PCA. R PICC in place, lab draw in the morning. TPN infusing at 83.3 ml/hr. Plan for SBFT Monday and PT rec home with HH/PT. Pending course. Problem: Patient Centered Care  Goal: Patient preferences are identified and integrated in the patient's plan of care  Description: Interventions:  - What would you like us to know as we care for you?  From home alone  - Provide timely, complete, and accurate information to patient/family  - Incorporate patient and family knowledge, values, beliefs, and cultural backgrounds into the planning and delivery of care  - Encourage patient/family to participate in care and decision-making at the level they choose  - Honor patient and family perspectives and choices  Outcome: Progressing     Problem: Patient/Family Goals  Goal: Patient/Family Long Term Goal  Description: Patient's Long Term Goal: Unclogged G-Tube    Interventions:  - Monitor vital signs  - Monitor appropriate labs  - Pain management  - Administer medications per order  - Follow MD orders  - Diagnostics per order  - Update / inform patient and family on plan of care  - Discharge planning  - See additional Care Plan goals for specific interventions  Outcome: Progressing  Goal: Patient/Family Short Term Goal  Description: Patient's Short Term Goal: Improve pancreatitis    Interventions:   - Monitor vital signs  - Monitor appropriate labs  - Pain management  - Administer medications per order  - Follow MD orders  - Diagnostics per order  - Update / inform patient and family on plan of care  - See additional Care Plan goals for specific interventions  Outcome: Progressing     Problem: GASTROINTESTINAL - ADULT  Goal: Maintains or returns to baseline bowel function  Description: INTERVENTIONS:  - Assess bowel function  - Maintain adequate hydration with IV or PO as ordered and tolerated  - Evaluate effectiveness of GI medications  - Encourage mobilization and activity  - Obtain nutritional consult as needed  - Establish a toileting routine/schedule  - Consider collaborating with pharmacy to review patient's medication profile  Outcome: Progressing  Goal: Maintains adequate nutritional intake (undernourished)  Description: INTERVENTIONS:  - Monitor percentage of each meal consumed  - Identify factors contributing to decreased intake, treat as appropriate  - Assist with meals as needed  - Monitor I&O, WT and lab values  - Obtain nutritional consult as needed  - Optimize oral hygiene and moisture  - Encourage food from home; allow for food preferences  - Enhance eating environment  Outcome: Progressing  Goal: Minimal or absence of nausea and vomiting  Description: INTERVENTIONS:  - Maintain adequate hydration with IV or PO as ordered and tolerated  - Nasogastric tube to low intermittent suction as ordered  - Evaluate effectiveness of ordered antiemetic medications  - Provide nonpharmacologic comfort measures as appropriate  - Advance diet as tolerated, if ordered  - Obtain nutritional consult as needed  - Evaluate fluid balance  Outcome: Progressing     Problem: METABOLIC/FLUID AND ELECTROLYTES - ADULT  Goal: Electrolytes maintained within normal limits  Description: INTERVENTIONS:  - Monitor labs and rhythm and assess patient for signs and symptoms of electrolyte imbalances  - Administer electrolyte replacement as ordered  - Monitor response to electrolyte replacements, including rhythm and repeat lab results as appropriate  - Fluid restriction as ordered  - Instruct patient on fluid and nutrition restrictions as appropriate  Outcome: Progressing  Goal: Hemodynamic stability and optimal renal function maintained  Description: INTERVENTIONS:  - Monitor labs and assess for signs and symptoms of volume excess or deficit  - Monitor intake, output and patient weight  - Monitor urine specific gravity, serum osmolarity and serum sodium as indicated or ordered  - Monitor response to interventions for patient's volume status, including labs, urine output, blood pressure (other measures as available)  - Encourage oral intake as appropriate  - Instruct patient on fluid and nutrition restrictions as appropriate  Outcome: Progressing  Goal: Glucose maintained within prescribed range  Description: INTERVENTIONS:  - Monitor Blood Glucose as ordered  - Assess for signs and symptoms of hyperglycemia and hypoglycemia  - Administer ordered medications to maintain glucose within target range  - Assess barriers to adequate nutritional intake and initiate nutrition consult as needed  - Instruct patient on self management of diabetes  Outcome: Progressing     Problem: Impaired Functional Mobility  Goal: Achieve highest/safest level of mobility/gait  Description: Interventions:  - Assess patient's functional ability and stability  - Promote increasing activity/tolerance for mobility and gait  - Educate and engage patient/family in tolerated activity level and precautions  - Recommend use of sit-stand lift for transfers  Outcome: Progressing     Problem: Impaired Activities of Daily Living  Goal: Achieve highest/safest level of independence in self care  Description: Interventions:  - Assess ability and encourage patient to participate in ADLs to maximize function  - Promote sitting position while performing ADLs such as feeding, grooming, and bathing  - Educate and encourage patient/family in tolerated functional activity level and precautions during self-care  - Provide support under elbow of weak side to prevent shoulder subluxation  Outcome: Progressing     Problem: SKIN/TISSUE INTEGRITY - ADULT  Goal: Skin integrity remains intact  Description: INTERVENTIONS  - Assess and document risk factors for pressure ulcer development  - Assess and document skin integrity  - Monitor for areas of redness and/or skin breakdown  - Initiate interventions, skin care algorithm/standards of care as needed  Outcome: Progressing  Goal: Incision(s), wounds(s) or drain site(s) healing without S/S of infection  Description: INTERVENTIONS:  - Assess and document risk factors for pressure ulcer development  - Assess and document skin integrity  - Assess and document dressing/incision, wound bed, drain sites and surrounding tissue  - Implement wound care per orders  - Initiate isolation precautions as appropriate  - Initiate Pressure Ulcer prevention bundle as indicated  Outcome: Progressing     Problem: PAIN - ADULT  Goal: Verbalizes/displays adequate comfort level or patient's stated pain goal  Description: INTERVENTIONS:  - Encourage pt to monitor pain and request assistance  - Assess pain using appropriate pain scale  - Administer analgesics based on type and severity of pain and evaluate response  - Implement non-pharmacological measures as appropriate and evaluate response  - Consider cultural and social influences on pain and pain management  - Manage/alleviate anxiety  - Utilize distraction and/or relaxation techniques  - Monitor for opioid side effects  - Notify MD/LIP if interventions unsuccessful or patient reports new pain  - Anticipate increased pain with activity and pre-medicate as appropriate  Outcome: Progressing     Problem: SAFETY ADULT - FALL  Goal: Free from fall injury  Description: INTERVENTIONS:  - Assess pt frequently for physical needs  - Identify cognitive and physical deficits and behaviors that affect risk of falls.   - [place] fall precautions as indicated by assessment.  - Educate pt/family on patient safety including physical limitations  - Instruct pt to call for assistance with activity based on assessment  - Modify environment to reduce risk of injury  - Provide assistive devices as appropriate  - Consider OT/PT consult to assist with strengthening/mobility  - Encourage toileting schedule  Outcome: Progressing     Problem: RISK FOR INFECTION - ADULT  Goal: Absence of fever/infection during anticipated neutropenic period  Description: INTERVENTIONS  - Monitor WBC  - Administer growth factors as ordered  - Implement neutropenic guidelines  Outcome: Progressing     Problem: CARDIOVASCULAR - ADULT  Goal: Maintains optimal cardiac output and hemodynamic stability  Description: INTERVENTIONS:  - Monitor vital signs, rhythm, and trends  - Monitor for bleeding, hypotension and signs of decreased cardiac output  - Evaluate effectiveness of vasoactive medications to optimize hemodynamic stability  - Monitor arterial and/or venous puncture sites for bleeding and/or hematoma  - Assess quality of pulses, skin color and temperature  - Assess for signs of decreased coronary artery perfusion - ex.  Angina  - Evaluate fluid balance, assess for edema, trend weights  Outcome: Progressing  Goal: Absence of cardiac arrhythmias or at baseline  Description: INTERVENTIONS:  - Continuous cardiac monitoring, monitor vital signs, obtain 12 lead EKG if indicated  - Evaluate effectiveness of antiarrhythmic and heart rate control medications as ordered  - Initiate emergency measures for life threatening arrhythmias  - Monitor electrolytes and administer replacement therapy as ordered  Outcome: Progressing     Problem: HEMATOLOGIC - ADULT  Goal: Maintains hematologic stability  Description: INTERVENTIONS  - Assess for signs and symptoms of bleeding or hemorrhage  - Monitor labs and vital signs for trends  - Administer supportive blood products/factors, fluids and medications as ordered and appropriate  - Administer supportive blood products/factors as ordered and appropriate  Outcome: Progressing  Goal: Free from bleeding injury  Description: (Example usage: patient with low platelets)  INTERVENTIONS:  - Avoid intramuscular injections, enemas and rectal medication administration  - Ensure safe mobilization of patient  - Hold pressure on venipuncture sites to achieve adequate hemostasis  - Assess for signs and symptoms of internal bleeding  - Monitor lab trends  - Patient is to report abnormal signs of bleeding to staff  - Avoid use of toothpicks and dental floss  - Use electric shaver for shaving  - Use soft bristle tooth brush  - Limit straining and forceful nose blowing  Outcome: Progressing     Problem: MUSCULOSKELETAL - ADULT  Goal: Return mobility to safest level of function  Description: INTERVENTIONS:  - Assess patient stability and activity tolerance for standing, transferring and ambulating w/ or w/o assistive devices  - Assist with transfers and ambulation using safe patient handling equipment as needed  - Ensure adequate protection for wounds/incisions during mobilization  - Obtain PT/OT consults as needed  - Advance activity as appropriate  - Communicate ordered activity level and limitations with patient/family  Outcome: Progressing

## 2023-06-19 ENCOUNTER — APPOINTMENT (OUTPATIENT)
Dept: GENERAL RADIOLOGY | Facility: HOSPITAL | Age: 48
End: 2023-06-19
Attending: SPECIALIST
Payer: COMMERCIAL

## 2023-06-19 LAB
ANION GAP SERPL CALC-SCNC: 4 MMOL/L (ref 0–18)
BUN BLD-MCNC: 29 MG/DL (ref 7–18)
BUN/CREAT SERPL: 42 (ref 10–20)
CALCIUM BLD-MCNC: 9 MG/DL (ref 8.5–10.1)
CHLORIDE SERPL-SCNC: 99 MMOL/L (ref 98–112)
CO2 SERPL-SCNC: 37 MMOL/L (ref 21–32)
CREAT BLD-MCNC: 0.69 MG/DL
GFR SERPLBLD BASED ON 1.73 SQ M-ARVRAT: 115 ML/MIN/1.73M2 (ref 60–?)
GLUCOSE BLD-MCNC: 113 MG/DL (ref 70–99)
GLUCOSE BLDC GLUCOMTR-MCNC: 110 MG/DL (ref 70–99)
GLUCOSE BLDC GLUCOMTR-MCNC: 132 MG/DL (ref 70–99)
GLUCOSE BLDC GLUCOMTR-MCNC: 133 MG/DL (ref 70–99)
GLUCOSE BLDC GLUCOMTR-MCNC: 155 MG/DL (ref 70–99)
HCT VFR BLD AUTO: 24.5 %
HGB BLD-MCNC: 8.1 G/DL
MAGNESIUM SERPL-MCNC: 2.1 MG/DL (ref 1.6–2.6)
OSMOLALITY SERPL CALC.SUM OF ELEC: 297 MOSM/KG (ref 275–295)
PHOSPHATE SERPL-MCNC: 4.5 MG/DL (ref 2.5–4.9)
POTASSIUM SERPL-SCNC: 3.3 MMOL/L (ref 3.5–5.1)
POTASSIUM SERPL-SCNC: 3.3 MMOL/L (ref 3.5–5.1)
SODIUM SERPL-SCNC: 140 MMOL/L (ref 136–145)

## 2023-06-19 PROCEDURE — 74240 X-RAY XM UPR GI TRC 1CNTRST: CPT | Performed by: SPECIALIST

## 2023-06-19 PROCEDURE — 99232 SBSQ HOSP IP/OBS MODERATE 35: CPT | Performed by: HOSPITALIST

## 2023-06-19 NOTE — PLAN OF CARE
Patient is alert and oriented. Room air. Vital signs stable. Remote tele in place. Voiding freely. Passing gas. Denies nausea. Dilaudid PCA continued for pain management. NGT to low continuous suction. TPN infusing. NPO. Midline incision clean, dry, and intact. Call light within reach. Appropriate safety precautions maintained.      Problem: GASTROINTESTINAL - ADULT  Goal: Maintains or returns to baseline bowel function  Description: INTERVENTIONS:  - Assess bowel function  - Maintain adequate hydration with IV or PO as ordered and tolerated  - Evaluate effectiveness of GI medications  - Encourage mobilization and activity  - Obtain nutritional consult as needed  - Establish a toileting routine/schedule  - Consider collaborating with pharmacy to review patient's medication profile  Outcome: Progressing  Goal: Maintains adequate nutritional intake (undernourished)  Description: INTERVENTIONS:  - Monitor percentage of each meal consumed  - Identify factors contributing to decreased intake, treat as appropriate  - Assist with meals as needed  - Monitor I&O, WT and lab values  - Obtain nutritional consult as needed  - Optimize oral hygiene and moisture  - Encourage food from home; allow for food preferences  - Enhance eating environment  Outcome: Progressing  Goal: Minimal or absence of nausea and vomiting  Description: INTERVENTIONS:  - Maintain adequate hydration with IV or PO as ordered and tolerated  - Nasogastric tube to low intermittent suction as ordered  - Evaluate effectiveness of ordered antiemetic medications  - Provide nonpharmacologic comfort measures as appropriate  - Advance diet as tolerated, if ordered  - Obtain nutritional consult as needed  - Evaluate fluid balance  Outcome: Progressing     Problem: METABOLIC/FLUID AND ELECTROLYTES - ADULT  Goal: Electrolytes maintained within normal limits  Description: INTERVENTIONS:  - Monitor labs and rhythm and assess patient for signs and symptoms of electrolyte imbalances  - Administer electrolyte replacement as ordered  - Monitor response to electrolyte replacements, including rhythm and repeat lab results as appropriate  - Fluid restriction as ordered  - Instruct patient on fluid and nutrition restrictions as appropriate  Outcome: Progressing  Goal: Hemodynamic stability and optimal renal function maintained  Description: INTERVENTIONS:  - Monitor labs and assess for signs and symptoms of volume excess or deficit  - Monitor intake, output and patient weight  - Monitor urine specific gravity, serum osmolarity and serum sodium as indicated or ordered  - Monitor response to interventions for patient's volume status, including labs, urine output, blood pressure (other measures as available)  - Encourage oral intake as appropriate  - Instruct patient on fluid and nutrition restrictions as appropriate  Outcome: Progressing  Goal: Glucose maintained within prescribed range  Description: INTERVENTIONS:  - Monitor Blood Glucose as ordered  - Assess for signs and symptoms of hyperglycemia and hypoglycemia  - Administer ordered medications to maintain glucose within target range  - Assess barriers to adequate nutritional intake and initiate nutrition consult as needed  - Instruct patient on self management of diabetes  Outcome: Progressing     Problem: SKIN/TISSUE INTEGRITY - ADULT  Goal: Skin integrity remains intact  Description: INTERVENTIONS  - Assess and document risk factors for pressure ulcer development  - Assess and document skin integrity  - Monitor for areas of redness and/or skin breakdown  - Initiate interventions, skin care algorithm/standards of care as needed  Outcome: Progressing  Goal: Incision(s), wounds(s) or drain site(s) healing without S/S of infection  Description: INTERVENTIONS:  - Assess and document risk factors for pressure ulcer development  - Assess and document skin integrity  - Assess and document dressing/incision, wound bed, drain sites and surrounding tissue  - Implement wound care per orders  - Initiate isolation precautions as appropriate  - Initiate Pressure Ulcer prevention bundle as indicated  Outcome: Progressing     Problem: PAIN - ADULT  Goal: Verbalizes/displays adequate comfort level or patient's stated pain goal  Description: INTERVENTIONS:  - Encourage pt to monitor pain and request assistance  - Assess pain using appropriate pain scale  - Administer analgesics based on type and severity of pain and evaluate response  - Implement non-pharmacological measures as appropriate and evaluate response  - Consider cultural and social influences on pain and pain management  - Manage/alleviate anxiety  - Utilize distraction and/or relaxation techniques  - Monitor for opioid side effects  - Notify MD/LIP if interventions unsuccessful or patient reports new pain  - Anticipate increased pain with activity and pre-medicate as appropriate  Outcome: Progressing     Problem: SAFETY ADULT - FALL  Goal: Free from fall injury  Description: INTERVENTIONS:  - Assess pt frequently for physical needs  - Identify cognitive and physical deficits and behaviors that affect risk of falls.   - [place] fall precautions as indicated by assessment.  - Educate pt/family on patient safety including physical limitations  - Instruct pt to call for assistance with activity based on assessment  - Modify environment to reduce risk of injury  - Provide assistive devices as appropriate  - Consider OT/PT consult to assist with strengthening/mobility  - Encourage toileting schedule  Outcome: Progressing     Problem: DISCHARGE PLANNING  Goal: Discharge to home or other facility with appropriate resources  Description: INTERVENTIONS:  - Identify barriers to discharge w/pt and caregiver  - Include patient/family/discharge partner in discharge planning  - Arrange for needed discharge resources and transportation as appropriate  - Identify discharge learning needs (meds, wound care, etc)  - Arrange for interpreters to assist at discharge as needed  - Consider post-discharge preferences of patient/family/discharge partner  - Complete POLST form as appropriate  - Assess patient's ability to be responsible for managing their own health  - Refer to Case Management Department for coordinating discharge planning if the patient needs post-hospital services based on physician/LIP order or complex needs related to functional status, cognitive ability or social support system  Outcome: Progressing     Problem: RISK FOR INFECTION - ADULT  Goal: Absence of fever/infection during anticipated neutropenic period  Description: INTERVENTIONS  - Monitor WBC  - Administer growth factors as ordered  - Implement neutropenic guidelines  Outcome: Progressing     Problem: CARDIOVASCULAR - ADULT  Goal: Maintains optimal cardiac output and hemodynamic stability  Description: INTERVENTIONS:  - Monitor vital signs, rhythm, and trends  - Monitor for bleeding, hypotension and signs of decreased cardiac output  - Evaluate effectiveness of vasoactive medications to optimize hemodynamic stability  - Monitor arterial and/or venous puncture sites for bleeding and/or hematoma  - Assess quality of pulses, skin color and temperature  - Assess for signs of decreased coronary artery perfusion - ex.  Angina  - Evaluate fluid balance, assess for edema, trend weights  Outcome: Progressing  Goal: Absence of cardiac arrhythmias or at baseline  Description: INTERVENTIONS:  - Continuous cardiac monitoring, monitor vital signs, obtain 12 lead EKG if indicated  - Evaluate effectiveness of antiarrhythmic and heart rate control medications as ordered  - Initiate emergency measures for life threatening arrhythmias  - Monitor electrolytes and administer replacement therapy as ordered  Outcome: Progressing     Problem: HEMATOLOGIC - ADULT  Goal: Maintains hematologic stability  Description: INTERVENTIONS  - Assess for signs and symptoms of bleeding or hemorrhage  - Monitor labs and vital signs for trends  - Administer supportive blood products/factors, fluids and medications as ordered and appropriate  - Administer supportive blood products/factors as ordered and appropriate  Outcome: Progressing     Problem: MUSCULOSKELETAL - ADULT  Goal: Return mobility to safest level of function  Description: INTERVENTIONS:  - Assess patient stability and activity tolerance for standing, transferring and ambulating w/ or w/o assistive devices  - Assist with transfers and ambulation using safe patient handling equipment as needed  - Ensure adequate protection for wounds/incisions during mobilization  - Obtain PT/OT consults as needed  - Advance activity as appropriate  - Communicate ordered activity level and limitations with patient/family  Outcome: Progressing

## 2023-06-19 NOTE — PLAN OF CARE
Patient is alert and oriented x4, on remote tele, on RA. Q4 VS and sedation level monitoring. Getting lovenox and metoprolol Q6. NPO, ice chips. Monitoring NV and gas formation. NGT to LCS, monitoring I&Os. Q6 sugars obtained. Voiding via urinal. 1x-assist/self in room. Encouraging ambulation and IS use. Abdomen incision in place, CDI. Pain managed with Dilaudid PCA. R PICC in place, lab draw in the morning. TPN infusing at 83.3 ml/hr. Plan for SBFT Monday and PT rec home with HH/PT. Pending course. Problem: Patient Centered Care  Goal: Patient preferences are identified and integrated in the patient's plan of care  Description: Interventions:  - What would you like us to know as we care for you?  From home alone  - Provide timely, complete, and accurate information to patient/family  - Incorporate patient and family knowledge, values, beliefs, and cultural backgrounds into the planning and delivery of care  - Encourage patient/family to participate in care and decision-making at the level they choose  - Honor patient and family perspectives and choices  Outcome: Progressing     Problem: Patient/Family Goals  Goal: Patient/Family Long Term Goal  Description: Patient's Long Term Goal: Unclogged G-Tube    Interventions:  - Monitor vital signs  - Monitor appropriate labs  - Pain management  - Administer medications per order  - Follow MD orders  - Diagnostics per order  - Update / inform patient and family on plan of care  - Discharge planning  - See additional Care Plan goals for specific interventions  Outcome: Progressing  Goal: Patient/Family Short Term Goal  Description: Patient's Short Term Goal: Improve pancreatitis    Interventions:   - Monitor vital signs  - Monitor appropriate labs  - Pain management  - Administer medications per order  - Follow MD orders  - Diagnostics per order  - Update / inform patient and family on plan of care  - See additional Care Plan goals for specific interventions  Outcome: Progressing     Problem: GASTROINTESTINAL - ADULT  Goal: Maintains or returns to baseline bowel function  Description: INTERVENTIONS:  - Assess bowel function  - Maintain adequate hydration with IV or PO as ordered and tolerated  - Evaluate effectiveness of GI medications  - Encourage mobilization and activity  - Obtain nutritional consult as needed  - Establish a toileting routine/schedule  - Consider collaborating with pharmacy to review patient's medication profile  Outcome: Progressing  Goal: Maintains adequate nutritional intake (undernourished)  Description: INTERVENTIONS:  - Monitor percentage of each meal consumed  - Identify factors contributing to decreased intake, treat as appropriate  - Assist with meals as needed  - Monitor I&O, WT and lab values  - Obtain nutritional consult as needed  - Optimize oral hygiene and moisture  - Encourage food from home; allow for food preferences  - Enhance eating environment  Outcome: Progressing  Goal: Minimal or absence of nausea and vomiting  Description: INTERVENTIONS:  - Maintain adequate hydration with IV or PO as ordered and tolerated  - Nasogastric tube to low intermittent suction as ordered  - Evaluate effectiveness of ordered antiemetic medications  - Provide nonpharmacologic comfort measures as appropriate  - Advance diet as tolerated, if ordered  - Obtain nutritional consult as needed  - Evaluate fluid balance  Outcome: Progressing     Problem: METABOLIC/FLUID AND ELECTROLYTES - ADULT  Goal: Electrolytes maintained within normal limits  Description: INTERVENTIONS:  - Monitor labs and rhythm and assess patient for signs and symptoms of electrolyte imbalances  - Administer electrolyte replacement as ordered  - Monitor response to electrolyte replacements, including rhythm and repeat lab results as appropriate  - Fluid restriction as ordered  - Instruct patient on fluid and nutrition restrictions as appropriate  Outcome: Progressing  Goal: Hemodynamic stability and optimal renal function maintained  Description: INTERVENTIONS:  - Monitor labs and assess for signs and symptoms of volume excess or deficit  - Monitor intake, output and patient weight  - Monitor urine specific gravity, serum osmolarity and serum sodium as indicated or ordered  - Monitor response to interventions for patient's volume status, including labs, urine output, blood pressure (other measures as available)  - Encourage oral intake as appropriate  - Instruct patient on fluid and nutrition restrictions as appropriate  Outcome: Progressing  Goal: Glucose maintained within prescribed range  Description: INTERVENTIONS:  - Monitor Blood Glucose as ordered  - Assess for signs and symptoms of hyperglycemia and hypoglycemia  - Administer ordered medications to maintain glucose within target range  - Assess barriers to adequate nutritional intake and initiate nutrition consult as needed  - Instruct patient on self management of diabetes  Outcome: Progressing     Problem: Impaired Functional Mobility  Goal: Achieve highest/safest level of mobility/gait  Description: Interventions:  - Assess patient's functional ability and stability  - Promote increasing activity/tolerance for mobility and gait  - Educate and engage patient/family in tolerated activity level and precautions  - Recommend use of sit-stand lift for transfers  Outcome: Progressing     Problem: Impaired Activities of Daily Living  Goal: Achieve highest/safest level of independence in self care  Description: Interventions:  - Assess ability and encourage patient to participate in ADLs to maximize function  - Promote sitting position while performing ADLs such as feeding, grooming, and bathing  - Educate and encourage patient/family in tolerated functional activity level and precautions during self-care  - Provide support under elbow of weak side to prevent shoulder subluxation  Outcome: Progressing     Problem: SKIN/TISSUE INTEGRITY - ADULT  Goal: Skin integrity remains intact  Description: INTERVENTIONS  - Assess and document risk factors for pressure ulcer development  - Assess and document skin integrity  - Monitor for areas of redness and/or skin breakdown  - Initiate interventions, skin care algorithm/standards of care as needed  Outcome: Progressing  Goal: Incision(s), wounds(s) or drain site(s) healing without S/S of infection  Description: INTERVENTIONS:  - Assess and document risk factors for pressure ulcer development  - Assess and document skin integrity  - Assess and document dressing/incision, wound bed, drain sites and surrounding tissue  - Implement wound care per orders  - Initiate isolation precautions as appropriate  - Initiate Pressure Ulcer prevention bundle as indicated  Outcome: Progressing     Problem: PAIN - ADULT  Goal: Verbalizes/displays adequate comfort level or patient's stated pain goal  Description: INTERVENTIONS:  - Encourage pt to monitor pain and request assistance  - Assess pain using appropriate pain scale  - Administer analgesics based on type and severity of pain and evaluate response  - Implement non-pharmacological measures as appropriate and evaluate response  - Consider cultural and social influences on pain and pain management  - Manage/alleviate anxiety  - Utilize distraction and/or relaxation techniques  - Monitor for opioid side effects  - Notify MD/LIP if interventions unsuccessful or patient reports new pain  - Anticipate increased pain with activity and pre-medicate as appropriate  Outcome: Progressing     Problem: SAFETY ADULT - FALL  Goal: Free from fall injury  Description: INTERVENTIONS:  - Assess pt frequently for physical needs  - Identify cognitive and physical deficits and behaviors that affect risk of falls.   - [place] fall precautions as indicated by assessment.  - Educate pt/family on patient safety including physical limitations  - Instruct pt to call for assistance with activity based on assessment  - Modify environment to reduce risk of injury  - Provide assistive devices as appropriate  - Consider OT/PT consult to assist with strengthening/mobility  - Encourage toileting schedule  Outcome: Progressing     Problem: RISK FOR INFECTION - ADULT  Goal: Absence of fever/infection during anticipated neutropenic period  Description: INTERVENTIONS  - Monitor WBC  - Administer growth factors as ordered  - Implement neutropenic guidelines  Outcome: Progressing     Problem: CARDIOVASCULAR - ADULT  Goal: Maintains optimal cardiac output and hemodynamic stability  Description: INTERVENTIONS:  - Monitor vital signs, rhythm, and trends  - Monitor for bleeding, hypotension and signs of decreased cardiac output  - Evaluate effectiveness of vasoactive medications to optimize hemodynamic stability  - Monitor arterial and/or venous puncture sites for bleeding and/or hematoma  - Assess quality of pulses, skin color and temperature  - Assess for signs of decreased coronary artery perfusion - ex.  Angina  - Evaluate fluid balance, assess for edema, trend weights  Outcome: Progressing  Goal: Absence of cardiac arrhythmias or at baseline  Description: INTERVENTIONS:  - Continuous cardiac monitoring, monitor vital signs, obtain 12 lead EKG if indicated  - Evaluate effectiveness of antiarrhythmic and heart rate control medications as ordered  - Initiate emergency measures for life threatening arrhythmias  - Monitor electrolytes and administer replacement therapy as ordered  Outcome: Progressing     Problem: HEMATOLOGIC - ADULT  Goal: Maintains hematologic stability  Description: INTERVENTIONS  - Assess for signs and symptoms of bleeding or hemorrhage  - Monitor labs and vital signs for trends  - Administer supportive blood products/factors, fluids and medications as ordered and appropriate  - Administer supportive blood products/factors as ordered and appropriate  Outcome: Progressing  Goal: Free from bleeding injury  Description: (Example usage: patient with low platelets)  INTERVENTIONS:  - Avoid intramuscular injections, enemas and rectal medication administration  - Ensure safe mobilization of patient  - Hold pressure on venipuncture sites to achieve adequate hemostasis  - Assess for signs and symptoms of internal bleeding  - Monitor lab trends  - Patient is to report abnormal signs of bleeding to staff  - Avoid use of toothpicks and dental floss  - Use electric shaver for shaving  - Use soft bristle tooth brush  - Limit straining and forceful nose blowing  Outcome: Progressing     Problem: MUSCULOSKELETAL - ADULT  Goal: Return mobility to safest level of function  Description: INTERVENTIONS:  - Assess patient stability and activity tolerance for standing, transferring and ambulating w/ or w/o assistive devices  - Assist with transfers and ambulation using safe patient handling equipment as needed  - Ensure adequate protection for wounds/incisions during mobilization  - Obtain PT/OT consults as needed  - Advance activity as appropriate  - Communicate ordered activity level and limitations with patient/family  Outcome: Progressing

## 2023-06-20 ENCOUNTER — APPOINTMENT (OUTPATIENT)
Dept: CV DIAGNOSTICS | Facility: HOSPITAL | Age: 48
End: 2023-06-20
Attending: HOSPITALIST
Payer: COMMERCIAL

## 2023-06-20 ENCOUNTER — APPOINTMENT (OUTPATIENT)
Dept: GENERAL RADIOLOGY | Facility: HOSPITAL | Age: 48
End: 2023-06-20
Attending: SPECIALIST
Payer: COMMERCIAL

## 2023-06-20 LAB
ANION GAP SERPL CALC-SCNC: 4 MMOL/L (ref 0–18)
BUN BLD-MCNC: 30 MG/DL (ref 7–18)
BUN/CREAT SERPL: 41.1 (ref 10–20)
CALCIUM BLD-MCNC: 8.7 MG/DL (ref 8.5–10.1)
CHLORIDE SERPL-SCNC: 104 MMOL/L (ref 98–112)
CO2 SERPL-SCNC: 35 MMOL/L (ref 21–32)
CREAT BLD-MCNC: 0.73 MG/DL
DEPRECATED RDW RBC AUTO: 43.3 FL (ref 35.1–46.3)
ERYTHROCYTE [DISTWIDTH] IN BLOOD BY AUTOMATED COUNT: 12.7 % (ref 11–15)
GFR SERPLBLD BASED ON 1.73 SQ M-ARVRAT: 113 ML/MIN/1.73M2 (ref 60–?)
GLUCOSE BLD-MCNC: 113 MG/DL (ref 70–99)
GLUCOSE BLDC GLUCOMTR-MCNC: 120 MG/DL (ref 70–99)
GLUCOSE BLDC GLUCOMTR-MCNC: 122 MG/DL (ref 70–99)
GLUCOSE BLDC GLUCOMTR-MCNC: 130 MG/DL (ref 70–99)
GLUCOSE BLDC GLUCOMTR-MCNC: 140 MG/DL (ref 70–99)
GLUCOSE BLDC GLUCOMTR-MCNC: 82 MG/DL (ref 70–99)
HCT VFR BLD AUTO: 24.8 %
HGB BLD-MCNC: 8 G/DL
MAGNESIUM SERPL-MCNC: 2 MG/DL (ref 1.6–2.6)
MCH RBC QN AUTO: 30.1 PG (ref 26–34)
MCHC RBC AUTO-ENTMCNC: 32.3 G/DL (ref 31–37)
MCV RBC AUTO: 93.2 FL
OSMOLALITY SERPL CALC.SUM OF ELEC: 303 MOSM/KG (ref 275–295)
PHOSPHATE SERPL-MCNC: 3.6 MG/DL (ref 2.5–4.9)
PLATELET # BLD AUTO: 331 10(3)UL (ref 150–450)
POTASSIUM SERPL-SCNC: 3.6 MMOL/L (ref 3.5–5.1)
POTASSIUM SERPL-SCNC: 3.6 MMOL/L (ref 3.5–5.1)
RBC # BLD AUTO: 2.66 X10(6)UL
SODIUM SERPL-SCNC: 143 MMOL/L (ref 136–145)
WBC # BLD AUTO: 8.6 X10(3) UL (ref 4–11)

## 2023-06-20 PROCEDURE — 74018 RADEX ABDOMEN 1 VIEW: CPT | Performed by: SPECIALIST

## 2023-06-20 PROCEDURE — 93306 TTE W/DOPPLER COMPLETE: CPT | Performed by: HOSPITALIST

## 2023-06-20 PROCEDURE — 99233 SBSQ HOSP IP/OBS HIGH 50: CPT | Performed by: HOSPITALIST

## 2023-06-20 NOTE — PLAN OF CARE
Patient A/O X 4 on R/A. NPO except for Ice chips. Remote Tele in place. Patient has a NG to low suction. Voiding freely and passing gas. Patient on Dilaudid PCA for pain management. NGT to low continuous suction. TPN infusing. Patient has a midline incision open to air with staples clean and dry. Safety measures in place. Problem: Patient Centered Care  Goal: Patient preferences are identified and integrated in the patient's plan of care  Description: Interventions:  - What would you like us to know as we care for you?  From home alone  - Provide timely, complete, and accurate information to patient/family  - Incorporate patient and family knowledge, values, beliefs, and cultural backgrounds into the planning and delivery of care  - Encourage patient/family to participate in care and decision-making at the level they choose  - Honor patient and family perspectives and choices  6/19/2023 2241 by Solitario Leal RN  Outcome: Progressing  6/19/2023 2241 by Solitario Leal RN  Outcome: Not Progressing     Problem: Patient/Family Goals  Goal: Patient/Family Long Term Goal  Description: Patient's Long Term Goal: Unclogged G-Tube    Interventions:  - Monitor vital signs  - Monitor appropriate labs  - Pain management  - Administer medications per order  - Follow MD orders  - Diagnostics per order  - Update / inform patient and family on plan of care  - Discharge planning  - See additional Care Plan goals for specific interventions  6/19/2023 2241 by Solitario Leal RN  Outcome: Progressing  6/19/2023 2241 by Solitario Leal RN  Outcome: Not Progressing  Goal: Patient/Family Short Term Goal  Description: Patient's Short Term Goal: Improve pancreatitis    Interventions:   - Monitor vital signs  - Monitor appropriate labs  - Pain management  - Administer medications per order  - Follow MD orders  - Diagnostics per order  - Update / inform patient and family on plan of care  - See additional Care Plan goals for specific interventions  6/19/2023 2241 by Carolina Candaa RN  Outcome: Progressing  6/19/2023 2241 by Carolina Canada RN  Outcome: Not Progressing     Problem: GASTROINTESTINAL - ADULT  Goal: Maintains or returns to baseline bowel function  Description: INTERVENTIONS:  - Assess bowel function  - Maintain adequate hydration with IV or PO as ordered and tolerated  - Evaluate effectiveness of GI medications  - Encourage mobilization and activity  - Obtain nutritional consult as needed  - Establish a toileting routine/schedule  - Consider collaborating with pharmacy to review patient's medication profile  6/19/2023 2241 by Carolina Canada RN  Outcome: Progressing  6/19/2023 2241 by Carolina Canada RN  Outcome: Not Progressing  Goal: Maintains adequate nutritional intake (undernourished)  Description: INTERVENTIONS:  - Monitor percentage of each meal consumed  - Identify factors contributing to decreased intake, treat as appropriate  - Assist with meals as needed  - Monitor I&O, WT and lab values  - Obtain nutritional consult as needed  - Optimize oral hygiene and moisture  - Encourage food from home; allow for food preferences  - Enhance eating environment  6/19/2023 2241 by Carolina Canada RN  Outcome: Progressing  6/19/2023 2241 by Carolina Canada RN  Outcome: Not Progressing  Goal: Minimal or absence of nausea and vomiting  Description: INTERVENTIONS:  - Maintain adequate hydration with IV or PO as ordered and tolerated  - Nasogastric tube to low intermittent suction as ordered  - Evaluate effectiveness of ordered antiemetic medications  - Provide nonpharmacologic comfort measures as appropriate  - Advance diet as tolerated, if ordered  - Obtain nutritional consult as needed  - Evaluate fluid balance  6/19/2023 2241 by Carolina Canada RN  Outcome: Progressing  6/19/2023 2241 by Carolina Canada RN  Outcome: Not Progressing     Problem: METABOLIC/FLUID AND ELECTROLYTES - ADULT  Goal: Electrolytes maintained within normal limits  Description: INTERVENTIONS:  - Monitor labs and rhythm and assess patient for signs and symptoms of electrolyte imbalances  - Administer electrolyte replacement as ordered  - Monitor response to electrolyte replacements, including rhythm and repeat lab results as appropriate  - Fluid restriction as ordered  - Instruct patient on fluid and nutrition restrictions as appropriate  6/19/2023 2241 by Satinder Mesa RN  Outcome: Progressing  6/19/2023 2241 by Satinder Mesa RN  Outcome: Not Progressing  Goal: Hemodynamic stability and optimal renal function maintained  Description: INTERVENTIONS:  - Monitor labs and assess for signs and symptoms of volume excess or deficit  - Monitor intake, output and patient weight  - Monitor urine specific gravity, serum osmolarity and serum sodium as indicated or ordered  - Monitor response to interventions for patient's volume status, including labs, urine output, blood pressure (other measures as available)  - Encourage oral intake as appropriate  - Instruct patient on fluid and nutrition restrictions as appropriate  6/19/2023 2241 by Satinder Mesa RN  Outcome: Progressing  6/19/2023 2241 by Satinder Mesa RN  Outcome: Not Progressing  Goal: Glucose maintained within prescribed range  Description: INTERVENTIONS:  - Monitor Blood Glucose as ordered  - Assess for signs and symptoms of hyperglycemia and hypoglycemia  - Administer ordered medications to maintain glucose within target range  - Assess barriers to adequate nutritional intake and initiate nutrition consult as needed  - Instruct patient on self management of diabetes  6/19/2023 2241 by Satinder Mesa RN  Outcome: Progressing  6/19/2023 2241 by Satinder Mesa RN  Outcome: Not Progressing     Problem: Impaired Functional Mobility  Goal: Achieve highest/safest level of mobility/gait  Description: Interventions:  - Assess patient's functional ability and stability  - Promote increasing activity/tolerance for mobility and gait  - Educate and engage patient/family in tolerated activity level and precautions    6/19/2023 2241 by Marianela Culp RN  Outcome: Progressing  6/19/2023 2241 by Marianela Culp RN  Outcome: Not Progressing     Problem: Impaired Activities of Daily Living  Goal: Achieve highest/safest level of independence in self care  Description: Interventions:  - Assess ability and encourage patient to participate in ADLs to maximize function  - Promote sitting position while performing ADLs such as feeding, grooming, and bathing  - Educate and encourage patient/family in tolerated functional activity level and precautions during self-care    6/19/2023 2241 by Marianela Culp RN  Outcome: Progressing  6/19/2023 2241 by Marianela Culp RN  Outcome: Not Progressing     Problem: SKIN/TISSUE INTEGRITY - ADULT  Goal: Skin integrity remains intact  Description: INTERVENTIONS  - Assess and document risk factors for pressure ulcer development  - Assess and document skin integrity  - Monitor for areas of redness and/or skin breakdown  - Initiate interventions, skin care algorithm/standards of care as needed  6/19/2023 2241 by Marianela Culp RN  Outcome: Progressing  6/19/2023 2241 by Marianela Culp RN  Outcome: Not Progressing  Goal: Incision(s), wounds(s) or drain site(s) healing without S/S of infection  Description: INTERVENTIONS:  - Assess and document risk factors for pressure ulcer development  - Assess and document skin integrity  - Assess and document dressing/incision, wound bed, drain sites and surrounding tissue  - Implement wound care per orders  - Initiate isolation precautions as appropriate  - Initiate Pressure Ulcer prevention bundle as indicated  6/19/2023 2241 by Marianela Culp RN  Outcome: Progressing  6/19/2023 2241 by Marianela Culp RN  Outcome: Not Progressing     Problem: PAIN - ADULT  Goal: Verbalizes/displays adequate comfort level or patient's stated pain goal  Description: INTERVENTIONS:  - Encourage pt to monitor pain and request assistance  - Assess pain using appropriate pain scale  - Administer analgesics based on type and severity of pain and evaluate response  - Implement non-pharmacological measures as appropriate and evaluate response  - Consider cultural and social influences on pain and pain management  - Manage/alleviate anxiety  - Utilize distraction and/or relaxation techniques  - Monitor for opioid side effects  - Notify MD/LIP if interventions unsuccessful or patient reports new pain  - Anticipate increased pain with activity and pre-medicate as appropriate  6/19/2023 2241 by Eduardo Doshi RN  Outcome: Progressing  6/19/2023 2241 by Eduardo Doshi RN  Outcome: Not Progressing     Problem: SAFETY ADULT - FALL  Goal: Free from fall injury  Description: INTERVENTIONS:  - Assess pt frequently for physical needs  - Identify cognitive and physical deficits and behaviors that affect risk of falls.   - [place] fall precautions as indicated by assessment.  - Educate pt/family on patient safety including physical limitations  - Instruct pt to call for assistance with activity based on assessment  - Modify environment to reduce risk of injury  - Provide assistive devices as appropriate  - Consider OT/PT consult to assist with strengthening/mobility  - Encourage toileting schedule  6/19/2023 2241 by Eduardo Doshi RN  Outcome: Progressing  6/19/2023 2241 by Eduardo Doshi RN  Outcome: Not Progressing     Problem: DISCHARGE PLANNING  Goal: Discharge to home or other facility with appropriate resources  Description: INTERVENTIONS:  - Identify barriers to discharge w/pt and caregiver  - Include patient/family/discharge partner in discharge planning  - Arrange for needed discharge resources and transportation as appropriate  - Identify discharge learning needs (meds, wound care, etc)  - Arrange for interpreters to assist at discharge as needed  - Consider post-discharge preferences of patient/family/discharge partner  - Complete POLST form as appropriate  - Assess patient's ability to be responsible for managing their own health  - Refer to Case Management Department for coordinating discharge planning if the patient needs post-hospital services based on physician/LIP order or complex needs related to functional status, cognitive ability or social support system  6/19/2023 2241 by Wilma Hauser RN  Outcome: Progressing  6/19/2023 2241 by Wilma Hauser RN  Outcome: Not Progressing     Problem: RISK FOR INFECTION - ADULT  Goal: Absence of fever/infection during anticipated neutropenic period  Description: INTERVENTIONS  - Monitor WBC  - Administer growth factors as ordered  - Implement neutropenic guidelines  6/19/2023 2241 by Wilma Hauser RN  Outcome: Progressing  6/19/2023 2241 by Wilma Hauser RN  Outcome: Not Progressing     Problem: CARDIOVASCULAR - ADULT  Goal: Maintains optimal cardiac output and hemodynamic stability  Description: INTERVENTIONS:  - Monitor vital signs, rhythm, and trends  - Monitor for bleeding, hypotension and signs of decreased cardiac output  - Evaluate effectiveness of vasoactive medications to optimize hemodynamic stability  - Monitor arterial and/or venous puncture sites for bleeding and/or hematoma  - Assess quality of pulses, skin color and temperature  - Assess for signs of decreased coronary artery perfusion - ex.  Angina  - Evaluate fluid balance, assess for edema, trend weights  6/19/2023 2241 by Wilma Hauser RN  Outcome: Progressing  6/19/2023 2241 by Wilma Hauser RN  Outcome: Not Progressing  Goal: Absence of cardiac arrhythmias or at baseline  Description: INTERVENTIONS:  - Continuous cardiac monitoring, monitor vital signs, obtain 12 lead EKG if indicated  - Evaluate effectiveness of antiarrhythmic and heart rate control medications as ordered  - Initiate emergency measures for life threatening arrhythmias  - Monitor electrolytes and administer replacement therapy as ordered  6/19/2023 2241 by An Emmanuel RN  Outcome: Progressing  6/19/2023 2241 by An Emmanuel RN  Outcome: Not Progressing     Problem: HEMATOLOGIC - ADULT  Goal: Maintains hematologic stability  Description: INTERVENTIONS  - Assess for signs and symptoms of bleeding or hemorrhage  - Monitor labs and vital signs for trends  - Administer supportive blood products/factors, fluids and medications as ordered and appropriate  - Administer supportive blood products/factors as ordered and appropriate  6/19/2023 2241 by An Emmanuel RN  Outcome: Progressing  6/19/2023 2241 by An Emmanuel RN  Outcome: Not Progressing  Goal: Free from bleeding injury  Description: (Example usage: patient with low platelets)  INTERVENTIONS:  - Avoid intramuscular injections, enemas and rectal medication administration  - Ensure safe mobilization of patient  - Hold pressure on venipuncture sites to achieve adequate hemostasis  - Assess for signs and symptoms of internal bleeding  - Monitor lab trends  - Patient is to report abnormal signs of bleeding to staff  - Avoid use of toothpicks and dental floss  - Use electric shaver for shaving  - Use soft bristle tooth brush  - Limit straining and forceful nose blowing  6/19/2023 2241 by An Emmanuel RN  Outcome: Progressing  6/19/2023 2241 by An Emmanuel RN  Outcome: Not Progressing     Problem: MUSCULOSKELETAL - ADULT  Goal: Return mobility to safest level of function  Description: INTERVENTIONS:  - Assess patient stability and activity tolerance for standing, transferring and ambulating w/ or w/o assistive devices  - Assist with transfers and ambulation using safe patient handling equipment as needed  - Ensure adequate protection for wounds/incisions during mobilization  - Obtain PT/OT consults as needed  - Advance activity as appropriate  - Communicate ordered activity level and limitations with patient/family  6/19/2023 2241 by Jeronimo Mg, RN  Outcome: Progressing  6/19/2023 2241 by Jeronimo gM, RN  Outcome: Not Progressing

## 2023-06-20 NOTE — PLAN OF CARE
Problem: Patient Centered Care  Goal: Patient preferences are identified and integrated in the patient's plan of care  Description: Interventions:  - What would you like us to know as we care for you?  From home alone  - Provide timely, complete, and accurate information to patient/family  - Incorporate patient and family knowledge, values, beliefs, and cultural backgrounds into the planning and delivery of care  - Encourage patient/family to participate in care and decision-making at the level they choose  - Honor patient and family perspectives and choices  Outcome: Progressing     Problem: Patient/Family Goals  Goal: Patient/Family Long Term Goal  Description: Patient's Long Term Goal: Unclogged G-Tube    Interventions:  - Monitor vital signs  - Monitor appropriate labs  - Pain management  - Administer medications per order  - Follow MD orders  - Diagnostics per order  - Update / inform patient and family on plan of care  - Discharge planning  - See additional Care Plan goals for specific interventions  Outcome: Progressing  Goal: Patient/Family Short Term Goal  Description: Patient's Short Term Goal: Improve pancreatitis    Interventions:   - Monitor vital signs  - Monitor appropriate labs  - Pain management  - Administer medications per order  - Follow MD orders  - Diagnostics per order  - Update / inform patient and family on plan of care  - See additional Care Plan goals for specific interventions  Outcome: Progressing     Problem: GASTROINTESTINAL - ADULT  Goal: Maintains or returns to baseline bowel function  Description: INTERVENTIONS:  - Assess bowel function  - Maintain adequate hydration with IV or PO as ordered and tolerated  - Evaluate effectiveness of GI medications  - Encourage mobilization and activity  - Obtain nutritional consult as needed  - Establish a toileting routine/schedule  - Consider collaborating with pharmacy to review patient's medication profile  Outcome: Progressing  Goal: Maintains adequate nutritional intake (undernourished)  Description: INTERVENTIONS:  - Monitor percentage of each meal consumed  - Identify factors contributing to decreased intake, treat as appropriate  - Assist with meals as needed  - Monitor I&O, WT and lab values  - Obtain nutritional consult as needed  - Optimize oral hygiene and moisture  - Encourage food from home; allow for food preferences  - Enhance eating environment  Outcome: Progressing  Goal: Minimal or absence of nausea and vomiting  Description: INTERVENTIONS:  - Maintain adequate hydration with IV or PO as ordered and tolerated  - Nasogastric tube to low intermittent suction as ordered  - Evaluate effectiveness of ordered antiemetic medications  - Provide nonpharmacologic comfort measures as appropriate  - Advance diet as tolerated, if ordered  - Obtain nutritional consult as needed  - Evaluate fluid balance  Outcome: Progressing     Problem: METABOLIC/FLUID AND ELECTROLYTES - ADULT  Goal: Electrolytes maintained within normal limits  Description: INTERVENTIONS:  - Monitor labs and rhythm and assess patient for signs and symptoms of electrolyte imbalances  - Administer electrolyte replacement as ordered  - Monitor response to electrolyte replacements, including rhythm and repeat lab results as appropriate  - Fluid restriction as ordered  - Instruct patient on fluid and nutrition restrictions as appropriate  Outcome: Progressing  Goal: Hemodynamic stability and optimal renal function maintained  Description: INTERVENTIONS:  - Monitor labs and assess for signs and symptoms of volume excess or deficit  - Monitor intake, output and patient weight  - Monitor urine specific gravity, serum osmolarity and serum sodium as indicated or ordered  - Monitor response to interventions for patient's volume status, including labs, urine output, blood pressure (other measures as available)  - Encourage oral intake as appropriate  - Instruct patient on fluid and nutrition restrictions as appropriate  Outcome: Progressing  Goal: Glucose maintained within prescribed range  Description: INTERVENTIONS:  - Monitor Blood Glucose as ordered  - Assess for signs and symptoms of hyperglycemia and hypoglycemia  - Administer ordered medications to maintain glucose within target range  - Assess barriers to adequate nutritional intake and initiate nutrition consult as needed  - Instruct patient on self management of diabetes  Outcome: Progressing     Problem: Impaired Functional Mobility  Goal: Achieve highest/safest level of mobility/gait  Description: Interventions:  - Assess patient's functional ability and stability  - Promote increasing activity/tolerance for mobility and gait  - Educate and engage patient/family in tolerated activity level and precautions  Outcome: Progressing     Problem: Impaired Activities of Daily Living  Goal: Achieve highest/safest level of independence in self care  Description: Interventions:  - Assess ability and encourage patient to participate in ADLs to maximize function  - Promote sitting position while performing ADLs such as feeding, grooming, and bathing  - Educate and encourage patient/family in tolerated functional activity level and precautions during self-care  Outcome: Progressing     Problem: SKIN/TISSUE INTEGRITY - ADULT  Goal: Skin integrity remains intact  Description: INTERVENTIONS  - Assess and document risk factors for pressure ulcer development  - Assess and document skin integrity  - Monitor for areas of redness and/or skin breakdown  - Initiate interventions, skin care algorithm/standards of care as needed  Outcome: Progressing  Goal: Incision(s), wounds(s) or drain site(s) healing without S/S of infection  Description: INTERVENTIONS:  - Assess and document risk factors for pressure ulcer development  - Assess and document skin integrity  - Assess and document dressing/incision, wound bed, drain sites and surrounding tissue  - Implement wound care per orders  - Initiate isolation precautions as appropriate  - Initiate Pressure Ulcer prevention bundle as indicated  Outcome: Progressing     Problem: CARDIOVASCULAR - ADULT  Goal: Maintains optimal cardiac output and hemodynamic stability  Description: INTERVENTIONS:  - Monitor vital signs, rhythm, and trends  - Monitor for bleeding, hypotension and signs of decreased cardiac output  - Evaluate effectiveness of vasoactive medications to optimize hemodynamic stability  - Monitor arterial and/or venous puncture sites for bleeding and/or hematoma  - Assess quality of pulses, skin color and temperature  - Assess for signs of decreased coronary artery perfusion - ex.  Angina  - Evaluate fluid balance, assess for edema, trend weights  Outcome: Progressing  Goal: Absence of cardiac arrhythmias or at baseline  Description: INTERVENTIONS:  - Continuous cardiac monitoring, monitor vital signs, obtain 12 lead EKG if indicated  - Evaluate effectiveness of antiarrhythmic and heart rate control medications as ordered  - Initiate emergency measures for life threatening arrhythmias  - Monitor electrolytes and administer replacement therapy as ordered  Outcome: Progressing     Problem: HEMATOLOGIC - ADULT  Goal: Maintains hematologic stability  Description: INTERVENTIONS  - Assess for signs and symptoms of bleeding or hemorrhage  - Monitor labs and vital signs for trends  - Administer supportive blood products/factors, fluids and medications as ordered and appropriate  - Administer supportive blood products/factors as ordered and appropriate  Outcome: Progressing  Goal: Free from bleeding injury  Description: (Example usage: patient with low platelets)  INTERVENTIONS:  - Avoid intramuscular injections, enemas and rectal medication administration  - Ensure safe mobilization of patient  - Hold pressure on venipuncture sites to achieve adequate hemostasis  - Assess for signs and symptoms of internal bleeding  - Monitor lab trends  - Patient is to report abnormal signs of bleeding to staff  - Avoid use of toothpicks and dental floss  - Use electric shaver for shaving  - Use soft bristle tooth brush  - Limit straining and forceful nose blowing  Outcome: Progressing     Problem: MUSCULOSKELETAL - ADULT  Goal: Return mobility to safest level of function  Description: INTERVENTIONS:  - Assess patient stability and activity tolerance for standing, transferring and ambulating w/ or w/o assistive devices  - Assist with transfers and ambulation using safe patient handling equipment as needed  - Ensure adequate protection for wounds/incisions during mobilization  - Obtain PT/OT consults as needed  - Advance activity as appropriate  - Communicate ordered activity level and limitations with patient/family  Outcome: Progressing     Problem: PAIN - ADULT  Goal: Verbalizes/displays adequate comfort level or patient's stated pain goal  Description: INTERVENTIONS:  - Encourage pt to monitor pain and request assistance  - Assess pain using appropriate pain scale  - Administer analgesics based on type and severity of pain and evaluate response  - Implement non-pharmacological measures as appropriate and evaluate response  - Consider cultural and social influences on pain and pain management  - Manage/alleviate anxiety  - Utilize distraction and/or relaxation techniques  - Monitor for opioid side effects  - Notify MD/LIP if interventions unsuccessful or patient reports new pain  - Anticipate increased pain with activity and pre-medicate as appropriate  Outcome: Progressing     Problem: SAFETY ADULT - FALL  Goal: Free from fall injury  Description: INTERVENTIONS:  - Assess pt frequently for physical needs  - Identify cognitive and physical deficits and behaviors that affect risk of falls.   - [place] fall precautions as indicated by assessment.  - Educate pt/family on patient safety including physical limitations  - Instruct pt to call for assistance with activity based on assessment  - Modify environment to reduce risk of injury  - Provide assistive devices as appropriate  - Consider OT/PT consult to assist with strengthening/mobility  - Encourage toileting schedule  Outcome: Progressing     Problem: DISCHARGE PLANNING  Goal: Discharge to home or other facility with appropriate resources  Description: INTERVENTIONS:  - Identify barriers to discharge w/pt and caregiver  - Include patient/family/discharge partner in discharge planning  - Arrange for needed discharge resources and transportation as appropriate  - Identify discharge learning needs (meds, wound care, etc)  - Arrange for interpreters to assist at discharge as needed  - Consider post-discharge preferences of patient/family/discharge partner  - Complete POLST form as appropriate  - Assess patient's ability to be responsible for managing their own health  - Refer to Case Management Department for coordinating discharge planning if the patient needs post-hospital services based on physician/LIP order or complex needs related to functional status, cognitive ability or social support system  Outcome: Progressing     Problem: RISK FOR INFECTION - ADULT  Goal: Absence of fever/infection during anticipated neutropenic period  Description: INTERVENTIONS  - Monitor WBC  - Administer growth factors as ordered  - Implement neutropenic guidelines  Outcome: Progressing     Vital signs stable. No acute changes during shift. NG tube removed by loan [employer] for clears. Tolerating thus far. TPN infusing per orders. Dilaudid PCA for pain management. Bed locked and in lowest position, call light within reach.

## 2023-06-21 LAB
ANION GAP SERPL CALC-SCNC: 5 MMOL/L (ref 0–18)
BUN BLD-MCNC: 26 MG/DL (ref 7–18)
BUN/CREAT SERPL: 42.6 (ref 10–20)
CALCIUM BLD-MCNC: 8.2 MG/DL (ref 8.5–10.1)
CHLORIDE SERPL-SCNC: 105 MMOL/L (ref 98–112)
CO2 SERPL-SCNC: 29 MMOL/L (ref 21–32)
CREAT BLD-MCNC: 0.61 MG/DL
DEPRECATED RDW RBC AUTO: 41.4 FL (ref 35.1–46.3)
ERYTHROCYTE [DISTWIDTH] IN BLOOD BY AUTOMATED COUNT: 12.4 % (ref 11–15)
GFR SERPLBLD BASED ON 1.73 SQ M-ARVRAT: 119 ML/MIN/1.73M2 (ref 60–?)
GLUCOSE BLD-MCNC: 94 MG/DL (ref 70–99)
GLUCOSE BLDC GLUCOMTR-MCNC: 95 MG/DL (ref 70–99)
HCT VFR BLD AUTO: 22 %
HGB BLD-MCNC: 7.3 G/DL
MAGNESIUM SERPL-MCNC: 1.8 MG/DL (ref 1.6–2.6)
MCH RBC QN AUTO: 30.4 PG (ref 26–34)
MCHC RBC AUTO-ENTMCNC: 33.2 G/DL (ref 31–37)
MCV RBC AUTO: 91.7 FL
OSMOLALITY SERPL CALC.SUM OF ELEC: 293 MOSM/KG (ref 275–295)
PHOSPHATE SERPL-MCNC: 2.6 MG/DL (ref 2.5–4.9)
PLATELET # BLD AUTO: 275 10(3)UL (ref 150–450)
POTASSIUM SERPL-SCNC: 4.2 MMOL/L (ref 3.5–5.1)
RBC # BLD AUTO: 2.4 X10(6)UL
SODIUM SERPL-SCNC: 139 MMOL/L (ref 136–145)
WBC # BLD AUTO: 8.2 X10(3) UL (ref 4–11)

## 2023-06-21 PROCEDURE — 99233 SBSQ HOSP IP/OBS HIGH 50: CPT | Performed by: HOSPITALIST

## 2023-06-21 RX ORDER — HYDROCODONE BITARTRATE AND ACETAMINOPHEN 5; 325 MG/1; MG/1
1 TABLET ORAL EVERY 6 HOURS PRN
Status: DISCONTINUED | OUTPATIENT
Start: 2023-06-21 | End: 2023-06-23

## 2023-06-21 RX ORDER — MAGNESIUM SULFATE HEPTAHYDRATE 40 MG/ML
2 INJECTION, SOLUTION INTRAVENOUS ONCE
Status: COMPLETED | OUTPATIENT
Start: 2023-06-21 | End: 2023-06-21

## 2023-06-21 NOTE — PLAN OF CARE
Tolerating full liquids, continue TPN tonight, no nausea, passing gas no bm, voiding freely, decreased dilaudid PCA, norco added for pain control,  declined to walk or sit in chair. Plan is to discharge home on Friday, patient updated on care plan. Problem: Patient Centered Care  Goal: Patient preferences are identified and integrated in the patient's plan of care  Description: Interventions:  - What would you like us to know as we care for you?  From home alone  - Provide timely, complete, and accurate information to patient/family  - Incorporate patient and family knowledge, values, beliefs, and cultural backgrounds into the planning and delivery of care  - Encourage patient/family to participate in care and decision-making at the level they choose  - Honor patient and family perspectives and choices  Outcome: Progressing     Problem: Patient/Family Goals  Goal: Patient/Family Long Term Goal  Description: Patient's Long Term Goal: Unclogged G-Tube    Interventions:  - Monitor vital signs  - Monitor appropriate labs  - Pain management  - Administer medications per order  - Follow MD orders  - Diagnostics per order  - Update / inform patient and family on plan of care  - Discharge planning  - See additional Care Plan goals for specific interventions  Outcome: Progressing  Goal: Patient/Family Short Term Goal  Description: Patient's Short Term Goal: Improve pancreatitis    Interventions:   - Monitor vital signs  - Monitor appropriate labs  - Pain management  - Administer medications per order  - Follow MD orders  - Diagnostics per order  - Update / inform patient and family on plan of care  - See additional Care Plan goals for specific interventions  Outcome: Progressing     Problem: GASTROINTESTINAL - ADULT  Goal: Maintains or returns to baseline bowel function  Description: INTERVENTIONS:  - Assess bowel function  - Maintain adequate hydration with IV or PO as ordered and tolerated  - Evaluate effectiveness of GI medications  - Encourage mobilization and activity  - Obtain nutritional consult as needed  - Establish a toileting routine/schedule  - Consider collaborating with pharmacy to review patient's medication profile  Outcome: Progressing  Goal: Maintains adequate nutritional intake (undernourished)  Description: INTERVENTIONS:  - Monitor percentage of each meal consumed  - Identify factors contributing to decreased intake, treat as appropriate  - Assist with meals as needed  - Monitor I&O, WT and lab values  - Obtain nutritional consult as needed  - Optimize oral hygiene and moisture  - Encourage food from home; allow for food preferences  - Enhance eating environment  Outcome: Progressing  Goal: Minimal or absence of nausea and vomiting  Description: INTERVENTIONS:  - Maintain adequate hydration with IV or PO as ordered and tolerated  - Nasogastric tube to low intermittent suction as ordered  - Evaluate effectiveness of ordered antiemetic medications  - Provide nonpharmacologic comfort measures as appropriate  - Advance diet as tolerated, if ordered  - Obtain nutritional consult as needed  - Evaluate fluid balance  Outcome: Progressing     Problem: METABOLIC/FLUID AND ELECTROLYTES - ADULT  Goal: Electrolytes maintained within normal limits  Description: INTERVENTIONS:  - Monitor labs and rhythm and assess patient for signs and symptoms of electrolyte imbalances  - Administer electrolyte replacement as ordered  - Monitor response to electrolyte replacements, including rhythm and repeat lab results as appropriate  - Fluid restriction as ordered  - Instruct patient on fluid and nutrition restrictions as appropriate  Outcome: Progressing  Goal: Hemodynamic stability and optimal renal function maintained  Description: INTERVENTIONS:  - Monitor labs and assess for signs and symptoms of volume excess or deficit  - Monitor intake, output and patient weight  - Monitor urine specific gravity, serum osmolarity and serum sodium as indicated or ordered  - Monitor response to interventions for patient's volume status, including labs, urine output, blood pressure (other measures as available)  - Encourage oral intake as appropriate  - Instruct patient on fluid and nutrition restrictions as appropriate  Outcome: Progressing  Goal: Glucose maintained within prescribed range  Description: INTERVENTIONS:  - Monitor Blood Glucose as ordered  - Assess for signs and symptoms of hyperglycemia and hypoglycemia  - Administer ordered medications to maintain glucose within target range  - Assess barriers to adequate nutritional intake and initiate nutrition consult as needed  - Instruct patient on self management of diabetes  Outcome: Progressing     Problem: Impaired Functional Mobility  Goal: Achieve highest/safest level of mobility/gait  Description: Interventions:  - Assess patient's functional ability and stability  - Promote increasing activity/tolerance for mobility and gait  - Educate and engage patient/family in tolerated activity level and precautions    Outcome: Progressing     Problem: Impaired Activities of Daily Living  Goal: Achieve highest/safest level of independence in self care  Description: Interventions:  - Assess ability and encourage patient to participate in ADLs to maximize function  - Promote sitting position while performing ADLs such as feeding, grooming, and bathing  - Educate and encourage patient/family in tolerated functional activity level and precautions during self-care    Outcome: Progressing     Problem: SKIN/TISSUE INTEGRITY - ADULT  Goal: Skin integrity remains intact  Description: INTERVENTIONS  - Assess and document risk factors for pressure ulcer development  - Assess and document skin integrity  - Monitor for areas of redness and/or skin breakdown  - Initiate interventions, skin care algorithm/standards of care as needed  Outcome: Progressing  Goal: Incision(s), wounds(s) or drain site(s) healing without S/S of infection  Description: INTERVENTIONS:  - Assess and document risk factors for pressure ulcer development  - Assess and document skin integrity  - Assess and document dressing/incision, wound bed, drain sites and surrounding tissue  - Implement wound care per orders  - Initiate isolation precautions as appropriate  - Initiate Pressure Ulcer prevention bundle as indicated  Outcome: Progressing     Problem: PAIN - ADULT  Goal: Verbalizes/displays adequate comfort level or patient's stated pain goal  Description: INTERVENTIONS:  - Encourage pt to monitor pain and request assistance  - Assess pain using appropriate pain scale  - Administer analgesics based on type and severity of pain and evaluate response  - Implement non-pharmacological measures as appropriate and evaluate response  - Consider cultural and social influences on pain and pain management  - Manage/alleviate anxiety  - Utilize distraction and/or relaxation techniques  - Monitor for opioid side effects  - Notify MD/LIP if interventions unsuccessful or patient reports new pain  - Anticipate increased pain with activity and pre-medicate as appropriate  Outcome: Progressing     Problem: SAFETY ADULT - FALL  Goal: Free from fall injury  Description: INTERVENTIONS:  - Assess pt frequently for physical needs  - Identify cognitive and physical deficits and behaviors that affect risk of falls.   - [place] fall precautions as indicated by assessment.  - Educate pt/family on patient safety including physical limitations  - Instruct pt to call for assistance with activity based on assessment  - Modify environment to reduce risk of injury  - Provide assistive devices as appropriate  - Consider OT/PT consult to assist with strengthening/mobility  - Encourage toileting schedule  Outcome: Progressing     Problem: DISCHARGE PLANNING  Goal: Discharge to home or other facility with appropriate resources  Description: INTERVENTIONS:  - Identify barriers to discharge w/pt and caregiver  - Include patient/family/discharge partner in discharge planning  - Arrange for needed discharge resources and transportation as appropriate  - Identify discharge learning needs (meds, wound care, etc)  - Arrange for interpreters to assist at discharge as needed  - Consider post-discharge preferences of patient/family/discharge partner  - Complete POLST form as appropriate  - Assess patient's ability to be responsible for managing their own health  - Refer to Case Management Department for coordinating discharge planning if the patient needs post-hospital services based on physician/LIP order or complex needs related to functional status, cognitive ability or social support system  Outcome: Progressing     Problem: RISK FOR INFECTION - ADULT  Goal: Absence of fever/infection during anticipated neutropenic period  Description: INTERVENTIONS  - Monitor WBC  - Administer growth factors as ordered  - Implement neutropenic guidelines  Outcome: Progressing     Problem: CARDIOVASCULAR - ADULT  Goal: Maintains optimal cardiac output and hemodynamic stability  Description: INTERVENTIONS:  - Monitor vital signs, rhythm, and trends  - Monitor for bleeding, hypotension and signs of decreased cardiac output  - Evaluate effectiveness of vasoactive medications to optimize hemodynamic stability  - Monitor arterial and/or venous puncture sites for bleeding and/or hematoma  - Assess quality of pulses, skin color and temperature  - Assess for signs of decreased coronary artery perfusion - ex.  Angina  - Evaluate fluid balance, assess for edema, trend weights  Outcome: Progressing  Goal: Absence of cardiac arrhythmias or at baseline  Description: INTERVENTIONS:  - Continuous cardiac monitoring, monitor vital signs, obtain 12 lead EKG if indicated  - Evaluate effectiveness of antiarrhythmic and heart rate control medications as ordered  - Initiate emergency measures for life threatening arrhythmias  - Monitor electrolytes and administer replacement therapy as ordered  Outcome: Progressing     Problem: HEMATOLOGIC - ADULT  Goal: Maintains hematologic stability  Description: INTERVENTIONS  - Assess for signs and symptoms of bleeding or hemorrhage  - Monitor labs and vital signs for trends  - Administer supportive blood products/factors, fluids and medications as ordered and appropriate  - Administer supportive blood products/factors as ordered and appropriate  Outcome: Progressing  Goal: Free from bleeding injury  Description: (Example usage: patient with low platelets)  INTERVENTIONS:  - Avoid intramuscular injections, enemas and rectal medication administration  - Ensure safe mobilization of patient  - Hold pressure on venipuncture sites to achieve adequate hemostasis  - Assess for signs and symptoms of internal bleeding  - Monitor lab trends  - Patient is to report abnormal signs of bleeding to staff  - Avoid use of toothpicks and dental floss  - Use electric shaver for shaving  - Use soft bristle tooth brush  - Limit straining and forceful nose blowing  Outcome: Progressing     Problem: MUSCULOSKELETAL - ADULT  Goal: Return mobility to safest level of function  Description: INTERVENTIONS:  - Assess patient stability and activity tolerance for standing, transferring and ambulating w/ or w/o assistive devices  - Assist with transfers and ambulation using safe patient handling equipment as needed  - Ensure adequate protection for wounds/incisions during mobilization  - Obtain PT/OT consults as needed  - Advance activity as appropriate  - Communicate ordered activity level and limitations with patient/family  Outcome: Progressing

## 2023-06-21 NOTE — DIETARY NOTE
Brief Nutrition Note:    Nutrition reassessment completed 6/18 (chronic SPCM), see note. Pt continues on CPN at this time, bag #19 ordered. S/p ex lap, gastric jejunostomy, entero-enterostomy, removal of gastrostomy tube. Diet advanced to clear liquids 6/20 and tolerating, advanced to full liquids today, spoke with pt over phone - agreed to start oral nutritional supplement (ONS) to help maximize nutrition. +ONS per discussion. Monitor po intake. F/u per protocol. Please consult nutrition services if earlier intervention is indicated.     Wt Readings from Last 6 Encounters:  06/14/23 : 46.7 kg (102 lb 14.4 oz)  05/29/23 : 57.4 kg (126 lb 8 oz)  05/08/23 : 47.3 kg (104 lb 4.4 oz)  04/12/23 : 54.4 kg (120 lb)  06/30/20 : 63.5 kg (140 lb)  01/17/19 : 62.9 kg (138 lb 11.2 oz)    Magdi Alexandra MS, SHERICE, RDN, LDN  Clinical Dietitian  P: [phone]

## 2023-06-21 NOTE — PLAN OF CARE
Mckay Pascal is A&Ox4. Pain managed with PCA Dilaudid. Vitals stable. Tolerating clears with no complaints of nausea/vomitting. TPN continuous at 83.3ml/hr. Surgical Incision intact. Patient ambulating throughout room independently. Call light in reach. Frequent rounding throughout shift. Problem: Patient Centered Care  Goal: Patient preferences are identified and integrated in the patient's plan of care  Description: Interventions:  - What would you like us to know as we care for you?  From home alone  - Provide timely, complete, and accurate information to patient/family  - Incorporate patient and family knowledge, values, beliefs, and cultural backgrounds into the planning and delivery of care  - Encourage patient/family to participate in care and decision-making at the level they choose  - Honor patient and family perspectives and choices  Outcome: Progressing     Problem: Patient/Family Goals  Goal: Patient/Family Long Term Goal  Description: Patient's Long Term Goal: Unclogged G-Tube    Interventions:  - Monitor vital signs  - Monitor appropriate labs  - Pain management  - Administer medications per order  - Follow MD orders  - Diagnostics per order  - Update / inform patient and family on plan of care  - Discharge planning  - See additional Care Plan goals for specific interventions  Outcome: Progressing  Goal: Patient/Family Short Term Goal  Description: Patient's Short Term Goal: Improve pancreatitis    Interventions:   - Monitor vital signs  - Monitor appropriate labs  - Pain management  - Administer medications per order  - Follow MD orders  - Diagnostics per order  - Update / inform patient and family on plan of care  - See additional Care Plan goals for specific interventions  Outcome: Progressing     Problem: GASTROINTESTINAL - ADULT  Goal: Maintains or returns to baseline bowel function  Description: INTERVENTIONS:  - Assess bowel function  - Maintain adequate hydration with IV or PO as ordered and tolerated  - Evaluate effectiveness of GI medications  - Encourage mobilization and activity  - Obtain nutritional consult as needed  - Establish a toileting routine/schedule  - Consider collaborating with pharmacy to review patient's medication profile  Outcome: Progressing  Goal: Maintains adequate nutritional intake (undernourished)  Description: INTERVENTIONS:  - Monitor percentage of each meal consumed  - Identify factors contributing to decreased intake, treat as appropriate  - Assist with meals as needed  - Monitor I&O, WT and lab values  - Obtain nutritional consult as needed  - Optimize oral hygiene and moisture  - Encourage food from home; allow for food preferences  - Enhance eating environment  Outcome: Progressing  Goal: Minimal or absence of nausea and vomiting  Description: INTERVENTIONS:  - Maintain adequate hydration with IV or PO as ordered and tolerated  - Nasogastric tube to low intermittent suction as ordered  - Evaluate effectiveness of ordered antiemetic medications  - Provide nonpharmacologic comfort measures as appropriate  - Advance diet as tolerated, if ordered  - Obtain nutritional consult as needed  - Evaluate fluid balance  Outcome: Progressing     Problem: METABOLIC/FLUID AND ELECTROLYTES - ADULT  Goal: Electrolytes maintained within normal limits  Description: INTERVENTIONS:  - Monitor labs and rhythm and assess patient for signs and symptoms of electrolyte imbalances  - Administer electrolyte replacement as ordered  - Monitor response to electrolyte replacements, including rhythm and repeat lab results as appropriate  - Fluid restriction as ordered  - Instruct patient on fluid and nutrition restrictions as appropriate  Outcome: Progressing  Goal: Hemodynamic stability and optimal renal function maintained  Description: INTERVENTIONS:  - Monitor labs and assess for signs and symptoms of volume excess or deficit  - Monitor intake, output and patient weight  - Monitor urine specific gravity, serum osmolarity and serum sodium as indicated or ordered  - Monitor response to interventions for patient's volume status, including labs, urine output, blood pressure (other measures as available)  - Encourage oral intake as appropriate  - Instruct patient on fluid and nutrition restrictions as appropriate  Outcome: Progressing  Goal: Glucose maintained within prescribed range  Description: INTERVENTIONS:  - Monitor Blood Glucose as ordered  - Assess for signs and symptoms of hyperglycemia and hypoglycemia  - Administer ordered medications to maintain glucose within target range  - Assess barriers to adequate nutritional intake and initiate nutrition consult as needed  - Instruct patient on self management of diabetes  Outcome: Progressing     Problem: Impaired Functional Mobility  Goal: Achieve highest/safest level of mobility/gait  Description: Interventions:  - Assess patient's functional ability and stability  - Promote increasing activity/tolerance for mobility and gait  - Educate and engage patient/family in tolerated activity level and precautions  Outcome: Progressing     Problem: Impaired Activities of Daily Living  Goal: Achieve highest/safest level of independence in self care  Description: Interventions:  - Assess ability and encourage patient to participate in ADLs to maximize function  - Promote sitting position while performing ADLs such as feeding, grooming, and bathing  - Educate and encourage patient/family in tolerated functional activity level and precautions during self-care  - Encourage patient to incorporate impaired side during daily activities to promote function  Outcome: Progressing     Problem: SKIN/TISSUE INTEGRITY - ADULT  Goal: Skin integrity remains intact  Description: INTERVENTIONS  - Assess and document risk factors for pressure ulcer development  - Assess and document skin integrity  - Monitor for areas of redness and/or skin breakdown  - Initiate interventions, skin care algorithm/standards of care as needed  Outcome: Progressing  Goal: Incision(s), wounds(s) or drain site(s) healing without S/S of infection  Description: INTERVENTIONS:  - Assess and document risk factors for pressure ulcer development  - Assess and document skin integrity  - Assess and document dressing/incision, wound bed, drain sites and surrounding tissue  - Implement wound care per orders  - Initiate isolation precautions as appropriate  - Initiate Pressure Ulcer prevention bundle as indicated  Outcome: Progressing     Problem: PAIN - ADULT  Goal: Verbalizes/displays adequate comfort level or patient's stated pain goal  Description: INTERVENTIONS:  - Encourage pt to monitor pain and request assistance  - Assess pain using appropriate pain scale  - Administer analgesics based on type and severity of pain and evaluate response  - Implement non-pharmacological measures as appropriate and evaluate response  - Consider cultural and social influences on pain and pain management  - Manage/alleviate anxiety  - Utilize distraction and/or relaxation techniques  - Monitor for opioid side effects  - Notify MD/LIP if interventions unsuccessful or patient reports new pain  - Anticipate increased pain with activity and pre-medicate as appropriate  Outcome: Progressing     Problem: SAFETY ADULT - FALL  Goal: Free from fall injury  Description: INTERVENTIONS:  - Assess pt frequently for physical needs  - Identify cognitive and physical deficits and behaviors that affect risk of falls.   - [place] fall precautions as indicated by assessment.  - Educate pt/family on patient safety including physical limitations  - Instruct pt to call for assistance with activity based on assessment  - Modify environment to reduce risk of injury  - Provide assistive devices as appropriate  - Consider OT/PT consult to assist with strengthening/mobility  - Encourage toileting schedule  Outcome: Progressing     Problem: DISCHARGE PLANNING  Goal: Discharge to home or other facility with appropriate resources  Description: INTERVENTIONS:  - Identify barriers to discharge w/pt and caregiver  - Include patient/family/discharge partner in discharge planning  - Arrange for needed discharge resources and transportation as appropriate  - Identify discharge learning needs (meds, wound care, etc)  - Arrange for interpreters to assist at discharge as needed  - Consider post-discharge preferences of patient/family/discharge partner  - Complete POLST form as appropriate  - Assess patient's ability to be responsible for managing their own health  - Refer to Case Management Department for coordinating discharge planning if the patient needs post-hospital services based on physician/LIP order or complex needs related to functional status, cognitive ability or social support system  Outcome: Progressing     Problem: RISK FOR INFECTION - ADULT  Goal: Absence of fever/infection during anticipated neutropenic period  Description: INTERVENTIONS  - Monitor WBC  - Administer growth factors as ordered  - Implement neutropenic guidelines  Outcome: Progressing     Problem: CARDIOVASCULAR - ADULT  Goal: Maintains optimal cardiac output and hemodynamic stability  Description: INTERVENTIONS:  - Monitor vital signs, rhythm, and trends  - Monitor for bleeding, hypotension and signs of decreased cardiac output  - Evaluate effectiveness of vasoactive medications to optimize hemodynamic stability  - Monitor arterial and/or venous puncture sites for bleeding and/or hematoma  - Assess quality of pulses, skin color and temperature  - Assess for signs of decreased coronary artery perfusion - ex.  Angina  - Evaluate fluid balance, assess for edema, trend weights  Outcome: Progressing  Goal: Absence of cardiac arrhythmias or at baseline  Description: INTERVENTIONS:  - Continuous cardiac monitoring, monitor vital signs, obtain 12 lead EKG if indicated  - Evaluate effectiveness of antiarrhythmic and heart rate control medications as ordered  - Initiate emergency measures for life threatening arrhythmias  - Monitor electrolytes and administer replacement therapy as ordered  Outcome: Progressing     Problem: HEMATOLOGIC - ADULT  Goal: Maintains hematologic stability  Description: INTERVENTIONS  - Assess for signs and symptoms of bleeding or hemorrhage  - Monitor labs and vital signs for trends  - Administer supportive blood products/factors, fluids and medications as ordered and appropriate  - Administer supportive blood products/factors as ordered and appropriate  Outcome: Progressing  Goal: Free from bleeding injury  Description: (Example usage: patient with low platelets)  INTERVENTIONS:  - Avoid intramuscular injections, enemas and rectal medication administration  - Ensure safe mobilization of patient  - Hold pressure on venipuncture sites to achieve adequate hemostasis  - Assess for signs and symptoms of internal bleeding  - Monitor lab trends  - Patient is to report abnormal signs of bleeding to staff  - Avoid use of toothpicks and dental floss  - Use electric shaver for shaving  - Use soft bristle tooth brush  - Limit straining and forceful nose blowing  Outcome: Progressing     Problem: MUSCULOSKELETAL - ADULT  Goal: Return mobility to safest level of function  Description: INTERVENTIONS:  - Assess patient stability and activity tolerance for standing, transferring and ambulating w/ or w/o assistive devices  - Assist with transfers and ambulation using safe patient handling equipment as needed  - Ensure adequate protection for wounds/incisions during mobilization  - Obtain PT/OT consults as needed  - Advance activity as appropriate  - Communicate ordered activity level and limitations with patient/family  Outcome: Progressing

## 2023-06-22 LAB
ANION GAP SERPL CALC-SCNC: 4 MMOL/L (ref 0–18)
BUN BLD-MCNC: 20 MG/DL (ref 7–18)
BUN/CREAT SERPL: 34.5 (ref 10–20)
CALCIUM BLD-MCNC: 8.2 MG/DL (ref 8.5–10.1)
CHLORIDE SERPL-SCNC: 108 MMOL/L (ref 98–112)
CO2 SERPL-SCNC: 25 MMOL/L (ref 21–32)
CREAT BLD-MCNC: 0.58 MG/DL
DEPRECATED RDW RBC AUTO: 41.4 FL (ref 35.1–46.3)
ERYTHROCYTE [DISTWIDTH] IN BLOOD BY AUTOMATED COUNT: 12.6 % (ref 11–15)
GFR SERPLBLD BASED ON 1.73 SQ M-ARVRAT: 121 ML/MIN/1.73M2 (ref 60–?)
GLUCOSE BLD-MCNC: 119 MG/DL (ref 70–99)
HCT VFR BLD AUTO: 24.2 %
HGB BLD-MCNC: 8 G/DL
MAGNESIUM SERPL-MCNC: 2 MG/DL (ref 1.6–2.6)
MCH RBC QN AUTO: 30.3 PG (ref 26–34)
MCHC RBC AUTO-ENTMCNC: 33.1 G/DL (ref 31–37)
MCV RBC AUTO: 91.7 FL
OSMOLALITY SERPL CALC.SUM OF ELEC: 288 MOSM/KG (ref 275–295)
PHOSPHATE SERPL-MCNC: 3.2 MG/DL (ref 2.5–4.9)
PLATELET # BLD AUTO: 286 10(3)UL (ref 150–450)
POTASSIUM SERPL-SCNC: 4.4 MMOL/L (ref 3.5–5.1)
RBC # BLD AUTO: 2.64 X10(6)UL
SODIUM SERPL-SCNC: 137 MMOL/L (ref 136–145)
WBC # BLD AUTO: 8.1 X10(3) UL (ref 4–11)

## 2023-06-22 PROCEDURE — 99233 SBSQ HOSP IP/OBS HIGH 50: CPT | Performed by: HOSPITALIST

## 2023-06-22 NOTE — PLAN OF CARE
tolerating full liquids, Diet advanced to low fiber/soft, last bag of TPN tonight, voiding freely, passing gas, bm per patient, dilaudid PCA for pain control, not using much, ambulated with PT today, up self care to bathroom. Labs in am, plan for discharge home tomorrow. Problem: Patient Centered Care  Goal: Patient preferences are identified and integrated in the patient's plan of care  Description: Interventions:  - What would you like us to know as we care for you?  From home alone  - Provide timely, complete, and accurate information to patient/family  - Incorporate patient and family knowledge, values, beliefs, and cultural backgrounds into the planning and delivery of care  - Encourage patient/family to participate in care and decision-making at the level they choose  - Honor patient and family perspectives and choices  6/22/2023 1709 by Leo Cordoba RN  Outcome: Progressing  6/22/2023 1709 by Leo Cordoba RN  Outcome: Progressing     Problem: Patient/Family Goals  Goal: Patient/Family Long Term Goal  Description: Patient's Long Term Goal: Unclogged G-Tube    Interventions:  - Monitor vital signs  - Monitor appropriate labs  - Pain management  - Administer medications per order  - Follow MD orders  - Diagnostics per order  - Update / inform patient and family on plan of care  - Discharge planning  - See additional Care Plan goals for specific interventions  6/22/2023 1709 by Leo Cordoba RN  Outcome: Progressing  6/22/2023 1709 by Leo Cordoba RN  Outcome: Progressing  Goal: Patient/Family Short Term Goal  Description: Patient's Short Term Goal: Improve pancreatitis    Interventions:   - Monitor vital signs  - Monitor appropriate labs  - Pain management  - Administer medications per order  - Follow MD orders  - Diagnostics per order  - Update / inform patient and family on plan of care  - See additional Care Plan goals for specific interventions  6/22/2023 1709 by Leo Cordoba RN  Outcome: Progressing  6/22/2023 1709 by Aidan Kelley RN  Outcome: Progressing     Problem: GASTROINTESTINAL - ADULT  Goal: Maintains or returns to baseline bowel function  Description: INTERVENTIONS:  - Assess bowel function  - Maintain adequate hydration with IV or PO as ordered and tolerated  - Evaluate effectiveness of GI medications  - Encourage mobilization and activity  - Obtain nutritional consult as needed  - Establish a toileting routine/schedule  - Consider collaborating with pharmacy to review patient's medication profile  6/22/2023 1709 by Aidan Kelley RN  Outcome: Progressing  6/22/2023 1709 by Aidan Kelley RN  Outcome: Progressing  Goal: Maintains adequate nutritional intake (undernourished)  Description: INTERVENTIONS:  - Monitor percentage of each meal consumed  - Identify factors contributing to decreased intake, treat as appropriate  - Assist with meals as needed  - Monitor I&O, WT and lab values  - Obtain nutritional consult as needed  - Optimize oral hygiene and moisture  - Encourage food from home; allow for food preferences  - Enhance eating environment  6/22/2023 1709 by Aidan Kelley RN  Outcome: Progressing  6/22/2023 1709 by Aidan Kelley RN  Outcome: Progressing  Goal: Minimal or absence of nausea and vomiting  Description: INTERVENTIONS:  - Maintain adequate hydration with IV or PO as ordered and tolerated  - Nasogastric tube to low intermittent suction as ordered  - Evaluate effectiveness of ordered antiemetic medications  - Provide nonpharmacologic comfort measures as appropriate  - Advance diet as tolerated, if ordered  - Obtain nutritional consult as needed  - Evaluate fluid balance  6/22/2023 1709 by Aidan Kelley RN  Outcome: Progressing  6/22/2023 1709 by Aidan Kelley RN  Outcome: Progressing     Problem: METABOLIC/FLUID AND ELECTROLYTES - ADULT  Goal: Electrolytes maintained within normal limits  Description: INTERVENTIONS:  - Monitor labs and rhythm and assess patient for signs and symptoms of electrolyte imbalances  - Administer electrolyte replacement as ordered  - Monitor response to electrolyte replacements, including rhythm and repeat lab results as appropriate  - Fluid restriction as ordered  - Instruct patient on fluid and nutrition restrictions as appropriate  6/22/2023 1709 by David Peguero RN  Outcome: Progressing  6/22/2023 1709 by David Peguero RN  Outcome: Progressing  Goal: Hemodynamic stability and optimal renal function maintained  Description: INTERVENTIONS:  - Monitor labs and assess for signs and symptoms of volume excess or deficit  - Monitor intake, output and patient weight  - Monitor urine specific gravity, serum osmolarity and serum sodium as indicated or ordered  - Monitor response to interventions for patient's volume status, including labs, urine output, blood pressure (other measures as available)  - Encourage oral intake as appropriate  - Instruct patient on fluid and nutrition restrictions as appropriate  6/22/2023 1709 by David Peguero RN  Outcome: Progressing  6/22/2023 1709 by David Peguero RN  Outcome: Progressing  Goal: Glucose maintained within prescribed range  Description: INTERVENTIONS:  - Monitor Blood Glucose as ordered  - Assess for signs and symptoms of hyperglycemia and hypoglycemia  - Administer ordered medications to maintain glucose within target range  - Assess barriers to adequate nutritional intake and initiate nutrition consult as needed  - Instruct patient on self management of diabetes  6/22/2023 1709 by David Peguero RN  Outcome: Progressing  6/22/2023 1709 by David Peguero RN  Outcome: Progressing     Problem: Impaired Functional Mobility  Goal: Achieve highest/safest level of mobility/gait  Description: Interventions:  - Assess patient's functional ability and stability  - Promote increasing activity/tolerance for mobility and gait  - Educate and engage patient/family in tolerated activity level and precautions    6/22/2023 1709 by Elian Graham RN  Outcome: Progressing  6/22/2023 1709 by Elian Graham RN  Outcome: Progressing     Problem: Impaired Activities of Daily Living  Goal: Achieve highest/safest level of independence in self care  Description: Interventions:  - Assess ability and encourage patient to participate in ADLs to maximize function  - Promote sitting position while performing ADLs such as feeding, grooming, and bathing  - Educate and encourage patient/family in tolerated functional activity level and precautions during self-care    6/22/2023 1709 by Elian Graham RN  Outcome: Progressing  6/22/2023 1709 by Elian Graham RN  Outcome: Progressing     Problem: SKIN/TISSUE INTEGRITY - ADULT  Goal: Skin integrity remains intact  Description: INTERVENTIONS  - Assess and document risk factors for pressure ulcer development  - Assess and document skin integrity  - Monitor for areas of redness and/or skin breakdown  - Initiate interventions, skin care algorithm/standards of care as needed  6/22/2023 1709 by Elian Graham RN  Outcome: Progressing  6/22/2023 1709 by Elian Graham RN  Outcome: Progressing  Goal: Incision(s), wounds(s) or drain site(s) healing without S/S of infection  Description: INTERVENTIONS:  - Assess and document risk factors for pressure ulcer development  - Assess and document skin integrity  - Assess and document dressing/incision, wound bed, drain sites and surrounding tissue  - Implement wound care per orders  - Initiate isolation precautions as appropriate  - Initiate Pressure Ulcer prevention bundle as indicated  6/22/2023 1709 by Elian Graham RN  Outcome: Progressing  6/22/2023 1709 by Elian Graham RN  Outcome: Progressing     Problem: PAIN - ADULT  Goal: Verbalizes/displays adequate comfort level or patient's stated pain goal  Description: INTERVENTIONS:  - Encourage pt to monitor pain and request assistance  - Assess pain using appropriate pain scale  - Administer analgesics based on type and severity of pain and evaluate response  - Implement non-pharmacological measures as appropriate and evaluate response  - Consider cultural and social influences on pain and pain management  - Manage/alleviate anxiety  - Utilize distraction and/or relaxation techniques  - Monitor for opioid side effects  - Notify MD/LIP if interventions unsuccessful or patient reports new pain  - Anticipate increased pain with activity and pre-medicate as appropriate  6/22/2023 1709 by David Peguero RN  Outcome: Progressing  6/22/2023 1709 by David Peguero RN  Outcome: Progressing     Problem: SAFETY ADULT - FALL  Goal: Free from fall injury  Description: INTERVENTIONS:  - Assess pt frequently for physical needs  - Identify cognitive and physical deficits and behaviors that affect risk of falls.   - [place] fall precautions as indicated by assessment.  - Educate pt/family on patient safety including physical limitations  - Instruct pt to call for assistance with activity based on assessment  - Modify environment to reduce risk of injury  - Provide assistive devices as appropriate  - Consider OT/PT consult to assist with strengthening/mobility  - Encourage toileting schedule  6/22/2023 1709 by David Peguero RN  Outcome: Progressing  6/22/2023 1709 by David Peguero RN  Outcome: Progressing     Problem: DISCHARGE PLANNING  Goal: Discharge to home or other facility with appropriate resources  Description: INTERVENTIONS:  - Identify barriers to discharge w/pt and caregiver  - Include patient/family/discharge partner in discharge planning  - Arrange for needed discharge resources and transportation as appropriate  - Identify discharge learning needs (meds, wound care, etc)  - Arrange for interpreters to assist at discharge as needed  - Consider post-discharge preferences of patient/family/discharge partner  - Complete POLST form as appropriate  - Assess patient's ability to be responsible for managing their own health  - Refer to Case Management Department for coordinating discharge planning if the patient needs post-hospital services based on physician/LIP order or complex needs related to functional status, cognitive ability or social support system  6/22/2023 1709 by Kandice Patel RN  Outcome: Progressing  6/22/2023 1709 by Kandice Patel RN  Outcome: Progressing     Problem: RISK FOR INFECTION - ADULT  Goal: Absence of fever/infection during anticipated neutropenic period  Description: INTERVENTIONS  - Monitor WBC  - Administer growth factors as ordered  - Implement neutropenic guidelines  6/22/2023 1709 by Kandice Patel RN  Outcome: Progressing  6/22/2023 1709 by Kandice Patel RN  Outcome: Progressing     Problem: CARDIOVASCULAR - ADULT  Goal: Maintains optimal cardiac output and hemodynamic stability  Description: INTERVENTIONS:  - Monitor vital signs, rhythm, and trends  - Monitor for bleeding, hypotension and signs of decreased cardiac output  - Evaluate effectiveness of vasoactive medications to optimize hemodynamic stability  - Monitor arterial and/or venous puncture sites for bleeding and/or hematoma  - Assess quality of pulses, skin color and temperature  - Assess for signs of decreased coronary artery perfusion - ex.  Angina  - Evaluate fluid balance, assess for edema, trend weights  6/22/2023 1709 by Kandice Patel RN  Outcome: Progressing  6/22/2023 1709 by Kandice Patel RN  Outcome: Progressing  Goal: Absence of cardiac arrhythmias or at baseline  Description: INTERVENTIONS:  - Continuous cardiac monitoring, monitor vital signs, obtain 12 lead EKG if indicated  - Evaluate effectiveness of antiarrhythmic and heart rate control medications as ordered  - Initiate emergency measures for life threatening arrhythmias  - Monitor electrolytes and administer replacement therapy as ordered  6/22/2023 1709 by Bebeto Beltran Lan Fabian RN  Outcome: Progressing  6/22/2023 1709 by Corby Culp RN  Outcome: Progressing     Problem: HEMATOLOGIC - ADULT  Goal: Maintains hematologic stability  Description: INTERVENTIONS  - Assess for signs and symptoms of bleeding or hemorrhage  - Monitor labs and vital signs for trends  - Administer supportive blood products/factors, fluids and medications as ordered and appropriate  - Administer supportive blood products/factors as ordered and appropriate  6/22/2023 1709 by Corby Culp RN  Outcome: Progressing  6/22/2023 1709 by Corby Culp RN  Outcome: Progressing  Goal: Free from bleeding injury  Description: (Example usage: patient with low platelets)  INTERVENTIONS:  - Avoid intramuscular injections, enemas and rectal medication administration  - Ensure safe mobilization of patient  - Hold pressure on venipuncture sites to achieve adequate hemostasis  - Assess for signs and symptoms of internal bleeding  - Monitor lab trends  - Patient is to report abnormal signs of bleeding to staff  - Avoid use of toothpicks and dental floss  - Use electric shaver for shaving  - Use soft bristle tooth brush  - Limit straining and forceful nose blowing  6/22/2023 1709 by Corby Culp RN  Outcome: Progressing  6/22/2023 1709 by Corby Culp RN  Outcome: Progressing     Problem: MUSCULOSKELETAL - ADULT  Goal: Return mobility to safest level of function  Description: INTERVENTIONS:  - Assess patient stability and activity tolerance for standing, transferring and ambulating w/ or w/o assistive devices  - Assist with transfers and ambulation using safe patient handling equipment as needed  - Ensure adequate protection for wounds/incisions during mobilization  - Obtain PT/OT consults as needed  - Advance activity as appropriate  - Communicate ordered activity level and limitations with patient/family  6/22/2023 1709 by Corby Culp RN  Outcome: Progressing  6/22/2023 1709 by Corby Culp RN  Outcome: Progressing

## 2023-06-22 NOTE — PLAN OF CARE
Jannet Davis is A&Ox4. Pain managed with PCA Dilaudid, patient declined Norco overnight. Vitals stable. Tolerating fulls with no complaints of nausea/vomitting. Passing gas, no BM. TPN continuous at 83.3ml/hr. Surgical Incision intact. Patient ambulating throughout room independently. Call light in reach. Frequent rounding throughout shift. Problem: Patient Centered Care  Goal: Patient preferences are identified and integrated in the patient's plan of care  Description: Interventions:  - What would you like us to know as we care for you?  From home alone  - Provide timely, complete, and accurate information to patient/family  - Incorporate patient and family knowledge, values, beliefs, and cultural backgrounds into the planning and delivery of care  - Encourage patient/family to participate in care and decision-making at the level they choose  - Honor patient and family perspectives and choices  Outcome: Progressing     Problem: Patient/Family Goals  Goal: Patient/Family Long Term Goal  Description: Patient's Long Term Goal: Unclogged G-Tube    Interventions:  - Monitor vital signs  - Monitor appropriate labs  - Pain management  - Administer medications per order  - Follow MD orders  - Diagnostics per order  - Update / inform patient and family on plan of care  - Discharge planning  - See additional Care Plan goals for specific interventions  Outcome: Progressing  Goal: Patient/Family Short Term Goal  Description: Patient's Short Term Goal: Improve pancreatitis    Interventions:   - Monitor vital signs  - Monitor appropriate labs  - Pain management  - Administer medications per order  - Follow MD orders  - Diagnostics per order  - Update / inform patient and family on plan of care  - See additional Care Plan goals for specific interventions  Outcome: Progressing     Problem: GASTROINTESTINAL - ADULT  Goal: Maintains or returns to baseline bowel function  Description: INTERVENTIONS:  - Assess bowel function  - Maintain adequate hydration with IV or PO as ordered and tolerated  - Evaluate effectiveness of GI medications  - Encourage mobilization and activity  - Obtain nutritional consult as needed  - Establish a toileting routine/schedule  - Consider collaborating with pharmacy to review patient's medication profile  Outcome: Progressing  Goal: Maintains adequate nutritional intake (undernourished)  Description: INTERVENTIONS:  - Monitor percentage of each meal consumed  - Identify factors contributing to decreased intake, treat as appropriate  - Assist with meals as needed  - Monitor I&O, WT and lab values  - Obtain nutritional consult as needed  - Optimize oral hygiene and moisture  - Encourage food from home; allow for food preferences  - Enhance eating environment  Outcome: Progressing  Goal: Minimal or absence of nausea and vomiting  Description: INTERVENTIONS:  - Maintain adequate hydration with IV or PO as ordered and tolerated  - Nasogastric tube to low intermittent suction as ordered  - Evaluate effectiveness of ordered antiemetic medications  - Provide nonpharmacologic comfort measures as appropriate  - Advance diet as tolerated, if ordered  - Obtain nutritional consult as needed  - Evaluate fluid balance  Outcome: Progressing     Problem: METABOLIC/FLUID AND ELECTROLYTES - ADULT  Goal: Electrolytes maintained within normal limits  Description: INTERVENTIONS:  - Monitor labs and rhythm and assess patient for signs and symptoms of electrolyte imbalances  - Administer electrolyte replacement as ordered  - Monitor response to electrolyte replacements, including rhythm and repeat lab results as appropriate  - Fluid restriction as ordered  - Instruct patient on fluid and nutrition restrictions as appropriate  Outcome: Progressing  Goal: Hemodynamic stability and optimal renal function maintained  Description: INTERVENTIONS:  - Monitor labs and assess for signs and symptoms of volume excess or deficit  - Monitor intake, output and patient weight  - Monitor urine specific gravity, serum osmolarity and serum sodium as indicated or ordered  - Monitor response to interventions for patient's volume status, including labs, urine output, blood pressure (other measures as available)  - Encourage oral intake as appropriate  - Instruct patient on fluid and nutrition restrictions as appropriate  Outcome: Progressing  Goal: Glucose maintained within prescribed range  Description: INTERVENTIONS:  - Monitor Blood Glucose as ordered  - Assess for signs and symptoms of hyperglycemia and hypoglycemia  - Administer ordered medications to maintain glucose within target range  - Assess barriers to adequate nutritional intake and initiate nutrition consult as needed  - Instruct patient on self management of diabetes  Outcome: Progressing     Problem: Impaired Functional Mobility  Goal: Achieve highest/safest level of mobility/gait  Description: Interventions:  - Assess patient's functional ability and stability  - Promote increasing activity/tolerance for mobility and gait  - Educate and engage patient/family in tolerated activity level and precautions  Outcome: Progressing     Problem: Impaired Activities of Daily Living  Goal: Achieve highest/safest level of independence in self care  Description: Interventions:  - Assess ability and encourage patient to participate in ADLs to maximize function  - Promote sitting position while performing ADLs such as feeding, grooming, and bathing  - Educate and encourage patient/family in tolerated functional activity level and precautions during self-care  - Encourage patient to incorporate impaired side during daily activities to promote function  Outcome: Progressing     Problem: SKIN/TISSUE INTEGRITY - ADULT  Goal: Skin integrity remains intact  Description: INTERVENTIONS  - Assess and document risk factors for pressure ulcer development  - Assess and document skin integrity  - Monitor for areas of redness and/or skin breakdown  - Initiate interventions, skin care algorithm/standards of care as needed  Outcome: Progressing  Goal: Incision(s), wounds(s) or drain site(s) healing without S/S of infection  Description: INTERVENTIONS:  - Assess and document risk factors for pressure ulcer development  - Assess and document skin integrity  - Assess and document dressing/incision, wound bed, drain sites and surrounding tissue  - Implement wound care per orders  - Initiate isolation precautions as appropriate  - Initiate Pressure Ulcer prevention bundle as indicated  Outcome: Progressing     Problem: PAIN - ADULT  Goal: Verbalizes/displays adequate comfort level or patient's stated pain goal  Description: INTERVENTIONS:  - Encourage pt to monitor pain and request assistance  - Assess pain using appropriate pain scale  - Administer analgesics based on type and severity of pain and evaluate response  - Implement non-pharmacological measures as appropriate and evaluate response  - Consider cultural and social influences on pain and pain management  - Manage/alleviate anxiety  - Utilize distraction and/or relaxation techniques  - Monitor for opioid side effects  - Notify MD/LIP if interventions unsuccessful or patient reports new pain  - Anticipate increased pain with activity and pre-medicate as appropriate  Outcome: Progressing     Problem: SAFETY ADULT - FALL  Goal: Free from fall injury  Description: INTERVENTIONS:  - Assess pt frequently for physical needs  - Identify cognitive and physical deficits and behaviors that affect risk of falls.   - Hurricane fall precautions as indicated by assessment.  - Educate pt/family on patient safety including physical limitations  - Instruct pt to call for assistance with activity based on assessment  - Modify environment to reduce risk of injury  - Provide assistive devices as appropriate  - Consider OT/PT consult to assist with strengthening/mobility  - Encourage toileting schedule  Outcome: Progressing     Problem: DISCHARGE PLANNING  Goal: Discharge to home or other facility with appropriate resources  Description: INTERVENTIONS:  - Identify barriers to discharge w/pt and caregiver  - Include patient/family/discharge partner in discharge planning  - Arrange for needed discharge resources and transportation as appropriate  - Identify discharge learning needs (meds, wound care, etc)  - Arrange for interpreters to assist at discharge as needed  - Consider post-discharge preferences of patient/family/discharge partner  - Complete POLST form as appropriate  - Assess patient's ability to be responsible for managing their own health  - Refer to Case Management Department for coordinating discharge planning if the patient needs post-hospital services based on physician/LIP order or complex needs related to functional status, cognitive ability or social support system  Outcome: Progressing     Problem: RISK FOR INFECTION - ADULT  Goal: Absence of fever/infection during anticipated neutropenic period  Description: INTERVENTIONS  - Monitor WBC  - Administer growth factors as ordered  - Implement neutropenic guidelines  Outcome: Progressing     Problem: CARDIOVASCULAR - ADULT  Goal: Maintains optimal cardiac output and hemodynamic stability  Description: INTERVENTIONS:  - Monitor vital signs, rhythm, and trends  - Monitor for bleeding, hypotension and signs of decreased cardiac output  - Evaluate effectiveness of vasoactive medications to optimize hemodynamic stability  - Monitor arterial and/or venous puncture sites for bleeding and/or hematoma  - Assess quality of pulses, skin color and temperature  - Assess for signs of decreased coronary artery perfusion - ex.  Angina  - Evaluate fluid balance, assess for edema, trend weights  Outcome: Progressing  Goal: Absence of cardiac arrhythmias or at baseline  Description: INTERVENTIONS:  - Continuous cardiac monitoring, monitor vital signs, obtain 12 lead EKG if indicated  - Evaluate effectiveness of antiarrhythmic and heart rate control medications as ordered  - Initiate emergency measures for life threatening arrhythmias  - Monitor electrolytes and administer replacement therapy as ordered  Outcome: Progressing     Problem: HEMATOLOGIC - ADULT  Goal: Maintains hematologic stability  Description: INTERVENTIONS  - Assess for signs and symptoms of bleeding or hemorrhage  - Monitor labs and vital signs for trends  - Administer supportive blood products/factors, fluids and medications as ordered and appropriate  - Administer supportive blood products/factors as ordered and appropriate  Outcome: Progressing  Goal: Free from bleeding injury  Description: (Example usage: patient with low platelets)  INTERVENTIONS:  - Avoid intramuscular injections, enemas and rectal medication administration  - Ensure safe mobilization of patient  - Hold pressure on venipuncture sites to achieve adequate hemostasis  - Assess for signs and symptoms of internal bleeding  - Monitor lab trends  - Patient is to report abnormal signs of bleeding to staff  - Avoid use of toothpicks and dental floss  - Use electric shaver for shaving  - Use soft bristle tooth brush  - Limit straining and forceful nose blowing  Outcome: Progressing     Problem: MUSCULOSKELETAL - ADULT  Goal: Return mobility to safest level of function  Description: INTERVENTIONS:  - Assess patient stability and activity tolerance for standing, transferring and ambulating w/ or w/o assistive devices  - Assist with transfers and ambulation using safe patient handling equipment as needed  - Ensure adequate protection for wounds/incisions during mobilization  - Obtain PT/OT consults as needed  - Advance activity as appropriate  - Communicate ordered activity level and limitations with patient/family  Outcome: Progressing

## 2023-06-23 VITALS
HEART RATE: 97 BPM | WEIGHT: 112.81 LBS | OXYGEN SATURATION: 100 % | DIASTOLIC BLOOD PRESSURE: 78 MMHG | BODY MASS INDEX: 17.1 KG/M2 | TEMPERATURE: 99 F | SYSTOLIC BLOOD PRESSURE: 102 MMHG | RESPIRATION RATE: 18 BRPM | HEIGHT: 68 IN

## 2023-06-23 LAB
ANION GAP SERPL CALC-SCNC: 4 MMOL/L (ref 0–18)
BUN BLD-MCNC: 16 MG/DL (ref 7–18)
BUN/CREAT SERPL: 28.6 (ref 10–20)
CALCIUM BLD-MCNC: 8 MG/DL (ref 8.5–10.1)
CHLORIDE SERPL-SCNC: 109 MMOL/L (ref 98–112)
CO2 SERPL-SCNC: 25 MMOL/L (ref 21–32)
CREAT BLD-MCNC: 0.56 MG/DL
DEPRECATED RDW RBC AUTO: 44.4 FL (ref 35.1–46.3)
ERYTHROCYTE [DISTWIDTH] IN BLOOD BY AUTOMATED COUNT: 13.1 % (ref 11–15)
GFR SERPLBLD BASED ON 1.73 SQ M-ARVRAT: 122 ML/MIN/1.73M2 (ref 60–?)
GLUCOSE BLD-MCNC: 92 MG/DL (ref 70–99)
HCT VFR BLD AUTO: 22.3 %
HGB BLD-MCNC: 7.3 G/DL
MAGNESIUM SERPL-MCNC: 1.5 MG/DL (ref 1.6–2.6)
MCH RBC QN AUTO: 30.8 PG (ref 26–34)
MCHC RBC AUTO-ENTMCNC: 32.7 G/DL (ref 31–37)
MCV RBC AUTO: 94.1 FL
OSMOLALITY SERPL CALC.SUM OF ELEC: 287 MOSM/KG (ref 275–295)
PHOSPHATE SERPL-MCNC: 3 MG/DL (ref 2.5–4.9)
PLATELET # BLD AUTO: 309 10(3)UL (ref 150–450)
POTASSIUM SERPL-SCNC: 4.4 MMOL/L (ref 3.5–5.1)
RBC # BLD AUTO: 2.37 X10(6)UL
SODIUM SERPL-SCNC: 138 MMOL/L (ref 136–145)
WBC # BLD AUTO: 10.3 X10(3) UL (ref 4–11)

## 2023-06-23 PROCEDURE — 99239 HOSP IP/OBS DSCHRG MGMT >30: CPT | Performed by: HOSPITALIST

## 2023-06-23 RX ORDER — HYDROCODONE BITARTRATE AND ACETAMINOPHEN 5; 325 MG/1; MG/1
1 TABLET ORAL EVERY 6 HOURS PRN
Qty: 28 TABLET | Refills: 0 | Status: SHIPPED | OUTPATIENT
Start: 2023-06-23

## 2023-06-23 RX ORDER — MAGNESIUM OXIDE 400 MG/1
800 TABLET ORAL ONCE
Status: COMPLETED | OUTPATIENT
Start: 2023-06-23 | End: 2023-06-23

## 2023-06-23 NOTE — CM/SW NOTE
Patient discussed in rounds, like discharge today. Patient tolerating general diet & off TPN. [facility] FOR  [facility] ([phone]), they are not current with patient. No new home needs. Confirmed w/ Judy Andre.     Karlene Paulino, [address]

## 2023-06-23 NOTE — PLAN OF CARE
VSS, afebrile. Dilaudid PCA in place in am, then stopped. Started Norco prn. Tolerating low fiber soft diet. Ambulating independently. Voiding WNL. Passing gas. Safety plan in place. Discharged home with family today. Problem: Patient Centered Care  Goal: Patient preferences are identified and integrated in the patient's plan of care  Description: Interventions:  - What would you like us to know as we care for you?  From home alone  - Provide timely, complete, and accurate information to patient/family  - Incorporate patient and family knowledge, values, beliefs, and cultural backgrounds into the planning and delivery of care  - Encourage patient/family to participate in care and decision-making at the level they choose  - Honor patient and family perspectives and choices  Outcome: Progressing     Problem: Patient/Family Goals  Goal: Patient/Family Short Term Goal  Description: Patient's Short Term Goal: Improve pancreatitis    Interventions:   - Monitor vital signs  - Monitor appropriate labs  - Pain management  - Administer medications per order  - Follow MD orders  - Diagnostics per order  - Update / inform patient and family on plan of care  - See additional Care Plan goals for specific interventions  Outcome: Progressing     Problem: GASTROINTESTINAL - ADULT  Goal: Maintains or returns to baseline bowel function  Description: INTERVENTIONS:  - Assess bowel function  - Maintain adequate hydration with IV or PO as ordered and tolerated  - Evaluate effectiveness of GI medications  - Encourage mobilization and activity  - Obtain nutritional consult as needed  - Establish a toileting routine/schedule  - Consider collaborating with pharmacy to review patient's medication profile  Outcome: Progressing  Goal: Maintains adequate nutritional intake (undernourished)  Description: INTERVENTIONS:  - Monitor percentage of each meal consumed  - Identify factors contributing to decreased intake, treat as appropriate  - Assist with meals as needed  - Monitor I&O, WT and lab values  - Obtain nutritional consult as needed  - Optimize oral hygiene and moisture  - Encourage food from home; allow for food preferences  - Enhance eating environment  Outcome: Progressing     Problem: METABOLIC/FLUID AND ELECTROLYTES - ADULT  Goal: Electrolytes maintained within normal limits  Description: INTERVENTIONS:  - Monitor labs and rhythm and assess patient for signs and symptoms of electrolyte imbalances  - Administer electrolyte replacement as ordered  - Monitor response to electrolyte replacements, including rhythm and repeat lab results as appropriate  - Fluid restriction as ordered  - Instruct patient on fluid and nutrition restrictions as appropriate  Outcome: Progressing  Goal: Hemodynamic stability and optimal renal function maintained  Description: INTERVENTIONS:  - Monitor labs and assess for signs and symptoms of volume excess or deficit  - Monitor intake, output and patient weight  - Monitor urine specific gravity, serum osmolarity and serum sodium as indicated or ordered  - Monitor response to interventions for patient's volume status, including labs, urine output, blood pressure (other measures as available)  - Encourage oral intake as appropriate  - Instruct patient on fluid and nutrition restrictions as appropriate  Outcome: Progressing  Goal: Glucose maintained within prescribed range  Description: INTERVENTIONS:  - Monitor Blood Glucose as ordered  - Assess for signs and symptoms of hyperglycemia and hypoglycemia  - Administer ordered medications to maintain glucose within target range  - Assess barriers to adequate nutritional intake and initiate nutrition consult as needed  - Instruct patient on self management of diabetes  Outcome: Progressing

## 2023-06-23 NOTE — PLAN OF CARE
Problem: Patient Centered Care  Goal: Patient preferences are identified and integrated in the patient's plan of care  Description: Interventions:  - What would you like us to know as we care for you?  From home alone  - Provide timely, complete, and accurate information to patient/family  - Incorporate patient and family knowledge, values, beliefs, and cultural backgrounds into the planning and delivery of care  - Encourage patient/family to participate in care and decision-making at the level they choose  - Honor patient and family perspectives and choices  Outcome: Progressing     Problem: Patient/Family Goals  Goal: Patient/Family Long Term Goal  Description: Patient's Long Term Goal: Unclogged G-Tube    Interventions:  - Monitor vital signs  - Monitor appropriate labs  - Pain management  - Administer medications per order  - Follow MD orders  - Diagnostics per order  - Update / inform patient and family on plan of care  - Discharge planning  - See additional Care Plan goals for specific interventions  Outcome: Progressing  Goal: Patient/Family Short Term Goal  Description: Patient's Short Term Goal: Improve pancreatitis    Interventions:   - Monitor vital signs  - Monitor appropriate labs  - Pain management  - Administer medications per order  - Follow MD orders  - Diagnostics per order  - Update / inform patient and family on plan of care  - See additional Care Plan goals for specific interventions  Outcome: Progressing     Problem: GASTROINTESTINAL - ADULT  Goal: Maintains or returns to baseline bowel function  Description: INTERVENTIONS:  - Assess bowel function  - Maintain adequate hydration with IV or PO as ordered and tolerated  - Evaluate effectiveness of GI medications  - Encourage mobilization and activity  - Obtain nutritional consult as needed  - Establish a toileting routine/schedule  - Consider collaborating with pharmacy to review patient's medication profile  Outcome: Progressing  Goal: Maintains adequate nutritional intake (undernourished)  Description: INTERVENTIONS:  - Monitor percentage of each meal consumed  - Identify factors contributing to decreased intake, treat as appropriate  - Assist with meals as needed  - Monitor I&O, WT and lab values  - Obtain nutritional consult as needed  - Optimize oral hygiene and moisture  - Encourage food from home; allow for food preferences  - Enhance eating environment  Outcome: Progressing  Goal: Minimal or absence of nausea and vomiting  Description: INTERVENTIONS:  - Maintain adequate hydration with IV or PO as ordered and tolerated  - Nasogastric tube to low intermittent suction as ordered  - Evaluate effectiveness of ordered antiemetic medications  - Provide nonpharmacologic comfort measures as appropriate  - Advance diet as tolerated, if ordered  - Obtain nutritional consult as needed  - Evaluate fluid balance  Outcome: Progressing     Problem: METABOLIC/FLUID AND ELECTROLYTES - ADULT  Goal: Electrolytes maintained within normal limits  Description: INTERVENTIONS:  - Monitor labs and rhythm and assess patient for signs and symptoms of electrolyte imbalances  - Administer electrolyte replacement as ordered  - Monitor response to electrolyte replacements, including rhythm and repeat lab results as appropriate  - Fluid restriction as ordered  - Instruct patient on fluid and nutrition restrictions as appropriate  Outcome: Progressing  Goal: Hemodynamic stability and optimal renal function maintained  Description: INTERVENTIONS:  - Monitor labs and assess for signs and symptoms of volume excess or deficit  - Monitor intake, output and patient weight  - Monitor urine specific gravity, serum osmolarity and serum sodium as indicated or ordered  - Monitor response to interventions for patient's volume status, including labs, urine output, blood pressure (other measures as available)  - Encourage oral intake as appropriate  - Instruct patient on fluid and nutrition restrictions as appropriate  Outcome: Progressing  Goal: Glucose maintained within prescribed range  Description: INTERVENTIONS:  - Monitor Blood Glucose as ordered  - Assess for signs and symptoms of hyperglycemia and hypoglycemia  - Administer ordered medications to maintain glucose within target range  - Assess barriers to adequate nutritional intake and initiate nutrition consult as needed  - Instruct patient on self management of diabetes  Outcome: Progressing     Problem: Impaired Functional Mobility  Goal: Achieve highest/safest level of mobility/gait  Description: Interventions:  - Assess patient's functional ability and stability  - Promote increasing activity/tolerance for mobility and gait  - Educate and engage patient/family in tolerated activity level and precautions    Outcome: Progressing     Problem: Impaired Activities of Daily Living  Goal: Achieve highest/safest level of independence in self care  Description: Interventions:  - Assess ability and encourage patient to participate in ADLs to maximize function  - Promote sitting position while performing ADLs such as feeding, grooming, and bathing  - Educate and encourage patient/family in tolerated functional activity level and precautions during self-care    Outcome: Progressing     Problem: SKIN/TISSUE INTEGRITY - ADULT  Goal: Skin integrity remains intact  Description: INTERVENTIONS  - Assess and document risk factors for pressure ulcer development  - Assess and document skin integrity  - Monitor for areas of redness and/or skin breakdown  - Initiate interventions, skin care algorithm/standards of care as needed  Outcome: Progressing  Goal: Incision(s), wounds(s) or drain site(s) healing without S/S of infection  Description: INTERVENTIONS:  - Assess and document risk factors for pressure ulcer development  - Assess and document skin integrity  - Assess and document dressing/incision, wound bed, drain sites and surrounding tissue  - Implement wound care per orders  - Initiate isolation precautions as appropriate  - Initiate Pressure Ulcer prevention bundle as indicated  Outcome: Progressing     Problem: CARDIOVASCULAR - ADULT  Goal: Maintains optimal cardiac output and hemodynamic stability  Description: INTERVENTIONS:  - Monitor vital signs, rhythm, and trends  - Monitor for bleeding, hypotension and signs of decreased cardiac output  - Evaluate effectiveness of vasoactive medications to optimize hemodynamic stability  - Monitor arterial and/or venous puncture sites for bleeding and/or hematoma  - Assess quality of pulses, skin color and temperature  - Assess for signs of decreased coronary artery perfusion - ex.  Angina  - Evaluate fluid balance, assess for edema, trend weights  Outcome: Progressing  Goal: Absence of cardiac arrhythmias or at baseline  Description: INTERVENTIONS:  - Continuous cardiac monitoring, monitor vital signs, obtain 12 lead EKG if indicated  - Evaluate effectiveness of antiarrhythmic and heart rate control medications as ordered  - Initiate emergency measures for life threatening arrhythmias  - Monitor electrolytes and administer replacement therapy as ordered  Outcome: Progressing     Problem: HEMATOLOGIC - ADULT  Goal: Maintains hematologic stability  Description: INTERVENTIONS  - Assess for signs and symptoms of bleeding or hemorrhage  - Monitor labs and vital signs for trends  - Administer supportive blood products/factors, fluids and medications as ordered and appropriate  - Administer supportive blood products/factors as ordered and appropriate  Outcome: Progressing  Goal: Free from bleeding injury  Description: (Example usage: patient with low platelets)  INTERVENTIONS:  - Avoid intramuscular injections, enemas and rectal medication administration  - Ensure safe mobilization of patient  - Hold pressure on venipuncture sites to achieve adequate hemostasis  - Assess for signs and symptoms of internal bleeding  - Monitor lab trends  - Patient is to report abnormal signs of bleeding to staff  - Avoid use of toothpicks and dental floss  - Use electric shaver for shaving  - Use soft bristle tooth brush  - Limit straining and forceful nose blowing  Outcome: Progressing     Problem: MUSCULOSKELETAL - ADULT  Goal: Return mobility to safest level of function  Description: INTERVENTIONS:  - Assess patient stability and activity tolerance for standing, transferring and ambulating w/ or w/o assistive devices  - Assist with transfers and ambulation using safe patient handling equipment as needed  - Ensure adequate protection for wounds/incisions during mobilization  - Obtain PT/OT consults as needed  - Advance activity as appropriate  - Communicate ordered activity level and limitations with patient/family  Outcome: Progressing     Problem: PAIN - ADULT  Goal: Verbalizes/displays adequate comfort level or patient's stated pain goal  Description: INTERVENTIONS:  - Encourage pt to monitor pain and request assistance  - Assess pain using appropriate pain scale  - Administer analgesics based on type and severity of pain and evaluate response  - Implement non-pharmacological measures as appropriate and evaluate response  - Consider cultural and social influences on pain and pain management  - Manage/alleviate anxiety  - Utilize distraction and/or relaxation techniques  - Monitor for opioid side effects  - Notify MD/LIP if interventions unsuccessful or patient reports new pain  - Anticipate increased pain with activity and pre-medicate as appropriate  Outcome: Progressing     Problem: SAFETY ADULT - FALL  Goal: Free from fall injury  Description: INTERVENTIONS:  - Assess pt frequently for physical needs  - Identify cognitive and physical deficits and behaviors that affect risk of falls.   - [place] fall precautions as indicated by assessment.  - Educate pt/family on patient safety including physical limitations  - Instruct pt to call for assistance with activity based on assessment  - Modify environment to reduce risk of injury  - Provide assistive devices as appropriate  - Consider OT/PT consult to assist with strengthening/mobility  - Encourage toileting schedule  Outcome: Progressing     Problem: DISCHARGE PLANNING  Goal: Discharge to home or other facility with appropriate resources  Description: INTERVENTIONS:  - Identify barriers to discharge w/pt and caregiver  - Include patient/family/discharge partner in discharge planning  - Arrange for needed discharge resources and transportation as appropriate  - Identify discharge learning needs (meds, wound care, etc)  - Arrange for interpreters to assist at discharge as needed  - Consider post-discharge preferences of patient/family/discharge partner  - Complete POLST form as appropriate  - Assess patient's ability to be responsible for managing their own health  - Refer to Case Management Department for coordinating discharge planning if the patient needs post-hospital services based on physician/LIP order or complex needs related to functional status, cognitive ability or social support system  Outcome: Progressing     Problem: RISK FOR INFECTION - ADULT  Goal: Absence of fever/infection during anticipated neutropenic period  Description: INTERVENTIONS  - Monitor WBC  - Administer growth factors as ordered  - Implement neutropenic guidelines  Outcome: Progressing     Pt resting in bed. Independent. Denies nausea or SOB. Tolerating diet. TPN finished. Dilaudid PCA continued. Remote tele in place. Incision to abdomen ISABEL-no drainage noted. Pt states no BM overnight. Safety measures in place. Frequent rounding being done. Plan for discharge home 6/23.

## 2023-06-24 NOTE — CONSULTS
[facility]    PATIENT'S NAME: Nelida Munson   ATTENDING PHYSICIAN: Jeannette Campos MD   CONSULTING PHYSICIAN: Alyce Medina MD   PATIENT ACCOUNT#:   [id number]    LOCATION:  [address] [place] [address] Ne #:   [id number]       YOB: 1975  ADMISSION DATE:       06/01/2023      CONSULT DATE:  06/23/2023    REPORT OF CONSULTATION    PROGRESS NOTE    Patient tolerated general diet. No nausea. Does not complain of any pain. Bowels appeared grossly normal.  Wound looks good. He is to be discharged to see me in 1 week. He will continue to be on soft low-residue diet.     Dictated By Alyce Medina MD  d: 06/23/2023 16:49:07  t: 06/23/2023 23:17:46  [facility] [id number]  [facility]/

## 2023-06-26 NOTE — PAYOR COMM NOTE
--------------  DISCHARGE REVIEW    Payor: [address] [place] [address] POS/MCNP  Subscriber #:  [id number]  Authorization Number: Haider Monroe date: 6/3/23  Admit time:  10:43 AM  Discharge Date: 2023  4:09 PM     Admitting Physician: Colt Mart MD  Attending Physician:  No att. providers found  Primary Care Physician: Cade Simpson MD          Discharge Summary Notes        Discharge Summary signed by Caryn Roche MD at 2023  1:01 PM       Author: Caryn Roche MD Specialty: HOSPITALIST Author Type: Physician    Filed: 2023  1:01 PM Date of Service: 2023 12:57 PM Status: Signed    : Caryn Roche MD (Physician)         [facility]    Discharge Summary    [address] Patient Status:  Inpatient    1975 MRN [id number]   Location [facility] 4W/SW/SE Attending Caryn Roche MD   Hosp Day # 21 PCP Katina Rapp MD     Date of Admission: 2023   Date of Discharge: 2023    Hospital Discharge Diagnoses: Malfunction of the jejunostomy    Lace+ Score: 66  59-90 High Risk  29-58 Medium Risk  0-28   Low Risk. [facility] Follow-Up Recommendation:  LACE > 58: High Risk of readmission after discharge from the hospital.      Admitting Diagnosis: Malfunction of jejunostomy tube ([facility] [address] [K94.13]    Disposition: Home    Discharge Diagnosis: . Principal Problem:    Malfunction of jejunostomy tube [facility])  Active Problems:    Gastric outlet obstruction      Hospital Course:   Reason for Admission:   Per Dr. Zamora Spine    This is an unfortunate 18-year-old gentleman with a past medical history of alcohol-induced groove pancreatitis who was hospitalized in April with gastric outlet obstruction. Imaging studies showed groove pancreatitis with external compression of the gastric outlet antrum. He subsequently had placement of a venting gastric tube and a jejunal feeding tube insertion. He was admitted to our institution again last month on May 19.   At that time he was dehydrated, very hypokalemic, and was hypotensive. He was found to have an antral fluid collection which was not clearly felt to be an abscess. He tolerated J-tube feedings and was discharged home. He presents to the emergency room today because the jejunostomy tube is clogged. He tried opening it with hot water and that did not help. He presented to the emergency room, where they tried pancrelipase, coke, and sodium bicarbonate, none of which helped to open up the tube. He was therefore admitted for further evaluation by Dr. Yodit Ibanez, who could potentially replace the G and J-tubes and remove pancreatic stent as well. Labs in the emergency room included a glucose of 72, sodium of 142, potassium of 3.5, chloride of 102, CO2 of 34, BUN of 17 with a creatinine of 1.13. Calcium 8.5. Anion gap of 6. Lipase 313, which is down. White count 10 with a hemoglobin of 10.7 and a platelet count of 529,171; there were 68% neutrophils. The patient reports that despite tube feedings, he has been losing weight. He is very discouraged by what he feels to be a lack of progress in his condition. He has not had significant pain, however. Discharge Physical Exam:   Physical Exam:    General: No acute distress. Respiratory: Clear to auscultation bilaterally. No wheezes. No rhonchi. Cardiovascular: S1, S2. Regular rate and rhythm. No murmurs, rubs or gallops. Abdomen: Soft, nontender, nondistended. Positive bowel sounds. No rebound or guarding. Neurologic: No focal neurological deficits. Musculoskeletal: Moves all extremities. Hospital Course:   Malfunction of J tube   # Low pancreatitis with gastric outlet obstruction s/p decompressive G tube and feeding J tube  - s/p ERCP with pancreatic sphincterotomy and placement of pancreatic duct stent 5/24 - failed conservative treatment. Placement of GJ tube   - s/p upper endoscopy with removal of pancreatic duct stent, removal of jejunal tube.  Gastrostomy tube in good position 6/13  - s/p Ex lap, gastric jejunostomy, entero-enterostomy, removal of gastrostomy tube 6/14   - GI and General surgery on consult. 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 - stable. See above. - multiple prev biopsies negative      # Small splenic infarct seen on CT.   - stable. # Acute on Chronic anemia  - baseline Hb 10-12. - IV venofer. - rpt H/H in AM.      # Sinus tachycrdia  - metoprolol IV added. - lasix 20mg IV x 1   - net + 3.7L      # Tobacco abuse  - counseled on cessation. # Severe malnutrition   - BMI 15  - dietitian following      DVT proph- Lovenox     Full code    Complications: none    Consultants         Provider   Role Specialty     Moisés Escamilla MD  Consulting Physician Carmen Hensley MD  Consulting Physician Gamal Choudhary MD  Consulting Physician Uri Baker MD  Consulting Physician Linda Gandhi MD  Consulting Physician GASTROENTEROLOGY     Deb Burgos MD  Consulting Physician SURGERY, GENERAL     Skyler Guardado MD  Consulting Physician SURGERY, GENERAL          Surgical Procedures       Case IDs Date Procedure Surgeon Location Status    [id number] 6/12/23 ESOPHAGOGASTRODUODENOSCOPY (EGD) with pancreatic stent removal Corky Avendano MD [facility] ENDOSCOPY Comp    [id number] 6/14/23 Exploratory laparotomy, gastric jejunostomy, entero-enterostomy, removal of gastrostomy tube Skyler Guardado MD [facility] MAIN OR Comp              Discharge Plan:   Discharge Condition: Stable    Current Discharge Medication List    New Orders    HYDROcodone-acetaminophen 5-325 MG Oral Tab  Take 1 tablet by mouth every 6 (six) hours as needed.               Discharge Diet: low fiber soft     Discharge Activity: As tolerated       Discharge Medications        START taking these medications        Instructions Prescription details   HYDROcodone-acetaminophen 5-325 MG Tabs  Commonly known as: Norco      Take 1 tablet by mouth every 6 (six) hours as needed. Quantity: 28 tablet  Refills: 0            STOP taking these medications      omeprazole 2 mg/mL Susp  Commonly known as: PriLOSEC        pancrelipase (Lip-Prot-Amyl) 49367-96792 units Cpep  Commonly known as: Zenpep        sodium bicarbonate 325 MG Tabs        traMADol 50 MG Tabs  Commonly known as: Ultram                  Where to Get Your Medications        Please  your prescriptions at the location directed by your doctor or nurse    Bring a paper prescription for each of these medications  HYDROcodone-acetaminophen 5-325 MG Tabs         Follow up: Follow-up Information       Oziel Witt MD Follow up in 2 week(s).     Specialty: Internal Medicine  Contact information:  [address] [address]  [phone]                             Follow up Labs and imaging:         Time spent:  > 30 minutes    Annabella Romeo MD  6/23/2023    Electronically signed by Christiano Mccarthy MD on 6/23/2023  1:01 PM         REVIEWER COMMENTS

## 2023-07-31 NOTE — PLAN OF CARE
Problem: ALTERED NUTRIENT INTAKE - ADULT  Goal: Nutrient intake appropriate for improving, restoring or maintaining nutritional needs  Description: INTERVENTIONS:  - Assess nutritional status and recommend course of action  - Monitor oral intake, labs, and treatment plans  - Recommend appropriate diets, oral nutritional supplements, and vitamin/mineral supplements  - Recommend, monitor, and adjust tube feedings and TPN/PPN based on assessed needs  - Provide specific nutrition education as appropriate  Outcome: Progressing Xolair Pregnancy And Lactation Text: This medication is Pregnancy Category B and is considered safe during pregnancy. This medication is excreted in breast milk.

## 2023-12-19 ENCOUNTER — HOSPITAL ENCOUNTER (INPATIENT)
Facility: HOSPITAL | Age: 48
LOS: 2 days | Discharge: HOME | End: 2023-12-21
Attending: EMERGENCY MEDICINE | Admitting: HOSPITALIST
Payer: COMMERCIAL

## 2023-12-19 ENCOUNTER — HOSPITAL ENCOUNTER (INPATIENT)
Facility: HOSPITAL | Age: 48
LOS: 2 days | Discharge: HOME | DRG: 439 | End: 2023-12-21
Attending: EMERGENCY MEDICINE | Admitting: HOSPITALIST
Payer: COMMERCIAL

## 2023-12-19 ENCOUNTER — APPOINTMENT (OUTPATIENT)
Dept: CT IMAGING | Facility: HOSPITAL | Age: 48
End: 2023-12-19
Attending: EMERGENCY MEDICINE
Payer: COMMERCIAL

## 2023-12-19 ENCOUNTER — APPOINTMENT (OUTPATIENT)
Dept: CT IMAGING | Facility: HOSPITAL | Age: 48
DRG: 439 | End: 2023-12-19
Attending: EMERGENCY MEDICINE
Payer: COMMERCIAL

## 2023-12-19 DIAGNOSIS — K85.90 ACUTE PANCREATITIS, UNSPECIFIED COMPLICATION STATUS, UNSPECIFIED PANCREATITIS TYPE: ICD-10-CM

## 2023-12-19 DIAGNOSIS — R10.9 ABDOMINAL PAIN, ACUTE: Primary | ICD-10-CM

## 2023-12-19 LAB
ALBUMIN SERPL-MCNC: 4.5 G/DL (ref 3.2–4.8)
ALP LIVER SERPL-CCNC: 148 U/L
ALT SERPL-CCNC: 24 U/L
ANION GAP SERPL CALC-SCNC: 5 MMOL/L (ref 0–18)
AST SERPL-CCNC: 24 U/L (ref ?–34)
BASOPHILS # BLD AUTO: 0.04 X10(3) UL (ref 0–0.2)
BASOPHILS NFR BLD AUTO: 0.2 %
BILIRUB DIRECT SERPL-MCNC: 0.2 MG/DL (ref ?–0.3)
BILIRUB SERPL-MCNC: 0.4 MG/DL (ref 0.3–1.2)
BUN BLD-MCNC: 8 MG/DL (ref 9–23)
BUN/CREAT SERPL: 10 (ref 10–20)
CALCIUM BLD-MCNC: 9.5 MG/DL (ref 8.7–10.4)
CHLORIDE SERPL-SCNC: 105 MMOL/L (ref 98–112)
CO2 SERPL-SCNC: 30 MMOL/L (ref 21–32)
CREAT BLD-MCNC: 0.8 MG/DL
DEPRECATED RDW RBC AUTO: 42.1 FL (ref 35.1–46.3)
EGFRCR SERPLBLD CKD-EPI 2021: 110 ML/MIN/1.73M2 (ref 60–?)
EOSINOPHIL # BLD AUTO: 0.02 X10(3) UL (ref 0–0.7)
EOSINOPHIL NFR BLD AUTO: 0.1 %
ERYTHROCYTE [DISTWIDTH] IN BLOOD BY AUTOMATED COUNT: 11.9 % (ref 11–15)
ETHANOL SERPL-MCNC: <3 MG/DL (ref ?–3)
GLUCOSE BLD-MCNC: 112 MG/DL (ref 70–99)
HCT VFR BLD AUTO: 46.5 %
HGB BLD-MCNC: 16.4 G/DL
IMM GRANULOCYTES # BLD AUTO: 0.07 X10(3) UL (ref 0–1)
IMM GRANULOCYTES NFR BLD: 0.4 %
LIPASE SERPL-CCNC: 368 U/L (ref 13–75)
LYMPHOCYTES # BLD AUTO: 1.3 X10(3) UL (ref 1–4)
LYMPHOCYTES NFR BLD AUTO: 7.2 %
MAGNESIUM SERPL-MCNC: 1.6 MG/DL (ref 1.6–2.6)
MCH RBC QN AUTO: 34 PG (ref 26–34)
MCHC RBC AUTO-ENTMCNC: 35.3 G/DL (ref 31–37)
MCV RBC AUTO: 96.3 FL
MONOCYTES # BLD AUTO: 0.87 X10(3) UL (ref 0.1–1)
MONOCYTES NFR BLD AUTO: 4.8 %
NEUTROPHILS # BLD AUTO: 15.71 X10 (3) UL (ref 1.5–7.7)
NEUTROPHILS # BLD AUTO: 15.71 X10(3) UL (ref 1.5–7.7)
NEUTROPHILS NFR BLD AUTO: 87.3 %
OSMOLALITY SERPL CALC.SUM OF ELEC: 289 MOSM/KG (ref 275–295)
PLATELET # BLD AUTO: 475 10(3)UL (ref 150–450)
POTASSIUM SERPL-SCNC: 4.2 MMOL/L (ref 3.5–5.1)
PROT SERPL-MCNC: 7.1 G/DL (ref 5.7–8.2)
RBC # BLD AUTO: 4.83 X10(6)UL
SODIUM SERPL-SCNC: 140 MMOL/L (ref 136–145)
WBC # BLD AUTO: 18 X10(3) UL (ref 4–11)

## 2023-12-19 PROCEDURE — 74177 CT ABD & PELVIS W/CONTRAST: CPT | Performed by: EMERGENCY MEDICINE

## 2023-12-19 PROCEDURE — 99223 1ST HOSP IP/OBS HIGH 75: CPT | Performed by: HOSPITALIST

## 2023-12-19 RX ORDER — MORPHINE SULFATE 2 MG/ML
1 INJECTION, SOLUTION INTRAMUSCULAR; INTRAVENOUS EVERY 2 HOUR PRN
Status: DISCONTINUED | OUTPATIENT
Start: 2023-12-19 | End: 2023-12-21

## 2023-12-19 RX ORDER — TRAMADOL HYDROCHLORIDE 50 MG/1
50 TABLET ORAL EVERY 6 HOURS PRN
Status: ON HOLD | COMMUNITY
End: 2023-12-21

## 2023-12-19 RX ORDER — LORAZEPAM 1 MG/1
1 TABLET ORAL
Status: DISCONTINUED | OUTPATIENT
Start: 2023-12-19 | End: 2023-12-21

## 2023-12-19 RX ORDER — LORAZEPAM 1 MG/1
2 TABLET ORAL
Status: DISCONTINUED | OUTPATIENT
Start: 2023-12-19 | End: 2023-12-21

## 2023-12-19 RX ORDER — MAGNESIUM OXIDE 400 MG/1
400 TABLET ORAL ONCE
Status: COMPLETED | OUTPATIENT
Start: 2023-12-19 | End: 2023-12-19

## 2023-12-19 RX ORDER — MORPHINE SULFATE 4 MG/ML
4 INJECTION, SOLUTION INTRAMUSCULAR; INTRAVENOUS EVERY 2 HOUR PRN
Status: DISCONTINUED | OUTPATIENT
Start: 2023-12-19 | End: 2023-12-21

## 2023-12-19 RX ORDER — ACETAMINOPHEN 500 MG
500 TABLET ORAL EVERY 4 HOURS PRN
Status: DISCONTINUED | OUTPATIENT
Start: 2023-12-19 | End: 2023-12-21

## 2023-12-19 RX ORDER — SODIUM CHLORIDE 9 MG/ML
INJECTION, SOLUTION INTRAVENOUS CONTINUOUS
Status: DISCONTINUED | OUTPATIENT
Start: 2023-12-19 | End: 2023-12-21

## 2023-12-19 RX ORDER — PROCHLORPERAZINE EDISYLATE 5 MG/ML
5 INJECTION INTRAMUSCULAR; INTRAVENOUS EVERY 8 HOURS PRN
Status: DISCONTINUED | OUTPATIENT
Start: 2023-12-19 | End: 2023-12-21

## 2023-12-19 RX ORDER — ONDANSETRON 2 MG/ML
4 INJECTION INTRAMUSCULAR; INTRAVENOUS ONCE
Status: COMPLETED | OUTPATIENT
Start: 2023-12-19 | End: 2023-12-19

## 2023-12-19 RX ORDER — MORPHINE SULFATE 4 MG/ML
4 INJECTION, SOLUTION INTRAMUSCULAR; INTRAVENOUS ONCE
Status: COMPLETED | OUTPATIENT
Start: 2023-12-19 | End: 2023-12-19

## 2023-12-19 RX ORDER — HEPARIN SODIUM 5000 [USP'U]/ML
5000 INJECTION, SOLUTION INTRAVENOUS; SUBCUTANEOUS EVERY 12 HOURS SCHEDULED
Status: DISCONTINUED | OUTPATIENT
Start: 2023-12-19 | End: 2023-12-21

## 2023-12-19 RX ORDER — MORPHINE SULFATE 2 MG/ML
2 INJECTION, SOLUTION INTRAMUSCULAR; INTRAVENOUS EVERY 2 HOUR PRN
Status: DISCONTINUED | OUTPATIENT
Start: 2023-12-19 | End: 2023-12-21

## 2023-12-19 RX ORDER — ONDANSETRON 2 MG/ML
4 INJECTION INTRAMUSCULAR; INTRAVENOUS EVERY 6 HOURS PRN
Status: DISCONTINUED | OUTPATIENT
Start: 2023-12-19 | End: 2023-12-21

## 2023-12-20 LAB
ALBUMIN SERPL-MCNC: 3.1 G/DL (ref 3.2–4.8)
ALBUMIN/GLOB SERPL: 1.6 {RATIO} (ref 1–2)
ALP LIVER SERPL-CCNC: 98 U/L
ALT SERPL-CCNC: 14 U/L
ANION GAP SERPL CALC-SCNC: 12 MMOL/L (ref 0–18)
AST SERPL-CCNC: 10 U/L (ref ?–34)
BASOPHILS # BLD AUTO: 0.05 X10(3) UL (ref 0–0.2)
BASOPHILS NFR BLD AUTO: 0.6 %
BILIRUB SERPL-MCNC: 0.5 MG/DL (ref 0.3–1.2)
BUN BLD-MCNC: 7 MG/DL (ref 9–23)
BUN/CREAT SERPL: 10 (ref 10–20)
CALCIUM BLD-MCNC: 8.2 MG/DL (ref 8.7–10.4)
CHLORIDE SERPL-SCNC: 105 MMOL/L (ref 98–112)
CO2 SERPL-SCNC: 26 MMOL/L (ref 21–32)
CREAT BLD-MCNC: 0.7 MG/DL
DEPRECATED RDW RBC AUTO: 43.2 FL (ref 35.1–46.3)
EGFRCR SERPLBLD CKD-EPI 2021: 114 ML/MIN/1.73M2 (ref 60–?)
EOSINOPHIL # BLD AUTO: 0.29 X10(3) UL (ref 0–0.7)
EOSINOPHIL NFR BLD AUTO: 3.3 %
ERYTHROCYTE [DISTWIDTH] IN BLOOD BY AUTOMATED COUNT: 11.9 % (ref 11–15)
GLOBULIN PLAS-MCNC: 2 G/DL (ref 2.8–4.4)
GLUCOSE BLD-MCNC: 91 MG/DL (ref 70–99)
HCT VFR BLD AUTO: 34.9 %
HGB BLD-MCNC: 12.2 G/DL
IMM GRANULOCYTES # BLD AUTO: 0.03 X10(3) UL (ref 0–1)
IMM GRANULOCYTES NFR BLD: 0.3 %
LIPASE SERPL-CCNC: 282 U/L (ref 12–53)
LYMPHOCYTES # BLD AUTO: 2.24 X10(3) UL (ref 1–4)
LYMPHOCYTES NFR BLD AUTO: 25.5 %
MAGNESIUM SERPL-MCNC: 1.7 MG/DL (ref 1.6–2.6)
MCH RBC QN AUTO: 34.7 PG (ref 26–34)
MCHC RBC AUTO-ENTMCNC: 35 G/DL (ref 31–37)
MCV RBC AUTO: 99.1 FL
MONOCYTES # BLD AUTO: 0.66 X10(3) UL (ref 0.1–1)
MONOCYTES NFR BLD AUTO: 7.5 %
NEUTROPHILS # BLD AUTO: 5.5 X10 (3) UL (ref 1.5–7.7)
NEUTROPHILS # BLD AUTO: 5.5 X10(3) UL (ref 1.5–7.7)
NEUTROPHILS NFR BLD AUTO: 62.8 %
OSMOLALITY SERPL CALC.SUM OF ELEC: 294 MOSM/KG (ref 275–295)
PLATELET # BLD AUTO: 306 10(3)UL (ref 150–450)
POTASSIUM SERPL-SCNC: 3.2 MMOL/L (ref 3.5–5.1)
PROT SERPL-MCNC: 5.1 G/DL (ref 5.7–8.2)
RBC # BLD AUTO: 3.52 X10(6)UL
SODIUM SERPL-SCNC: 143 MMOL/L (ref 136–145)
WBC # BLD AUTO: 8.8 X10(3) UL (ref 4–11)

## 2023-12-20 PROCEDURE — 99233 SBSQ HOSP IP/OBS HIGH 50: CPT | Performed by: HOSPITALIST

## 2023-12-20 RX ORDER — POTASSIUM CHLORIDE 20 MEQ/1
40 TABLET, EXTENDED RELEASE ORAL ONCE
Status: COMPLETED | OUTPATIENT
Start: 2023-12-20 | End: 2023-12-20

## 2023-12-20 RX ORDER — MAGNESIUM OXIDE 400 MG/1
400 TABLET ORAL ONCE
Status: COMPLETED | OUTPATIENT
Start: 2023-12-20 | End: 2023-12-20

## 2023-12-20 NOTE — H&P
[facility]    PATIENT'S NAME: [facility], [address] [facility]   ATTENDING PHYSICIAN: Alma Tellez MD   PATIENT ACCOUNT#:   [de-identified]    LOCATION:  [address]  MEDICAL RECORD #:   [id number]       YOB: 1975  ADMISSION DATE:       12/19/2023    HISTORY AND PHYSICAL EXAMINATION    CHIEF COMPLAINT:  Recurrent alcohol-induced groove pancreatitis. HISTORY OF PRESENT ILLNESS:  The patient is a 15-year-old  male with chronic history of alcohol abuse. Started drinking 4 to 5 days ago and started developing midabdominal pain radiating to back associated with nausea, vomiting, poor oral intake for last 2 to 3 days. Came in today to the emergency department for evaluation. CBC showed white blood cell count of 18.0 with left shift, platelet count 426. Chemistry, calcium level, liver function tests were unremarkable. Alcohol level was negative. His last drink was yesterday. Lipase 368. CT scan of the abdomen showed findings compatible with acute pancreatitis within the pancreaticoduodenal groove and pancreatic head. Scattered pancreatic calcifications suggestive of chronic pancreatitis. No organized measurable fluid collection, pseudocyst.  No evidence of pancreatic infarct or hemorrhage. Nonspecific common bile duct dilation without significant intrahepatic biliary ductal dilatation. Patient was started on IV fluids, and he will be admitted to the hospital for further management. Magnesium level was added. 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 PAST SURGICAL HISTORY:  Patient had exploratory laparotomy in June 2023 with gastrojejunostomy and antecolic enteroenterostomy, removal G and J tubes with removal of pancreatic stent.     MEDICATIONS:  Please see medication reconciliation list.      ALLERGIES:  No known drug allergies. FAMILY HISTORY:  Mother had heart disease. Father had hypertension. SOCIAL HISTORY:  Ex-tobacco user. Positive alcohol abuse as outlined above. No drug use. Lives with his family. Independent in his basic activities of daily living. REVIEW OF SYSTEMS:  Patient reports midabdominal pain radiating to the back associated with nausea, vomiting, poor oral intake for last 3 to 4 days. Concomitant with relapse of alcohol intake 1 to 2 drinks a day per the patient for last 5 to 6 days. Other 12-point review of systems is negative. PHYSICAL EXAMINATION:    GENERAL:  Alert and oriented to time, place, and person. Moderate distress. VITAL SIGNS:  Temperature 98.4; pulse 130, sinus tachycardia; respiratory rate 17; blood pressure 160/100; pulse ox 98% on room air. HEENT:  Atraumatic. Oropharynx clear. Moist mucous membranes. Normal hard and soft palate. Eyes:  Anicteric sclerae. NECK:  Supple. No lymphadenopathy. Trachea midline. Full range of motion. LUNGS:  Clear to auscultation bilaterally. Normal respiratory effort. HEART:  Regular rate, rhythm. S1 and S2 auscultated. Tachycardiac. ABDOMEN:  Soft, nondistended. Tenderness in the epigastric area. Hypoactive bowel sounds. EXTREMITIES:  No peripheral edema, clubbing, or cyanosis. NEUROLOGIC:  Motor and sensory intact. ASSESSMENT:    1. 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 2.   High risk for alcohol withdrawal.    PLAN:  Patient will be admitted to general medical floor. IV fluids, n.p.o. except sips of clear liquid. Follow CBC, CMP, lipase, and magnesium level. Gastroenterology consult. Monitor hemodynamic status and electrolytes. Detox protocol. Further recommendations to follow.      Dictated By Shashank Silva MD  d: 12/19/2023 17:33:56  t: 12/19/2023 17:44:25  Job [id number]  GL/

## 2023-12-20 NOTE — DISCHARGE INSTRUCTIONS
Referrals for substance abuse treatment are listed below. Please call one of the following facilities to schedule an intake appointment for continued treatment.      [facility]  [address] [facility], [facility] [address]  [phone]    [facility]  [address], [address]  An Isaiah Myronthomas 10 ([facility])  [address] [place] [address]  [phone]   100

## 2023-12-20 NOTE — ED QUICK NOTES
Orders for admission, patient is aware of plan and ready to go upstairs. Any questions, please call ED RN Charles Love at extension [phone].      Patient Covid vaccination status: Partially vaccinated     COVID Test Ordered in ED: None    COVID Suspicion at Admission: N/A    Running Infusions:      Mental Status/LOC at time of transport: A/Ox4    Other pertinent information:   CIWA score: N/A   NIH score:  N/A

## 2023-12-20 NOTE — PLAN OF CARE
Patient admitted from ER. VS stable. Not in distress. Complained of pain and pain medication given. IV fluids infusing. Denies Nausea, vomiting, SOB and chest pain. Head to toe assessment done. Call light in place. Safety precaution maintained. All needs was attended. Will continue to monitor patient.   Problem: Patient Centered Care  Goal: Patient preferences are identified and integrated in the patient's plan of care  Description: Interventions:  - Provide timely, complete, and accurate information to patient/family  - Incorporate patient and family knowledge, values, beliefs, and cultural backgrounds into the planning and delivery of care  - Encourage patient/family to participate in care and decision-making at the level they choose  - Honor patient and family perspectives and choices  Outcome: Progressing     Problem: PAIN - ADULT  Goal: Verbalizes/displays adequate comfort level or patient's stated pain goal  Description: INTERVENTIONS:  - Encourage pt to monitor pain and request assistance  - Assess pain using appropriate pain scale  - Administer analgesics based on type and severity of pain and evaluate response  - Implement non-pharmacological measures as appropriate and evaluate response  - Consider cultural and social influences on pain and pain management  - Manage/alleviate anxiety  - Utilize distraction and/or relaxation techniques  - Monitor for opioid side effects  - Notify MD/LIP if interventions unsuccessful or patient reports new pain  - Anticipate increased pain with activity and pre-medicate as appropriate  Outcome: Progressing     Problem: GASTROINTESTINAL - ADULT  Goal: Minimal or absence of nausea and vomiting  Description: INTERVENTIONS:  - Maintain adequate hydration with IV or PO as ordered and tolerated  - Nasogastric tube to low intermittent suction as ordered  - Evaluate effectiveness of ordered antiemetic medications  - Provide nonpharmacologic comfort measures as appropriate  - Advance diet as tolerated, if ordered  - Obtain nutritional consult as needed  - Evaluate fluid balance  Outcome: Progressing

## 2023-12-21 VITALS
SYSTOLIC BLOOD PRESSURE: 132 MMHG | HEIGHT: 68 IN | OXYGEN SATURATION: 98 % | HEART RATE: 108 BPM | TEMPERATURE: 99 F | DIASTOLIC BLOOD PRESSURE: 85 MMHG | RESPIRATION RATE: 18 BRPM | WEIGHT: 128.13 LBS | BODY MASS INDEX: 19.42 KG/M2

## 2023-12-21 PROBLEM — K85.90 ACUTE PANCREATITIS (HCC): Status: RESOLVED | Noted: 2019-01-15 | Resolved: 2023-12-21

## 2023-12-21 PROBLEM — K85.90 ACUTE PANCREATITIS, UNSPECIFIED COMPLICATION STATUS, UNSPECIFIED PANCREATITIS TYPE (HCC): Status: RESOLVED | Noted: 2019-01-15 | Resolved: 2023-12-21

## 2023-12-21 PROBLEM — K85.90 ACUTE PANCREATITIS: Status: RESOLVED | Noted: 2019-01-15 | Resolved: 2023-12-21

## 2023-12-21 PROBLEM — R10.9 ABDOMINAL PAIN, ACUTE: Status: RESOLVED | Noted: 2023-12-19 | Resolved: 2023-12-21

## 2023-12-21 PROBLEM — K85.90 ACUTE PANCREATITIS, UNSPECIFIED COMPLICATION STATUS, UNSPECIFIED PANCREATITIS TYPE: Status: RESOLVED | Noted: 2019-01-15 | Resolved: 2023-12-21

## 2023-12-21 LAB
ANION GAP SERPL CALC-SCNC: 11 MMOL/L (ref 0–18)
BASOPHILS # BLD AUTO: 0.05 X10(3) UL (ref 0–0.2)
BASOPHILS NFR BLD AUTO: 0.8 %
BUN BLD-MCNC: <5 MG/DL (ref 9–23)
CALCIUM BLD-MCNC: 7.9 MG/DL (ref 8.7–10.4)
CHLORIDE SERPL-SCNC: 106 MMOL/L (ref 98–112)
CO2 SERPL-SCNC: 25 MMOL/L (ref 21–32)
CREAT BLD-MCNC: 0.53 MG/DL
DEPRECATED RDW RBC AUTO: 42.5 FL (ref 35.1–46.3)
EGFRCR SERPLBLD CKD-EPI 2021: 124 ML/MIN/1.73M2 (ref 60–?)
EOSINOPHIL # BLD AUTO: 0.4 X10(3) UL (ref 0–0.7)
EOSINOPHIL NFR BLD AUTO: 6.6 %
ERYTHROCYTE [DISTWIDTH] IN BLOOD BY AUTOMATED COUNT: 11.7 % (ref 11–15)
GLUCOSE BLD-MCNC: 84 MG/DL (ref 70–99)
HCT VFR BLD AUTO: 34.5 %
HGB BLD-MCNC: 12.1 G/DL
IMM GRANULOCYTES # BLD AUTO: 0.03 X10(3) UL (ref 0–1)
IMM GRANULOCYTES NFR BLD: 0.5 %
LYMPHOCYTES # BLD AUTO: 1.68 X10(3) UL (ref 1–4)
LYMPHOCYTES NFR BLD AUTO: 27.7 %
MAGNESIUM SERPL-MCNC: 1.8 MG/DL (ref 1.6–2.6)
MCH RBC QN AUTO: 34.6 PG (ref 26–34)
MCHC RBC AUTO-ENTMCNC: 35.1 G/DL (ref 31–37)
MCV RBC AUTO: 98.6 FL
MONOCYTES # BLD AUTO: 0.47 X10(3) UL (ref 0.1–1)
MONOCYTES NFR BLD AUTO: 7.7 %
NEUTROPHILS # BLD AUTO: 3.44 X10 (3) UL (ref 1.5–7.7)
NEUTROPHILS # BLD AUTO: 3.44 X10(3) UL (ref 1.5–7.7)
NEUTROPHILS NFR BLD AUTO: 56.7 %
PLATELET # BLD AUTO: 268 10(3)UL (ref 150–450)
POTASSIUM SERPL-SCNC: 3.4 MMOL/L (ref 3.5–5.1)
POTASSIUM SERPL-SCNC: 3.4 MMOL/L (ref 3.5–5.1)
RBC # BLD AUTO: 3.5 X10(6)UL
SODIUM SERPL-SCNC: 142 MMOL/L (ref 136–145)
WBC # BLD AUTO: 6.1 X10(3) UL (ref 4–11)

## 2023-12-21 PROCEDURE — 99239 HOSP IP/OBS DSCHRG MGMT >30: CPT | Performed by: INTERNAL MEDICINE

## 2023-12-21 RX ORDER — TRAMADOL HYDROCHLORIDE 50 MG/1
50 TABLET ORAL EVERY 6 HOURS PRN
Qty: 30 TABLET | Refills: 0 | Status: SHIPPED | OUTPATIENT
Start: 2023-12-21

## 2023-12-21 RX ORDER — POTASSIUM CHLORIDE 20 MEQ/1
40 TABLET, EXTENDED RELEASE ORAL ONCE
Status: COMPLETED | OUTPATIENT
Start: 2023-12-21 | End: 2023-12-21

## 2023-12-21 RX ORDER — MAGNESIUM OXIDE 400 MG/1
400 TABLET ORAL ONCE
Status: COMPLETED | OUTPATIENT
Start: 2023-12-21 | End: 2023-12-21

## 2023-12-21 RX ORDER — ACETAMINOPHEN 500 MG
500 TABLET ORAL EVERY 4 HOURS PRN
Status: SHIPPED | COMMUNITY
Start: 2023-12-21

## 2023-12-21 NOTE — PLAN OF CARE
Problem: Patient Centered Care  Goal: Patient preferences are identified and integrated in the patient's plan of care  Description: Interventions:  - What would you like us to know as we care for you?   - Provide timely, complete, and accurate information to patient/family  - Incorporate patient and family knowledge, values, beliefs, and cultural backgrounds into the planning and delivery of care  - Encourage patient/family to participate in care and decision-making at the level they choose  - Honor patient and family perspectives and choices  Outcome: Progressing     Problem: PAIN - ADULT  Goal: Verbalizes/displays adequate comfort level or patient's stated pain goal  Description: INTERVENTIONS:  - Encourage pt to monitor pain and request assistance  - Assess pain using appropriate pain scale  - Administer analgesics based on type and severity of pain and evaluate response  - Implement non-pharmacological measures as appropriate and evaluate response  - Consider cultural and social influences on pain and pain management  - Manage/alleviate anxiety  - Utilize distraction and/or relaxation techniques  - Monitor for opioid side effects  - Notify MD/LIP if interventions unsuccessful or patient reports new pain  - Anticipate increased pain with activity and pre-medicate as appropriate  Outcome: Progressing     Problem: GASTROINTESTINAL - ADULT  Goal: Minimal or absence of nausea and vomiting  Description: INTERVENTIONS:  - Maintain adequate hydration with IV or PO as ordered and tolerated  - Nasogastric tube to low intermittent suction as ordered  - Evaluate effectiveness of ordered antiemetic medications  - Provide nonpharmacologic comfort measures as appropriate  - Advance diet as tolerated, if ordered  - Obtain nutritional consult as needed  - Evaluate fluid balance  Outcome: Progressing  Goal: Maintains or returns to baseline bowel function  Description: INTERVENTIONS:  - Assess bowel function  - Maintain adequate hydration with IV or PO as ordered and tolerated  - Evaluate effectiveness of GI medications  - Encourage mobilization and activity  - Obtain nutritional consult as needed  - Establish a toileting routine/schedule  - Consider collaborating with pharmacy to review patient's medication profile  Outcome: Progressing  Goal: Maintains adequate nutritional intake (undernourished)  Description: INTERVENTIONS:  - Monitor percentage of each meal consumed  - Identify factors contributing to decreased intake, treat as appropriate  - Assist with meals as needed  - Monitor I&O, WT and lab values  - Obtain nutritional consult as needed  - Optimize oral hygiene and moisture  - Encourage food from home; allow for food preferences  - Enhance eating environment  Outcome: Progressing  Goal: Achieves appropriate nutritional intake (bariatric)  Description: INTERVENTIONS:  - Monitor for over-consumption  - Identify factors contributing to increased intake, treat as appropriate  - Monitor I&O, WT and lab values  - Obtain nutritional consult as needed  - Evaluate psychosocial factors contributing to over-consumption  Outcome: Progressing     Patient is alert and oriented X4. CIWA scores low during shift. IVF running. Pt started on clears. Denying pain at this time. Pain assessed and treated PRN. Bed low, locked, and all safety measures in place. Frequent rounding, call light within reach.

## 2023-12-21 NOTE — PLAN OF CARE
Patient Alert and oriented. VS stable. Not in distress. Denies any pain. IV fluids infusing. Denies Nausea, vomiting, SOB and chest pain. Call light in place. Safety precaution maintained. All needs was attended. Will continue to monitor patient.   Problem: Patient Centered Care  Goal: Patient preferences are identified and integrated in the patient's plan of care  Description: Interventions:  - Provide timely, complete, and accurate information to patient/family  - Incorporate patient and family knowledge, values, beliefs, and cultural backgrounds into the planning and delivery of care  - Encourage patient/family to participate in care and decision-making at the level they choose  - Honor patient and family perspectives and choices  Outcome: Progressing     Problem: PAIN - ADULT  Goal: Verbalizes/displays adequate comfort level or patient's stated pain goal  Description: INTERVENTIONS:  - Encourage pt to monitor pain and request assistance  - Assess pain using appropriate pain scale  - Administer analgesics based on type and severity of pain and evaluate response  - Implement non-pharmacological measures as appropriate and evaluate response  - Consider cultural and social influences on pain and pain management  - Manage/alleviate anxiety  - Utilize distraction and/or relaxation techniques  - Monitor for opioid side effects  - Notify MD/LIP if interventions unsuccessful or patient reports new pain  - Anticipate increased pain with activity and pre-medicate as appropriate  Outcome: Progressing     Problem: GASTROINTESTINAL - ADULT  Goal: Minimal or absence of nausea and vomiting  Description: INTERVENTIONS:  - Maintain adequate hydration with IV or PO as ordered and tolerated  - Nasogastric tube to low intermittent suction as ordered  - Evaluate effectiveness of ordered antiemetic medications  - Provide nonpharmacologic comfort measures as appropriate  - Advance diet as tolerated, if ordered  - Obtain nutritional consult as needed  - Evaluate fluid balance  Outcome: Progressing

## 2023-12-21 NOTE — PLAN OF CARE
Mariama Panda had no acute changes during shift. Call light placed within reach at all times with bed locked in lowest position and bed alarm off as patient tolerated ambulation well by himself. Patient called appropriately and was rounded on frequently. Patient to discharge home later today. This RN removed patient's IV, administered discharge teaching and gave patient discharge paperwork. Patient was stable upon discharge and denied escort down to exit. Mariama Panda was stable upon discharge. Problem: Patient Centered Care  Goal: Patient preferences are identified and integrated in the patient's plan of care  Description: Interventions:  - What would you like us to know as we care for you?  From home alone  - Provide timely, complete, and accurate information to patient/family  - Incorporate patient and family knowledge, values, beliefs, and cultural backgrounds into the planning and delivery of care  - Encourage patient/family to participate in care and decision-making at the level they choose  - Honor patient and family perspectives and choices  Outcome: Progressing     Problem: PAIN - ADULT  Goal: Verbalizes/displays adequate comfort level or patient's stated pain goal  Description: INTERVENTIONS:  - Encourage pt to monitor pain and request assistance  - Assess pain using appropriate pain scale  - Administer analgesics based on type and severity of pain and evaluate response  - Implement non-pharmacological measures as appropriate and evaluate response  - Consider cultural and social influences on pain and pain management  - Manage/alleviate anxiety  - Utilize distraction and/or relaxation techniques  - Monitor for opioid side effects  - Notify MD/LIP if interventions unsuccessful or patient reports new pain  - Anticipate increased pain with activity and pre-medicate as appropriate  Outcome: Progressing     Problem: GASTROINTESTINAL - ADULT  Goal: Minimal or absence of nausea and vomiting  Description: INTERVENTIONS:  - Maintain adequate hydration with IV or PO as ordered and tolerated  - Nasogastric tube to low intermittent suction as ordered  - Evaluate effectiveness of ordered antiemetic medications  - Provide nonpharmacologic comfort measures as appropriate  - Advance diet as tolerated, if ordered  - Obtain nutritional consult as needed  - Evaluate fluid balance  Outcome: Progressing  Goal: Maintains or returns to baseline bowel function  Description: INTERVENTIONS:  - Assess bowel function  - Maintain adequate hydration with IV or PO as ordered and tolerated  - Evaluate effectiveness of GI medications  - Encourage mobilization and activity  - Obtain nutritional consult as needed  - Establish a toileting routine/schedule  - Consider collaborating with pharmacy to review patient's medication profile  Outcome: Progressing  Goal: Maintains adequate nutritional intake (undernourished)  Description: INTERVENTIONS:  - Monitor percentage of each meal consumed  - Identify factors contributing to decreased intake, treat as appropriate  - Assist with meals as needed  - Monitor I&O, WT and lab values  - Obtain nutritional consult as needed  - Optimize oral hygiene and moisture  - Encourage food from home; allow for food preferences  - Enhance eating environment  Outcome: Progressing  Goal: Achieves appropriate nutritional intake (bariatric)  Description: INTERVENTIONS:  - Monitor for over-consumption  - Identify factors contributing to increased intake, treat as appropriate  - Monitor I&O, WT and lab values  - Obtain nutritional consult as needed  - Evaluate psychosocial factors contributing to over-consumption  Outcome: Progressing

## 2023-12-28 ENCOUNTER — HOSPITAL ENCOUNTER (EMERGENCY)
Facility: HOSPITAL | Age: 48
Discharge: HOME | End: 2023-12-28
Attending: EMERGENCY MEDICINE
Payer: COMMERCIAL

## 2023-12-28 VITALS
HEIGHT: 68 IN | OXYGEN SATURATION: 99 % | DIASTOLIC BLOOD PRESSURE: 97 MMHG | RESPIRATION RATE: 18 BRPM | BODY MASS INDEX: 19.7 KG/M2 | HEART RATE: 120 BPM | SYSTOLIC BLOOD PRESSURE: 154 MMHG | WEIGHT: 130 LBS | TEMPERATURE: 98 F

## 2023-12-28 DIAGNOSIS — K86.1 CHRONIC PANCREATITIS, UNSPECIFIED PANCREATITIS TYPE (HCC): Primary | ICD-10-CM

## 2023-12-28 LAB
ALBUMIN SERPL-MCNC: 4.7 G/DL (ref 3.2–4.8)
ALBUMIN/GLOB SERPL: 1.7 {RATIO} (ref 1–2)
ALP LIVER SERPL-CCNC: 146 U/L
ALT SERPL-CCNC: 26 U/L
ANION GAP SERPL CALC-SCNC: 7 MMOL/L (ref 0–18)
AST SERPL-CCNC: 19 U/L (ref ?–34)
BASOPHILS # BLD AUTO: 0.03 X10(3) UL (ref 0–0.2)
BASOPHILS NFR BLD AUTO: 0.2 %
BILIRUB SERPL-MCNC: 0.3 MG/DL (ref 0.3–1.2)
BUN BLD-MCNC: 9 MG/DL (ref 9–23)
BUN/CREAT SERPL: 11.4 (ref 10–20)
CALCIUM BLD-MCNC: 9.8 MG/DL (ref 8.7–10.4)
CHLORIDE SERPL-SCNC: 103 MMOL/L (ref 98–112)
CO2 SERPL-SCNC: 29 MMOL/L (ref 21–32)
CREAT BLD-MCNC: 0.79 MG/DL
DEPRECATED RDW RBC AUTO: 44.4 FL (ref 35.1–46.3)
EGFRCR SERPLBLD CKD-EPI 2021: 110 ML/MIN/1.73M2 (ref 60–?)
EOSINOPHIL # BLD AUTO: 0.02 X10(3) UL (ref 0–0.7)
EOSINOPHIL NFR BLD AUTO: 0.1 %
ERYTHROCYTE [DISTWIDTH] IN BLOOD BY AUTOMATED COUNT: 11.9 % (ref 11–15)
GLOBULIN PLAS-MCNC: 2.8 G/DL (ref 2.8–4.4)
GLUCOSE BLD-MCNC: 123 MG/DL (ref 70–99)
HCT VFR BLD AUTO: 47 %
HGB BLD-MCNC: 15.6 G/DL
IMM GRANULOCYTES # BLD AUTO: 0.06 X10(3) UL (ref 0–1)
IMM GRANULOCYTES NFR BLD: 0.4 %
LIPASE SERPL-CCNC: 605 U/L (ref 13–75)
LYMPHOCYTES # BLD AUTO: 0.9 X10(3) UL (ref 1–4)
LYMPHOCYTES NFR BLD AUTO: 6.3 %
MCH RBC QN AUTO: 33.4 PG (ref 26–34)
MCHC RBC AUTO-ENTMCNC: 33.2 G/DL (ref 31–37)
MCV RBC AUTO: 100.6 FL
MONOCYTES # BLD AUTO: 0.52 X10(3) UL (ref 0.1–1)
MONOCYTES NFR BLD AUTO: 3.6 %
NEUTROPHILS # BLD AUTO: 12.75 X10 (3) UL (ref 1.5–7.7)
NEUTROPHILS # BLD AUTO: 12.75 X10(3) UL (ref 1.5–7.7)
NEUTROPHILS NFR BLD AUTO: 89.4 %
OSMOLALITY SERPL CALC.SUM OF ELEC: 288 MOSM/KG (ref 275–295)
PLATELET # BLD AUTO: 476 10(3)UL (ref 150–450)
POTASSIUM SERPL-SCNC: 4.4 MMOL/L (ref 3.5–5.1)
PROT SERPL-MCNC: 7.5 G/DL (ref 5.7–8.2)
RBC # BLD AUTO: 4.67 X10(6)UL
SODIUM SERPL-SCNC: 139 MMOL/L (ref 136–145)
WBC # BLD AUTO: 14.3 X10(3) UL (ref 4–11)

## 2023-12-28 PROCEDURE — 83690 ASSAY OF LIPASE: CPT | Performed by: EMERGENCY MEDICINE

## 2023-12-28 PROCEDURE — 80053 COMPREHEN METABOLIC PANEL: CPT

## 2023-12-28 PROCEDURE — S0119 ONDANSETRON 4 MG: HCPCS | Performed by: EMERGENCY MEDICINE

## 2023-12-28 PROCEDURE — 83690 ASSAY OF LIPASE: CPT

## 2023-12-28 PROCEDURE — 36415 COLL VENOUS BLD VENIPUNCTURE: CPT

## 2023-12-28 PROCEDURE — 96372 THER/PROPH/DIAG INJ SC/IM: CPT

## 2023-12-28 PROCEDURE — 80053 COMPREHEN METABOLIC PANEL: CPT | Performed by: EMERGENCY MEDICINE

## 2023-12-28 PROCEDURE — 99284 EMERGENCY DEPT VISIT MOD MDM: CPT

## 2023-12-28 PROCEDURE — 85025 COMPLETE CBC W/AUTO DIFF WBC: CPT | Performed by: EMERGENCY MEDICINE

## 2023-12-28 PROCEDURE — 85025 COMPLETE CBC W/AUTO DIFF WBC: CPT

## 2023-12-28 PROCEDURE — 99283 EMERGENCY DEPT VISIT LOW MDM: CPT

## 2023-12-28 RX ORDER — HYDROCODONE BITARTRATE AND ACETAMINOPHEN 10; 325 MG/1; MG/1
1 TABLET ORAL EVERY 6 HOURS PRN
Qty: 15 TABLET | Refills: 0 | Status: SHIPPED | OUTPATIENT
Start: 2023-12-28

## 2023-12-28 RX ORDER — ONDANSETRON 4 MG/1
4 TABLET, ORALLY DISINTEGRATING ORAL ONCE
Status: COMPLETED | OUTPATIENT
Start: 2023-12-28 | End: 2023-12-28

## 2023-12-28 RX ORDER — MORPHINE SULFATE 4 MG/ML
12 INJECTION, SOLUTION INTRAMUSCULAR; INTRAVENOUS ONCE
Status: COMPLETED | OUTPATIENT
Start: 2023-12-28 | End: 2023-12-28

## 2023-12-28 RX ORDER — ONDANSETRON 4 MG/1
4 TABLET, ORALLY DISINTEGRATING ORAL ONCE
Status: DISCONTINUED | OUTPATIENT
Start: 2023-12-28 | End: 2023-12-28

## 2023-12-28 NOTE — ED INITIAL ASSESSMENT (HPI)
Patient arrives ambulatory through triage with complaints of abdominal pain since this afternoon. Patient admitted for pancreatitis 12/19--12/21.

## 2024-02-17 ENCOUNTER — HOSPITAL ENCOUNTER (INPATIENT)
Facility: HOSPITAL | Age: 49
LOS: 2 days | Discharge: HOME | End: 2024-02-20
Attending: EMERGENCY MEDICINE | Admitting: INTERNAL MEDICINE
Payer: COMMERCIAL

## 2024-02-17 ENCOUNTER — APPOINTMENT (OUTPATIENT)
Dept: CT IMAGING | Facility: HOSPITAL | Age: 49
End: 2024-02-17
Attending: EMERGENCY MEDICINE
Payer: COMMERCIAL

## 2024-02-17 ENCOUNTER — APPOINTMENT (OUTPATIENT)
Dept: GENERAL RADIOLOGY | Facility: HOSPITAL | Age: 49
End: 2024-02-17
Attending: SURGERY
Payer: COMMERCIAL

## 2024-02-17 DIAGNOSIS — K85.90 ACUTE PANCREATITIS, UNSPECIFIED COMPLICATION STATUS, UNSPECIFIED PANCREATITIS TYPE (HCC): Primary | ICD-10-CM

## 2024-02-17 DIAGNOSIS — E87.6 HYPOKALEMIA: ICD-10-CM

## 2024-02-17 PROBLEM — R73.9 HYPERGLYCEMIA: Status: ACTIVE | Noted: 2024-02-17

## 2024-02-17 LAB
ALBUMIN SERPL-MCNC: 4.3 G/DL (ref 3.2–4.8)
ALBUMIN/GLOB SERPL: 1.6 {RATIO} (ref 1–2)
ALP LIVER SERPL-CCNC: 131 U/L
ALT SERPL-CCNC: 33 U/L
ANION GAP SERPL CALC-SCNC: 4 MMOL/L (ref 0–18)
AST SERPL-CCNC: 31 U/L (ref ?–34)
BASOPHILS # BLD AUTO: 0.05 X10(3) UL (ref 0–0.2)
BASOPHILS NFR BLD AUTO: 0.5 %
BILIRUB SERPL-MCNC: 0.5 MG/DL (ref 0.3–1.2)
BUN BLD-MCNC: 10 MG/DL (ref 9–23)
BUN/CREAT SERPL: 13 (ref 10–20)
CALCIUM BLD-MCNC: 9.4 MG/DL (ref 8.7–10.4)
CHLORIDE SERPL-SCNC: 104 MMOL/L (ref 98–112)
CO2 SERPL-SCNC: 29 MMOL/L (ref 21–32)
CREAT BLD-MCNC: 0.77 MG/DL
DEPRECATED RDW RBC AUTO: 39.5 FL (ref 35.1–46.3)
EGFRCR SERPLBLD CKD-EPI 2021: 111 ML/MIN/1.73M2 (ref 60–?)
EOSINOPHIL # BLD AUTO: 0.11 X10(3) UL (ref 0–0.7)
EOSINOPHIL NFR BLD AUTO: 1 %
ERYTHROCYTE [DISTWIDTH] IN BLOOD BY AUTOMATED COUNT: 11.6 % (ref 11–15)
GLOBULIN PLAS-MCNC: 2.7 G/DL (ref 2.8–4.4)
GLUCOSE BLD-MCNC: 116 MG/DL (ref 70–99)
HCT VFR BLD AUTO: 45.4 %
HGB BLD-MCNC: 15.3 G/DL
IMM GRANULOCYTES # BLD AUTO: 0.03 X10(3) UL (ref 0–1)
IMM GRANULOCYTES NFR BLD: 0.3 %
LIPASE SERPL-CCNC: 630 U/L (ref 13–75)
LYMPHOCYTES # BLD AUTO: 1.62 X10(3) UL (ref 1–4)
LYMPHOCYTES NFR BLD AUTO: 14.6 %
MCH RBC QN AUTO: 31.2 PG (ref 26–34)
MCHC RBC AUTO-ENTMCNC: 33.7 G/DL (ref 31–37)
MCV RBC AUTO: 92.7 FL
MONOCYTES # BLD AUTO: 0.78 X10(3) UL (ref 0.1–1)
MONOCYTES NFR BLD AUTO: 7 %
NEUTROPHILS # BLD AUTO: 8.52 X10 (3) UL (ref 1.5–7.7)
NEUTROPHILS # BLD AUTO: 8.52 X10(3) UL (ref 1.5–7.7)
NEUTROPHILS NFR BLD AUTO: 76.6 %
OSMOLALITY SERPL CALC.SUM OF ELEC: 284 MOSM/KG (ref 275–295)
PLATELET # BLD AUTO: 473 10(3)UL (ref 150–450)
POTASSIUM SERPL-SCNC: 3.9 MMOL/L (ref 3.5–5.1)
PROT SERPL-MCNC: 7 G/DL (ref 5.7–8.2)
RBC # BLD AUTO: 4.9 X10(6)UL
SODIUM SERPL-SCNC: 137 MMOL/L (ref 136–145)
WBC # BLD AUTO: 11.1 X10(3) UL (ref 4–11)

## 2024-02-17 PROCEDURE — 99254 IP/OBS CNSLTJ NEW/EST MOD 60: CPT | Performed by: SURGERY

## 2024-02-17 PROCEDURE — 74177 CT ABD & PELVIS W/CONTRAST: CPT | Performed by: EMERGENCY MEDICINE

## 2024-02-17 PROCEDURE — 71046 X-RAY EXAM CHEST 2 VIEWS: CPT | Performed by: SURGERY

## 2024-02-17 RX ORDER — ONDANSETRON 2 MG/ML
4 INJECTION INTRAMUSCULAR; INTRAVENOUS ONCE
Status: COMPLETED | OUTPATIENT
Start: 2024-02-17 | End: 2024-02-17

## 2024-02-17 RX ORDER — FAMOTIDINE 10 MG/ML
20 INJECTION, SOLUTION INTRAVENOUS ONCE
Status: COMPLETED | OUTPATIENT
Start: 2024-02-17 | End: 2024-02-17

## 2024-02-17 RX ORDER — MORPHINE SULFATE 2 MG/ML
4 INJECTION, SOLUTION INTRAMUSCULAR; INTRAVENOUS EVERY 6 HOURS PRN
Status: DISCONTINUED | OUTPATIENT
Start: 2024-02-17 | End: 2024-02-18

## 2024-02-17 RX ORDER — HEPARIN SODIUM 5000 [USP'U]/ML
5000 INJECTION, SOLUTION INTRAVENOUS; SUBCUTANEOUS EVERY 12 HOURS SCHEDULED
Status: DISCONTINUED | OUTPATIENT
Start: 2024-02-17 | End: 2024-02-20

## 2024-02-17 RX ORDER — ACETAMINOPHEN 325 MG/1
650 TABLET ORAL EVERY 6 HOURS PRN
Status: DISCONTINUED | OUTPATIENT
Start: 2024-02-17 | End: 2024-02-20

## 2024-02-17 RX ORDER — DEXTROSE AND SODIUM CHLORIDE 5; .45 G/100ML; G/100ML
INJECTION, SOLUTION INTRAVENOUS CONTINUOUS
Status: DISCONTINUED | OUTPATIENT
Start: 2024-02-17 | End: 2024-02-20

## 2024-02-17 RX ORDER — MORPHINE SULFATE 4 MG/ML
4 INJECTION, SOLUTION INTRAMUSCULAR; INTRAVENOUS ONCE
Status: COMPLETED | OUTPATIENT
Start: 2024-02-17 | End: 2024-02-17

## 2024-02-17 RX ORDER — MORPHINE SULFATE 2 MG/ML
1 INJECTION, SOLUTION INTRAMUSCULAR; INTRAVENOUS EVERY 6 HOURS PRN
Status: DISCONTINUED | OUTPATIENT
Start: 2024-02-17 | End: 2024-02-17

## 2024-02-17 RX ORDER — ONDANSETRON 2 MG/ML
4 INJECTION INTRAMUSCULAR; INTRAVENOUS EVERY 6 HOURS PRN
Status: DISCONTINUED | OUTPATIENT
Start: 2024-02-17 | End: 2024-02-20

## 2024-02-17 NOTE — ED PROVIDER NOTES
Patient Seen in: [facility] Emergency Department      History     Chief Complaint   Patient presents with    Abdomen/Flank Pain     Stated Complaint: Abd pain    Subjective:   48-year-old male with history of recurrent pancreatitis and hx of  gastrojejunostomy, antecolic enteroenterostomy in   because of his pancreatitis about 9 months ago here with abdominal pain intermittently for the last 5 days.  The first few days he was vomiting a lot but no vomiting in the last few days.  He has been able to eat in the last 24 hours but again today this morning the pain was quite severe and increased and located in the epigastric and right upper abdomen that radiates to the back feeling like his pancreatitis.  He is nauseous but no vomiting today.  No fever.  No alcohol in at least 9 months.  No fever or chills.  No cough or congestion            Objective:   Past Medical History:   Diagnosis Date    Anesthesia complication     cramps;nausea after anesthesia when in 3rd grade    Anxiety state     Duodenitis     Pancreatitis     PONV (postoperative nausea and vomiting)               Past Surgical History:   Procedure Laterality Date    BIOPSY/REMOVAL, LYMPH NODE(S)      R neck    GASTROSTOMY/JEJUNOSTOMY TUBE N/A                 Social History     Socioeconomic History    Marital status:    Tobacco Use    Smoking status: Former     Packs/day: 0.50     Years: 25.00     Additional pack years: 0.00     Total pack years: 12.50     Types: Cigarettes     Quit date: 2023     Years since quittin.7     Passive exposure: Never    Smokeless tobacco: Never    Tobacco comments:     0.5-1 PPD   Vaping Use    Vaping Use: Never used   Substance and Sexual Activity    Alcohol use: Not Currently     Comment: not since 2023    Drug use: Not Currently     Types: Cannabis     Comment: rare     Social Determinants of Health     Food Insecurity: No Food Insecurity (2023)    Food Insecurity     Food  Insecurity: Never true   Transportation Needs: No Transportation Needs (12/20/2023)    Transportation Needs     Lack of Transportation: No   Housing Stability: Low Risk  (12/20/2023)    Housing Stability     Housing Instability: No              Review of Systems    Positive for stated complaint: Abd pain  Other systems are as noted in HPI.  Constitutional and vital signs reviewed.      All other systems reviewed and negative except as noted above.    Physical Exam     ED Triage Vitals [02/17/24 1228]   BP (!) 154/111   Pulse 105   Resp 18   Temp 98.7 °F (37.1 °C)   Temp src Oral   SpO2 100 %   O2 Device None (Room air)       Current:BP (!) 141/98   Pulse 92   Temp 98.7 °F (37.1 °C) (Oral)   Resp 18   Ht 172.7 cm (5' 8\")   Wt 55.3 kg   SpO2 100%   BMI 18.55 kg/m²         Physical Exam  HENT:      Head: Normocephalic and atraumatic.      Right Ear: External ear normal.      Left Ear: External ear normal.      Nose: Nose normal.      Mouth/Throat:      Mouth: Mucous membranes are moist.   Eyes:      Extraocular Movements: Extraocular movements intact.      Pupils: Pupils are equal, round, and reactive to light.   Cardiovascular:      Rate and Rhythm: Normal rate and regular rhythm.      Pulses: Normal pulses.   Pulmonary:      Effort: Pulmonary effort is normal.      Breath sounds: Normal breath sounds.   Abdominal:      Palpations: Abdomen is soft.      Tenderness: There is abdominal tenderness in the right upper quadrant and epigastric area. There is no guarding or rebound.   Musculoskeletal:         General: No swelling. Normal range of motion.      Cervical back: Normal range of motion and neck supple.   Lymphadenopathy:      Cervical: No cervical adenopathy.   Skin:     General: Skin is warm.      Capillary Refill: Capillary refill takes less than 2 seconds.   Neurological:      General: No focal deficit present.      Mental Status: He is alert.      Sensory: No sensory deficit.      Motor: No weakness.                ED Course     Labs Reviewed   COMP METABOLIC PANEL (14) - Abnormal; Notable for the following components:       Result Value    Glucose 116 (*)     Alkaline Phosphatase 131 (*)     Globulin  2.7 (*)     All other components within normal limits   LIPASE - Abnormal; Notable for the following components:    Lipase 630 (*)     All other components within normal limits   CBC W/ DIFFERENTIAL - Abnormal; Notable for the following components:    WBC 11.1 (*)     .0 (*)     Neutrophil Absolute Prelim 8.52 (*)     Neutrophil Absolute 8.52 (*)     All other components within normal limits   CBC WITH DIFFERENTIAL WITH PLATELET    Narrative:     The following orders were created for panel order CBC With Differential With Platelet.  Procedure                               Abnormality         Status                     ---------                               -----------         ------                     CBC W/ DIFFERENTIAL[[id number]]          Abnormal            Final result                 Please view results for these tests on the individual orders.   RAINBOW DRAW BLUE          ED Course as of 02/17/24 1731  ------------------------------------------------------------  Time: 02/17 1733  Comment: Labs independently interpreted by me.  CBC shows mild leukocytosis and thrombocytosis.  CMP fairly normal but the alk phos is little high.  Lipase high at 630.  CT abdomen pelvis independently interpreted by me appears to have an keratitis of the head and possibly necrotizing pancreatitis.  This was discussed with surgery, GI Dr. Berg and admitting Dr. France.  Patient getting fluids and fentanyl and Zofran here.              [facility]      CT ABDOMEN+PELVIS BARIATRIC PROTOCOL (CONTRAST ONLY) (CPT=74177)    Result Date: 2/17/2024  CONCLUSION:  Acute pancreatitis involving the pancreatic head noted.  Slightly heterogeneous enhancement of the pancreatic head suggest necrotizing pancreatitis.  There is surrounding fat  stranding and fluid but no loculated fluid collection.  Scattered pancreatic calcifications are again noted.  Small cystic foci in the pancreatic head measure up to 1.6 cm and may reflect pseudocysts.  Consider short-term follow-up MRCP without and with contrast.  Mild intrahepatic and extrahepatic biliary ductal dilatation suspected be slightly increased since the previous examination.  Suspected caliber change of the distal intrapancreatic portion of the common bile duct which could could suggest stricture formation.  Advise biliary laboratory correlation.  Wall thickening of the 2nd and 3rd portion duodenum with surrounding fat stranding probably reactive.  Mildly prominent mesenteric lymph nodes in the right upper quadrant not significantly changed and may be reactive.  Colonic diverticulosis.  Lesser incidental findings as above.      Dictated by (CST): Tom Simpson MD on 2/17/2024 at 4:51 PM     Finalized by (CST): Tom Simpson MD on 2/17/2024 at 5:03 PM           Admission disposition: 2/17/2024  5:34 PM                                        Medical Decision Making  Patient with history of recurrent pancreatitis and gastric bypass surgery last year here with 5 days of intermittent abdominal pain and vomiting.  Now its mostly pain today.  Will give pain medication IV fluids and Zofran and Pepcid and do imaging and labs.  Will discuss with surgery    Amount and/or Complexity of Data Reviewed  External Data Reviewed: notes.     Details: GI office notes from yesterday reviewed as well as surgery notes from June and the op report.  Patient had : gastrojejunostomy, antecolic enteroenterostomy  Labs: ordered. Decision-making details documented in ED Course.  Radiology: ordered and independent interpretation performed. Decision-making details documented in ED Course.    Risk  Parenteral controlled substances.  Decision regarding hospitalization.  Risk Details: Pancreatitis,  gastrojejunostomy        Disposition and Plan     Clinical Impression:  1. Acute pancreatitis, unspecified complication status, unspecified pancreatitis type         Disposition:  Admit  2/17/2024  5:34 pm    Follow-up:  No follow-up provider specified.        Medications Prescribed:  Current Discharge Medication List                            Hospital Problems       Present on Admission  Date Reviewed: 6/14/2023            ICD-10-CM Noted POA    * (Principal) Acute pancreatitis, unspecified complication status, unspecified pancreatitis type K85.90 2/17/2024 Unknown    Hyperglycemia R73.9 2/17/2024 Yes

## 2024-02-17 NOTE — ED INITIAL ASSESSMENT (HPI)
Pt presents to the ER with c/o RLW pain. Pt has h/o pancreatitis. Today is day 5 of pain and he has run out of Norco and tramadol

## 2024-02-17 NOTE — ED QUICK NOTES
Orders for admission, patient is aware of plan and ready to go upstairs. Any questions, please call ED RN gladis at extension [phone].     Patient Covid vaccination status: Partially vaccinated     COVID Test Ordered in ED: None    COVID Suspicion at Admission: N/A    Running Infusions:  None    Mental Status/LOC at time of transport: Axox4    Other pertinent information:   CIWA score: N/A   NIH score:  N/A

## 2024-02-18 LAB
ANION GAP SERPL CALC-SCNC: 3 MMOL/L (ref 0–18)
BASOPHILS # BLD AUTO: 0.06 X10(3) UL (ref 0–0.2)
BASOPHILS NFR BLD AUTO: 0.9 %
BUN BLD-MCNC: 6 MG/DL (ref 9–23)
BUN/CREAT SERPL: 8 (ref 10–20)
CALCIUM BLD-MCNC: 8.6 MG/DL (ref 8.7–10.4)
CHLORIDE SERPL-SCNC: 107 MMOL/L (ref 98–112)
CO2 SERPL-SCNC: 27 MMOL/L (ref 21–32)
CREAT BLD-MCNC: 0.75 MG/DL
DEPRECATED RDW RBC AUTO: 39.6 FL (ref 35.1–46.3)
EGFRCR SERPLBLD CKD-EPI 2021: 112 ML/MIN/1.73M2 (ref 60–?)
EOSINOPHIL # BLD AUTO: 0.34 X10(3) UL (ref 0–0.7)
EOSINOPHIL NFR BLD AUTO: 4.8 %
ERYTHROCYTE [DISTWIDTH] IN BLOOD BY AUTOMATED COUNT: 11.6 % (ref 11–15)
GLUCOSE BLD-MCNC: 88 MG/DL (ref 70–99)
HCT VFR BLD AUTO: 37.6 %
HGB BLD-MCNC: 12.6 G/DL
IMM GRANULOCYTES # BLD AUTO: 0.02 X10(3) UL (ref 0–1)
IMM GRANULOCYTES NFR BLD: 0.3 %
LYMPHOCYTES # BLD AUTO: 1.88 X10(3) UL (ref 1–4)
LYMPHOCYTES NFR BLD AUTO: 26.8 %
MCH RBC QN AUTO: 31 PG (ref 26–34)
MCHC RBC AUTO-ENTMCNC: 33.5 G/DL (ref 31–37)
MCV RBC AUTO: 92.6 FL
MONOCYTES # BLD AUTO: 0.61 X10(3) UL (ref 0.1–1)
MONOCYTES NFR BLD AUTO: 8.7 %
NEUTROPHILS # BLD AUTO: 4.11 X10 (3) UL (ref 1.5–7.7)
NEUTROPHILS # BLD AUTO: 4.11 X10(3) UL (ref 1.5–7.7)
NEUTROPHILS NFR BLD AUTO: 58.5 %
OSMOLALITY SERPL CALC.SUM OF ELEC: 281 MOSM/KG (ref 275–295)
PLATELET # BLD AUTO: 357 10(3)UL (ref 150–450)
POTASSIUM SERPL-SCNC: 3.4 MMOL/L (ref 3.5–5.1)
RBC # BLD AUTO: 4.06 X10(6)UL
SODIUM SERPL-SCNC: 137 MMOL/L (ref 136–145)
WBC # BLD AUTO: 7 X10(3) UL (ref 4–11)

## 2024-02-18 PROCEDURE — 99232 SBSQ HOSP IP/OBS MODERATE 35: CPT | Performed by: SURGERY

## 2024-02-18 RX ORDER — MORPHINE SULFATE 2 MG/ML
4 INJECTION, SOLUTION INTRAMUSCULAR; INTRAVENOUS EVERY 4 HOURS PRN
Status: DISCONTINUED | OUTPATIENT
Start: 2024-02-18 | End: 2024-02-18

## 2024-02-18 RX ORDER — ACETAMINOPHEN 500 MG
500 TABLET ORAL EVERY 4 HOURS PRN
Status: DISCONTINUED | OUTPATIENT
Start: 2024-02-18 | End: 2024-02-20

## 2024-02-18 RX ORDER — MORPHINE SULFATE 2 MG/ML
4 INJECTION, SOLUTION INTRAMUSCULAR; INTRAVENOUS
Status: DISCONTINUED | OUTPATIENT
Start: 2024-02-18 | End: 2024-02-20

## 2024-02-18 NOTE — PLAN OF CARE
Patient is alert and oriented. Pain in abdomen r/t pancreatitis managed with IV morphine. Frequency updated to every 3 hours. Patient is NPO with ice chips allowed. Denies N/V.  Cxr negative for cardiac process. IV antibiotics continued. IVF infusing at 83 ml/hr. Safety precautions in place.     Problem: Patient Centered Care  Goal: Patient preferences are identified and integrated in the patient's plan of care  Description: Interventions:  - What would you like us to know as we care for you?   - Provide timely, complete, and accurate information to patient/family  - Incorporate patient and family knowledge, values, beliefs, and cultural backgrounds into the planning and delivery of care  - Encourage patient/family to participate in care and decision-making at the level they choose  - Honor patient and family perspectives and choices  Outcome: Progressing     Problem: PAIN - ADULT  Goal: Verbalizes/displays adequate comfort level or patient's stated pain goal  Description: INTERVENTIONS:  - Encourage pt to monitor pain and request assistance  - Assess pain using appropriate pain scale  - Administer analgesics based on type and severity of pain and evaluate response  - Implement non-pharmacological measures as appropriate and evaluate response  - Consider cultural and social influences on pain and pain management  - Manage/alleviate anxiety  - Utilize distraction and/or relaxation techniques  - Monitor for opioid side effects  - Notify MD/LIP if interventions unsuccessful or patient reports new pain  - Anticipate increased pain with activity and pre-medicate as appropriate  Outcome: Not Progressing

## 2024-02-18 NOTE — PLAN OF CARE
Pt admitted from the ED with pancreatitis. Jarrett is A/Ox4, VSS on room air. NPO w/ ice chips, tolerating well. PRN Morphine for pain management. IVF infusing. Unasyn for abx coverage. Chest X-ray completed as ordered. Denies nausea/vomiting. Fall and safety precautions maintained. Bed locked in the lowest position. Call light within reach, frequent rounding done.

## 2024-02-18 NOTE — CONSULTS
[facility]    Report of Consultation    Jarrett Bal Patient Status:  Emergency    1975 MRN [id number]   Location [facility] EMERGENCY DEPARTMENT Attending Naif Contreras MD   Hosp Day # 0 PCP None Pcp     Date of Admission:  2024  Date of Consult: 2024    Reason for Consultation:   Consults    History provided by:pt  HPI:     Chief Complaint   Patient presents with    Abdomen/Flank Pain     HPI  Pt presents with recent N/V, abdominal pain ongoing for 5 days. He has a h/o chronic intermittent pancreatitis.His vomiting resolved and he was able to eat the last 2-3 days, but his upper abdominal pain became worse. The pain radiates to his back. No F/C/J.  He does not drink alcohol. Dr. Garcias performed Exploratory laparotomy, gastric jejunostomy, entero-enterostomy, removal of gastrostomy tube  in 2023 for gastric outlet obstruction.     CT today:    CONCLUSION:     Acute pancreatitis involving the pancreatic head noted.  Slightly heterogeneous enhancement of the pancreatic head suggest necrotizing pancreatitis.  There is surrounding fat stranding and fluid but no loculated fluid collection.  Scattered pancreatic  calcifications are again noted.  Small cystic foci in the pancreatic head measure up to 1.6 cm and may reflect pseudocysts.  Consider short-term follow-up MRCP without and with contrast.     Mild intrahepatic and extrahepatic biliary ductal dilatation suspected be slightly increased since the previous examination.  Suspected caliber change of the distal intrapancreatic portion of the common bile duct which could could suggest stricture  formation.  Advise biliary laboratory correlation.     Wall thickening of the 2nd and 3rd portion duodenum with surrounding fat stranding probably reactive.     Mildly prominent mesenteric lymph nodes in the right upper quadrant not significantly changed and may be reactive.     Colonic diverticulosis.        WBC 11.1, Hb 15.3,  Lipase 630, alk phos 131, Ca 9.4, creat 0.7, alb 4.3.      History     Past Medical History:   Diagnosis Date    Anesthesia complication     cramps;nausea after anesthesia when in 3rd grade    Anxiety state     Duodenitis     Pancreatitis     PONV (postoperative nausea and vomiting)      Past Surgical History:   Procedure Laterality Date    BIOPSY/REMOVAL, LYMPH NODE(S)      R neck    GASTROSTOMY/JEJUNOSTOMY TUBE N/A      Family History   Problem Relation Age of Onset    Hypertension Father     Hypertension Mother     Psychiatric Mother      Social History:  Social History     Socioeconomic History    Marital status:    Tobacco Use    Smoking status: Former     Packs/day: 0.50     Years: 25.00     Additional pack years: 0.00     Total pack years: 12.50     Types: Cigarettes     Quit date: 2023     Years since quittin.7     Passive exposure: Never    Smokeless tobacco: Never    Tobacco comments:     0.5-1 PPD   Vaping Use    Vaping Use: Never used   Substance and Sexual Activity    Alcohol use: Not Currently     Comment: not since 2023    Drug use: Not Currently     Types: Cannabis     Comment: rare     Social Determinants of Health     Food Insecurity: No Food Insecurity (2023)    Food Insecurity     Food Insecurity: Never true   Transportation Needs: No Transportation Needs (2023)    Transportation Needs     Lack of Transportation: No   Housing Stability: Low Risk  (2023)    Housing Stability     Housing Instability: No     Allergies/Medications:   Allergies: No Known Allergies  (Not in a hospital admission)      Review of Systems:   Review of Systems    Review of Systems:   GENERAL: per HPI  SKIN: no ulcerated or worrisome skin lesions  EYES:denies blurred vision or double vision  HEENT: denies new nasal congestion, sinus pain or ST  LUNGS: denies shortness of breath with exertion  CARDIOVASCULAR: denies chest pain on exertion  GI: recent hematemesis, no BRBPR, no worsening  heartburn  : no dysuria, no blood in urine, no difficulty urinating  MUSCULOSKELETAL: no new musculoskeletal complaints  NEURO: no persistent, recurrent  headaches  PSYCHE:no depression or anxiety  HEMATOLOGIC: no hx of blood dyscrasia  ENDOCRINE: no new endocrine problems  ALL/ASTHMA: no new hx of severe allergy or asthma  BACK: normal, no spinal deformity, no CVA tenderness      Physical Exam:   Vital Signs:   height is 5' 8\" (1.727 m) and weight is 122 lb (55.3 kg). His oral temperature is 98.7 °F (37.1 °C). His blood pressure is 134/95 (abnormal) and his pulse is 81. His respiration is 19 and oxygen saturation is 99%.   Physical Exam  Alert, NAD  HEENT wnl, anicteric, PERRL  Neck supple, norm ROM, no JVD  L CTA B  H Reg rate  Abd soft, some T RUQ and epigastric, no rebound tenderness, no guarding, ND, no masses, no hernias, no HSM.  Extr no c/c/e  Skin intact, no jaundice, no rashes, no lesions  Neuro grossly intact, no focal deficits, no tremors  Back no deformity, no CVA tnd.     Results:     Lab Results   Component Value Date    WBC 11.1 (H) 02/17/2024    HGB 15.3 02/17/2024    HCT 45.4 02/17/2024    .0 (H) 02/17/2024    CREATSERUM 0.77 02/17/2024    BUN 10 02/17/2024     02/17/2024    K 3.9 02/17/2024     02/17/2024    CO2 29.0 02/17/2024     (H) 02/17/2024    CA 9.4 02/17/2024    ALB 4.3 02/17/2024    ALKPHO 131 (H) 02/17/2024    BILT 0.5 02/17/2024    TP 7.0 02/17/2024    AST 31 02/17/2024    ALT 33 02/17/2024    PTT 27.8 08/07/2018    INR 0.96 05/19/2023    OLAYINKA 139 (H) 06/16/2023     (HH) 02/17/2024    MG 1.8 12/21/2023    PHOS 3.0 06/23/2023    TROPHS 17 05/19/2023    ETOH <3 12/19/2023     CT ABDOMEN+PELVIS BARIATRIC PROTOCOL (CONTRAST ONLY) (CPT=74177)    Result Date: 2/17/2024  CONCLUSION:  Acute pancreatitis involving the pancreatic head noted.  Slightly heterogeneous enhancement of the pancreatic head suggest necrotizing pancreatitis.  There is surrounding fat  stranding and fluid but no loculated fluid collection.  Scattered pancreatic calcifications are again noted.  Small cystic foci in the pancreatic head measure up to 1.6 cm and may reflect pseudocysts.  Consider short-term follow-up MRCP without and with contrast.  Mild intrahepatic and extrahepatic biliary ductal dilatation suspected be slightly increased since the previous examination.  Suspected caliber change of the distal intrapancreatic portion of the common bile duct which could could suggest stricture formation.  Advise biliary laboratory correlation.  Wall thickening of the 2nd and 3rd portion duodenum with surrounding fat stranding probably reactive.  Mildly prominent mesenteric lymph nodes in the right upper quadrant not significantly changed and may be reactive.  Colonic diverticulosis.  Lesser incidental findings as above.      Dictated by (CST): Tom Simpson MD on 2/17/2024 at 4:51 PM     Finalized by (CST): Tom Simpson MD on 2/17/2024 at 5:03 PM               Impression:     Acute pancreatitis, unspecified complication status, unspecified pancreatitis type        Hyperglycemia           Recommendations:  Unasyn, NPO, IVF hydration, analgesics, PPI. GI consultation. Follow labs, Hb, Ca, creatinine, WBC, plt. Obtain CXR. Follow with serial abd exams. O2 monitor. Incentive spirometer.     We discussed the diagnosis and treatment plan. Will follow.     Omayra Mccauley MD  2/17/2024

## 2024-02-18 NOTE — PROGRESS NOTES
[facility]    Progress Note    Jarrett Bal Patient Status:  Emergency    1975 MRN [id number]   Location [facility] EMERGENCY DEPARTMENT Attending Naif Contreras MD   Hosp Day # 0 PCP None Pcp      HPI:     Chief Complaint   Patient presents with    Abdomen/Flank Pain     HPI  Pt presented with recent N/V, abdominal pain ongoing for 5 days. He has a h/o chronic intermittent pancreatitis.His vomiting resolved and he was able to eat the last 2-3 days, but his upper abdominal pain became worse. The pain radiates to his back. No F/C/J.  He does not drink alcohol. Dr. Garcias performed Exploratory laparotomy, gastric jejunostomy, entero-enterostomy, removal of gastrostomy tube  in 2023 for gastric outlet obstruction.     CT     CONCLUSION:     Acute pancreatitis involving the pancreatic head noted.  Slightly heterogeneous enhancement of the pancreatic head suggest necrotizing pancreatitis.  There is surrounding fat stranding and fluid but no loculated fluid collection.  Scattered pancreatic  calcifications are again noted.  Small cystic foci in the pancreatic head measure up to 1.6 cm and may reflect pseudocysts.  Consider short-term follow-up MRCP without and with contrast.     Mild intrahepatic and extrahepatic biliary ductal dilatation suspected be slightly increased since the previous examination.  Suspected caliber change of the distal intrapancreatic portion of the common bile duct which could could suggest stricture  formation.  Advise biliary laboratory correlation.     Wall thickening of the 2nd and 3rd portion duodenum with surrounding fat stranding probably reactive.     Mildly prominent mesenteric lymph nodes in the right upper quadrant not significantly changed and may be reactive.     Colonic diverticulosis.        WBC 11.1 on admission down to 7 today, Hb 12.6,  Ca 8.6, creat 0.75    Still has some abd pain, slightly better. No N/V.      History     Past Medical  History:   Diagnosis Date    Anesthesia complication     cramps;nausea after anesthesia when in 3rd grade    Anxiety state     Duodenitis     Pancreatitis     PONV (postoperative nausea and vomiting)      Past Surgical History:   Procedure Laterality Date    BIOPSY/REMOVAL, LYMPH NODE(S)      R neck    GASTROSTOMY/JEJUNOSTOMY TUBE N/A      Family History   Problem Relation Age of Onset    Hypertension Father     Hypertension Mother     Psychiatric Mother      Social History:  Social History     Socioeconomic History    Marital status:    Tobacco Use    Smoking status: Former     Packs/day: 0.50     Years: 25.00     Additional pack years: 0.00     Total pack years: 12.50     Types: Cigarettes     Quit date: 2023     Years since quittin.7     Passive exposure: Never    Smokeless tobacco: Never    Tobacco comments:     0.5-1 PPD   Vaping Use    Vaping Use: Never used   Substance and Sexual Activity    Alcohol use: Not Currently     Comment: not since 2023    Drug use: Not Currently     Types: Cannabis     Comment: rare     Social Determinants of Health     Food Insecurity: No Food Insecurity (2024)    Food Insecurity     Food Insecurity: Never true   Transportation Needs: No Transportation Needs (2024)    Transportation Needs     Lack of Transportation: No   Housing Stability: Low Risk  (2024)    Housing Stability     Housing Instability: No     Allergies/Medications:   Allergies: No Known Allergies  Medications Prior to Admission   Medication Sig    HYDROcodone-acetaminophen  MG Oral Tab Take 1 tablet by mouth every 6 (six) hours as needed for Pain.    traMADol 50 MG Oral Tab Take 1 tablet (50 mg total) by mouth every 6 (six) hours as needed for Pain.    acetaminophen 500 MG Oral Tab Take 1 tablet (500 mg total) by mouth every 4 (four) hours as needed for Fever (equal to or greater than 100.4).       Review of Systems:   Review of Systems    Review of Systems:   GENERAL: per  HPI  SKIN: no ulcerated or worrisome skin lesions  EYES:denies blurred vision or double vision  HEENT: denies new nasal congestion, sinus pain or ST  LUNGS: denies shortness of breath with exertion  CARDIOVASCULAR: denies chest pain on exertion  GI: recent hematemesis, no BRBPR, no worsening heartburn  : no dysuria, no blood in urine, no difficulty urinating  MUSCULOSKELETAL: no new musculoskeletal complaints  NEURO: no persistent, recurrent  headaches  PSYCHE:no depression or anxiety  HEMATOLOGIC: no hx of blood dyscrasia  ENDOCRINE: no new endocrine problems  ALL/ASTHMA: no new hx of severe allergy or asthma  BACK: normal, no spinal deformity, no CVA tenderness      Physical Exam:   Vital Signs:   height is 5' 8\" (1.727 m) and weight is 121 lb 12.8 oz (55.2 kg). His oral temperature is 98.7 °F (37.1 °C). His blood pressure is 113/84 and his pulse is 73. His respiration is 20 and oxygen saturation is 98%.   Physical Exam  Alert, NAD  HEENT wnl, anicteric, PERRL  Neck supple, norm ROM, no JVD  L CTA B  H Reg rate  Abd soft, some T RUQ and epigastric, no rebound tenderness, no guarding, ND, no masses, no hernias, no HSM.  Extr no c/c/e  Skin intact, no jaundice, no rashes, no lesions  Neuro grossly intact, no focal deficits, no tremors  Back no deformity, no CVA tnd.     Results:     Lab Results   Component Value Date    WBC 7.0 02/18/2024    HGB 12.6 (L) 02/18/2024    HCT 37.6 (L) 02/18/2024    .0 02/18/2024    CREATSERUM 0.75 02/18/2024    BUN 6 (L) 02/18/2024     02/18/2024    K 3.4 (L) 02/18/2024     02/18/2024    CO2 27.0 02/18/2024    GLU 88 02/18/2024    CA 8.6 (L) 02/18/2024    ALB 4.3 02/17/2024    ALKPHO 131 (H) 02/17/2024    BILT 0.5 02/17/2024    TP 7.0 02/17/2024    AST 31 02/17/2024    ALT 33 02/17/2024    PTT 27.8 08/07/2018    INR 0.96 05/19/2023    OLAYINKA 139 (H) 06/16/2023     (HH) 02/17/2024    MG 1.8 12/21/2023    PHOS 3.0 06/23/2023    TROPHS 17 05/19/2023    ETOH <3  12/19/2023     XR CHEST PA + LAT CHEST (CPT=71046)    Result Date: 2/18/2024  CONCLUSION: No radiographic evidence of acute cardiopulmonary abnormality.  Preliminary report was given by [facility].  There are no clinically significant discrepancies.    elm-remote    Dictated by (CST): Hudson Ambrose MD on 2/18/2024 at 8:02 AM     Finalized by (CST): Hudson Ambrose MD on 2/18/2024 at 8:03 AM          CT ABDOMEN+PELVIS BARIATRIC PROTOCOL (CONTRAST ONLY) (CPT=74177)    Result Date: 2/17/2024  CONCLUSION:  Acute pancreatitis involving the pancreatic head noted.  Slightly heterogeneous enhancement of the pancreatic head suggest necrotizing pancreatitis.  There is surrounding fat stranding and fluid but no loculated fluid collection.  Scattered pancreatic calcifications are again noted.  Small cystic foci in the pancreatic head measure up to 1.6 cm and may reflect pseudocysts.  Consider short-term follow-up MRCP without and with contrast.  Mild intrahepatic and extrahepatic biliary ductal dilatation suspected be slightly increased since the previous examination.  Suspected caliber change of the distal intrapancreatic portion of the common bile duct which could could suggest stricture formation.  Advise biliary laboratory correlation.  Wall thickening of the 2nd and 3rd portion duodenum with surrounding fat stranding probably reactive.  Mildly prominent mesenteric lymph nodes in the right upper quadrant not significantly changed and may be reactive.  Colonic diverticulosis.  Lesser incidental findings as above.      Dictated by (CST): Tom Simpson MD on 2/17/2024 at 4:51 PM     Finalized by (CST): Tom Simpson MD on 2/17/2024 at 5:03 PM               Impression:     Acute pancreatitis, unspecified complication status, unspecified pancreatitis type        Hyperglycemia           Recommendations:  Unasyn, NPO, IVF hydration, analgesics, PPI. GI consultation. Follow labs, Hb, Ca, creatinine, WBC, plt. Obtain  CXR. Follow with serial abd exams. O2 monitor. Incentive spirometer.     We discussed the diagnosis and treatment plan. Will follow.     Omayra Mccauley MD  2/18/2024

## 2024-02-18 NOTE — H&P
[facility]    History and Physical    Jarrett Bal Patient Status:  Inpatient    1975 MRN [id number]   Location [facility] 4W/SW/SE Attending Iliana Beverly MD   Hosp Day # 0 PCP None Pcp     Date:  2024  Date of Admission:  2024    History provided by:patient  HPI:     Chief Complaint   Patient presents with    Abdomen/Flank Pain     48-year-old male with history of recurrent pancreatitis and hx of  gastrojejunostomy, antecolic enteroenterostomy in   because of his pancreatitis about 9 months ago here with abdominal pain intermittently for the last 5 days. Per patient pain was severe and located in the epigastric and right upper abdomen that radiates to the back feeling like his pancreatitis.  He is nauseous but no vomiting today.  No fever.  No alcohol in at least 9 months.  No fever or chills.  No cough or congestion             History     Past Medical History:   Diagnosis Date    Anesthesia complication     cramps;nausea after anesthesia when in 3rd grade    Anxiety state     Duodenitis     Pancreatitis     PONV (postoperative nausea and vomiting)      Past Surgical History:   Procedure Laterality Date    BIOPSY/REMOVAL, LYMPH NODE(S)      R neck    GASTROSTOMY/JEJUNOSTOMY TUBE N/A      Family History   Problem Relation Age of Onset    Hypertension Father     Hypertension Mother     Psychiatric Mother      Social History:  Social History     Socioeconomic History    Marital status:    Tobacco Use    Smoking status: Former     Packs/day: 0.50     Years: 25.00     Additional pack years: 0.00     Total pack years: 12.50     Types: Cigarettes     Quit date: 2023     Years since quittin.7     Passive exposure: Never    Smokeless tobacco: Never    Tobacco comments:     0.5-1 PPD   Vaping Use    Vaping Use: Never used   Substance and Sexual Activity    Alcohol use: Not Currently     Comment: not since 2023    Drug use: Not Currently     Types:  Cannabis     Comment: rare     Social Determinants of Health     Food Insecurity: No Food Insecurity (2/17/2024)    Food Insecurity     Food Insecurity: Never true   Transportation Needs: No Transportation Needs (2/17/2024)    Transportation Needs     Lack of Transportation: No   Housing Stability: Low Risk  (2/17/2024)    Housing Stability     Housing Instability: No     Allergies/Medications:   Allergies: No Known Allergies  Medications Prior to Admission   Medication Sig    HYDROcodone-acetaminophen  MG Oral Tab Take 1 tablet by mouth every 6 (six) hours as needed for Pain.    traMADol 50 MG Oral Tab Take 1 tablet (50 mg total) by mouth every 6 (six) hours as needed for Pain.    acetaminophen 500 MG Oral Tab Take 1 tablet (500 mg total) by mouth every 4 (four) hours as needed for Fever (equal to or greater than 100.4).       Review of Systems:     Constitutional: Negative.    HENT: Negative.     Eyes: Negative.    Respiratory: Negative.     Cardiovascular: Negative.    Gastrointestinal:  Positive for abdominal pain.   Endocrine: Negative.    Genitourinary: Negative.    Musculoskeletal: Negative.    Skin: Negative.    Allergic/Immunologic: Negative.    Neurological: Negative.    Hematological: Negative.    Psychiatric/Behavioral: Negative.         Physical Exam:   Vital Signs:  Blood pressure 113/84, pulse 73, temperature 98.7 °F (37.1 °C), temperature source Oral, resp. rate 20, height 5' 8\" (1.727 m), weight 121 lb 12.8 oz (55.2 kg), SpO2 98%.  Physical Exam  Vitals and nursing note reviewed.   HENT:      Head: Normocephalic and atraumatic.   Eyes:      Pupils: Pupils are equal, round, and reactive to light.   Cardiovascular:      Rate and Rhythm: Normal rate and regular rhythm.      Pulses: Normal pulses.      Heart sounds: Normal heart sounds.   Pulmonary:      Effort: Pulmonary effort is normal.      Breath sounds: Normal breath sounds.   Abdominal:      General: Abdomen is flat.      Palpations:  Abdomen is soft.      Tenderness: There is abdominal tenderness.   Musculoskeletal:      Cervical back: Neck supple.   Skin:     General: Skin is warm.   Neurological:      General: No focal deficit present.      Mental Status: He is alert and oriented to person, place, and time.   Psychiatric:         Mood and Affect: Mood normal.           Results:     Lab Results   Component Value Date    WBC 7.0 02/18/2024    HGB 12.6 (L) 02/18/2024    HCT 37.6 (L) 02/18/2024    .0 02/18/2024    CREATSERUM 0.75 02/18/2024    BUN 6 (L) 02/18/2024     02/18/2024    K 3.4 (L) 02/18/2024     02/18/2024    CO2 27.0 02/18/2024    GLU 88 02/18/2024    CA 8.6 (L) 02/18/2024    ALB 4.3 02/17/2024    ALKPHO 131 (H) 02/17/2024    BILT 0.5 02/17/2024    TP 7.0 02/17/2024    AST 31 02/17/2024    ALT 33 02/17/2024    PTT 27.8 08/07/2018    INR 0.96 05/19/2023    OLAYINKA 139 (H) 06/16/2023     (HH) 02/17/2024    MG 1.8 12/21/2023    PHOS 3.0 06/23/2023    TROPHS 17 05/19/2023    ETOH <3 12/19/2023     XR CHEST PA + LAT CHEST (CPT=71046)    Result Date: 2/18/2024  CONCLUSION: No radiographic evidence of acute cardiopulmonary abnormality.  Preliminary report was given by [facility].  There are no clinically significant discrepancies.    elm-remote    Dictated by (CST): Hudson Ambrose MD on 2/18/2024 at 8:02 AM     Finalized by (CST): Hudson Ambrose MD on 2/18/2024 at 8:03 AM          CT ABDOMEN+PELVIS BARIATRIC PROTOCOL (CONTRAST ONLY) (CPT=74177)    Result Date: 2/17/2024  CONCLUSION:  Acute pancreatitis involving the pancreatic head noted.  Slightly heterogeneous enhancement of the pancreatic head suggest necrotizing pancreatitis.  There is surrounding fat stranding and fluid but no loculated fluid collection.  Scattered pancreatic calcifications are again noted.  Small cystic foci in the pancreatic head measure up to 1.6 cm and may reflect pseudocysts.  Consider short-term follow-up MRCP without and with contrast.   Mild intrahepatic and extrahepatic biliary ductal dilatation suspected be slightly increased since the previous examination.  Suspected caliber change of the distal intrapancreatic portion of the common bile duct which could could suggest stricture formation.  Advise biliary laboratory correlation.  Wall thickening of the 2nd and 3rd portion duodenum with surrounding fat stranding probably reactive.  Mildly prominent mesenteric lymph nodes in the right upper quadrant not significantly changed and may be reactive.  Colonic diverticulosis.  Lesser incidental findings as above.      Dictated by (CST): Tmo Simpson MD on 2/17/2024 at 4:51 PM     Finalized by (CST): Tom Simpson MD on 2/17/2024 at 5:03 PM               Assessment/Plan:     Acute pancreatitis, unspecified complication status, unspecified pancreatitis type  NPO, IV fluids, seen by GI and surgery  Pain meds    Hyperglycemia  D/W family at bedside  MDM  Reviewed previous: labs and CT scan  Total time providing critical care:  minutes. This excludes time spent performing separately reportable procedures and services.  Consults: primary care provider, gastrointestinal and general surgery                   COLIN COTTON, MD LITO  2/18/2024

## 2024-02-18 NOTE — CONSULTS
[facility]    Report of Consultation    Jarrett Bal Patient Status:  Observation    1975 MRN [id number]   Location [facility] 4W/SW/SE Attending Iliana Beverly MD   Hosp Day # 0 PCP None Pcp     Date of Admission:  2024  Date of Consult:  2024  Reason for Consultation:   Acute on chronic pancreatitis    History of Present Illness:   Patient is a 48 year old male past medical history of chronic alcohol induced pancreatitis status post gastrojejunostomy and enteroenterostomy in  of last year for chronic pancreatitis with gastric outlet obstruction.  Now admitted with recurrent abdominal pain.  Patient has a history of alcohol induced chronic pancreatitis with surgery as mentioned above.  Postoperatively he says he did well for about 6 months and then about 2 months ago began having mild episodic flares of central abdominal pain which would respond to a liquid diet.  These episodes were infrequent and transient.  However, over the past week he was having more episodes of pain.  He then saw his primary gastroenterologist 2 days ago in clinic.  At that time he was feeling better but the next day  the pain returned and was intense and constant prompting a visit to the ED.  CT scan performed there showed pancreatitis with heterogeneity in the head of the pancreas raising the possibility of necrosis.  Patient was admitted and overnight feels better though still requiring IV narcotics.  Patient's last drink of alcohol was about 2 months ago he says.  Denies any new medications.  Denies use of nonsteroidals.  Denies abdominal trauma.    Past Medical History  Past Medical History:   Diagnosis Date    Anesthesia complication     cramps;nausea after anesthesia when in 3rd grade    Anxiety state     Duodenitis     Pancreatitis     PONV (postoperative nausea and vomiting)        Past Surgical History  Past Surgical History:   Procedure Laterality Date    BIOPSY/REMOVAL, LYMPH  NODE(S)      R neck    GASTROSTOMY/JEJUNOSTOMY TUBE N/A        Family History  Family History   Problem Relation Age of Onset    Hypertension Father     Hypertension Mother     Psychiatric Mother        Social History  Pediatric History   Patient Parents    Rodrigo Bal (Father)     Other Topics Concern    Not on file   Social History Narrative    Not on file           Current Medications:  Current Facility-Administered Medications   Medication Dose Route Frequency    potassium chloride 40 mEq in 250mL sodium chloride 0.9% IVPB premix  40 mEq Intravenous Once    acetaminophen (Tylenol Extra Strength) tab 500 mg  500 mg Oral Q4H PRN    morphINE PF 2 MG/ML injection 4 mg  4 mg Intravenous Q4H PRN    ampicillin-sulbactam (Unasyn) 3 g in sodium chloride 0.9% 100mL IVPB-ADD  3 g Intravenous q6h    ondansetron (Zofran) 4 MG/2ML injection 4 mg  4 mg Intravenous Q6H PRN    dextrose 5%-sodium chloride 0.45% infusion   Intravenous Continuous    heparin (Porcine) 5000 UNIT/ML injection 5,000 Units  5,000 Units Subcutaneous 2 times per day    acetaminophen (Tylenol) tab 650 mg  650 mg Oral Q6H PRN     Medications Prior to Admission   Medication Sig    HYDROcodone-acetaminophen  MG Oral Tab Take 1 tablet by mouth every 6 (six) hours as needed for Pain.    traMADol 50 MG Oral Tab Take 1 tablet (50 mg total) by mouth every 6 (six) hours as needed for Pain.    acetaminophen 500 MG Oral Tab Take 1 tablet (500 mg total) by mouth every 4 (four) hours as needed for Fever (equal to or greater than 100.4).       Allergies  No Known Allergies    Review of Systems:   GENERAL HEALTH: feels well otherwise, denies fever or weight loss  SKIN: denies any unusual skin lesions or rashes  EYES: no visual complaints or deficits  HEENT: denies mouth sores  RESPIRATORY: no shortness of breath   CARDIOVASCULAR:no chest pain   GI: Refer to HPI,   : no hematuria  MUSCULOSKELETAL: no joint swelling or warmth  NEURO: no focal weakness or  numbness  PSYCHE: no depression or anxiety  HEMATOLOGIC: no easy bruising  ENDOCRINE: no temperature intolerance    Physical Exam:   Blood pressure 118/77, pulse 80, temperature 98.7 °F (37.1 °C), temperature source Oral, resp. rate 18, height 5' 8\" (1.727 m), weight 121 lb 12.8 oz (55.2 kg), SpO2 100%.  GENERAL: well developed, in no apparent distress  SKIN: no rashes,no suspicious lesions  HEENT: atraumatic, normocephalic, oropharynx clear  NECK: supple,no adenopathy, no masses  LUNGS: clear to auscultation  CARDIO: RRR   GI: normal active BS, no focal tenderness on palpation, no masses or ascites, normal liver span/no organomegaly  EXTREMITIES: no calf tenderness  MUSCULOSKELETAL: no calf tenderness  PSYCH: normal affect  NUT: well nourished appearing, no cachexia      Results:     Laboratory Data:  Lab Results   Component Value Date    WBC 7.0 02/18/2024    HGB 12.6 (L) 02/18/2024    HCT 37.6 (L) 02/18/2024    .0 02/18/2024    CREATSERUM 0.75 02/18/2024    BUN 6 (L) 02/18/2024     02/18/2024    K 3.4 (L) 02/18/2024     02/18/2024    CO2 27.0 02/18/2024    GLU 88 02/18/2024    CA 8.6 (L) 02/18/2024    ALB 4.3 02/17/2024    ALKPHO 131 (H) 02/17/2024    TP 7.0 02/17/2024    AST 31 02/17/2024    ALT 33 02/17/2024    PTT 27.8 08/07/2018    INR 0.96 05/19/2023    PTP 12.7 05/19/2023    OLAYINKA 139 (H) 06/16/2023     (HH) 02/17/2024    MG 1.8 12/21/2023    PHOS 3.0 06/23/2023    ETOH <3 12/19/2023         Imaging:  XR CHEST PA + LAT CHEST (CPT=71046)    Result Date: 2/18/2024  PROCEDURE: XR CHEST PA + LAT CHEST (CPT=71046)  COMPARISON: [facility], XR CHEST AP PORTABLE (CPT=71045), 5/19/2023, 10:36 AM.  [facility], XR CHEST AP PORTABLE (CPT=71045), 4/28/2023, 6:20 AM.  [facility], XR CHEST AP PORTABLE (CPT=71045), 4/27/2023, 7:32 PM.  INDICATIONS: Pancreatitis. History of hypotension and hyperglycemia.  TECHNIQUE:   Two views.   FINDINGS:  CARDIAC/VASC: Normal.  No cardiac silhouette abnormality or cardiomegaly.  Unremarkable pulmonary vasculature.  MEDIAST/JOANN: No visible mass or adenopathy. LUNGS/PLEURA: Normal.  No significant pulmonary parenchymal abnormalities.  No effusion or pleural thickening.  BONES: No fracture or visible bony lesion. OTHER: Negative.          CONCLUSION: No radiographic evidence of acute cardiopulmonary abnormality.  Preliminary report was given by [facility].  There are no clinically significant discrepancies.    [facility]    Dictated by (CST): Hudson Ambrose MD on 2/18/2024 at 8:02 AM     Finalized by (CST): Hudson Ambrose MD on 2/18/2024 at 8:03 AM          CT ABDOMEN+PELVIS BARIATRIC PROTOCOL (CONTRAST ONLY) (CPT=74177)    Result Date: 2/17/2024  PROCEDURE: CT ABDOMEN+PELVIS BARIATRIC PROTOCOL (CONTRAST ONLY) (CPT=74177)  COMPARISON: [facility], CT ABDOMEN PELVIS IV CONTRAST NO ORAL (ER), 12/19/2023, 4:31 PM.  [facility], CT ABDOMEN PELVIS IV CONTRAST NO ORAL (ER), 6/01/2023, 9:25 PM.  [facility], CT ABDOMEN PELVIS ORAL CONTRAST ONLY (CPT =94484), 5/24/2023, 3:37 PM.  INDICATIONS: Abd pain  TECHNIQUE: CT images of the abdomen and pelvis were obtained with non-ionic intravenous contrast material.  Automated exposure control for dose reduction was used. Adjustment of the mA and/or kV was done based on the patient's size. Use of iterative reconstruction technique for dose reduction was used.  Dose information is transmitted to the ACR (American College of Radiology) NRDR (National Radiology Data Registry) which includes the Dose Index Registry.  FINDINGS:  LUNG BASES: No focal consolidation. LIVER: No enlargement, atrophy, abnormal density, or significant focal lesion.  Mild intrahepatic biliary ductal dilatation. SPLEEN: No enlargement or focal lesion.  GALLBLADDER: No cholelithiasis.  Common bile duct measures 1.0 cm. PANCREAS: Asymmetric prominence and heterogeneity  of the pancreatic head is noted.  There are small calcifications seen in the pancreatic head.  There is surrounding peripancreatic fat stranding and fluid.  No evidence of a localized loculated associated  fluid collection.  Cystic foci are seen in the region the pancreatic head thing measuring up to 1.6 cm. ADRENALS: No defined mass or abnormal enlargement.  KIDNEYS: Enhance symmetrically without hydronephrosis. AORTA/VASCULAR:   Abdominal aorta is normal in caliber. ABDOMINAL NODES: No mass or adenopathy.  BOWEL/MESENTERY:  No dilated loops of bowel.  There is wall thickening of the 2nd and 3rd portion duodenum with surrounding fat stranding.  Colonic diverticulosis.  The appendix is not clearly visualized.  No pneumatosis or pneumoperitoneum.  Few scattered mildly prominent mesenteric lymph nodes are noted overall similar to the prior study. ABDOMINAL WALL: Unremarkable. URINARY BLADDER: No focal wall thickening or calculus.  PELVIC NODES: No adenopathy.   PELVIC ORGANS: No visible mass.  Pelvic organs appropriate for patient age.  BONES:   Osseous structures are intact. OTHER: Negative.          CONCLUSION:  Acute pancreatitis involving the pancreatic head noted.  Slightly heterogeneous enhancement of the pancreatic head suggest necrotizing pancreatitis.  There is surrounding fat stranding and fluid but no loculated fluid collection.  Scattered pancreatic calcifications are again noted.  Small cystic foci in the pancreatic head measure up to 1.6 cm and may reflect pseudocysts.  Consider short-term follow-up MRCP without and with contrast.  Mild intrahepatic and extrahepatic biliary ductal dilatation suspected be slightly increased since the previous examination.  Suspected caliber change of the distal intrapancreatic portion of the common bile duct which could could suggest stricture formation.  Advise biliary laboratory correlation.  Wall thickening of the 2nd and 3rd portion duodenum with surrounding fat  stranding probably reactive.  Mildly prominent mesenteric lymph nodes in the right upper quadrant not significantly changed and may be reactive.  Colonic diverticulosis.  Lesser incidental findings as above.      Dictated by (CST): Tom Simpson MD on 2/17/2024 at 4:51 PM     Finalized by (CST): Tom Simpson MD on 2/17/2024 at 5:03 PM             Impression:   Chronic calcific pancreatitis from past alcohol and tobacco use status post gastric bypass and enteroenterostomy 8 months ago, now admitted with recurrent pancreatitis.  CT shows equivocal evidence of necrotic pancreatitis.    Recommendations:  Normal breast, Hydration, analgesics    Time spent in direct patient contact and decision making as well as chart review, counseling/coordination of care:  60 minutes  Thank you for allowing me to participate in the care of your patient.    Naif Berg MD FACG    2/18/2024

## 2024-02-19 LAB
ALBUMIN SERPL-MCNC: 3.3 G/DL (ref 3.2–4.8)
ALP LIVER SERPL-CCNC: 91 U/L
ALT SERPL-CCNC: 15 U/L
AST SERPL-CCNC: 19 U/L (ref ?–34)
BASOPHILS # BLD AUTO: 0.04 X10(3) UL (ref 0–0.2)
BASOPHILS NFR BLD AUTO: 0.7 %
BILIRUB DIRECT SERPL-MCNC: 0.2 MG/DL (ref ?–0.3)
BILIRUB SERPL-MCNC: 0.4 MG/DL (ref 0.3–1.2)
DEPRECATED RDW RBC AUTO: 39.9 FL (ref 35.1–46.3)
EOSINOPHIL # BLD AUTO: 0.43 X10(3) UL (ref 0–0.7)
EOSINOPHIL NFR BLD AUTO: 7.2 %
ERYTHROCYTE [DISTWIDTH] IN BLOOD BY AUTOMATED COUNT: 11.6 % (ref 11–15)
HCT VFR BLD AUTO: 36.5 %
HGB BLD-MCNC: 12.6 G/DL
IMM GRANULOCYTES # BLD AUTO: 0.02 X10(3) UL (ref 0–1)
IMM GRANULOCYTES NFR BLD: 0.3 %
LYMPHOCYTES # BLD AUTO: 1.98 X10(3) UL (ref 1–4)
LYMPHOCYTES NFR BLD AUTO: 33.3 %
MCH RBC QN AUTO: 32.4 PG (ref 26–34)
MCHC RBC AUTO-ENTMCNC: 34.5 G/DL (ref 31–37)
MCV RBC AUTO: 93.8 FL
MONOCYTES # BLD AUTO: 0.55 X10(3) UL (ref 0.1–1)
MONOCYTES NFR BLD AUTO: 9.3 %
NEUTROPHILS # BLD AUTO: 2.92 X10 (3) UL (ref 1.5–7.7)
NEUTROPHILS # BLD AUTO: 2.92 X10(3) UL (ref 1.5–7.7)
NEUTROPHILS NFR BLD AUTO: 49.2 %
PLATELET # BLD AUTO: 363 10(3)UL (ref 150–450)
POTASSIUM SERPL-SCNC: 3.6 MMOL/L (ref 3.5–5.1)
PROT SERPL-MCNC: 5.4 G/DL (ref 5.7–8.2)
RBC # BLD AUTO: 3.89 X10(6)UL
WBC # BLD AUTO: 5.9 X10(3) UL (ref 4–11)

## 2024-02-19 PROCEDURE — 99232 SBSQ HOSP IP/OBS MODERATE 35: CPT | Performed by: SURGERY

## 2024-02-19 NOTE — CDS QUERY
How to answer this Query:    1.) DON'T CLICK COSIGN BUTTON FIRST  2.) Click \"3 dots...\" to the right of cosign button and click EDIT on the toolbar.  2.) Type an \"X\" in the bracket for the diagnosis that applies. (You may also add additional clinical details as you feel necessary to substantiate your response).   3.) Finally click \"Sign\" to complete response.  Thank You    DOCUMENTATION CLARIFICATION FORM  Dear Doctor:COLIN  Clinical information suggests potential for impaired nutritional status.     PLEASE (X)  DIAGNOSIS THAT APPLIES        SELECTION BY PHYSICIAN ONLY    ( x)  Moderate Protein-Calorie Malnutrition    ( )  Other (please specify):         Documentation (include date/source): BMI:18.52    Clinical Nutrition Assessment: Pt is at moderate nutrition risk.  Pt meets moderate malnutrition criteria.       CRITERIA FOR MALNUTRITION DIAGNOSIS:  Criteria for non-severe malnutrition diagnosis: acute illness/injury related to wt loss 5% in 1 month, body fat mild depletion, and muscle mass mild depletion.    RISK FACTORS:HO ETOH PANCREATITIS  CLINICAL INDICATORS:WT LOSS, MILD DEPLETION BODY FAT ORBITAL,TRICEPS, AND FAT OVERLYING RIB CAGE, MILD MUSCLE MASS TEMPLE REGION, CLAVICLE,SCAPULAR, SHOULDER, THIGH AND CALF REGION  TREATMENT:ENCOURAGED SMALL FREQ MEALS, ENSURE  If you have any questions, please contact Clinical :  Omayra MCLEAN RN at [phone]     Thank You!    THIS FORM IS A PERMANENT PART OF THE MEDICAL RECORD

## 2024-02-19 NOTE — PROGRESS NOTES
[facility]  Gastroenterology Progress Note    Jarrett Bal Patient Status:  Inpatient    1975 MRN [id number]   Location [facility] 4W/SW/SE Attending Iliana Beverly MD   Hosp Day # 1 PCP None Pcp     Subjective:  Jarrett Bal is a(n) 48 year old male.    Current complaints: feeling hungry, endorses some abdominal cramping not related to his pancreatitis pain    Objective:  Blood pressure 132/86, pulse 77, temperature 98.2 °F (36.8 °C), temperature source Oral, resp. rate 18, height 5' 8\" (1.727 m), weight 121 lb 12.8 oz (55.2 kg), SpO2 99%.  Respiratory: no labored breathing  CV: RRR  Abdomen: nondistended, soft, nontender, BS+  Extremities: no calf tenderness  Neurologic: Ox3    Labs:   Recent Labs   Lab 24  1356 24  0535 24  0545 24  0552   WBC 11.1* 7.0  --  5.9   HGB 15.3 12.6*  --  12.6*   HCT 45.4 37.6*  --  36.5*   .0* 357.0  --  363.0   CREATSERUM 0.77 0.75  --   --    BUN 10 6*  --   --     137  --   --    K 3.9 3.4* 3.6  --     107  --   --    CO2 29.0 27.0  --   --    * 88  --   --    CA 9.4 8.6*  --   --    ALB 4.3  --  3.3  --    ALKPHO 131*  --  91  --    BILT 0.5  --  0.4  --    TP 7.0  --  5.4*  --    AST 31  --  19  --    ALT 33  --  15  --        Recent Labs   Lab 24  1356   *        Imaging:  XR CHEST PA + LAT CHEST (CPT=71046)    Result Date: 2024  CONCLUSION: No radiographic evidence of acute cardiopulmonary abnormality.  Preliminary report was given by [facility].  There are no clinically significant discrepancies.    [facility]    Dictated by (CST): Hudson Ambrose MD on 2024 at 8:02 AM     Finalized by (CST): Hudson Ambrose MD on 2024 at 8:03 AM          CT ABDOMEN+PELVIS BARIATRIC PROTOCOL (CONTRAST ONLY) (CPT=74177)    Result Date: 2024  CONCLUSION:  Acute pancreatitis involving the pancreatic head noted.  Slightly heterogeneous enhancement of the pancreatic head suggest  necrotizing pancreatitis.  There is surrounding fat stranding and fluid but no loculated fluid collection.  Scattered pancreatic calcifications are again noted.  Small cystic foci in the pancreatic head measure up to 1.6 cm and may reflect pseudocysts.  Consider short-term follow-up MRCP without and with contrast.  Mild intrahepatic and extrahepatic biliary ductal dilatation suspected be slightly increased since the previous examination.  Suspected caliber change of the distal intrapancreatic portion of the common bile duct which could could suggest stricture formation.  Advise biliary laboratory correlation.  Wall thickening of the 2nd and 3rd portion duodenum with surrounding fat stranding probably reactive.  Mildly prominent mesenteric lymph nodes in the right upper quadrant not significantly changed and may be reactive.  Colonic diverticulosis.  Lesser incidental findings as above.      Dictated by (CST): Tom Simpson MD on 2/17/2024 at 4:51 PM     Finalized by (CST): Tom Simpson MD on 2/17/2024 at 5:03 PM            Problem list:  Patient Active Problem List   Diagnosis    Hypokalemia    Duodenitis    Gastric mass    Gastric outlet obstruction    Prolonged QT interval    Pneumoperitoneum    CARLOS A (acute kidney injury) (HCC)    Hyponatremia    Metabolic alkalosis    Lightheaded    Weakness generalized    Traumatic injury of head, initial encounter    Hypotension, unspecified hypotension type    Malfunction of jejunostomy tube (HCC)    Hyperglycemia    Acute pancreatitis, unspecified complication status, unspecified pancreatitis type       Assessment  Chronic calcific pancreatitis from past alcohol and tobacco use status post gastric bypass and enteroenterostomy 8 months ago, now admitted with recurrent pancreatitis.   CT shows without clear evidence of necrotic pancreatitis.  Labs, exam stable    Plan  - clear liquid diet  - continue antibiotic therapy  - continue IVF  - repeat Ct abd/ pelvis am      Is  this a shared or split note between Advanced Practice Provider and Physician? Yes    Michelle Torres, APRN    2/19/2024  9:48 AM

## 2024-02-19 NOTE — PROGRESS NOTES
[facility]    Progress Note    Jarrett Bal Patient Status:  Inpatient    1975 MRN [id number]   Location [facility] 4W/SW/SE Attending Iliana Beverly MD   Hosp Day # 1 PCP None Pcp       Subjective:   Pt feeling well, pain improving. No nausea or vomiting, tolerating clears.    Objective:   Vital Signs:  Blood pressure 132/86, pulse 77, temperature 98.2 °F (36.8 °C), temperature source Oral, resp. rate 18, height 5' 8\" (1.727 m), weight 121 lb 12.8 oz (55.2 kg), SpO2 99%.    Physical Exam     General: No acute distress. Alert and oriented x 3.  HEENT: Moist mucous membranes. Anicteric.   Neck: Supple, No JVD.   Respiratory: Nonlabored resp.  Cardiovascular: Regular rate.   Abdomen: Soft, NT, no rebound tnd, no guarding, nondistended.  No masses. Normal bowel sounds.    Neurologic: No focal neurological deficits.  Musculoskeletal: Full range of motion of all extremities. No calf tenderness. No swelling noted.  Integument: No lesions. No erythema.  Psychiatric: Appropriate mood and affect.         Assessment and Plan:     Acute pancreatitis, unspecified complication status, unspecified pancreatitis type      Hyperglycemia    Pt improving, continue IV abx and IVF. Clears now, diet per GI. Will continue to follow and monitor progress.    Results:     Lab Results   Component Value Date    WBC 5.9 2024    HGB 12.6 (L) 2024    HCT 36.5 (L) 2024    .0 2024    CREATSERUM 0.75 2024    BUN 6 (L) 2024     2024    K 3.6 2024     2024    CO2 27.0 2024    GLU 88 2024    CA 8.6 (L) 2024    ALB 3.3 2024    ALKPHO 91 2024    BILT 0.4 2024    TP 5.4 (L) 2024    AST 19 2024    ALT 15 2024    PTT 27.8 2018    INR 0.96 2023    OLAYINKA 139 (H) 2023     (HH) 2024    MG 1.8 2023    PHOS 3.0 2023    ETOH <3 2023       XR CHEST PA + LAT  CHEST (CPT=71046)    Result Date: 2/18/2024  CONCLUSION: No radiographic evidence of acute cardiopulmonary abnormality.  Preliminary report was given by [facility].  There are no clinically significant discrepancies.    [facility]    Dictated by (CST): Hudson Ambrose MD on 2/18/2024 at 8:02 AM     Finalized by (CST): Hudson Ambrose MD on 2/18/2024 at 8:03 AM          CT ABDOMEN+PELVIS BARIATRIC PROTOCOL (CONTRAST ONLY) (CPT=74177)    Result Date: 2/17/2024  CONCLUSION:  Acute pancreatitis involving the pancreatic head noted.  Slightly heterogeneous enhancement of the pancreatic head suggest necrotizing pancreatitis.  There is surrounding fat stranding and fluid but no loculated fluid collection.  Scattered pancreatic calcifications are again noted.  Small cystic foci in the pancreatic head measure up to 1.6 cm and may reflect pseudocysts.  Consider short-term follow-up MRCP without and with contrast.  Mild intrahepatic and extrahepatic biliary ductal dilatation suspected be slightly increased since the previous examination.  Suspected caliber change of the distal intrapancreatic portion of the common bile duct which could could suggest stricture formation.  Advise biliary laboratory correlation.  Wall thickening of the 2nd and 3rd portion duodenum with surrounding fat stranding probably reactive.  Mildly prominent mesenteric lymph nodes in the right upper quadrant not significantly changed and may be reactive.  Colonic diverticulosis.  Lesser incidental findings as above.      Dictated by (CST): Tom Simpson MD on 2/17/2024 at 4:51 PM     Finalized by (CST): Tom Simpson MD on 2/17/2024 at 5:03 PM                       Edin Bashir PA-C  2/19/2024

## 2024-02-19 NOTE — PLAN OF CARE
Problem: Patient Centered Care  Goal: Patient preferences are identified and integrated in the patient's plan of care  Description: Interventions:  - What would you like us to know as we care for you?   - Provide timely, complete, and accurate information to patient/family  - Incorporate patient and family knowledge, values, beliefs, and cultural backgrounds into the planning and delivery of care  - Encourage patient/family to participate in care and decision-making at the level they choose  - Honor patient and family perspectives and choices  Outcome: Progressing     Problem: PAIN - ADULT  Goal: Verbalizes/displays adequate comfort level or patient's stated pain goal  Description: INTERVENTIONS:  - Encourage pt to monitor pain and request assistance  - Assess pain using appropriate pain scale  - Administer analgesics based on type and severity of pain and evaluate response  - Implement non-pharmacological measures as appropriate and evaluate response  - Consider cultural and social influences on pain and pain management  - Manage/alleviate anxiety  - Utilize distraction and/or relaxation techniques  - Monitor for opioid side effects  - Notify MD/LIP if interventions unsuccessful or patient reports new pain  - Anticipate increased pain with activity and pre-medicate as appropriate  Outcome: Progressing   No acute changes overnight. Jarrett is A/Ox4, VSS on room air. NPO w/ ice chips, no c/o N/V. IVF infusing Unasyn continued for abx coverage. Pain managed with PRN morphine. Ambulating independently. Fall and safety precautions maintained. Bed locked in the lowest position. Call light and personal belongings within reach. Frequent rounding done.

## 2024-02-19 NOTE — PLAN OF CARE
Pt is alert and oriented x4. On room air. Vital signs stable. Ambulating independently. Continent. Advanced to regular diet. Tolerating well. Complains of slight increase in abdominal pain after eating. Morphine IV given. Safety measures in place. Will continue to monitor.     Problem: Patient Centered Care  Goal: Patient preferences are identified and integrated in the patient's plan of care  Description: Interventions:  - Provide timely, complete, and accurate information to patient/family  - Incorporate patient and family knowledge, values, beliefs, and cultural backgrounds into the planning and delivery of care  - Encourage patient/family to participate in care and decision-making at the level they choose  - Honor patient and family perspectives and choices  Outcome: Progressing     Problem: PAIN - ADULT  Goal: Verbalizes/displays adequate comfort level or patient's stated pain goal  Description: INTERVENTIONS:  - Encourage pt to monitor pain and request assistance  - Assess pain using appropriate pain scale  - Administer analgesics based on type and severity of pain and evaluate response  - Implement non-pharmacological measures as appropriate and evaluate response  - Consider cultural and social influences on pain and pain management  - Manage/alleviate anxiety  - Utilize distraction and/or relaxation techniques  - Monitor for opioid side effects  - Notify MD/LIP if interventions unsuccessful or patient reports new pain  - Anticipate increased pain with activity and pre-medicate as appropriate  Outcome: Progressing

## 2024-02-19 NOTE — PAYOR COMM NOTE
--------------  ADMISSION REVIEW     Payor: TARA NIELSEN POS/MCNP  Subscriber #:  [id number]  Authorization Number: [id number]    ADMIT TO INPT STATUS 2/18/24  ADMIT TO OBSERVATION 2/17/24    Patient Seen in: [facility] Emergency Department    History   Stated Complaint: Abd pain    Subjective:   48-year-old male with history of recurrent pancreatitis and hx of  gastrojejunostomy, antecolic enteroenterostomy in June 23  because of his pancreatitis about 9 months ago here with abdominal pain intermittently for the last 5 days.  The first few days he was vomiting a lot but no vomiting in the last few days.  He has been able to eat in the last 24 hours but again today this morning the pain was quite severe and increased and located in the epigastric and right upper abdomen that radiates to the back feeling like his pancreatitis.  He is nauseous but no vomiting today.  No fever.  No alcohol in at least 9 months.  No fever or chills.  No cough or congestion    Objective:   Past Medical History:   Diagnosis Date    Anesthesia complication     cramps;nausea after anesthesia when in 3rd grade    Anxiety state     Duodenitis     Pancreatitis     PONV (postoperative nausea and vomiting)      Past Surgical History:   Procedure Laterality Date    BIOPSY/REMOVAL, LYMPH NODE(S)      R neck    GASTROSTOMY/JEJUNOSTOMY TUBE N/A        Physical Exam     ED Triage Vitals [02/17/24 1228]   BP (!) 154/111   Pulse 105   Resp 18   Temp 98.7 °F (37.1 °C)   Temp src Oral   SpO2 100 %   O2 Device None (Room air)     Current:BP (!) 141/98   Pulse 92   Temp 98.7 °F (37.1 °C) (Oral)   Resp 18   Ht 172.7 cm (5' 8\")   Wt 55.3 kg   SpO2 100%   BMI 18.55 kg/m²     Physical Exam  Cardiovascular:      Rate and Rhythm: Normal rate and regular rhythm.      Pulses: Normal pulses.   Pulmonary:      Effort: Pulmonary effort is normal.      Breath sounds: Normal breath sounds.   Abdominal:      Palpations: Abdomen is soft.      Tenderness: There  is abdominal tenderness in the right upper quadrant and epigastric area. There is no guarding or rebound.   Musculoskeletal:         General: No swelling. Normal range of motion.      Cervical back: Normal range of motion and neck supple.     Labs Reviewed   COMP METABOLIC PANEL (14) - Abnormal; Notable for the following components:       Result Value    Glucose 116 (*)     Alkaline Phosphatase 131 (*)     Globulin  2.7 (*)     All other components within normal limits   LIPASE - Abnormal; Notable for the following components:    Lipase 630 (*)     All other components within normal limits   CBC W/ DIFFERENTIAL - Abnormal; Notable for the following components:    WBC 11.1 (*)     .0 (*)     Neutrophil Absolute Prelim 8.52 (*)     Neutrophil Absolute 8.52 (*)     All other components within normal limits     Comment: Labs independently interpreted by me.  CBC shows mild leukocytosis and thrombocytosis.  CMP fairly normal but the alk phos is little high.  Lipase high at 630.  CT abdomen pelvis independently interpreted by me appears to have an keratitis of the head and possibly necrotizing pancreatitis.  This was discussed with surgery, GI Dr. Berg and admitting Dr. France.  Patient getting fluids and fentanyl and Zofran here.      CT ABDOMEN+PELVIS    Acute pancreatitis involving the pancreatic head noted.  Slightly heterogeneous enhancement of the pancreatic head suggest necrotizing pancreatitis.  There is surrounding fat stranding and fluid but no loculated fluid collection.  Scattered pancreatic calcifications are again noted.  Small cystic foci in the pancreatic head measure up to 1.6 cm and may reflect pseudocysts.  Consider short-term follow-up MRCP without and with contrast.  Mild intrahepatic and extrahepatic biliary ductal dilatation suspected be slightly increased since the previous examination.  Suspected caliber change of the distal intrapancreatic portion of the common bile duct which could could  suggest stricture formation.  Advise biliary laboratory correlation.  Wall thickening of the 2nd and 3rd portion duodenum with surrounding fat stranding probably reactive.  Mildly prominent mesenteric lymph nodes in the right upper quadrant not significantly changed and may be reactive.  Colonic diverticulosis.       Medical Decision Making  Patient with history of recurrent pancreatitis and gastric bypass surgery last year here with 5 days of intermittent abdominal pain and vomiting.  Now its mostly pain today.  Will give pain medication IV fluids and Zofran and Pepcid and do imaging and labs.  Will discuss with surgery    Amount and/or Complexity of Data Reviewed  External Data Reviewed: notes.     Details: GI office notes from yesterday reviewed as well as surgery notes from June and the op report.  Patient had : gastrojejunostomy, antecolic enteroenterostomy  Labs: ordered. Decision-making details documented in ED Course.  Radiology: ordered and independent interpretation performed. Decision-making details documented in ED Course.    Risk  Parenteral controlled substances.  Decision regarding hospitalization.  Risk Details: Pancreatitis, gastrojejunostomy    Disposition and Plan     Clinical Impression:  1. Acute pancreatitis, unspecified complication status, unspecified pancreatitis type       Hospital Problems       Present on Admission  Date Reviewed: 6/14/2023            ICD-10-CM Noted POA    * (Principal) Acute pancreatitis, unspecified complication status, unspecified pancreatitis type K85.90 2/17/2024 Unknown    Hyperglycemia R73.9 2/17/2024 Yes           SURGERY:    Pt presents with recent N/V, abdominal pain ongoing for 5 days. He has a h/o chronic intermittent pancreatitis.His vomiting resolved and he was able to eat the last 2-3 days, but his upper abdominal pain became worse. The pain radiates to his back. No F/C/J.  He does not drink alcohol. Dr. Garcias performed Exploratory laparotomy, gastric  jejunostomy, entero-enterostomy, removal of gastrostomy tube  in June 2023 for gastric outlet obstruction.     WBC 11.1, Hb 15.3, Lipase 630, alk phos 131, Ca 9.4, creat 0.7, alb 4.3.     Impression:     Acute pancreatitis, unspecified complication status, unspecified pancreatitis type      Recommendations:  Unasyn, NPO, IVF hydration, analgesics, PPI. GI consultation. Follow labs, Hb, Ca, creatinine, WBC, plt. Obtain CXR. Follow with serial abd exams. O2 monitor. Incentive spirometer    2/18:      GI:    Patient is a 48 year old male past medical history of chronic alcohol induced pancreatitis status post gastrojejunostomy and enteroenterostomy in June of last year for chronic pancreatitis with gastric outlet obstruction.  Now admitted with recurrent abdominal pain.  Patient has a history of alcohol induced chronic pancreatitis with surgery as mentioned above.  Postoperatively he says he did well for about 6 months and then about 2 months ago began having mild episodic flares of central abdominal pain which would respond to a liquid diet.  These episodes were infrequent and transient.  However, over the past week he was having more episodes of pain.  He then saw his primary gastroenterologist 2 days ago in clinic.  At that time he was feeling better but the next day  the pain returned and was intense and constant prompting a visit to the ED.  CT scan performed there showed pancreatitis with heterogeneity in the head of the pancreas raising the possibility of necrosis.  Patient was admitted and overnight feels better though still requiring IV narcotics.  Patient's last drink of alcohol was about 2 months ago he says.  Denies any new medications.  Denies use of nonsteroidals.  Denies abdominal trauma.      Impression:   Chronic calcific pancreatitis from past alcohol and tobacco use status post gastric bypass and enteroenterostomy 8 months ago, now admitted with recurrent pancreatitis.  CT shows equivocal evidence of  necrotic pancreatitis.     Recommendations:  Bowel rest, Hydration, analgesics        History and Physical     48-year-old male with history of recurrent pancreatitis and hx of  gastrojejunostomy, antecolic enteroenterostomy in June 23  because of his pancreatitis about 9 months ago here with abdominal pain intermittently for the last 5 days. Per patient pain was severe and located in the epigastric and right upper abdomen that radiates to the back feeling like his pancreatitis.  He is nauseous but no vomiting today.  No fever.  No alcohol in at least 9 months.  No fever or chills.  No cough or congestion       Physical Exam:   Vital Signs:  Blood pressure 113/84, pulse 73, temperature 98.7 °F (37.1 °C), temperature source Oral, resp. rate 20, height 5' 8\" (1.727 m), weight 121 lb 12.8 oz (55.2 kg), SpO2 98%.    Abdominal:      General: Abdomen is flat.      Palpations: Abdomen is soft.      Tenderness: There is abdominal tenderness.           Lab Results   Component Value Date     WBC 7.0 02/18/2024     HGB 12.6 (L) 02/18/2024     HCT 37.6 (L) 02/18/2024     .0 02/18/2024     CREATSERUM 0.75 02/18/2024     BUN 6 (L) 02/18/2024      02/18/2024     K 3.4 (L) 02/18/2024      02/18/2024     CO2 27.0 02/18/2024     GLU 88 02/18/2024     CA 8.6 (L) 02/18/2024       Assessment/Plan:     Acute pancreatitis, unspecified complication status, unspecified pancreatitis type  NPO, IV fluids, seen by GI and surgery  Pain meds    Hyperglycemia          NURSING:  Pain in abdomen r/t pancreatitis managed with IV morphine. Frequency updated to every 3 hours. Patient is NPO with ice chips allowed. IV antibiotics continued. IVF infusing at 83 ml/hr.       2/19:     GI:    Current complaints: feeling hungry, endorses some abdominal cramping not related to his pancreatitis pain     Objective:  Blood pressure 132/86, pulse 77, temperature 98.2 °F (36.8 °C), temperature source Oral, resp. rate 18, height 5' 8\" (1.727 m),  weight 121 lb 12.8 oz (55.2 kg), SpO2 99%.  Respiratory: no labored breathing  CV: RRR  Abdomen: nondistended, soft, nontender, BS+  Extremities: no calf tenderness  Neurologic: Ox3     Lab 02/17/24  1356 02/18/24  0535 02/19/24  0552   WBC 11.1* 7.0 5.9   HGB 15.3 12.6* 12.6*   HCT 45.4 37.6* 36.5*   .0* 357.0 363.0     Assessment  Chronic calcific pancreatitis from past alcohol and tobacco use status post gastric bypass and enteroenterostomy 8 months ago, now admitted with recurrent pancreatitis.   CT shows without clear evidence of necrotic pancreatitis.  Labs, exam stable     Plan  - clear liquid diet  - continue antibiotic therapy  - continue IVF  - repeat Ct abd/ pelvis am      MEDICATIONS ADMINISTERED IN LAST 1 DAY:  ampicillin-sulbactam (Unasyn) 3 g in sodium chloride 0.9% 100mL IVPB-ADD       Date Action Dose Route User    2/19/2024 0807 New Bag 3 g Intravenous Latesha Batres RN    2/19/2024 0333 New Bag 3 g Intravenous Glory Baumann RN    2/18/2024 2148 New Bag 3 g Intravenous Glory Baumann RN    2/18/2024 1524 New Bag 3 g Intravenous Oralia Peterson RN          dextrose 5%-sodium chloride 0.45% infusion       Date Action Dose Route User    2/19/2024 0200 New Bag (none) Intravenous Glory Baumann RN          morphINE PF 2 MG/ML injection 4 mg       Date Action Dose Route User    2/18/2024 1302 Given 4 mg Intravenous Oralia Peterson RN          morphINE PF 2 MG/ML injection 4 mg       Date Action Dose Route User    2/19/2024 0545 Given 4 mg Intravenous Glory Baumann RN    2/19/2024 0200 Given 4 mg Intravenous Glory Baumann RN    2/18/2024 2115 Given 4 mg Intravenous Glory Baumann RN    2/18/2024 1631 Given 4 mg Intravenous Oralia Peterson RN

## 2024-02-19 NOTE — PROGRESS NOTES
[facility]    Progress Note    Jarrett Bal Patient Status:  Inpatient    1975 MRN [id number]   Location [facility] 4W/SW/SE Attending Iliana Beverly MD   Hosp Day # 1 PCP None Pcp     Chief Complaint: Abd pain    Subjective:     Constitutional: Negative.    HENT: Negative.     Eyes: Negative.    Respiratory: Negative.     Cardiovascular: Negative.    Gastrointestinal: Negative.    Endocrine: Negative.    Genitourinary: Negative.    Musculoskeletal: Negative.    Skin: Negative.    Allergic/Immunologic: Negative.    Neurological: Negative.    Hematological: Negative.    Psychiatric/Behavioral: Negative.         Objective:   Blood pressure 127/85, pulse 87, temperature 98.8 °F (37.1 °C), temperature source Oral, resp. rate 18, height 5' 8\" (1.727 m), weight 121 lb 12.8 oz (55.2 kg), SpO2 100%.  Physical Exam  Vitals and nursing note reviewed.   Constitutional:       Appearance: Normal appearance.   HENT:      Head: Normocephalic and atraumatic.   Eyes:      Pupils: Pupils are equal, round, and reactive to light.   Cardiovascular:      Rate and Rhythm: Normal rate and regular rhythm.      Pulses: Normal pulses.      Heart sounds: Normal heart sounds.   Pulmonary:      Effort: Pulmonary effort is normal.      Breath sounds: Normal breath sounds.   Abdominal:      General: Abdomen is flat. Bowel sounds are normal.      Palpations: Abdomen is soft.   Musculoskeletal:         General: Normal range of motion.      Cervical back: Neck supple.   Skin:     General: Skin is warm.   Neurological:      General: No focal deficit present.      Mental Status: He is alert and oriented to person, place, and time.   Psychiatric:         Mood and Affect: Mood normal.         Results:   Lab Results   Component Value Date    WBC 5.9 2024    HGB 12.6 (L) 2024    HCT 36.5 (L) 2024    .0 2024    CREATSERUM 0.75 2024    BUN 6 (L) 2024     2024    K 3.6  02/19/2024     02/18/2024    CO2 27.0 02/18/2024    GLU 88 02/18/2024    CA 8.6 (L) 02/18/2024    ALB 3.3 02/19/2024    ALKPHO 91 02/19/2024    BILT 0.4 02/19/2024    TP 5.4 (L) 02/19/2024    AST 19 02/19/2024    ALT 15 02/19/2024    PTT 27.8 08/07/2018    INR 0.96 05/19/2023    OLAYINKA 139 (H) 06/16/2023     (HH) 02/17/2024    MG 1.8 12/21/2023    PHOS 3.0 06/23/2023    TROPHS 17 05/19/2023    ETOH <3 12/19/2023       XR CHEST PA + LAT CHEST (CPT=71046)    Result Date: 2/18/2024  CONCLUSION: No radiographic evidence of acute cardiopulmonary abnormality.  Preliminary report was given by [facility].  There are no clinically significant discrepancies.    elm-remote    Dictated by (CST): Hudson Ambrose MD on 2/18/2024 at 8:02 AM     Finalized by (CST): Hudson Ambrose MD on 2/18/2024 at 8:03 AM          CT ABDOMEN+PELVIS BARIATRIC PROTOCOL (CONTRAST ONLY) (CPT=74177)    Result Date: 2/17/2024  CONCLUSION:  Acute pancreatitis involving the pancreatic head noted.  Slightly heterogeneous enhancement of the pancreatic head suggest necrotizing pancreatitis.  There is surrounding fat stranding and fluid but no loculated fluid collection.  Scattered pancreatic calcifications are again noted.  Small cystic foci in the pancreatic head measure up to 1.6 cm and may reflect pseudocysts.  Consider short-term follow-up MRCP without and with contrast.  Mild intrahepatic and extrahepatic biliary ductal dilatation suspected be slightly increased since the previous examination.  Suspected caliber change of the distal intrapancreatic portion of the common bile duct which could could suggest stricture formation.  Advise biliary laboratory correlation.  Wall thickening of the 2nd and 3rd portion duodenum with surrounding fat stranding probably reactive.  Mildly prominent mesenteric lymph nodes in the right upper quadrant not significantly changed and may be reactive.  Colonic diverticulosis.  Lesser incidental findings as  above.      Dictated by (CST): Tom Simpson MD on 2/17/2024 at 4:51 PM     Finalized by (CST): Tom Simpson MD on 2/17/2024 at 5:03 PM               Assessment & Plan:     Acute pancreatitis, unspecified complication status, unspecified pancreatitis type doing better , tolerating PO diet  Advance diet as tolerated    Hyperglycemia  D/W RN  DC home in am  Time spent 35 mins              COLIN COTTON, MD LITO  2/19/2024

## 2024-02-19 NOTE — DIETARY NOTE
ADULT NUTRITION INITIAL ASSESSMENT    Pt is at moderate nutrition risk.  Pt meets moderate malnutrition criteria.      CRITERIA FOR MALNUTRITION DIAGNOSIS:  Criteria for non-severe malnutrition diagnosis: acute illness/injury related to wt loss 5% in 1 month, body fat mild depletion, and muscle mass mild depletion.    RECOMMENDATIONS TO MD: See Nutrition Intervention    ADMITTING DIAGNOSIS:  Acute pancreatitis, unspecified complication status, unspecified pancreatitis type [K85.90]    PERTINENT PAST MEDICAL HISTORY:  has a past medical history of Anesthesia complication, Anxiety state, Duodenitis, Pancreatitis, and PONV (postoperative nausea and vomiting).     PATIENT STATUS: Initial 02/19/24: seeing pt due to low BMI. Pt known to RD's from 6/2023 admission with SPCM with wt loss to low of 102# from usual wt of 140#. Pt has been working on regaining wt and got to 135# but loss over the past month due to new onset pain/discomfort. Pt reports recent UBW of 140# but had been heavier than that as well before illness.  Hx of pancreatitis and hx of gastrojejunostmy, enteroenterostomy in June 2023. Admitted with acute pancreatitis (recurrent)--now resolving (etiology unknown).  Diet advanced to low fat today--ate pancakes, eggs and pudding at lunch--ab well except mild cramping.  Pt uses ensure. Last admission tolerated ensure enlive--will provide bid until DC.       FOOD/NUTRITION RELATED HISTORY:  Appetite: Fair to good  Intake:  pancakes, eggs, pudding at lunch today  Intake Meeting Needs: Marginal, added oral nutrition supplements (ONS)  Percent Meals Eaten (last 6 days)       None           Food Allergies: No Known Food Allergies (NKFA)  Cultural/Ethnic/Taoist Preferences: Not Obtained    GASTROINTESTINAL:  N/V resolved, cramping. Pancreatitis improved.      MEDICATIONS: reviewed   heparin  5,000 Units Subcutaneous 2 times per day       LABS: reviewed. LFT improved.    Recent Labs     02/17/24  1356 02/18/24  0535  02/19/24  0545   * 88  --    BUN 10 6*  --    CREATSERUM 0.77 0.75  --    CA 9.4 8.6*  --     137  --    K 3.9 3.4* 3.6    107  --    CO2 29.0 27.0  --    OSMOCALC 284 281  --        NUTRITION RELATED PHYSICAL FINDINGS:  - Nutrition Focused Physical Exam (NFPE): mild depletion body fat orbital region, triceps region, and fat overlying rib cage region and mild depletion muscle mass temple region, clavicle region, scapular region, shoulder region, thigh region, and calf region /generally thin.   - Fluid Accumulation: none  see RN documentation for details  - Skin Integrity: intact see RN documentation for details    ANTHROPOMETRICS:  HT: 172.7 cm (5' 8\")  WT: 55.2 kg (121 lb 12.8 oz)   BMI: Body mass index is 18.52 kg/m².  BMI CLASSIFICATION: less than 19 kg/m2 - underweight  IBW: 154 lbs        79% IBW  Usual Body Wt: 140-150 lbs before illness, loss to low of 102#, regain to 135# 1 month ago, now loss again       90% UBW of 1 mo ago, and 87% pre illness UBW    WEIGHT HISTORY:  Patient Weight(s) for the past 336 hrs:   Weight   02/17/24 2006 55.2 kg (121 lb 12.8 oz)   02/17/24 1228 55.3 kg (122 lb)     Wt Readings from Last 10 Encounters:   02/17/24 55.2 kg (121 lb 12.8 oz)   12/28/23 59 kg (130 lb)   12/20/23 58.1 kg (128 lb 1.6 oz)   06/23/23 51.2 kg (112 lb 12.8 oz)   05/29/23 57.4 kg (126 lb 8 oz)   05/08/23 47.3 kg (104 lb 4.4 oz)   04/12/23 54.4 kg (120 lb)   06/30/20 63.5 kg (140 lb)   01/17/19 62.9 kg (138 lb 11.2 oz)   12/04/18 63.5 kg (140 lb)       NUTRITION DIAGNOSIS/PROBLEM:    Moderate Malnutrition related to Acute - Physiological causes resulting in anorexia or diminished intake and chronic due to hx of pancreatitis as evidenced by recent wt loss 10-15#, hx of 20# wt loss and at least mild muscle and fat deficits and recent decreased po intake    NUTRITION INTERVENTION:     NUTRITION PRESCRIPTION:   Estimated Nutrition needs: --dosing wt of 55 kg - wt taken on 2/17  Calories:  3418-3191 calories/day (30-35 calories per kg Dosing wt)  Protein: 72-83 g protein/day (1.3-1.5 g protein/kg Dosing wt)      - Diet:       Procedures    Regular/General diet Is Patient on Accuchecks? No; Misc Restriction: Low Fat      - RD Malnutrition Care Plan: Encouraged increased PO intake, Encouraged small frequent meals with emphasis on high calorie/high protein, and Initiated ONS (oral nutritional supplements), low fat   - Meals and snacks: Encouraged adequate PO intake  - Medical Food Supplements-RD added Ensure Enlive (350 calories/ 20 g protein each) Chocolate BID Rational/use of oral supplements discussed.  - Vitamin and mineral supplements: none  - Feeding assistance: meal set up  - Nutrition education:  pt aware to limit fat and abstain from ETOH    - Coordination of nutrition care: collaboration with other providers  - Discharge and transfer of nutrition care to new setting or provider: to be determined    MONITOR AND EVALUATE/NUTRITION GOALS:  - Food and Nutrient Intake:      Monitor: adequacy of PO intake, tolerance of PO intake, adequacy of supplement intake, and tolerance of supplement intake  - Food and Nutrient Administration:      Monitor: N/A  - Anthropometric Measurement:    Monitor weight  - Nutrition Goals:      gradual wt gain as able, PO and supplement greater than 75% of needs, labs within acceptable limits, support body systems, and improved GI status    DIETITIAN FOLLOW UP: RD to follow and monitor nutrition status      Beryl Blunt RD, LDN   Clinical Dietitian [id number]

## 2024-02-20 ENCOUNTER — APPOINTMENT (OUTPATIENT)
Dept: CT IMAGING | Facility: HOSPITAL | Age: 49
End: 2024-02-20
Attending: NURSE PRACTITIONER
Payer: COMMERCIAL

## 2024-02-20 VITALS
DIASTOLIC BLOOD PRESSURE: 92 MMHG | HEART RATE: 97 BPM | TEMPERATURE: 99 F | SYSTOLIC BLOOD PRESSURE: 145 MMHG | WEIGHT: 121.81 LBS | BODY MASS INDEX: 18.46 KG/M2 | OXYGEN SATURATION: 98 % | HEIGHT: 68 IN | RESPIRATION RATE: 18 BRPM

## 2024-02-20 LAB
ANION GAP SERPL CALC-SCNC: 5 MMOL/L (ref 0–18)
BUN BLD-MCNC: <5 MG/DL (ref 9–23)
CALCIUM BLD-MCNC: 8.5 MG/DL (ref 8.7–10.4)
CHLORIDE SERPL-SCNC: 108 MMOL/L (ref 98–112)
CO2 SERPL-SCNC: 28 MMOL/L (ref 21–32)
CREAT BLD-MCNC: 0.67 MG/DL
EGFRCR SERPLBLD CKD-EPI 2021: 116 ML/MIN/1.73M2 (ref 60–?)
GLUCOSE BLD-MCNC: 91 MG/DL (ref 70–99)
POTASSIUM SERPL-SCNC: 3.8 MMOL/L (ref 3.5–5.1)
SODIUM SERPL-SCNC: 141 MMOL/L (ref 136–145)

## 2024-02-20 PROCEDURE — 99232 SBSQ HOSP IP/OBS MODERATE 35: CPT | Performed by: SURGERY

## 2024-02-20 PROCEDURE — 74177 CT ABD & PELVIS W/CONTRAST: CPT | Performed by: NURSE PRACTITIONER

## 2024-02-20 RX ORDER — HYDROCODONE BITARTRATE AND ACETAMINOPHEN 10; 325 MG/1; MG/1
1 TABLET ORAL EVERY 6 HOURS PRN
Qty: 15 TABLET | Refills: 0 | Status: SHIPPED | OUTPATIENT
Start: 2024-02-20

## 2024-02-20 NOTE — DISCHARGE SUMMARY
[facility]    Discharge Summary    Jarrett Bal Patient Status:  Inpatient    1975 MRN [id number]   Location [facility] 4W/SW/SE Attending Iliana Beverly MD   Hosp Day # 2 PCP None Pcp     Date of Admission: 2024   Date of Discharge: 2024    Admitting Diagnosis: Acute pancreatitis, unspecified complication status, unspecified pancreatitis type [K85.90]    Disposition: Home    Discharge Diagnosis: .Principal Problem:    Acute pancreatitis, unspecified complication status, unspecified pancreatitis type  Active Problems:    Hyperglycemia      Hospital Course:   Reason for Admission: Abd pain    Discharge Physical Exam: General appearance: alert, appears stated age, and cooperative  Head: Normocephalic, without obvious abnormality, atraumatic  Pulmonary:  clear to auscultation bilaterally  Cardiovascular: S1, S2 normal, no murmur, click, rub or gallop, regular rate and rhythm, S1, S2 normal  Abdominal: soft, non-tender; bowel sounds normal; no masses,  no organomegaly  Extremities: extremities normal, atraumatic, no cyanosis or edema  Pulses: 2+ and symmetric  Skin: Skin color, texture, turgor normal. No rashes or lesions  Neurologic: Grossly normal    Hospital Course: Pt seen by GI kept NPO, IV fluids pain meds, doing better tolerating PO diet will DC home    Complications: None    Consultants         Provider   Role Specialty     Iliana Beverly MD  Consulting Physician Internal Medicine     Omayra Mccauley MD  Consulting Physician SURGERYGENERAL Otis Matthew B, MD  Consulting Physician GASTROENTEROLOGY            Pending Labs       Order Current Status    RAINBOW DRAW LAVENDER In process            Discharge Plan:   Discharge Condition: Stable    Current Discharge Medication List        Home Meds - Modified    Details   HYDROcodone-acetaminophen  MG Oral Tab Take 1 tablet by mouth every 6 (six) hours as needed for Pain.           Home Meds - Unchanged     Details   acetaminophen 500 MG Oral Tab Take 1 tablet (500 mg total) by mouth every 4 (four) hours as needed for Fever (equal to or greater than 100.4).                 Discharge Diet: As tolerated    Discharge Activity: As tolerated    Follow up:       Time spent 35 mins        COLIN COTTON, MD LITO  2/20/2024

## 2024-02-20 NOTE — PLAN OF CARE
Pt is alert and oriented x4. On room air. Vital signs stable. Ambulating independently. Continent. Advanced to regular diet. Tolerating well. Complains of slight increase in abdominal pain after eating. Morphine IV given. CT of the abdomen done. Shows improvement. Pt cleared for a discharge. AVS explained. Pt states understanding. IV removed with no signs and symptoms of an infection. Pt took all his belongings and left with his father.    Problem: Patient Centered Care  Goal: Patient preferences are identified and integrated in the patient's plan of care  Description: Interventions:  - Provide timely, complete, and accurate information to patient/family  - Incorporate patient and family knowledge, values, beliefs, and cultural backgrounds into the planning and delivery of care  - Encourage patient/family to participate in care and decision-making at the level they choose  - Honor patient and family perspectives and choices  Outcome: Progressing     Problem: PAIN - ADULT  Goal: Verbalizes/displays adequate comfort level or patient's stated pain goal  Description: INTERVENTIONS:  - Encourage pt to monitor pain and request assistance  - Assess pain using appropriate pain scale  - Administer analgesics based on type and severity of pain and evaluate response  - Implement non-pharmacological measures as appropriate and evaluate response  - Consider cultural and social influences on pain and pain management  - Manage/alleviate anxiety  - Utilize distraction and/or relaxation techniques  - Monitor for opioid side effects  - Notify MD/LIP if interventions unsuccessful or patient reports new pain  - Anticipate increased pain with activity and pre-medicate as appropriate  Outcome: Progressing

## 2024-02-20 NOTE — PROGRESS NOTES
[facility], [place]    Progress Note    Jarrett Bal Patient Status:  Inpatient    1975 MRN [id number]   Location [facility] 4W/SW/SE Attending Iliana Beverly MD   Hosp Day # 2 PCP None Pcp       Subjective:   Pt feeling well, denies pain. No nausea or vomiting, tolerating PO intake.     Objective:   Vital Signs:  Blood pressure 128/89, pulse 76, temperature 98.2 °F (36.8 °C), temperature source Oral, resp. rate 18, height 5' 8\" (1.727 m), weight 121 lb 12.8 oz (55.2 kg), SpO2 98%.    Physical Exam     General: No acute distress. Alert and oriented x 3.  HEENT: Moist mucous membranes. Anicteric.   Neck: Supple, No JVD.   Respiratory: Nonlabored resp.  Cardiovascular: Regular rate.   Abdomen: Soft, NT, no rebound tnd, no guarding, nondistended.  No masses. Normal bowel sounds.    Neurologic: No focal neurological deficits.  Musculoskeletal: Full range of motion of all extremities. No calf tenderness. No swelling noted.  Integument: No lesions. No erythema.  Psychiatric: Appropriate mood and affect.         Assessment and Plan:     Acute pancreatitis, unspecified complication status, unspecified pancreatitis type      Hyperglycemia    CT A/P ordered today to reevaluate pancreatitis. Will follow up results. Stable for discharge from surgical standpoint if scan looks okay.     Results:     Lab Results   Component Value Date    WBC 5.9 2024    HGB 12.6 (L) 2024    HCT 36.5 (L) 2024    .0 2024    CREATSERUM 0.67 (L) 2024    BUN <5 (L) 2024     2024    K 3.8 2024     2024    CO2 28.0 2024    GLU 91 2024    CA 8.5 (L) 2024    ALB 3.3 2024    ALKPHO 91 2024    BILT 0.4 2024    TP 5.4 (L) 2024    AST 19 2024    ALT 15 2024    PTT 27.8 2018    INR 0.96 2023    OLAYINKA 139 (H) 2023     (HH) 2024    MG 1.8 2023    PHOS 3.0 2023    ETOH <3  12/19/2023       No results found.                Edin Bashir PA-C  2/20/2024

## 2024-02-20 NOTE — PLAN OF CARE
No acute changes over night. Pt is alert and oriented on RA. Pt is voiding freely. Still no BM over night. Pain controlled with IVP morphine. Pt is up independently. IVF running as ordered. Pt tolerating general diet. Call light is within reach and safety measures are in place.    Problem: PAIN - ADULT  Goal: Verbalizes/displays adequate comfort level or patient's stated pain goal  Description: INTERVENTIONS:  - Encourage pt to monitor pain and request assistance  - Assess pain using appropriate pain scale  - Administer analgesics based on type and severity of pain and evaluate response  - Implement non-pharmacological measures as appropriate and evaluate response  - Consider cultural and social influences on pain and pain management  - Manage/alleviate anxiety  - Utilize distraction and/or relaxation techniques  - Monitor for opioid side effects  - Notify MD/LIP if interventions unsuccessful or patient reports new pain  - Anticipate increased pain with activity and pre-medicate as appropriate  Outcome: Progressing

## 2024-02-20 NOTE — PROGRESS NOTES
[facility]  Gastroenterology Progress Note    Jarrett Bal Patient Status:  Inpatient    1975 MRN [id number]   Location [facility] 4W/SW/SE Attending Iliana Beverly MD   Hosp Day # 2 PCP None Pcp     Subjective:  Jarrett Bal is a(n) 48 year old male.    Current complaints: tolerating diet, still with cramping after eating, awaiting CT scan    Objective:  Blood pressure 128/89, pulse 76, temperature 98.2 °F (36.8 °C), temperature source Oral, resp. rate 18, height 5' 8\" (1.727 m), weight 121 lb 12.8 oz (55.2 kg), SpO2 98%.  Respiratory: no labored breathing  CV: RRR  Abdomen: nondistended, soft, nontender  Extremities: no calf tenderness  Neurologic: Ox3    Labs:   Recent Labs   Lab 24  1356 24  0535 24  0545 24  0552 24  0906   WBC 11.1* 7.0  --  5.9  --    HGB 15.3 12.6*  --  12.6*  --    HCT 45.4 37.6*  --  36.5*  --    .0* 357.0  --  363.0  --    CREATSERUM 0.77 0.75  --   --  0.67*   BUN 10 6*  --   --  <5*    137  --   --  141   K 3.9 3.4* 3.6  --  3.8    107  --   --  108   CO2 29.0 27.0  --   --  28.0   * 88  --   --  91   CA 9.4 8.6*  --   --  8.5*   ALB 4.3  --  3.3  --   --    ALKPHO 131*  --  91  --   --    BILT 0.5  --  0.4  --   --    TP 7.0  --  5.4*  --   --    AST 31  --  19  --   --    ALT 33  --  15  --   --        Recent Labs   Lab 24  1356   *        Imaging:  XR CHEST PA + LAT CHEST (CPT=71046)    Result Date: 2024  CONCLUSION: No radiographic evidence of acute cardiopulmonary abnormality.  Preliminary report was given by [facility].  There are no clinically significant discrepancies.    elm-remote    Dictated by (CST): Hudson Ambrose MD on 2024 at 8:02 AM     Finalized by (CST): Hudson Ambrose MD on 2024 at 8:03 AM          CT ABDOMEN+PELVIS BARIATRIC PROTOCOL (CONTRAST ONLY) (CPT=74177)    Result Date: 2024  CONCLUSION:  Acute pancreatitis involving the pancreatic head noted.   Slightly heterogeneous enhancement of the pancreatic head suggest necrotizing pancreatitis.  There is surrounding fat stranding and fluid but no loculated fluid collection.  Scattered pancreatic calcifications are again noted.  Small cystic foci in the pancreatic head measure up to 1.6 cm and may reflect pseudocysts.  Consider short-term follow-up MRCP without and with contrast.  Mild intrahepatic and extrahepatic biliary ductal dilatation suspected be slightly increased since the previous examination.  Suspected caliber change of the distal intrapancreatic portion of the common bile duct which could could suggest stricture formation.  Advise biliary laboratory correlation.  Wall thickening of the 2nd and 3rd portion duodenum with surrounding fat stranding probably reactive.  Mildly prominent mesenteric lymph nodes in the right upper quadrant not significantly changed and may be reactive.  Colonic diverticulosis.  Lesser incidental findings as above.      Dictated by (CST): Tom Simpson MD on 2/17/2024 at 4:51 PM     Finalized by (CST): Tom Simpson MD on 2/17/2024 at 5:03 PM            Problem list:  Patient Active Problem List   Diagnosis    Hypokalemia    Duodenitis    Gastric mass    Gastric outlet obstruction (HCC)    Prolonged QT interval    Pneumoperitoneum    CARLOS A (acute kidney injury) (HCC)    Hyponatremia    Metabolic alkalosis    Lightheaded    Weakness generalized    Traumatic injury of head, initial encounter    Hypotension, unspecified hypotension type    Malfunction of jejunostomy tube (HCC)    Hyperglycemia    Acute pancreatitis, unspecified complication status, unspecified pancreatitis type       Assessment  Chronic calcific pancreatitis from past alcohol and tobacco use status post gastric bypass and enteroenterostomy 8 months ago, now admitted with recurrent pancreatitis.   CT shows without clear evidence of necrotic pancreatitis.  Labs, exam stable, tolerating diet     Plan  - repeat Ct  abd/ pelvis to be completed this morning   - Likely discharge later today   - Smoking cessation strongly recommended.     Is this a shared or split note between Advanced Practice Provider and Physician? Yes    Michelle Torres, APRN    2/20/2024  10:59 AM

## 2024-02-21 ENCOUNTER — PATIENT OUTREACH (OUTPATIENT)
Dept: CASE MANAGEMENT | Age: 49
End: 2024-02-21

## 2024-02-21 NOTE — PAYOR COMM NOTE
--------------  DISCHARGE REVIEW    Payor: TARA NIELSEN POS/MCNP  Subscriber #:  [id number]  Authorization Number: [id number]    Admit date: 24  Admit time:  11:38 AM  Discharge Date: 2024  3:23 PM     Admitting Physician: Iliana Beverly MD  Attending Physician:  No att. providers found  Primary Care Physician: Pcp, None          Discharge Summary Notes        Discharge Summary signed by Iliana Beverly MD at 2024 10:55 AM       Author: Iliana Beverly MD Specialty: Internal Medicine Author Type: Physician    Filed: 2024 10:55 AM Date of Service: 2024 10:54 AM Status: Signed    : Iliana Beverly MD (Physician)         [facility]    Discharge Summary    Jarrett Bal Patient Status:  Inpatient    1975 MRN [id number]   Location [facility] 4//SE Attending Iliana Beverly MD   Hosp Day # 2 PCP None Pcp     Date of Admission: 2024   Date of Discharge: 2024    Admitting Diagnosis: Acute pancreatitis, unspecified complication status, unspecified pancreatitis type [K85.90]    Disposition: Home    Discharge Diagnosis: .Principal Problem:    Acute pancreatitis, unspecified complication status, unspecified pancreatitis type  Active Problems:    Hyperglycemia      Hospital Course:   Reason for Admission: Abd pain    Discharge Physical Exam: General appearance: alert, appears stated age, and cooperative  Head: Normocephalic, without obvious abnormality, atraumatic  Pulmonary:  clear to auscultation bilaterally  Cardiovascular: S1, S2 normal, no murmur, click, rub or gallop, regular rate and rhythm, S1, S2 normal  Abdominal: soft, non-tender; bowel sounds normal; no masses,  no organomegaly  Extremities: extremities normal, atraumatic, no cyanosis or edema  Pulses: 2+ and symmetric  Skin: Skin color, texture, turgor normal. No rashes or lesions  Neurologic: Grossly normal    Hospital Course: Pt seen by GI kept NPO, IV fluids pain meds, doing  better tolerating PO diet will DC home    Complications: None    Consultants         Provider   Role Specialty     Iliana Beverly MD  Consulting Physician Internal Medicine     Omayra Mccauley MD  Consulting Physician SURGERY, GENERAL     Naif Berg MD  Consulting Physician GASTROENTEROLOGY            Pending Labs       Order Current Status    RAINBOW DRAW LAVENDER In process            Discharge Plan:   Discharge Condition: Stable    Current Discharge Medication List        Home Meds - Modified    Details   HYDROcodone-acetaminophen  MG Oral Tab Take 1 tablet by mouth every 6 (six) hours as needed for Pain.           Home Meds - Unchanged    Details   acetaminophen 500 MG Oral Tab Take 1 tablet (500 mg total) by mouth every 4 (four) hours as needed for Fever (equal to or greater than 100.4).                 Discharge Diet: As tolerated    Discharge Activity: As tolerated    Follow up:       Time spent 35 mins        COLIN COTTON, MD ILIANA  2/20/2024      Electronically signed by Iliana Beverly MD on 2/20/2024 10:55 AM         REVIEWER COMMENTS

## 2024-02-21 NOTE — PROGRESS NOTES
Left message on mailbox for pt to call [facility] back for TCM.  [facility] contact information [phone] included in message.

## 2024-03-02 ENCOUNTER — APPOINTMENT (OUTPATIENT)
Dept: CT IMAGING | Facility: HOSPITAL | Age: 49
End: 2024-03-02
Attending: EMERGENCY MEDICINE
Payer: COMMERCIAL

## 2024-03-02 ENCOUNTER — HOSPITAL ENCOUNTER (INPATIENT)
Facility: HOSPITAL | Age: 49
LOS: 3 days | Discharge: HOME | End: 2024-03-05
Attending: EMERGENCY MEDICINE | Admitting: HOSPITALIST
Payer: COMMERCIAL

## 2024-03-02 DIAGNOSIS — E87.6 HYPOKALEMIA: ICD-10-CM

## 2024-03-02 DIAGNOSIS — K86.1 CHRONIC PANCREATITIS, UNSPECIFIED PANCREATITIS TYPE (HCC): ICD-10-CM

## 2024-03-02 DIAGNOSIS — K85.90 ACUTE PANCREATITIS, UNSPECIFIED COMPLICATION STATUS, UNSPECIFIED PANCREATITIS TYPE (HCC): Primary | ICD-10-CM

## 2024-03-02 LAB
ALBUMIN SERPL-MCNC: 4.3 G/DL (ref 3.2–4.8)
ALP LIVER SERPL-CCNC: 134 U/L
ALT SERPL-CCNC: 15 U/L
ANION GAP SERPL CALC-SCNC: 2 MMOL/L (ref 0–18)
ANION GAP SERPL CALC-SCNC: 5 MMOL/L (ref 0–18)
AST SERPL-CCNC: 15 U/L (ref ?–34)
BASOPHILS # BLD AUTO: 0.06 X10(3) UL (ref 0–0.2)
BASOPHILS NFR BLD AUTO: 0.7 %
BILIRUB DIRECT SERPL-MCNC: 0.2 MG/DL (ref ?–0.3)
BILIRUB SERPL-MCNC: 0.5 MG/DL (ref 0.3–1.2)
BILIRUB UR QL: NEGATIVE
BUN BLD-MCNC: 8 MG/DL (ref 9–23)
BUN BLD-MCNC: 9 MG/DL (ref 9–23)
BUN/CREAT SERPL: 11.3 (ref 10–20)
BUN/CREAT SERPL: 12.5 (ref 10–20)
CALCIUM BLD-MCNC: 8.5 MG/DL (ref 8.7–10.4)
CALCIUM BLD-MCNC: 9.7 MG/DL (ref 8.7–10.4)
CHLORIDE SERPL-SCNC: 104 MMOL/L (ref 98–112)
CHLORIDE SERPL-SCNC: 109 MMOL/L (ref 98–112)
CLARITY UR: CLEAR
CO2 SERPL-SCNC: 26 MMOL/L (ref 21–32)
CO2 SERPL-SCNC: 28 MMOL/L (ref 21–32)
CREAT BLD-MCNC: 0.71 MG/DL
CREAT BLD-MCNC: 0.72 MG/DL
DEPRECATED RDW RBC AUTO: 40.8 FL (ref 35.1–46.3)
EGFRCR SERPLBLD CKD-EPI 2021: 113 ML/MIN/1.73M2 (ref 60–?)
EGFRCR SERPLBLD CKD-EPI 2021: 113 ML/MIN/1.73M2 (ref 60–?)
EOSINOPHIL # BLD AUTO: 0.33 X10(3) UL (ref 0–0.7)
EOSINOPHIL NFR BLD AUTO: 3.7 %
ERYTHROCYTE [DISTWIDTH] IN BLOOD BY AUTOMATED COUNT: 12.1 % (ref 11–15)
GLUCOSE BLD-MCNC: 115 MG/DL (ref 70–99)
GLUCOSE BLD-MCNC: 91 MG/DL (ref 70–99)
GLUCOSE UR-MCNC: NORMAL MG/DL
HCT VFR BLD AUTO: 45.4 %
HGB BLD-MCNC: 15.2 G/DL
HGB UR QL STRIP.AUTO: NEGATIVE
IMM GRANULOCYTES # BLD AUTO: 0.04 X10(3) UL (ref 0–1)
IMM GRANULOCYTES NFR BLD: 0.4 %
KETONES UR-MCNC: 60 MG/DL
LEUKOCYTE ESTERASE UR QL STRIP.AUTO: NEGATIVE
LIPASE SERPL-CCNC: 422 U/L (ref 13–75)
LYMPHOCYTES # BLD AUTO: 1.53 X10(3) UL (ref 1–4)
LYMPHOCYTES NFR BLD AUTO: 16.9 %
MCH RBC QN AUTO: 30.7 PG (ref 26–34)
MCHC RBC AUTO-ENTMCNC: 33.5 G/DL (ref 31–37)
MCV RBC AUTO: 91.7 FL
MONOCYTES # BLD AUTO: 0.7 X10(3) UL (ref 0.1–1)
MONOCYTES NFR BLD AUTO: 7.8 %
NEUTROPHILS # BLD AUTO: 6.37 X10 (3) UL (ref 1.5–7.7)
NEUTROPHILS # BLD AUTO: 6.37 X10(3) UL (ref 1.5–7.7)
NEUTROPHILS NFR BLD AUTO: 70.5 %
NITRITE UR QL STRIP.AUTO: NEGATIVE
OSMOLALITY SERPL CALC.SUM OF ELEC: 282 MOSM/KG (ref 275–295)
OSMOLALITY SERPL CALC.SUM OF ELEC: 283 MOSM/KG (ref 275–295)
PH UR: 5.5 [PH] (ref 5–8)
PLATELET # BLD AUTO: 480 10(3)UL (ref 150–450)
POTASSIUM SERPL-SCNC: 3.7 MMOL/L (ref 3.5–5.1)
POTASSIUM SERPL-SCNC: 3.9 MMOL/L (ref 3.5–5.1)
PROT SERPL-MCNC: 7 G/DL (ref 5.7–8.2)
RBC # BLD AUTO: 4.95 X10(6)UL
SODIUM SERPL-SCNC: 137 MMOL/L (ref 136–145)
SODIUM SERPL-SCNC: 137 MMOL/L (ref 136–145)
SP GR UR STRIP: >1.03 (ref 1–1.03)
UROBILINOGEN UR STRIP-ACNC: NORMAL
WBC # BLD AUTO: 9 X10(3) UL (ref 4–11)

## 2024-03-02 PROCEDURE — 74177 CT ABD & PELVIS W/CONTRAST: CPT | Performed by: EMERGENCY MEDICINE

## 2024-03-02 PROCEDURE — 99223 1ST HOSP IP/OBS HIGH 75: CPT | Performed by: HOSPITALIST

## 2024-03-02 RX ORDER — KETOROLAC TROMETHAMINE 15 MG/ML
15 INJECTION, SOLUTION INTRAMUSCULAR; INTRAVENOUS ONCE
Status: COMPLETED | OUTPATIENT
Start: 2024-03-02 | End: 2024-03-02

## 2024-03-02 RX ORDER — MORPHINE SULFATE 4 MG/ML
4 INJECTION, SOLUTION INTRAMUSCULAR; INTRAVENOUS EVERY 2 HOUR PRN
Status: DISCONTINUED | OUTPATIENT
Start: 2024-03-02 | End: 2024-03-05

## 2024-03-02 RX ORDER — ONDANSETRON 2 MG/ML
4 INJECTION INTRAMUSCULAR; INTRAVENOUS ONCE
Status: COMPLETED | OUTPATIENT
Start: 2024-03-02 | End: 2024-03-02

## 2024-03-02 RX ORDER — SENNOSIDES 8.6 MG
17.2 TABLET ORAL NIGHTLY PRN
Status: DISCONTINUED | OUTPATIENT
Start: 2024-03-02 | End: 2024-03-05

## 2024-03-02 RX ORDER — ONDANSETRON 2 MG/ML
8 INJECTION INTRAMUSCULAR; INTRAVENOUS EVERY 4 HOURS PRN
Status: DISCONTINUED | OUTPATIENT
Start: 2024-03-02 | End: 2024-03-05

## 2024-03-02 RX ORDER — PANCRELIPASE 36000; 180000; 114000 [USP'U]/1; [USP'U]/1; [USP'U]/1
36000 CAPSULE, DELAYED RELEASE PELLETS ORAL
COMMUNITY
Start: 2024-02-29 | End: 2024-03-30

## 2024-03-02 RX ORDER — POLYETHYLENE GLYCOL 3350 17 G/17G
17 POWDER, FOR SOLUTION ORAL DAILY
Status: DISCONTINUED | OUTPATIENT
Start: 2024-03-02 | End: 2024-03-05

## 2024-03-02 RX ORDER — BISACODYL 10 MG
10 SUPPOSITORY, RECTAL RECTAL
Status: DISCONTINUED | OUTPATIENT
Start: 2024-03-02 | End: 2024-03-05

## 2024-03-02 RX ORDER — PROCHLORPERAZINE EDISYLATE 5 MG/ML
5 INJECTION INTRAMUSCULAR; INTRAVENOUS EVERY 8 HOURS PRN
Status: DISCONTINUED | OUTPATIENT
Start: 2024-03-02 | End: 2024-03-05

## 2024-03-02 RX ORDER — MELATONIN
3 NIGHTLY PRN
Status: DISCONTINUED | OUTPATIENT
Start: 2024-03-02 | End: 2024-03-05

## 2024-03-02 RX ORDER — SODIUM CHLORIDE, SODIUM LACTATE, POTASSIUM CHLORIDE, CALCIUM CHLORIDE 600; 310; 30; 20 MG/100ML; MG/100ML; MG/100ML; MG/100ML
INJECTION, SOLUTION INTRAVENOUS CONTINUOUS
Status: DISCONTINUED | OUTPATIENT
Start: 2024-03-02 | End: 2024-03-02

## 2024-03-02 RX ORDER — ONDANSETRON 2 MG/ML
4 INJECTION INTRAMUSCULAR; INTRAVENOUS EVERY 6 HOURS PRN
Status: DISCONTINUED | OUTPATIENT
Start: 2024-03-02 | End: 2024-03-02

## 2024-03-02 RX ORDER — SODIUM CHLORIDE, SODIUM LACTATE, POTASSIUM CHLORIDE, CALCIUM CHLORIDE 600; 310; 30; 20 MG/100ML; MG/100ML; MG/100ML; MG/100ML
INJECTION, SOLUTION INTRAVENOUS CONTINUOUS
Status: DISCONTINUED | OUTPATIENT
Start: 2024-03-02 | End: 2024-03-03

## 2024-03-02 RX ORDER — ACETAMINOPHEN 500 MG
500 TABLET ORAL EVERY 4 HOURS PRN
Status: DISCONTINUED | OUTPATIENT
Start: 2024-03-02 | End: 2024-03-05

## 2024-03-02 RX ORDER — POLYETHYLENE GLYCOL 3350 17 G/17G
17 POWDER, FOR SOLUTION ORAL DAILY PRN
Status: DISCONTINUED | OUTPATIENT
Start: 2024-03-02 | End: 2024-03-05

## 2024-03-02 RX ORDER — HEPARIN SODIUM 5000 [USP'U]/ML
5000 INJECTION, SOLUTION INTRAVENOUS; SUBCUTANEOUS EVERY 8 HOURS SCHEDULED
Status: DISCONTINUED | OUTPATIENT
Start: 2024-03-02 | End: 2024-03-05

## 2024-03-02 RX ORDER — ENEMA 19; 7 G/133ML; G/133ML
1 ENEMA RECTAL ONCE AS NEEDED
Status: DISCONTINUED | OUTPATIENT
Start: 2024-03-02 | End: 2024-03-05

## 2024-03-02 RX ORDER — ECHINACEA PURPUREA EXTRACT 125 MG
1 TABLET ORAL
Status: DISCONTINUED | OUTPATIENT
Start: 2024-03-02 | End: 2024-03-05

## 2024-03-02 RX ORDER — MORPHINE SULFATE 2 MG/ML
2 INJECTION, SOLUTION INTRAMUSCULAR; INTRAVENOUS EVERY 2 HOUR PRN
Status: DISCONTINUED | OUTPATIENT
Start: 2024-03-02 | End: 2024-03-05

## 2024-03-02 RX ORDER — MORPHINE SULFATE 2 MG/ML
1 INJECTION, SOLUTION INTRAMUSCULAR; INTRAVENOUS EVERY 2 HOUR PRN
Status: DISCONTINUED | OUTPATIENT
Start: 2024-03-02 | End: 2024-03-05

## 2024-03-02 NOTE — CHRONIC PAIN
[facility]  Report of Consultation    Jarrett Bal Patient Status:  Inpatient    1975 MRN [id number]   Location [facility] 4W/SW/SE Attending Bishop Garcia,*   Hosp Day # 0 PCP None Pcp     Date of Admission:  3/2/2024  Date of Consult:  2024    Reason for Consultation:   1. Acute pancreatitis, unspecified complication status, unspecified pancreatitis type (HCC)          History of Present Illness:  Jarrett Bal is a a(n) 49 year old male intermittent pain spasms and pain scores of 8/10.     History:  Past Medical History:   Diagnosis Date    Anesthesia complication     cramps;nausea after anesthesia when in 3rd grade    Anxiety state     Duodenitis     Pancreatitis (HCC)     PONV (postoperative nausea and vomiting)      Past Surgical History:   Procedure Laterality Date    BIOPSY/REMOVAL, LYMPH NODE(S)      R neck    GASTROSTOMY/JEJUNOSTOMY TUBE N/A      Family History   Problem Relation Age of Onset    Hypertension Father     Hypertension Mother     Psychiatric Mother       reports that he quit smoking about 9 months ago. His smoking use included cigarettes. He has a 12.5 pack-year smoking history. He has never been exposed to tobacco smoke. He has never used smokeless tobacco. He reports that he does not currently use alcohol. He reports that he does not currently use drugs after having used the following drugs: Cannabis.    Allergies:  No Known Allergies    Medications:    Current Facility-Administered Medications:     polyethylene glycol (PEG 3350) (Miralax) 17 g oral packet 17 g, 17 g, Oral, Daily    lactated ringers infusion, , Intravenous, Continuous    heparin (Porcine) 5000 UNIT/ML injection 5,000 Units, 5,000 Units, Subcutaneous, Q8H FARZANA    acetaminophen (Tylenol Extra Strength) tab 500 mg, 500 mg, Oral, Q4H PRN    melatonin tab 3 mg, 3 mg, Oral, Nightly PRN    polyethylene glycol (PEG 3350) (Miralax) 17 g oral packet 17 g, 17 g, Oral, Daily PRN     sennosides (Senokot) tab 17.2 mg, 17.2 mg, Oral, Nightly PRN    bisacodyl (Dulcolax) 10 MG rectal suppository 10 mg, 10 mg, Rectal, Daily PRN    fleet enema (Fleet) 7-19 GM/118ML rectal enema 133 mL, 1 enema, Rectal, Once PRN    prochlorperazine (Compazine) 10 MG/2ML injection 5 mg, 5 mg, Intravenous, Q8H PRN    glycerin-hypromellose- (Artifical Tears) 0.2-0.2-1 % ophthalmic solution 1 drop, 1 drop, Both Eyes, QID PRN    sodium chloride (Saline Mist) 0.65 % nasal solution 1 spray, 1 spray, Each Nare, Q3H PRN    ondansetron (Zofran) 4 MG/2ML injection 8 mg, 8 mg, Intravenous, Q4H PRN    morphINE PF 2 MG/ML injection 1 mg, 1 mg, Intravenous, Q2H PRN **OR** morphINE PF 2 MG/ML injection 2 mg, 2 mg, Intravenous, Q2H PRN **OR** morphINE PF 4 MG/ML injection 4 mg, 4 mg, Intravenous, Q2H PRN    pantoprazole (Protonix) 40 mg in sodium chloride 0.9% PF 10 mL IV push, 40 mg, Intravenous, Daily  Scheduled Meds:   polyethylene glycol (PEG 3350)  17 g Oral Daily    heparin  5,000 Units Subcutaneous Q8H FARZANA    pantoprazole  40 mg Intravenous Daily     Continuous Infusions:   lactated ringers 200 mL/hr at 03/02/24 1438     PRN Meds:.acetaminophen, melatonin, polyethylene glycol (PEG 3350), sennosides, bisacodyl, fleet enema, prochlorperazine, glycerin-hypromellose-, sodium chloride, ondansetron, morphINE **OR** morphINE **OR** morphINE    Review of Systems:  Pertinent items are noted in HPI.    Physical Exam:  Blood pressure 132/90, pulse 92, temperature 98.3 °F (36.8 °C), temperature source Oral, resp. rate 16, height 5' 8\" (1.727 m), weight 119 lb 9.6 oz (54.3 kg), SpO2 100%.  General: Alert and oriented x3, NAD, appears stated age, appropriate disposition and demeanor, answers questions appropriately appears to be comfortable in bed NAD  Head: normocephalic, atraumatic  Eyes: anicteric; no injection  Abdomen tender  Laboratory Data:  Lab Results   Component Value Date    WBC 9.0 03/02/2024    HGB 15.2 03/02/2024     HCT 45.4 03/02/2024    .0 03/02/2024    CREATSERUM 0.72 03/02/2024    BUN 9 03/02/2024     03/02/2024    K 3.9 03/02/2024     03/02/2024    CO2 28.0 03/02/2024    GLU 91 03/02/2024    CA 9.7 03/02/2024    ALB 4.3 03/02/2024    ALKPHO 134 03/02/2024    BILT 0.5 03/02/2024    TP 7.0 03/02/2024    AST 15 03/02/2024    ALT 15 03/02/2024     03/02/2024       Imaging:  CT Scan    @IMAG [email or web address]  Impression:  Patient Active Problem List   Diagnosis    Hypokalemia    Duodenitis    Gastric mass    Gastric outlet obstruction (HCC)    Prolonged QT interval    Pneumoperitoneum    CARLOS A (acute kidney injury) (HCC)    Hyponatremia    Metabolic alkalosis    Lightheaded    Weakness generalized    Traumatic injury of head, initial encounter    Hypotension, unspecified hypotension type    Malfunction of jejunostomy tube (HCC)    Hyperglycemia    Acute pancreatitis, unspecified complication status, unspecified pancreatitis type (HCC)           Recommendations:  Gi on consult  Morphine IVP      Comprehensive analgesic plan was formulated. Conservative vs. Aggressive measures were discussed at length including pharmacotherapy (eg. Anti- inflammatories, muscle relaxants, neuropathic medications, oral steroids, analgesics), injections, and further testing. Risks and benefits of all options were discussed at length to patients satisfaction during a comprehensive interactive discussion. All questions were answered during extended questions and answer session. Patient agreeable to discussion plan. Greater than 50% of the time was spent with counseling (nature of discussion centered around pain, therapy, and treatment options), face to face time, time spent reviewing data, obtaining patient information and discussing the care with the patients health care providers.     Thank you for allowing me to participate in the care of your patient.    Total time: 12 mon    MAEVE SAUCEDA MD  3/2/2024  Anesthesia  Chronic Pain Service [phone]

## 2024-03-02 NOTE — H&P
[facility]    PATIENT'S NAME: ARGELIA YBARRA   ATTENDING PHYSICIAN: Bishop Garcia MD   PATIENT ACCOUNT#:   [id number]    LOCATION:  [address] [place] [address]  MEDICAL RECORD #:   [id number]       YOB: 1975  ADMISSION DATE:       03/02/2024    HISTORY AND PHYSICAL EXAMINATION    Complex medical decision-making was involved, coordinating care with Nursing, Dr. lFores, Dr. Beverly who was the patient's previous physician and care was turned over to me, with coordinating care with Pain Clinic and Hematology as well.      CHIEF COMPLAINT:  Abdominal pain, nausea, and vomiting.      ADMITTING DIAGNOSIS:  Pancreatitis.    HISTORY OF PRESENT ILLNESS:  This is a very pleasant 49-year-old white male, who recently stopped drinking, who has a history of chronic alcohol-induced pancreatitis with a gastrojejunostomy and enteroenterostomy in June of last year, has gastric outlet obstruction, now came in for an episode of acute pancreatitis as well and on scanning it appears that he also has a nonocclusive portal vein thrombosis with a small pseudocyst.  He was evaluated by GI, Dr. Christianson, who saw him and she recommended a hematology consult for the portal vein thrombosis.  Dr. Sanderson was notified about that and I discussed with Dr. Flores who had called Pain Clinic to ask them to come.  The patient says that injections seem to work well for his pain and he seemed comfortable, so I put him on a morphine panel until such time as Pain Clinic sees him.    PAST MEDICAL HISTORY:  Significant for alcohol abuse; pancreatitis; gastric output obstructions, status post venting gastric tube and jejunal feeding with enteroenterostomy; status post exploratory laparotomy with gastrojejunostomy and enteroenterostomy.  The J and G tubes were removed and he was quite ill.  He has no past history of an inherited clotting disorder itself.    MEDICATIONS:  Medications at home consist of pancrelipase 36,000 units 3 times a day with meals;  Norco 10/325 one tablet every 6 hours as needed; Tylenol 500 mg every 6 hours as needed, watch total daily Tylenol, limit of 3 g.     ALLERGIES:  None.      FAMILY HISTORY:  Mother is 68 and has elevated blood pressure; she does not have heart disease as listed in the medical record.  His father is 76 and has hypertension.    SOCIAL HISTORY:  He now still smokes about a half a pack per week and has not had any alcohol to drink for many months.    REVIEW OF SYSTEMS:  He does not have any bleeding.  He does report nausea and vomiting.  He has had weight loss over the last several months and has not been able to sleep because of recurrent abdominal pain.  His legs do not swell up.  There is no significant chest pain or shortness of breath.  The rest of his 13 systems reviewed are negative.      PHYSICAL EXAMINATION:    GENERAL:  A thin, friendly, cooperative, appropriate white male in no apparent distress.    VITAL SIGNS:  Temperature 98.3, pulse 92, respiratory rate 16, blood pressure 132/90, 100%.  He is 5 feet 8 inches with a weight of 119 pounds 9.6 ounces, giving a BMI of 18.19.    HEENT:  Atraumatic, normocephalic.  Oropharynx is moist.   NECK:  Supple.  LUNGS:  Occasional rhonchi in the bases.  BACK:  No dorsal spine or CVA tenderness.  HEART:  Normal S1 and S2.  No S3.    ABDOMEN:  Soft.  Currently tender in the epigastrium and in the left upper quadrant.    GENITOURINARY/RECTAL:  Exams not performed, not pertinent to this exam for me.  EXTREMITIES:  Without cyanosis or edema that I can appreciate.  VASCULAR:  He has 1+ dorsalis pedis pulses bilaterally.    NEUROLOGIC:  He is alert and oriented x3.  He has 5/5 motor strength bilaterally.  Toes are downgoing.  MUSCULOSKELETAL:  No joint deformities or injuries.   SKIN:  I do not see any obvious rashes or tattoos.  PSYCHIATRIC:  He is appropriate and cooperative and looks very well.  LYMPHATICS:  No submandibular lymphadenopathy.    LABORATORY DATA:  Sodium 137,  potassium 3.9, chloride 104, CO2 of 28, BUN 9, creatinine 0.72.  Alkaline phosphatase 134, ALT/AST 15, lipase 422, albumin 4.3.  White count 9000, hemoglobin 15.2, platelets 480,000.  Urinalysis is normal.    His CT scan, which I reviewed the images, showed inflammation of the pancreatic head, nonocclusive thrombus in the portal vein and anterior division of right portal vein, which are nonocclusive and stable.      ASSESSMENT AND PLAN:    1.   Acute on chronic pancreatitis.  History of alcohol, but patient says none.  As per Dr. Christianson and we will see how he does n.p.o. on lactated Ringer's.  2.   Portal vein thrombosis.  Will ask Dr. Sanderson to see.  Patient flatly denies any family history of inherited clotting disorders or miscarriages.  3.   Protein-calorie malnutrition, fairly severe.  BMI is 18.19.  Will check CMP and also see how he does.  4.   Code status is Full Code by medical records.     Dictated By Bishop Garcia MD  d: 03/02/2024 13:52:51  t: 03/02/2024 14:46:04  Job [id number]  [facility]/

## 2024-03-02 NOTE — ED QUICK NOTES
Pt states he was here 10 days ago for the abdominal pain. He says he was admitted for pancreatitis; he denies drinking alcohol. Pain is 10/10

## 2024-03-02 NOTE — CONSULTS
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  Now admitted with recurrent abdominal pain was admitted to the hospital for Acute pancreatitis, unspecified complication status, unspecified pancreatitis type (HCC):    Patient has a history of alcohol induced chronic pancreatitis with surgery as mentioned above.  Postoperatively he says he did well for about 6 months and then about 2 months ago began having mild episodic flares of central abdominal pain which would respond to a liquid diet. These episodes were infrequent and transient.  However, over the past week he was having more episodes of pain. Last hospitalization on 2024.    In last few days has suprapubic pain after eating with n/v, seen by Dr. Ram and tried on pancreatic enzymes - only took once.  Pain is burning sensation, worse at night.  Has run out of hydrocodone and feels tylenol helps less.  Continue to use tobacco but down to one pack every 3 days.  States last drink was 2 months ago; denies drug use.      Denies diarrhea, feels that it is harder to have a bm.  Does not take a laxative.  No hematochezia nor melena.     Labwork shows improved lipase compared to 2024, no leukocytosis nor anemia.  Imaging shows:  -new non occlusive thrombus in setting of normal LAEs w/o complaint of ruq pain  -persistence of distal cbd narrowing with upstream dilation (seen in 2023)  -acute on  chronic pancreatitis - mild inflammation, no necrosis  -thickening at GJ anastomosis with D1/2 thickening unchanged from 2024 or 2023 imaging    Past Medical History  Past Medical History:   Diagnosis Date    Anesthesia complication     cramps;nausea after anesthesia when in 3rd grade    Anxiety state     Duodenitis     Pancreatitis (HCC)     PONV (postoperative nausea and vomiting)      Past Surgical History  Past Surgical History:   Procedure Laterality Date    BIOPSY/REMOVAL, LYMPH NODE(S)      R neck    GASTROSTOMY/JEJUNOSTOMY TUBE N/A      Family History  Family History   Problem Relation Age of Onset    Hypertension Father     Hypertension Mother     Psychiatric Mother      Social History  Social History     Socioeconomic History    Marital status:    Tobacco Use    Smoking status: Former     Packs/day: 0.50     Years: 25.00     Additional pack years: 0.00     Total pack years: 12.50     Types: Cigarettes     Quit date: 2023     Years since quittin.8     Passive exposure: Never    Smokeless tobacco: Never    Tobacco comments:     0.5-1 PPD   Vaping Use    Vaping Use: Never used   Substance and Sexual Activity    Alcohol use: Not Currently     Comment: not since 2023    Drug use: Not Currently     Types: Cannabis     Comment: rare     Social Determinants of Health     Food Insecurity: No Food Insecurity (2024)    Food Insecurity     Food Insecurity: Never true   Transportation Needs: No Transportation Needs (2024)    Transportation Needs     Lack of Transportation: No   Housing Stability: Low Risk  (2024)    Housing Stability     Housing Instability: No          Current Medications:  No current facility-administered medications for this encounter.     (Not in a hospital admission)      Allergies  No Known Allergies    Review of Systems:      ROS:  No fevers, chills, night sweats. + nausea, vomiting.  Denies diarrhea but +constipation, no persistent changes in caliber  of stool, no dysphagia, no jaundice, no heartburn, no early satiety, no hematemesis, no melena. + abdominal pain.  Denies hematochezia.    History of obstructive sleep apnea: No  History of respiratory illness/home oxygen supplementation: No  History of cardiac illness/pacemaker/AICD/blood thinners: No  History of anxiety/depression: YES  History of chronic narcotic pain medication use: YES - recently ran out  History of diabetes: No  History of mobility issues: No  History of C-Diff Colitis:No  History of MRSA: No  History of abdominal surgeries:  yes    ENDOSCOPIC REPORTS:   Colonoscopy:yes  EGD:yes  ERCP:    History of Prep or Sedation intolerance with previous endoscopic procedures: see pmhx    GENERAL: feels well otherwise.  SKIN: denies any unusual skin lesions  EYES: denies any new visual changes or double vision, denies red painful eyes  HEENT: denies any new hearing changes, nasal congestion, sinus pain  LUNGS: denies shortness of breath with exertion or persistent cough  CARDIOVASCULAR: denies chest pain on exertion  GI: as above  : denies dysuria, nocturia or changes in stream  MUSCULOSKELETAL: denies new myalgias, swelling  NEURO: denies new sensory or motor deficits.  PSYCHE: denies depression or anxiety  HEMATOLOGIC: denies bleeding or bruising      Physical Exam:   Blood pressure (!) 134/94, pulse 86, temperature 97.8 °F (36.6 °C), temperature source Oral, resp. rate 18, height 5' 8\" (1.727 m), weight 123 lb (55.8 kg), SpO2 98%.    General appearance:  alert, appears stated age and cooperative    HEENT: negative findings: lids and lashes normal and conjunctivae and sclerae normal.     Pulmonary: clear to auscultation bilaterally of anterior chest    Cardiovascular: regular rate and rhythm    Abdominal: soft, bowel sounds present; non-distended, tender most in epigastric region with mild ttp in suprapubic resion, no rebound/guarding    Extremities: No edema, cyanosis, or clubbing    Skin: warm and  dry    Neurologic: Grossly normal    Psychiatric: calm      Results:     Laboratory Data:  Lab Results   Component Value Date    WBC 9.0 03/02/2024    HGB 15.2 03/02/2024    HCT 45.4 03/02/2024    .0 (H) 03/02/2024    CREATSERUM 0.72 03/02/2024    BUN 9 03/02/2024     03/02/2024    K 3.9 03/02/2024     03/02/2024    CO2 28.0 03/02/2024    GLU 91 03/02/2024    CA 9.7 03/02/2024    ALB 4.3 03/02/2024    ALKPHO 134 (H) 03/02/2024    TP 7.0 03/02/2024    AST 15 03/02/2024    ALT 15 03/02/2024    PTT 27.8 08/07/2018    INR 0.96 05/19/2023    PTP 12.7 05/19/2023    OLAYINKA 139 (H) 06/16/2023     (HH) 03/02/2024    MG 1.8 12/21/2023    PHOS 3.0 06/23/2023    TROPHS 17 05/19/2023    ETOH <3 12/19/2023         Imaging:  CT ABDOMEN+PELVIS(CONTRAST ONLY)(CPT=74177)    Result Date: 3/2/2024  CONCLUSION:   Mildly increased fluid and inflammation around the pancreatic head and the severely thickened gastric pylorus and duodenum.  Stable small pseudo cyst at the pancreaticoduodenal groove.  No perforation  New nonocclusive thrombus in the main portal vein.  Stable thrombosis of the anterior division of the right portal vein.      Dictated by (CST): Elvre Frausto MD on 3/02/2024 at 9:52 AM     Finalized by (CST): Elver Frausto MD on 3/02/2024 at 10:09 AM            Impression:     Acute pancreatitis, unspecified complication status, unspecified pancreatitis type (HCC)  -bowel rest - NPO, IVF with LR  -pain control - consider non narcotic therapy as concern he is developing drug seeking behavior --> consider pain management consult; defer to primary  -pancreatic enzymes once advancing diet  -cessation of tob as is trigger  -consider pain management consult - defer to primary  -trend bun/cr to ensure enough resuscitation  -as pancreatitis is mild, if in next six hours, patient is hungry - okay to advance to clear liquids    Constipation  -trial of miralax  -while on narcotics - should be on bowel regimen    Non  occlusive thrombus likely 2/2 to splenic vein inflammation 2/2 to pancreatitis but ddx includes non cirrhotic etiology such as malignancy or hypercoag disorder; as clot burden grows, may need to be on anticoagulation  -consider hematology consult - defer to primary  -stop tob abuse    Thank you for allowing me to participate in the care of your patient.    Ayleen Christianson MD  3/2/2024

## 2024-03-02 NOTE — ED PROVIDER NOTES
Patient Seen in: [facility] Emergency Department    History     Chief Complaint   Patient presents with    Abdomen/Flank Pain     Stated Complaint: abdominal pain    HPI    Patient complains of mid upper  abdominal pain that is getting worse over the past week.  Patient has chronic pancreatitis was hospitalized recently for this states that if he tries to eat he has increased pain vomiting pain is mid upper abdomen..  Pain described as sharp.  Pain rated as 10/10.  Modifying factors include: Worse with eating.          Past Medical History:   Diagnosis Date    Anesthesia complication     cramps;nausea after anesthesia when in 3rd grade    Anxiety state     Duodenitis     Pancreatitis (HCC)     PONV (postoperative nausea and vomiting)        Past Surgical History:   Procedure Laterality Date    BIOPSY/REMOVAL, LYMPH NODE(S)      R neck    GASTROSTOMY/JEJUNOSTOMY TUBE N/A             Family History   Problem Relation Age of Onset    Hypertension Father     Hypertension Mother     Psychiatric Mother        Social History     Socioeconomic History    Marital status:    Tobacco Use    Smoking status: Former     Packs/day: 0.50     Years: 25.00     Additional pack years: 0.00     Total pack years: 12.50     Types: Cigarettes     Quit date: 2023     Years since quittin.8     Passive exposure: Never    Smokeless tobacco: Never    Tobacco comments:     0.5-1 PPD   Vaping Use    Vaping Use: Never used   Substance and Sexual Activity    Alcohol use: Not Currently     Comment: not since 2023    Drug use: Not Currently     Types: Cannabis     Comment: rare     Social Determinants of Health     Food Insecurity: No Food Insecurity (2024)    Food Insecurity     Food Insecurity: Never true   Transportation Needs: No Transportation Needs (2024)    Transportation Needs     Lack of Transportation: No   Housing Stability: Low Risk  (2024)    Housing Stability     Housing Instability: No        Review of Systems    Positive for stated complaint: abdominal pain  Other systems are as noted in HPI.  Constitutional and vital signs reviewed.      All other systems reviewed and negative except as noted above.    PSFH elements reviewed from today and agreed except as otherwise stated in HPI.    Physical Exam     ED Triage Vitals [03/02/24 0802]   /84   Pulse (!) 123   Resp 18   Temp 97.8 °F (36.6 °C)   Temp src Oral   SpO2 100 %   O2 Device None (Room air)       Current:BP (!) 134/94   Pulse 86   Temp 97.8 °F (36.6 °C) (Oral)   Resp 18   Ht 172.7 cm (5' 8\")   Wt 55.8 kg   SpO2 98%   BMI 18.70 kg/m²   Pulse ox nl        Physical Exam  General Appearance: alert, no distress  Eyes: pupils equal and round no pallor or injection  ENT, Mouth: mucous membranes moist  Respiratory: there are no retractions, lungs are clear to auscultation  Cardiovascular: regular rate and rhythm    Gastrointestinal: soft tender epigastric with vol guarding  Neurological: II-XII grossly intact  no focal deficits  Skin: warm and dry, no rashes.  Musculoskeletal: neck is supple non tender        Extremities are symmetrical, full range of motion  Psychiatric: patient is pleasant, there is no agitation    DIFFERENTIAL DIAGNOSIS: After history and physical exam differential diagnosis was considered for  pancreatitis vs. Enteritis vs. gastritis          ED Course     Labs Reviewed   LIPASE - Abnormal; Notable for the following components:       Result Value    Lipase 422 (*)     All other components within normal limits   HEPATIC FUNCTION PANEL (7) - Abnormal; Notable for the following components:    Alkaline Phosphatase 134 (*)     All other components within normal limits   CBC W/ DIFFERENTIAL - Abnormal; Notable for the following components:    .0 (*)     All other components within normal limits   BASIC METABOLIC PANEL (8) - Normal   CBC WITH DIFFERENTIAL WITH PLATELET    Narrative:     The following orders were  created for panel order CBC With Differential With Platelet.  Procedure                               Abnormality         Status                     ---------                               -----------         ------                     CBC W/ DIFFERENTIAL[[id number]]          Abnormal            Final result                 Please view results for these tests on the individual orders.   URINALYSIS, ROUTINE       MDM         Cardiac Monitor:   Pulse Readings from Last 1 Encounters:   03/02/24 86   , sinus, 110  interpreted by me.    Radiology findings:  I personally reviewed the images. CT ABDOMEN+PELVIS(CONTRAST ONLY)(CPT=74177)    Result Date: 3/2/2024  CONCLUSION:   Mildly increased fluid and inflammation around the pancreatic head and the severely thickened gastric pylorus and duodenum.  Stable small pseudo cyst at the pancreaticoduodenal groove.  No perforation  New nonocclusive thrombus in the main portal vein.  Stable thrombosis of the anterior division of the right portal vein.      Dictated by (CST): Elver Frausto MD on 3/02/2024 at 9:52 AM     Finalized by (CST): Elver Frausto MD on 3/02/2024 at 10:09 AM                 Medical Decision Making  Problems Addressed:  Acute pancreatitis, unspecified complication status, unspecified pancreatitis type (HCC): acute illness or injury    Amount and/or Complexity of Data Reviewed  Labs: ordered. Decision-making details documented in ED Course.  Radiology: ordered. Decision-making details documented in ED Course.  Discussion of management or test interpretation with external provider(s): Patient with acute pancreatitis on top of chronic pancreatitis.  Has difficulty controlling his pain on a regular basis been much worse the past several days.  CT scan revealed nonocclusive portal vein thrombosis discussed with GI at this point there is still flow so no intervention needed, consider hypercoaguable work up.  I spoke with Dr. William who will be the admitting  physician.    Risk  Parenteral controlled substances.  Decision regarding hospitalization.      I spent a total of 45 minutes of critical care time in obtaining history, performing a physical exam, bedside monitoring of interventions, collecting and interpreting tests and discussion with consultants but not including time spent performing procedures.        Disposition and Plan     Clinical Impression:  1. Acute pancreatitis, unspecified complication status, unspecified pancreatitis type (HCC)        Disposition:  Admit    Follow-up:  No follow-up provider specified.    Medications Prescribed:  Current Discharge Medication List          Hospital Problems       Present on Admission  Date Reviewed: 6/14/2023            ICD-10-CM Noted POA    * (Principal) Acute pancreatitis, unspecified complication status, unspecified pancreatitis type (HCC) K85.90 2/17/2024 Unknown

## 2024-03-02 NOTE — ED QUICK NOTES
Orders for admission, patient is aware of plan and ready to go upstairs. Any questions, please call ED RN [place] at extension [phone].     Patient Covid vaccination status: Partially vaccinated     COVID Test Ordered in ED: None    COVID Suspicion at Admission: N/A    Running Infusions:      Mental Status/LOC at time of transport: AOx4    Other pertinent information:   CIWA score: N/A   NIH score:  N/A

## 2024-03-03 PROBLEM — I81 PORTAL VEIN THROMBOSIS: Status: ACTIVE | Noted: 2024-03-03

## 2024-03-03 LAB
ALBUMIN SERPL-MCNC: 3.1 G/DL (ref 3.2–4.8)
ALBUMIN/GLOB SERPL: 1.7 {RATIO} (ref 1–2)
ALP LIVER SERPL-CCNC: 94 U/L
ALT SERPL-CCNC: 11 U/L
ANION GAP SERPL CALC-SCNC: 4 MMOL/L (ref 0–18)
ANION GAP SERPL CALC-SCNC: 4 MMOL/L (ref 0–18)
ANION GAP SERPL CALC-SCNC: 5 MMOL/L (ref 0–18)
AST SERPL-CCNC: 10 U/L (ref ?–34)
BASOPHILS # BLD AUTO: 0.04 X10(3) UL (ref 0–0.2)
BASOPHILS NFR BLD AUTO: 0.6 %
BILIRUB SERPL-MCNC: 0.5 MG/DL (ref 0.3–1.2)
BUN BLD-MCNC: 5 MG/DL (ref 9–23)
BUN BLD-MCNC: 5 MG/DL (ref 9–23)
BUN BLD-MCNC: 6 MG/DL (ref 9–23)
BUN/CREAT SERPL: 8.3 (ref 10–20)
BUN/CREAT SERPL: 8.3 (ref 10–20)
BUN/CREAT SERPL: 9 (ref 10–20)
CALCIUM BLD-MCNC: 8.4 MG/DL (ref 8.7–10.4)
CALCIUM BLD-MCNC: 8.6 MG/DL (ref 8.7–10.4)
CALCIUM BLD-MCNC: 8.6 MG/DL (ref 8.7–10.4)
CHLORIDE SERPL-SCNC: 109 MMOL/L (ref 98–112)
CHLORIDE SERPL-SCNC: 110 MMOL/L (ref 98–112)
CHLORIDE SERPL-SCNC: 110 MMOL/L (ref 98–112)
CO2 SERPL-SCNC: 26 MMOL/L (ref 21–32)
CO2 SERPL-SCNC: 26 MMOL/L (ref 21–32)
CO2 SERPL-SCNC: 27 MMOL/L (ref 21–32)
CREAT BLD-MCNC: 0.6 MG/DL
CREAT BLD-MCNC: 0.6 MG/DL
CREAT BLD-MCNC: 0.67 MG/DL
DEPRECATED RDW RBC AUTO: 42.1 FL (ref 35.1–46.3)
EGFRCR SERPLBLD CKD-EPI 2021: 115 ML/MIN/1.73M2 (ref 60–?)
EGFRCR SERPLBLD CKD-EPI 2021: 119 ML/MIN/1.73M2 (ref 60–?)
EGFRCR SERPLBLD CKD-EPI 2021: 119 ML/MIN/1.73M2 (ref 60–?)
EOSINOPHIL # BLD AUTO: 0.24 X10(3) UL (ref 0–0.7)
EOSINOPHIL NFR BLD AUTO: 3.8 %
ERYTHROCYTE [DISTWIDTH] IN BLOOD BY AUTOMATED COUNT: 12.3 % (ref 11–15)
GLOBULIN PLAS-MCNC: 1.8 G/DL (ref 2.8–4.4)
GLUCOSE BLD-MCNC: 67 MG/DL (ref 70–99)
GLUCOSE BLD-MCNC: 67 MG/DL (ref 70–99)
GLUCOSE BLD-MCNC: 75 MG/DL (ref 70–99)
HCT VFR BLD AUTO: 34.6 %
HGB BLD-MCNC: 11.9 G/DL
IMM GRANULOCYTES # BLD AUTO: 0.02 X10(3) UL (ref 0–1)
IMM GRANULOCYTES NFR BLD: 0.3 %
INR BLD: 0.98 (ref 0.8–1.2)
LIPASE SERPL-CCNC: 167 U/L (ref 12–53)
LYMPHOCYTES # BLD AUTO: 2.03 X10(3) UL (ref 1–4)
LYMPHOCYTES NFR BLD AUTO: 32.1 %
MAGNESIUM SERPL-MCNC: 1.5 MG/DL (ref 1.6–2.6)
MCH RBC QN AUTO: 31.8 PG (ref 26–34)
MCHC RBC AUTO-ENTMCNC: 34.4 G/DL (ref 31–37)
MCV RBC AUTO: 92.5 FL
MONOCYTES # BLD AUTO: 0.38 X10(3) UL (ref 0.1–1)
MONOCYTES NFR BLD AUTO: 6 %
NEUTROPHILS # BLD AUTO: 3.62 X10 (3) UL (ref 1.5–7.7)
NEUTROPHILS # BLD AUTO: 3.62 X10(3) UL (ref 1.5–7.7)
NEUTROPHILS NFR BLD AUTO: 57.2 %
OSMOLALITY SERPL CALC.SUM OF ELEC: 286 MOSM/KG (ref 275–295)
OSMOLALITY SERPL CALC.SUM OF ELEC: 286 MOSM/KG (ref 275–295)
OSMOLALITY SERPL CALC.SUM OF ELEC: 288 MOSM/KG (ref 275–295)
PHOSPHATE SERPL-MCNC: 3.2 MG/DL (ref 2.4–5.1)
PLATELET # BLD AUTO: 315 10(3)UL (ref 150–450)
POTASSIUM SERPL-SCNC: 3.9 MMOL/L (ref 3.5–5.1)
POTASSIUM SERPL-SCNC: 3.9 MMOL/L (ref 3.5–5.1)
POTASSIUM SERPL-SCNC: 4.3 MMOL/L (ref 3.5–5.1)
PROT SERPL-MCNC: 4.9 G/DL (ref 5.7–8.2)
PROTHROMBIN TIME: 13.6 SECONDS (ref 11.6–14.8)
RBC # BLD AUTO: 3.74 X10(6)UL
SODIUM SERPL-SCNC: 140 MMOL/L (ref 136–145)
SODIUM SERPL-SCNC: 140 MMOL/L (ref 136–145)
SODIUM SERPL-SCNC: 141 MMOL/L (ref 136–145)
WBC # BLD AUTO: 6.3 X10(3) UL (ref 4–11)

## 2024-03-03 PROCEDURE — 99233 SBSQ HOSP IP/OBS HIGH 50: CPT | Performed by: HOSPITALIST

## 2024-03-03 RX ORDER — SODIUM CHLORIDE, SODIUM LACTATE, POTASSIUM CHLORIDE, CALCIUM CHLORIDE 600; 310; 30; 20 MG/100ML; MG/100ML; MG/100ML; MG/100ML
INJECTION, SOLUTION INTRAVENOUS CONTINUOUS
Status: ACTIVE | OUTPATIENT
Start: 2024-03-03 | End: 2024-03-04

## 2024-03-03 RX ORDER — SODIUM CHLORIDE, SODIUM LACTATE, POTASSIUM CHLORIDE, CALCIUM CHLORIDE 600; 310; 30; 20 MG/100ML; MG/100ML; MG/100ML; MG/100ML
INJECTION, SOLUTION INTRAVENOUS CONTINUOUS
Status: DISCONTINUED | OUTPATIENT
Start: 2024-03-03 | End: 2024-03-04

## 2024-03-03 NOTE — PLAN OF CARE
Patient is alert and oriented. RA. Tele in place. Voiding freely. Up independently. IVF infusing. CLD. PRN morphine given for pain management. Call light within reach.   Problem: Patient Centered Care  Goal: Patient preferences are identified and integrated in the patient's plan of care  Description: Interventions:  - Provide timely, complete, and accurate information to patient/family  - Incorporate patient and family knowledge, values, beliefs, and cultural backgrounds into the planning and delivery of care  - Encourage patient/family to participate in care and decision-making at the level they choose  - Honor patient and family perspectives and choices  Outcome: Progressing     Problem: PAIN - ADULT  Goal: Verbalizes/displays adequate comfort level or patient's stated pain goal  Description: INTERVENTIONS:  - Encourage pt to monitor pain and request assistance  - Assess pain using appropriate pain scale  - Administer analgesics based on type and severity of pain and evaluate response  - Implement non-pharmacological measures as appropriate and evaluate response  - Consider cultural and social influences on pain and pain management  - Manage/alleviate anxiety  - Utilize distraction and/or relaxation techniques  - Monitor for opioid side effects  - Notify MD/LIP if interventions unsuccessful or patient reports new pain  - Anticipate increased pain with activity and pre-medicate as appropriate  Outcome: Progressing     Problem: RISK FOR INFECTION - ADULT  Goal: Absence of fever/infection during anticipated neutropenic period  Description: INTERVENTIONS  - Monitor WBC  - Administer growth factors as ordered  - Implement neutropenic guidelines  Outcome: Progressing     Problem: DISCHARGE PLANNING  Goal: Discharge to home or other facility with appropriate resources  Description: INTERVENTIONS:  - Identify barriers to discharge w/pt and caregiver  - Include patient/family/discharge partner in discharge planning  -  Arrange for needed discharge resources and transportation as appropriate  - Identify discharge learning needs (meds, wound care, etc)  - Arrange for interpreters to assist at discharge as needed  - Consider post-discharge preferences of patient/family/discharge partner  - Complete POLST form as appropriate  - Assess patient's ability to be responsible for managing their own health  - Refer to Case Management Department for coordinating discharge planning if the patient needs post-hospital services based on physician/LIP order or complex needs related to functional status, cognitive ability or social support system  Outcome: Progressing     Problem: GASTROINTESTINAL - ADULT  Goal: Minimal or absence of nausea and vomiting  Description: INTERVENTIONS:  - Maintain adequate hydration with IV or PO as ordered and tolerated  - Nasogastric tube to low intermittent suction as ordered  - Evaluate effectiveness of ordered antiemetic medications  - Provide nonpharmacologic comfort measures as appropriate  - Advance diet as tolerated, if ordered  - Obtain nutritional consult as needed  - Evaluate fluid balance  Outcome: Progressing  Goal: Maintains or returns to baseline bowel function  Description: INTERVENTIONS:  - Assess bowel function  - Maintain adequate hydration with IV or PO as ordered and tolerated  - Evaluate effectiveness of GI medications  - Encourage mobilization and activity  - Obtain nutritional consult as needed  - Establish a toileting routine/schedule  - Consider collaborating with pharmacy to review patient's medication profile  Outcome: Progressing

## 2024-03-03 NOTE — CONSULTS
[facility]    Report of Hematology/Oncology Consultation    Jarrett Bal Patient Status:  Inpatient    1975 MRN [id number]   Location 412/412-A Attending Bishop Garcia,*   Date of admission 3/2/2024  8:08 AM   PCP None Pcp     Date of Consult:  24      History of Present Illness:  Jarrett Bal is a 49 year old male seen at the request of Dr. Bishop Garcia who presented due to acute pancreatitis with new diagnosis of nonocclusive portal vein thrombosis.    Patient been recently admitted for acute pancreatitis as well.  He does have history of chronic pancreatitis for which he had gastrojejunostomy and enterostomy in the past due to gastric outlet obstruction secondary to chronic pancreatitis.  Patient follows regularly with Dr. Ram, gastroenterologist from [facility].  Patient has been having mild episodic flares of his pancreatitis for approximately 2 months.  Since his last admission on 2024 he has been having more episodes of abdominal pain.  He recently complained of suprapubic pain after eating, which was accompanied by nausea and vomiting.  He has been recommended to take pancreatic enzymes, which he only took 1 time.    During workup this admission on CT scan of the abdomen pelvis, he was found to have mildly increased fluid and inflammation around the pancreatic head and severely thickened gastric pylorus and duodenum.  He was also found to have a new nonocclusive thrombus in the main portal vein and stable thrombosis of the anterior division of the right portal vein.    I have been consulted regarding etiology of the new nonocclusive portal vein thrombosis.  Patient does not have a prior history of VTE despite multiple hospitalizations, no family history of VTE.      Past Medical History:   Diagnosis Date    Anesthesia complication     cramps;nausea after anesthesia when in 3rd grade    Anxiety state     Duodenitis     Pancreatitis (HCC)     PONV (postoperative  nausea and vomiting)        Past Surgical History:   Procedure Laterality Date    Biopsy/removal, lymph node(s)      R neck    Gastrostomy/jejunostomy tube N/A        Allergies:  Patient has no known allergies.    MEDS:  Current Facility-Administered Medications on File Prior to Encounter   Medication Dose Route Frequency Provider Last Rate Last Admin    [COMPLETED] iopamidol 76% (ISOVUE-370) injection for power injector  80 mL Intravenous ONCE PRN Iliana Beverly MD   80 mL at 02/20/24 1049    [COMPLETED] potassium chloride 40 mEq in 250mL sodium chloride 0.9% IVPB premix  40 mEq Intravenous Once Iliana Beverly MD 62.5 mL/hr at 02/18/24 0936 40 mEq at 02/18/24 0936    [COMPLETED] fentaNYL (Sublimaze) 50 mcg/mL injection 100 mcg  100 mcg Intravenous Once Naif Contreras MD   100 mcg at 02/17/24 1411    [COMPLETED] famotidine (Pepcid) 20 mg/2mL injection 20 mg  20 mg Intravenous Once Naif Contreras MD   20 mg at 02/17/24 1411    [COMPLETED] ondansetron (Zofran) 4 MG/2ML injection 4 mg  4 mg Intravenous Once Naif Contrears MD   4 mg at 02/17/24 1411    [COMPLETED] sodium chloride 0.9 % IV bolus 1,000 mL  1,000 mL Intravenous Once Naif Contreras MD   Stopped at 02/17/24 1501    [COMPLETED] iopamidol 76% (ISOVUE-370) injection for power injector  80 mL Intravenous ONCE PRN Naif Contreras MD   80 mL at 02/17/24 1621    [COMPLETED] fentaNYL (Sublimaze) 50 mcg/mL injection 100 mcg  100 mcg Intravenous Once Naif Contreras MD   100 mcg at 02/17/24 1654    [COMPLETED] morphINE PF 4 MG/ML injection 4 mg  4 mg Intravenous Once Naif Contreras MD   4 mg at 02/17/24 1840     Current Outpatient Medications on File Prior to Encounter   Medication Sig Dispense Refill    Pancrelipase, Lip-Prot-Amyl, (CREON) 13633-916732 units Oral Cap DR Particles Take 36,000 Units by mouth 3 (three) times daily with meals.      HYDROcodone-acetaminophen  MG Oral Tab Take 1 tablet by mouth every 6 (six) hours as  needed for Pain. 15 tablet 0    acetaminophen 500 MG Oral Tab Take 1 tablet (500 mg total) by mouth every 4 (four) hours as needed for Fever (equal to or greater than 100.4).        polyethylene glycol (PEG 3350)  17 g Oral Daily    heparin  5,000 Units Subcutaneous Q8H FARZANA    pantoprazole  40 mg Intravenous Daily         Family History   Problem Relation Age of Onset    Hypertension Father     Hypertension Mother     Psychiatric Mother        Social History     Socioeconomic History    Marital status:    Tobacco Use    Smoking status: Former     Packs/day: 0.50     Years: 25.00     Additional pack years: 0.00     Total pack years: 12.50     Types: Cigarettes     Quit date: 2023     Years since quittin.8     Passive exposure: Never    Smokeless tobacco: Never    Tobacco comments:     0.5-1 PPD   Vaping Use    Vaping Use: Never used   Substance and Sexual Activity    Alcohol use: Not Currently     Comment: not since 2023    Drug use: Not Currently     Types: Cannabis     Comment: rare     Social Determinants of Health     Food Insecurity: No Food Insecurity (3/2/2024)    Food Insecurity     Food Insecurity: Never true   Transportation Needs: No Transportation Needs (3/2/2024)    Transportation Needs     Lack of Transportation: No   Housing Stability: Low Risk  (3/2/2024)    Housing Stability     Housing Instability: No       Review of Systems   Constitutional:  Positive for fatigue.   Respiratory:  Negative for shortness of breath.    Cardiovascular:  Negative for chest pain.   Gastrointestinal:  Positive for abdominal pain, nausea and vomiting. Negative for constipation and diarrhea.   Genitourinary:  Negative for difficulty urinating.    Musculoskeletal:  Negative for arthralgias and back pain.   Neurological:  Negative for headaches.   Psychiatric/Behavioral:  Positive for sleep disturbance.          Physical Exam:   Vital Signs: BP (!) 156/97 (BP Location: Left arm)   Pulse 85   Temp 98 °F  (36.7 °C) (Axillary)   Resp 16   Ht 1.727 m (5' 8\")   Wt 54.3 kg (119 lb 9.6 oz)   SpO2 98%   BMI 18.19 kg/m²   General: Patient is alert, not in acute distress.  HEENT: EOMs intact. PERRL.   Neck: No JVD. No palpable lymphadenopathy. Neck is supple.  Chest: Clear to auscultation.  Heart: Regular rate and rhythm.   Abdomen: Soft, epigastric tenderness with good bowel sounds.  Extremities: No edema.  Psych/Depression: nl      Laboratory Data:      Recent Results (from the past 24 hour(s))   Basic Metabolic Panel (8)    Collection Time: 03/02/24  8:17 AM   Result Value Ref Range    Glucose 91 70 - 99 mg/dL    Sodium 137 136 - 145 mmol/L    Potassium 3.9 3.5 - 5.1 mmol/L    Chloride 104 98 - 112 mmol/L    CO2 28.0 21.0 - 32.0 mmol/L    Anion Gap 5 0 - 18 mmol/L    BUN 9 9 - 23 mg/dL    Creatinine 0.72 0.70 - 1.30 mg/dL    BUN/CREA Ratio 12.5 10.0 - 20.0    Calcium, Total 9.7 8.7 - 10.4 mg/dL    Calculated Osmolality 282 275 - 295 mOsm/kg    eGFR-Cr 113 >=60 mL/min/1.73m2   Lipase    Collection Time: 03/02/24  8:17 AM   Result Value Ref Range    Lipase 422 (HH) 13 - 75 U/L   Hepatic Function Panel (7)    Collection Time: 03/02/24  8:17 AM   Result Value Ref Range    AST 15 <=34 U/L    ALT 15 10 - 49 U/L    Alkaline Phosphatase 134 (H) 45 - 117 U/L    Bilirubin, Total 0.5 0.3 - 1.2 mg/dL    Bilirubin, Direct 0.2 <=0.3 mg/dL    Total Protein 7.0 5.7 - 8.2 g/dL    Albumin 4.3 3.2 - 4.8 g/dL   CBC W/ DIFFERENTIAL    Collection Time: 03/02/24  8:17 AM   Result Value Ref Range    WBC 9.0 4.0 - 11.0 x10(3) uL    RBC 4.95 4.30 - 5.70 x10(6)uL    HGB 15.2 13.0 - 17.5 g/dL    HCT 45.4 39.0 - 53.0 %    MCV 91.7 80.0 - 100.0 fL    MCH 30.7 26.0 - 34.0 pg    MCHC 33.5 31.0 - 37.0 g/dL    RDW-SD 40.8 35.1 - 46.3 fL    RDW 12.1 11.0 - 15.0 %    .0 (H) 150.0 - 450.0 10(3)uL    Neutrophil Absolute Prelim 6.37 1.50 - 7.70 x10 (3) uL    Neutrophil Absolute 6.37 1.50 - 7.70 x10(3) uL    Lymphocyte Absolute 1.53 1.00 - 4.00  x10(3) uL    Monocyte Absolute 0.70 0.10 - 1.00 x10(3) uL    Eosinophil Absolute 0.33 0.00 - 0.70 x10(3) uL    Basophil Absolute 0.06 0.00 - 0.20 x10(3) uL    Immature Granulocyte Absolute 0.04 0.00 - 1.00 x10(3) uL    Neutrophil % 70.5 %    Lymphocyte % 16.9 %    Monocyte % 7.8 %    Eosinophil % 3.7 %    Basophil % 0.7 %    Immature Granulocyte % 0.4 %   Urinalysis, Routine    Collection Time: 03/02/24 10:46 AM   Result Value Ref Range    Urine Color Light-Yellow Yellow    Clarity Urine Clear Clear    Spec Gravity >1.030 (H) 1.005 - 1.030    Glucose Urine Normal Normal mg/dL    Bilirubin Urine Negative Negative    Ketones Urine 60 (A) Negative mg/dL    Blood Urine Negative Negative    pH Urine 5.5 5.0 - 8.0    Protein Urine Trace (A) Negative mg/dL    Urobilinogen Urine Normal Normal    Nitrite Urine Negative Negative    Leukocyte Esterase Urine Negative Negative    Microscopic Microscopic not indicated          Imaging:  CT ABDOMEN+PELVIS(CONTRAST ONLY)(CPT=74177)    Result Date: 3/2/2024  CONCLUSION:   Mildly increased fluid and inflammation around the pancreatic head and the severely thickened gastric pylorus and duodenum.  Stable small pseudo cyst at the pancreaticoduodenal groove.  No perforation  New nonocclusive thrombus in the main portal vein.  Stable thrombosis of the anterior division of the right portal vein.      Dictated by (CST): Elver Frausto MD on 3/02/2024 at 9:52 AM     Finalized by (CST): Elver Frausto MD on 3/02/2024 at 10:09 AM             Impression and Plan:    Patient with new nonocclusive portal vein thrombosis in the setting of acute pancreatitis.    No indication for hypercoagulable workup, given that this is in the setting of acute pancreatitis.    On imaging the suggestion of portal hypertension.  Recommend the patient be evaluated for varices for Wiele risk assessment prior to initiation of anticoagulation.  I reach out to GI for this purpose.  After this evaluation is completed,  recommend to initiate therapeutic doses of low molecular weight heparin and if tolerating, he may be discharged home with a DOAC.  He should complete anticoagulation for 3 to 6 months.  He follows regularly with GI and can follow with GI for anticoagulation.  He is to establish care with a primary care doctor, who could also follow-up for his anticoagulation.    Thank you for allowing me to participate in the care of your patient.    All questions answered to the best of my abilities.    Will sign off.      Please call if new issues arise during this admission.      OPHELIA light.        Beto Sanderson M.D.  [facility]  Associate Medical Director  [address] [place]  [address]  [phone]      03/02/24    This note was created using a voice-recognition transcribing system. Incorrect words or phrases may have been missed during proofreading. Please interpret accordingly.

## 2024-03-03 NOTE — PLAN OF CARE
Admitted through the ED. Patient is alert x4. Monitoring vital signs- stable at this time. Receiving IV fluids per MD order. Tolerating clear liquid diet per GI order. Voiding freely. SCDs for DVT prophylaxis. Patient declined heparin, MD aware. Morphine as needed for pain. Up independently in the room. Fall precautions maintained- bed locked in lowest position, call light and personal belongings within reach, non-skid socks in place to bilateral feet. Frequent rounding by nursing staff. GI, pain management, and heme/onc on consult. Plan pending medical clearance. Addressed additional questions.    Problem: Patient Centered Care  Goal: Patient preferences are identified and integrated in the patient's plan of care  Description: Interventions:  - What would you like us to know as we care for you?   - Provide timely, complete, and accurate information to patient/family  - Incorporate patient and family knowledge, values, beliefs, and cultural backgrounds into the planning and delivery of care  - Encourage patient/family to participate in care and decision-making at the level they choose  - Honor patient and family perspectives and choices  Outcome: Progressing     Problem: Patient/Family Goals  Goal: Patient/Family Long Term Goal  Description: Patient's Long Term Goal: to go home and not have this issue    Interventions:  - follow MD Orders  - update patient and family on plan of care  - discharge planning  - GI and heme/onc consult  - diagnostics per order  - See additional Care Plan goals for specific interventions  Outcome: Progressing  Goal: Patient/Family Short Term Goal  Description: Patient's Short Term Goal: to have less pain    Interventions:   - pain medications as needed  - non-pharmacologic pain interventions  - diet per GI order  - See additional Care Plan goals for specific interventions  Outcome: Progressing     Problem: PAIN - ADULT  Goal: Verbalizes/displays adequate comfort level or patient's stated  pain goal  Description: INTERVENTIONS:  - Encourage pt to monitor pain and request assistance  - Assess pain using appropriate pain scale  - Administer analgesics based on type and severity of pain and evaluate response  - Implement non-pharmacological measures as appropriate and evaluate response  - Consider cultural and social influences on pain and pain management  - Manage/alleviate anxiety  - Utilize distraction and/or relaxation techniques  - Monitor for opioid side effects  - Notify MD/LIP if interventions unsuccessful or patient reports new pain  - Anticipate increased pain with activity and pre-medicate as appropriate  Outcome: Progressing     Problem: RISK FOR INFECTION - ADULT  Goal: Absence of fever/infection during anticipated neutropenic period  Description: INTERVENTIONS  - Monitor WBC  - Administer growth factors as ordered  - Implement neutropenic guidelines  Outcome: Progressing     Problem: DISCHARGE PLANNING  Goal: Discharge to home or other facility with appropriate resources  Description: INTERVENTIONS:  - Identify barriers to discharge w/pt and caregiver  - Include patient/family/discharge partner in discharge planning  - Arrange for needed discharge resources and transportation as appropriate  - Identify discharge learning needs (meds, wound care, etc)  - Arrange for interpreters to assist at discharge as needed  - Consider post-discharge preferences of patient/family/discharge partner  - Complete POLST form as appropriate  - Assess patient's ability to be responsible for managing their own health  - Refer to Case Management Department for coordinating discharge planning if the patient needs post-hospital services based on physician/LIP order or complex needs related to functional status, cognitive ability or social support system  Outcome: Progressing     Problem: GASTROINTESTINAL - ADULT  Goal: Minimal or absence of nausea and vomiting  Description: INTERVENTIONS:  - Maintain adequate  hydration with IV or PO as ordered and tolerated  - Nasogastric tube to low intermittent suction as ordered  - Evaluate effectiveness of ordered antiemetic medications  - Provide nonpharmacologic comfort measures as appropriate  - Advance diet as tolerated, if ordered  - Obtain nutritional consult as needed  - Evaluate fluid balance  Outcome: Progressing  Goal: Maintains or returns to baseline bowel function  Description: INTERVENTIONS:  - Assess bowel function  - Maintain adequate hydration with IV or PO as ordered and tolerated  - Evaluate effectiveness of GI medications  - Encourage mobilization and activity  - Obtain nutritional consult as needed  - Establish a toileting routine/schedule  - Consider collaborating with pharmacy to review patient's medication profile  Outcome: Progressing

## 2024-03-03 NOTE — PROGRESS NOTES
[facility]    Progress Note    Jarrett Bal Patient Status:  Inpatient    1932 MRN [id number]   Location [facility] 3W/SW Attending Akbar Burt MD   Hosp Day # 1 PCP None Pcp     SUBJECTIVE   Patient is a 49 year old male who was admitted to the hospital for Acute pancreatitis, unspecified complication status, unspecified pancreatitis type (HCC):    Overnight: tolerating clear liquid diet, declined miralax    Current Medications:  Current Facility-Administered Medications   Medication Dose Route Frequency    magnesium sulfate 4 g/100mL IVPB premix 4 g  4 g Intravenous Once    polyethylene glycol (PEG 3350) (Miralax) 17 g oral packet 17 g  17 g Oral Daily    lactated ringers infusion   Intravenous Continuous    heparin (Porcine) 5000 UNIT/ML injection 5,000 Units  5,000 Units Subcutaneous Q8H FARZANA    acetaminophen (Tylenol Extra Strength) tab 500 mg  500 mg Oral Q4H PRN    melatonin tab 3 mg  3 mg Oral Nightly PRN    polyethylene glycol (PEG 3350) (Miralax) 17 g oral packet 17 g  17 g Oral Daily PRN    sennosides (Senokot) tab 17.2 mg  17.2 mg Oral Nightly PRN    bisacodyl (Dulcolax) 10 MG rectal suppository 10 mg  10 mg Rectal Daily PRN    fleet enema (Fleet) 7-19 GM/118ML rectal enema 133 mL  1 enema Rectal Once PRN    prochlorperazine (Compazine) 10 MG/2ML injection 5 mg  5 mg Intravenous Q8H PRN    glycerin-hypromellose- (Artifical Tears) 0.2-0.2-1 % ophthalmic solution 1 drop  1 drop Both Eyes QID PRN    sodium chloride (Saline Mist) 0.65 % nasal solution 1 spray  1 spray Each Nare Q3H PRN    ondansetron (Zofran) 4 MG/2ML injection 8 mg  8 mg Intravenous Q4H PRN    morphINE PF 2 MG/ML injection 1 mg  1 mg Intravenous Q2H PRN    Or    morphINE PF 2 MG/ML injection 2 mg  2 mg Intravenous Q2H PRN    Or    morphINE PF 4 MG/ML injection 4 mg  4 mg Intravenous Q2H PRN    pantoprazole (Protonix) 40 mg in sodium chloride 0.9% PF 10 mL IV push  40 mg Intravenous Daily       Medications  Prior to Admission   Medication Sig    Pancrelipase, Lip-Prot-Amyl, (CREON) 92172-545360 units Oral Cap DR Particles Take 36,000 Units by mouth 3 (three) times daily with meals.    HYDROcodone-acetaminophen  MG Oral Tab Take 1 tablet by mouth every 6 (six) hours as needed for Pain.    acetaminophen 500 MG Oral Tab Take 1 tablet (500 mg total) by mouth every 4 (four) hours as needed for Fever (equal to or greater than 100.4).     Allergies  No Known Allergies    Physical Exam:   Blood pressure (!) 134/92, pulse 85, temperature 97.5 °F (36.4 °C), temperature source Temporal, resp. rate 18, height 5' 8\" (1.727 m), weight 125 lb 12.8 oz (57.1 kg), SpO2 97%.    General appearance:  alert, appears stated age and cooperative    HEENT: negative findings: lids and lashes normal and conjunctivae and sclerae normal.     Pulmonary: clear to auscultation bilaterally of anterior chest    Cardiovascular: regular rate and rhythm    Abdominal: soft, bowel sounds present; non-distended, minimal tender to palp on exam today in epigastric region    Extremities: No edema, cyanosis, or clubbing    Skin: warm and dry    Neurologic: Grossly normal    Psychiatric: calm      Results:     Laboratory Data:  Lab Results   Component Value Date    WBC 6.3 03/03/2024    HGB 11.9 (L) 03/03/2024    HCT 34.6 (L) 03/03/2024    .0 03/03/2024    CREATSERUM 0.60 (L) 03/03/2024    CREATSERUM 0.60 (L) 03/03/2024    BUN 5 (L) 03/03/2024    BUN 5 (L) 03/03/2024     03/03/2024     03/03/2024    K 3.9 03/03/2024    K 3.9 03/03/2024     03/03/2024     03/03/2024    CO2 26.0 03/03/2024    CO2 26.0 03/03/2024    GLU 67 (L) 03/03/2024    GLU 67 (L) 03/03/2024    CA 8.6 (L) 03/03/2024    CA 8.6 (L) 03/03/2024    ALB 3.1 (L) 03/03/2024    ALKPHO 94 03/03/2024    TP 4.9 (L) 03/03/2024    AST 10 03/03/2024    ALT 11 03/03/2024    PTT 27.8 08/07/2018    INR 0.98 03/03/2024    PTP 13.6 03/03/2024    OLAYINKA 139 (H) 06/16/2023      (H) 03/03/2024    MG 1.5 (L) 03/03/2024    PHOS 3.2 03/03/2024    ETOH <3 12/19/2023       Imaging:  CT ABDOMEN+PELVIS(CONTRAST ONLY)(CPT=74177)    Result Date: 3/2/2024  CONCLUSION:   Mildly increased fluid and inflammation around the pancreatic head and the severely thickened gastric pylorus and duodenum.  Stable small pseudo cyst at the pancreaticoduodenal groove.  No perforation  New nonocclusive thrombus in the main portal vein.  Stable thrombosis of the anterior division of the right portal vein.      Dictated by (CST): Elver Frausto MD on 3/02/2024 at 9:52 AM     Finalized by (CST): Elver Frausto MD on 3/02/2024 at 10:09 AM              Assessment/Plan:    Patient is a 49 year old male who was admitted to the hospital for Acute pancreatitis, unspecified complication status, unspecified pancreatitis type (HCC):     Acute pancreatitis, unspecified complication status, unspecified pancreatitis type (HCC)  -tolerating clear liquids, okay to advance diet today  -pain control - consider non narcotic therapy as concern he is developing drug seeking behavior   -pancreatic enzymes added  -counseled that cessation of tob is critical as is trigger  -IV fluids for maintenance until on low fat diet     Constipation  -trial of miralax - educated on importance  -while on narcotics - should be on bowel regimen     Non occlusive thrombus likely 2/2 to splenic vein inflammation 2/2 to pancreatitis but ddx includes non cirrhotic etiology such as malignancy or hypercoag disorder; as clot burden grows, may need to be on anticoagulation  -appreciate hematology input - plan for egd before start of non emergent anticoagulation kiran as he did not stop drinking until 2 months ago and active tob use, although inr and platelet count argue against portal htn but gastritis and pud on diff  -stop tob abuse  -npo tomorrow for egd     Thank you for allowing me to participate in the care of your patient.    Time spent in direct patient contact  and decision making as well as counseling/coordination of care:  35 minutes    Note to patient: The 21st Century Cures Act makes medical notes like these available to patients in the interest of transparency. However, this is a medical document intended as peer to peer communication. It is written in medical language and may contain abbreviations or verbiage that are unfamiliar. It may appear blunt or direct. Medical documents are intended to carry relevant information, facts as evident, and the clinical opinion of the practitioner.      Ayleen Christianson MD  [facility],   3/3/2024  8:33 AM

## 2024-03-03 NOTE — PROGRESS NOTES
[facility]  Anesthesiology Pain Management Progress Note      Patient name: Jarrett Bal 49 year old male  : 1975  MRN: [id number]    Diagnosis: [unfilled]    Reason for Consult: pancreatitis    Current hospital day: Hospital Day: 2    Pain Scores: 2    Current Medications:  Scheduled Meds:   magnesium sulfate  4 g Intravenous Once    polyethylene glycol (PEG 3350)  17 g Oral Daily    heparin  5,000 Units Subcutaneous Q8H FARZANA    pantoprazole  40 mg Intravenous Daily     Continuous Infusions:   lactated ringers 200 mL/hr at 24 0354     PRN Meds:.acetaminophen, melatonin, polyethylene glycol (PEG 3350), sennosides, bisacodyl, fleet enema, prochlorperazine, glycerin-hypromellose-, sodium chloride, ondansetron, morphINE **OR** morphINE **OR** morphINE      Assessment:  Doing ok    Plan:  Continue IVP morphine    MAEVE SAUCEDA MD  3/3/2024  Anesthesia Chronic Pain Service [phone]

## 2024-03-03 NOTE — PLAN OF CARE
Patient is alert and oriented. Monitoring vital signs- stable at this time. No acute changes noted throughout shift. Receiving IV fluids per MD order. Was advance as tolerated per GI, but per internal medicine, keep on fulls for now.  Voiding freely. Heparin for DVT prophylaxis. Morphine as needed for pain. Educated patient on importance of taking miralax with the pain meds to prevent constipation. Encouraged frequent ambulation and use of incentive spirometer. Fall precautions maintained-bed locked in lowest position, call light and personal belongings within reach, non-skid socks in place to bilateral feet. Frequent rounding by nursing staff. GI and heme/onc on consult. Plan is NPO at midnight for EGD tomorrow. Addressed additional questions    Problem: Patient Centered Care  Goal: Patient preferences are identified and integrated in the patient's plan of care  Description: Interventions:  - What would you like us to know as we care for you?   - Provide timely, complete, and accurate information to patient/family  - Incorporate patient and family knowledge, values, beliefs, and cultural backgrounds into the planning and delivery of care  - Encourage patient/family to participate in care and decision-making at the level they choose  - Honor patient and family perspectives and choices  Outcome: Progressing     Problem: Patient/Family Goals  Goal: Patient/Family Long Term Goal  Description: Patient's Long Term Goal: to go home    Interventions:  - follow MD orders  - update patient and family on plan of care  - discharge planning  - GI consult  - monitor labs and vitals   - See additional Care Plan goals for specific interventions  Outcome: Progressing  Goal: Patient/Family Short Term Goal  Description: Patient's Short Term Goal: to have less pain    Interventions:   - pain medications as needed  - non-pharmacologic pain interventions  - diet per order  - See additional Care Plan goals for specific  interventions  Outcome: Progressing     Problem: PAIN - ADULT  Goal: Verbalizes/displays adequate comfort level or patient's stated pain goal  Description: INTERVENTIONS:  - Encourage pt to monitor pain and request assistance  - Assess pain using appropriate pain scale  - Administer analgesics based on type and severity of pain and evaluate response  - Implement non-pharmacological measures as appropriate and evaluate response  - Consider cultural and social influences on pain and pain management  - Manage/alleviate anxiety  - Utilize distraction and/or relaxation techniques  - Monitor for opioid side effects  - Notify MD/LIP if interventions unsuccessful or patient reports new pain  - Anticipate increased pain with activity and pre-medicate as appropriate  Outcome: Progressing     Problem: RISK FOR INFECTION - ADULT  Goal: Absence of fever/infection during anticipated neutropenic period  Description: INTERVENTIONS  - Monitor WBC  - Administer growth factors as ordered  - Implement neutropenic guidelines  Outcome: Progressing     Problem: DISCHARGE PLANNING  Goal: Discharge to home or other facility with appropriate resources  Description: INTERVENTIONS:  - Identify barriers to discharge w/pt and caregiver  - Include patient/family/discharge partner in discharge planning  - Arrange for needed discharge resources and transportation as appropriate  - Identify discharge learning needs (meds, wound care, etc)  - Arrange for interpreters to assist at discharge as needed  - Consider post-discharge preferences of patient/family/discharge partner  - Complete POLST form as appropriate  - Assess patient's ability to be responsible for managing their own health  - Refer to Case Management Department for coordinating discharge planning if the patient needs post-hospital services based on physician/LIP order or complex needs related to functional status, cognitive ability or social support system  Outcome: Progressing      Problem: GASTROINTESTINAL - ADULT  Goal: Minimal or absence of nausea and vomiting  Description: INTERVENTIONS:  - Maintain adequate hydration with IV or PO as ordered and tolerated  - Nasogastric tube to low intermittent suction as ordered  - Evaluate effectiveness of ordered antiemetic medications  - Provide nonpharmacologic comfort measures as appropriate  - Advance diet as tolerated, if ordered  - Obtain nutritional consult as needed  - Evaluate fluid balance  Outcome: Progressing  Goal: Maintains or returns to baseline bowel function  Description: INTERVENTIONS:  - Assess bowel function  - Maintain adequate hydration with IV or PO as ordered and tolerated  - Evaluate effectiveness of GI medications  - Encourage mobilization and activity  - Obtain nutritional consult as needed  - Establish a toileting routine/schedule  - Consider collaborating with pharmacy to review patient's medication profile  Outcome: Progressing

## 2024-03-03 NOTE — PROGRESS NOTES
[facility]  part of [facility]    Progress Note    Jarrett Bal Patient Status:  Inpatient    1975 MRN [id number]   Location [facility] 4W/SW/SE Attending Bishop Garcia,*   Hosp Day # 1 PCP None Pcp     Chief Complaint: stomach pain and hungry    Subjective:     Constitutional:  Positive for appetite change. Negative for activity change.   HENT:  Negative for congestion.    Respiratory:  Negative for cough, chest tightness and stridor.    Cardiovascular:  Negative for leg swelling.   Gastrointestinal:  Positive for abdominal pain. Negative for heartburn and nausea.   Genitourinary:  Negative for dysuria.   Neurological:  Negative for tremors.       Objective:   Blood pressure 119/81, pulse 89, temperature 97.8 °F (36.6 °C), temperature source Oral, resp. rate 16, height 5' 8\" (1.727 m), weight 125 lb 12.8 oz (57.1 kg), SpO2 99%.  Physical Exam  Vitals and nursing note reviewed.   HENT:      Head: Normocephalic and atraumatic.   Cardiovascular:      Rate and Rhythm: Normal rate and regular rhythm.      Pulses: Normal pulses.   Pulmonary:      Effort: Pulmonary effort is normal.      Breath sounds: Rhonchi present.   Abdominal:      General: Bowel sounds are normal.      Palpations: Abdomen is soft.      Tenderness: There is abdominal tenderness.   Skin:     General: Skin is warm and dry.      Capillary Refill: Capillary refill takes less than 2 seconds.   Neurological:      General: No focal deficit present.      Mental Status: He is alert and oriented to person, place, and time.   Psychiatric:         Behavior: Behavior normal.         Judgment: Judgment normal.         Results:   Lab Results   Component Value Date    WBC 6.3 2024    HGB 11.9 (L) 2024    HCT 34.6 (L) 2024    .0 2024    CREATSERUM 0.60 (L) 2024    CREATSERUM 0.60 (L) 2024    BUN 5 (L) 2024    BUN 5 (L) 2024     2024     2024     K 3.9 03/03/2024    K 3.9 03/03/2024     03/03/2024     03/03/2024    CO2 26.0 03/03/2024    CO2 26.0 03/03/2024    GLU 67 (L) 03/03/2024    GLU 67 (L) 03/03/2024    CA 8.6 (L) 03/03/2024    CA 8.6 (L) 03/03/2024    ALB 3.1 (L) 03/03/2024    ALKPHO 94 03/03/2024    BILT 0.5 03/03/2024    TP 4.9 (L) 03/03/2024    AST 10 03/03/2024    ALT 11 03/03/2024    PTT 27.8 08/07/2018    INR 0.98 03/03/2024    OLAYINKA 139 (H) 06/16/2023     (H) 03/03/2024    MG 1.5 (L) 03/03/2024    PHOS 3.2 03/03/2024    TROPHS 17 05/19/2023    ETOH <3 12/19/2023       CT ABDOMEN+PELVIS(CONTRAST ONLY)(CPT=74177)    Result Date: 3/2/2024  CONCLUSION:   Mildly increased fluid and inflammation around the pancreatic head and the severely thickened gastric pylorus and duodenum.  Stable small pseudo cyst at the pancreaticoduodenal groove.  No perforation  New nonocclusive thrombus in the main portal vein.  Stable thrombosis of the anterior division of the right portal vein.      Dictated by (CST): Elver Frausto MD on 3/02/2024 at 9:52 AM     Finalized by (CST): Elver Frausto MD on 3/02/2024 at 10:09 AM               Assessment & Plan:     1.       Acute on chronic pancreatitis.  History of alcohol, but patient says none.  egd tomorrow; still needing pain meds; will keep full liquids  2.       Portal vein thrombosis.  Will ask Dr. Sanderson to see.  Patient flatly denies any family history of inherited clotting disorders or miscarriages.  3.       Protein-calorie malnutrition, fairly severe.  BMI is 18.19.  Will check CMP and also see how he does.eat more as soon as pain better  4.       Code status is Full Code by medical records.       Complex mdm; coordinating care with nurse and counseling pt about pancreatitis      SYED SCOTT MD

## 2024-03-04 ENCOUNTER — ANESTHESIA EVENT (OUTPATIENT)
Dept: ENDOSCOPY | Facility: HOSPITAL | Age: 49
End: 2024-03-04
Payer: COMMERCIAL

## 2024-03-04 ENCOUNTER — ANESTHESIA (OUTPATIENT)
Dept: ENDOSCOPY | Facility: HOSPITAL | Age: 49
End: 2024-03-04
Payer: COMMERCIAL

## 2024-03-04 LAB
ALBUMIN SERPL-MCNC: 3.1 G/DL (ref 3.2–4.8)
ALBUMIN/GLOB SERPL: 1.7 {RATIO} (ref 1–2)
ALP LIVER SERPL-CCNC: 94 U/L
ALT SERPL-CCNC: 9 U/L
ANION GAP SERPL CALC-SCNC: 4 MMOL/L (ref 0–18)
AST SERPL-CCNC: 10 U/L (ref ?–34)
BASOPHILS # BLD AUTO: 0.04 X10(3) UL (ref 0–0.2)
BASOPHILS NFR BLD AUTO: 0.7 %
BILIRUB SERPL-MCNC: 0.3 MG/DL (ref 0.3–1.2)
BUN BLD-MCNC: <5 MG/DL (ref 9–23)
CALCIUM BLD-MCNC: 8.5 MG/DL (ref 8.7–10.4)
CHLORIDE SERPL-SCNC: 111 MMOL/L (ref 98–112)
CO2 SERPL-SCNC: 27 MMOL/L (ref 21–32)
CREAT BLD-MCNC: 0.59 MG/DL
DEPRECATED RDW RBC AUTO: 42.1 FL (ref 35.1–46.3)
EGFRCR SERPLBLD CKD-EPI 2021: 120 ML/MIN/1.73M2 (ref 60–?)
EOSINOPHIL # BLD AUTO: 0.37 X10(3) UL (ref 0–0.7)
EOSINOPHIL NFR BLD AUTO: 6.9 %
ERYTHROCYTE [DISTWIDTH] IN BLOOD BY AUTOMATED COUNT: 12.3 % (ref 11–15)
GLOBULIN PLAS-MCNC: 1.8 G/DL (ref 2.8–4.4)
GLUCOSE BLD-MCNC: 80 MG/DL (ref 70–99)
HCT VFR BLD AUTO: 35.2 %
HGB BLD-MCNC: 12.1 G/DL
IMM GRANULOCYTES # BLD AUTO: 0.01 X10(3) UL (ref 0–1)
IMM GRANULOCYTES NFR BLD: 0.2 %
LYMPHOCYTES # BLD AUTO: 1.91 X10(3) UL (ref 1–4)
LYMPHOCYTES NFR BLD AUTO: 35.6 %
MAGNESIUM SERPL-MCNC: 1.8 MG/DL (ref 1.6–2.6)
MCH RBC QN AUTO: 32.1 PG (ref 26–34)
MCHC RBC AUTO-ENTMCNC: 34.4 G/DL (ref 31–37)
MCV RBC AUTO: 93.4 FL
MONOCYTES # BLD AUTO: 0.48 X10(3) UL (ref 0.1–1)
MONOCYTES NFR BLD AUTO: 9 %
NEUTROPHILS # BLD AUTO: 2.55 X10 (3) UL (ref 1.5–7.7)
NEUTROPHILS # BLD AUTO: 2.55 X10(3) UL (ref 1.5–7.7)
NEUTROPHILS NFR BLD AUTO: 47.6 %
PHOSPHATE SERPL-MCNC: 4 MG/DL (ref 2.4–5.1)
PLATELET # BLD AUTO: 310 10(3)UL (ref 150–450)
POTASSIUM SERPL-SCNC: 3.9 MMOL/L (ref 3.5–5.1)
PROT SERPL-MCNC: 4.9 G/DL (ref 5.7–8.2)
RBC # BLD AUTO: 3.77 X10(6)UL
SODIUM SERPL-SCNC: 142 MMOL/L (ref 136–145)
WBC # BLD AUTO: 5.4 X10(3) UL (ref 4–11)

## 2024-03-04 PROCEDURE — 99233 SBSQ HOSP IP/OBS HIGH 50: CPT | Performed by: HOSPITALIST

## 2024-03-04 PROCEDURE — 0DB98ZX EXCISION OF DUODENUM, VIA NATURAL OR ARTIFICIAL OPENING ENDOSCOPIC, DIAGNOSTIC: ICD-10-PCS | Performed by: INTERNAL MEDICINE

## 2024-03-04 RX ORDER — HYDROMORPHONE HYDROCHLORIDE 1 MG/ML
0.6 INJECTION, SOLUTION INTRAMUSCULAR; INTRAVENOUS; SUBCUTANEOUS EVERY 5 MIN PRN
Status: DISCONTINUED | OUTPATIENT
Start: 2024-03-04 | End: 2024-03-04 | Stop reason: HOSPADM

## 2024-03-04 RX ORDER — HYDROMORPHONE HYDROCHLORIDE 1 MG/ML
0.2 INJECTION, SOLUTION INTRAMUSCULAR; INTRAVENOUS; SUBCUTANEOUS EVERY 5 MIN PRN
Status: DISCONTINUED | OUTPATIENT
Start: 2024-03-04 | End: 2024-03-04 | Stop reason: HOSPADM

## 2024-03-04 RX ORDER — HYDROMORPHONE HYDROCHLORIDE 1 MG/ML
0.4 INJECTION, SOLUTION INTRAMUSCULAR; INTRAVENOUS; SUBCUTANEOUS EVERY 5 MIN PRN
Status: DISCONTINUED | OUTPATIENT
Start: 2024-03-04 | End: 2024-03-04 | Stop reason: HOSPADM

## 2024-03-04 RX ORDER — NALOXONE HYDROCHLORIDE 0.4 MG/ML
80 INJECTION, SOLUTION INTRAMUSCULAR; INTRAVENOUS; SUBCUTANEOUS AS NEEDED
Status: DISCONTINUED | OUTPATIENT
Start: 2024-03-04 | End: 2024-03-04 | Stop reason: HOSPADM

## 2024-03-04 RX ORDER — PROCHLORPERAZINE EDISYLATE 5 MG/ML
5 INJECTION INTRAMUSCULAR; INTRAVENOUS EVERY 8 HOURS PRN
Status: DISCONTINUED | OUTPATIENT
Start: 2024-03-04 | End: 2024-03-04 | Stop reason: HOSPADM

## 2024-03-04 RX ORDER — MORPHINE SULFATE 4 MG/ML
2 INJECTION, SOLUTION INTRAMUSCULAR; INTRAVENOUS EVERY 10 MIN PRN
Status: DISCONTINUED | OUTPATIENT
Start: 2024-03-04 | End: 2024-03-04 | Stop reason: HOSPADM

## 2024-03-04 RX ORDER — ONDANSETRON 2 MG/ML
4 INJECTION INTRAMUSCULAR; INTRAVENOUS EVERY 6 HOURS PRN
Status: DISCONTINUED | OUTPATIENT
Start: 2024-03-04 | End: 2024-03-04 | Stop reason: HOSPADM

## 2024-03-04 RX ORDER — MORPHINE SULFATE 4 MG/ML
4 INJECTION, SOLUTION INTRAMUSCULAR; INTRAVENOUS EVERY 10 MIN PRN
Status: DISCONTINUED | OUTPATIENT
Start: 2024-03-04 | End: 2024-03-04 | Stop reason: HOSPADM

## 2024-03-04 RX ORDER — SODIUM CHLORIDE, SODIUM LACTATE, POTASSIUM CHLORIDE, CALCIUM CHLORIDE 600; 310; 30; 20 MG/100ML; MG/100ML; MG/100ML; MG/100ML
INJECTION, SOLUTION INTRAVENOUS CONTINUOUS
Status: DISCONTINUED | OUTPATIENT
Start: 2024-03-04 | End: 2024-03-04

## 2024-03-04 RX ORDER — MIDAZOLAM HYDROCHLORIDE 1 MG/ML
INJECTION INTRAMUSCULAR; INTRAVENOUS AS NEEDED
Status: DISCONTINUED | OUTPATIENT
Start: 2024-03-04 | End: 2024-03-04 | Stop reason: SURG

## 2024-03-04 RX ORDER — MAGNESIUM SULFATE HEPTAHYDRATE 40 MG/ML
2 INJECTION, SOLUTION INTRAVENOUS ONCE
Status: COMPLETED | OUTPATIENT
Start: 2024-03-04 | End: 2024-03-04

## 2024-03-04 RX ORDER — MORPHINE SULFATE 10 MG/ML
6 INJECTION, SOLUTION INTRAMUSCULAR; INTRAVENOUS EVERY 10 MIN PRN
Status: DISCONTINUED | OUTPATIENT
Start: 2024-03-04 | End: 2024-03-04 | Stop reason: HOSPADM

## 2024-03-04 RX ADMIN — MIDAZOLAM HYDROCHLORIDE 2 MG: 1 INJECTION INTRAMUSCULAR; INTRAVENOUS at 11:50:00

## 2024-03-04 NOTE — OPERATIVE REPORT
EGD Operative Report    Jarrett Bal Patient Status:  Inpatient    1975 MRN [id number]   Location [facility] ENDOSCOPY LAB SUITES Attending Bishop Garcia,*   Hosp Day #   2 PCP None Pcp       Pre-op Diagnosis:    Chronic pancreatitis.     Dyspepsia    Post-Op Diagnosis:   Normal esophagus and Z-line.  No varices.  2 cm hiatal hernia  Previous gastro jejunostomy.  Normal anastomosis in the mid gastric body.    No gastric varices noted  Moderately severe duodenitis in the duodenal bulb and into the second portion of duodenum with mild narrowing.  Scope passes without significant resistance.  Biopsies taken with cold forceps from this area      Procedure Performed: EGD with biopsies    Informed Consent: Informed consent for both the procedure and sedation were obtained from the patient. The potentially life-threatening complications of sedation, bleeding,  Perforation, transfusion or repeat endoscopy were reviewed along with the possible need for hospitalization, surgical management, transfusion or repeat endoscopy should one of these complications arise. The patient understands and is agreeable to proceed.  Sedation Type: MAC-Patient received sedation with monitored anesthesia provided by an anesthesiologist  Moderate Sedation Time: None.  Deep sedation provided by anesthesia.  Procedure Description: The patient was placed in the left lateral decubitus position.  A bite block was placed in the patient’s mouth.  The endoscope was inserted through the mouth and advanced under direct visualization to the 3rd portion of the duodenum and was then withdrawn to examine the duodenal bulb and gastric antrum.  The endoscope was then retroflexed to examine the angulus, GE junction, cardia, body and fundus and then withdrawn to examine the esophagus. The  endoscope was then removed from the patient. The patient tolerated the procedure well with no immediate complications and was transferred to the recovery area in stable condition.  Findings:    ESOPHAGUS:  Normal esophagus and Z-line.  2 cm sliding hiatal hernia.  No esophageal varices.    STOMACH:   There was evidence of previous gastrojejunostomy with normal anastomosis. Normal gastric examination otherwise.  No gastric varices noted.  The pylorus was minimally inflamed.     DUODENUM:  The duodenal bulb and into the second portion with evidence of mild narrowing with erythema and small ulcerations consistent with moderately severe duodenitis.  Scope passes with minimal resistance.  The distal 2nd portion and 3rd portion of duodenum was normal.  Biopsies taken from the area duodenitis with cold forceps    Recommendations:   Follow-up pathology results  Okay to consider anticoagulation in 6 hours if needed  Discharge:  The patient was given an after visit summary detailing the procedure, findings, recommendations and follow up plans.  Antonio Martin MD  3/4/2024  12:00 PM

## 2024-03-04 NOTE — PLAN OF CARE
Problem: Patient Centered Care  Goal: Patient preferences are identified and integrated in the patient's plan of care  Description: Interventions:  - What would you like us to know as we care for you?   - Provide timely, complete, and accurate information to patient/family  - Incorporate patient and family knowledge, values, beliefs, and cultural backgrounds into the planning and delivery of care  - Encourage patient/family to participate in care and decision-making at the level they choose  - Honor patient and family perspectives and choices  3/4/2024 1456 by Asad Plummer RN  Outcome: Progressing  3/4/2024 1456 by Asad Plummer RN  Outcome: Progressing     Problem: Patient/Family Goals  Goal: Patient/Family Long Term Goal  Description: Patient's Long Term Goal: to resume normal bowel function.    Interventions:  -   - See additional Care Plan goals for specific interventions  3/4/2024 1456 by Asad Plummer RN  Outcome: Progressing  3/4/2024 1456 by Asad Plummer RN  Outcome: Progressing  Goal: Patient/Family Short Term Goal  Description: Patient's Short Term Goal: to be able to eat regular food.    Interventions:   - diet slowly advanced.  - See additional Care Plan goals for specific interventions  3/4/2024 1456 by Asad Plummer RN  Outcome: Progressing  3/4/2024 1456 by Asad Plummer RN  Outcome: Progressing     Problem: PAIN - ADULT  Goal: Verbalizes/displays adequate comfort level or patient's stated pain goal  Description: INTERVENTIONS:  - Encourage pt to monitor pain and request assistance  - Assess pain using appropriate pain scale  - Administer analgesics based on type and severity of pain and evaluate response  - Implement non-pharmacological measures as appropriate and evaluate response  - Consider cultural and social influences on pain and pain management  - Manage/alleviate anxiety  - Utilize distraction and/or relaxation techniques  - Monitor for  opioid side effects  - Notify MD/LIP if interventions unsuccessful or patient reports new pain  - Anticipate increased pain with activity and pre-medicate as appropriate  3/4/2024 1456 by Asad Plummer RN  Outcome: Progressing  3/4/2024 1456 by Asad Plummer RN  Outcome: Progressing     Problem: RISK FOR INFECTION - ADULT  Goal: Absence of fever/infection during anticipated neutropenic period  Description: INTERVENTIONS  - Monitor WBC  - Administer growth factors as ordered  - Implement neutropenic guidelines  3/4/2024 1456 by Asad Plummer RN  Outcome: Progressing  3/4/2024 1456 by Asad Plummer RN  Outcome: Progressing     Problem: DISCHARGE PLANNING  Goal: Discharge to home or other facility with appropriate resources  Description: INTERVENTIONS:  - Identify barriers to discharge w/pt and caregiver  - Include patient/family/discharge partner in discharge planning  - Arrange for needed discharge resources and transportation as appropriate  - Identify discharge learning needs (meds, wound care, etc)  - Arrange for interpreters to assist at discharge as needed  - Consider post-discharge preferences of patient/family/discharge partner  - Complete POLST form as appropriate  - Assess patient's ability to be responsible for managing their own health  - Refer to Case Management Department for coordinating discharge planning if the patient needs post-hospital services based on physician/LIP order or complex needs related to functional status, cognitive ability or social support system  3/4/2024 1456 by Asad Plummer RN  Outcome: Progressing  3/4/2024 1456 by Asad Plummer RN  Outcome: Progressing     Problem: GASTROINTESTINAL - ADULT  Goal: Minimal or absence of nausea and vomiting  Description: INTERVENTIONS:  - Maintain adequate hydration with IV or PO as ordered and tolerated  - Nasogastric tube to low intermittent suction as ordered  - Evaluate effectiveness of ordered  antiemetic medications  - Provide nonpharmacologic comfort measures as appropriate  - Advance diet as tolerated, if ordered  - Obtain nutritional consult as needed  - Evaluate fluid balance  3/4/2024 1456 by Asad Plummer RN  Outcome: Progressing  3/4/2024 1456 by Asad Plummer, RN  Outcome: Progressing  Goal: Maintains or returns to baseline bowel function  Description: INTERVENTIONS:  - Assess bowel function  - Maintain adequate hydration with IV or PO as ordered and tolerated  - Evaluate effectiveness of GI medications  - Encourage mobilization and activity  - Obtain nutritional consult as needed  - Establish a toileting routine/schedule  - Consider collaborating with pharmacy to review patient's medication profile  3/4/2024 1456 by Asad lPummer RN  Outcome: Progressing  3/4/2024 1456 by Asad Plummer RN  Outcome: Progressing   Had EGD today, tolerated clear liquid diet well. Diet slowly advanced. No pain.

## 2024-03-04 NOTE — CHRONIC PAIN
[facility]  Inpatient Pain Management Progress Note      Patient name: Jarrett Bal 49 year old male  : 1975  MRN: [id number]    Diagnosis:   1. Acute pancreatitis, unspecified complication status, unspecified pancreatitis type (HCC)        Current hospital day: Hospital Day: 3    Pain Scores: 4    Subjective: Pt seen at bedside. Endorses that he will have EGD today. Endorses that he still has required IVP pain medication for breakthrough pain. Denies any changes in bowel and bladder, fever, chills, cp , or sob    History:  Past Medical History:   Diagnosis Date    Anesthesia complication     cramps;nausea after anesthesia when in 3rd grade    Anxiety state     Duodenitis     Pancreatitis (HCC)     PONV (postoperative nausea and vomiting)      Past Surgical History:   Procedure Laterality Date    BIOPSY/REMOVAL, LYMPH NODE(S)      R neck    GASTROSTOMY/JEJUNOSTOMY TUBE N/A      Family History   Problem Relation Age of Onset    Hypertension Father     Hypertension Mother     Psychiatric Mother       reports that he quit smoking about 10 months ago. His smoking use included cigarettes. He has a 12.5 pack-year smoking history. He has never been exposed to tobacco smoke. He has never used smokeless tobacco. He reports that he does not currently use alcohol. He reports that he does not currently use drugs after having used the following drugs: Cannabis.    Allergies:  No Known Allergies    Current Medications:  Scheduled Meds:   lipase-protease-amylase (Lip-Prot-Amyl)  5,000 Units Oral TID CC    polyethylene glycol (PEG 3350)  17 g Oral Daily    heparin  5,000 Units Subcutaneous Q8H FARZANA    pantoprazole  40 mg Intravenous Daily     Continuous Infusions:   lactated ringers 75 mL/hr at 24 0600     PRN Meds:.acetaminophen, melatonin, polyethylene glycol (PEG 3350), sennosides, bisacodyl, fleet enema, prochlorperazine, glycerin-hypromellose-, sodium chloride, ondansetron, morphINE **OR**  morphINE **OR** morphINE    Exam:Blood pressure (!) 133/99, pulse 79, temperature 97.8 °F (36.6 °C), temperature source Temporal, resp. rate 16, height 5' 8\" (1.727 m), weight 125 lb 12.8 oz (57.1 kg), SpO2 98%.    General: Alert and oriented x3, NAD, appears stated age, appropriate disposition and demeanor, answers questions appropriately   Head: normocephalic, atraumatic  Eyes: anicteric; no injection  Ears: no obvious deformities noted   Nose: externally grossly within normal limits, no unusual discharge or rhinorrhea   Throat: lips grossly within normal limits by visual exam externally  Neck: supple, trachea midline, no obvious JVD  Chest: S1, S2, RRR, no obvious visual deformity  Respiratory: CTAB  Abdomen: soft, ++tender, bowel sounds present  Assessment/Plan:  Pt is a 50 y/o male w acute pancreatitis, abd pain:    -cont w iv dilaudid for breakthrough pain   -cont w norco po    Seen w avni and nursing   IMCHAELLE Baker   Total Time: 15     Lengthy discussion of risks, benefits, and alternative treatments were reviewed to patients satisfaction. All questions were answered. Patient agreeable to plan. Greater than 50% of the time was spent with counseling (nature of discussion centered around pain, therapy, and treatment options), face to face time, time spent reviewing data, obtaining patient information and discussing the care with the patients health care providers.     Chronic Pain Service [phone]

## 2024-03-04 NOTE — DIETARY NOTE
ADULT NUTRITION INITIAL ASSESSMENT    Pt is at moderate nutrition risk.  Pt meets moderate malnutrition criteria.      CRITERIA FOR MALNUTRITION DIAGNOSIS:  Criteria for non-severe malnutrition diagnosis: acute illness/injury related to wt loss 5% in 1 month, body fat mild depletion, and muscle mass mild depletion.    RECOMMENDATIONS TO MD: See Nutrition Intervention    ADMITTING DIAGNOSIS:  Acute pancreatitis, unspecified complication status, unspecified pancreatitis type (HCC) [K85.90]    PERTINENT PAST MEDICAL HISTORY:  has a past medical history of Anesthesia complication, Anxiety state, Duodenitis, Pancreatitis (HCC), and PONV (postoperative nausea and vomiting).     PATIENT STATUS: Initial 03/04/24: Seeing pt due to screened at risk by RN at admission for unintentional wt loss and eating poorly due to decreased appetite. Pt known from 6/2023 admission with SPCM with wt loss to low of 102# from UBW of 140#. Had been working on regaining wt and got to 135#, but last admission dropped to 121#--currently 125# on bedscale wt yesterday (maybe slightly lower at admission). Pt with chronic pancreatitis and admitted with portal vein thrombosis. S/p EGD today (see results) and diet progressed from CLD to FLD and pt hungry and desiring solids. If tolerated FLD can progress to soft/low fiber diet.   Pt agrees to ONS as prior admission.        FOOD/NUTRITION RELATED HISTORY:  Appetite: Fair to good  Intake:  had been tolerating CLD and FLD  Intake Meeting Needs: No, but oral nutrition supplements (ONS) to maximize  Percent Meals Eaten (last 6 days)       Date/Time Percent Meals Eaten (%)    03/03/24 1230 100 %     Percent Meals Eaten (%): 2 puddings, italian ice at 03/03/24 1230    03/03/24 1929 100 %     Percent Meals Eaten (%): soup at 03/03/24 1929           Food Allergies: No Known Food Allergies (NKFA)  Cultural/Ethnic/Lutheran Preferences: None    GASTROINTESTINAL:  no N/V, or Diarrhea or constipation, but some mild  abd pain.      MEDICATIONS: reviewed   lipase-protease-amylase (Lip-Prot-Amyl)  5,000 Units Oral TID CC    polyethylene glycol (PEG 3350)  17 g Oral Daily    heparin  5,000 Units Subcutaneous Q8H FARZANA    pantoprazole  40 mg Intravenous Daily       LABS: reviewed  Recent Labs     03/02/24  1754 03/03/24  0013 03/03/24  0537 03/04/24  0427   * 75 67*  67* 80   BUN 8* 6* 5*  5* <5*   CREATSERUM 0.71 0.67* 0.60*  0.60* 0.59*   CA 8.5* 8.4* 8.6*  8.6* 8.5*   MG  --   --  1.5* 1.8    141 140  140 142   K 3.7 4.3 3.9  3.9 3.9    109 110  110 111   CO2 26.0 27.0 26.0  26.0 27.0   PHOS  --   --  3.2 4.0   OSMOCALC 283 288 286  286  --        NUTRITION RELATED PHYSICAL FINDINGS:  - Nutrition Focused Physical Exam (NFPE): mild depletion body fat orbital region, triceps region, and fat overlying rib cage region and mild depletion muscle mass temple region, clavicle region, scapular region, shoulder region, dorsal greenberg region, thigh region, and calf region/thin   - Fluid Accumulation: none  see RN documentation for details  - Skin Integrity: intact see RN documentation for details    ANTHROPOMETRICS:  HT: 172.7 cm (5' 8\")  WT: 57.1 kg (125 lb 12.8 oz)   BMI: Body mass index is 19.13 kg/m².  BMI CLASSIFICATION: 19-24.9 kg/m2 - WNL  IBW: 154 lbs        82% IBW  Usual Body Wt: 140-150 lbs before illness, loss to low of 102#, regain to 135# 1-2 month ago and now loss again      84-90% UBW    WEIGHT HISTORY:  Patient Weight(s) for the past 336 hrs:   Weight   03/03/24 0714 57.1 kg (125 lb 12.8 oz)   03/02/24 1146 54.3 kg (119 lb 9.6 oz)   03/02/24 0810 55.8 kg (123 lb)     Wt Readings from Last 10 Encounters:   03/03/24 57.1 kg (125 lb 12.8 oz)   02/17/24 55.2 kg (121 lb 12.8 oz)   12/28/23 59 kg (130 lb)   12/20/23 58.1 kg (128 lb 1.6 oz)   06/23/23 51.2 kg (112 lb 12.8 oz)   05/29/23 57.4 kg (126 lb 8 oz)   05/08/23 47.3 kg (104 lb 4.4 oz)   04/12/23 54.4 kg (120 lb)   06/30/20 63.5 kg (140 lb)    01/17/19 62.9 kg (138 lb 11.2 oz)       NUTRITION DIAGNOSIS/PROBLEM:    Moderate Malnutrition related to Acute - Physiological causes resulting in anorexia or diminished intake in the setting of acute flares of chronic pancreatitis as evidenced by acute wt loss, less than adequate po intake and mild muscle and fat deficits.      NUTRITION INTERVENTION:     NUTRITION PRESCRIPTION:   Estimated Nutrition needs: --dosing wt of 57 kg - wt taken on 3/3  Calories: 3083-6560 calories/day (30-35 calories per kg Dosing wt)  Protein: 74-86 g protein/day (1.3-1.5 g protein/kg Dosing wt)    - Diet:       Procedures    Full liquid diet Is Patient on Accuchecks? No; Misc Restriction: Low Fiber/Soft, Low Fat      - RD Malnutrition Care Plan: Encouraged increased PO intake and Initiated ONS (oral nutritional supplements)    - Meals and snacks: Encouraged increased PO intake  - Medical Food Supplements-RD added Ensure Enlive (350 calories/ 20 g protein each) Chocolate BID Rational/use of oral supplements discussed.  - Vitamin and mineral supplements: none  - Feeding assistance: meal set up  - Nutrition education:  pt knows to adhere to lower fat diet    - Coordination of nutrition care: collaboration with other providers  - Discharge and transfer of nutrition care to new setting or provider: to be determined    MONITOR AND EVALUATE/NUTRITION GOALS:  - Food and Nutrient Intake:      Monitor: adequacy of PO intake, tolerance of PO intake, adequacy of supplement intake, and tolerance of supplement intake  - Food and Nutrient Administration:      Monitor: N/A  - Anthropometric Measurement:    Monitor weight  - Nutrition Goals:      gradual wt gain as able, PO and supplement greater than 75% of needs, labs within acceptable limits, support body systems, and improved GI status    DIETITIAN FOLLOW UP: RD to follow and monitor nutrition status      Beryl Blunt RD, LDN   Clinical Dietitian [id number]

## 2024-03-04 NOTE — PROGRESS NOTES
[facility]  part of Located within [facility]    Progress Note    Jarrett Bal Patient Status:  Inpatient    1975 MRN [id number]   Location [facility] 4W/SW/SE Attending Bishop Garcia,*   Hosp Day # 2 PCP None Pcp     Chief Complaint: feels good    Subjective:     Constitutional:  Positive for appetite change. Negative for activity change.   HENT:  Negative for congestion.    Respiratory:  Negative for cough, chest tightness and stridor.    Cardiovascular:  Negative for leg swelling.   Gastrointestinal:  Positive for abdominal pain. Negative for heartburn and nausea.   Genitourinary:  Negative for dysuria.   Neurological:  Negative for tremors.       Objective:   Blood pressure (!) 129/92, pulse 79, temperature 98.3 °F (36.8 °C), temperature source Oral, resp. rate 16, height 5' 8\" (1.727 m), weight 125 lb 12.8 oz (57.1 kg), SpO2 98%.  Physical Exam  Vitals and nursing note reviewed.   HENT:      Head: Normocephalic and atraumatic.   Cardiovascular:      Rate and Rhythm: Normal rate and regular rhythm.      Pulses: Normal pulses.   Pulmonary:      Effort: Pulmonary effort is normal.      Breath sounds: Rhonchi present.   Abdominal:      General: Bowel sounds are normal.      Palpations: Abdomen is soft.      Tenderness: There is abdominal tenderness.   Skin:     General: Skin is warm and dry.      Capillary Refill: Capillary refill takes less than 2 seconds.   Neurological:      General: No focal deficit present.      Mental Status: He is alert and oriented to person, place, and time.   Psychiatric:         Behavior: Behavior normal.         Judgment: Judgment normal.         Results:   Lab Results   Component Value Date    WBC 5.4 2024    HGB 12.1 (L) 2024    HCT 35.2 (L) 2024    .0 2024    CREATSERUM 0.59 (L) 2024    BUN <5 (L) 2024     2024    K 3.9 2024     2024    CO2 27.0 2024    GLU 80 2024     CA 8.5 (L) 03/04/2024    ALB 3.1 (L) 03/04/2024    ALKPHO 94 03/04/2024    BILT 0.3 03/04/2024    TP 4.9 (L) 03/04/2024    AST 10 03/04/2024    ALT 9 (L) 03/04/2024    PTT 27.8 08/07/2018    INR 0.98 03/03/2024    OLAYINKA 139 (H) 06/16/2023     (H) 03/03/2024    MG 1.8 03/04/2024    PHOS 4.0 03/04/2024    TROPHS 17 05/19/2023    ETOH <3 12/19/2023       No results found.        Assessment & Plan:     1.       Acute on chronic pancreatitis.  History of alcohol, but patient says none.  egd tomorrow; still needing pain meds; will keep full liquids  2.       Portal vein thrombosis.   Patient flatly denies any family history of inherited clotting disorders or miscarriages.seen by dr morales; ddoac on MT and lovenox here  3.       Protein-calorie malnutrition, fairly severe.  BMI is 18.19.  Will check CMP and also see how he does.eat more as soon as pain better  4.       Code status is Full Code by medical records.       Complex mdm; coordinating care with nurse and counseling pt about pancreatitis/egd/food poss dc      SYED SCOTT MD

## 2024-03-04 NOTE — PAYOR COMM NOTE
--------------  ADMISSION REVIEW     Payor: TARA NIELSEN POS/MCNP  Subscriber #:  [id number]  Authorization Number: [id number]    Admit date: 3/2/24  Admit time: 11:39 AM       REVIEW DOCUMENTATION:     ED Provider Notes        ED Provider Notes signed by Jimmy Flores MD at 3/2/2024 11:33 AM       Author: Jimmy Flores MD Service: -- Author Type: Physician    Filed: 3/2/2024 11:33 AM Date of Service: 3/2/2024  8:17 AM Status: Signed    : Jimmy Flores MD (Physician)           Patient Seen in: [facility] Emergency Department    History     Chief Complaint   Patient presents with    Abdomen/Flank Pain     Stated Complaint: abdominal pain    HPI    Patient complains of mid upper  abdominal pain that is getting worse over the past week.  Patient has chronic pancreatitis was hospitalized recently for this states that if he tries to eat he has increased pain vomiting pain is mid upper abdomen..  Pain described as sharp.  Pain rated as 10/10.  Modifying factors include: Worse with eating.          Past Medical History:   Diagnosis Date    Anesthesia complication     cramps;nausea after anesthesia when in 3rd grade    Anxiety state     Duodenitis     Pancreatitis (HCC)     PONV (postoperative nausea and vomiting)        Past Surgical History:   Procedure Laterality Date    BIOPSY/REMOVAL, LYMPH NODE(S)      R neck    GASTROSTOMY/JEJUNOSTOMY TUBE N/A          Physical Exam     ED Triage Vitals [03/02/24 0802]   /84   Pulse (!) 123   Resp 18   Temp 97.8 °F (36.6 °C)   Temp src Oral   SpO2 100 %   O2 Device None (Room air)       Current:BP (!) 134/94   Pulse 86   Temp 97.8 °F (36.6 °C) (Oral)   Resp 18   Ht 172.7 cm (5' 8\")   Wt 55.8 kg   SpO2 98%   BMI 18.70 kg/m²   Pulse ox nl        Physical Exam  General Appearance: alert, no distress  Eyes: pupils equal and round no pallor or injection  ENT, Mouth: mucous membranes moist  Respiratory: there are no retractions, lungs are clear to  auscultation  Cardiovascular: regular rate and rhythm    Gastrointestinal: soft tender epigastric with vol guarding  Neurological: II-XII grossly intact  no focal deficits  Skin: warm and dry, no rashes.  Musculoskeletal: neck is supple non tender        Extremities are symmetrical, full range of motion  Psychiatric: patient is pleasant, there is no agitation    DIFFERENTIAL DIAGNOSIS: After history and physical exam differential diagnosis was considered for  pancreatitis vs. Enteritis vs. gastritis          ED Course     Labs Reviewed   LIPASE - Abnormal; Notable for the following components:       Result Value    Lipase 422 (*)     All other components within normal limits   HEPATIC FUNCTION PANEL (7) - Abnormal; Notable for the following components:    Alkaline Phosphatase 134 (*)     All other components within normal limits   CBC W/ DIFFERENTIAL - Abnormal; Notable for the following components:    .0 (*)     All other components within normal limits   BASIC METABOLIC PANEL (8) - Normal     MDM         Cardiac Monitor:   Pulse Readings from Last 1 Encounters:   03/02/24 86   , sinus, 110  interpreted by me.    Radiology findings:  I personally reviewed the images. CT ABDOMEN+PELVIS(CONTRAST ONLY)(CPT=74177)    Result Date: 3/2/2024  CONCLUSION:   Mildly increased fluid and inflammation around the pancreatic head and the severely thickened gastric pylorus and duodenum.  Stable small pseudo cyst at the pancreaticoduodenal groove.  No perforation  New nonocclusive thrombus in the main portal vein.  Stable thrombosis of the anterior division of the right portal vein.      Dictated by (CST): Elver Frausto MD on 3/02/2024 at 9:52 AM     Finalized by (CST): Elver Frausto MD on 3/02/2024 at 10:09 AM               Disposition and Plan     Clinical Impression:  1. Acute pancreatitis, unspecified complication status, unspecified pancreatitis type (HCC)        Disposition:  Admit    Signed by Jimmy Flores MD on  3/2/2024 11:33 AM         HISTORY AND PHYSICAL      ASSESSMENT AND PLAN:    1.       Acute on chronic pancreatitis.  History of alcohol, but patient says none.  As per Dr. Christianson and we will see how he does n.p.o. on lactated Ringer's.  2.       Portal vein thrombosis.  Will ask Dr. Sanderson to see.  Patient flatly denies any family history of inherited clotting disorders or miscarriages.  3.       Protein-calorie malnutrition, fairly severe.  BMI is 18.19.  Will check CMP and also see how he does.        GI CONSULT      In last few days has suprapubic pain after eating with n/v, seen by Dr. Ram and tried on pancreatic enzymes - only took once.  Pain is burning sensation, worse at night.  Has run out of hydrocodone and feels tylenol helps less.  Continue to use tobacco but down to one pack every 3 days.  States last drink was 2 months ago; denies drug use.       Denies diarrhea, feels that it is harder to have a bm.  Does not take a laxative.  No hematochezia nor melena.      Labwork shows improved lipase compared to 2/17/2024, no leukocytosis nor anemia.  Imaging shows:  -new non occlusive thrombus in setting of normal LAEs w/o complaint of ruq pain  -persistence of distal cbd narrowing with upstream dilation (seen in 12/2023)  -acute on chronic pancreatitis - mild inflammation, no necrosis  -thickening at GJ anastomosis with D1/2 thickening unchanged from 2/20/2024 or 12/2023 imaging    CT ABDOMEN+PELVIS(CONTRAST ONLY)(CPT=74177)     Result Date: 3/2/2024  CONCLUSION:          Mildly increased fluid and inflammation around the pancreatic head and the severely thickened gastric pylorus and duodenum.  Stable small pseudo cyst at the pancreaticoduodenal groove.  No perforation  New nonocclusive thrombus in the main portal vein.  Stable thrombosis of the anterior division of the right portal vein.      Dictated by (CST): Elver Frausto MD on 3/02/2024 at 9:52 AM     Finalized by (CST): Elver Frausto MD on 3/02/2024 at 10:09 AM              Impression:     Acute pancreatitis, unspecified complication status, unspecified pancreatitis type (HCC)  -bowel rest - NPO, IVF with LR  -pain control - consider non narcotic therapy as concern he is developing drug seeking behavior --> consider pain management consult; defer to primary  -pancreatic enzymes once advancing diet  -cessation of tob as is trigger  -consider pain management consult - defer to primary  -trend bun/cr to ensure enough resuscitation  -as pancreatitis is mild, if in next six hours, patient is hungry - okay to advance to clear liquids     Constipation  -trial of miralax  -while on narcotics - should be on bowel regimen     Non occlusive thrombus likely 2/2 to splenic vein inflammation 2/2 to pancreatitis but ddx includes non cirrhotic etiology such as malignancy or hypercoag disorder; as clot burden grows, may need to be on anticoagulation  -consider hematology consult - defer to primary  -stop tob abuse     HEME/ONC CONSULT     Impression and Plan:    Patient with new nonocclusive portal vein thrombosis in the setting of acute pancreatitis.     No indication for hypercoagulable workup, given that this is in the setting of acute pancreatitis.     On imaging the suggestion of portal hypertension.  Recommend the patient be evaluated for varices for Wiele risk assessment prior to initiation of anticoagulation.  I reach out to GI for this purpose.  After this evaluation is completed, recommend to initiate therapeutic doses of low molecular weight heparin and if tolerating, he may be discharged home with a DOAC.  He should complete anticoagulation for 3 to 6 months.  He follows regularly with GI and can follow with GI for anticoagulation.  He is to establish care with a primary care doctor, who could also follow-up for his anticoagulation.         REVIEW DOCUMENTATION  3-3-24     Blood pressure 119/81, pulse 89, temperature 97.8 °F (36.6 °C), temperature source Oral, resp. rate 16,  height 5' 8\" (1.727 m), weight 125 lb 12.8 oz (57.1 kg), SpO2 99%.  Physical Exam  Vitals and nursing note reviewed.   HENT:      Head: Normocephalic and atraumatic.   Cardiovascular:      Rate and Rhythm: Normal rate and regular rhythm.      Pulses: Normal pulses.   Pulmonary:      Effort: Pulmonary effort is normal.      Breath sounds: Rhonchi present.   Abdominal:      General: Bowel sounds are normal.      Palpations: Abdomen is soft.      Tenderness: There is abdominal tenderness.   Skin:     General: Skin is warm and dry.      Capillary Refill: Capillary refill takes less than 2 seconds.   Neurological:      General: No focal deficit present.      Mental Status: He is alert and oriented to person, place, and time.   Lab Results   Component Value Date     WBC 6.3 03/03/2024     HGB 11.9 (L) 03/03/2024     HCT 34.6 (L) 03/03/2024     .0 03/03/2024     CREATSERUM 0.60 (L) 03/03/2024     CREATSERUM 0.60 (L) 03/03/2024     BUN 5 (L) 03/03/2024     BUN 5 (L) 03/03/2024      03/03/2024      03/03/2024     K 3.9 03/03/2024     K 3.9 03/03/2024      03/03/2024      03/03/2024     CO2 26.0 03/03/2024     CO2 26.0 03/03/2024     GLU 67 (L) 03/03/2024     GLU 67 (L) 03/03/2024     CA 8.6 (L) 03/03/2024     CA 8.6 (L) 03/03/2024     ALB 3.1 (L) 03/03/2024     ALKPHO 94 03/03/2024     BILT 0.5 03/03/2024     TP 4.9 (L) 03/03/2024     AST 10 03/03/2024     ALT 11 03/03/2024         Assessment & Plan:   1.       Acute on chronic pancreatitis.  History of alcohol, but patient says none.  egd tomorrow; still needing pain meds; will keep full liquids  2.       Portal vein thrombosis.  Will ask Dr. Sanderson to see.  Patient flatly denies any family history of inherited clotting disorders or miscarriages.  3.       Protein-calorie malnutrition, fairly severe.  BMI is 18.19.  Will check CMP and also see how he does.eat more as soon as pain better    IN ED     ketorolac (Toradol) 15 MG/ML injection 15  mg  Dose: 15 mg  Freq: Once Route: IV  Start: 03/02/24 0813 End: 03/02/24 0818   ondansetron (Zofran) 4 MG/2ML injection 4 mg  Dose: 4 mg  Freq: Once Route: IV  Start: 03/02/24 0818 End: 03/02/24 0819   sodium chloride 0.9 % IV bolus 1,000 mL  Dose: 1,000 mL  Freq: Once Route: IV  Last Dose: Stopped (03/02/24 0930)  Start: 03/02/24 0813 End: 03/02/24 0930             MEDICATIONS ADMINISTERED IN LAST 1 DAY:  heparin (Porcine) 5000 UNIT/ML injection 5,000 Units       Date Action Dose Route User    3/4/2024 0600 Given 5,000 Units Subcutaneous (Right Upper Arm) Nathalia Benites RN    3/3/2024 2155 Given 5,000 Units Subcutaneous (Right Upper Arm) Nathalia Benites RN    3/3/2024 1300 Given 5,000 Units Subcutaneous (Left Upper Arm) Yvette Aguilera RN          lactated ringers infusion       Date Action Dose Route User    3/3/2024 1230 New Bag (none) Intravenous Yvette Aguilera RN          lactated ringers infusion  75 ML HR        Date Action Dose Route User    3/4/2024 0600 New Bag (none) Intravenous Nathalia Benites RN    3/3/2024 1712 New Bag (none) Intravenous Yvette Aguilera RN          magnesium sulfate in sterile water for injection 2 g/50mL IVPB premix 2 g       Date Action Dose Route User    3/4/2024 0820 New Bag 2 g Intravenous Asad Plummer RN          morphINE PF 2 MG/ML injection 2 mg       Date Action Dose Route User    3/3/2024 1712 Given 2 mg Intravenous Yvette Aguilera RN          morphINE PF 4 MG/ML injection 4 mg       Date Action Dose Route User    3/4/2024 1014 Given 4 mg Intravenous Asad Plummer RN    3/4/2024 0600 Given 4 mg Intravenous Nathalia Benites RN    3/4/2024 0319 Given 4 mg Intravenous Nathalia Benites RN    3/3/2024 2155 Given 4 mg Intravenous Nathalia Benites RN    3/3/2024 1929 Given 4 mg Intravenous KilgastYvette RN    3/3/2024 1226 Given 4 mg Intravenous KilgastYvette RN          pancrelipase (Lip-Prot-Amyl) (Zenpep) DR particles cap 5,000 Units       Date Action Dose  Route User    3/3/2024 1719 Given 5,000 Units Oral MarisabelgaYvette bran RN          pantoprazole (Protonix) 40 mg in sodium chloride 0.9% PF 10 mL IV push       Date Action Dose Route User    3/4/2024 0820 Given 40 mg Intravenous Asad Plummer RN          polyethylene glycol (PEG 3350) (Miralax) 17 g oral packet 17 g       Date Action Dose Route User    3/3/2024 1300 Given 17 g Oral KilgastYvette RN            Vitals (last day)       Date/Time Temp Pulse Resp BP SpO2 Weight O2 Device O2 Flow Rate (L/min) Who    03/04/24 1105 -- 76 19 120/89 99 % -- None (Room air) -- EK    03/04/24 0815 97.8 °F (36.6 °C) 79 16 133/99 98 % -- None (Room air) -- SS    03/04/24 0320 97.3 °F (36.3 °C) -- 16 120/82 98 % -- None (Room air) -- NH    03/03/24 1943 97.6 °F (36.4 °C) -- 18 122/85 99 % -- None (Room air) -- DM    03/03/24 1636 98.7 °F (37.1 °C) 84 16 120/75 96 % -- None (Room air) -- ES    03/03/24 1258 97.8 °F (36.6 °C) 89 16 119/81 99 % -- None (Room air) -- SK    03/03/24 0851 98.4 °F (36.9 °C) 76 18 129/91 99 % -- None (Room air) -- ES    03/03/24 0714 -- -- -- -- -- 125 lb 12.8 oz -- -- DM    03/03/24 0524 97.5 °F (36.4 °C) -- 18 134/92 97 % -- None (Room air) -- DM

## 2024-03-04 NOTE — PLAN OF CARE
Patient is alert and oriented. RA. Tele in place. Voiding freely. Up independently. IVF infusing. NPO midnight. PRN morphine given for pain management. Call light within reach.   Problem: Patient Centered Care  Goal: Patient preferences are identified and integrated in the patient's plan of care  Description: Interventions:  - Provide timely, complete, and accurate information to patient/family  - Incorporate patient and family knowledge, values, beliefs, and cultural backgrounds into the planning and delivery of care  - Encourage patient/family to participate in care and decision-making at the level they choose  - Honor patient and family perspectives and choices  Outcome: Progressing     Problem: PAIN - ADULT  Goal: Verbalizes/displays adequate comfort level or patient's stated pain goal  Description: INTERVENTIONS:  - Encourage pt to monitor pain and request assistance  - Assess pain using appropriate pain scale  - Administer analgesics based on type and severity of pain and evaluate response  - Implement non-pharmacological measures as appropriate and evaluate response  - Consider cultural and social influences on pain and pain management  - Manage/alleviate anxiety  - Utilize distraction and/or relaxation techniques  - Monitor for opioid side effects  - Notify MD/LIP if interventions unsuccessful or patient reports new pain  - Anticipate increased pain with activity and pre-medicate as appropriate  Outcome: Progressing     Problem: RISK FOR INFECTION - ADULT  Goal: Absence of fever/infection during anticipated neutropenic period  Description: INTERVENTIONS  - Monitor WBC  - Administer growth factors as ordered  - Implement neutropenic guidelines  Outcome: Progressing     Problem: DISCHARGE PLANNING  Goal: Discharge to home or other facility with appropriate resources  Description: INTERVENTIONS:  - Identify barriers to discharge w/pt and caregiver  - Include patient/family/discharge partner in discharge  planning  - Arrange for needed discharge resources and transportation as appropriate  - Identify discharge learning needs (meds, wound care, etc)  - Arrange for interpreters to assist at discharge as needed  - Consider post-discharge preferences of patient/family/discharge partner  - Complete POLST form as appropriate  - Assess patient's ability to be responsible for managing their own health  - Refer to Case Management Department for coordinating discharge planning if the patient needs post-hospital services based on physician/LIP order or complex needs related to functional status, cognitive ability or social support system  Outcome: Progressing     Problem: GASTROINTESTINAL - ADULT  Goal: Minimal or absence of nausea and vomiting  Description: INTERVENTIONS:  - Maintain adequate hydration with IV or PO as ordered and tolerated  - Nasogastric tube to low intermittent suction as ordered  - Evaluate effectiveness of ordered antiemetic medications  - Provide nonpharmacologic comfort measures as appropriate  - Advance diet as tolerated, if ordered  - Obtain nutritional consult as needed  - Evaluate fluid balance  Outcome: Progressing  Goal: Maintains or returns to baseline bowel function  Description: INTERVENTIONS:  - Assess bowel function  - Maintain adequate hydration with IV or PO as ordered and tolerated  - Evaluate effectiveness of GI medications  - Encourage mobilization and activity  - Obtain nutritional consult as needed  - Establish a toileting routine/schedule  - Consider collaborating with pharmacy to review patient's medication profile  Outcome: Progressing

## 2024-03-04 NOTE — ANESTHESIA POSTPROCEDURE EVALUATION
Patient: Jarrett Bal    Procedure Summary       Date: 03/04/24 Room / Location: [facility] ENDOSCOPY 05 / [facility] ENDOSCOPY    Anesthesia Start: 1145 Anesthesia Stop:     Procedure: ESOPHAGOGASTRODUODENOSCOPY (EGD) Diagnosis: (duodenitis,hiatal hernia,previous gastric jejunostomy)    Surgeons: Antonio Martin MD Anesthesiologist: Maeve Sauceda MD    Anesthesia Type: MAC ASA Status: 3            Anesthesia Type: MAC    Vitals Value Taken Time   /72 03/04/24 1220   Temp 98 03/04/24 1225   Pulse 75 03/04/24 1220   Resp 13 03/04/24 1220   SpO2 98 % 03/04/24 1220   Vitals shown include unfiled device data.    [facility] AN Post Evaluation:   Patient Evaluated in floor  Patient Participation: complete - patient participated  Level of Consciousness: awake  Pain Management: adequate  Airway Patency:patent  Dental exam unchanged from preop  Yes    Cardiovascular Status: acceptable  Respiratory Status: acceptable  Postoperative Hydration acceptable      MAEVE SAUCEDA MD  3/4/2024 12:25 PM

## 2024-03-05 ENCOUNTER — TELEPHONE (OUTPATIENT)
Dept: INTERNAL MEDICINE UNIT | Facility: HOSPITAL | Age: 49
End: 2024-03-05

## 2024-03-05 VITALS
OXYGEN SATURATION: 97 % | HEART RATE: 97 BPM | BODY MASS INDEX: 19.07 KG/M2 | WEIGHT: 125.81 LBS | TEMPERATURE: 99 F | RESPIRATION RATE: 18 BRPM | HEIGHT: 68 IN | SYSTOLIC BLOOD PRESSURE: 120 MMHG | DIASTOLIC BLOOD PRESSURE: 80 MMHG

## 2024-03-05 LAB
ANION GAP SERPL CALC-SCNC: 4 MMOL/L (ref 0–18)
BASOPHILS # BLD AUTO: 0.04 X10(3) UL (ref 0–0.2)
BASOPHILS NFR BLD AUTO: 0.7 %
BUN BLD-MCNC: 7 MG/DL (ref 9–23)
BUN/CREAT SERPL: 11.1 (ref 10–20)
CALCIUM BLD-MCNC: 8.3 MG/DL (ref 8.7–10.4)
CHLORIDE SERPL-SCNC: 111 MMOL/L (ref 98–112)
CO2 SERPL-SCNC: 27 MMOL/L (ref 21–32)
CREAT BLD-MCNC: 0.63 MG/DL
DEPRECATED RDW RBC AUTO: 41.1 FL (ref 35.1–46.3)
EGFRCR SERPLBLD CKD-EPI 2021: 117 ML/MIN/1.73M2 (ref 60–?)
EOSINOPHIL # BLD AUTO: 0.46 X10(3) UL (ref 0–0.7)
EOSINOPHIL NFR BLD AUTO: 7.6 %
ERYTHROCYTE [DISTWIDTH] IN BLOOD BY AUTOMATED COUNT: 12.2 % (ref 11–15)
GLUCOSE BLD-MCNC: 84 MG/DL (ref 70–99)
HCT VFR BLD AUTO: 36.5 %
HGB BLD-MCNC: 12.3 G/DL
IMM GRANULOCYTES # BLD AUTO: 0.01 X10(3) UL (ref 0–1)
IMM GRANULOCYTES NFR BLD: 0.2 %
LYMPHOCYTES # BLD AUTO: 1.87 X10(3) UL (ref 1–4)
LYMPHOCYTES NFR BLD AUTO: 31.1 %
MAGNESIUM SERPL-MCNC: 1.9 MG/DL (ref 1.6–2.6)
MCH RBC QN AUTO: 30.7 PG (ref 26–34)
MCHC RBC AUTO-ENTMCNC: 33.7 G/DL (ref 31–37)
MCV RBC AUTO: 91 FL
MONOCYTES # BLD AUTO: 0.48 X10(3) UL (ref 0.1–1)
MONOCYTES NFR BLD AUTO: 8 %
NEUTROPHILS # BLD AUTO: 3.16 X10 (3) UL (ref 1.5–7.7)
NEUTROPHILS # BLD AUTO: 3.16 X10(3) UL (ref 1.5–7.7)
NEUTROPHILS NFR BLD AUTO: 52.4 %
OSMOLALITY SERPL CALC.SUM OF ELEC: 291 MOSM/KG (ref 275–295)
PHOSPHATE SERPL-MCNC: 4.5 MG/DL (ref 2.4–5.1)
PLATELET # BLD AUTO: 329 10(3)UL (ref 150–450)
POTASSIUM SERPL-SCNC: 4.1 MMOL/L (ref 3.5–5.1)
RBC # BLD AUTO: 4.01 X10(6)UL
SODIUM SERPL-SCNC: 142 MMOL/L (ref 136–145)
WBC # BLD AUTO: 6 X10(3) UL (ref 4–11)

## 2024-03-05 PROCEDURE — 99239 HOSP IP/OBS DSCHRG MGMT >30: CPT | Performed by: HOSPITALIST

## 2024-03-05 RX ORDER — NICOTINE POLACRILEX 4 MG/1
20 GUM, CHEWING ORAL DAILY
Qty: 30 TABLET | Refills: 0 | Status: SHIPPED | OUTPATIENT
Start: 2024-03-05 | End: 2024-04-04

## 2024-03-05 RX ORDER — HYDROCODONE BITARTRATE AND ACETAMINOPHEN 10; 325 MG/1; MG/1
1 TABLET ORAL EVERY 6 HOURS PRN
Qty: 15 TABLET | Refills: 0 | Status: SHIPPED | OUTPATIENT
Start: 2024-03-05

## 2024-03-05 NOTE — PAYOR COMM NOTE
--------------  CONTINUED STAY REVIEW    Payor: [employer]/MCNP  Subscriber #:  [id number]  Authorization Number: [id number]    Admit date: 3/2/24  Admit time: 11:39 AM    Admitting Physician: Bishop Garcia MD  Attending Physician:  Bishop Garcia,*  Primary Care Physician: Pcp, None    REVIEW DOCUMENTATION:  3/4  Inpatient Pain Management Progress Note  Assessment/Plan:  Pt is a 48 y/o male w acute pancreatitis, abd pain:     -cont w iv dilaudid for breakthrough pain   -cont w norco po       EGD Operative Report   Findings:    Z-line. 2 cm sliding hiatal hernia.   DUODENUM:  The duodenal bulb and into the second portion with evidence of mild narrowing with erythema and small ulcerations consistent with moderately severe duodenitis.  Scope passes with minimal resistance.   Biopsies taken from the area duodenitis with cold forceps     Recommendations:   Follow-up pathology results  Okay to consider anticoagulation in 6 hours if needed  Hospitalist Progress Note   Assessment & Plan:   1.       Acute on chronic pancreatitis.  History of alcohol, but patient says none.  egd tomorrow; still needing pain meds; will keep full liquids  2.       Portal vein thrombosis.   Patient flatly denies any family history of inherited clotting disorders or miscarriages.seen by dr morales; ddoac on dc and lovenox here  3.       Protein-calorie malnutrition, fairly severe.  BMI is 18.19.  Will check CMP and also see how he does.eat more as soon as pain better  4.       Code status is Full Code by medical records.         Complex mdm; coordinating care with nurse and counseling pt about pancreatitis/egd/food  MEDICATIONS ADMINISTERED IN LAST 1 DAY:  midazolam (Versed) 2 MG/2ML injection       Date Action Dose Route User    3/4/2024 1150 Given 2 mg Intravenous Linden James MD          morphINE PF 2 MG/ML injection 2 mg       Date Action Dose Route User    3/5/2024 0830 Given 2 mg Intravenous Donna Aguilera, BILLY lic  pend    3/4/2024 2152 Given 2 mg Intravenous HalburClifton burton RN          morphINE PF 4 MG/ML injection 4 mg       Date Action Dose Route User    3/5/2024 0039 Given 4 mg Intravenous Clifton Toledo RN    3/4/2024 1014 Given 4 mg Intravenous Asad Plummer RN          pancrelipase (Lip-Prot-Amyl) (Zenpep) DR particles cap 5,000 Units       Date Action Dose Route User    3/5/2024 0816 Given 5,000 Units Oral Cary Chase RN    3/4/2024 1637 Given 5,000 Units Oral Asad Plummer RN          pantoprazole (Protonix) 40 mg in sodium chloride 0.9% PF 10 mL IV push       Date Action Dose Route User    3/5/2024 0815 Given 40 mg Intravenous Cary Chase RN          polyethylene glycol (PEG 3350) (Miralax) 17 g oral packet 17 g       Date Action Dose Route User    3/5/2024 0814 Given 17 g Oral Cary Chase RN          propofol (Diprivan) 10 MG/ML injection       Date Action Dose Route User    3/4/2024 1150 Given 100 mg Intravenous Linden James MD            Vitals (last day)       Date/Time Temp Pulse Resp BP SpO2 Weight O2 Device O2 Flow Rate (L/min) Who    03/04/24 1249 98.3 °F (36.8 °C) 78 16 129/92 98 % -- None (Room air) -- IG    03/04/24 1220 -- 77 0 -- 98 % -- -- -- EK    03/04/24 0815 97.8 °F (36.6 °C) 79 16 133/99 98 % -- None (Room air) -- SS          CIWA Scores (since admission)       None              PLEASE FAX DAYS CERTIFIED AND NEXT REVIEW DATE [phone]

## 2024-03-05 NOTE — CM/SW NOTE
MDO for possible Eliquis    SW called pharmacy and spoke with pharmacy tech who was able to confirm pt is covered at 100% for Eliquis    RN updated    Nathalia Zambrano, LSW, MSW ext. [phone]

## 2024-03-05 NOTE — DISCHARGE PLANNING
Patient cleared for discharge.  After visit summary reviewed with patient and family; they verbalized understanding of all instructions. All questions answered.   Personal belongings sent with patient. Discharged to home in stable condition

## 2024-03-05 NOTE — TELEPHONE ENCOUNTER
Appt request by Dr Garcia pt preferred male provider out of [place] office. Call made to call center confirmed insurance is accepted and provided list of providers accepting new patietns. Call made to pt and appt scheduled with Dr Blas on 3/7/2024.

## 2024-03-05 NOTE — CHRONIC PAIN
[facility]  Anesthesiology Pain Management Progress Note      Patient name: Jarrett Bal 49 year old male  : 1975  MRN: [id number]    Diagnosis: [unfilled]    Reason for Consult: abdominal pain    Current hospital day: Hospital Day: 4    Pain Scores: 2    Current Medications:  Scheduled Meds:   lipase-protease-amylase (Lip-Prot-Amyl)  5,000 Units Oral TID CC    polyethylene glycol (PEG 3350)  17 g Oral Daily    heparin  5,000 Units Subcutaneous Q8H FARZANA    pantoprazole  40 mg Intravenous Daily     Continuous Infusions:  PRN Meds:.acetaminophen, melatonin, polyethylene glycol (PEG 3350), sennosides, bisacodyl, fleet enema, prochlorperazine, glycerin-hypromellose-, sodium chloride, ondansetron, morphINE **OR** morphINE **OR** morphINE      Assessment:  Doing well    Plan:  Home today    MAEVE SAUCEDA MD  3/5/2024  Anesthesia Chronic Pain Service [phone]

## 2024-03-05 NOTE — PLAN OF CARE
Patient Alert and Oriented X4. Given Morphine for pain. Fall precautions in place. Call light and personal belongings within arms reach.   Problem: Patient Centered Care  Goal: Patient preferences are identified and integrated in the patient's plan of care  Description: Interventions:  - What would you like us to know as we care for you?   - Provide timely, complete, and accurate information to patient/family  - Incorporate patient and family knowledge, values, beliefs, and cultural backgrounds into the planning and delivery of care  - Encourage patient/family to participate in care and decision-making at the level they choose  - Honor patient and family perspectives and choices  Outcome: Progressing     Problem: PAIN - ADULT  Goal: Verbalizes/displays adequate comfort level or patient's stated pain goal  Description: INTERVENTIONS:  - Encourage pt to monitor pain and request assistance  - Assess pain using appropriate pain scale  - Administer analgesics based on type and severity of pain and evaluate response  - Implement non-pharmacological measures as appropriate and evaluate response  - Consider cultural and social influences on pain and pain management  - Manage/alleviate anxiety  - Utilize distraction and/or relaxation techniques  - Monitor for opioid side effects  - Notify MD/LIP if interventions unsuccessful or patient reports new pain  - Anticipate increased pain with activity and pre-medicate as appropriate  Outcome: Progressing

## 2024-03-05 NOTE — DISCHARGE SUMMARY
Dc summary#[id number]  > 30 min spent on dc    Hospital Discharge Diagnoses:  pancreatitis    Lace+ Score: 60  59-90 High Risk  29-58 Medium Risk  0-28   Low Risk.    TCM Follow-Up Recommendation:  LACE > 58: High Risk of readmission after discharge from the hospital.tcm fu recommended

## 2024-03-05 NOTE — PAYOR COMM NOTE
--------------  CONTINUED STAY REVIEW    Payor: [employer] [place] POS/MCNP  Subscriber #:  [id number]  Authorization Number: [id number]    Admit date: 3/2/24  Admit time: 11:39 AM    Admitting Physician: Bishop Garcia MD  Attending Physician:  Bishop Garcia,*  Primary Care Physician: Pcp, None    REVIEW DOCUMENTATION:  3/5  Gastroenterology Progress Note         Assessment  Chronic pancreatitis.         - tolerating advanced low fat diet with some abdominal cramping, off ivf         -pancreatic enzymes added         -counseled that cessation of tob as well as complete cessation of alcohol is critical                 2.   Non occlusive thrombus 2/2 splenic vein inflammation 2/2 pancreatitis        -hematology following        - S/p EGD 3/4/2024: duodenitis secondary to pancreatic inflammation.  No varices or high risk bleeding outside of duodenitis noted-> ok for anticoagulation         path : chronic peptic duodenitis , no malignancy or h pylori     3. Constipation.       -having BM - on miralax     4. Chronic pain     - pain service following        Plan  - ok for anticoagulation per GI , management per hematology  - continue PPI daily  - continue pancreatic enzymes  - Avoid all alcohol and smoking cessation stressed with patient  MEDICATIONS ADMINISTERED IN LAST 1 DAY:  morphINE PF 2 MG/ML injection 2 mg       Date Action Dose Route User    3/5/2024 0830 Given 2 mg Intravenous Donna Aguilera RN lic pend    3/4/2024 2152 Given 2 mg Intravenous Clifton Toledo RN          morphINE PF 4 MG/ML injection 4 mg       Date Action Dose Route User    3/5/2024 0039 Given 4 mg Intravenous Clifton Toledo RN          pancrelipase (Lip-Prot-Amyl) (Zenpep) DR particles cap 5,000 Units       Date Action Dose Route User    3/5/2024 1346 Given 5,000 Units Oral Donna Aguilera RN lic pend    3/5/2024 0816 Given 5,000 Units Oral Cary Chase RN    3/4/2024 1637 Given 5,000 Units Oral Asad Plummer RN           pantoprazole (Protonix) 40 mg in sodium chloride 0.9% PF 10 mL IV push       Date Action Dose Route User    3/5/2024 0815 Given 40 mg Intravenous Cary Chase, RN          polyethylene glycol (PEG 3350) (Miralax) 17 g oral packet 17 g       Date Action Dose Route User    3/5/2024 0814 Given 17 g Oral Cary Chase, RN            Vitals (last day)       Date/Time Temp Pulse Resp BP SpO2 Weight O2 Device O2 Flow Rate (L/min) Who    03/05/24 0834 98.4 °F (36.9 °C) 97 18 130/92 98 % -- None (Room air) -- LZ    03/04/24 1249 98.3 °F (36.8 °C) 78 16 129/92 98 % -- None (Room air) -- IG    03/04/24 1220 -- 77 0 -- 98 % -- -- -- EK    03/04/24 0815 97.8 °F (36.6 °C) 79 16 133/99 98 % -- None (Room air) -- SS          CIWA Scores (since admission)       None              PLEASE FAX DAYS CERTIFIED AND NEXT REVIEW DATE [phone]

## 2024-03-05 NOTE — PROGRESS NOTES
[facility]  Gastroenterology Progress Note    Jarrett Bal Patient Status:  Inpatient    1975 MRN [id number]   Location [facility] 4W/SW/SE Attending Bishop Garcia,*   Hosp Day # 3 PCP None Pcp     Subjective:  Jarrett Bal is a(n) 49 year old male.    Current complaints: Doing relatively well.  Minimal to no abdominal discomfort.  Tolerating diet    Objective:  Blood pressure (!) 130/92, pulse 97, temperature 98.4 °F (36.9 °C), temperature source Oral, resp. rate 18, height 5' 8\" (1.727 m), weight 125 lb 12.8 oz (57.1 kg), SpO2 98%.  Respiratory: no labored breathing  CV: RRR  Abdomen: nondistended, soft, nontender  Extremities: no calf tenderness  Neurologic: Ox3    Labs:   Recent Labs   Lab 24  0817 24  1754 24  0417 24  0418 24  0537 24  0427 24  0445   WBC 9.0  --   --  6.3  --  5.4 6.0   HGB 15.2  --   --  11.9*  --  12.1* 12.3*   HCT 45.4  --   --  34.6*  --  35.2* 36.5*   .0*  --   --  315.0  --  310.0 329.0   CREATSERUM 0.72   < >  --   --  0.60*  0.60* 0.59* 0.63*   BUN 9   < >  --   --  5*  5* <5* 7*      < >  --   --  140  140 142 142   K 3.9   < >  --   --  3.9  3.9 3.9 4.1      < >  --   --  110  110 111 111   CO2 28.0   < >  --   --  26.0  26.0 27.0 27.0   GLU 91   < >  --   --  67*  67* 80 84   CA 9.7   < >  --   --  8.6*  8.6* 8.5* 8.3*   ALB 4.3  --   --   --  3.1* 3.1*  --    ALKPHO 134*  --   --   --  94 94  --    BILT 0.5  --   --   --  0.5 0.3  --    TP 7.0  --   --   --  4.9* 4.9*  --    AST 15  --   --   --  10 10  --    ALT 15  --   --   --  11 9*  --    INR  --   --  0.98  --   --   --   --    PTP  --   --  13.6  --   --   --   --    MG  --   --   --   --  1.5* 1.8 1.9   PHOS  --   --   --   --  3.2 4.0 4.5    < > = values in this interval not displayed.       Recent Labs   Lab 24  0817 24  0537   * 167*        Imaging:  No results found.     Problem list:  Patient Active  Problem List   Diagnosis    Hypokalemia    Duodenitis    Gastric mass    Gastric outlet obstruction (HCC)    Prolonged QT interval    Pneumoperitoneum    CARLOS A (acute kidney injury) (HCC)    Hyponatremia    Metabolic alkalosis    Lightheaded    Weakness generalized    Traumatic injury of head, initial encounter    Hypotension, unspecified hypotension type    Malfunction of jejunostomy tube (HCC)    Hyperglycemia    Acute pancreatitis, unspecified complication status, unspecified pancreatitis type (HCC)    Portal vein thrombosis       Assessment  Chronic pancreatitis.         - tolerating advanced low fat diet with some abdominal cramping, off ivf         -pancreatic enzymes added         -counseled that cessation of tob as well as complete cessation of alcohol is critical                 2.   Non occlusive thrombus 2/2 splenic vein inflammation 2/2 pancreatitis        -hematology following        - S/p EGD 3/4/2024: duodenitis secondary to pancreatic inflammation.  No varices or high risk bleeding outside of duodenitis noted-> ok for anticoagulation         path : chronic peptic duodenitis , no malignancy or h pylori    3. Constipation.       -having BM - on miralax    4. Chronic pain     - pain service following      Plan  - ok for anticoagulation per GI , management per hematology  - continue PPI daily  - continue pancreatic enzymes  - Avoid all alcohol and smoking cessation stressed with patient      Agree with note.  I have personally seen the patient and performed my own physical exam.  The note has been fully edited by me.   Antonio Martin MD

## 2024-03-05 NOTE — DISCHARGE INSTRUCTIONS
Ok to go home; follow up with new primary as arranged by my nurse  If ins does not pay for eliquis please give coupon  Please send home with incentive spirometer  Please give copy low fat diet     Medication List        START taking these medications      apixaban 5 MG Tabs  Commonly known as: Eliquis  Take 2 tabs (10mg) by mouth twice daily for 7 days, then take 1 tab (5mg) by mouth twice daily thereafter.     Omeprazole 20 MG Tbec  Take 20 mg by mouth daily.     Sennosides 17.2 MG Tabs  Take 1 tablet (17.2 mg total) by mouth nightly as needed (constipation, as needed if no bowel movement that day).            CHANGE how you take these medications      HYDROcodone-acetaminophen  MG Tabs  Commonly known as: Norco  Take 1 tablet by mouth every 6 (six) hours as needed for Pain. Watch drowsiness no alcohol  What changed: additional instructions            CONTINUE taking these medications      acetaminophen 500 MG Tabs  Commonly known as: Tylenol Extra Strength     Creon [id number] units Cpep  Generic drug: Pancrelipase (Lip-Prot-Amyl)               Where to Get Your Medications        These medications were sent to [id number] - [address] [phone], [phone]  [facility] [address]      Phone: [phone]   apixaban 5 MG Tabs  HYDROcodone-acetaminophen  MG Tabs  Omeprazole 20 MG Tbec  Sennosides 17.2 MG Tabs

## 2024-03-06 ENCOUNTER — TELEPHONE (OUTPATIENT)
Dept: INTERNAL MEDICINE CLINIC | Facility: CLINIC | Age: 49
End: 2024-03-06

## 2024-03-06 ENCOUNTER — PATIENT OUTREACH (OUTPATIENT)
Dept: CASE MANAGEMENT | Age: 49
End: 2024-03-06

## 2024-03-06 DIAGNOSIS — Z02.9 ENCOUNTERS FOR UNSPECIFIED ADMINISTRATIVE PURPOSE: Primary | ICD-10-CM

## 2024-03-06 NOTE — TELEPHONE ENCOUNTER
Yes TCM visit first visit preferred. pending if he follows up with treatment/plan consider changing pcp at later date/physical.

## 2024-03-06 NOTE — PROGRESS NOTES
Left message on mailbox for pt to call NCM back for TCM.  NCM contact information [phone] included in message.  Sent TE to [facility] for 3/07/2024 appt type clarification.      Discharge Dx:   Acute on chronic pancreatitis with portal vein thrombosis  S/p EGD 3/4/2024     Follow-up Information    Follow up With Specialties Details Why Contact Info   Maricarmen Ram MD GASTROENTEROLOGY Follow up in 2 week(s)  [address]  [address]  [facility] [id number] [phone]   Beto Sanderson MD Hematology and Oncology, ONCOLOGY, HEMATOLOGY Follow up in 2 week(s) please see about blood thinners [address]  [facility] [id number] [phone]   Tom Blas MD Internal Medicine Follow up on 3/7/2024 Appointment at 8:40AM to Establish Care/ Hospital Follow up please bring insurance card and photo  [facility]  [address]  [facility] [address]  [phone]

## 2024-03-06 NOTE — DISCHARGE SUMMARY
[facility]    PATIENT'S NAME: ARGELIA YBARRA   ATTENDING PHYSICIAN: Bishop Garcia MD   PATIENT ACCOUNT#:   [id number]    LOCATION:  [address] [place] [address]  MEDICAL RECORD #:   [id number]       YOB: 1975  ADMISSION DATE:       03/02/2024      DISCHARGE DATE:  03/05/2024    DISCHARGE SUMMARY  35 min spent on dc      DISCHARGE DIAGNOSIS:  Acute on chronic pancreatitis with portal vein thrombosis.     HISTORY AND HOSPITAL COURSE:  This is a very pleasant 49-year-old white male who came to me after a disagreement with the primary doctor that he was going to see, Dr. Beverly.  The patient said that he was not able to get in touch with Dr. Beverly at the office.  Dr. Beverly denied this.  Regardless, he came in on Dr. Beverly's emergency room call and was then given to me because the patient did not want to see Dr. Beverly.  He has a history of abdominal pain, nausea, and vomiting and a previous history of alcohol-induced pancreatitis.  The patient denies drinking since that episode occurred.  This necessitated a gastrojejunostomy and enteroenterostomy in June of last year, gastric outlet obstruction and is quite seriously ill.  CT was performed and showed that he had a portal vein thrombosis and GI requested we consult Dr. Sanderson who recommended NOAC upon discharge and anticoagulation was subcutaneous here.  The patient had an EGD performed which was relatively benign.  I discussed with Dr. Martin, who performed the procedure, that there were no varices noted.  There was some severe duodenitis, but the scope passed easily.  Biopsies were taken.  Dr. Martin is fine with me anticoagulating him for the portal vein thrombosis and the patient ate and felt well, so we discharged him home.  With his permission, his father was in the room when I discussed this.      PHYSICAL EXAMINATION:    VITAL SIGNS:  On discharge, temperature 98.9, pulse 97, respiratory rate 18, blood pressure 120/80, 97%.    LUNGS:  Occasional  rhonchi.   HEART:  Normal S1, S2.  No S3.    ABDOMEN:  Soft, nontender.   EXTREMITIES:  Without edema.   NEUROLOGIC:  He is alert and oriented, friendly and cooperative.      LABORATORY STUDIES:  Please see chart.     ASSESSMENT AND PLAN:    1.   Acute on chronic pancreatitis.  The patient stated he no longer drinks.  Continue treatment as per GI.   2.   Severe duodenitis on esophagogastroduodenoscopy.  Continue medications.  Avoid any spicy foods.   3.   Portal vein thrombosis.  No inherited clotting disorder.  Continue oral anticoagulant Eliquis upon discharge.    4.   Protein-calorie malnutrition, fairly severe.  BMI 18.19 with temporal wasting.   5.   Code status is Full Code by medical records.      CONDITION ON DISCHARGE:  Stable.     CODE STATUS:  Full Code.     DIET:  Low-fat diet.     ACTIVITY:  No heavy exercise.  Otherwise as tolerated.     FOLLOWUP:  Follow up with Dr. Sanderson in 2 weeks, Dr. Ram in 2 weeks, and then, as a primary, Dr. Tom Blas on 03/07/2024 at 8:40 a.m. to establish care.  Return if fever, chills, sweats, nausea, vomiting, chest pain, shortness of breath, or other complaints.       MEDICATIONS ON DISCHARGE:    1.   Tylenol 500 mg every 4 to 6 hours p.r.n. pain.  Watch total daily Tylenol, limit to 3 g.   2.   Norco 10/325 one tablet every 6 hours as needed for pain.  Watch drowsiness.  No alcohol.   3.   Creon 36,000 units 3 times a day with food.   4.   Senna 17.2 mg nightly as needed for constipation.   5.   Eliquis 10 mg twice a day for 7 days and then 5 mg twice a day thereafter.    6.   Omeprazole 20 mg daily.      RISK OF READMISSION:  High.  TCM followup is recommended.      Dictated By Bishop Garcia MD  d: 03/05/2024 18:10:15  t: 03/05/2024 23:13:18  Job [id number]  [facility]/

## 2024-03-06 NOTE — TELEPHONE ENCOUNTER
Left message on mailbox for pt to call NCM back for TCM.    Patient does have new pt appointment scheduled with Dr. Blas on 3/07/2024, made by hospitalist RN 3/05/2024.    TCM appointment recommended by 3/12/2024, as patient is a High risk for readmission.  Please advise.    BOOK BY DATE (last date for TCM): 3/19/2024    Please discuss with Dr. Blas if tomorrow's appt can be changed to TCM or keep as scheduled. No need to  follow-up with patient--only appt type clarification. Thank you!         Appointment Information   Name: Jarrett Bal MRN: [id number]   Date: 3/7/2024 Status: Michael   Appt Time: 8:40 AM Length: 40   Visit Type: NON-TCM HOSPITAL FOLLOW UP [5060] Copay: $0.00   Provider: Tom Blas MD Department: [facility]   Referral Number:   Referral Status:     Enc Form Number: [id number]       Made On: 3/5/2024 1:58 PM By: KOBI RENE [[id number]] (ES)

## 2024-03-07 ENCOUNTER — OFFICE VISIT (OUTPATIENT)
Dept: INTERNAL MEDICINE CLINIC | Facility: CLINIC | Age: 49
End: 2024-03-07
Payer: COMMERCIAL

## 2024-03-07 VITALS
SYSTOLIC BLOOD PRESSURE: 108 MMHG | OXYGEN SATURATION: 99 % | DIASTOLIC BLOOD PRESSURE: 72 MMHG | WEIGHT: 123 LBS | HEART RATE: 97 BPM | BODY MASS INDEX: 18.64 KG/M2 | HEIGHT: 68 IN

## 2024-03-07 DIAGNOSIS — K86.1 CHRONIC PANCREATITIS, UNSPECIFIED PANCREATITIS TYPE (HCC): ICD-10-CM

## 2024-03-07 DIAGNOSIS — I81 PORTAL VEIN THROMBOSIS: ICD-10-CM

## 2024-03-07 DIAGNOSIS — Z09 HOSPITAL DISCHARGE FOLLOW-UP: ICD-10-CM

## 2024-03-07 DIAGNOSIS — Z76.89 ENCOUNTER TO ESTABLISH CARE WITH NEW DOCTOR: Primary | ICD-10-CM

## 2024-03-07 LAB
CHOLEST SERPL-MCNC: 104 MG/DL (ref ?–200)
CRP SERPL-MCNC: 1.4 MG/DL (ref ?–1)
HDLC SERPL-MCNC: 22 MG/DL (ref 40–59)
LDLC SERPL CALC-MCNC: 66 MG/DL (ref ?–100)
NONHDLC SERPL-MCNC: 82 MG/DL (ref ?–130)
TRIGL SERPL-MCNC: 80 MG/DL (ref 30–149)
TSI SER-ACNC: 2 MIU/ML (ref 0.55–4.78)
VLDLC SERPL CALC-MCNC: 12 MG/DL (ref 0–30)

## 2024-03-07 PROCEDURE — 3008F BODY MASS INDEX DOCD: CPT | Performed by: STUDENT IN AN ORGANIZED HEALTH CARE EDUCATION/TRAINING PROGRAM

## 2024-03-07 PROCEDURE — 3078F DIAST BP <80 MM HG: CPT | Performed by: STUDENT IN AN ORGANIZED HEALTH CARE EDUCATION/TRAINING PROGRAM

## 2024-03-07 PROCEDURE — 99496 TRANSJ CARE MGMT HIGH F2F 7D: CPT | Performed by: STUDENT IN AN ORGANIZED HEALTH CARE EDUCATION/TRAINING PROGRAM

## 2024-03-07 PROCEDURE — 3074F SYST BP LT 130 MM HG: CPT | Performed by: STUDENT IN AN ORGANIZED HEALTH CARE EDUCATION/TRAINING PROGRAM

## 2024-03-07 NOTE — PROGRESS NOTES
The patient completed an appointment with Dr. Blas today, 3/7/2024. [facility] closing encounter.

## 2024-03-07 NOTE — PAYOR COMM NOTE
--------------  DISCHARGE REVIEW    Payor: TARA NIELSEN POS/MCNP  Subscriber #:  [id number]  Authorization Number: [id number]    Admit date: 3/2/24  Admit time:  11:39 AM  Discharge Date: 3/5/2024  3:34 PM     Admitting Physician: Bishop Garcia MD  Attending Physician:  No att. providers found  Primary Care Physician: Pcp, None          Discharge Summary Notes        Discharge Summary signed by Bishop Garcia MD at 3/6/2024 11:07 AM        Author: Bishop Garcia MD Specialty: HOSPITALIST Author Type: Physician    Filed: 3/6/2024 11:07 AM Status: Signed    : Bishop Garcia MD (Physician)         DISCHARGE SUMMARY    ARGELIA YBARRA [id number]   YOB: 1975 [id number]         [facility]    PATIENT'S NAME: ARGELIA YBARRA   ATTENDING PHYSICIAN: Bishop Garcia MD   PATIENT ACCOUNT#:   [id number]    LOCATION:  [address] [place] [address]  MEDICAL RECORD #:   [id number]       YOB: 1975  ADMISSION DATE:       03/02/2024      DISCHARGE DATE:  03/05/2024    DISCHARGE SUMMARY  35 min spent on dc      DISCHARGE DIAGNOSIS:  Acute on chronic pancreatitis with portal vein thrombosis.     HISTORY AND HOSPITAL COURSE:  This is a very pleasant 49-year-old white male who came to me after a disagreement with the primary doctor that he was going to see, Dr. Beverly.  The patient said that he was not able to get in touch with Dr. Beverly at the office.  Dr. Beverly denied this.  Regardless, he came in on Dr. Beverly's emergency room call and was then given to me because the patient did not want to see Dr. Beverly.  He has a history of abdominal pain, nausea, and vomiting and a previous history of alcohol-induced pancreatitis.  The patient denies drinking since that episode occurred.  This necessitated a gastrojejunostomy and enteroenterostomy in June of last year, gastric outlet obstruction and is quite seriously ill.  CT was performed and showed that he had a portal vein  thrombosis and GI requested we consult Dr. Sanderson who recommended NOAC upon discharge and anticoagulation was subcutaneous here.  The patient had an EGD performed which was relatively benign.  I discussed with Dr. Martin, who performed the procedure, that there were no varices noted.  There was some severe duodenitis, but the scope passed easily.  Biopsies were taken.  Dr. Martin is fine with me anticoagulating him for the portal vein thrombosis and the patient ate and felt well, so we discharged him home.  With his permission, his father was in the room when I discussed this.      PHYSICAL EXAMINATION:    VITAL SIGNS:  On discharge, temperature 98.9, pulse 97, respiratory rate 18, blood pressure 120/80, 97%.    LUNGS:  Occasional rhonchi.   HEART:  Normal S1, S2.  No S3.    ABDOMEN:  Soft, nontender.   EXTREMITIES:  Without edema.   NEUROLOGIC:  He is alert and oriented, friendly and cooperative.      LABORATORY STUDIES:  Please see chart.     ASSESSMENT AND PLAN:    1.   Acute on chronic pancreatitis.  The patient stated he no longer drinks.  Continue treatment as per GI.   2.   Severe duodenitis on esophagogastroduodenoscopy.  Continue medications.  Avoid any spicy foods.   3.   Portal vein thrombosis.  No inherited clotting disorder.  Continue oral anticoagulant Eliquis upon discharge.    4.   Protein-calorie malnutrition, fairly severe.  BMI 18.19 with temporal wasting.   5.   Code status is Full Code by medical records.      CONDITION ON DISCHARGE:  Stable.     CODE STATUS:  Full Code.     DIET:  Low-fat diet.     ACTIVITY:  No heavy exercise.  Otherwise as tolerated.     FOLLOWUP:  Follow up with Dr. Sanderson in 2 weeks, Dr. Ram in 2 weeks, and then, as a primary, Dr. Tom Blas on 03/07/2024 at 8:40 a.m. to establish care.  Return if fever, chills, sweats, nausea, vomiting, chest pain, shortness of breath, or other complaints.       MEDICATIONS ON DISCHARGE:    1.   Tylenol 500 mg every 4 to 6 hours p.r.n. pain.   Watch total daily Tylenol, limit to 3 g.   2.   Norco 10/325 one tablet every 6 hours as needed for pain.  Watch drowsiness.  No alcohol.   3.   Creon 36,000 units 3 times a day with food.   4.   Senna 17.2 mg nightly as needed for constipation.   5.   Eliquis 10 mg twice a day for 7 days and then 5 mg twice a day thereafter.    6.   Omeprazole 20 mg daily.      RISK OF READMISSION:  High.  TCM followup is recommended.      Dictated By Bishop Garcia MD  d: 03/05/2024 18:10:15  t: 03/05/2024 23:13:18  [facility] [id number]  [facility]/    Electronically signed by Bishop Garcia MD on 3/6/2024 11:07 AM

## 2024-03-07 NOTE — PROGRESS NOTES
Subjective:   Jarrett Bal is a 49 year old male who presents for hospital follow up.   He was discharged from [facility] to Home or Self Care  Admission Date: 3/2/24   Discharge Date: 3/5/24  Hospital Discharge Diagnosis: Acute on chronic pancreatitis with portal vein thrombosis.     Interactive contact within 2 business days post discharge first initiated on Date: 3/6/2024    During the visit, the following was completed:  Obtained and reviewed discharge summary, continuity of care documents, and Hospitalist notes  Reviewed Labs (CBC, CMP) and CT radiology results    HPI: Pt is a 48y/o male with PMHx of Prolonged Qtc, Former Alcohol use disorder (sober 1 year since April 2023) followed by Anthony Ram, whom was recently admitted for recurrent Acute on chronic pancreatitis. TGL were normal 8 months ago. Pt had prolonged hospitalization course below and portal venous thrombosis noted, and told he had acute on chronic alcohol pancreatitis. He states she had to quit drinking due to pain and is a deterrent as to why he quit 1 year ago. Patient states he smokes only 1 pack a cigarettes which will last a whole week and trying to work on quitting. Pt has a history of multiple hospitalization for pancreatitis, and last June 2023 had GJ tube placed and reversal due to complicated pancreatitis.         Last PCP 2 years ago    Discharge Summary:       DISCHARGE DIAGNOSIS:  Acute on chronic pancreatitis with portal vein thrombosis.      HISTORY AND HOSPITAL COURSE:  This is a very pleasant 49-year-old white male who came to me after a disagreement with the primary doctor that he was going to see, Dr. Beverly.  The patient said that he was not able to get in touch with Dr. Beverly at the office.  Dr. Beverly denied this.  Regardless, he came in on Dr. Beverly's emergency room call and was then given to me because the patient did not want to see Dr. Beverly.  He has a history of abdominal pain, nausea, and vomiting  and a previous history of alcohol-induced pancreatitis.  The patient denies drinking since that episode occurred.  This necessitated a gastrojejunostomy and enteroenterostomy in June of last year, gastric outlet obstruction and is quite seriously ill.  CT was performed and showed that he had a portal vein thrombosis and GI requested we consult Dr. Sanderson who recommended NOAC upon discharge and anticoagulation was subcutaneous here.  The patient had an EGD performed which was relatively benign.  I discussed with Dr. Martin, who performed the procedure, that there were no varices noted.  There was some severe duodenitis, but the scope passed easily.  Biopsies were taken.  Dr. Martin is fine with me anticoagulating him for the portal vein thrombosis and the patient ate and felt well, so we discharged him home.  With his permission, his father was in the room when I discussed this.       PHYSICAL EXAMINATION:    VITAL SIGNS:  On discharge, temperature 98.9, pulse 97, respiratory rate 18, blood pressure 120/80, 97%.    LUNGS:  Occasional rhonchi.   HEART:  Normal S1, S2.  No S3.    ABDOMEN:  Soft, nontender.   EXTREMITIES:  Without edema.   NEUROLOGIC:  He is alert and oriented, friendly and cooperative.       LABORATORY STUDIES:  Please see chart.      ASSESSMENT AND PLAN:    1.       Acute on chronic pancreatitis.  The patient stated he no longer drinks.  Continue treatment as per GI.   2.       Severe duodenitis on esophagogastroduodenoscopy.  Continue medications.  Avoid any spicy foods.   3.       Portal vein thrombosis.  No inherited clotting disorder.  Continue oral anticoagulant Eliquis upon discharge.    4.       Protein-calorie malnutrition, fairly severe.  BMI 18.19 with temporal wasting.   5.       Code status is Full Code by medical records.       CONDITION ON DISCHARGE:  Stable.      CODE STATUS:  Full Code.      DIET:  Low-fat diet.      ACTIVITY:  No heavy exercise.  Otherwise as tolerated.      FOLLOWUP:   Follow up with Dr. Sanderson in 2 weeks, Dr. Ram in 2 weeks, and then, as a primary, Dr. Tom Blas on 03/07/2024 at 8:40 a.m. to establish care.  Return if fever, chills, sweats, nausea, vomiting, chest pain, shortness of breath, or other complaints.        MEDICATIONS ON DISCHARGE:    1.       Tylenol 500 mg every 4 to 6 hours p.r.n. pain.  Watch total daily Tylenol, limit to 3 g.   2.       Norco 10/325 one tablet every 6 hours as needed for pain.  Watch drowsiness.  No alcohol.   3.       Creon 36,000 units 3 times a day with food.   4.       Senna 17.2 mg nightly as needed for constipation.   5.       Eliquis 10 mg twice a day for 7 days and then 5 mg twice a day thereafter.    6.       Omeprazole 20 mg daily.       RISK OF READMISSION:  High.  TCM followup is recommended.       Dictated By Bishop Garcia MD  d:     03/05/2024 18:10:15  t:      03/05/2024 23:13:18  [facility]   [id number]  [facility]/      Dr. Maricarmen Ram:     Jarrett Bal is a/a(n) 49 year old male who is here to follow up on pancreatitis. He was recently seen at [facility] where he was found to have continued pancreatitis noted on CT scan with possibility of necrotizing pancreatitis and continued pseudocyst.        Former 6pack and cocktails daily,      Divorce in 2016 (3 kids: 16/12/11 good relationship with kids    Self employed  (commercial/industrial)      History/Other:   Current Medications:  Medication Reconciliation:  I am aware of an inpatient discharge within the last 30 days.  The discharge medication list has been reconciled with the patient's current medication list and reviewed by me.  See medication list for additions of new medication, and changes to current doses of medications and discontinued medications.  Outpatient Medications Marked as Taking for the 3/7/24 encounter (Office Visit) with Tom Blas MD   Medication Sig    HYDROcodone-acetaminophen  MG Oral Tab Take 1 tablet by mouth every 6 (six) hours  as needed for Pain. Watch drowsiness no alcohol    Omeprazole 20 MG Oral Tab EC Take 20 mg by mouth daily.    apixaban 5 MG Oral Tab Take 2 tabs (10mg) by mouth twice daily for 7 days, then take 1 tab (5mg) by mouth twice daily thereafter.    Pancrelipase, Lip-Prot-Amyl, (CREON) 97873-243909 units Oral Cap DR Particles Take 36,000 Units by mouth 3 (three) times daily with meals.       Review of Systems   Constitutional: Negative.    HENT: Negative.     Eyes: Negative.    Respiratory: Negative.     Cardiovascular: Negative.    Gastrointestinal:  Positive for abdominal pain (mild/chronic).   Genitourinary: Negative.    Musculoskeletal: Negative.    Skin: Negative.    Neurological: Negative.    Psychiatric/Behavioral: Negative.          Negative except abdominal pain chronic    Objective:   Physical Exam  HENT:      Head: Normocephalic and atraumatic.   Cardiovascular:      Rate and Rhythm: Normal rate and regular rhythm.   Pulmonary:      Effort: Pulmonary effort is normal.      Breath sounds: Normal breath sounds.   Abdominal:      General: Bowel sounds are normal.      Palpations: Abdomen is soft.   Musculoskeletal:         General: Normal range of motion.   Skin:     Comments: Midline surgical scar (chronic) well healed.     Neurological:      General: No focal deficit present.      Mental Status: He is alert and oriented to person, place, and time.   Psychiatric:         Mood and Affect: Mood normal.         /72 (BP Location: Right arm, Patient Position: Sitting, Cuff Size: adult)   Pulse 97   Ht 5' 8\" (1.727 m)   Wt 123 lb (55.8 kg)   SpO2 99%   BMI 18.70 kg/m²  Estimated body mass index is 18.7 kg/m² as calculated from the following:    Height as of this encounter: 5' 8\" (1.727 m).    Weight as of this encounter: 123 lb (55.8 kg).    Assessment & Plan:   1. Encounter to establish care with new doctor (Primary)  -     GASTRO - INTERNAL  -     Lipid Panel; Future; Expected date: 03/07/2024  -     TSH W  Reflex To Free T4; Future; Expected date: 03/07/2024  -     C-Reactive Protein; Future; Expected date: 03/07/2024  -     Oncology/Hematology Referral - In Network  Patient with past ministry of alcohol use disorder has been sober for 1 year does have a family history of rheumatoid arthritis and paternal grandparent however has had recurrent acute on chronic pancreatitis.  Formally seen by jakob Perez  Plan:  - Triglycerides were normal however unclear if patient might have autoimmune pancreatitis or any other etiology as to why he has induced recurrent flareups despite being sober from alcohol.  Will refer to next we will GI within our system for further second opinion and evaluation.  2. Hospital discharge follow-up  -     GASTRO - INTERNAL  -     Lipid Panel; Future; Expected date: 03/07/2024  -     TSH W Reflex To Free T4; Future; Expected date: 03/07/2024  -     C-Reactive Protein; Future; Expected date: 03/07/2024  Plan:  - Triglycerides were normal however unclear if patient might have autoimmune pancreatitis or any other etiology as to why he has induced recurrent flareups despite being sober from alcohol.  Will refer to next we will GI within our system for further second opinion and evaluation.  3. Chronic pancreatitis, unspecified pancreatitis type (HCC)  -     GASTRO - INTERNAL  -     Lipid Panel; Future; Expected date: 03/07/2024  -     TSH W Reflex To Free T4; Future; Expected date: 03/07/2024  -     C-Reactive Protein; Future; Expected date: 03/07/2024  Plan:  - Triglycerides were normal however unclear if patient might have autoimmune pancreatitis or any other etiology as to why he has induced recurrent flareups despite being sober from alcohol.  Will refer to next we will GI within our system for further second opinion and evaluation.  4. Portal vein thrombosis  -     Oncology/Hematology Referral - In Network  Patient was then discharged 6 days ago noticed to have portal vein thrombosis    First  week of Eliquis 10 mg p.o. twice daily and will taper to 5 mg p.o. twice daily.  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  Patient has a former alcohol use however he is sober for the last year.  Plan:  - Refer patient to hematology oncology for further evaluation regards for provoked/unprovoked      Return in about 4 weeks (around 4/4/2024) for Annual physical .

## 2024-03-07 NOTE — PROGRESS NOTES
No action needed: TSH normal, TGL normal. However mildly elevated CRP. Already going to see GI and hematology. Might need autoimmune pancreatitis workup.

## 2024-03-07 NOTE — PROGRESS NOTES
Appointment Information   Name: Jarrett Bal MRN: [id number]   Date: 3/7/2024 Status: Michael   Appt Time: 8:40 AM Length: 40   Visit Type: [facility] HOSPITAL FOLLOW UP [6010] Copay: $0.00   Provider: Tom Blas MD Department: [facility]   Referral Number:   Referral Status:     Enc Form Number: [id number]       Notes: Acute on chronic pancreatitis with portal vein thrombosis.   Made On:  Changed:  Change Notes:  Confirmed: 3/5/2024 1:58 PM  3/6/2024 11:28 AM  3/6/2024 11:29 AM  3/6/2024 4:04 PM By:  By:  By:  By: KOBI RENE [[id number]] (ES)  NATALIE SCHROEDER [[id number]] (ES)  NATALIE SCHROEDER [[id number]] (ES)  GENERIC, MYCHART [MYCHARTG] ([employer]ob[employer])   Changes:     3/6/2024 11:28 AM    Change reason: Provider   Old Appointment: New Appointment:   Visit Type: NON-[facility] HOSPITAL FOLLOW UP [5060]  Provider: Tom Blas MD [[id number]]  Dept: [facility] [[id number]]  Date: 3/7/24  Arrival Time: 8:40 AM  Time: 8:40 AM  Length: 40 Visit Type: [facility] HOSPITAL FOLLOW UP [6010]  Provider: Tom Blas MD [[id number]]  [facility] [[id number][facility]  Date: 3/7/24  Arrival Time: 8:40 AM  Time: 8:40 AM  Length: 40      Appointment Instructions

## 2024-03-22 ENCOUNTER — APPOINTMENT (OUTPATIENT)
Dept: HEMATOLOGY/ONCOLOGY | Facility: HOSPITAL | Age: 49
End: 2024-03-22
Attending: INTERNAL MEDICINE
Payer: COMMERCIAL

## 2024-04-12 ENCOUNTER — APPOINTMENT (OUTPATIENT)
Dept: CT IMAGING | Facility: HOSPITAL | Age: 49
End: 2024-04-12
Attending: EMERGENCY MEDICINE
Payer: COMMERCIAL

## 2024-04-12 ENCOUNTER — APPOINTMENT (OUTPATIENT)
Dept: MRI IMAGING | Facility: HOSPITAL | Age: 49
End: 2024-04-12
Attending: EMERGENCY MEDICINE
Payer: COMMERCIAL

## 2024-04-12 ENCOUNTER — HOSPITAL ENCOUNTER (INPATIENT)
Facility: HOSPITAL | Age: 49
LOS: 4 days | Discharge: HOME | End: 2024-04-16
Attending: EMERGENCY MEDICINE | Admitting: HOSPITALIST
Payer: COMMERCIAL

## 2024-04-12 ENCOUNTER — APPOINTMENT (OUTPATIENT)
Dept: CT IMAGING | Facility: HOSPITAL | Age: 49
DRG: 438 | End: 2024-04-12
Attending: EMERGENCY MEDICINE
Payer: COMMERCIAL

## 2024-04-12 ENCOUNTER — APPOINTMENT (OUTPATIENT)
Dept: MRI IMAGING | Facility: HOSPITAL | Age: 49
DRG: 438 | End: 2024-04-12
Attending: EMERGENCY MEDICINE
Payer: COMMERCIAL

## 2024-04-12 ENCOUNTER — HOSPITAL ENCOUNTER (OUTPATIENT)
Facility: HOSPITAL | Age: 49
Setting detail: OBSERVATION
Discharge: HOME | DRG: 438 | End: 2024-04-16
Attending: EMERGENCY MEDICINE | Admitting: HOSPITALIST
Payer: COMMERCIAL

## 2024-04-12 DIAGNOSIS — K83.09 ASCENDING CHOLANGITIS (HCC): ICD-10-CM

## 2024-04-12 DIAGNOSIS — E87.6 HYPOKALEMIA: ICD-10-CM

## 2024-04-12 DIAGNOSIS — K85.90 ACUTE PANCREATITIS, UNSPECIFIED COMPLICATION STATUS, UNSPECIFIED PANCREATITIS TYPE (HCC): Primary | ICD-10-CM

## 2024-04-12 DIAGNOSIS — R74.01 TRANSAMINITIS: ICD-10-CM

## 2024-04-12 DIAGNOSIS — K81.0 ACUTE CHOLECYSTITIS: ICD-10-CM

## 2024-04-12 LAB
ALBUMIN SERPL-MCNC: 4.3 G/DL (ref 3.2–4.8)
ALP LIVER SERPL-CCNC: 283 U/L
ALT SERPL-CCNC: 100 U/L
ANION GAP SERPL CALC-SCNC: 3 MMOL/L (ref 0–18)
AST SERPL-CCNC: 372 U/L (ref ?–34)
BASOPHILS # BLD AUTO: 0.03 X10(3) UL (ref 0–0.2)
BASOPHILS NFR BLD AUTO: 0.2 %
BILIRUB DIRECT SERPL-MCNC: 0.5 MG/DL (ref ?–0.3)
BILIRUB SERPL-MCNC: 0.9 MG/DL (ref 0.3–1.2)
BUN BLD-MCNC: 10 MG/DL (ref 9–23)
BUN/CREAT SERPL: 13.7 (ref 10–20)
CALCIUM BLD-MCNC: 9.3 MG/DL (ref 8.7–10.4)
CHLORIDE SERPL-SCNC: 107 MMOL/L (ref 98–112)
CO2 SERPL-SCNC: 28 MMOL/L (ref 21–32)
CREAT BLD-MCNC: 0.73 MG/DL
DEPRECATED RDW RBC AUTO: 44 FL (ref 35.1–46.3)
EGFRCR SERPLBLD CKD-EPI 2021: 112 ML/MIN/1.73M2 (ref 60–?)
EOSINOPHIL # BLD AUTO: 0.11 X10(3) UL (ref 0–0.7)
EOSINOPHIL NFR BLD AUTO: 0.7 %
ERYTHROCYTE [DISTWIDTH] IN BLOOD BY AUTOMATED COUNT: 13.2 % (ref 11–15)
ETHANOL SERPL-MCNC: <3 MG/DL (ref ?–3)
GLUCOSE BLD-MCNC: 102 MG/DL (ref 70–99)
HCT VFR BLD AUTO: 42.4 %
HGB BLD-MCNC: 14.2 G/DL
IMM GRANULOCYTES # BLD AUTO: 0.05 X10(3) UL (ref 0–1)
IMM GRANULOCYTES NFR BLD: 0.3 %
LIPASE SERPL-CCNC: 226 U/L (ref 13–75)
LYMPHOCYTES # BLD AUTO: 1.03 X10(3) UL (ref 1–4)
LYMPHOCYTES NFR BLD AUTO: 6.9 %
MCH RBC QN AUTO: 30.8 PG (ref 26–34)
MCHC RBC AUTO-ENTMCNC: 33.5 G/DL (ref 31–37)
MCV RBC AUTO: 92 FL
MONOCYTES # BLD AUTO: 0.94 X10(3) UL (ref 0.1–1)
MONOCYTES NFR BLD AUTO: 6.3 %
NEUTROPHILS # BLD AUTO: 12.67 X10 (3) UL (ref 1.5–7.7)
NEUTROPHILS # BLD AUTO: 12.67 X10(3) UL (ref 1.5–7.7)
NEUTROPHILS NFR BLD AUTO: 85.6 %
OSMOLALITY SERPL CALC.SUM OF ELEC: 285 MOSM/KG (ref 275–295)
PLATELET # BLD AUTO: 371 10(3)UL (ref 150–450)
POTASSIUM SERPL-SCNC: 4.2 MMOL/L (ref 3.5–5.1)
PROT SERPL-MCNC: 6.8 G/DL (ref 5.7–8.2)
RBC # BLD AUTO: 4.61 X10(6)UL
SODIUM SERPL-SCNC: 138 MMOL/L (ref 136–145)
WBC # BLD AUTO: 14.8 X10(3) UL (ref 4–11)

## 2024-04-12 PROCEDURE — 74181 MRI ABDOMEN W/O CONTRAST: CPT | Performed by: EMERGENCY MEDICINE

## 2024-04-12 PROCEDURE — 74177 CT ABD & PELVIS W/CONTRAST: CPT | Performed by: EMERGENCY MEDICINE

## 2024-04-12 PROCEDURE — 76376 3D RENDER W/INTRP POSTPROCES: CPT | Performed by: EMERGENCY MEDICINE

## 2024-04-12 PROCEDURE — 99223 1ST HOSP IP/OBS HIGH 75: CPT | Performed by: HOSPITALIST

## 2024-04-12 RX ORDER — MORPHINE SULFATE 4 MG/ML
4 INJECTION, SOLUTION INTRAMUSCULAR; INTRAVENOUS ONCE
Status: COMPLETED | OUTPATIENT
Start: 2024-04-12 | End: 2024-04-12

## 2024-04-12 RX ORDER — DEXTROSE MONOHYDRATE, SODIUM CHLORIDE, AND POTASSIUM CHLORIDE 50; 1.49; 4.5 G/1000ML; G/1000ML; G/1000ML
INJECTION, SOLUTION INTRAVENOUS CONTINUOUS
Status: DISCONTINUED | OUTPATIENT
Start: 2024-04-12 | End: 2024-04-16

## 2024-04-12 RX ORDER — MORPHINE SULFATE 4 MG/ML
4 INJECTION, SOLUTION INTRAMUSCULAR; INTRAVENOUS EVERY 2 HOUR PRN
Status: DISCONTINUED | OUTPATIENT
Start: 2024-04-12 | End: 2024-04-16

## 2024-04-12 RX ORDER — ACETAMINOPHEN 500 MG
500 TABLET ORAL EVERY 4 HOURS PRN
Status: DISCONTINUED | OUTPATIENT
Start: 2024-04-12 | End: 2024-04-16

## 2024-04-12 RX ORDER — ONDANSETRON 2 MG/ML
4 INJECTION INTRAMUSCULAR; INTRAVENOUS ONCE
Status: COMPLETED | OUTPATIENT
Start: 2024-04-12 | End: 2024-04-12

## 2024-04-12 RX ORDER — ONDANSETRON 2 MG/ML
4 INJECTION INTRAMUSCULAR; INTRAVENOUS EVERY 6 HOURS PRN
Status: DISCONTINUED | OUTPATIENT
Start: 2024-04-12 | End: 2024-04-16

## 2024-04-12 RX ORDER — PROCHLORPERAZINE EDISYLATE 5 MG/ML
5 INJECTION INTRAMUSCULAR; INTRAVENOUS EVERY 8 HOURS PRN
Status: DISCONTINUED | OUTPATIENT
Start: 2024-04-12 | End: 2024-04-16

## 2024-04-12 RX ORDER — MORPHINE SULFATE 4 MG/ML
1 INJECTION, SOLUTION INTRAMUSCULAR; INTRAVENOUS EVERY 2 HOUR PRN
Status: DISCONTINUED | OUTPATIENT
Start: 2024-04-12 | End: 2024-04-16

## 2024-04-12 RX ORDER — MORPHINE SULFATE 4 MG/ML
2 INJECTION, SOLUTION INTRAMUSCULAR; INTRAVENOUS EVERY 2 HOUR PRN
Status: DISCONTINUED | OUTPATIENT
Start: 2024-04-12 | End: 2024-04-16

## 2024-04-12 RX ORDER — SODIUM CHLORIDE 9 MG/ML
1000 INJECTION, SOLUTION INTRAVENOUS ONCE
Status: DISCONTINUED | OUTPATIENT
Start: 2024-04-12 | End: 2024-04-12

## 2024-04-12 NOTE — ED INITIAL ASSESSMENT (HPI)
Pt to ED by self with c/o mid abdominal pain that radiates to back that started approximately 1000 today. Pt with hx of pancreatitis. Pt denies fever. +vomiting and diarrhea per patient. Pt denies fall or trauma. Pt denies ETOH. No respiratory distress noted. Pt is alert and oriented x4. Pt ambulating by self with steady gait.

## 2024-04-12 NOTE — ED QUICK NOTES
Orders for admission, patient is aware of plan and ready to go upstairs. Any questions, please call ED RN Farzana at extension [phone].     Patient Covid vaccination status: Partially vaccinated     COVID Test Ordered in ED: None    COVID Suspicion at Admission: N/A    Running Infusions:  None    Mental Status/LOC at time of transport: AxOx4    Other pertinent information:   CIWA score: N/A   NIH score:  N/A

## 2024-04-12 NOTE — CONSULTS
GASTROENTEROLOGY CONSULTATION  [facility]    Jarrett Bal Patient Status:  Inpatient   Date of Birth 2/24/1975 MRN [id number]   Location [facility] 1W Attending Andrez Easley MD   Hosp Day # 0 PCP None Pcp     Date of Consultation:  4/12/2024    History of Present Illness:    Jarrett Bal is a 49 year old male with abdominal pain.    The patient presents to the emergency room today with complaints of abdominal pain.  He reports epigastric and right upper quadrant pain for the past 4 days.  The pain has waxed and waned.  Because of ongoing pain, he presents to the emergency room today.  The patient was found to have leukocytosis and elevated liver enzymes.  A CT scan of the abdomen reveals a markedly inflamed and distended gallbladder with peribiliary edema.    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  This was diagnosed in 2018.  He has had multiple attacks of pancreatitis. Last year, the patient developed a gastric outlet obstruction secondary to this requiring PEG placement for venting of the stomach.  He also had an ERCP with sphincterotomy at that time.  In June, the patient underwent a exploratory laparotomy with gastrojejunostomy and removal of the PEG tube.    In March, a CT scan of the abdomen and pelvis revealed a nonocclusive thrombus of the main portal vein along with a stable thrombosis of the anterior division of the right portal vein.  An EGD was performed.  No varices were seen.  He was started on anticoagulation.    The patient continues to smoke but states that he is no longer drinking alcohol.    He denies any fever or chills.  There is no nausea or vomiting.    Past Medical History:     Past Medical History:    Anesthesia complication    cramps;nausea after anesthesia when in 3rd grade    Anxiety state    Duodenitis    Pancreatitis (HCC)    PONV (postoperative nausea and vomiting)       Medications:     Prior  to Admission medications    Medication Sig Start Date End Date Taking? Authorizing Provider   HYDROcodone-acetaminophen  MG Oral Tab Take 1 tablet by mouth every 6 (six) hours as needed for Pain. Watch drowsiness no alcohol 3/5/24  Yes Bishop Garcia MD   apixaban 5 MG Oral Tab Take 2 tabs (10mg) by mouth twice daily for 7 days, then take 1 tab (5mg) by mouth twice daily thereafter.  Patient taking differently: Take 1 tablet (5 mg total) by mouth 2 (two) times daily. Take 2 tabs (10mg) by mouth twice daily for 7 days, then take 1 tab (5mg) by mouth twice daily thereafter. 3/4/24  Yes Bishop Garcia MD   Pancrelipase, Lip-Prot-Amyl, (CREON) 21463-643837 units Oral Cap DR Particles Take 36,000 Units by mouth 3 (three) times daily with meals. 2/29/24 4/12/24 Yes External/Patient, Reported   acetaminophen 500 MG Oral Tab Take 1 tablet (500 mg total) by mouth every 4 (four) hours as needed for Fever (equal to or greater than 100.4). 12/21/23  Yes Yonny Foster MD   Omeprazole 20 MG Oral Tab EC Take 20 mg by mouth daily. 3/5/24 4/4/24  Bishop Garcia MD       Current Facility-Administered Medications   Medication Dose Route Frequency    sodium chloride 0.9% infusion 1,000 mL  1,000 mL Intravenous Once    piperacillin-tazobactam (Zosyn) 3.375 g in dextrose 5% 100 mL IVPB-ADDV  3.375 g Intravenous Q8H    potassium chloride 20 mEq in dextrose 5%-sodium chloride 0.45% 1000mL infusion premix   Intravenous Continuous    acetaminophen (Tylenol Extra Strength) tab 500 mg  500 mg Oral Q4H PRN    ondansetron (Zofran) 4 MG/2ML injection 4 mg  4 mg Intravenous Q6H PRN    prochlorperazine (Compazine) 10 MG/2ML injection 5 mg  5 mg Intravenous Q8H PRN    morphINE PF 4 MG/ML injection 1 mg  1 mg Intravenous Q2H PRN    Or    morphINE PF 4 MG/ML injection 2 mg  2 mg Intravenous Q2H PRN    Or    morphINE PF 4 MG/ML injection 4 mg  4 mg Intravenous Q2H PRN        Family History:     Family History    Problem Relation Age of Onset    Hypertension Mother     Other (Early onset Dementia) Mother     Other (recurrent falls) Mother     Hypertension Father     No Known Problems Brother     Dementia Maternal Grandmother     Other (Alzehimers) Maternal Grandmother     Other (Cardiac arrythmia) Paternal Grandmother     No Known Problems Paternal Grandfather        Social History:    Social History     Socioeconomic History    Marital status:    Tobacco Use    Smoking status: Every Day     Current packs/day: 0.00     Average packs/day: 0.5 packs/day for 25.0 years (12.5 ttl pk-yrs)     Types: Cigarettes     Start date: 1998     Last attempt to quit: 2023     Years since quittin.9    Smokeless tobacco: Never    Tobacco comments:     0.05 cigarettes a day, working on quitting   Vaping Use    Vaping status: Never Used   Substance and Sexual Activity    Alcohol use: Not Currently     Comment: not since 2023 (Sober 1 year)    Drug use: Not Currently     Types: Cannabis     Comment: rare    Sexual activity: Not Currently     Social Determinants of Health     Food Insecurity: No Food Insecurity (3/2/2024)    Food Insecurity     Food Insecurity: Never true   Transportation Needs: No Transportation Needs (3/2/2024)    Transportation Needs     Lack of Transportation: No   Housing Stability: Low Risk  (3/2/2024)    Housing Stability     Housing Instability: No       ROS:     A comprehensive 10 point review of systems was completed.  Pertinent positives and negatives noted in the the HPI.      Physical Exam:    Vital Signs:   Vitals:    24 1639   BP: 136/83   Pulse: 85   Resp: 20   Temp: 98.7 °F (37.1 °C)     General: Alert, oriented and in no acute distress  HEENT: normocephalic; non-icteric; oral cavity free of lesions  Neck:  Supple; no thyromegaly or adenopathy  Lungs:  Clear to ausculation and percussion  Heart:  Normal S1S2  Abdomen:  Soft; RUQ tenderness with guarding; no masses or  hepatosplenomegaly; non-distended  Extremities:  No edema    Labs:      Lab Results   Component Value Date    WBC 14.8 04/12/2024    HGB 14.2 04/12/2024    HCT 42.4 04/12/2024    .0 04/12/2024    CREATSERUM 0.73 04/12/2024    BUN 10 04/12/2024     04/12/2024    K 4.2 04/12/2024     04/12/2024    CO2 28.0 04/12/2024     04/12/2024    CA 9.3 04/12/2024    ALB 4.3 04/12/2024    ALKPHO 283 04/12/2024    BILT 0.9 04/12/2024    TP 6.8 04/12/2024     04/12/2024     04/12/2024     04/12/2024    ETOH <3 04/12/2024        RADIOLOGY:    PROCEDURE:CT ABDOMEN + PELVIS (CONTRAST ONLY) (CPT=74177)     COMPARISON: [facility], CT ABDOMEN + PELVIS (CONTRAST ONLY) (CPT=74177), 3/02/2024, 9:35 AM.  [facility], CT ABDOMEN + PELVIS (CONTRAST ONLY) (CPT=74177), 2/20/2024, 10:44 AM.     INDICATIONS:abd pain/ vomiting     TECHNIQUE:    CT images of the abdomen and pelvis were obtained with non-ionic intravenous contrast material.  Automated exposure control for dose reduction was used. Adjustment of the mA and/or kV was done based on the patient's size. Use of iterative   reconstruction technique for dose reduction was used.  Dose information is transmitted to the ACR (American College of Radiology) NRDR (National Radiology Data Registry) which includes the Dose Index Registry.     FINDINGS:          LOWER THORAX:                      Coronary artery calcifications cannot be assessed.  No visible pulmonary or pleural disease.  LIVER:               No intrinsic lesion.  Peribiliary edema.  Previously noted thrombus in the main portal vein is not seen.  The right anterior portal vein is not well seen suggesting thrombus.  Persistent nonocclusive thrombus in the splenic vein.  GALLBLADDER:Marked edema and hyperemia of the gallbladder.  BILIARY:            Intrahepatic peribiliary edema.  Enlarged common bile duct measures 0.9 cm, similar to the prior  exam.  PANCREAS:      A few calcifications are again noted in the pancreatic head.  Borderline enlarged pancreatic duct measures 4 mm, similar to the prior exam.  SPLEEN:           No enlargement or focal lesion.    ADRENALS:      No mass or enlargement.    KIDNEYS:          No mass, obstruction, or calcification.    RETROPERITONEUM:               No mass or enlarged adenopathy.    AORTA/VASCULAR:      No aneurysm or dissection.  PERITONEUM:No free fluid or air.  GI TRACT/MESENTERY:           Marked thickening and edema of the gastric pylorus and 1st and 2nd portions of the duodenum are again noted, similar or slightly worsened compared to the prior exam.  Cyst at the pancreaticoduodenal groove is stable compared to the   prior exam measuring 1.8 cm.  There is surrounding edema.    URINARY BLADDER:Unremarkable.  REPRODUCTIVE ORGANS:Unremarkable.  ABDOMINAL WALL:      No acute abnormality.  BONES:             No acute fracture.  Mild spondylosis.       =====  CONCLUSION:      1. New marked inflammation of the gallbladder and intrahepatic peribiliary edema.     2. Increased fluid and inflammation around the pancreatic head, gastric pylorus, and duodenum.  Stable peripancreatic cyst at the pancreaticoduodenal groove.  No evidence of perforation.     3. Stable nonocclusive thrombus in the splenic vein near the confluence.  Previously noted thrombus in the main portal vein has resolved.  Stable thrombosis of the anterior division of the right portal vein.     4. Additional chronic or incidental findings are described in the body of this report.             Dictated by (CST): Hudson Ambrose MD on 4/12/2024 at 3:36 PM       Finalized by (CST): Hudson Ambrose MD on 4/12/2024 at 3:45 PM    Assessment:    Acute cholecystitis  Elevated liver enzymes  Chronic groove pancreatitis  Portal vein/splenic vein thrombosis  History of gastric outlet obstruction - s/p gastrojejunostomy    The patient presents with acute cholecystitis.  He  also has elevated liver enzymes.  I suspect this is secondary to cholecystitis.  His common bile duct is dilated but this is chronic and unchanged from previous.  The biliary ductal dilation is most likely secondary to his chronic groove pancreatitis.    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  He is not using alcohol at this time but continues to smoke.  He has had multiple episodes of pancreatitis.  This has been complicated by the development of a gastric outlet obstruction requiring gastrojejunostomy and portal and splenic vein thromboses.  He has been on anticoagulation since March.  CT scan today shows resolution of the portal vein thrombosis.  Thrombosis is still noted in the splenic vein as well as the anterior division of the right portal vein.    Plan:    MRCP  Surgical consultation  NPO.  IV antibiotics.   Hold Tarah.       Linden Poe MD

## 2024-04-12 NOTE — ED PROVIDER NOTES
Patient Seen in: [facility] Emergency Department    History     Chief Complaint   Patient presents with    Abdomen/Flank Pain       HPI    49-year-old male presents ER with complaints of epigastric pain rating to his back.  Patient with a past medical history of pancreatitis.  Patient was a longtime alcoholic in the past but no longer drinks alcohol.  Patient states his pain sometimes comes on spontaneously.  Patient's primary care doctor is looking into autoimmune causes for pancreatitis.  Patient states he was at work when he developed the pain.  Patient then also started having nausea and vomiting.  Patient states he ran out of his Norco for pain.  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    History reviewed.   Past Medical History:    Anesthesia complication    cramps;nausea after anesthesia when in 3rd grade    Anxiety state    Duodenitis    Pancreatitis (HCC)    PONV (postoperative nausea and vomiting)       History reviewed.   Past Surgical History:   Procedure Laterality Date    Biopsy/removal, lymph node(s)      R neck    Egd N/A 03/04/2024    ; duodenitis,hiatal hernia,previous gastric jejunostomy    Gastrostomy/jejunostomy tube N/A 06/2023         Medications :  No medications prior to admission.        Family History   Problem Relation Age of Onset    Hypertension Mother     Other (Early onset Dementia) Mother     Other (recurrent falls) Mother     Hypertension Father     No Known Problems Brother     Dementia Maternal Grandmother     Other (Alzehimers) Maternal Grandmother     Other (Cardiac arrythmia) Paternal Grandmother     No Known Problems Paternal Grandfather        Smoking Status:   Social History     Socioeconomic History    Marital status:    Tobacco Use    Smoking status: Every Day     Current packs/day: 0.00     Average packs/day: 0.5 packs/day for 25.0 years (12.5 ttl pk-yrs)     Types: Cigarettes      Start date: 1998     Last attempt to quit: 2023     Years since quittin.9    Smokeless tobacco: Never    Tobacco comments:     0.05 cigarettes a day, working on quitting   Vaping Use    Vaping status: Never Used   Substance and Sexual Activity    Alcohol use: Not Currently     Comment: not since 2023 (Sober 1 year)    Drug use: Not Currently     Types: Cannabis     Comment: rare    Sexual activity: Not Currently       ROS  All pertinent positives for the review of systems are mentioned in the HPI  All other organ systems are reviewed and are negative.    Constitutional and vital signs reviewed.      Social History and Family History elements reviewed from today, pertinent positives to the presenting problem noted.    Physical Exam     ED Triage Vitals [24 1223]   /83   Pulse 82   Resp 22   Temp 98 °F (36.7 °C)   Temp src Temporal   SpO2 98 %   O2 Device None (Room air)       All measures to prevent infection transmission during my interaction with the patient were taken. The patient was already wearing a droplet mask on my arrival to the room. Personal protective equipment including droplet mask, eye protection, and gloves were worn throughout the duration of the exam.  Handwashing was performed prior to and after the exam.  Stethoscope and any equipment used during my examination was cleaned with super sani-cloth germicidal wipes following the exam.     Physical Exam  Vitals and nursing note reviewed.   Constitutional:       Appearance: He is well-developed.   HENT:      Head: Normocephalic and atraumatic.      Right Ear: External ear normal.      Left Ear: External ear normal.      Nose: Nose normal.   Eyes:      Conjunctiva/sclera: Conjunctivae normal.      Pupils: Pupils are equal, round, and reactive to light.   Cardiovascular:      Rate and Rhythm: Normal rate and regular rhythm.      Heart sounds: Normal heart sounds.   Pulmonary:      Effort: Pulmonary effort is normal.       Breath sounds: Normal breath sounds.   Abdominal:      General: Bowel sounds are normal.      Palpations: Abdomen is soft.      Tenderness: There is abdominal tenderness in the epigastric area. There is no right CVA tenderness, left CVA tenderness, guarding or rebound.   Musculoskeletal:         General: Normal range of motion.      Cervical back: Normal range of motion and neck supple.   Skin:     General: Skin is warm and dry.   Neurological:      Mental Status: He is alert and oriented to person, place, and time.      Deep Tendon Reflexes: Reflexes are normal and symmetric.   Psychiatric:         Behavior: Behavior normal.         Thought Content: Thought content normal.         Judgment: Judgment normal.         ED Course        Labs Reviewed   BASIC METABOLIC PANEL (8) - Abnormal; Notable for the following components:       Result Value    Glucose 102 (*)     All other components within normal limits   HEPATIC FUNCTION PANEL (7) - Abnormal; Notable for the following components:     (*)      (*)     Alkaline Phosphatase 283 (*)     Bilirubin, Direct 0.5 (*)     All other components within normal limits   LIPASE - Abnormal; Notable for the following components:    Lipase 226 (*)     All other components within normal limits   COMP METABOLIC PANEL (14) - Abnormal; Notable for the following components:    Anion Gap <0 (*)     BUN <5 (*)     Calcium, Total 8.6 (*)      (*)      (*)     Alkaline Phosphatase 286 (*)     Total Protein 5.5 (*)     Globulin  2.0 (*)     All other components within normal limits   LIPASE - Abnormal; Notable for the following components:    Lipase 129 (*)     All other components within normal limits   CBC, PLATELET; NO DIFFERENTIAL - Abnormal; Notable for the following components:    RBC 3.95 (*)     HGB 12.5 (*)     HCT 36.6 (*)     All other components within normal limits   COMP METABOLIC PANEL (14) - Abnormal; Notable for the following components:    BUN <5  (*)     ALT 89 (*)     AST 48 (*)     Alkaline Phosphatase 263 (*)     Total Protein 5.4 (*)     Globulin  2.2 (*)     All other components within normal limits   COMP METABOLIC PANEL (14) - Abnormal; Notable for the following components:    Chloride 114 (*)     BUN 7 (*)     BUN/CREA Ratio 8.6 (*)     ALT 71 (*)     Alkaline Phosphatase 316 (*)     Total Protein 5.5 (*)     Globulin  2.1 (*)     All other components within normal limits   AMYLASE - Abnormal; Notable for the following components:    Amylase 153 (*)     All other components within normal limits   LIPASE - Abnormal; Notable for the following components:    Lipase 129 (*)     All other components within normal limits   HEPATIC FUNCTION PANEL (7) - Abnormal; Notable for the following components:    Alkaline Phosphatase 271 (*)     All other components within normal limits   CBC, PLATELET; NO DIFFERENTIAL - Abnormal; Notable for the following components:    RBC 4.22 (*)     All other components within normal limits   BASIC METABOLIC PANEL (8) - Abnormal; Notable for the following components:    Chloride 114 (*)     BUN <5 (*)     All other components within normal limits   CBC W/ DIFFERENTIAL - Abnormal; Notable for the following components:    WBC 14.8 (*)     Neutrophil Absolute Prelim 12.67 (*)     Neutrophil Absolute 12.67 (*)     All other components within normal limits   CBC W/ DIFFERENTIAL - Abnormal; Notable for the following components:    RBC 3.76 (*)     HGB 12.2 (*)     HCT 35.0 (*)     All other components within normal limits   CBC W/ DIFFERENTIAL - Abnormal; Notable for the following components:    RBC 4.02 (*)     HGB 12.9 (*)     HCT 37.9 (*)     All other components within normal limits   ETHYL ALCOHOL - Normal   CBC WITH DIFFERENTIAL WITH PLATELET    Narrative:     The following orders were created for panel order CBC With Differential With Platelet.                  Procedure                               Abnormality         Status                                      ---------                               -----------         ------                                     CBC W/ DIFFERENTIAL[[id number]]          Abnormal            Final result                                                 Please view results for these tests on the individual orders.   CBC WITH DIFFERENTIAL WITH PLATELET    Narrative:     The following orders were created for panel order CBC With Differential With Platelet.                  Procedure                               Abnormality         Status                                     ---------                               -----------         ------                                     CBC W/ DIFFERENTIAL[[id number]]          Abnormal            Final result                                                 Please view results for these tests on the individual orders.   CBC WITH DIFFERENTIAL WITH PLATELET    Narrative:     The following orders were created for panel order CBC With Differential With Platelet.                  Procedure                               Abnormality         Status                                     ---------                               -----------         ------                                     CBC W/ DIFFERENTIAL[[id number]]          Abnormal            Final result                                                 Please view results for these tests on the individual orders.         Imaging Results Available and Reviewed while in ED: No results found.  ED Medications Administered:   Medications   sodium chloride 0.9 % IV bolus 1,000 mL (0 mL Intravenous Stopped 4/12/24 1403)   ondansetron (Zofran) 4 MG/2ML injection 4 mg (4 mg Intravenous Given 4/12/24 1310)   morphINE PF 4 MG/ML injection 4 mg (4 mg Intravenous Given 4/12/24 1320)   fentaNYL (Sublimaze) 50 mcg/mL injection 50 mcg (50 mcg Intravenous Given 4/12/24 1403)   iopamidol 76% (ISOVUE-370) injection for power injector (80 mL Intravenous  Given 4/12/24 1546)   piperacillin-tazobactam (Zosyn) 4.5 g in dextrose 5% 100 mL IVPB-ADDV (0 g Intravenous Stopped 4/12/24 1707)   morphINE PF 4 MG/ML injection 4 mg (4 mg Intravenous Given 4/12/24 1617)         MDM     Vitals:    04/15/24 1931 04/16/24 0536 04/16/24 0742 04/16/24 1340   BP: 107/67 96/72 106/65 105/72   BP Location: Left arm Left arm Left arm Left arm   Pulse: 65 67 66 67   Resp: 18 18 18 18   Temp: 97.9 °F (36.6 °C) 97.9 °F (36.6 °C) 97.7 °F (36.5 °C) 97.2 °F (36.2 °C)   TempSrc: Oral Oral Oral Oral   SpO2: 99% 99% 100% 100%   Weight:       Height:         *I personally reviewed and interpreted all ED vitals.  I also personally reviewed all labs and imaging if ordered    Pulse Ox: 98%, Room air, Normal     Monitor Interpretation:   normal sinus rhythm    Differential Diagnosis/ Diagnostic Considerations: Gastritis, gastroenteritis, pancreatitis.    Medical Record Review: I personally reviewed available prior medical records for any recent pertinent discharge summaries, testing, and procedures and reviewed those reports.    Complicating Factors: The patient already has does not have any pertinent problems on file. to contribute to the complexity of this ED evaluation.    Medical Decision Making  49-year-old male presents ER with complaint of epigastric pain.  Patient with longstanding history of pancreatitis but states he no longer drinks alcohol.  Patient's blood work does show transaminitis.  Awaiting patient's CT abdomen pelvis.  Patient signed out to Dr. May for CT interpretation and possible admission.  Patient's coworker bedside made aware of the disposition and likely admission for possible pancreatitis.  Patient given morphine for pain as well as IV fluids.    Total Critical Care Time: 31 minutes including time spent examining and re-evaluating the patient, ordering and reviewing laboratory tests, documenting, reviewing previous records, obtaining information from the family, and  speaking with consultants, admitting doctors, nurses and medics and excludes any time spent on procedures.      Problems Addressed:  Acute cholecystitis: acute illness or injury  Acute pancreatitis, unspecified complication status, unspecified pancreatitis type (HCC): acute illness or injury  Ascending cholangitis (HCC): acute illness or injury  Transaminitis: acute illness or injury    Amount and/or Complexity of Data Reviewed  Independent Historian:      Details: Medical history obtained from the coworker states the patient started having sudden pain while he was at work.  Labs: ordered. Decision-making details documented in ED Course.        Condition upon leaving the department: Stable    Disposition and Plan     Clinical Impression:  1. Acute pancreatitis, unspecified complication status, unspecified pancreatitis type (HCC)    2. Transaminitis    3. Acute cholecystitis    4. Ascending cholangitis (HCC)    5. Hypokalemia        Disposition:  Admit    Follow-up:  Pcp, None  [place] [address]    Follow up in 1 week(s)        Medications Prescribed:  Discharge Medication List as of 4/16/2024  3:10 PM          Hospital Problems       Present on Admission  Date Reviewed: 4/19/2024            ICD-10-CM Noted POA    * (Principal) Acute pancreatitis, unspecified complication status, unspecified pancreatitis type (HCC) K85.90 2/17/2024 Unknown    Acute cholecystitis K81.0 4/12/2024 Unknown    Ascending cholangitis (HCC) K83.09 4/12/2024 Unknown    Transaminitis R74.01 4/12/2024 Unknown

## 2024-04-12 NOTE — PLAN OF CARE
Problem: Patient Centered Care  Goal: Patient preferences are identified and integrated in the patient's plan of care  Description: Interventions:  - What would you like us to know as we care for you? Lives at home alone  - Provide timely, complete, and accurate information to patient/family  - Incorporate patient and family knowledge, values, beliefs, and cultural backgrounds into the planning and delivery of care  - Encourage patient/family to participate in care and decision-making at the level they choose  - Honor patient and family perspectives and choices  Note: Received patient from ER with diagnosis of acute pancreatitis. Patient alert and oriented, pain \"controlled\" at this time, was given morphine prior to transfer to [facility] from ER. No complaint of nausea or vomiting. Patient oriented to room, call light. Discussed plan of care, NPO status. IVF started as ordered. Awaiting for surgeon to see.  Will continue to monitor. Bed on low and locked position, call light within reach.

## 2024-04-12 NOTE — ED PROVIDER NOTES
S/o to me Dr. Dr. Riojas pending CT. noted as below.  Leukocytosis with transaminases.  Talk to patient.  Examined him and he still has right upper quadrant pain.  Clinically does not look ill.  Talk to the hospitalist who saw him.  GI and general surgery were consulted.  Patient will be admitted with fluids and IV antibiotics and n.p.o. status.          CT ABDOMEN+PELVIS(CONTRAST ONLY)(CPT=74177)    Result Date: 4/12/2024  PROCEDURE: CT ABDOMEN + PELVIS (CONTRAST ONLY) (CPT=74177)  COMPARISON: [facility], CT ABDOMEN + PELVIS (CONTRAST ONLY) (CPT=74177), 3/02/2024, 9:35 AM.  [facility], CT ABDOMEN + PELVIS (CONTRAST ONLY) (CPT=74177), 2/20/2024, 10:44 AM.  INDICATIONS: abd pain/ vomiting  TECHNIQUE: CT images of the abdomen and pelvis were obtained with non-ionic intravenous contrast material.  Automated exposure control for dose reduction was used. Adjustment of the mA and/or kV was done based on the patient's size. Use of iterative reconstruction technique for dose reduction was used.  Dose information is transmitted to the ACR (American College of Radiology) NRDR (National Radiology Data Registry) which includes the Dose Index Registry.  FINDINGS:  LOWER THORAX:  Coronary artery calcifications cannot be assessed.  No visible pulmonary or pleural disease. LIVER:   No intrinsic lesion.  Peribiliary edema.  Previously noted thrombus in the main portal vein is not seen.  The right anterior portal vein is not well seen suggesting thrombus.  Persistent nonocclusive thrombus in the splenic vein. GALLBLADDER: Marked edema and hyperemia of the gallbladder. BILIARY:   Intrahepatic peribiliary edema.  Enlarged common bile duct measures 0.9 cm, similar to the prior exam. PANCREAS:   A few calcifications are again noted in the pancreatic head.  Borderline enlarged pancreatic duct measures 4 mm, similar to the prior exam. SPLEEN:   No enlargement or focal lesion.  ADRENALS:   No mass or  enlargement.  KIDNEYS:   No mass, obstruction, or calcification.  RETROPERITONEUM:   No mass or enlarged adenopathy.  AORTA/VASCULAR:   No aneurysm or dissection. PERITONEUM: No free fluid or air. GI TRACT/MESENTERY:    Marked thickening and edema of the gastric pylorus and 1st and 2nd portions of the duodenum are again noted, similar or slightly worsened compared to the prior exam.  Cyst at the pancreaticoduodenal groove is stable compared to the  prior exam measuring 1.8 cm.  There is surrounding edema.  URINARY BLADDER: Unremarkable. REPRODUCTIVE ORGANS: Unremarkable. ABDOMINAL WALL:   No acute abnormality. BONES:  No acute fracture.  Mild spondylosis.          CONCLUSION:   1. New marked inflammation of the gallbladder and intrahepatic peribiliary edema.  2. Increased fluid and inflammation around the pancreatic head, gastric pylorus, and duodenum.  Stable peripancreatic cyst at the pancreaticoduodenal groove.  No evidence of perforation.  3. Stable nonocclusive thrombus in the splenic vein near the confluence.  Previously noted thrombus in the main portal vein has resolved.  Stable thrombosis of the anterior division of the right portal vein.  4. Additional chronic or incidental findings are described in the body of this report.     Dictated by (CST): Hudson Ambrose MD on 4/12/2024 at 3:36 PM     Finalized by (CST): Hudson Ambrose MD on 4/12/2024 at 3:45 PM

## 2024-04-12 NOTE — H&P
[facility]    PATIENT'S NAME: ARGELIA YBARRA   ATTENDING PHYSICIAN: Allen Riojas DO   PATIENT ACCOUNT#:   [id number]    LOCATION:  [address]  MEDICAL RECORD #:   [id number]       YOB: 1975  ADMISSION DATE:       04/12/2024    HISTORY AND PHYSICAL EXAMINATION    CHIEF COMPLAINT:  Acute pancreatitis and cholecystitis, possible gallstone pancreatitis.    HISTORY OF PRESENT ILLNESS:  Patient is a 49-year-old  male with complex past medical history including alcohol-induced pancreatitis and ever since recurrent pancreatitis.  Last hospitalization was in March 2024.  At that time, his liver function tests were slightly elevated.  Patient had an upper endoscopy during recent hospitalization which showed severe duodenitis.  Stabilized and discharged home.  Today, came back with recurrent abdominal pain, this time epigastric and right upper quadrant.  Started 3 days ago.  CBC today showed white blood cell count of 14.8 with left shift.  Chemistry was unremarkable.  Liver function tests; AST and  and 100; alkaline phosphatase 283; and direct bilirubin 0.5, slightly elevated.  Alcohol level was negative.  CT scan of the abdomen showed new markedly inflamed and distended gallbladder with increased fluid and inflammation around the pancreatic head, gastric pylorus, and duodenum; nonocclusive thrombus in the splenic vein; and resolution of portal vein thrombus; biliary tract common bile duct is 0.9 cm, slightly dilated.     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  He developed gastric outlet obstruction and required venting G-tube.  Eventually, he had exploratory laparotomy in June 2023 with gastrojejunostomy/G-tube removal and antecolic enteroenterostomy and removal of pancreatic stent.  He had multiple episodes of recurrent pancreatitis after that without alcohol consumption including recent hospitalization in March 2024.  He has  inflamed duodenitis and portal vein thrombus, currently anticoagulated with Eliquis.     PAST SURGICAL HISTORY:  As mentioned above.     MEDICATIONS:  Please see medication reconciliation list.     ALLERGIES:  No known drug allergies.     FAMILY HISTORY:  Mother had heart disease.  Father has hypertension.     SOCIAL HISTORY:  Ex-tobacco and alcohol user.  No current tobacco, alcohol, or drug use.  Lives with his family.  Independent in his basic activities of daily living.     REVIEW OF SYSTEMS:  Patient described epigastric right upper quadrant pain.  It started after he ate lunch 3 days ago and has been persistent ever since.  He has been avoiding foods.  He had nausea and vomiting.  He denies any fever or chills.  Other 12-point review of systems is negative.       PHYSICAL EXAMINATION:    GENERAL:  Alert and oriented to time, place and person.  Moderate distress.   VITAL SIGNS:  Temperature 98.0, pulse 91, respiratory rate 17, blood pressure 137/88, pulse ox 99% on room air.  HEENT:  Atraumatic.  Oropharynx clear.  Dry mucous membranes.  Normal hard and soft palate.  Eyes:  Anicteric sclerae.    NECK:  Supple.  No lymphadenopathy.  Trachea midline.  Full range of motion.   LUNGS:  Clear to auscultation bilaterally.  Normal respiratory effort.   HEART:  Regular rate and rhythm.  S1 and S2 auscultated.  No murmur.    ABDOMEN:  Soft, nondistended.  Tenderness noted, right upper quadrant area.  Mild guarding.  No rebound tenderness.  Hypoactive bowel sounds.   EXTREMITIES:  No peripheral edema, clubbing or cyanosis.   NEUROLOGIC:  Motor and sensory intact.      ASSESSMENT:    1.   Recurrent pancreatitis.  Rule out gallstone or biliary stricture pancreatitis, reflux pancreatitis.  2.   Acute cholecystitis.      PLAN:  Patient will be admitted to general medical floor.  IV antibiotics, Zosyn.  MRCP.  IV fluids.  N.p.o.  Pain and nausea control.  Repeat CBC, CMP, and lipase in the morning.  Gastroenterology and  general surgery consults.  Further recommendations to follow.     Dictated By Andrez Easley MD  d: 04/12/2024 16:11:37  t: 04/12/2024 16:19:08  [facility] [id number]  /

## 2024-04-13 LAB
ALBUMIN SERPL-MCNC: 3.5 G/DL (ref 3.2–4.8)
ALBUMIN/GLOB SERPL: 1.8 {RATIO} (ref 1–2)
ALP LIVER SERPL-CCNC: 286 U/L
ALT SERPL-CCNC: 130 U/L
ANION GAP SERPL CALC-SCNC: <0 MMOL/L (ref 0–18)
AST SERPL-CCNC: 150 U/L (ref ?–34)
BASOPHILS # BLD AUTO: 0.04 X10(3) UL (ref 0–0.2)
BASOPHILS NFR BLD AUTO: 0.9 %
BILIRUB SERPL-MCNC: 1.1 MG/DL (ref 0.3–1.2)
BUN BLD-MCNC: <5 MG/DL (ref 9–23)
CALCIUM BLD-MCNC: 8.6 MG/DL (ref 8.7–10.4)
CHLORIDE SERPL-SCNC: 111 MMOL/L (ref 98–112)
CO2 SERPL-SCNC: 29 MMOL/L (ref 21–32)
CREAT BLD-MCNC: 0.75 MG/DL
DEPRECATED RDW RBC AUTO: 44.6 FL (ref 35.1–46.3)
EGFRCR SERPLBLD CKD-EPI 2021: 111 ML/MIN/1.73M2 (ref 60–?)
EOSINOPHIL # BLD AUTO: 0.31 X10(3) UL (ref 0–0.7)
EOSINOPHIL NFR BLD AUTO: 6.6 %
ERYTHROCYTE [DISTWIDTH] IN BLOOD BY AUTOMATED COUNT: 13.2 % (ref 11–15)
GLOBULIN PLAS-MCNC: 2 G/DL (ref 2.8–4.4)
GLUCOSE BLD-MCNC: 89 MG/DL (ref 70–99)
HCT VFR BLD AUTO: 35 %
HGB BLD-MCNC: 12.2 G/DL
IMM GRANULOCYTES # BLD AUTO: 0.02 X10(3) UL (ref 0–1)
IMM GRANULOCYTES NFR BLD: 0.4 %
LIPASE SERPL-CCNC: 129 U/L (ref 12–53)
LYMPHOCYTES # BLD AUTO: 1.08 X10(3) UL (ref 1–4)
LYMPHOCYTES NFR BLD AUTO: 23 %
MCH RBC QN AUTO: 32.4 PG (ref 26–34)
MCHC RBC AUTO-ENTMCNC: 34.9 G/DL (ref 31–37)
MCV RBC AUTO: 93.1 FL
MONOCYTES # BLD AUTO: 0.41 X10(3) UL (ref 0.1–1)
MONOCYTES NFR BLD AUTO: 8.7 %
NEUTROPHILS # BLD AUTO: 2.83 X10 (3) UL (ref 1.5–7.7)
NEUTROPHILS # BLD AUTO: 2.83 X10(3) UL (ref 1.5–7.7)
NEUTROPHILS NFR BLD AUTO: 60.4 %
PLATELET # BLD AUTO: 265 10(3)UL (ref 150–450)
POTASSIUM SERPL-SCNC: 3.6 MMOL/L (ref 3.5–5.1)
PROT SERPL-MCNC: 5.5 G/DL (ref 5.7–8.2)
RBC # BLD AUTO: 3.76 X10(6)UL
SODIUM SERPL-SCNC: 136 MMOL/L (ref 136–145)
WBC # BLD AUTO: 4.7 X10(3) UL (ref 4–11)

## 2024-04-13 PROCEDURE — 99223 1ST HOSP IP/OBS HIGH 75: CPT | Performed by: SURGERY

## 2024-04-13 PROCEDURE — 99233 SBSQ HOSP IP/OBS HIGH 50: CPT | Performed by: HOSPITALIST

## 2024-04-13 NOTE — PLAN OF CARE
Waiting for Dr. Avila to consult with Dr. Garcias on Monday, IVF infusing, morphine for pain control, tolerating clear liquids.  Problem: Patient Centered Care  Goal: Patient preferences are identified and integrated in the patient's plan of care  Description: Interventions:  - What would you like us to know as we care for you? Lives at home alone  - Provide timely, complete, and accurate information to patient/family  - Incorporate patient and family knowledge, values, beliefs, and cultural backgrounds into the planning and delivery of care  - Encourage patient/family to participate in care and decision-making at the level they choose  - Honor patient and family perspectives and choices  Outcome: Progressing     Problem: PAIN - ADULT  Goal: Verbalizes/displays adequate comfort level or patient's stated pain goal  Description: INTERVENTIONS:  - Encourage pt to monitor pain and request assistance  - Assess pain using appropriate pain scale  - Administer analgesics based on type and severity of pain and evaluate response  - Implement non-pharmacological measures as appropriate and evaluate response  - Consider cultural and social influences on pain and pain management  - Manage/alleviate anxiety  - Utilize distraction and/or relaxation techniques  - Monitor for opioid side effects  - Notify MD/LIP if interventions unsuccessful or patient reports new pain  - Anticipate increased pain with activity and pre-medicate as appropriate  Outcome: Progressing     Problem: RISK FOR INFECTION - ADULT  Goal: Absence of fever/infection during anticipated neutropenic period  Description: INTERVENTIONS  - Monitor WBC  - Administer growth factors as ordered  - Implement neutropenic guidelines  Outcome: Progressing     Problem: SAFETY ADULT - FALL  Goal: Free from fall injury  Description: INTERVENTIONS:  - Assess pt frequently for physical needs  - Identify cognitive and physical deficits and behaviors that affect risk of falls.  -  [place] fall precautions as indicated by assessment.  - Educate pt/family on patient safety including physical limitations  - Instruct pt to call for assistance with activity based on assessment  - Modify environment to reduce risk of injury  - Provide assistive devices as appropriate  - Consider OT/PT consult to assist with strengthening/mobility  - Encourage toileting schedule  Outcome: Progressing     Problem: DISCHARGE PLANNING  Goal: Discharge to home or other facility with appropriate resources  Description: INTERVENTIONS:  - Identify barriers to discharge w/pt and caregiver  - Include patient/family/discharge partner in discharge planning  - Arrange for needed discharge resources and transportation as appropriate  - Identify discharge learning needs (meds, wound care, etc)  - Arrange for interpreters to assist at discharge as needed  - Consider post-discharge preferences of patient/family/discharge partner  - Complete POLST form as appropriate  - Assess patient's ability to be responsible for managing their own health  - Refer to Case Management Department for coordinating discharge planning if the patient needs post-hospital services based on physician/LIP order or complex needs related to functional status, cognitive ability or social support system  Outcome: Progressing     Problem: GASTROINTESTINAL - ADULT  Goal: Minimal or absence of nausea and vomiting  Description: INTERVENTIONS:  - Maintain adequate hydration with IV or PO as ordered and tolerated  - Nasogastric tube to low intermittent suction as ordered  - Evaluate effectiveness of ordered antiemetic medications  - Provide nonpharmacologic comfort measures as appropriate  - Advance diet as tolerated, if ordered  - Obtain nutritional consult as needed  - Evaluate fluid balance  Outcome: Progressing  Goal: Maintains or returns to baseline bowel function  Description: INTERVENTIONS:  - Assess bowel function  - Maintain adequate hydration with IV or  PO as ordered and tolerated  - Evaluate effectiveness of GI medications  - Encourage mobilization and activity  - Obtain nutritional consult as needed  - Establish a toileting routine/schedule  - Consider collaborating with pharmacy to review patient's medication profile  Outcome: Progressing

## 2024-04-13 NOTE — PROGRESS NOTES
[facility]  GI Progress Note      Jarrett Bal Patient Status:  Inpatient    1975 MRN [id number]   Location [facility] 1W Attending Steph Mckeon MD   Hosp Day # 1 PCP None Pcp          SUBJECTIVE:     Abdominal pain is better but persists. No nausea or vomiting.     OBJECTIVE:    Height: 5' 8\" (172.7 cm) (1639)  Weight: 123 lb 6.4 oz (56 kg) (1639)  BSA (Calculated - sq m): 1.66 sq meters (1639)  Pulse: 66 (734)  BP: 95/67 (734)  Temp: 98.4 °F (36.9 °C) (734)  Do Not Use - Resp Rate: --  SpO2: 100 % (734)        General: Alert, oriented, no acute distress  Lungs: Clear  Heart: Normal S1 and S2  Abdomen: Soft, RUQ tenderness with guarding unchanged. Bowel sounds are positive. Non-distended  Extremities: No edema    LAB RESULTS:       Lab Results   Component Value Date    WBC 4.7 2024    HGB 12.2 2024    HCT 35.0 2024    .0 2024    CREATSERUM 0.75 2024    BUN <5 2024     2024    K 3.6 2024     2024    CO2 29.0 2024    GLU 89 2024    CA 8.6 2024    ALB 3.5 2024    ALKPHO 286 2024    BILT 1.1 2024    TP 5.5 2024     2024     2024     2024    ETOH <3 2024        RADIOLOGY RESULTS:    Preliminary MRI report reviewed. Final report is pending      ASSESSMENT:    Acute cholecystitis  Elevated liver enzymes  Chronic groove pancreatitis  Portal vein/splenic vein thrombosis  History of gastric outlet obstruction - s/p gastrojejunostomy     There is no significant clinical change. His clinical presentation is consistent with acute cholecystitis.  The changes noted on MRI in the area of the duodenum are likely related to his chronic groove pancreatitis. I doubt a perforated ulcer.     Liver enzymes remain elevated.  I suspect this is secondary to cholecystitis.  His common bile duct is dilated but this  is chronic and unchanged from previous.  The biliary ductal dilation is most likely secondary to his chronic groove pancreatitis. The CBD on MRI shows an 8 mm CBD with smooth tapering distally. No CBD stones are seen.      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  He is not using alcohol at this time but continues to smoke.  He has had multiple episodes of pancreatitis.  This has been complicated by the development of a gastric outlet obstruction requiring gastrojejunostomy and portal and splenic vein thromboses.  He has been on anticoagulation since March.  CT scan today shows resolution of the portal vein thrombosis.  Thrombosis is still noted in the splenic vein as well as the anterior division of the right portal vein.    PLAN:    Management of cholecystitis per surgery.  Continue antibiotics.  Hold Eliquis.  Await formal MRCP results.   Will continue to follow.     Linden Poe MD

## 2024-04-13 NOTE — CONSULTS
[facility]    Patient Name:  Jarrett Bal   YOB: 1975   Gender:  Male   Appt Date:  4/12/2024   Provider:  No name on file   Insurance:  [facility] POS/MCNP     PATIENT PROVIDERS  Referring Provider: No ref. provider found   Address: No referring provider defined for this encounter.   Phone #: N/A    Primary Care Provider:None Pcp   Address: [unfilled]   Phone #: None       CHIEF COMPLAINT  Chief Complaint   Patient presents with    Abdomen/Flank Pain        PROBLEMS  Reviewed   Patient Active Problem List   Diagnosis    Hypokalemia    Duodenitis    Gastric mass    Gastric outlet obstruction (HCC)    Prolonged QT interval    Pneumoperitoneum    CARLOS A (acute kidney injury) (HCC)    Hyponatremia    Metabolic alkalosis    Lightheaded    Weakness generalized    Traumatic injury of head, initial encounter    Hypotension, unspecified hypotension type    Malfunction of jejunostomy tube (HCC)    Hyperglycemia    Acute pancreatitis, unspecified complication status, unspecified pancreatitis type (HCC)    Portal vein thrombosis    Transaminitis    Acute cholecystitis    Ascending cholangitis (HCC)        History of Present Illness:  Patient is being evaluated at the request of the emergency department to render opinion guarding surgical management of cholecystitis in the setting of a history of chronic pancreatitis.  49-year-old gentleman with a history of chronic pancreatitis [alcohol-related] which was complicated by gastric outlet obstruction for which she underwent exploratory laparotomy and gastrojejunostomy bypass last year, later complicated by portal vein thrombosis more recently, now on Eliquis.  Patient presented to the emergency department yesterday with abdominal pain.Workup included a CT scan which is reviewed below but essentially returned to show gallbladder distention and marked inflammation with inflammatory changes along the pancreatic head, gastric pylorus  and duodenum.  Of note thrombosis along the main portal vein had resolved however remaining areas of nonocclusive thrombi remained stable.  Lipase 226, white blood cell count 14.8, total bilirubin 0.9, AST ALT mildly elevated at 372 and 100.  Patient admitted for additional workup and management.     Vital Signs:  BP 93/76 (BP Location: Left arm)   Pulse 66   Temp 98.3 °F (36.8 °C) (Oral)   Resp 18   Ht 1.727 m (5' 8\")   Wt 56 kg (123 lb 6.4 oz)   SpO2 98%   BMI 18.76 kg/m²      Medications Reviewed:  No current outpatient medications on file.     Allergies Reviewed:  No Known Allergies     History:  Reviewed:  Past Medical History:    Anesthesia complication    cramps;nausea after anesthesia when in 3rd grade    Anxiety state    Duodenitis    Pancreatitis (HCC)    PONV (postoperative nausea and vomiting)      Reviewed:  Past Surgical History:   Procedure Laterality Date    Biopsy/removal, lymph node(s)      R neck    Egd N/A 2024    ; duodenitis,hiatal hernia,previous gastric jejunostomy    Gastrostomy/jejunostomy tube N/A 2023      Reviewed Social History:  Social History     Socioeconomic History    Marital status:    Tobacco Use    Smoking status: Every Day     Current packs/day: 0.00     Average packs/day: 0.5 packs/day for 25.0 years (12.5 ttl pk-yrs)     Types: Cigarettes     Start date: 1998     Last attempt to quit: 2023     Years since quittin.9    Smokeless tobacco: Never    Tobacco comments:     0.05 cigarettes a day, working on quitting   Vaping Use    Vaping status: Never Used   Substance and Sexual Activity    Alcohol use: Not Currently     Comment: not since 2023 (Sober 1 year)    Drug use: Not Currently     Types: Cannabis     Comment: rare    Sexual activity: Not Currently      Reviewed:  Family History   Problem Relation Age of Onset    Hypertension Mother     Other (Early onset Dementia) Mother     Other (recurrent falls) Mother     Hypertension  Father     No Known Problems Brother     Dementia Maternal Grandmother     Other (Alzehimers) Maternal Grandmother     Other (Cardiac arrythmia) Paternal Grandmother     No Known Problems Paternal Grandfather         Review of Systems:  Review of Systems   Constitutional:  Negative for activity change, appetite change, chills, fatigue, fever and unexpected weight change.   HENT:  Negative for mouth sores, nosebleeds and trouble swallowing.    Eyes:  Negative for discharge and redness.   Respiratory:  Negative for cough, shortness of breath and wheezing.    Cardiovascular:  Negative for chest pain.   Gastrointestinal:  Positive for abdominal distention and abdominal pain. Negative for blood in stool, nausea and vomiting.   Genitourinary:  Negative for difficulty urinating and dysuria.   Musculoskeletal:  Negative for back pain and gait problem.   Skin:  Negative for color change.   Allergic/Immunologic: Negative for immunocompromised state.   Neurological:  Negative for speech difficulty, weakness and numbness.   Psychiatric/Behavioral:  Negative for confusion.         Physical Examination:  Physical Exam  Constitutional:       General: He is not in acute distress.     Appearance: He is well-developed. He is not diaphoretic.   HENT:      Head: Normocephalic and atraumatic.   Eyes:      General:         Right eye: No discharge.         Left eye: No discharge.      Conjunctiva/sclera: Conjunctivae normal.      Pupils: Pupils are equal, round, and reactive to light.   Neck:      Thyroid: No thyromegaly.   Cardiovascular:      Rate and Rhythm: Normal rate and regular rhythm.      Heart sounds: No murmur heard.  Pulmonary:      Effort: No respiratory distress.      Breath sounds: Normal breath sounds. No wheezing.   Abdominal:      General: Bowel sounds are normal. There is no distension.      Palpations: Abdomen is soft.      Tenderness: There is abdominal tenderness. Positive signs include Nielson's sign.      Hernia: No  hernia is present.   Musculoskeletal:         General: Normal range of motion.   Skin:     General: Skin is warm and dry.   Neurological:      Mental Status: He is alert and oriented to person, place, and time.        CT ABDOMEN+PELVIS(CONTRAST ONLY)(CPT=74177)    Result Date: 4/12/2024  CONCLUSION:   1. New marked inflammation of the gallbladder and intrahepatic peribiliary edema.  2. Increased fluid and inflammation around the pancreatic head, gastric pylorus, and duodenum.  Stable peripancreatic cyst at the pancreaticoduodenal groove.  No evidence of perforation.  3. Stable nonocclusive thrombus in the splenic vein near the confluence.  Previously noted thrombus in the main portal vein has resolved.  Stable thrombosis of the anterior division of the right portal vein.  4. Additional chronic or incidental findings are described in the body of this report.     Dictated by (CST): Hudson Ambrose MD on 4/12/2024 at 3:36 PM     Finalized by (CST): Hudson Ambrose MD on 4/12/2024 at 3:45 PM            Recent Labs   Lab 04/12/24  1308   RBC 4.61   HGB 14.2   HCT 42.4   MCV 92.0   MCH 30.8   MCHC 33.5   RDW 13.2   NEPRELIM 12.67*   WBC 14.8*   .0       Recent Labs   Lab 04/12/24  1308   *   BUN 10   CREATSERUM 0.73   EGFRCR 112   CA 9.3   ALB 4.3      K 4.2      CO2 28.0   ALKPHO 283*   *   *   BILT 0.9   TP 6.8         Assessment / Plan:  Chronic pancreatitis, thought to be alcohol related, complicated by gastric outlet obstruction for which patient underwent gastrojejunostomy in the past now presenting with abdominal pain and clinical picture of acute cholecystitis.  Appreciate recommendations from multidisciplinary team, await final results of MRCP  Continue to hold anticoagulation  Will discuss with Dr. Garcias on Monday, will likely need cholecystectomy    Sanjay Avila MD  [facility]  [facility], [facility]  [email or web address]       Follow  Up:  No follow-ups on file.       Electronically Signed by: No name on file

## 2024-04-13 NOTE — PLAN OF CARE
Problem: Patient Centered Care  Goal: Patient preferences are identified and integrated in the patient's plan of care  Description: Interventions:  - What would you like us to know as we care for you? Lives at home alone  - Provide timely, complete, and accurate information to patient/family  - Incorporate patient and family knowledge, values, beliefs, and cultural backgrounds into the planning and delivery of care  - Encourage patient/family to participate in care and decision-making at the level they choose  - Honor patient and family perspectives and choices  Outcome: Progressing     Problem: PAIN - ADULT  Goal: Verbalizes/displays adequate comfort level or patient's stated pain goal  Description: INTERVENTIONS:  - Encourage pt to monitor pain and request assistance  - Assess pain using appropriate pain scale  - Administer analgesics based on type and severity of pain and evaluate response  - Implement non-pharmacological measures as appropriate and evaluate response  - Consider cultural and social influences on pain and pain management  - Manage/alleviate anxiety  - Utilize distraction and/or relaxation techniques  - Monitor for opioid side effects  - Notify MD/LIP if interventions unsuccessful or patient reports new pain  - Anticipate increased pain with activity and pre-medicate as appropriate  Outcome: Progressing     Problem: RISK FOR INFECTION - ADULT  Goal: Absence of fever/infection during anticipated neutropenic period  Description: INTERVENTIONS  - Monitor WBC  - Administer growth factors as ordered  - Implement neutropenic guidelines  Outcome: Progressing     Problem: SAFETY ADULT - FALL  Goal: Free from fall injury  Description: INTERVENTIONS:  - Assess pt frequently for physical needs  - Identify cognitive and physical deficits and behaviors that affect risk of falls.  - [place] fall precautions as indicated by assessment.  - Educate pt/family on patient safety including physical limitations  -  Instruct pt to call for assistance with activity based on assessment  - Modify environment to reduce risk of injury  - Provide assistive devices as appropriate  - Consider OT/PT consult to assist with strengthening/mobility  - Encourage toileting schedule  Outcome: Not Progressing     Problem: DISCHARGE PLANNING  Goal: Discharge to home or other facility with appropriate resources  Description: INTERVENTIONS:  - Identify barriers to discharge w/pt and caregiver  - Include patient/family/discharge partner in discharge planning  - Arrange for needed discharge resources and transportation as appropriate  - Identify discharge learning needs (meds, wound care, etc)  - Arrange for interpreters to assist at discharge as needed  - Consider post-discharge preferences of patient/family/discharge partner  - Complete POLST form as appropriate  - Assess patient's ability to be responsible for managing their own health  - Refer to Case Management Department for coordinating discharge planning if the patient needs post-hospital services based on physician/LIP order or complex needs related to functional status, cognitive ability or social support system  Outcome: Progressing     Problem: GASTROINTESTINAL - ADULT  Goal: Minimal or absence of nausea and vomiting  Description: INTERVENTIONS:  - Maintain adequate hydration with IV or PO as ordered and tolerated  - Nasogastric tube to low intermittent suction as ordered  - Evaluate effectiveness of ordered antiemetic medications  - Provide nonpharmacologic comfort measures as appropriate  - Advance diet as tolerated, if ordered  - Obtain nutritional consult as needed  - Evaluate fluid balance  Outcome: Progressing  Goal: Maintains or returns to baseline bowel function  Description: INTERVENTIONS:  - Assess bowel function  - Maintain adequate hydration with IV or PO as ordered and tolerated  - Evaluate effectiveness of GI medications  - Encourage mobilization and activity  - Obtain  nutritional consult as needed  - Establish a toileting routine/schedule  - Consider collaborating with pharmacy to review patient's medication profile  Outcome: Not Progressing

## 2024-04-13 NOTE — PROGRESS NOTES
[facility]  part of [facility]    Progress Note    Jarrett Bal Patient Status:  Inpatient    1975 MRN [id number]   Location [facility] 1W Attending Steph Mckeon MD   Hosp Day # 1 PCP None Pcp       Subjective:   Jarrett Bal who feels ok. Tolerating a CLD. No nausea or vomiting.     Objective:   Blood pressure 106/75, pulse 69, temperature 98.2 °F (36.8 °C), temperature source Oral, resp. rate 18, height 5' 8\" (1.727 m), weight 123 lb 6.4 oz (56 kg), SpO2 100%.    Physical Exam:    General: No acute distress.   Respiratory: Clear to auscultation bilaterally. No wheezes. No rhonchi.  Cardiovascular: S1, S2. Regular rate and rhythm. No murmurs, rubs or gallops.   Abdomen: Soft, nontender, nondistended.  Positive bowel sounds. No rebound or guarding.  Neurologic: No focal neurological deficits.   Musculoskeletal: Moves all extremities.  Extremities: No edema.    Results:     Lab Results   Component Value Date    WBC 4.7 2024    HGB 12.2 (L) 2024    HCT 35.0 (L) 2024    .0 2024    CREATSERUM 0.75 2024    BUN <5 (L) 2024     2024    K 3.6 2024     2024    CO2 29.0 2024    GLU 89 2024    CA 8.6 (L) 2024    ALB 3.5 2024    ALKPHO 286 (H) 2024    BILT 1.1 2024    TP 5.5 (L) 2024     (H) 2024     (H) 2024    PTT 27.8 2018    INR 0.98 2024    TSH 1.999 2024    OLAYINKA 139 (H) 2023     (H) 2024    CRP 1.40 (H) 2024    MG 1.9 2024    PHOS 4.5 2024    ETOH <3 2024       MRI ABDOMEN AND MRCP W/3D (CPT=74181/55438)    Result Date: 2024  CONCLUSION:  1. Marked circumferential wall thickening at the C-loop of the duodenum.  There is extensive surrounding edema/infiltration, which extends to the pancreaticoduodenal groove.  Also identified is moderate volume peripancreatic and periduodenal free  fluid.  Multiple loculations of fluid are also noted in the right upper quadrant, which include a dominant 6 x 2.5 cm loculation interposed between the gallbladder and C-loop of the duodenum.  These fluid collections are unchanged since previous day abdominal CT, but new or significantly increased relative to abdominal CT from March, 2024. 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  Worsening fluid collections could relate to micro perforation, although there was no free intraperitoneal air on previous day abdominal CT to suggest yadira perforation.  EGD correlation should be considered. 2. Mild likely reactive gallbladder wall thickening and pericholecystic edema.  There is also trace perihepatic ascites.  No MRCP evidence of cholelithiasis.  As there is no gallbladder luminal distention, acute cholecystitis is unlikely. 3. Mild 9 mm dilation of the common bile duct with additional mild central intrahepatic biliary ductal dilation.  Common bile duct demonstrates smooth distal tapering.  No MRCP evidence of choledocholithiasis.  Nonspecific mild 4 mm prominence of the main pancreatic duct at the level of the pancreatic neck and body. 4. Nonspecific edema at the hepatic hilum, which may be reactive.  Known chronic portal vein thrombosis is not well assessed on this noncontrast exam. 5. Prominent but nonenlarged right cardiophrenic lymph node is likely reactive.    A preliminary report was issued by the [facility] teleradiology service. There are no major discrepancies.  elm-remote  Dictated by (CST): Yaya Watkins MD on 4/13/2024 at 10:24 AM     Finalized by (CST): Yaya Watkins MD on 4/13/2024 at 10:41 AM          CT ABDOMEN+PELVIS(CONTRAST ONLY)(CPT=74177)    Result Date: 4/12/2024  CONCLUSION:   1. New marked inflammation of the gallbladder and intrahepatic peribiliary edema.  2. Increased fluid  and inflammation around the pancreatic head, gastric pylorus, and duodenum.  Stable peripancreatic cyst at the pancreaticoduodenal groove.  No evidence of perforation.  3. Stable nonocclusive thrombus in the splenic vein near the confluence.  Previously noted thrombus in the main portal vein has resolved.  Stable thrombosis of the anterior division of the right portal vein.  4. Additional chronic or incidental findings are described in the body of this report.     Dictated by (CST): Hudson Ambrose MD on 4/12/2024 at 3:36 PM     Finalized by (CST): Hudson Ambrose MD on 4/12/2024 at 3:45 PM               Assessment and Plan:     Acute pancreatitis, unspecified complication status, unspecified pancreatitis type (HCC)  Chronic  Pain control  CLD       Transaminitis  Trending down  Follow LFTs daily       Acute cholecystitis  Follow labs daily  Pain control  IV antibiotics  CLD  Surgery planned for tomorrow       Quality:  DVT Prophylaxis: heparin  CODE status: Full    MDM High. Time spent on chart/note review, review of labs/imaging, discussion with patient, physical exam, discussion with staff, consultants, coordinating care, writing progress note, and discussion of plan of care.       BISHOP KRAUS MD  04/13/24

## 2024-04-14 LAB
ALBUMIN SERPL-MCNC: 3.2 G/DL (ref 3.2–4.8)
ALBUMIN/GLOB SERPL: 1.5 {RATIO} (ref 1–2)
ALP LIVER SERPL-CCNC: 263 U/L
ALT SERPL-CCNC: 89 U/L
ANION GAP SERPL CALC-SCNC: 5 MMOL/L (ref 0–18)
AST SERPL-CCNC: 48 U/L (ref ?–34)
BILIRUB SERPL-MCNC: 0.5 MG/DL (ref 0.3–1.2)
BUN BLD-MCNC: <5 MG/DL (ref 9–23)
CALCIUM BLD-MCNC: 8.7 MG/DL (ref 8.7–10.4)
CHLORIDE SERPL-SCNC: 112 MMOL/L (ref 98–112)
CO2 SERPL-SCNC: 26 MMOL/L (ref 21–32)
CREAT BLD-MCNC: 0.72 MG/DL
DEPRECATED RDW RBC AUTO: 44.4 FL (ref 35.1–46.3)
EGFRCR SERPLBLD CKD-EPI 2021: 113 ML/MIN/1.73M2 (ref 60–?)
ERYTHROCYTE [DISTWIDTH] IN BLOOD BY AUTOMATED COUNT: 13.1 % (ref 11–15)
GLOBULIN PLAS-MCNC: 2.2 G/DL (ref 2.8–4.4)
GLUCOSE BLD-MCNC: 86 MG/DL (ref 70–99)
HCT VFR BLD AUTO: 36.6 %
HGB BLD-MCNC: 12.5 G/DL
MCH RBC QN AUTO: 31.6 PG (ref 26–34)
MCHC RBC AUTO-ENTMCNC: 34.2 G/DL (ref 31–37)
MCV RBC AUTO: 92.7 FL
PLATELET # BLD AUTO: 290 10(3)UL (ref 150–450)
POTASSIUM SERPL-SCNC: 4.2 MMOL/L (ref 3.5–5.1)
PROT SERPL-MCNC: 5.4 G/DL (ref 5.7–8.2)
RBC # BLD AUTO: 3.95 X10(6)UL
SODIUM SERPL-SCNC: 143 MMOL/L (ref 136–145)
WBC # BLD AUTO: 5.1 X10(3) UL (ref 4–11)

## 2024-04-14 PROCEDURE — 99233 SBSQ HOSP IP/OBS HIGH 50: CPT | Performed by: HOSPITALIST

## 2024-04-14 NOTE — PROGRESS NOTES
Surgical Oncology Inpatient Progress Note    Subjective:  VSS  Feels better    Objective:  Temp:  [98.2 °F (36.8 °C)-98.5 °F (36.9 °C)] 98.5 °F (36.9 °C)  Pulse:  [62-72] 62  Resp:  [18] 18  BP: ()/(67-75) 94/71  SpO2:  [100 %] 100 %    Intake/Output:      Intake/Output Summary (Last 24 hours) at 4/14/2024 0708  Last data filed at 4/13/2024 2200  Gross per 24 hour   Intake 2800 ml   Output 0 ml   Net 2800 ml       Wt Readings from Last 6 Encounters:   04/12/24 56 kg (123 lb 6.4 oz)   03/07/24 55.8 kg (123 lb)   03/03/24 57.1 kg (125 lb 12.8 oz)   02/17/24 55.2 kg (121 lb 12.8 oz)   12/28/23 59 kg (130 lb)   12/20/23 58.1 kg (128 lb 1.6 oz)       Allergies:    No Known Allergies    Labs:  Recent Labs   Lab 04/12/24  1308 04/13/24  0750 04/14/24  0546   RBC 4.61 3.76* 3.95*   HGB 14.2 12.2* 12.5*   HCT 42.4 35.0* 36.6*   MCV 92.0 93.1 92.7   MCH 30.8 32.4 31.6   MCHC 33.5 34.9 34.2   RDW 13.2 13.2 13.1   NEPRELIM 12.67* 2.83  --    WBC 14.8* 4.7 5.1   .0 265.0 290.0       Recent Labs   Lab 04/12/24  1308 04/13/24  0750 04/14/24  0546   * 89 86   BUN 10 <5* <5*   CREATSERUM 0.73 0.75 0.72   CA 9.3 8.6* 8.7    136 143   K 4.2 3.6 4.2    111 112   CO2 28.0 29.0 26.0         Physical Exam:  General: NAD  Lungs: CTA bilat  Heart: RRR, S1, S2  Abdomen: soft    MRI ABDOMEN AND MRCP W/3D (CPT=74181/75476)    Result Date: 4/13/2024  CONCLUSION:  1. Marked circumferential wall thickening at the C-loop of the duodenum.  There is extensive surrounding edema/infiltration, which extends to the pancreaticoduodenal groove.  Also identified is moderate volume peripancreatic and periduodenal free fluid.  Multiple loculations of fluid are also noted in the right upper quadrant, which include a dominant 6 x 2.5 cm loculation interposed between the gallbladder and C-loop of the duodenum.  These fluid collections are unchanged since previous day abdominal CT, but new or significantly increased relative to  abdominal CT from March, 2024. 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  Worsening fluid collections could relate to micro perforation, although there was no free intraperitoneal air on previous day abdominal CT to suggest yadira perforation.  EGD correlation should be considered. 2. Mild likely reactive gallbladder wall thickening and pericholecystic edema.  There is also trace perihepatic ascites.  No MRCP evidence of cholelithiasis.  As there is no gallbladder luminal distention, acute cholecystitis is unlikely. 3. Mild 9 mm dilation of the common bile duct with additional mild central intrahepatic biliary ductal dilation.  Common bile duct demonstrates smooth distal tapering.  No MRCP evidence of choledocholithiasis.  Nonspecific mild 4 mm prominence of the main pancreatic duct at the level of the pancreatic neck and body. 4. Nonspecific edema at the hepatic hilum, which may be reactive.  Known chronic portal vein thrombosis is not well assessed on this noncontrast exam. 5. Prominent but nonenlarged right cardiophrenic lymph node is likely reactive.    A preliminary report was issued by the [facility] teleradiology service. There are no major discrepancies.  elm-remote  Dictated by (CST): Yaya Watkins MD on 4/13/2024 at 10:24 AM     Finalized by (CST): Yaya Watkins MD on 4/13/2024 at 10:41 AM          CT ABDOMEN+PELVIS(CONTRAST ONLY)(CPT=74177)    Result Date: 4/12/2024  CONCLUSION:   1. New marked inflammation of the gallbladder and intrahepatic peribiliary edema.  2. Increased fluid and inflammation around the pancreatic head, gastric pylorus, and duodenum.  Stable peripancreatic cyst at the pancreaticoduodenal groove.  No evidence of perforation.  3. Stable nonocclusive thrombus in the splenic vein near the confluence.  Previously noted thrombus in the main portal vein has resolved.   Stable thrombosis of the anterior division of the right portal vein.  4. Additional chronic or incidental findings are described in the body of this report.     Dictated by (CST): Hudson Ambrose MD on 4/12/2024 at 3:36 PM     Finalized by (CST): Hudson Ambrose MD on 4/12/2024 at 3:45 PM            Assessment/Plan:  Continue to hold eliquis  May be advanced to low fiber  IV antibiotics  NPO at midnight  Possible cholecystectomy per dr leticia Avila MD  [facility]  [facility]  Pager [phone]  [email or web address]

## 2024-04-14 NOTE — PROGRESS NOTES
[facility]  GI Progress Note      Jarrett Bal Patient Status:  Inpatient    1975 MRN [id number]   Location [facility] 1W Attending Steph Mckeon MD   Hosp Day # 2 PCP None Pcp          SUBJECTIVE:     Abdominal pain is almost completely resolved.    OBJECTIVE:    Height: --  Weight: --  BSA (Calculated - sq m): --  Pulse: 63 (731)  BP: 92/66 (731)  Temp: 97.7 °F (36.5 °C) (731)  Do Not Use - Resp Rate: --  SpO2: 99 % (731)        General: Alert, oriented, no acute distress  Lungs: Clear  Heart: Normal S1 and S2  Abdomen: Soft, Non-tender. Bowel sounds are positive. Non-distended  Extremities: No edema    LAB RESULTS:       Lab Results   Component Value Date    WBC 5.1 2024    HGB 12.5 2024    HCT 36.6 2024    .0 2024    CREATSERUM 0.72 2024    BUN <5 2024     2024    K 4.2 2024     2024    CO2 26.0 2024    GLU 86 2024    CA 8.7 2024    ALB 3.2 2024    ALKPHO 263 2024    BILT 0.5 2024    TP 5.4 2024    AST 48 2024    ALT 89 2024        RADIOLOGY RESULTS:    PROCEDURE:MRI ABDOMEN&MRCP W/3D (CPT=74181/28685)               MRCP       COMPARISON: [facility], CT ABDOMEN+PELVIS BARIATRIC PROTOCOL (CONTRAST ONLY) (CPT=74177), 2024, 4:14 PM.  [facility], CT ABDOMEN + PELVIS (CONTRAST ONLY) (CPT=74177), 2024, 10:44 AM.  [facility],   CT ABDOMEN + PELVIS (CONTRAST ONLY) (CPT=74177), 3/02/2024, 9:35 AM.  [facility], CT ABDOMEN + PELVIS (CONTRAST ONLY) (CPT=74177), 2024, 3:12 PM.     INDICATIONS:cholecystitis, obstructive biliary disease     TECHNIQUE:    A comprehensive examination was performed utilizing a variety of imaging planes and imaging parameters to optimize visualization of suspected pathology.  Images were obtained without the infusion of intravenous  gadolinium-based contrast agent.                  Magnetic resonance cholangiopancreatography was also performed for delineation and assessment of the pancreaticobiliary tree.     MRCP FINDINGS:    COMMENT:       Evaluation of the vasculature and of the abdominal viscera for the presence of intraparenchymal pathology is suboptimal in the absence of contrast infusion.  GALLBLADDER:            No gallstones.  Mild pericholecystic edema, possibly reactive as there is no gallbladder luminal distention.  BILE DUCTS:    Mild 9 mm dilation of the common bile duct.  Common bile duct demonstrates smooth distal tapering.  There is mild central intrahepatic biliary ductal dilation.  No MRCP evidence of choledocholithiasis.  PANCREAS:      There is peripancreatic edema, which predominantly involves the pancreatic head and pancreaticoduodenal groove.  Mild 4 mm prominence of the main pancreatic duct.  Marked wall thickening at the C-loop of the duodenum adjacent to the pancreatic   groove.  Moderate volume peripancreatic and Cara duodenal free fluid, which includes a dominant 6 x 2.5 cm loculation of fluid between the C-loop of the duodenum and the gallbladder.  Smaller approximate 1.1 cm cystic collection at the   pancreaticoduodenal groove is unchanged over serial prior abdominal CT exams.     OTHER FINDINGS:          LUNG BASES:Prominent but nonenlarged right cardiophrenic lymph node, which may be reactive. There is dependent subsegmental atelectasis bilaterally.  LIVER:  No focal hepatic mass is seen.  Edema at the hepatic hilum may be reactive.  SPLEEN:           No enlargement.    ADRENALS:      Unremarkable, without focal mass or enlargement.    KIDNEYS:          No hydronephrosis or solid masses are apparent.    VASCULATURE:            Suboptimally assessed within the parameters of this noncontrast exam.  Note that known chronic portal vein thrombosis is not well assessed on this noncontrast exam.  LYMPH NODES:             No lymphadenopathy is detected.    ABDOMINAL WALL:      Unremarkable.    BONES:             Suboptimally assessed by scan protocol, but without grossly apparent bony lesion or fracture.    OTHER:             There is small volume perihepatic free fluid.  Partially imaged left upper quadrant gastrojejunostomy.       =====  CONCLUSION:   1. Marked circumferential wall thickening at the C-loop of the duodenum.  There is extensive surrounding edema/infiltration, which extends to the pancreaticoduodenal groove.  Also identified is moderate volume peripancreatic and periduodenal free fluid.    Multiple loculations of fluid are also noted in the right upper quadrant, which include a dominant 6 x 2.5 cm loculation interposed between the gallbladder and C-loop of the duodenum.  These fluid collections are unchanged since previous day abdominal   CT, but new or significantly increased relative to abdominal CT from March, 2024. 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  Worsening fluid   collections could relate to micro perforation, although there was no free intraperitoneal air on previous day abdominal CT to suggest yadira perforation.  EGD correlation should be considered.  2. Mild likely reactive gallbladder wall thickening and pericholecystic edema.  There is also trace perihepatic ascites.  No MRCP evidence of cholelithiasis.  As there is no gallbladder luminal distention, acute cholecystitis is unlikely.  3. Mild 9 mm dilation of the common bile duct with additional mild central intrahepatic biliary ductal dilation.  Common bile duct demonstrates smooth distal tapering.  No MRCP evidence of choledocholithiasis.  Nonspecific mild 4 mm prominence of the   main pancreatic duct at the level of the pancreatic neck and body.  4. Nonspecific edema at the hepatic hilum, which may be reactive.  Known chronic portal vein  thrombosis is not well assessed on this noncontrast exam.  5. Prominent but nonenlarged right cardiophrenic lymph node is likely reactive.          A preliminary report was issued by the [facility] teleradiology service. There are no major discrepancies.     elm-remote     Dictated by (CST): Yaya Watkins MD on 4/13/2024 at 10:24 AM       Finalized by (CST): Yaya Watkins MD on 4/13/2024 at 10:41 AM      ASSESSMENT:    Acute cholecystitis  Elevated liver enzymes  Chronic groove pancreatitis  Portal vein/splenic vein thrombosis  History of gastric outlet obstruction - s/p gastrojejunostomy     The patient is much better. Pain has resolved. His clinical presentation is consistent with acute cholecystitis.  The changes noted on MRI in the area of the duodenum are likely related to his chronic groove pancreatitis. I doubt a perforated ulcer.     Alk phos is unchanged. Transaminases are improving. Bili is normal.  I suspect this is secondary to cholecystitis.  His common bile duct is dilated but this is chronic and unchanged from previous.  The biliary ductal dilation is most likely secondary to his chronic groove pancreatitis. The CBD on MRI shows an 8 mm CBD with smooth tapering distally. No CBD stones are seen. The fluid around his pancreas is likely related to his groove pancreatitis.  I cannot exclude acute pancreatitis. However, his pain is different than the pain he has had with pancreatitis.      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  He is not using alcohol at this time but continues to smoke.  He has had multiple episodes of pancreatitis.  This has been complicated by the development of a gastric outlet obstruction requiring gastrojejunostomy and portal and splenic vein thromboses.  He has been on anticoagulation since March.  CT scan today shows resolution of the portal vein thrombosis.  Thrombosis is still noted in the splenic vein as well as the  anterior division of the right portal vein.    PLAN:    Management of cholecystitis per surgery.  Continue antibiotics.  Hold Eliquis.  Will continue to follow.     Linden Poe MD

## 2024-04-14 NOTE — PROGRESS NOTES
[facility]  part of [facility]    Progress Note    Jarrett Bal Patient Status:  Inpatient    1975 MRN [id number]   Location [facility] 1W Attending Steph Mckeon MD   Hosp Day # 2 PCP None Pcp       Subjective:   Jarrett Bal is tolerating his diet. No abdominal pain.     Objective:   Blood pressure 102/71, pulse 73, temperature 98.4 °F (36.9 °C), temperature source Oral, resp. rate 18, height 5' 8\" (1.727 m), weight 123 lb 6.4 oz (56 kg), SpO2 98%.    Physical Exam:    General: No acute distress.   Respiratory: Clear to auscultation bilaterally. No wheezes. No rhonchi.  Cardiovascular: S1, S2. Regular rate and rhythm. No murmurs, rubs or gallops.   Abdomen: Soft, nontender, nondistended.  Positive bowel sounds. No rebound or guarding.  Neurologic: No focal neurological deficits.   Musculoskeletal: Moves all extremities.  Extremities: No edema.    Results:     Lab Results   Component Value Date    WBC 5.1 2024    HGB 12.5 (L) 2024    HCT 36.6 (L) 2024    .0 2024    CREATSERUM 0.72 2024    BUN <5 (L) 2024     2024    K 4.2 2024     2024    CO2 26.0 2024    GLU 86 2024    CA 8.7 2024    ALB 3.2 2024    ALKPHO 263 (H) 2024    BILT 0.5 2024    TP 5.4 (L) 2024    AST 48 (H) 2024    ALT 89 (H) 2024    PTT 27.8 2018    INR 0.98 2024    TSH 1.999 2024    OLAYINKA 139 (H) 2023     (H) 2024    CRP 1.40 (H) 2024    MG 1.9 2024    PHOS 4.5 2024    ETOH <3 2024       MRI ABDOMEN AND MRCP W/3D (CPT=74181/51916)    Result Date: 2024  CONCLUSION:  1. Marked circumferential wall thickening at the C-loop of the duodenum.  There is extensive surrounding edema/infiltration, which extends to the pancreaticoduodenal groove.  Also identified is moderate volume peripancreatic and periduodenal free fluid.  Multiple  loculations of fluid are also noted in the right upper quadrant, which include a dominant 6 x 2.5 cm loculation interposed between the gallbladder and C-loop of the duodenum.  These fluid collections are unchanged since previous day abdominal CT, but new or significantly increased relative to abdominal CT from March, 2024. 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  Worsening fluid collections could relate to micro perforation, although there was no free intraperitoneal air on previous day abdominal CT to suggest yadira perforation.  EGD correlation should be considered. 2. Mild likely reactive gallbladder wall thickening and pericholecystic edema.  There is also trace perihepatic ascites.  No MRCP evidence of cholelithiasis.  As there is no gallbladder luminal distention, acute cholecystitis is unlikely. 3. Mild 9 mm dilation of the common bile duct with additional mild central intrahepatic biliary ductal dilation.  Common bile duct demonstrates smooth distal tapering.  No MRCP evidence of choledocholithiasis.  Nonspecific mild 4 mm prominence of the main pancreatic duct at the level of the pancreatic neck and body. 4. Nonspecific edema at the hepatic hilum, which may be reactive.  Known chronic portal vein thrombosis is not well assessed on this noncontrast exam. 5. Prominent but nonenlarged right cardiophrenic lymph node is likely reactive.    A preliminary report was issued by the [facility] teleradiology service. There are no major discrepancies.  [id number]  Dictated by (CST): Yaya Watkins MD on 4/13/2024 at 10:24 AM     Finalized by (CST): Yaya Watkins MD on 4/13/2024 at 10:41 AM          CT ABDOMEN+PELVIS(CONTRAST ONLY)(CPT=74177)    Result Date: 4/12/2024  CONCLUSION:   1. New marked inflammation of the gallbladder and intrahepatic peribiliary edema.  2. Increased fluid and inflammation  around the pancreatic head, gastric pylorus, and duodenum.  Stable peripancreatic cyst at the pancreaticoduodenal groove.  No evidence of perforation.  3. Stable nonocclusive thrombus in the splenic vein near the confluence.  Previously noted thrombus in the main portal vein has resolved.  Stable thrombosis of the anterior division of the right portal vein.  4. Additional chronic or incidental findings are described in the body of this report.     Dictated by (CST): Hudson Ambrose MD on 4/12/2024 at 3:36 PM     Finalized by (CST): Hudson Ambrose MD on 4/12/2024 at 3:45 PM               Assessment and Plan:     Acute pancreatitis, unspecified complication status, unspecified pancreatitis type (HCC)  Chronic  Pain control  Soft diet       Transaminitis  Trending down  Follow LFTs daily       Acute cholecystitis  Follow labs daily  Pain control  IV antibiotics  Soft diet  NPO after midnight   Surgery planned for tomorrow       Quality:  DVT Prophylaxis: heparin  CODE status: Full    MDM High. Time spent on chart/note review, review of labs/imaging, discussion with patient, physical exam, discussion with staff, consultants, coordinating care, writing progress note, and discussion of plan of care.       BISHOP KRAUS MD  04/14/24

## 2024-04-14 NOTE — PLAN OF CARE
Started on Low fat/soft diet, tolerating well. NPO after midnight for possible surgery tomorrow. IVF infusing. Call light within reach and bed in low position.  Problem: Patient Centered Care  Goal: Patient preferences are identified and integrated in the patient's plan of care  Description: Interventions:  - What would you like us to know as we care for you? Lives at home alone  - Provide timely, complete, and accurate information to patient/family  - Incorporate patient and family knowledge, values, beliefs, and cultural backgrounds into the planning and delivery of care  - Encourage patient/family to participate in care and decision-making at the level they choose  - Honor patient and family perspectives and choices  Outcome: Progressing     Problem: PAIN - ADULT  Goal: Verbalizes/displays adequate comfort level or patient's stated pain goal  Description: INTERVENTIONS:  - Encourage pt to monitor pain and request assistance  - Assess pain using appropriate pain scale  - Administer analgesics based on type and severity of pain and evaluate response  - Implement non-pharmacological measures as appropriate and evaluate response  - Consider cultural and social influences on pain and pain management  - Manage/alleviate anxiety  - Utilize distraction and/or relaxation techniques  - Monitor for opioid side effects  - Notify MD/LIP if interventions unsuccessful or patient reports new pain  - Anticipate increased pain with activity and pre-medicate as appropriate  Outcome: Progressing     Problem: RISK FOR INFECTION - ADULT  Goal: Absence of fever/infection during anticipated neutropenic period  Description: INTERVENTIONS  - Monitor WBC  - Administer growth factors as ordered  - Implement neutropenic guidelines  Outcome: Progressing     Problem: SAFETY ADULT - FALL  Goal: Free from fall injury  Description: INTERVENTIONS:  - Assess pt frequently for physical needs  - Identify cognitive and physical deficits and behaviors  that affect risk of falls.  - [place] fall precautions as indicated by assessment.  - Educate pt/family on patient safety including physical limitations  - Instruct pt to call for assistance with activity based on assessment  - Modify environment to reduce risk of injury  - Provide assistive devices as appropriate  - Consider OT/PT consult to assist with strengthening/mobility  - Encourage toileting schedule  Outcome: Progressing     Problem: DISCHARGE PLANNING  Goal: Discharge to home or other facility with appropriate resources  Description: INTERVENTIONS:  - Identify barriers to discharge w/pt and caregiver  - Include patient/family/discharge partner in discharge planning  - Arrange for needed discharge resources and transportation as appropriate  - Identify discharge learning needs (meds, wound care, etc)  - Arrange for interpreters to assist at discharge as needed  - Consider post-discharge preferences of patient/family/discharge partner  - Complete POLST form as appropriate  - Assess patient's ability to be responsible for managing their own health  - Refer to Case Management Department for coordinating discharge planning if the patient needs post-hospital services based on physician/LIP order or complex needs related to functional status, cognitive ability or social support system  Outcome: Progressing     Problem: GASTROINTESTINAL - ADULT  Goal: Minimal or absence of nausea and vomiting  Description: INTERVENTIONS:  - Maintain adequate hydration with IV or PO as ordered and tolerated  - Nasogastric tube to low intermittent suction as ordered  - Evaluate effectiveness of ordered antiemetic medications  - Provide nonpharmacologic comfort measures as appropriate  - Advance diet as tolerated, if ordered  - Obtain nutritional consult as needed  - Evaluate fluid balance  Outcome: Progressing  Goal: Maintains or returns to baseline bowel function  Description: INTERVENTIONS:  - Assess bowel function  - Maintain  adequate hydration with IV or PO as ordered and tolerated  - Evaluate effectiveness of GI medications  - Encourage mobilization and activity  - Obtain nutritional consult as needed  - Establish a toileting routine/schedule  - Consider collaborating with pharmacy to review patient's medication profile  Outcome: Progressing

## 2024-04-15 ENCOUNTER — APPOINTMENT (OUTPATIENT)
Dept: PICC SERVICES | Facility: HOSPITAL | Age: 49
End: 2024-04-15
Attending: HOSPITALIST
Payer: COMMERCIAL

## 2024-04-15 ENCOUNTER — APPOINTMENT (OUTPATIENT)
Dept: PICC SERVICES | Facility: HOSPITAL | Age: 49
DRG: 438 | End: 2024-04-15
Attending: HOSPITALIST
Payer: COMMERCIAL

## 2024-04-15 ENCOUNTER — APPOINTMENT (OUTPATIENT)
Dept: ULTRASOUND IMAGING | Facility: HOSPITAL | Age: 49
End: 2024-04-15
Attending: SPECIALIST
Payer: COMMERCIAL

## 2024-04-15 ENCOUNTER — APPOINTMENT (OUTPATIENT)
Dept: NUCLEAR MEDICINE | Facility: HOSPITAL | Age: 49
End: 2024-04-15
Attending: SPECIALIST
Payer: COMMERCIAL

## 2024-04-15 ENCOUNTER — APPOINTMENT (OUTPATIENT)
Dept: ULTRASOUND IMAGING | Facility: HOSPITAL | Age: 49
DRG: 438 | End: 2024-04-15
Attending: SPECIALIST
Payer: COMMERCIAL

## 2024-04-15 ENCOUNTER — APPOINTMENT (OUTPATIENT)
Dept: NUCLEAR MEDICINE | Facility: HOSPITAL | Age: 49
DRG: 438 | End: 2024-04-15
Attending: SPECIALIST
Payer: COMMERCIAL

## 2024-04-15 LAB
ALBUMIN SERPL-MCNC: 3.4 G/DL (ref 3.2–4.8)
ALBUMIN/GLOB SERPL: 1.6 {RATIO} (ref 1–2)
ALP LIVER SERPL-CCNC: 316 U/L
ALT SERPL-CCNC: 71 U/L
AMYLASE SERPL-CCNC: 153 U/L (ref 30–118)
ANION GAP SERPL CALC-SCNC: 1 MMOL/L (ref 0–18)
AST SERPL-CCNC: 30 U/L (ref ?–34)
BASOPHILS # BLD AUTO: 0.05 X10(3) UL (ref 0–0.2)
BASOPHILS NFR BLD AUTO: 1 %
BILIRUB SERPL-MCNC: 0.3 MG/DL (ref 0.3–1.2)
BUN BLD-MCNC: 7 MG/DL (ref 9–23)
BUN/CREAT SERPL: 8.6 (ref 10–20)
CALCIUM BLD-MCNC: 8.8 MG/DL (ref 8.7–10.4)
CHLORIDE SERPL-SCNC: 114 MMOL/L (ref 98–112)
CO2 SERPL-SCNC: 27 MMOL/L (ref 21–32)
CREAT BLD-MCNC: 0.81 MG/DL
DEPRECATED RDW RBC AUTO: 44.6 FL (ref 35.1–46.3)
EGFRCR SERPLBLD CKD-EPI 2021: 109 ML/MIN/1.73M2 (ref 60–?)
EOSINOPHIL # BLD AUTO: 0.55 X10(3) UL (ref 0–0.7)
EOSINOPHIL NFR BLD AUTO: 10.9 %
ERYTHROCYTE [DISTWIDTH] IN BLOOD BY AUTOMATED COUNT: 12.9 % (ref 11–15)
GLOBULIN PLAS-MCNC: 2.1 G/DL (ref 2.8–4.4)
GLUCOSE BLD-MCNC: 95 MG/DL (ref 70–99)
HCT VFR BLD AUTO: 37.9 %
HGB BLD-MCNC: 12.9 G/DL
IMM GRANULOCYTES # BLD AUTO: 0.01 X10(3) UL (ref 0–1)
IMM GRANULOCYTES NFR BLD: 0.2 %
LIPASE SERPL-CCNC: 129 U/L (ref 12–53)
LYMPHOCYTES # BLD AUTO: 1.41 X10(3) UL (ref 1–4)
LYMPHOCYTES NFR BLD AUTO: 28 %
MCH RBC QN AUTO: 32.1 PG (ref 26–34)
MCHC RBC AUTO-ENTMCNC: 34 G/DL (ref 31–37)
MCV RBC AUTO: 94.3 FL
MONOCYTES # BLD AUTO: 0.48 X10(3) UL (ref 0.1–1)
MONOCYTES NFR BLD AUTO: 9.5 %
NEUTROPHILS # BLD AUTO: 2.54 X10 (3) UL (ref 1.5–7.7)
NEUTROPHILS # BLD AUTO: 2.54 X10(3) UL (ref 1.5–7.7)
NEUTROPHILS NFR BLD AUTO: 50.4 %
OSMOLALITY SERPL CALC.SUM OF ELEC: 292 MOSM/KG (ref 275–295)
PLATELET # BLD AUTO: 299 10(3)UL (ref 150–450)
POTASSIUM SERPL-SCNC: 4.6 MMOL/L (ref 3.5–5.1)
PROT SERPL-MCNC: 5.5 G/DL (ref 5.7–8.2)
RBC # BLD AUTO: 4.02 X10(6)UL
SODIUM SERPL-SCNC: 142 MMOL/L (ref 136–145)
WBC # BLD AUTO: 5 X10(3) UL (ref 4–11)

## 2024-04-15 PROCEDURE — 99233 SBSQ HOSP IP/OBS HIGH 50: CPT | Performed by: HOSPITALIST

## 2024-04-15 PROCEDURE — 78226 HEPATOBILIARY SYSTEM IMAGING: CPT | Performed by: SPECIALIST

## 2024-04-15 PROCEDURE — 76705 ECHO EXAM OF ABDOMEN: CPT | Performed by: SPECIALIST

## 2024-04-15 RX ORDER — PANTOPRAZOLE SODIUM 40 MG/1
40 TABLET, DELAYED RELEASE ORAL
Status: DISCONTINUED | OUTPATIENT
Start: 2024-04-15 | End: 2024-04-16

## 2024-04-15 NOTE — PLAN OF CARE
Problem: Patient Centered Care  Goal: Patient preferences are identified and integrated in the patient's plan of care  Description: Interventions:  - What would you like us to know as we care for you? Lives at home alone  - Provide timely, complete, and accurate information to patient/family  - Incorporate patient and family knowledge, values, beliefs, and cultural backgrounds into the planning and delivery of care  - Encourage patient/family to participate in care and decision-making at the level they choose  - Honor patient and family perspectives and choices  Outcome: Progressing     Problem: PAIN - ADULT  Goal: Verbalizes/displays adequate comfort level or patient's stated pain goal  Description: INTERVENTIONS:  - Encourage pt to monitor pain and request assistance  - Assess pain using appropriate pain scale  - Administer analgesics based on type and severity of pain and evaluate response  - Implement non-pharmacological measures as appropriate and evaluate response  - Consider cultural and social influences on pain and pain management  - Manage/alleviate anxiety  - Utilize distraction and/or relaxation techniques  - Monitor for opioid side effects  - Notify MD/LIP if interventions unsuccessful or patient reports new pain  - Anticipate increased pain with activity and pre-medicate as appropriate  Outcome: Progressing     Problem: RISK FOR INFECTION - ADULT  Goal: Absence of fever/infection during anticipated neutropenic period  Description: INTERVENTIONS  - Monitor WBC  - Administer growth factors as ordered  - Implement neutropenic guidelines  Outcome: Progressing     Problem: SAFETY ADULT - FALL  Goal: Free from fall injury  Description: INTERVENTIONS:  - Assess pt frequently for physical needs  - Identify cognitive and physical deficits and behaviors that affect risk of falls.  - [place] fall precautions as indicated by assessment.  - Educate pt/family on patient safety including physical limitations  -  Instruct pt to call for assistance with activity based on assessment  - Modify environment to reduce risk of injury  - Provide assistive devices as appropriate  - Consider OT/PT consult to assist with strengthening/mobility  - Encourage toileting schedule  Outcome: Progressing     Problem: DISCHARGE PLANNING  Goal: Discharge to home or other facility with appropriate resources  Description: INTERVENTIONS:  - Identify barriers to discharge w/pt and caregiver  - Include patient/family/discharge partner in discharge planning  - Arrange for needed discharge resources and transportation as appropriate  - Identify discharge learning needs (meds, wound care, etc)  - Arrange for interpreters to assist at discharge as needed  - Consider post-discharge preferences of patient/family/discharge partner  - Complete POLST form as appropriate  - Assess patient's ability to be responsible for managing their own health  - Refer to Case Management Department for coordinating discharge planning if the patient needs post-hospital services based on physician/LIP order or complex needs related to functional status, cognitive ability or social support system  Outcome: Progressing     Problem: GASTROINTESTINAL - ADULT  Goal: Minimal or absence of nausea and vomiting  Description: INTERVENTIONS:  - Maintain adequate hydration with IV or PO as ordered and tolerated  - Nasogastric tube to low intermittent suction as ordered  - Evaluate effectiveness of ordered antiemetic medications  - Provide nonpharmacologic comfort measures as appropriate  - Advance diet as tolerated, if ordered  - Obtain nutritional consult as needed  - Evaluate fluid balance  Outcome: Progressing  Goal: Maintains or returns to baseline bowel function  Description: INTERVENTIONS:  - Assess bowel function  - Maintain adequate hydration with IV or PO as ordered and tolerated  - Evaluate effectiveness of GI medications  - Encourage mobilization and activity  - Obtain  nutritional consult as needed  - Establish a toileting routine/schedule  - Consider collaborating with pharmacy to review patient's medication profile  Outcome: Progressing

## 2024-04-15 NOTE — PAYOR COMM NOTE
--------------  ADMISSION REVIEW     Payor: [employer]  Subscriber #:  [id number]  Authorization Number: [id number]    Admit date: 4/12/24  Admit time:  4:29 PM       REVIEW DOCUMENTATION:     ED Provider Notes        ED Provider Notes signed by Elio May MD at 4/12/2024  4:04 PM       Author: Elio May MD Service: -- Author Type: Physician    Filed: 4/12/2024  4:04 PM Date of Service: 4/12/2024  4:04 PM Status: Signed    : Elio May MD (Physician)         S/o to me Dr. Dr. Riojas pending CT. noted as below.  Leukocytosis with transaminases.  Talk to patient.  Examined him and he still has right upper quadrant pain.  Clinically does not look ill.  Talk to the hospitalist who saw him.  GI and general surgery were consulted.  Patient will be admitted with fluids and IV antibiotics and n.p.o. status.          CT ABDOMEN+PELVIS(CONTRAST ONLY)(CPT=74177)    Result Date: 4/12/2024  PROCEDURE: CT ABDOMEN + PELVIS (CONTRAST ONLY) (CPT=74177)  COMPARISON: [facility], CT ABDOMEN + PELVIS (CONTRAST ONLY) (CPT=74177), 3/02/2024, 9:35 AM.  [facility], CT ABDOMEN + PELVIS (CONTRAST ONLY) (CPT=74177), 2/20/2024, 10:44 AM.  INDICATIONS: abd pain/ vomiting  TECHNIQUE: CT images of the abdomen and pelvis were obtained with non-ionic intravenous contrast material.  Automated exposure control for dose reduction was used. Adjustment of the mA and/or kV was done based on the patient's size. Use of iterative reconstruction technique for dose reduction was used.  Dose information is transmitted to the ACR (American College of Radiology) NRDR (National Radiology Data Registry) which includes the Dose Index Registry.  FINDINGS:  LOWER THORAX:  Coronary artery calcifications cannot be assessed.  No visible pulmonary or pleural disease. LIVER:   No intrinsic lesion.  Peribiliary edema.  Previously noted thrombus in the main portal vein is not seen.  The right anterior  portal vein is not well seen suggesting thrombus.  Persistent nonocclusive thrombus in the splenic vein. GALLBLADDER: Marked edema and hyperemia of the gallbladder. BILIARY:   Intrahepatic peribiliary edema.  Enlarged common bile duct measures 0.9 cm, similar to the prior exam. PANCREAS:   A few calcifications are again noted in the pancreatic head.  Borderline enlarged pancreatic duct measures 4 mm, similar to the prior exam. SPLEEN:   No enlargement or focal lesion.  ADRENALS:   No mass or enlargement.  KIDNEYS:   No mass, obstruction, or calcification.  RETROPERITONEUM:   No mass or enlarged adenopathy.  AORTA/VASCULAR:   No aneurysm or dissection. PERITONEUM: No free fluid or air. GI TRACT/MESENTERY:    Marked thickening and edema of the gastric pylorus and 1st and 2nd portions of the duodenum are again noted, similar or slightly worsened compared to the prior exam.  Cyst at the pancreaticoduodenal groove is stable compared to the  prior exam measuring 1.8 cm.  There is surrounding edema.  URINARY BLADDER: Unremarkable. REPRODUCTIVE ORGANS: Unremarkable. ABDOMINAL WALL:   No acute abnormality. BONES:  No acute fracture.  Mild spondylosis.          CONCLUSION:   1. New marked inflammation of the gallbladder and intrahepatic peribiliary edema.  2. Increased fluid and inflammation around the pancreatic head, gastric pylorus, and duodenum.  Stable peripancreatic cyst at the pancreaticoduodenal groove.  No evidence of perforation.  3. Stable nonocclusive thrombus in the splenic vein near the confluence.  Previously noted thrombus in the main portal vein has resolved.  Stable thrombosis of the anterior division of the right portal vein.  4. Additional chronic or incidental findings are described in the body of this report.     Dictated by (CST): Hudson Ambrose MD on 4/12/2024 at 3:36 PM     Finalized by (CST): Hudson Ambrose MD on 4/12/2024 at 3:45 PM             Signed by Elio May MD on 4/12/2024  4:04 PM      HISTORY AND PHYSICAL EXAMINATION   ASSESSMENT:    1.       Recurrent pancreatitis.  Rule out gallstone or biliary stricture pancreatitis, reflux pancreatitis.  2.       Acute cholecystitis.       PLAN:  Patient will be admitted to general medical floor.  IV antibiotics, Zosyn.  MRCP.  IV fluids.  N.p.o.  Pain and nausea control.  Repeat CBC, CMP, and lipase in the morning.  Gastroenterology and general surgery consults.  Further recommendations to follow.   GASTROENTEROLOGY CONSULTATION   Assessment:     Acute cholecystitis  Elevated liver enzymes  Chronic groove pancreatitis  Portal vein/splenic vein thrombosis  History of gastric outlet obstruction - s/p gastrojejunostomy     The patient presents with acute cholecystitis.  He also has elevated liver enzymes.  I suspect this is secondary to cholecystitis.  His common bile duct is dilated but this is chronic and unchanged from previous.  The biliary ductal dilation is most likely secondary to his chronic groove pancreatitis.     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  He is not using alcohol at this time but continues to smoke.  He has had multiple episodes of pancreatitis.  This has been complicated by the development of a gastric outlet obstruction requiring gastrojejunostomy and portal and splenic vein thromboses.  He has been on anticoagulation since March.  CT scan today shows resolution of the portal vein thrombosis.  Thrombosis is still noted in the splenic vein as well as the anterior division of the right portal vein.     Plan:     MRCP  Surgical consultation  NPO.  IV antibiotics.   Hold Eliquis.    4/13  [facility] Surgical Oncology and Breast Surgery Consult Orders   Assessment / Plan:  Chronic pancreatitis, thought to be alcohol related, complicated by gastric outlet obstruction for which patient underwent gastrojejunostomy in the past now presenting with abdominal pain and clinical picture of acute  cholecystitis.  Appreciate recommendations from multidisciplinary team, await final results of MRCP  Continue to hold anticoagulation  Will discuss with Dr. Garcias on Monday, will likely need cholecystectomy      GI Progress Note   ASSESSMENT:     Acute cholecystitis  Elevated liver enzymes  Chronic groove pancreatitis  Portal vein/splenic vein thrombosis  History of gastric outlet obstruction - s/p gastrojejunostomy     There is no significant clinical change. His clinical presentation is consistent with acute cholecystitis.  The changes noted on MRI in the area of the duodenum are likely related to his chronic groove pancreatitis. I doubt a perforated ulcer.      Liver enzymes remain elevated.  I suspect this is secondary to cholecystitis.  His common bile duct is dilated but this is chronic and unchanged from previous.  The biliary ductal dilation is most likely secondary to his chronic groove pancreatitis. The CBD on MRI shows an 8 mm CBD with smooth tapering distally. No CBD stones are seen.      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  He is not using alcohol at this time but continues to smoke.  He has had multiple episodes of pancreatitis.  This has been complicated by the development of a gastric outlet obstruction requiring gastrojejunostomy and portal and splenic vein thromboses.  He has been on anticoagulation since March.  CT scan today shows resolution of the portal vein thrombosis.  Thrombosis is still noted in the splenic vein as well as the anterior division of the right portal vein.     PLAN:     Management of cholecystitis per surgery.  Continue antibiotics.  Hold Eliquis.  Await formal MRCP results.   Will continue to follow.   Internal Medicine Progress Note   Assessment and Plan:     Acute pancreatitis, unspecified complication status, unspecified pancreatitis type (HCC)  Chronic  Pain control  CLD        Transaminitis  Trending down  Follow LFTs  daily        Acute cholecystitis  Follow labs daily  Pain control  IV antibiotics  CLD  Surgery planned for tomorrow   4/14  Surgical Oncology Inpatient Progress Note   Assessment/Plan:  Continue to hold eliquis  May be advanced to low fiber  IV antibiotics  NPO at midnight  Possible cholecystectomy per dr kelly  GI Progress Note   ASSESSMENT:     Acute cholecystitis  Elevated liver enzymes  Chronic groove pancreatitis  Portal vein/splenic vein thrombosis  History of gastric outlet obstruction - s/p gastrojejunostomy     The patient is much better. Pain has resolved. His clinical presentation is consistent with acute cholecystitis.  The changes noted on MRI in the area of the duodenum are likely related to his chronic groove pancreatitis. I doubt a perforated ulcer.      Alk phos is unchanged. Transaminases are improving. Bili is normal.  I suspect this is secondary to cholecystitis.  His common bile duct is dilated but this is chronic and unchanged from previous.  The biliary ductal dilation is most likely secondary to his chronic groove pancreatitis. The CBD on MRI shows an 8 mm CBD with smooth tapering distally. No CBD stones are seen. The fluid around his pancreas is likely related to his groove pancreatitis.  I cannot exclude acute pancreatitis. However, his pain is different than the pain he has had with pancreatitis.      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  He is not using alcohol at this time but continues to smoke.  He has had multiple episodes of pancreatitis.  This has been complicated by the development of a gastric outlet obstruction requiring gastrojejunostomy and portal and splenic vein thromboses.  He has been on anticoagulation since March.  CT scan today shows resolution of the portal vein thrombosis.  Thrombosis is still noted in the splenic vein as well as the anterior division of the right portal vein.     PLAN:     Management of cholecystitis  per surgery.  Continue antibiotics.  Hold Eliquis.  Will continue to follow.    Internal Medicine Progress Note   Assessment and Plan:     Acute pancreatitis, unspecified complication status, unspecified pancreatitis type (HCC)  Chronic  Pain control  Soft diet        Transaminitis  Trending down  Follow LFTs daily        Acute cholecystitis  Follow labs daily  Pain control  IV antibiotics  Soft diet  NPO after midnight   Surgery planned for tomorrow   4/15  Gastroenterology Progress Note   Assessment     Acute cholecystitis  Elevated liver enzymes  Chronic groove pancreatitis  Portal vein/splenic vein thrombosis  History of gastric outlet obstruction - s/p gastrojejunostomy  6.   Had EGD 3/4/2024 with moderate severe duodenitis w/ mild narrowing     Presented 4/12/2024 with 4 day history of RUQ pain n/v with clinical picture of acute cholecystitis. His pain was not similar to his chronic pancreatitis. Pain has now resolved. CT showed new marked inflammation of the gallbladder and intrahepatic peribiliary edema. The changes noted on MRI in the area of the duodenum are likely related to his chronic groove pancreatitis.     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  He is not using alcohol at this time but continues to smoke.  He has had multiple episodes of pancreatitis.  This has been complicated by the development of a gastric outlet obstruction requiring gastrojejunostomy and portal and splenic vein thromboses.  He has been on anticoagulation since March.  CT scan shows resolution of the portal vein thrombosis.  Thrombosis is still noted in the splenic vein as well as the anterior division of the right portal vein.        Alk phos is increased. Transaminases are improving. Bili is normal.  His common bile duct is dilated but this is chronic and unchanged from previous.  The biliary ductal dilation is most likely secondary to his chronic groove pancreatitis. The CBD on MRI  shows an 8 mm CBD with smooth tapering distally. No CBD stones are seen.There is possible gallbladder wall thickening can not rule out cholelithiasis.The fluid around his pancreas is likely related to his groove pancreatitis.  Cannot exclude acute pancreatitis.       PLAN:     Further evaluation per surgery.  Awaiting RUQ US and HIDA scan.    Continue antibiotics.  Ppi daily  Avoid all alcohol.    Will consider restarting diet and slowly advancing as tolerated.    MEDICATIONS ADMINISTERED IN LAST 1 DAY:  potassium chloride 20 mEq in dextrose 5%-sodium chloride 0.45% 1000mL infusion premix       Date Action Dose Route User    4/15/2024 0723 New Bag (none) Intravenous Madiha Herrera RN    4/14/2024 2055 New Bag (none) Intravenous Madiha Herrera RN          morphINE PF 4 MG/ML injection 4 mg       Date Action Dose Route User    4/14/2024 2054 Given 4 mg Intravenous Madiha Herrera RN          pantoprazole (Protonix) DR tab 40 mg       Date Action Dose Route User    4/15/2024 1509 Given 40 mg Oral Maddison Mead RN          piperacillin-tazobactam (Zosyn) 3.375 g in dextrose 5% 100 mL IVPB-ADDV       Date Action Dose Route User    4/15/2024 1418 New Bag 3.375 g Intravenous Maddison Mead RN    4/15/2024 0559 New Bag 3.375 g Intravenous Madiha Herrera RN    4/14/2024 2216 New Bag 3.375 g Intravenous Madiha Herrera RN            Vitals (last day)       Date/Time Temp Pulse Resp BP SpO2 Weight O2 Device O2 Flow Rate (L/min) Who          CIWA Scores (since admission)       None          PLEASE FAX DAYS CERTIFIED AND NEXT REVIEW DATE [phone]

## 2024-04-15 NOTE — PROGRESS NOTES
Gen Surgery    full consult to follow    possible choledocholithiasis     Hi risk for cholecystectomy  To discuss with GI

## 2024-04-15 NOTE — PROGRESS NOTES
[facility]  Gastroenterology Progress Note    Jarrett Bal Patient Status:  Inpatient    1975 MRN [id number]   Location [facility] 1W Attending Philipp Garcias MD   Hosp Day # 3 PCP None Pcp     Subjective:  Jarrett Bal is a(n) 49 year old male.    Current complaints: denies abdominal pain    Objective:  Blood pressure 95/72, pulse 72, temperature 98.2 °F (36.8 °C), temperature source Oral, resp. rate 18, height 5' 8\" (1.727 m), weight 123 lb 6.4 oz (56 kg), SpO2 100%.  Respiratory: no labored breathing  CV: RRR, clear  Abdomen: nondistended, soft, non tender, BS +  Extremities: no edema  Neurologic: Ox3    Labs:   Recent Labs   Lab 24  0750 24  0546 04/15/24  0538   WBC 4.7 5.1 5.0   HGB 12.2* 12.5* 12.9*   HCT 35.0* 36.6* 37.9*   .0 290.0 299.0   CREATSERUM 0.75 0.72 0.81   BUN <5* <5* 7*    143 142   K 3.6 4.2 4.6    112 114*   CO2 29.0 26.0 27.0   GLU 89 86 95   CA 8.6* 8.7 8.8   ALB 3.5 3.2 3.4   ALKPHO 286* 263* 316*   BILT 1.1 0.5 0.3   TP 5.5* 5.4* 5.5*   * 48* 30   * 89* 71*       Recent Labs   Lab 24  1308 24  0750   * 129*   ETOH <3  --         Imaging:  MRI ABDOMEN AND MRCP W/3D (CPT=74181/48964)    Result Date: 2024  CONCLUSION:  1. Marked circumferential wall thickening at the C-loop of the duodenum.  There is extensive surrounding edema/infiltration, which extends to the pancreaticoduodenal groove.  Also identified is moderate volume peripancreatic and periduodenal free fluid.  Multiple loculations of fluid are also noted in the right upper quadrant, which include a dominant 6 x 2.5 cm loculation interposed between the gallbladder and C-loop of the duodenum.  These fluid collections are unchanged since previous day abdominal CT, but new or significantly increased relative to abdominal CT from . Primary differential considerations include acute on chronic groove pancreatitis or a severe  infectious/inflammatory duodenitis/duodenal ulcer disease.  Worsening fluid collections could relate to micro perforation, although there was no free intraperitoneal air on previous day abdominal CT to suggest yadira perforation.  EGD correlation should be considered. 2. Mild likely reactive gallbladder wall thickening and pericholecystic edema.  There is also trace perihepatic ascites.  No MRCP evidence of cholelithiasis.  As there is no gallbladder luminal distention, acute cholecystitis is unlikely. 3. Mild 9 mm dilation of the common bile duct with additional mild central intrahepatic biliary ductal dilation.  Common bile duct demonstrates smooth distal tapering.  No MRCP evidence of choledocholithiasis.  Nonspecific mild 4 mm prominence of the main pancreatic duct at the level of the pancreatic neck and body. 4. Nonspecific edema at the hepatic hilum, which may be reactive.  Known chronic portal vein thrombosis is not well assessed on this noncontrast exam. 5. Prominent but nonenlarged right cardiophrenic lymph node is likely reactive.    A preliminary report was issued by the "[employer] Radiology teleradiology service. There are no major discrepancies.  elm-remote  Dictated by (CST): Yaya Watkins MD on 4/13/2024 at 10:24 AM     Finalized by (CST): Yaya Watkins MD on 4/13/2024 at 10:41 AM          CT ABDOMEN+PELVIS(CONTRAST ONLY)(CPT=74177)    Result Date: 4/12/2024  CONCLUSION:   1. New marked inflammation of the gallbladder and intrahepatic peribiliary edema.  2. Increased fluid and inflammation around the pancreatic head, gastric pylorus, and duodenum.  Stable peripancreatic cyst at the pancreaticoduodenal groove.  No evidence of perforation.  3. Stable nonocclusive thrombus in the splenic vein near the confluence.  Previously noted thrombus in the main portal vein has resolved.  Stable thrombosis of the anterior division of the right portal vein.  4. Additional chronic or incidental findings are described  in the body of this report.     Dictated by (CST): Hudson Ambrose MD on 4/12/2024 at 3:36 PM     Finalized by (CST): Hudson Ambrose MD on 4/12/2024 at 3:45 PM            Problem list:  Patient Active Problem List   Diagnosis    Hypokalemia    Duodenitis    Gastric mass    Gastric outlet obstruction (HCC)    Prolonged QT interval    Pneumoperitoneum    CARLOS A (acute kidney injury) (HCC)    Hyponatremia    Metabolic alkalosis    Lightheaded    Weakness generalized    Traumatic injury of head, initial encounter    Hypotension, unspecified hypotension type    Malfunction of jejunostomy tube (HCC)    Hyperglycemia    Acute pancreatitis, unspecified complication status, unspecified pancreatitis type (HCC)    Portal vein thrombosis    Transaminitis    Acute cholecystitis    Ascending cholangitis (HCC)       Assessment    Acute cholecystitis  Elevated liver enzymes  Chronic groove pancreatitis  Portal vein/splenic vein thrombosis  History of gastric outlet obstruction - s/p gastrojejunostomy  6.   Had EGD 3/4/2024 with moderate severe duodenitis w/ mild narrowing     Presented 4/12/2024 with 4 day history of RUQ pain n/v with clinical picture of acute cholecystitis. His pain was not similar to his chronic pancreatitis. Pain has now resolved. CT showed new marked inflammation of the gallbladder and intrahepatic peribiliary edema. The changes noted on MRI in the area of the duodenum are likely related to his chronic groove pancreatitis.    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  He is not using alcohol at this time but continues to smoke.  He has had multiple episodes of pancreatitis.  This has been complicated by the development of a gastric outlet obstruction requiring gastrojejunostomy and portal and splenic vein thromboses.  He has been on anticoagulation since March.  CT scan shows resolution of the portal vein thrombosis.  Thrombosis is still noted in the splenic vein as well as  the anterior division of the right portal vein.       Alk phos is increased. Transaminases are improving. Bili is normal.  His common bile duct is dilated but this is chronic and unchanged from previous.  The biliary ductal dilation is most likely secondary to his chronic groove pancreatitis. The CBD on MRI shows an 8 mm CBD with smooth tapering distally. No CBD stones are seen.There is possible gallbladder wall thickening can not rule out cholelithiasis.The fluid around his pancreas is likely related to his groove pancreatitis.  Cannot exclude acute pancreatitis.      PLAN:     Further evaluation per surgery.  Awaiting RUQ US and HIDA scan.    Continue antibiotics.  Ppi daily  Avoid all alcohol.    Will consider restarting diet and slowly advancing as tolerated.          Antonio Martin.

## 2024-04-15 NOTE — PLAN OF CARE
Problem: Patient Centered Care  Goal: Patient preferences are identified and integrated in the patient's plan of care  Description: Interventions:  - What would you like us to know as we care for you? Lives at home alone  - Provide timely, complete, and accurate information to patient/family  - Incorporate patient and family knowledge, values, beliefs, and cultural backgrounds into the planning and delivery of care  - Encourage patient/family to participate in care and decision-making at the level they choose  - Honor patient and family perspectives and choices  Outcome: Progressing     Problem: PAIN - ADULT  Goal: Verbalizes/displays adequate comfort level or patient's stated pain goal  Description: INTERVENTIONS:  - Encourage pt to monitor pain and request assistance  - Assess pain using appropriate pain scale  - Administer analgesics based on type and severity of pain and evaluate response  - Implement non-pharmacological measures as appropriate and evaluate response  - Consider cultural and social influences on pain and pain management  - Manage/alleviate anxiety  - Utilize distraction and/or relaxation techniques  - Monitor for opioid side effects  - Notify MD/LIP if interventions unsuccessful or patient reports new pain  - Anticipate increased pain with activity and pre-medicate as appropriate  Outcome: Progressing     Problem: RISK FOR INFECTION - ADULT  Goal: Absence of fever/infection during anticipated neutropenic period  Description: INTERVENTIONS  - Monitor WBC  - Administer growth factors as ordered  - Implement neutropenic guidelines  Outcome: Progressing     Problem: SAFETY ADULT - FALL  Goal: Free from fall injury  Description: INTERVENTIONS:  - Assess pt frequently for physical needs  - Identify cognitive and physical deficits and behaviors that affect risk of falls.  - [place] fall precautions as indicated by assessment.  - Educate pt/family on patient safety including physical limitations  -  Instruct pt to call for assistance with activity based on assessment  - Modify environment to reduce risk of injury  - Provide assistive devices as appropriate  - Consider OT/PT consult to assist with strengthening/mobility  - Encourage toileting schedule  Outcome: Progressing     Problem: DISCHARGE PLANNING  Goal: Discharge to home or other facility with appropriate resources  Description: INTERVENTIONS:  - Identify barriers to discharge w/pt and caregiver  - Include patient/family/discharge partner in discharge planning  - Arrange for needed discharge resources and transportation as appropriate  - Identify discharge learning needs (meds, wound care, etc)  - Arrange for interpreters to assist at discharge as needed  - Consider post-discharge preferences of patient/family/discharge partner  - Complete POLST form as appropriate  - Assess patient's ability to be responsible for managing their own health  - Refer to Case Management Department for coordinating discharge planning if the patient needs post-hospital services based on physician/LIP order or complex needs related to functional status, cognitive ability or social support system  Outcome: Progressing     Problem: GASTROINTESTINAL - ADULT  Goal: Minimal or absence of nausea and vomiting  Description: INTERVENTIONS:  - Maintain adequate hydration with IV or PO as ordered and tolerated  - Nasogastric tube to low intermittent suction as ordered  - Evaluate effectiveness of ordered antiemetic medications  - Provide nonpharmacologic comfort measures as appropriate  - Advance diet as tolerated, if ordered  - Obtain nutritional consult as needed  - Evaluate fluid balance  Outcome: Progressing  Goal: Maintains or returns to baseline bowel function  Description: INTERVENTIONS:  - Assess bowel function  - Maintain adequate hydration with IV or PO as ordered and tolerated  - Evaluate effectiveness of GI medications  - Encourage mobilization and activity  - Obtain  nutritional consult as needed  - Establish a toileting routine/schedule  - Consider collaborating with pharmacy to review patient's medication profile  Outcome: Progressing   Vital signs stable. No complaint of pain. US of abdomen and HIDA scan done. Clear liquid diet. IVF and IV antibiotics. Continue to monitor. Bed on low and locked position, call light within reach.

## 2024-04-15 NOTE — PROGRESS NOTES
[facility]  part of [facility]    Progress Note    Jarrett Bal Patient Status:  Inpatient    1975 MRN [id number]   Location [facility] 1W Attending Steph Mckeon MD   Hosp Day # 3 PCP None Pcp       Subjective:   Jarrett Bal is tolerating his diet. No abdominal pain.     Objective:   Blood pressure 118/83, pulse 77, temperature 97.9 °F (36.6 °C), temperature source Oral, resp. rate 18, height 5' 8\" (1.727 m), weight 123 lb 6.4 oz (56 kg), SpO2 94%.    Physical Exam:    General: No acute distress.   Respiratory: Clear to auscultation bilaterally. No wheezes. No rhonchi.  Cardiovascular: S1, S2. Regular rate and rhythm. No murmurs, rubs or gallops.   Abdomen: Soft, nontender, nondistended.  Positive bowel sounds. No rebound or guarding.  Neurologic: No focal neurological deficits.   Musculoskeletal: Moves all extremities.  Extremities: No edema.    Results:     Lab Results   Component Value Date    WBC 5.0 04/15/2024    HGB 12.9 (L) 04/15/2024    HCT 37.9 (L) 04/15/2024    .0 04/15/2024    CREATSERUM 0.81 04/15/2024    BUN 7 (L) 04/15/2024     04/15/2024    K 4.6 04/15/2024     (H) 04/15/2024    CO2 27.0 04/15/2024    GLU 95 04/15/2024    CA 8.8 04/15/2024    ALB 3.4 04/15/2024    ALKPHO 316 (H) 04/15/2024    BILT 0.3 04/15/2024    TP 5.5 (L) 04/15/2024    AST 30 04/15/2024    ALT 71 (H) 04/15/2024    PTT 27.8 2018    INR 0.98 2024    TSH 1.999 2024    OLAYINKA 153 (H) 04/15/2024     (H) 04/15/2024    CRP 1.40 (H) 2024    MG 1.9 2024    PHOS 4.5 2024    ETOH <3 2024       NM GB HEPATOBILIARY SCAN  (CPT=78226)    Result Date: 4/15/2024  CONCLUSION:  Negative for scintigraphic evidence of acute cholecystitis or biliary obstruction.    Dictated by (CST): Emmanuel Ryan MD on 4/15/2024 at 12:34 PM     Finalized by (CST): Emmanuel Ryan MD on 4/15/2024 at 12:35 PM          MRI ABDOMEN AND MRCP W/3D  (STG=25971/18380)    Result Date: 4/13/2024  CONCLUSION:  1. Marked circumferential wall thickening at the C-loop of the duodenum.  There is extensive surrounding edema/infiltration, which extends to the pancreaticoduodenal groove.  Also identified is moderate volume peripancreatic and periduodenal free fluid.  Multiple loculations of fluid are also noted in the right upper quadrant, which include a dominant 6 x 2.5 cm loculation interposed between the gallbladder and C-loop of the duodenum.  These fluid collections are unchanged since previous day abdominal CT, but new or significantly increased relative to abdominal CT from March, 2024. 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  Worsening fluid collections could relate to micro perforation, although there was no free intraperitoneal air on previous day abdominal CT to suggest yadira perforation.  EGD correlation should be considered. 2. Mild likely reactive gallbladder wall thickening and pericholecystic edema.  There is also trace perihepatic ascites.  No MRCP evidence of cholelithiasis.  As there is no gallbladder luminal distention, acute cholecystitis is unlikely. 3. Mild 9 mm dilation of the common bile duct with additional mild central intrahepatic biliary ductal dilation.  Common bile duct demonstrates smooth distal tapering.  No MRCP evidence of choledocholithiasis.  Nonspecific mild 4 mm prominence of the main pancreatic duct at the level of the pancreatic neck and body. 4. Nonspecific edema at the hepatic hilum, which may be reactive.  Known chronic portal vein thrombosis is not well assessed on this noncontrast exam. 5. Prominent but nonenlarged right cardiophrenic lymph node is likely reactive.    A preliminary report was issued by the [employer] [facility] teleradiology service. There are no major discrepancies.  [id number]  Dictated by (CST):  Yaya Watkins MD on 4/13/2024 at 10:24 AM     Finalized by (CST): Yaya Watkins MD on 4/13/2024 at 10:41 AM          CT ABDOMEN+PELVIS(CONTRAST ONLY)(CPT=74177)    Result Date: 4/12/2024  CONCLUSION:   1. New marked inflammation of the gallbladder and intrahepatic peribiliary edema.  2. Increased fluid and inflammation around the pancreatic head, gastric pylorus, and duodenum.  Stable peripancreatic cyst at the pancreaticoduodenal groove.  No evidence of perforation.  3. Stable nonocclusive thrombus in the splenic vein near the confluence.  Previously noted thrombus in the main portal vein has resolved.  Stable thrombosis of the anterior division of the right portal vein.  4. Additional chronic or incidental findings are described in the body of this report.     Dictated by (CST): Hudson Ambrose MD on 4/12/2024 at 3:36 PM     Finalized by (CST): Hudson Ambrose MD on 4/12/2024 at 3:45 PM               Assessment and Plan:     Acute pancreatitis, unspecified complication status, unspecified pancreatitis type (HCC)  Chronic  Pain control  CLD now   Hida scan pending   Hold off on surgery for now  Discussed with Dr. Marc       Transaminitis  Trending down  Follow LFTs daily       Acute cholecystitis  Follow labs daily  Pain control  IV antibiotics  Soft diet  NPO after midnight         Quality:  DVT Prophylaxis: heparin  CODE status: Full    MDM High. Time spent on chart/note review, review of labs/imaging, discussion with patient, physical exam, discussion with staff, consultants, coordinating care, writing progress note, and discussion of plan of care.       BISHOP KRAUS MD  04/15/24

## 2024-04-16 VITALS
DIASTOLIC BLOOD PRESSURE: 72 MMHG | TEMPERATURE: 97 F | RESPIRATION RATE: 18 BRPM | BODY MASS INDEX: 18.7 KG/M2 | OXYGEN SATURATION: 100 % | SYSTOLIC BLOOD PRESSURE: 105 MMHG | WEIGHT: 123.38 LBS | HEART RATE: 67 BPM | HEIGHT: 68 IN

## 2024-04-16 LAB
ALBUMIN SERPL-MCNC: 3.6 G/DL (ref 3.2–4.8)
ALP LIVER SERPL-CCNC: 271 U/L
ALT SERPL-CCNC: 48 U/L
ANION GAP SERPL CALC-SCNC: 0 MMOL/L (ref 0–18)
AST SERPL-CCNC: 22 U/L (ref ?–34)
BILIRUB DIRECT SERPL-MCNC: 0.2 MG/DL (ref ?–0.3)
BILIRUB SERPL-MCNC: 0.4 MG/DL (ref 0.3–1.2)
BUN BLD-MCNC: <5 MG/DL (ref 9–23)
CALCIUM BLD-MCNC: 8.8 MG/DL (ref 8.7–10.4)
CHLORIDE SERPL-SCNC: 114 MMOL/L (ref 98–112)
CO2 SERPL-SCNC: 25 MMOL/L (ref 21–32)
CREAT BLD-MCNC: 0.78 MG/DL
DEPRECATED RDW RBC AUTO: 44.8 FL (ref 35.1–46.3)
EGFRCR SERPLBLD CKD-EPI 2021: 110 ML/MIN/1.73M2 (ref 60–?)
ERYTHROCYTE [DISTWIDTH] IN BLOOD BY AUTOMATED COUNT: 12.9 % (ref 11–15)
GLUCOSE BLD-MCNC: 85 MG/DL (ref 70–99)
HCT VFR BLD AUTO: 40 %
HGB BLD-MCNC: 13 G/DL
MCH RBC QN AUTO: 30.8 PG (ref 26–34)
MCHC RBC AUTO-ENTMCNC: 32.5 G/DL (ref 31–37)
MCV RBC AUTO: 94.8 FL
PLATELET # BLD AUTO: 358 10(3)UL (ref 150–450)
POTASSIUM SERPL-SCNC: 4.2 MMOL/L (ref 3.5–5.1)
PROT SERPL-MCNC: 5.8 G/DL (ref 5.7–8.2)
RBC # BLD AUTO: 4.22 X10(6)UL
SODIUM SERPL-SCNC: 139 MMOL/L (ref 136–145)
WBC # BLD AUTO: 5 X10(3) UL (ref 4–11)

## 2024-04-16 PROCEDURE — 99239 HOSP IP/OBS DSCHRG MGMT >30: CPT | Performed by: HOSPITALIST

## 2024-04-16 RX ORDER — NICOTINE POLACRILEX 4 MG/1
20 GUM, CHEWING ORAL DAILY
Qty: 30 TABLET | Refills: 0 | Status: SHIPPED | OUTPATIENT
Start: 2024-04-16 | End: 2024-05-16

## 2024-04-16 RX ORDER — PANCRELIPASE 36000; 180000; 114000 [USP'U]/1; [USP'U]/1; [USP'U]/1
36000 CAPSULE, DELAYED RELEASE PELLETS ORAL
Qty: 180 CAPSULE | Refills: 0 | Status: SHIPPED | OUTPATIENT
Start: 2024-04-16 | End: 2024-04-30

## 2024-04-16 RX ORDER — HYDROCODONE BITARTRATE AND ACETAMINOPHEN 10; 325 MG/1; MG/1
1 TABLET ORAL EVERY 6 HOURS PRN
Qty: 15 TABLET | Refills: 0 | Status: SHIPPED | OUTPATIENT
Start: 2024-04-16 | End: 2024-04-30

## 2024-04-16 NOTE — PLAN OF CARE
Problem: Patient Centered Care  Goal: Patient preferences are identified and integrated in the patient's plan of care  Description: Interventions:  - What would you like us to know as we care for you? Lives at home alone  - Provide timely, complete, and accurate information to patient/family  - Incorporate patient and family knowledge, values, beliefs, and cultural backgrounds into the planning and delivery of care  - Encourage patient/family to participate in care and decision-making at the level they choose  - Honor patient and family perspectives and choices  Outcome: Progressing     Problem: PAIN - ADULT  Goal: Verbalizes/displays adequate comfort level or patient's stated pain goal  Description: INTERVENTIONS:  - Encourage pt to monitor pain and request assistance  - Assess pain using appropriate pain scale  - Administer analgesics based on type and severity of pain and evaluate response  - Implement non-pharmacological measures as appropriate and evaluate response  - Consider cultural and social influences on pain and pain management  - Manage/alleviate anxiety  - Utilize distraction and/or relaxation techniques  - Monitor for opioid side effects  - Notify MD/LIP if interventions unsuccessful or patient reports new pain  - Anticipate increased pain with activity and pre-medicate as appropriate  Outcome: Progressing     Problem: RISK FOR INFECTION - ADULT  Goal: Absence of fever/infection during anticipated neutropenic period  Description: INTERVENTIONS  - Monitor WBC  - Administer growth factors as ordered  - Implement neutropenic guidelines  Outcome: Progressing     Problem: SAFETY ADULT - FALL  Goal: Free from fall injury  Description: INTERVENTIONS:  - Assess pt frequently for physical needs  - Identify cognitive and physical deficits and behaviors that affect risk of falls.  - [place] fall precautions as indicated by assessment.  - Educate pt/family on patient safety including physical limitations  -  Instruct pt to call for assistance with activity based on assessment  - Modify environment to reduce risk of injury  - Provide assistive devices as appropriate  - Consider OT/PT consult to assist with strengthening/mobility  - Encourage toileting schedule  Outcome: Progressing     Problem: DISCHARGE PLANNING  Goal: Discharge to home or other facility with appropriate resources  Description: INTERVENTIONS:  - Identify barriers to discharge w/pt and caregiver  - Include patient/family/discharge partner in discharge planning  - Arrange for needed discharge resources and transportation as appropriate  - Identify discharge learning needs (meds, wound care, etc)  - Arrange for interpreters to assist at discharge as needed  - Consider post-discharge preferences of patient/family/discharge partner  - Complete POLST form as appropriate  - Assess patient's ability to be responsible for managing their own health  - Refer to Case Management Department for coordinating discharge planning if the patient needs post-hospital services based on physician/LIP order or complex needs related to functional status, cognitive ability or social support system  Outcome: Progressing     Problem: GASTROINTESTINAL - ADULT  Goal: Minimal or absence of nausea and vomiting  Description: INTERVENTIONS:  - Maintain adequate hydration with IV or PO as ordered and tolerated  - Nasogastric tube to low intermittent suction as ordered  - Evaluate effectiveness of ordered antiemetic medications  - Provide nonpharmacologic comfort measures as appropriate  - Advance diet as tolerated, if ordered  - Obtain nutritional consult as needed  - Evaluate fluid balance  Outcome: Progressing  Goal: Maintains or returns to baseline bowel function  Description: INTERVENTIONS:  - Assess bowel function  - Maintain adequate hydration with IV or PO as ordered and tolerated  - Evaluate effectiveness of GI medications  - Encourage mobilization and activity  - Obtain  nutritional consult as needed  - Establish a toileting routine/schedule  - Consider collaborating with pharmacy to review patient's medication profile  Outcome: Progressing      Yes

## 2024-04-16 NOTE — PAYOR COMM NOTE
--------------RE-REVIEW  CONTINUED STAY REVIEW    Payor: [employer] POS/MCNP  Subscriber #:  [id number]  Authorization Number: [id number]    Admit date: 4/12/24  Admit time:  4:29 PM    Admitting Physician: Andrez Easley MD  Attending Physician:  Philipp Garcias MD  Primary Care Physician: Pcp, None    REVIEW DOCUMENTATION:  4/12  HISTORY AND PHYSICAL EXAMINATION     CHIEF COMPLAINT:  Acute pancreatitis and cholecystitis, possible gallstone pancreatitis.     HISTORY OF PRESENT ILLNESS:  Patient is a 49-year-old  male with complex past medical history including alcohol-induced pancreatitis and ever since recurrent pancreatitis.  Last hospitalization was in March 2024.  At that time, his liver function tests were slightly elevated.  Patient had an upper endoscopy during recent hospitalization which showed severe duodenitis.  Stabilized and discharged home.  Today, came back with recurrent abdominal pain, this time epigastric and right upper quadrant.  Started 3 days ago.  CBC today showed white blood cell count of 14.8 with left shift.  Chemistry was unremarkable.  Liver function tests; AST and  and 100; alkaline phosphatase 283; and direct bilirubin 0.5, slightly elevated.  Alcohol level was negative.  CT scan of the abdomen showed new markedly inflamed and distended gallbladder with increased fluid and inflammation around the pancreatic head, gastric pylorus, and duodenum; nonocclusive thrombus in the splenic vein; and resolution of portal vein thrombus; biliary tract common bile duct is 0.9 cm, slightly dilated.      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  He developed gastric outlet obstruction and required venting G-tube.  Eventually, he had exploratory laparotomy in June 2023 with gastrojejunostomy/G-tube removal and antecolic enteroenterostomy and removal of pancreatic stent.  He had multiple episodes of recurrent pancreatitis after that without  alcohol consumption including recent hospitalization in March 2024.  He has inflamed duodenitis and portal vein thrombus, currently anticoagulated with Eliquis.      PAST SURGICAL HISTORY:  As mentioned above.      MEDICATIONS:  Please see medication reconciliation list.      ALLERGIES:  No known drug allergies.      FAMILY HISTORY:  Mother had heart disease.  Father has hypertension.      SOCIAL HISTORY:  Ex-tobacco and alcohol user.  No current tobacco, alcohol, or drug use.  Lives with his family.  Independent in his basic activities of daily living.      REVIEW OF SYSTEMS:  Patient described epigastric right upper quadrant pain.  It started after he ate lunch 3 days ago and has been persistent ever since.  He has been avoiding foods.  He had nausea and vomiting.  He denies any fever or chills.  Other 12-point review of systems is negative.        PHYSICAL EXAMINATION:    GENERAL:  Alert and oriented to time, place and person.  Moderate distress.   VITAL SIGNS:  Temperature 98.0, pulse 91, respiratory rate 17, blood pressure 137/88, pulse ox 99% on room air.  HEENT:  Atraumatic.  Oropharynx clear.  Dry mucous membranes.  Normal hard and soft palate.  Eyes:  Anicteric sclerae.    NECK:  Supple.  No lymphadenopathy.  Trachea midline.  Full range of motion.   LUNGS:  Clear to auscultation bilaterally.  Normal respiratory effort.   HEART:  Regular rate and rhythm.  S1 and S2 auscultated.  No murmur.    ABDOMEN:  Soft, nondistended.  Tenderness noted, right upper quadrant area.  Mild guarding.  No rebound tenderness.  Hypoactive bowel sounds.   EXTREMITIES:  No peripheral edema, clubbing or cyanosis.   NEUROLOGIC:  Motor and sensory intact.       ASSESSMENT:    1.       Recurrent pancreatitis.  Rule out gallstone or biliary stricture pancreatitis, reflux pancreatitis.  2.       Acute cholecystitis.       PLAN:  Patient will be admitted to general medical floor.  IV antibiotics, Zosyn.  MRCP.  IV fluids.  N.p.o.  Pain  and nausea control.  Repeat CBC, CMP, and lipase in the morning.  Gastroenterology and general surgery consults.  Further recommendations to follow.    GASTROENTEROLOGY CONSULTATION  [facility]           Jarrett Bal Patient Status:  Inpatient   Date of Birth 2/24/1975 MRN [id number]   Location [facility] 1W Attending Andrez Easley MD   Hosp Day # 0 PCP None Pcp      Date of Consultation:  4/12/2024     History of Present Illness:     Jarrett Bal is a 49 year old male with abdominal pain.     The patient presents to the emergency room today with complaints of abdominal pain.  He reports epigastric and right upper quadrant pain for the past 4 days.  The pain has waxed and waned.  Because of ongoing pain, he presents to the emergency room today.  The patient was found to have leukocytosis and elevated liver enzymes.  A CT scan of the abdomen reveals a markedly inflamed and distended gallbladder with peribiliary edema.     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  This was diagnosed in 2018.  He has had multiple attacks of pancreatitis. Last year, the patient developed a gastric outlet obstruction secondary to this requiring PEG placement for venting of the stomach.  He also had an ERCP with sphincterotomy at that time.  In June, the patient underwent a exploratory laparotomy with gastrojejunostomy and removal of the PEG tube.     In March, a CT scan of the abdomen and pelvis revealed a nonocclusive thrombus of the main portal vein along with a stable thrombosis of the anterior division of the right portal vein.  An EGD was performed.  No varices were seen.  He was started on anticoagulation.     The patient continues to smoke but states that he is no longer drinking alcohol.     He denies any fever or chills.  There is no nausea or vomiting.     Past Medical History:      Past Medical History       Past Medical History:     Anesthesia complication     cramps;nausea after anesthesia when in 3rd grade    Anxiety state    Duodenitis    Pancreatitis (HCC)    PONV (postoperative nausea and vomiting)            Medications:              Prior to Admission medications    Medication Sig Start Date End Date Taking? Authorizing Provider   HYDROcodone-acetaminophen  MG Oral Tab Take 1 tablet by mouth every 6 (six) hours as needed for Pain. Watch drowsiness no alcohol 3/5/24   Yes Bishop Garcia MD   apixaban 5 MG Oral Tab Take 2 tabs (10mg) by mouth twice daily for 7 days, then take 1 tab (5mg) by mouth twice daily thereafter.  Patient taking differently: Take 1 tablet (5 mg total) by mouth 2 (two) times daily. Take 2 tabs (10mg) by mouth twice daily for 7 days, then take 1 tab (5mg) by mouth twice daily thereafter. 3/4/24   Yes Bishop Garcia MD   Pancrelipase, Lip-Prot-Amyl, (CREON) 00593-113486 units Oral Cap DR Particles Take 36,000 Units by mouth 3 (three) times daily with meals. 2/29/24 4/12/24 Yes External/Patient, Reported   acetaminophen 500 MG Oral Tab Take 1 tablet (500 mg total) by mouth every 4 (four) hours as needed for Fever (equal to or greater than 100.4). 12/21/23   Yes Yonny Foster MD   Omeprazole 20 MG Oral Tab EC Take 20 mg by mouth daily. 3/5/24 4/4/24   Bishop Garcia MD         Current Hospital Medications          Current Facility-Administered Medications   Medication Dose Route Frequency    sodium chloride 0.9% infusion 1,000 mL  1,000 mL Intravenous Once    piperacillin-tazobactam (Zosyn) 3.375 g in dextrose 5% 100 mL IVPB-ADDV  3.375 g Intravenous Q8H    potassium chloride 20 mEq in dextrose 5%-sodium chloride 0.45% 1000mL infusion premix   Intravenous Continuous    acetaminophen (Tylenol Extra Strength) tab 500 mg  500 mg Oral Q4H PRN    ondansetron (Zofran) 4 MG/2ML injection 4 mg  4 mg Intravenous Q6H PRN    prochlorperazine (Compazine) 10 MG/2ML injection 5 mg  5 mg  Intravenous Q8H PRN    morphINE PF 4 MG/ML injection 1 mg  1 mg Intravenous Q2H PRN     Or    morphINE PF 4 MG/ML injection 2 mg  2 mg Intravenous Q2H PRN     Or    morphINE PF 4 MG/ML injection 4 mg  4 mg Intravenous Q2H PRN            Family History:      Family History         Family History   Problem Relation Age of Onset    Hypertension Mother      Other (Early onset Dementia) Mother      Other (recurrent falls) Mother      Hypertension Father      No Known Problems Brother      Dementia Maternal Grandmother      Other (Alzehimers) Maternal Grandmother      Other (Cardiac arrythmia) Paternal Grandmother      No Known Problems Paternal Grandfather              Social History:     Short Social Hx on File   Social History            Socioeconomic History    Marital status:    Tobacco Use    Smoking status: Every Day       Current packs/day: 0.00       Average packs/day: 0.5 packs/day for 25.0 years (12.5 ttl pk-yrs)       Types: Cigarettes       Start date: 1998       Last attempt to quit: 2023       Years since quittin.9    Smokeless tobacco: Never    Tobacco comments:       0.05 cigarettes a day, working on quitting   Vaping Use    Vaping status: Never Used   Substance and Sexual Activity    Alcohol use: Not Currently       Comment: not since 2023 (Sober 1 year)    Drug use: Not Currently       Types: Cannabis       Comment: rare    Sexual activity: Not Currently      Social Determinants of Health           Food Insecurity: No Food Insecurity (3/2/2024)     Food Insecurity      Food Insecurity: Never true   Transportation Needs: No Transportation Needs (3/2/2024)     Transportation Needs      Lack of Transportation: No   Housing Stability: Low Risk  (3/2/2024)     Housing Stability      Housing Instability: No            ROS:      A comprehensive 10 point review of systems was completed.  Pertinent positives and negatives noted in the the HPI.        Physical Exam:     Vital Signs:        Vitals:     04/12/24 1639   BP: 136/83   Pulse: 85   Resp: 20   Temp: 98.7 °F (37.1 °C)      General: Alert, oriented and in no acute distress  HEENT: normocephalic; non-icteric; oral cavity free of lesions  Neck:  Supple; no thyromegaly or adenopathy  Lungs:  Clear to ausculation and percussion  Heart:  Normal S1S2  Abdomen:  Soft; RUQ tenderness with guarding; no masses or hepatosplenomegaly; non-distended  Extremities:  No edema     Labs:             Lab Results   Component Value Date     WBC 14.8 04/12/2024     HGB 14.2 04/12/2024     HCT 42.4 04/12/2024     .0 04/12/2024     CREATSERUM 0.73 04/12/2024     BUN 10 04/12/2024      04/12/2024     K 4.2 04/12/2024      04/12/2024     CO2 28.0 04/12/2024      04/12/2024     CA 9.3 04/12/2024     ALB 4.3 04/12/2024     ALKPHO 283 04/12/2024     BILT 0.9 04/12/2024     TP 6.8 04/12/2024      04/12/2024      04/12/2024      04/12/2024     ETOH <3 04/12/2024         RADIOLOGY:     PROCEDURE:CT ABDOMEN + PELVIS (CONTRAST ONLY) (CPT=74177)     COMPARISON: [facility], CT ABDOMEN + PELVIS (CONTRAST ONLY) (CPT=74177), 3/02/2024, 9:35 AM.  [facility], CT ABDOMEN + PELVIS (CONTRAST ONLY) (CPT=74177), 2/20/2024, 10:44 AM.     INDICATIONS:abd pain/ vomiting     TECHNIQUE:    CT images of the abdomen and pelvis were obtained with non-ionic intravenous contrast material.  Automated exposure control for dose reduction was used. Adjustment of the mA and/or kV was done based on the patient's size. Use of iterative   reconstruction technique for dose reduction was used.  Dose information is transmitted to the ACR (American College of Radiology) NRDR (National Radiology Data Registry) which includes the Dose Index Registry.     FINDINGS:          LOWER THORAX:                      Coronary artery calcifications cannot be assessed.  No visible pulmonary or pleural disease.  LIVER:               No intrinsic  lesion.  Peribiliary edema.  Previously noted thrombus in the main portal vein is not seen.  The right anterior portal vein is not well seen suggesting thrombus.  Persistent nonocclusive thrombus in the splenic vein.  GALLBLADDER:Marked edema and hyperemia of the gallbladder.  BILIARY:            Intrahepatic peribiliary edema.  Enlarged common bile duct measures 0.9 cm, similar to the prior exam.  PANCREAS:      A few calcifications are again noted in the pancreatic head.  Borderline enlarged pancreatic duct measures 4 mm, similar to the prior exam.  SPLEEN:           No enlargement or focal lesion.    ADRENALS:      No mass or enlargement.    KIDNEYS:          No mass, obstruction, or calcification.    RETROPERITONEUM:               No mass or enlarged adenopathy.    AORTA/VASCULAR:      No aneurysm or dissection.  PERITONEUM:No free fluid or air.  GI TRACT/MESENTERY:           Marked thickening and edema of the gastric pylorus and 1st and 2nd portions of the duodenum are again noted, similar or slightly worsened compared to the prior exam.  Cyst at the pancreaticoduodenal groove is stable compared to the   prior exam measuring 1.8 cm.  There is surrounding edema.    URINARY BLADDER:Unremarkable.  REPRODUCTIVE ORGANS:Unremarkable.  ABDOMINAL WALL:      No acute abnormality.  BONES:             No acute fracture.  Mild spondylosis.        =====  CONCLUSION:      1. New marked inflammation of the gallbladder and intrahepatic peribiliary edema.     2. Increased fluid and inflammation around the pancreatic head, gastric pylorus, and duodenum.  Stable peripancreatic cyst at the pancreaticoduodenal groove.  No evidence of perforation.     3. Stable nonocclusive thrombus in the splenic vein near the confluence.  Previously noted thrombus in the main portal vein has resolved.  Stable thrombosis of the anterior division of the right portal vein.     4. Additional chronic or incidental findings are described in the body of  this report.             Dictated by (CST): Hudson Ambrose MD on 4/12/2024 at 3:36 PM       Finalized by (CST): Hudson Ambrose MD on 4/12/2024 at 3:45 PM     Assessment:     Acute cholecystitis  Elevated liver enzymes  Chronic groove pancreatitis  Portal vein/splenic vein thrombosis  History of gastric outlet obstruction - s/p gastrojejunostomy     The patient presents with acute cholecystitis.  He also has elevated liver enzymes.  I suspect this is secondary to cholecystitis.  His common bile duct is dilated but this is chronic and unchanged from previous.  The biliary ductal dilation is most likely secondary to his chronic groove pancreatitis.     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  He is not using alcohol at this time but continues to smoke.  He has had multiple episodes of pancreatitis.  This has been complicated by the development of a gastric outlet obstruction requiring gastrojejunostomy and portal and splenic vein thromboses.  He has been on anticoagulation since March.  CT scan today shows resolution of the portal vein thrombosis.  Thrombosis is still noted in the splenic vein as well as the anterior division of the right portal vein.     Plan:     MRCP  Surgical consultation  NPO.  IV antibiotics.   Hold Eliquis.     4/13  [facility] Surgical Oncology and Breast Surgery     Patient Name:  Jarrett Bal   YOB: 1975   Gender:  Male   Appt Date:  4/12/2024   Provider:  No name on file   Insurance:  [facility] POS/MCNP      PATIENT PROVIDERS  Referring Provider: No ref. provider found   Address: No referring provider defined for this encounter.   Phone #: N/A     Primary Care Provider:None Pcp   Address: [unfilled]   Phone #: None         CHIEF COMPLAINT      Chief Complaint   Patient presents with    Abdomen/Flank Pain         PROBLEMS  Reviewed       Patient Active Problem List   Diagnosis    Hypokalemia    Duodenitis    Gastric mass    Gastric  outlet obstruction (HCC)    Prolonged QT interval    Pneumoperitoneum    CARLOS A (acute kidney injury) (HCC)    Hyponatremia    Metabolic alkalosis    Lightheaded    Weakness generalized    Traumatic injury of head, initial encounter    Hypotension, unspecified hypotension type    Malfunction of jejunostomy tube (HCC)    Hyperglycemia    Acute pancreatitis, unspecified complication status, unspecified pancreatitis type (HCC)    Portal vein thrombosis    Transaminitis    Acute cholecystitis    Ascending cholangitis (HCC)         History of Present Illness:  Patient is being evaluated at the request of the emergency department to render opinion guarding surgical management of cholecystitis in the setting of a history of chronic pancreatitis.  49-year-old gentleman with a history of chronic pancreatitis [alcohol-related] which was complicated by gastric outlet obstruction for which she underwent exploratory laparotomy and gastrojejunostomy bypass last year, later complicated by portal vein thrombosis more recently, now on Eliquis.  Patient presented to the emergency department yesterday with abdominal pain.Workup included a CT scan which is reviewed below but essentially returned to show gallbladder distention and marked inflammation with inflammatory changes along the pancreatic head, gastric pylorus and duodenum.  Of note thrombosis along the main portal vein had resolved however remaining areas of nonocclusive thrombi remained stable.  Lipase 226, white blood cell count 14.8, total bilirubin 0.9, AST ALT mildly elevated at 372 and 100.  Patient admitted for additional workup and management.      Vital Signs:  BP 93/76 (BP Location: Left arm)   Pulse 66   Temp 98.3 °F (36.8 °C) (Oral)   Resp 18   Ht 1.727 m (5' 8\")   Wt 56 kg (123 lb 6.4 oz)   SpO2 98%   BMI 18.76 kg/m²       Medications Reviewed:  Medications - Current   No current outpatient medications on file.         Allergies Reviewed:  Allergies   No Known  Allergies         History:  Reviewed:  Past Medical History       Past Medical History:    Anesthesia complication     cramps;nausea after anesthesia when in 3rd grade    Anxiety state    Duodenitis    Pancreatitis (HCC)    PONV (postoperative nausea and vomiting)         Reviewed:  Past Surgical History         Past Surgical History:   Procedure Laterality Date    Biopsy/removal, lymph node(s)         R neck    Egd N/A 2024     ; duodenitis,hiatal hernia,previous gastric jejunostomy    Gastrostomy/jejunostomy tube N/A 2023         Reviewed Social History:  Social Hx on file   Social History            Socioeconomic History    Marital status:    Tobacco Use    Smoking status: Every Day       Current packs/day: 0.00       Average packs/day: 0.5 packs/day for 25.0 years (12.5 ttl pk-yrs)       Types: Cigarettes       Start date: 1998       Last attempt to quit: 2023       Years since quittin.9    Smokeless tobacco: Never    Tobacco comments:       0.05 cigarettes a day, working on quitting   Vaping Use    Vaping status: Never Used   Substance and Sexual Activity    Alcohol use: Not Currently       Comment: not since 2023 (Sober 1 year)    Drug use: Not Currently       Types: Cannabis       Comment: rare    Sexual activity: Not Currently         Reviewed:  Family History         Family History   Problem Relation Age of Onset    Hypertension Mother      Other (Early onset Dementia) Mother      Other (recurrent falls) Mother      Hypertension Father      No Known Problems Brother      Dementia Maternal Grandmother      Other (Alzehimers) Maternal Grandmother      Other (Cardiac arrythmia) Paternal Grandmother      No Known Problems Paternal Grandfather              Review of Systems:  Review of Systems   Constitutional:  Negative for activity change, appetite change, chills, fatigue, fever and unexpected weight change.   HENT:  Negative for mouth sores, nosebleeds and trouble  swallowing.    Eyes:  Negative for discharge and redness.   Respiratory:  Negative for cough, shortness of breath and wheezing.    Cardiovascular:  Negative for chest pain.   Gastrointestinal:  Positive for abdominal distention and abdominal pain. Negative for blood in stool, nausea and vomiting.   Genitourinary:  Negative for difficulty urinating and dysuria.   Musculoskeletal:  Negative for back pain and gait problem.   Skin:  Negative for color change.   Allergic/Immunologic: Negative for immunocompromised state.   Neurological:  Negative for speech difficulty, weakness and numbness.   Psychiatric/Behavioral:  Negative for confusion.          Physical Examination:  Physical Exam  Constitutional:       General: He is not in acute distress.     Appearance: He is well-developed. He is not diaphoretic.   HENT:      Head: Normocephalic and atraumatic.   Eyes:      General:         Right eye: No discharge.         Left eye: No discharge.      Conjunctiva/sclera: Conjunctivae normal.      Pupils: Pupils are equal, round, and reactive to light.   Neck:      Thyroid: No thyromegaly.   Cardiovascular:      Rate and Rhythm: Normal rate and regular rhythm.      Heart sounds: No murmur heard.  Pulmonary:      Effort: No respiratory distress.      Breath sounds: Normal breath sounds. No wheezing.   Abdominal:      General: Bowel sounds are normal. There is no distension.      Palpations: Abdomen is soft.      Tenderness: There is abdominal tenderness. Positive signs include Nielson's sign.      Hernia: No hernia is present.   Musculoskeletal:         General: Normal range of motion.   Skin:     General: Skin is warm and dry.   Neurological:      Mental Status: He is alert and oriented to person, place, and time.         CT ABDOMEN+PELVIS(CONTRAST ONLY)(CPT=74177)     Result Date: 4/12/2024  CONCLUSION:          1. New marked inflammation of the gallbladder and intrahepatic peribiliary edema.  2. Increased fluid and inflammation  around the pancreatic head, gastric pylorus, and duodenum.  Stable peripancreatic cyst at the pancreaticoduodenal groove.  No evidence of perforation.  3. Stable nonocclusive thrombus in the splenic vein near the confluence.  Previously noted thrombus in the main portal vein has resolved.  Stable thrombosis of the anterior division of the right portal vein.  4. Additional chronic or incidental findings are described in the body of this report.     Dictated by (CST): Hudson Ambrose MD on 2024 at 3:36 PM     Finalized by (CST): Hudson Ambrose MD on 2024 at 3:45 PM                 Recent Labs   Lab 24  1308   RBC 4.61   HGB 14.2   HCT 42.4   MCV 92.0   MCH 30.8   MCHC 33.5   RDW 13.2   NEPRELIM 12.67*   WBC 14.8*   .0             Recent Labs   Lab 24  1308   *   BUN 10   CREATSERUM 0.73   EGFRCR 112   CA 9.3   ALB 4.3      K 4.2      CO2 28.0   ALKPHO 283*   *   *   BILT 0.9   TP 6.8            Assessment / Plan:  Chronic pancreatitis, thought to be alcohol related, complicated by gastric outlet obstruction for which patient underwent gastrojejunostomy in the past now presenting with abdominal pain and clinical picture of acute cholecystitis.  Appreciate recommendations from multidisciplinary team, await final results of MRCP  Continue to hold anticoagulation  Will discuss with Dr. Garcias on Monday, will likely need cholecystectomy     Sanjay Avila MD  Complex General Surgical Oncology  [facility] [place]  [email or web address]       Follow Up:  No follow-ups on file.    GI Progress Note              Jarrett ARLEY Roperjose Patient Status:  Inpatient    1975 MRN [id number]   Location [facility] 1W Attending Steph Mckeon MD   Hosp Day # 1 PCP None Pcp            SUBJECTIVE:      Abdominal pain is better but persists. No nausea or vomiting.      OBJECTIVE:     Height: 5' 8\" (172.7 cm) ( 1639)  Weight: 123 lb 6.4 oz (56 kg) (  1639)  BSA (Calculated - sq m): 1.66 sq meters (04/12 1639)  Pulse: 66 (04/13 0734)  BP: 95/67 (04/13 0734)  Temp: 98.4 °F (36.9 °C) (04/13 0734)  Do Not Use - Resp Rate: --  SpO2: 100 % (04/13 0734)          General: Alert, oriented, no acute distress  Lungs: Clear  Heart: Normal S1 and S2  Abdomen: Soft, RUQ tenderness with guarding unchanged. Bowel sounds are positive. Non-distended  Extremities: No edema     LAB RESULTS:                                                    Lab Results   Component Value Date     WBC 4.7 04/13/2024     HGB 12.2 04/13/2024     HCT 35.0 04/13/2024     .0 04/13/2024     CREATSERUM 0.75 04/13/2024     BUN <5 04/13/2024      04/13/2024     K 3.6 04/13/2024      04/13/2024     CO2 29.0 04/13/2024     GLU 89 04/13/2024     CA 8.6 04/13/2024     ALB 3.5 04/13/2024     ALKPHO 286 04/13/2024     BILT 1.1 04/13/2024     TP 5.5 04/13/2024      04/13/2024      04/13/2024      04/13/2024     ETOH <3 04/12/2024         RADIOLOGY RESULTS:     Preliminary MRI report reviewed. Final report is pending        ASSESSMENT:     Acute cholecystitis  Elevated liver enzymes  Chronic groove pancreatitis  Portal vein/splenic vein thrombosis  History of gastric outlet obstruction - s/p gastrojejunostomy     There is no significant clinical change. His clinical presentation is consistent with acute cholecystitis.  The changes noted on MRI in the area of the duodenum are likely related to his chronic groove pancreatitis. I doubt a perforated ulcer.      Liver enzymes remain elevated.  I suspect this is secondary to cholecystitis.  His common bile duct is dilated but this is chronic and unchanged from previous.  The biliary ductal dilation is most likely secondary to his chronic groove pancreatitis. The CBD on MRI shows an 8 mm CBD with smooth tapering distally. No CBD stones are seen.      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   He is not using alcohol at this time but continues to smoke.  He has had multiple episodes of pancreatitis.  This has been complicated by the development of a gastric outlet obstruction requiring gastrojejunostomy and portal and splenic vein thromboses.  He has been on anticoagulation since March.  CT scan today shows resolution of the portal vein thrombosis.  Thrombosis is still noted in the splenic vein as well as the anterior division of the right portal vein.     PLAN:     Management of cholecystitis per surgery.  Continue antibiotics.  Hold Eliquis.  Await formal MRCP results.   Will continue to follow.    Progress Note           Jarrett Bal Patient Status:  Inpatient    1975 MRN [id number]   Location [facility] 1W Attending Steph Mckeon MD   Hosp Day # 1 PCP None Pcp         Subjective:   Jarrett Bal who feels ok. Tolerating a CLD. No nausea or vomiting.      Objective:   Blood pressure 106/75, pulse 69, temperature 98.2 °F (36.8 °C), temperature source Oral, resp. rate 18, height 5' 8\" (1.727 m), weight 123 lb 6.4 oz (56 kg), SpO2 100%.     Physical Exam:    General: No acute distress.   Respiratory: Clear to auscultation bilaterally. No wheezes. No rhonchi.  Cardiovascular: S1, S2. Regular rate and rhythm. No murmurs, rubs or gallops.   Abdomen: Soft, nontender, nondistended.  Positive bowel sounds. No rebound or guarding.  Neurologic: No focal neurological deficits.   Musculoskeletal: Moves all extremities.  Extremities: No edema.     Results:            Lab Results   Component Value Date     WBC 4.7 2024     HGB 12.2 (L) 2024     HCT 35.0 (L) 2024     .0 2024     CREATSERUM 0.75 2024     BUN <5 (L) 2024      2024     K 3.6 2024      2024     CO2 29.0 2024     GLU 89 2024     CA 8.6 (L) 2024     ALB 3.5 2024     ALKPHO 286 (H) 2024     BILT 1.1 2024     TP 5.5 (L) 2024       (H) 04/13/2024      (H) 04/13/2024     PTT 27.8 08/07/2018     INR 0.98 03/03/2024     TSH 1.999 03/05/2024     OLAYINKA 139 (H) 06/16/2023      (H) 04/13/2024     CRP 1.40 (H) 03/04/2024     MG 1.9 03/05/2024     PHOS 4.5 03/05/2024     ETOH <3 04/12/2024         MRI ABDOMEN AND MRCP W/3D (CPT=74181/55957)     Result Date: 4/13/2024  CONCLUSION:         1. Marked circumferential wall thickening at the C-loop of the duodenum.  There is extensive surrounding edema/infiltration, which extends to the pancreaticoduodenal groove.  Also identified is moderate volume peripancreatic and periduodenal free fluid.  Multiple loculations of fluid are also noted in the right upper quadrant, which include a dominant 6 x 2.5 cm loculation interposed between the gallbladder and C-loop of the duodenum.  These fluid collections are unchanged since previous day abdominal CT, but new or significantly increased relative to abdominal CT from March, 2024. 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  Worsening fluid collections could relate to micro perforation, although there was no free intraperitoneal air on previous day abdominal CT to suggest yadira perforation.  EGD correlation should be considered. 2. Mild likely reactive gallbladder wall thickening and pericholecystic edema.  There is also trace perihepatic ascites.  No MRCP evidence of cholelithiasis.  As there is no gallbladder luminal distention, acute cholecystitis is unlikely. 3. Mild 9 mm dilation of the common bile duct with additional mild central intrahepatic biliary ductal dilation.  Common bile duct demonstrates smooth distal tapering.  No MRCP evidence of choledocholithiasis.  Nonspecific mild 4 mm prominence of the main pancreatic duct at the level of the pancreatic neck and body. 4. Nonspecific edema at the hepatic hilum, which may be reactive.   Known chronic portal vein thrombosis is not well assessed on this noncontrast exam. 5. Prominent but nonenlarged right cardiophrenic lymph node is likely reactive.    A preliminary report was issued by the [facility] teleradiology service. There are no major discrepancies.  elm-remote  Dictated by (CST): Yaya Watkins MD on 4/13/2024 at 10:24 AM     Finalized by (CST): Yaya Watkins MD on 4/13/2024 at 10:41 AM           CT ABDOMEN+PELVIS(CONTRAST ONLY)(CPT=74177)     Result Date: 4/12/2024  CONCLUSION:          1. New marked inflammation of the gallbladder and intrahepatic peribiliary edema.  2. Increased fluid and inflammation around the pancreatic head, gastric pylorus, and duodenum.  Stable peripancreatic cyst at the pancreaticoduodenal groove.  No evidence of perforation.  3. Stable nonocclusive thrombus in the splenic vein near the confluence.  Previously noted thrombus in the main portal vein has resolved.  Stable thrombosis of the anterior division of the right portal vein.  4. Additional chronic or incidental findings are described in the body of this report.     Dictated by (CST): Hudson Ambrose MD on 4/12/2024 at 3:36 PM     Finalized by (CST): Hudson Ambrose MD on 4/12/2024 at 3:45 PM                Assessment and Plan:     Acute pancreatitis, unspecified complication status, unspecified pancreatitis type (HCC)  Chronic  Pain control  CLD        Transaminitis  Trending down  Follow LFTs daily        Acute cholecystitis  Follow labs daily  Pain control  IV antibiotics  CLD  Surgery planned for tomorrow         Quality:  DVT Prophylaxis: heparin  CODE status: Full     MDM High. Time spent on chart/note review, review of labs/imaging, discussion with patient, physical exam, discussion with staff, consultants, coordinating care, writing progress note, and discussion of plan of care.     4/14  Surgical Oncology Inpatient Progress Note     Subjective:  VSS  Feels better     Objective:  Temp:  [98.2 °F  (36.8 °C)-98.5 °F (36.9 °C)] 98.5 °F (36.9 °C)  Pulse:  [62-72] 62  Resp:  [18] 18  BP: ()/(67-75) 94/71  SpO2:  [100 %] 100 %     Intake/Output:        Intake/Output Summary (Last 24 hours) at 4/14/2024 0708  Last data filed at 4/13/2024 2200      Gross per 24 hour   Intake 2800 ml   Output 0 ml   Net 2800 ml             Wt Readings from Last 6 Encounters:   04/12/24 56 kg (123 lb 6.4 oz)   03/07/24 55.8 kg (123 lb)   03/03/24 57.1 kg (125 lb 12.8 oz)   02/17/24 55.2 kg (121 lb 12.8 oz)   12/28/23 59 kg (130 lb)   12/20/23 58.1 kg (128 lb 1.6 oz)         Allergies:     Allergies   No Known Allergies        Labs:        Recent Labs   Lab 04/12/24  1308 04/13/24  0750 04/14/24  0546   RBC 4.61 3.76* 3.95*   HGB 14.2 12.2* 12.5*   HCT 42.4 35.0* 36.6*   MCV 92.0 93.1 92.7   MCH 30.8 32.4 31.6   MCHC 33.5 34.9 34.2   RDW 13.2 13.2 13.1   NEPRELIM 12.67* 2.83  --    WBC 14.8* 4.7 5.1   .0 265.0 290.0               Recent Labs   Lab 04/12/24  1308 04/13/24  0750 04/14/24  0546   * 89 86   BUN 10 <5* <5*   CREATSERUM 0.73 0.75 0.72   CA 9.3 8.6* 8.7    136 143   K 4.2 3.6 4.2    111 112   CO2 28.0 29.0 26.0            Physical Exam:  General: NAD  Lungs: CTA bilat  Heart: RRR, S1, S2  Abdomen: soft     MRI ABDOMEN AND MRCP W/3D (CPT=74181/42852)     Result Date: 4/13/2024  CONCLUSION:         1. Marked circumferential wall thickening at the C-loop of the duodenum.  There is extensive surrounding edema/infiltration, which extends to the pancreaticoduodenal groove.  Also identified is moderate volume peripancreatic and periduodenal free fluid.  Multiple loculations of fluid are also noted in the right upper quadrant, which include a dominant 6 x 2.5 cm loculation interposed between the gallbladder and C-loop of the duodenum.  These fluid collections are unchanged since previous day abdominal CT, but new or significantly increased relative to abdominal CT from March, 2024. Primary differential  considerations include acute on chronic groove pancreatitis or a severe infectious/inflammatory duodenitis/duodenal ulcer disease.  Worsening fluid collections could relate to micro perforation, although there was no free intraperitoneal air on previous day abdominal CT to suggest yadira perforation.  EGD correlation should be considered. 2. Mild likely reactive gallbladder wall thickening and pericholecystic edema.  There is also trace perihepatic ascites.  No MRCP evidence of cholelithiasis.  As there is no gallbladder luminal distention, acute cholecystitis is unlikely. 3. Mild 9 mm dilation of the common bile duct with additional mild central intrahepatic biliary ductal dilation.  Common bile duct demonstrates smooth distal tapering.  No MRCP evidence of choledocholithiasis.  Nonspecific mild 4 mm prominence of the main pancreatic duct at the level of the pancreatic neck and body. 4. Nonspecific edema at the hepatic hilum, which may be reactive.  Known chronic portal vein thrombosis is not well assessed on this noncontrast exam. 5. Prominent but nonenlarged right cardiophrenic lymph node is likely reactive.    A preliminary report was issued by the [facility] teleradiology service. There are no major discrepancies.  elm-remote  Dictated by (CST): Yaya Watkins MD on 4/13/2024 at 10:24 AM     Finalized by (CST): Yaya Watkins MD on 4/13/2024 at 10:41 AM           CT ABDOMEN+PELVIS(CONTRAST ONLY)(CPT=74177)     Result Date: 4/12/2024  CONCLUSION:          1. New marked inflammation of the gallbladder and intrahepatic peribiliary edema.  2. Increased fluid and inflammation around the pancreatic head, gastric pylorus, and duodenum.  Stable peripancreatic cyst at the pancreaticoduodenal groove.  No evidence of perforation.  3. Stable nonocclusive thrombus in the splenic vein near the confluence.  Previously noted thrombus in the main portal vein has resolved.  Stable thrombosis of the anterior division of  the right portal vein.  4. Additional chronic or incidental findings are described in the body of this report.     Dictated by (CST): Hudson Ambrose MD on 2024 at 3:36 PM     Finalized by (CST): Hudson Ambrose MD on 2024 at 3:45 PM             Assessment/Plan:  Continue to hold eliquis  May be advanced to low fiber  IV antibiotics  NPO at midnight  Possible cholecystectomy per dr kelly   GI Progress Note              Jarrett Bal Patient Status:  Inpatient    1975 MRN [id number]   Location [facility] 1W Attending Steph Mckeon MD   Hosp Day # 2 PCP None Pcp            SUBJECTIVE:      Abdominal pain is almost completely resolved.     OBJECTIVE:     Height: --  Weight: --  BSA (Calculated - sq m): --  Pulse: 63 (731)  BP: 92/66 (731)  Temp: 97.7 °F (36.5 °C) (731)  Do Not Use - Resp Rate: --  SpO2: 99 % (731)          General: Alert, oriented, no acute distress  Lungs: Clear  Heart: Normal S1 and S2  Abdomen: Soft, Non-tender. Bowel sounds are positive. Non-distended  Extremities: No edema     LAB RESULTS:                                                    Lab Results   Component Value Date     WBC 5.1 2024     HGB 12.5 2024     HCT 36.6 2024     .0 2024     CREATSERUM 0.72 2024     BUN <5 2024      2024     K 4.2 2024      2024     CO2 26.0 2024     GLU 86 2024     CA 8.7 2024     ALB 3.2 2024     ALKPHO 263 2024     BILT 0.5 2024     TP 5.4 2024     AST 48 2024     ALT 89 2024         RADIOLOGY RESULTS:     PROCEDURE:MRI ABDOMEN&MRCP W/3D (CPT=74181/57464)               MRCP       COMPARISON: [facility], CT ABDOMEN+PELVIS BARIATRIC PROTOCOL (CONTRAST ONLY) (CPT=74177), 2024, 4:14 PM.  [facility], CT ABDOMEN + PELVIS (CONTRAST ONLY) (CPT=74177), 2024, 10:44 AM.  [facility],   CT  ABDOMEN + PELVIS (CONTRAST ONLY) (CPT=74177), 3/02/2024, 9:35 AM.  [facility], CT ABDOMEN + PELVIS (CONTRAST ONLY) (CPT=74177), 4/12/2024, 3:12 PM.     INDICATIONS:cholecystitis, obstructive biliary disease     TECHNIQUE:    A comprehensive examination was performed utilizing a variety of imaging planes and imaging parameters to optimize visualization of suspected pathology.  Images were obtained without the infusion of intravenous gadolinium-based contrast agent.                  Magnetic resonance cholangiopancreatography was also performed for delineation and assessment of the pancreaticobiliary tree.     MRCP FINDINGS:    COMMENT:       Evaluation of the vasculature and of the abdominal viscera for the presence of intraparenchymal pathology is suboptimal in the absence of contrast infusion.  GALLBLADDER:            No gallstones.  Mild pericholecystic edema, possibly reactive as there is no gallbladder luminal distention.  BILE DUCTS:    Mild 9 mm dilation of the common bile duct.  Common bile duct demonstrates smooth distal tapering.  There is mild central intrahepatic biliary ductal dilation.  No MRCP evidence of choledocholithiasis.  PANCREAS:      There is peripancreatic edema, which predominantly involves the pancreatic head and pancreaticoduodenal groove.  Mild 4 mm prominence of the main pancreatic duct.  Marked wall thickening at the C-loop of the duodenum adjacent to the pancreatic   groove.  Moderate volume peripancreatic and Cara duodenal free fluid, which includes a dominant 6 x 2.5 cm loculation of fluid between the C-loop of the duodenum and the gallbladder.  Smaller approximate 1.1 cm cystic collection at the   pancreaticoduodenal groove is unchanged over serial prior abdominal CT exams.     OTHER FINDINGS:          LUNG BASES:Prominent but nonenlarged right cardiophrenic lymph node, which may be reactive. There is dependent subsegmental atelectasis bilaterally.  LIVER:  No focal  hepatic mass is seen.  Edema at the hepatic hilum may be reactive.  SPLEEN:           No enlargement.    ADRENALS:      Unremarkable, without focal mass or enlargement.    KIDNEYS:          No hydronephrosis or solid masses are apparent.    VASCULATURE:            Suboptimally assessed within the parameters of this noncontrast exam.  Note that known chronic portal vein thrombosis is not well assessed on this noncontrast exam.  LYMPH NODES:            No lymphadenopathy is detected.    ABDOMINAL WALL:      Unremarkable.    BONES:             Suboptimally assessed by scan protocol, but without grossly apparent bony lesion or fracture.    OTHER:             There is small volume perihepatic free fluid.  Partially imaged left upper quadrant gastrojejunostomy.        =====  CONCLUSION:   1. Marked circumferential wall thickening at the C-loop of the duodenum.  There is extensive surrounding edema/infiltration, which extends to the pancreaticoduodenal groove.  Also identified is moderate volume peripancreatic and periduodenal free fluid.    Multiple loculations of fluid are also noted in the right upper quadrant, which include a dominant 6 x 2.5 cm loculation interposed between the gallbladder and C-loop of the duodenum.  These fluid collections are unchanged since previous day abdominal   CT, but new or significantly increased relative to abdominal CT from March, 2024. 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  Worsening fluid   collections could relate to micro perforation, although there was no free intraperitoneal air on previous day abdominal CT to suggest yadira perforation.  EGD correlation should be considered.  2. Mild likely reactive gallbladder wall thickening and pericholecystic edema.  There is also trace perihepatic ascites.  No MRCP evidence of cholelithiasis.  As there is no gallbladder luminal  distention, acute cholecystitis is unlikely.  3. Mild 9 mm dilation of the common bile duct with additional mild central intrahepatic biliary ductal dilation.  Common bile duct demonstrates smooth distal tapering.  No MRCP evidence of choledocholithiasis.  Nonspecific mild 4 mm prominence of the   main pancreatic duct at the level of the pancreatic neck and body.  4. Nonspecific edema at the hepatic hilum, which may be reactive.  Known chronic portal vein thrombosis is not well assessed on this noncontrast exam.  5. Prominent but nonenlarged right cardiophrenic lymph node is likely reactive.          A preliminary report was issued by the [facility] teleradiology service. There are no major discrepancies.     elm-remote     Dictated by (CST): Yaya Watkins MD on 4/13/2024 at 10:24 AM       Finalized by (CST): Yaya Watkins MD on 4/13/2024 at 10:41 AM        ASSESSMENT:     Acute cholecystitis  Elevated liver enzymes  Chronic groove pancreatitis  Portal vein/splenic vein thrombosis  History of gastric outlet obstruction - s/p gastrojejunostomy     The patient is much better. Pain has resolved. His clinical presentation is consistent with acute cholecystitis.  The changes noted on MRI in the area of the duodenum are likely related to his chronic groove pancreatitis. I doubt a perforated ulcer.      Alk phos is unchanged. Transaminases are improving. Bili is normal.  I suspect this is secondary to cholecystitis.  His common bile duct is dilated but this is chronic and unchanged from previous.  The biliary ductal dilation is most likely secondary to his chronic groove pancreatitis. The CBD on MRI shows an 8 mm CBD with smooth tapering distally. No CBD stones are seen. The fluid around his pancreas is likely related to his groove pancreatitis.  I cannot exclude acute pancreatitis. However, his pain is different than the pain he has had with pancreatitis.      The patient has a history of chronic pancreatitis  secondary to alcohol and smoking.  He is not using alcohol at this time but continues to smoke.  He has had multiple episodes of pancreatitis.  This has been complicated by the development of a gastric outlet obstruction requiring gastrojejunostomy and portal and splenic vein thromboses.  He has been on anticoagulation since March.  CT scan today shows resolution of the portal vein thrombosis.  Thrombosis is still noted in the splenic vein as well as the anterior division of the right portal vein.     PLAN:     Management of cholecystitis per surgery.  Continue antibiotics.  Hold Eliquis.  Will continue to follow.    Progress Note           Jarrett Bal Patient Status:  Inpatient    1975 MRN [id number]   Location [facility] 1W Attending Steph Mckeon MD   Hosp Day # 2 PCP None Pcp         Subjective:   Jarrett Bal is tolerating his diet. No abdominal pain.      Objective:   Blood pressure 102/71, pulse 73, temperature 98.4 °F (36.9 °C), temperature source Oral, resp. rate 18, height 5' 8\" (1.727 m), weight 123 lb 6.4 oz (56 kg), SpO2 98%.     Physical Exam:    General: No acute distress.   Respiratory: Clear to auscultation bilaterally. No wheezes. No rhonchi.  Cardiovascular: S1, S2. Regular rate and rhythm. No murmurs, rubs or gallops.   Abdomen: Soft, nontender, nondistended.  Positive bowel sounds. No rebound or guarding.  Neurologic: No focal neurological deficits.   Musculoskeletal: Moves all extremities.  Extremities: No edema.     Results:            Lab Results   Component Value Date     WBC 5.1 2024     HGB 12.5 (L) 2024     HCT 36.6 (L) 2024     .0 2024     CREATSERUM 0.72 2024     BUN <5 (L) 2024      2024     K 4.2 2024      2024     CO2 26.0 2024     GLU 86 2024     CA 8.7 2024     ALB 3.2 2024     ALKPHO 263 (H) 2024     BILT 0.5 2024     TP 5.4 (L) 2024     AST 48 (H)  04/14/2024     ALT 89 (H) 04/14/2024     PTT 27.8 08/07/2018     INR 0.98 03/03/2024     TSH 1.999 03/05/2024     OLAYINKA 139 (H) 06/16/2023      (H) 04/13/2024     CRP 1.40 (H) 03/04/2024     MG 1.9 03/05/2024     PHOS 4.5 03/05/2024     ETOH <3 04/12/2024         MRI ABDOMEN AND MRCP W/3D (CPT=74181/06675)     Result Date: 4/13/2024  CONCLUSION:         1. Marked circumferential wall thickening at the C-loop of the duodenum.  There is extensive surrounding edema/infiltration, which extends to the pancreaticoduodenal groove.  Also identified is moderate volume peripancreatic and periduodenal free fluid.  Multiple loculations of fluid are also noted in the right upper quadrant, which include a dominant 6 x 2.5 cm loculation interposed between the gallbladder and C-loop of the duodenum.  These fluid collections are unchanged since previous day abdominal CT, but new or significantly increased relative to abdominal CT from March, 2024. 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  Worsening fluid collections could relate to micro perforation, although there was no free intraperitoneal air on previous day abdominal CT to suggest yadira perforation.  EGD correlation should be considered. 2. Mild likely reactive gallbladder wall thickening and pericholecystic edema.  There is also trace perihepatic ascites.  No MRCP evidence of cholelithiasis.  As there is no gallbladder luminal distention, acute cholecystitis is unlikely. 3. Mild 9 mm dilation of the common bile duct with additional mild central intrahepatic biliary ductal dilation.  Common bile duct demonstrates smooth distal tapering.  No MRCP evidence of choledocholithiasis.  Nonspecific mild 4 mm prominence of the main pancreatic duct at the level of the pancreatic neck and body. 4. Nonspecific edema at the hepatic hilum, which may be reactive.  Known chronic  portal vein thrombosis is not well assessed on this noncontrast exam. 5. Prominent but nonenlarged right cardiophrenic lymph node is likely reactive.    A preliminary report was issued by the [facility] teleradiology service. There are no major discrepancies.  elm-remote  Dictated by (CST): Yaya Watkins MD on 2024 at 10:24 AM     Finalized by (CST): Yaya Watkins MD on 2024 at 10:41 AM           CT ABDOMEN+PELVIS(CONTRAST ONLY)(CPT=74177)     Result Date: 2024  CONCLUSION:          1. New marked inflammation of the gallbladder and intrahepatic peribiliary edema.  2. Increased fluid and inflammation around the pancreatic head, gastric pylorus, and duodenum.  Stable peripancreatic cyst at the pancreaticoduodenal groove.  No evidence of perforation.  3. Stable nonocclusive thrombus in the splenic vein near the confluence.  Previously noted thrombus in the main portal vein has resolved.  Stable thrombosis of the anterior division of the right portal vein.  4. Additional chronic or incidental findings are described in the body of this report.     Dictated by (CST): Hudson Ambrose MD on 2024 at 3:36 PM     Finalized by (CST): Hduson Ambrose MD on 2024 at 3:45 PM                Assessment and Plan:     Acute pancreatitis, unspecified complication status, unspecified pancreatitis type (HCC)  Chronic  Pain control  Soft diet        Transaminitis  Trending down  Follow LFTs daily        Acute cholecystitis  Follow labs daily  Pain control  IV antibiotics  Soft diet  NPO after midnight   Surgery planned for tomorrow         Quality:  DVT Prophylaxis: heparin  CODE status: Full     MDM High. Time spent on chart/note review, review of labs/imaging, discussion with patient, physical exam, discussion with staff, consultants, coordinating care, writing progress note, and discussion of plan of care.    4/15  Gastroenterology Progress Note           Jarrett Bal Patient Status:  Inpatient     2/24/1975 MRN [id number]   Location [facility] 1W Attending Philipp Garcias MD   Hosp Day # 3 PCP None Pcp      Subjective:  Jarrett Bal is a(n) 49 year old male.     Current complaints: denies abdominal pain     Objective:  Blood pressure 95/72, pulse 72, temperature 98.2 °F (36.8 °C), temperature source Oral, resp. rate 18, height 5' 8\" (1.727 m), weight 123 lb 6.4 oz (56 kg), SpO2 100%.  Respiratory: no labored breathing  CV: RRR, clear  Abdomen: nondistended, soft, non tender, BS +  Extremities: no edema  Neurologic: Ox3     Labs:         Recent Labs   Lab 04/13/24  0750 04/14/24  0546 04/15/24  0538   WBC 4.7 5.1 5.0   HGB 12.2* 12.5* 12.9*   HCT 35.0* 36.6* 37.9*   .0 290.0 299.0   CREATSERUM 0.75 0.72 0.81   BUN <5* <5* 7*    143 142   K 3.6 4.2 4.6    112 114*   CO2 29.0 26.0 27.0   GLU 89 86 95   CA 8.6* 8.7 8.8   ALB 3.5 3.2 3.4   ALKPHO 286* 263* 316*   BILT 1.1 0.5 0.3   TP 5.5* 5.4* 5.5*   * 48* 30   * 89* 71*              Recent Labs   Lab 04/12/24  1308 04/13/24  0750   * 129*   ETOH <3  --          Imaging:  MRI ABDOMEN AND MRCP W/3D (CPT=74181/46445)     Result Date: 4/13/2024  CONCLUSION:         1. Marked circumferential wall thickening at the C-loop of the duodenum.  There is extensive surrounding edema/infiltration, which extends to the pancreaticoduodenal groove.  Also identified is moderate volume peripancreatic and periduodenal free fluid.  Multiple loculations of fluid are also noted in the right upper quadrant, which include a dominant 6 x 2.5 cm loculation interposed between the gallbladder and C-loop of the duodenum.  These fluid collections are unchanged since previous day abdominal CT, but new or significantly increased relative to abdominal CT from March, 2024. 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  Worsening fluid  collections could relate to micro perforation, although there was no free intraperitoneal air on previous day abdominal CT to suggest yadira perforation.  EGD correlation should be considered. 2. Mild likely reactive gallbladder wall thickening and pericholecystic edema.  There is also trace perihepatic ascites.  No MRCP evidence of cholelithiasis.  As there is no gallbladder luminal distention, acute cholecystitis is unlikely. 3. Mild 9 mm dilation of the common bile duct with additional mild central intrahepatic biliary ductal dilation.  Common bile duct demonstrates smooth distal tapering.  No MRCP evidence of choledocholithiasis.  Nonspecific mild 4 mm prominence of the main pancreatic duct at the level of the pancreatic neck and body. 4. Nonspecific edema at the hepatic hilum, which may be reactive.  Known chronic portal vein thrombosis is not well assessed on this noncontrast exam. 5. Prominent but nonenlarged right cardiophrenic lymph node is likely reactive.    A preliminary report was issued by the [facility] teleradiology service. There are no major discrepancies.  elm-remote  Dictated by (CST): Yaya Watkins MD on 4/13/2024 at 10:24 AM     Finalized by (CST): Yaya Watkins MD on 4/13/2024 at 10:41 AM           CT ABDOMEN+PELVIS(CONTRAST ONLY)(CPT=74177)     Result Date: 4/12/2024  CONCLUSION:          1. New marked inflammation of the gallbladder and intrahepatic peribiliary edema.  2. Increased fluid and inflammation around the pancreatic head, gastric pylorus, and duodenum.  Stable peripancreatic cyst at the pancreaticoduodenal groove.  No evidence of perforation.  3. Stable nonocclusive thrombus in the splenic vein near the confluence.  Previously noted thrombus in the main portal vein has resolved.  Stable thrombosis of the anterior division of the right portal vein.  4. Additional chronic or incidental findings are described in the body of this report.     Dictated by (CST): Hudson Ambrose MD  on 4/12/2024 at 3:36 PM     Finalized by (CST): Hudson Ambrose MD on 4/12/2024 at 3:45 PM             Problem list:      Patient Active Problem List   Diagnosis    Hypokalemia    Duodenitis    Gastric mass    Gastric outlet obstruction (HCC)    Prolonged QT interval    Pneumoperitoneum    CARLOS A (acute kidney injury) (HCC)    Hyponatremia    Metabolic alkalosis    Lightheaded    Weakness generalized    Traumatic injury of head, initial encounter    Hypotension, unspecified hypotension type    Malfunction of jejunostomy tube (HCC)    Hyperglycemia    Acute pancreatitis, unspecified complication status, unspecified pancreatitis type (HCC)    Portal vein thrombosis    Transaminitis    Acute cholecystitis    Ascending cholangitis (HCC)         Assessment     Acute cholecystitis  Elevated liver enzymes  Chronic groove pancreatitis  Portal vein/splenic vein thrombosis  History of gastric outlet obstruction - s/p gastrojejunostomy  6.   Had EGD 3/4/2024 with moderate severe duodenitis w/ mild narrowing     Presented 4/12/2024 with 4 day history of RUQ pain n/v with clinical picture of acute cholecystitis. His pain was not similar to his chronic pancreatitis. Pain has now resolved. CT showed new marked inflammation of the gallbladder and intrahepatic peribiliary edema. The changes noted on MRI in the area of the duodenum are likely related to his chronic groove pancreatitis.     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  He is not using alcohol at this time but continues to smoke.  He has had multiple episodes of pancreatitis.  This has been complicated by the development of a gastric outlet obstruction requiring gastrojejunostomy and portal and splenic vein thromboses.  He has been on anticoagulation since March.  CT scan shows resolution of the portal vein thrombosis.  Thrombosis is still noted in the splenic vein as well as the anterior division of the right portal vein.        Alk  phos is increased. Transaminases are improving. Bili is normal.  His common bile duct is dilated but this is chronic and unchanged from previous.  The biliary ductal dilation is most likely secondary to his chronic groove pancreatitis. The CBD on MRI shows an 8 mm CBD with smooth tapering distally. No CBD stones are seen.There is possible gallbladder wall thickening can not rule out cholelithiasis.The fluid around his pancreas is likely related to his groove pancreatitis.  Cannot exclude acute pancreatitis.       PLAN:     Further evaluation per surgery.  Awaiting RUQ US and HIDA scan.    Continue antibiotics.  Ppi daily  Avoid all alcohol.    Progress Note           Jarrett Bal Patient Status:  Inpatient    1975 MRN [id number]   Location [facility] 1W Attending Steph Mckeon MD   Hosp Day # 3 PCP None Pcp         Subjective:   Jarrett Bal is tolerating his diet. No abdominal pain.      Objective:   Blood pressure 118/83, pulse 77, temperature 97.9 °F (36.6 °C), temperature source Oral, resp. rate 18, height 5' 8\" (1.727 m), weight 123 lb 6.4 oz (56 kg), SpO2 94%.     Physical Exam:    General: No acute distress.   Respiratory: Clear to auscultation bilaterally. No wheezes. No rhonchi.  Cardiovascular: S1, S2. Regular rate and rhythm. No murmurs, rubs or gallops.   Abdomen: Soft, nontender, nondistended.  Positive bowel sounds. No rebound or guarding.  Neurologic: No focal neurological deficits.   Musculoskeletal: Moves all extremities.  Extremities: No edema.     Results:            Lab Results   Component Value Date     WBC 5.0 04/15/2024     HGB 12.9 (L) 04/15/2024     HCT 37.9 (L) 04/15/2024     .0 04/15/2024     CREATSERUM 0.81 04/15/2024     BUN 7 (L) 04/15/2024      04/15/2024     K 4.6 04/15/2024      (H) 04/15/2024     CO2 27.0 04/15/2024     GLU 95 04/15/2024     CA 8.8 04/15/2024     ALB 3.4 04/15/2024     ALKPHO 316 (H) 04/15/2024     BILT 0.3 04/15/2024     TP 5.5  (L) 04/15/2024     AST 30 04/15/2024     ALT 71 (H) 04/15/2024     PTT 27.8 08/07/2018     INR 0.98 03/03/2024     TSH 1.999 03/05/2024     OLAYINKA 153 (H) 04/15/2024      (H) 04/15/2024     CRP 1.40 (H) 03/04/2024     MG 1.9 03/05/2024     PHOS 4.5 03/05/2024     ETOH <3 04/12/2024         NM GB HEPATOBILIARY SCAN  (CPT=78226)     Result Date: 4/15/2024  CONCLUSION:         Negative for scintigraphic evidence of acute cholecystitis or biliary obstruction.    Dictated by (CST): Emmanuel Ryan MD on 4/15/2024 at 12:34 PM     Finalized by (CST): Emmanuel Ryan MD on 4/15/2024 at 12:35 PM           MRI ABDOMEN AND MRCP W/3D (CPT=74181/[id number])     Result Date: 4/13/2024  CONCLUSION:         1. Marked circumferential wall thickening at the C-loop of the duodenum.  There is extensive surrounding edema/infiltration, which extends to the pancreaticoduodenal groove.  Also identified is moderate volume peripancreatic and periduodenal free fluid.  Multiple loculations of fluid are also noted in the right upper quadrant, which include a dominant 6 x 2.5 cm loculation interposed between the gallbladder and C-loop of the duodenum.  These fluid collections are unchanged since previous day abdominal CT, but new or significantly increased relative to abdominal CT from March, 2024. 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  Worsening fluid collections could relate to micro perforation, although there was no free intraperitoneal air on previous day abdominal CT to suggest yadira perforation.  EGD correlation should be considered. 2. Mild likely reactive gallbladder wall thickening and pericholecystic edema.  There is also trace perihepatic ascites.  No MRCP evidence of cholelithiasis.  As there is no gallbladder luminal distention, acute cholecystitis is unlikely. 3. Mild 9 mm dilation of the common bile duct with  additional mild central intrahepatic biliary ductal dilation.  Common bile duct demonstrates smooth distal tapering.  No MRCP evidence of choledocholithiasis.  Nonspecific mild 4 mm prominence of the main pancreatic duct at the level of the pancreatic neck and body. 4. Nonspecific edema at the hepatic hilum, which may be reactive.  Known chronic portal vein thrombosis is not well assessed on this noncontrast exam. 5. Prominent but nonenlarged right cardiophrenic lymph node is likely reactive.    A preliminary report was issued by the [facility] teleradiology service. There are no major discrepancies.  elm-remote  Dictated by (CST): Yaya Watkins MD on 4/13/2024 at 10:24 AM     Finalized by (CST): Yaya Watkins MD on 4/13/2024 at 10:41 AM           CT ABDOMEN+PELVIS(CONTRAST ONLY)(CPT=74177)     Result Date: 4/12/2024  CONCLUSION:          1. New marked inflammation of the gallbladder and intrahepatic peribiliary edema.  2. Increased fluid and inflammation around the pancreatic head, gastric pylorus, and duodenum.  Stable peripancreatic cyst at the pancreaticoduodenal groove.  No evidence of perforation.  3. Stable nonocclusive thrombus in the splenic vein near the confluence.  Previously noted thrombus in the main portal vein has resolved.  Stable thrombosis of the anterior division of the right portal vein.  4. Additional chronic or incidental findings are described in the body of this report.     Dictated by (CST): Hudson Ambrose MD on 4/12/2024 at 3:36 PM     Finalized by (CST): Hudson Ambrose MD on 4/12/2024 at 3:45 PM                Assessment and Plan:     Acute pancreatitis, unspecified complication status, unspecified pancreatitis type (HCC)  Chronic  Pain control  CLD now   Hida scan pending   Hold off on surgery for now  Discussed with Dr. Marc        Transaminitis  Trending down  Follow LFTs daily        Acute cholecystitis  Follow labs daily  Pain control  IV antibiotics  Soft diet  NPO after  midnight            Quality:  DVT Prophylaxis: heparin  CODE status: Full     MDM High. Time spent on chart/note review, review of labs/imaging, discussion with patient, physical exam, discussion with staff, consultants, coordinating care, writing progress note, and discussion of plan of care.    REPORT OF CONSULTATION        HISTORY OF PRESENT ILLNESS:  This is a 49-year-old male known to me in the past.  He has a history of EtOH-induced pancreatitis with recurrent pancreatitis.  He did undergo surgery in June 2023 for gastric outlet obstruction secondary to groove pancreatitis.  He did have a gastrojejunostomy.  He did have a G-tube previously which was removed and also removed the pancreatic stent.  He did very well since that time.  It was found, though, he did have recently a portal vein thrombosis which was studied and has been on Eliquis for anticoagulation.  Now, he came in the hospital with severe abdominal pain.  He denies any EtOH use since the last surgery in the summer.  Workup at that time showed fluid around the pancreas consistent with pancreatitis.  The gallbladder was dilated.  Question of hyperemia, although there were no gallstones.  Slight elevation in LFTs, bilirubins were normal and slight elevation of lipase.  He stated he had been having 3 days of abdominal pain, but it got worse.  He did have nausea and vomiting.  No obvious change in bowels.       PHYSICAL EXAMINATION:    GENERAL:  The examination at this time reveals him to be in no acute distress.    LUNGS:  Per attending.  ABDOMEN:  Soft, not distended.  Well-healed wound.    NEUROLOGIC:  Per attending.     ASSESSMENT AND PLAN:  The impression at this time is recurrent pancreatitis, possibility of cholecystitis or choledocholithiasis.  It should be noted he did have an MRCP which was equivocal, did not show a true stone.  I am very concerned with this portal vein thrombosis and also recurrent pancreatitis.  He is to go today for  hepatobiliary scan and true ultrasound of the gallbladder to better delineate the anatomy.  Will follow consults.  He is being seen by Dr. Martin from GI.        Gastroenterology Progress Note           Jarrett Bal Patient Status:  Inpatient    1975 MRN [id number]   Location [facility] 1W Attending Philipp Garcias MD   Hosp Day # 4 PCP None Pcp      Subjective:  Jarrett Bal is a(n) 49 year old male.     Current complaints: denies pain. Took dose of morphine over night for abdominal cramping. Tolerating clears- hungry     Objective:  Blood pressure 106/65, pulse 66, temperature 97.7 °F (36.5 °C), temperature source Oral, resp. rate 18, height 5' 8\" (1.727 m), weight 123 lb 6.4 oz (56 kg), SpO2 100%.  Respiratory: no labored breathing  CV: RRR clear  Abdomen: nondistended, soft, non tender, BS +  Extremities: no edema  Neurologic: Ox3     Labs:          Recent Labs   Lab 24  0750 24  0546 04/15/24  0538 24  0725   WBC 4.7 5.1 5.0 5.0   HGB 12.2* 12.5* 12.9* 13.0   HCT 35.0* 36.6* 37.9* 40.0   .0 290.0 299.0 358.0   CREATSERUM 0.75 0.72 0.81  --    BUN <5* <5* 7*  --     143 142  --    K 3.6 4.2 4.6  --     112 114*  --    CO2 29.0 26.0 27.0  --    GLU 89 86 95  --    CA 8.6* 8.7 8.8  --    ALB 3.5 3.2 3.4  --    ALKPHO 286* 263* 316*  --    BILT 1.1 0.5 0.3  --    TP 5.5* 5.4* 5.5*  --    * 48* 30  --    * 89* 71*  --                Recent Labs   Lab 24  1308 24  0750 04/15/24  0538   OLAYINKA  --   --  153*   * 129* 129*   ETOH <3  --   --          Imaging:  NM GB HEPATOBILIARY SCAN  (CPT=78226)     Result Date: 4/15/2024  CONCLUSION:         Negative for scintigraphic evidence of acute cholecystitis or biliary obstruction.    Dictated by (CST): Emmanuel Ryan MD on 4/15/2024 at 12:34 PM     Finalized by (CST): Emmanuel Ryan MD on 4/15/2024 at 12:35 PM             Problem list:      Patient Active Problem List   Diagnosis     Hypokalemia    Duodenitis    Gastric mass    Gastric outlet obstruction (HCC)    Prolonged QT interval    Pneumoperitoneum    CARLOS A (acute kidney injury) (HCC)    Hyponatremia    Metabolic alkalosis    Lightheaded    Weakness generalized    Traumatic injury of head, initial encounter    Hypotension, unspecified hypotension type    Malfunction of jejunostomy tube (HCC)    Hyperglycemia    Acute pancreatitis, unspecified complication status, unspecified pancreatitis type (HCC)    Portal vein thrombosis    Transaminitis    Acute cholecystitis    Ascending cholangitis (HCC)         Assessment  Abdominal pain  -etiology likely due to acute on chronic pancreatitis.  -No evidence of obvious acute cholecystitis given the negative HIDA scan  Elevated liver enzymes  -Suspect that it is from peripancreatic inflammation  -Ultrasound did raise a question of possible bile duct irregularity and possible stone however the recent MRI MRCP was reviewed and the images were very good without evidence of any choledocholithiasis or biliary abnormalities.  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   Had EGD 3/4/2024 with moderate severe duodenitis w/ mild narrowing:  path : chronic peptic duodenitis , no malignancy or h pylori         PLAN:  1. Check Phosphatidylethanol (PETH) level to assure no recent alcohol use.    2. Advance diet to soft low fat-   3. EUS outpatient with Dr Toussaint in the next 2 weeks.  If EUS is abnormal with bile duct stone then ERCP to be done at that time.  4. Daily PPI    5. Avoid all alcohol. Emphasized smoking cessation.    Progress Note           Jarrett Bal Patient Status:  Inpatient    1975 MRN [id number]   Location [facility] 1W Attending Philipp Garcias MD   Hosp Day # 4 PCP None Pcp      Subjective:  Feels ok  hungry     Objective/Physical Exam:  General: Alert,  orientated x3.  Cooperative.  No apparent distress.  Vital Signs:  Blood pressure 106/65, pulse 66, temperature 97.7 °F (36.5 °C), temperature source Oral, resp. rate 18, height 5' 8\" (1.727 m), weight 123 lb 6.4 oz (56 kg), SpO2 100%.  Abdomen:  Soft, non-distended, non-tender,   Labs:        Lab Results   Component Value Date     WBC 5.0 04/16/2024     HGB 13.0 04/16/2024     HCT 40.0 04/16/2024     .0 04/16/2024     CREATSERUM 0.78 04/16/2024     BUN <5 (L) 04/16/2024      04/16/2024     K 4.2 04/16/2024      (H) 04/16/2024     CO2 25.0 04/16/2024     GLU 85 04/16/2024     CA 8.8 04/16/2024     ALB 3.6 04/16/2024     ALKPHO 271 (H) 04/16/2024     BILT 0.4 04/16/2024     TP 5.8 04/16/2024     AST 22 04/16/2024     ALT 48 04/16/2024     PTT 27.8 08/07/2018     INR 0.98 03/03/2024     TSH 1.999 03/05/2024     OLAYINKA 153 (H) 04/15/2024      (H) 04/15/2024     CRP 1.40 (H) 03/04/2024     MG 1.9 03/05/2024     PHOS 4.5 03/05/2024     ETOH <3 04/12/2024         Imaging:  NM GB HEPATOBILIARY SCAN  (CPT=78226)     Result Date: 4/15/2024  PROCEDURE:         NM GB HEPATOBILIARY SCAN (CPT=78226)  COMPARISON:       [facility], CT ABDOMEN+PELVIS BARIATRIC PROTOCOL (CONTRAST ONLY) (CPT=74177), 2/17/2024, 4:14 PM.  [facility], CT ABDOMEN PELVIS IV CONTRAST NO ORAL (ER), 12/19/2023, 4:31 PM.  [facility], US ABDOMEN LIMITED (CPT=76705), 4/15/2024, 9:28 AM.  [facility], MRI ABDOMEN AND MRCP W/3D (UNJ=[id number]), 4/12/2024, 9:53 PM.  [facility], CT ABDOMEN + PELVIS (CONTRAST ONLY) (CPT=74177), 4/12/2024, 3:12 PM.  [facility], CT ABDOMEN + PELVIS (CONTRAST ONLY) (CPT=74177), 3/02/2024, 9:35 AM.  [facility], CT ABDOMEN + PELVIS (CONTRAST ONLY) (CPT=74177), 2/20/2024, 10:44 AM.  INDICATIONS:  Pancreatitis with right upper quadrant and epigastric abdominal pain; possible cholecystitis.  TECHNIQUE:   Hepatobiliary imaging was performed after the injection of 6.1 millicuries of Technetium-99m Choletec into the right antecubital vein.   FINDINGS:        LIVER:          There is prompt, homogeneous uptake of radiotracer by the liver on the immediate and 5 minute images.  BILIARY DUCTS:         The central biliary structures are seen 10 minutes after tracer injection.  GALLBLADDER:       Normal. The gallbladder was visualized by the 15 minute image.  INTESTINE:         Normal, with evidence of tracer by the 10 minute image. This is confirmed on subsequent images.            CONCLUSION:         Negative for scintigraphic evidence of acute cholecystitis or biliary obstruction.    Dictated by (CST): Emmanuel Ryan MD on 4/15/2024 at 12:34 PM     Finalized by (CST): Emmanuel Ryan MD on 4/15/2024 at 12:35 PM           US ABDOMEN LIMITED (CPT=76705)     Result Date: 4/15/2024  PROCEDURE:         US ABDOMEN LIMITED (CPT=76705)  COMPARISON:         [facility], MRI ABDOMEN AND MRCP W/3D (YEL=[id number]), 4/12/2024, 9:53 PM.  [facility], CT ABDOMEN PELVIS IV CONTRAST NO ORAL (ER), 12/19/2023, 4:31 PM.  [facility], CT ABDOMEN+PELVIS BARIATRIC PROTOCOL (CONTRAST ONLY) (CPT=74177), 2/17/2024, 4:14 PM.  [facility], CT ABDOMEN + PELVIS (CONTRAST ONLY) (CPT=74177), 2/20/2024, 10:44 AM.  [facility], CT ABDOMEN + PELVIS (CONTRAST ONLY) (CPT=74177), 3/02/2024, 9:35 AM.  [facility], CT ABDOMEN + PELVIS (CONTRAST ONLY) (CPT=74177), 4/12/2024, 3:12 PM.  INDICATIONS:           Right upper quadrant and epigastric abdominal pain.  TECHNIQUE:          Limited evaluation of the areas indicated in the order with gray scale and colorflow of the main vessels where appropriate. Areas scanned: Targeted sonographic interrogation of the right upper quadrant was performed.  FINDINGS:     LIVER:          Enlarged, measuring 17.8 cm. There is diffusely  increased parenchymal echogenicity and heterogeneous, coarse echotexture, compatible with hepatic steatosis. These features impair penetration of the acoustic beam and reduce sensitivity for detection of hepatic lesions. Within these parameters, no focal masses are identified. There is no intrahepatic biliary ductal dilatation.  GALLBLADDER/CBD:          No echogenic, shadowing calculi are visible within the gallbladder lumen. There is no significant biliary sludge. Diffuse gallbladder wall thickening measures up to 0.4 cm, and pericholecystic fluid accumulation is apparent. Mild, diffuse intraluminal dilatation is evident. The common bile duct is dilated in caliber, measuring 0.8 cm. In the common bile duct, there is an ovoid echogenic structure measuring 0.7 x 0.4 x 0.5 cm. RIGHT KIDNEY: Measures 9.7 cm in length. Survey sagittal images demonstrate no collecting system dilatation. There are no visible calculi.  PANCREAS:   Visualized portions of the pancreatic head and body have a grossly unremarkable sonographic appearance. Views of the tail are obscured by intervening bowel gas. OTHER:            Patency and normal hepatopetal flow directionality of the main portal vein is demonstrated with velocity recorded as 38 cm/s.      CONCLUSION:  1. There is mild extrahepatic biliary dilatation with an apparent echogenic structure in the distal common bile duct, which could reflect choledocholithiasis. This is not corroborated on the recent MRCP, however.  2. Diffuse gallbladder wall thickening and edema with pericholecystic fluid accumulation. However, no cholelithiasis is appreciated. These findings are non-specific and may be reactive.  3. Hepatomegaly with sonographic features of hepatic steatosis.  4. Lesser incidental findings as above.    DICTATED BY (CST): JOSAFAT GIMENEZ MD ON 4/15/2024 AT 10:21 AM     FINALIZED BY (CST): JOSAFAT GIMENEZ MD ON 4/15/2024 AT 10:25 AM              MRI ABDOMEN AND MRCP W/3D  ([facility]=[id number])     Result Date: 4/13/2024  PROCEDURE:         MRI ABDOMEN&MRCP W/3D (CPT=74181/22317)     MRCP   COMPARISON:     [facility], CT ABDOMEN+PELVIS BARIATRIC PROTOCOL (CONTRAST ONLY) (CPT=74177), 2/17/2024, 4:14 PM.  [facility], CT ABDOMEN + PELVIS (CONTRAST ONLY) (CPT=74177), 2/20/2024, 10:44 AM.  [facility], CT ABDOMEN + PELVIS (CONTRAST ONLY) (CPT=74177), 3/02/2024, 9:35 AM.  [facility], CT ABDOMEN + PELVIS (CONTRAST ONLY) (CPT=74177), 4/12/2024, 3:12 PM.  INDICATIONS:    cholecystitis, obstructive biliary disease  TECHNIQUE:          A comprehensive examination was performed utilizing a variety of imaging planes and imaging parameters to optimize visualization of suspected pathology.  Images were obtained without the infusion of intravenous gadolinium-based contrast agent.            Magnetic resonance cholangiopancreatography was also performed for delineation and assessment of the pancreaticobiliary tree.  MRCP FINDINGS:  COMMENT:     Evaluation of the vasculature and of the abdominal viscera for the presence of intraparenchymal pathology is suboptimal in the absence of contrast infusion. GALLBLADDER:            No gallstones.  Mild pericholecystic edema, possibly reactive as there is no gallbladder luminal distention. BILE DUCTS:         Mild 9 mm dilation of the common bile duct.  Common bile duct demonstrates smooth distal tapering.  There is mild central intrahepatic biliary ductal dilation.  No MRCP evidence of choledocholithiasis. PANCREAS:  There is peripancreatic edema, which predominantly involves the pancreatic head and pancreaticoduodenal groove.  Mild 4 mm prominence of the main pancreatic duct.  Marked wall thickening at the C-loop of the duodenum adjacent to the pancreatic groove.  Moderate volume peripancreatic and Cara duodenal free fluid, which includes a dominant 6 x 2.5 cm loculation of fluid between the C-loop of  the duodenum and the gallbladder.  Smaller approximate 1.1 cm cystic collection at the pancreaticoduodenal groove is unchanged over serial prior abdominal CT exams.  OTHER FINDINGS:   LUNG BASES:        Prominent but nonenlarged right cardiophrenic lymph node, which may be reactive. There is dependent subsegmental atelectasis bilaterally. LIVER:No focal hepatic mass is seen.  Edema at the hepatic hilum may be reactive. SPLEEN:        No enlargement.  ADRENALS:           Unremarkable, without focal mass or enlargement.  KIDNEYS:   No hydronephrosis or solid masses are apparent.  VASCULATURE:     Suboptimally assessed within the parameters of this noncontrast exam.  Note that known chronic portal vein thrombosis is not well assessed on this noncontrast exam. LYMPH NODES:       No lymphadenopathy is detected.  ABDOMINAL WALL:   Unremarkable.  BONES:     Suboptimally assessed by scan protocol, but without grossly apparent bony lesion or fracture.  OTHER:         There is small volume perihepatic free fluid.  Partially imaged left upper quadrant gastrojejunostomy.          CONCLUSION:         1. Marked circumferential wall thickening at the C-loop of the duodenum.  There is extensive surrounding edema/infiltration, which extends to the pancreaticoduodenal groove.  Also identified is moderate volume peripancreatic and periduodenal free fluid.  Multiple loculations of fluid are also noted in the right upper quadrant, which include a dominant 6 x 2.5 cm loculation interposed between the gallbladder and C-loop of the duodenum.  These fluid collections are unchanged since previous day abdominal CT, but new or significantly increased relative to abdominal CT from March, 2024. 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  Worsening fluid collections could relate to micro perforation, although there was no free  intraperitoneal air on previous day abdominal CT to suggest yadira perforation.  EGD correlation should be considered. 2. Mild likely reactive gallbladder wall thickening and pericholecystic edema.  There is also trace perihepatic ascites.  No MRCP evidence of cholelithiasis.  As there is no gallbladder luminal distention, acute cholecystitis is unlikely. 3. Mild 9 mm dilation of the common bile duct with additional mild central intrahepatic biliary ductal dilation.  Common bile duct demonstrates smooth distal tapering.  No MRCP evidence of choledocholithiasis.  Nonspecific mild 4 mm prominence of the main pancreatic duct at the level of the pancreatic neck and body. 4. Nonspecific edema at the hepatic hilum, which may be reactive.  Known chronic portal vein thrombosis is not well assessed on this noncontrast exam. 5. Prominent but nonenlarged right cardiophrenic lymph node is likely reactive.    A preliminary report was issued by the [facility] teleradiology service. There are no major discrepancies.  [facility]  Dictated by (CST): Yaya Waktins MD on 4/13/2024 at 10:24 AM     Finalized by (CST): Yaya Watkins MD on 4/13/2024 at 10:41 AM           CT ABDOMEN+PELVIS(CONTRAST ONLY)(CPT=74177)     Result Date: 4/12/2024  PROCEDURE:         CT ABDOMEN + PELVIS (CONTRAST ONLY) (CPT=74177)  COMPARISON:      [facility], CT ABDOMEN + PELVIS (CONTRAST ONLY) (CPT=74177), 3/02/2024, 9:35 AM.  [facility], CT ABDOMEN + PELVIS (CONTRAST ONLY) (CPT=74177), 2/20/2024, 10:44 AM.  INDICATIONS:     abd pain/ vomiting  TECHNIQUE:        CT images of the abdomen and pelvis were obtained with non-ionic intravenous contrast material.  Automated exposure control for dose reduction was used. Adjustment of the mA and/or kV was done based on the patient's size. Use of iterative reconstruction technique for dose reduction was used.  Dose information is transmitted to the ACR (American College of  Radiology) NRDR (National Radiology Data Registry) which includes the Dose Index Registry.  FINDINGS:   LOWER THORAX:              Coronary artery calcifications cannot be assessed.  No visible pulmonary or pleural disease. LIVER:          No intrinsic lesion.  Peribiliary edema.  Previously noted thrombus in the main portal vein is not seen.  The right anterior portal vein is not well seen suggesting thrombus.  Persistent nonocclusive thrombus in the splenic vein. GALLBLADDER:     Marked edema and hyperemia of the gallbladder. BILIARY:     Intrahepatic peribiliary edema.  Enlarged common bile duct measures 0.9 cm, similar to the prior exam. PANCREAS:             A few calcifications are again noted in the pancreatic head.  Borderline enlarged pancreatic duct measures 4 mm, similar to the prior exam. SPLEEN:    No enlargement or focal lesion.  ADRENALS:         No mass or enlargement.  KIDNEYS:     No mass, obstruction, or calcification.  RETROPERITONEUM:          No mass or enlarged adenopathy.  AORTA/VASCULAR:             No aneurysm or dissection. PERITONEUM:           No free fluid or air. GI TRACT/MESENTERY:     Marked thickening and edema of the gastric pylorus and 1st and 2nd portions of the duodenum are again noted, similar or slightly worsened compared to the prior exam.  Cyst at the pancreaticoduodenal groove is stable compared to the  prior exam measuring 1.8 cm.  There is surrounding edema.  URINARY BLADDER: Unremarkable. REPRODUCTIVE ORGANS:        Unremarkable. ABDOMINAL WALL:        No acute abnormality. BONES:          No acute fracture.  Mild spondylosis.           CONCLUSION:          1. New marked inflammation of the gallbladder and intrahepatic peribiliary edema.  2. Increased fluid and inflammation around the pancreatic head, gastric pylorus, and duodenum.  Stable peripancreatic cyst at the pancreaticoduodenal groove.  No evidence of perforation.  3. Stable nonocclusive thrombus in the splenic  vein near the confluence.  Previously noted thrombus in the main portal vein has resolved.  Stable thrombosis of the anterior division of the right portal vein.  4. Additional chronic or incidental findings are described in the body of this report.     Dictated by (CST): Hudson Ambrose MD on 4/12/2024 at 3:36 PM     Finalized by (CST): Hudson Ambrose MD on 4/12/2024 at 3:45 PM              Assessment/Plan:      Patient Active Problem List   Diagnosis    Hypokalemia    Duodenitis    Gastric mass    Gastric outlet obstruction (HCC)    Prolonged QT interval    Pneumoperitoneum    CARLOS A (acute kidney injury) (HCC)    Hyponatremia    Metabolic alkalosis    Lightheaded    Weakness generalized    Traumatic injury of head, initial encounter    Hypotension, unspecified hypotension type    Malfunction of jejunostomy tube (HCC)    Hyperglycemia    Acute pancreatitis, unspecified complication status, unspecified pancreatitis type (HCC)    Portal vein thrombosis    Transaminitis    Acute cholecystitis    Ascending cholangitis (HCC)   Hepato-biliary neg  US   ? Choledocholithiasis  LFT's nl except decreasing Alk phos  TB 0.4/DB0.2  Amylase 153  Lipase 129  Pancreatitis  residual  with fluid but no true pseudocyst  Even though gallbladder shows some dilatation and hyperemia  (no cholelithiasis) I feel that this is reactive from pancreatitis  Advise observation    increase po    Discussed with Dr. Martin   possible outpt endoscopic US   Most likely restart anti-coag soon          MEDICATIONS ADMINISTERED IN LAST 1 DAY:  potassium chloride 20 mEq in dextrose 5%-sodium chloride 0.45% 1000mL infusion premix       Date Action Dose Route User    4/15/2024 1944 New Bag (none) Intravenous Nathan French RN          morphINE PF 4 MG/ML injection 4 mg       Date Action Dose Route User    4/15/2024 2150 Given 4 mg Intravenous Nathan French RN          pantoprazole (Protonix) DR tab 40 mg       Date Action Dose Route User    4/16/2024 0500  Given 40 mg Oral Nathan French RN          piperacillin-tazobactam (Zosyn) 3.375 g in dextrose 5% 100 mL IVPB-ADDV       Date Action Dose Route User    4/16/2024 1405 New Bag 3.375 g Intravenous Maddison Mead RN    4/16/2024 0500 New Bag 3.375 g Intravenous Nathan French RN    4/15/2024 2150 New Bag 3.375 g Intravenous Nathan French RN            Vitals (last day)       Date/Time Temp Pulse Resp BP SpO2 Weight O2 Device O2 Flow Rate (L/min) Who          CIWA Scores (since admission)       None              PLEASE FAX DAYS CERTIFIED AND NEXT REVIEW DATE [phone]

## 2024-04-16 NOTE — PROGRESS NOTES
[facility]  part of [facility]    Progress Note    Jarrett Bal Patient Status:  Inpatient    1975 MRN [id number]   Location [facility] 1W Attending Philipp Garcias MD   Hosp Day # 4 PCP None Pcp     Subjective:  Feels ok  hungry    Objective/Physical Exam:  General: Alert, orientated x3.  Cooperative.  No apparent distress.  Vital Signs:  Blood pressure 106/65, pulse 66, temperature 97.7 °F (36.5 °C), temperature source Oral, resp. rate 18, height 5' 8\" (1.727 m), weight 123 lb 6.4 oz (56 kg), SpO2 100%.  Abdomen:  Soft, non-distended, non-tender,   Labs:  Lab Results   Component Value Date    WBC 5.0 2024    HGB 13.0 2024    HCT 40.0 2024    .0 2024    CREATSERUM 0.78 2024    BUN <5 (L) 2024     2024    K 4.2 2024     (H) 2024    CO2 25.0 2024    GLU 85 2024    CA 8.8 2024    ALB 3.6 2024    ALKPHO 271 (H) 2024    BILT 0.4 2024    TP 5.8 2024    AST 22 2024    ALT 48 2024    PTT 27.8 2018    INR 0.98 2024    TSH 1.999 2024    OLAYINKA 153 (H) 04/15/2024     (H) 04/15/2024    CRP 1.40 (H) 2024    MG 1.9 2024    PHOS 4.5 2024    ETOH <3 2024       Imaging:  NM GB HEPATOBILIARY SCAN  (CPT=78226)    Result Date: 4/15/2024  PROCEDURE: NM GB HEPATOBILIARY SCAN (CPT=78226)  COMPARISON: [facility], CT ABDOMEN+PELVIS BARIATRIC PROTOCOL (CONTRAST ONLY) (CPT=74177), 2024, 4:14 PM.  [facility], CT ABDOMEN PELVIS IV CONTRAST NO ORAL (ER), 2023, 4:31 PM.  [facility], US ABDOMEN LIMITED (CPT=76705), 4/15/2024, 9:28 AM.  [facility], MRI ABDOMEN AND MRCP W/3D (UIM=[id number]), 2024, 9:53 PM.  [facility], CT ABDOMEN + PELVIS (CONTRAST ONLY) (CPT=74177), 2024, 3:12 PM.  [facility], CT ABDOMEN + PELVIS  (CONTRAST ONLY) (CPT=74177), 3/02/2024, 9:35 AM.  [facility], CT ABDOMEN + PELVIS (CONTRAST ONLY) (CPT=74177), 2/20/2024, 10:44 AM.  INDICATIONS: Pancreatitis with right upper quadrant and epigastric abdominal pain; possible cholecystitis.  TECHNIQUE: Hepatobiliary imaging was performed after the injection of 6.1 millicuries of Technetium-99m Choletec into the right antecubital vein.   FINDINGS:  LIVER: There is prompt, homogeneous uptake of radiotracer by the liver on the immediate and 5 minute images.  BILIARY DUCTS: The central biliary structures are seen 10 minutes after tracer injection.  GALLBLADDER: Normal. The gallbladder was visualized by the 15 minute image.  INTESTINE: Normal, with evidence of tracer by the 10 minute image. This is confirmed on subsequent images.           CONCLUSION:  Negative for scintigraphic evidence of acute cholecystitis or biliary obstruction.    Dictated by (CST): Emmanuel Ryan MD on 4/15/2024 at 12:34 PM     Finalized by (CST): Emmanuel Ryan MD on 4/15/2024 at 12:35 PM          US ABDOMEN LIMITED (CPT=76705)    Result Date: 4/15/2024  PROCEDURE: US ABDOMEN LIMITED (CPT=76705)  COMPARISON:   [facility], MRI ABDOMEN AND MRCP W/3D (RCU=[id number]), 4/12/2024, 9:53 PM.  [facility], CT ABDOMEN PELVIS IV CONTRAST NO ORAL (ER), 12/19/2023, 4:31 PM.  [facility], CT ABDOMEN+PELVIS BARIATRIC PROTOCOL (CONTRAST ONLY) (CPT=74177), 2/17/2024, 4:14 PM.  [facility], CT ABDOMEN + PELVIS (CONTRAST ONLY) (CPT=74177), 2/20/2024, 10:44 AM.  [facility], CT ABDOMEN + PELVIS (CONTRAST ONLY) (CPT=74177), 3/02/2024, 9:35 AM.  [facility], CT ABDOMEN + PELVIS (CONTRAST ONLY) (CPT=74177), 4/12/2024, 3:12 PM.  INDICATIONS:   Right upper quadrant and epigastric abdominal pain.  TECHNIQUE:   Limited evaluation of the areas indicated in the order with gray scale and colorflow of the main vessels  where appropriate. Areas scanned: Targeted sonographic interrogation of the right upper quadrant was performed.  FINDINGS:  LIVER:   Enlarged, measuring 17.8 cm. There is diffusely increased parenchymal echogenicity and heterogeneous, coarse echotexture, compatible with hepatic steatosis. These features impair penetration of the acoustic beam and reduce sensitivity for detection of hepatic lesions. Within these parameters, no focal masses are identified. There is no intrahepatic biliary ductal dilatation.  GALLBLADDER/CBD: No echogenic, shadowing calculi are visible within the gallbladder lumen. There is no significant biliary sludge. Diffuse gallbladder wall thickening measures up to 0.4 cm, and pericholecystic fluid accumulation is apparent. Mild, diffuse intraluminal dilatation is evident. The common bile duct is dilated in caliber, measuring 0.8 cm. In the common bile duct, there is an ovoid echogenic structure measuring 0.7 x 0.4 x 0.5 cm. RIGHT KIDNEY: Measures 9.7 cm in length. Survey sagittal images demonstrate no collecting system dilatation. There are no visible calculi.  PANCREAS: Visualized portions of the pancreatic head and body have a grossly unremarkable sonographic appearance. Views of the tail are obscured by intervening bowel gas. OTHER:   Patency and normal hepatopetal flow directionality of the main portal vein is demonstrated with velocity recorded as 38 cm/s.      CONCLUSION:  1. There is mild extrahepatic biliary dilatation with an apparent echogenic structure in the distal common bile duct, which could reflect choledocholithiasis. This is not corroborated on the recent MRCP, however.  2. Diffuse gallbladder wall thickening and edema with pericholecystic fluid accumulation. However, no cholelithiasis is appreciated. These findings are non-specific and may be reactive.  3. Hepatomegaly with sonographic features of hepatic steatosis.  4. Lesser incidental findings as above.    DICTATED BY  (CST): JOSAFAT GIMENEZ MD ON 4/15/2024 AT 10:21 AM     FINALIZED BY (CST): JOSAFAT GIMENEZ MD ON 4/15/2024 AT 10:25 AM             MRI ABDOMEN AND MRCP W/3D (CPT=74181/39800)    Result Date: 4/13/2024  PROCEDURE: MRI ABDOMEN&MRCP W/3D (CPT=74181/58633)  MRCP   COMPARISON: [facility], CT ABDOMEN+PELVIS BARIATRIC PROTOCOL (CONTRAST ONLY) (CPT=74177), 2/17/2024, 4:14 PM.  [facility], CT ABDOMEN + PELVIS (CONTRAST ONLY) (CPT=74177), 2/20/2024, 10:44 AM.  [facility], CT ABDOMEN + PELVIS (CONTRAST ONLY) (CPT=74177), 3/02/2024, 9:35 AM.  [facility], CT ABDOMEN + PELVIS (CONTRAST ONLY) (CPT=74177), 4/12/2024, 3:12 PM.  INDICATIONS: cholecystitis, obstructive biliary disease  TECHNIQUE: A comprehensive examination was performed utilizing a variety of imaging planes and imaging parameters to optimize visualization of suspected pathology.  Images were obtained without the infusion of intravenous gadolinium-based contrast agent.   Magnetic resonance cholangiopancreatography was also performed for delineation and assessment of the pancreaticobiliary tree.  MRCP FINDINGS:  COMMENT:  Evaluation of the vasculature and of the abdominal viscera for the presence of intraparenchymal pathology is suboptimal in the absence of contrast infusion. GALLBLADDER:   No gallstones.  Mild pericholecystic edema, possibly reactive as there is no gallbladder luminal distention. BILE DUCTS:   Mild 9 mm dilation of the common bile duct.  Common bile duct demonstrates smooth distal tapering.  There is mild central intrahepatic biliary ductal dilation.  No MRCP evidence of choledocholithiasis. PANCREAS: There is peripancreatic edema, which predominantly involves the pancreatic head and pancreaticoduodenal groove.  Mild 4 mm prominence of the main pancreatic duct.  Marked wall thickening at the C-loop of the duodenum adjacent to the pancreatic groove.  Moderate volume peripancreatic and Cara  duodenal free fluid, which includes a dominant 6 x 2.5 cm loculation of fluid between the C-loop of the duodenum and the gallbladder.  Smaller approximate 1.1 cm cystic collection at the pancreaticoduodenal groove is unchanged over serial prior abdominal CT exams.  OTHER FINDINGS:  LUNG BASES: Prominent but nonenlarged right cardiophrenic lymph node, which may be reactive. There is dependent subsegmental atelectasis bilaterally. LIVER: No focal hepatic mass is seen.  Edema at the hepatic hilum may be reactive. SPLEEN: No enlargement.  ADRENALS: Unremarkable, without focal mass or enlargement.  KIDNEYS: No hydronephrosis or solid masses are apparent.  VASCULATURE: Suboptimally assessed within the parameters of this noncontrast exam.  Note that known chronic portal vein thrombosis is not well assessed on this noncontrast exam. LYMPH NODES: No lymphadenopathy is detected.  ABDOMINAL WALL: Unremarkable.  BONES: Suboptimally assessed by scan protocol, but without grossly apparent bony lesion or fracture.  OTHER: There is small volume perihepatic free fluid.  Partially imaged left upper quadrant gastrojejunostomy.         CONCLUSION:  1. Marked circumferential wall thickening at the C-loop of the duodenum.  There is extensive surrounding edema/infiltration, which extends to the pancreaticoduodenal groove.  Also identified is moderate volume peripancreatic and periduodenal free fluid.  Multiple loculations of fluid are also noted in the right upper quadrant, which include a dominant 6 x 2.5 cm loculation interposed between the gallbladder and C-loop of the duodenum.  These fluid collections are unchanged since previous day abdominal CT, but new or significantly increased relative to abdominal CT from March, 2024. 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  Worsening fluid collections could relate to micro  perforation, although there was no free intraperitoneal air on previous day abdominal CT to suggest yadira perforation.  EGD correlation should be considered. 2. Mild likely reactive gallbladder wall thickening and pericholecystic edema.  There is also trace perihepatic ascites.  No MRCP evidence of cholelithiasis.  As there is no gallbladder luminal distention, acute cholecystitis is unlikely. 3. Mild 9 mm dilation of the common bile duct with additional mild central intrahepatic biliary ductal dilation.  Common bile duct demonstrates smooth distal tapering.  No MRCP evidence of choledocholithiasis.  Nonspecific mild 4 mm prominence of the main pancreatic duct at the level of the pancreatic neck and body. 4. Nonspecific edema at the hepatic hilum, which may be reactive.  Known chronic portal vein thrombosis is not well assessed on this noncontrast exam. 5. Prominent but nonenlarged right cardiophrenic lymph node is likely reactive.    A preliminary report was issued by the [facility] teleradiology service. There are no major discrepancies.  [facility]  Dictated by (CST): Yaya Watkins MD on 4/13/2024 at 10:24 AM     Finalized by (CST): Yaya Watkins MD on 4/13/2024 at 10:41 AM          CT ABDOMEN+PELVIS(CONTRAST ONLY)(CPT=74177)    Result Date: 4/12/2024  PROCEDURE: CT ABDOMEN + PELVIS (CONTRAST ONLY) (CPT=74177)  COMPARISON: [facility], CT ABDOMEN + PELVIS (CONTRAST ONLY) (CPT=74177), 3/02/2024, 9:35 AM.  [facility], CT ABDOMEN + PELVIS (CONTRAST ONLY) (CPT=74177), 2/20/2024, 10:44 AM.  INDICATIONS: abd pain/ vomiting  TECHNIQUE: CT images of the abdomen and pelvis were obtained with non-ionic intravenous contrast material.  Automated exposure control for dose reduction was used. Adjustment of the mA and/or kV was done based on the patient's size. Use of iterative reconstruction technique for dose reduction was used.  Dose information is transmitted to the ACR (American  College of Radiology) NRDR (National Radiology Data Registry) which includes the Dose Index Registry.  FINDINGS:  LOWER THORAX:  Coronary artery calcifications cannot be assessed.  No visible pulmonary or pleural disease. LIVER:   No intrinsic lesion.  Peribiliary edema.  Previously noted thrombus in the main portal vein is not seen.  The right anterior portal vein is not well seen suggesting thrombus.  Persistent nonocclusive thrombus in the splenic vein. GALLBLADDER: Marked edema and hyperemia of the gallbladder. BILIARY:   Intrahepatic peribiliary edema.  Enlarged common bile duct measures 0.9 cm, similar to the prior exam. PANCREAS:   A few calcifications are again noted in the pancreatic head.  Borderline enlarged pancreatic duct measures 4 mm, similar to the prior exam. SPLEEN:   No enlargement or focal lesion.  ADRENALS:   No mass or enlargement.  KIDNEYS:   No mass, obstruction, or calcification.  RETROPERITONEUM:   No mass or enlarged adenopathy.  AORTA/VASCULAR:   No aneurysm or dissection. PERITONEUM: No free fluid or air. GI TRACT/MESENTERY:    Marked thickening and edema of the gastric pylorus and 1st and 2nd portions of the duodenum are again noted, similar or slightly worsened compared to the prior exam.  Cyst at the pancreaticoduodenal groove is stable compared to the  prior exam measuring 1.8 cm.  There is surrounding edema.  URINARY BLADDER: Unremarkable. REPRODUCTIVE ORGANS: Unremarkable. ABDOMINAL WALL:   No acute abnormality. BONES:  No acute fracture.  Mild spondylosis.          CONCLUSION:   1. New marked inflammation of the gallbladder and intrahepatic peribiliary edema.  2. Increased fluid and inflammation around the pancreatic head, gastric pylorus, and duodenum.  Stable peripancreatic cyst at the pancreaticoduodenal groove.  No evidence of perforation.  3. Stable nonocclusive thrombus in the splenic vein near the confluence.  Previously noted thrombus in the main portal vein has resolved.   Stable thrombosis of the anterior division of the right portal vein.  4. Additional chronic or incidental findings are described in the body of this report.     Dictated by (CST): Hudson Ambrose MD on 4/12/2024 at 3:36 PM     Finalized by (CST): Hudson Ambrose MD on 4/12/2024 at 3:45 PM            Assessment/Plan:  Patient Active Problem List   Diagnosis    Hypokalemia    Duodenitis    Gastric mass    Gastric outlet obstruction (HCC)    Prolonged QT interval    Pneumoperitoneum    CARLOS A (acute kidney injury) (HCC)    Hyponatremia    Metabolic alkalosis    Lightheaded    Weakness generalized    Traumatic injury of head, initial encounter    Hypotension, unspecified hypotension type    Malfunction of jejunostomy tube (HCC)    Hyperglycemia    Acute pancreatitis, unspecified complication status, unspecified pancreatitis type (HCC)    Portal vein thrombosis    Transaminitis    Acute cholecystitis    Ascending cholangitis (HCC)   Hepato-biliary neg  US   ? Choledocholithiasis  LFT's nl except decreasing Alk phos  TB 0.4/DB0.2  Amylase 153  Lipase 129  Pancreatitis  residual  with fluid but no true pseudocyst  Even though gallbladder shows some dilatation and hyperemia  (no cholelithiasis) I feel that this is reactive from pancreatitis  Advise observation    increase po    Discussed with Dr. Martin   possible outpt endoscopic US   Most likely restart anti-coag soon    YULIANA GONZÁLES MD  4/16/2024  8:50 AM

## 2024-04-16 NOTE — DISCHARGE SUMMARY
[facility]  part of [facility]    Discharge Summary    Jarrett Bal Patient Status:  Inpatient    1975 MRN [id number]   Location [facility] 1W Attending Philipp Garcias MD   Hosp Day # 4 PCP None Pcp     Date of Admission: 2024   Date of Discharge: 2024    Hospital Discharge Diagnoses: Acute Cholecystitis    Lace+ Score: 61  59-90 High Risk  29-58 Medium Risk  0-28   Low Risk.    TCM Follow-Up Recommendation:  LACE > 58: High Risk of readmission after discharge from the hospital.    .    Admitting Diagnosis: Acute cholecystitis [K81.0]  Ascending cholangitis (HCC) [K83.09]  Transaminitis [R74.01]  Acute pancreatitis, unspecified complication status, unspecified pancreatitis type (HCC) [K85.90]    Disposition: Home    Discharge Diagnosis: .Principal Problem:    Acute pancreatitis, unspecified complication status, unspecified pancreatitis type (HCC)  Active Problems:    Transaminitis    Acute cholecystitis    Ascending cholangitis (HCC)      Hospital Course:   Reason for Admission:   Per Dr. 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 Last hospitalization was in 2024. At that time, his liver function tests were slightly elevated. Patient had an upper endoscopy during recent hospitalization which showed severe duodenitis. Stabilized and discharged home. Today, came back with recurrent abdominal pain, this time epigastric and right upper quadrant. Started 3 days ago. CBC today showed white blood cell count of 14.8 with left shift. Chemistry was unremarkable. Liver function tests; AST and  and 100; alkaline phosphatase 283; and direct bilirubin 0.5, slightly elevated. Alcohol level was negative. CT scan of the abdomen showed new markedly inflamed and distended gallbladder with increased fluid and inflammation around the pancreatic head, gastric  pylorus, and duodenum; nonocclusive thrombus in the splenic vein; and resolution of portal vein thrombus; biliary tract common bile duct is 0.9 cm, slightly dilated.     Discharge Physical Exam:   Physical Exam:    General: No acute distress.   Respiratory: Clear to auscultation bilaterally. No wheezes. No rhonchi.  Cardiovascular: S1, S2. Regular rate and rhythm. No murmurs, rubs or gallops.   Abdomen: Soft, nontender, nondistended.  Positive bowel sounds. No rebound or guarding.  Neurologic: No focal neurological deficits.   Musculoskeletal: Moves all extremities.    Hospital Course:      Acute pancreatitis, unspecified complication status, unspecified pancreatitis type (HCC)  Acute Cholecystitis  Chronic  Pain control  Soft diet  Hida scan reviewed- negative for cholecystitis   Hold off on surgery for now  Follow up with Dr. Marc in 10 days  Will do outpatient endoscopy  Discussed with Dr. Marc        Transaminitis  resolved            Quality:  DVT Prophylaxis: heparin  CODE status: Full       Complications: none    Consultants         Provider   Role Specialty     Sanjay Avila MD      Consulting Physician Surgical Oncology     Linden Poe MD      Consulting Physician GASTROENTEROLOGY     Philipp Garcias MD      Consulting Physician SURGERY, GENERAL                Discharge Plan:   Discharge Condition: Stable    Current Discharge Medication List        Home Meds - Modified    Details   Omeprazole 20 MG Oral Tab EC Take 20 mg by mouth daily.      Pancrelipase, Lip-Prot-Amyl, (CREON) 19858-878138 units Oral Cap DR Particles Take 36,000 Units by mouth 3 (three) times daily with meals.      HYDROcodone-acetaminophen  MG Oral Tab Take 1 tablet by mouth every 6 (six) hours as needed for Pain. Watch drowsiness no alcohol           Home Meds - Unchanged    Details   apixaban 5 MG Oral Tab Take 2 tabs (10mg) by mouth twice daily for 7 days, then take 1 tab (5mg) by mouth twice daily thereafter.       acetaminophen 500 MG Oral Tab Take 1 tablet (500 mg total) by mouth every 4 (four) hours as needed for Fever (equal to or greater than 100.4).                 Discharge Diet: Cardiac soft diet     Discharge Activity: As tolerated       Discharge Medications        CHANGE how you take these medications        Instructions Prescription details   apixaban 5 MG Tabs  Commonly known as: Eliquis  What changed:   how much to take  how to take this  when to take this      Take 2 tabs (10mg) by mouth twice daily for 7 days, then take 1 tab (5mg) by mouth twice daily thereafter.   Quantity: 74 tablet  Refills: 0            CONTINUE taking these medications        Instructions Prescription details   acetaminophen 500 MG Tabs  Commonly known as: Tylenol Extra Strength      Take 1 tablet (500 mg total) by mouth every 4 (four) hours as needed for Fever (equal to or greater than 100.4).   Refills: 0     Creon [id number] units Cpep  Generic drug: Pancrelipase (Lip-Prot-Amyl)      Take 36,000 Units by mouth 3 (three) times daily with meals.   Stop taking on: May 29, 2024  Quantity: 180 capsule  Refills: 0     HYDROcodone-acetaminophen  MG Tabs  Commonly known as: Norco      Take 1 tablet by mouth every 6 (six) hours as needed for Pain. Watch drowsiness no alcohol   Quantity: 15 tablet  Refills: 0     Omeprazole 20 MG Tbec      Take 20 mg by mouth daily.   Stop taking on: May 16, 2024  Quantity: 30 tablet  Refills: 0               Where to Get Your Medications        These medications were sent to [id number] - [address] [phone], [phone]  [facility] [address]      Phone: [phone]   Creon [id number] units Cpep  Omeprazole 20 MG Tbec       Please  your prescriptions at the location directed by your doctor or nurse    Bring a paper prescription for each of these medications  HYDROcodone-acetaminophen  MG Tabs         Follow up:      Follow-up  Information       Pcp, None Follow up in 1 week(s).    Contact information:  [place] [id number]                             Follow up Labs and imaging:         Time spent:  > 30 minutes    BISHOP KRAUS MD  4/16/2024

## 2024-04-16 NOTE — PLAN OF CARE
Problem: Patient Centered Care  Goal: Patient preferences are identified and integrated in the patient's plan of care  Description: Interventions:  - What would you like us to know as we care for you? Lives at home alone  - Provide timely, complete, and accurate information to patient/family  - Incorporate patient and family knowledge, values, beliefs, and cultural backgrounds into the planning and delivery of care  - Encourage patient/family to participate in care and decision-making at the level they choose  - Honor patient and family perspectives and choices  Outcome: Adequate for Discharge     Problem: PAIN - ADULT  Goal: Verbalizes/displays adequate comfort level or patient's stated pain goal  Description: INTERVENTIONS:  - Encourage pt to monitor pain and request assistance  - Assess pain using appropriate pain scale  - Administer analgesics based on type and severity of pain and evaluate response  - Implement non-pharmacological measures as appropriate and evaluate response  - Consider cultural and social influences on pain and pain management  - Manage/alleviate anxiety  - Utilize distraction and/or relaxation techniques  - Monitor for opioid side effects  - Notify MD/LIP if interventions unsuccessful or patient reports new pain  - Anticipate increased pain with activity and pre-medicate as appropriate  Outcome: Adequate for Discharge     Problem: RISK FOR INFECTION - ADULT  Goal: Absence of fever/infection during anticipated neutropenic period  Description: INTERVENTIONS  - Monitor WBC  - Administer growth factors as ordered  - Implement neutropenic guidelines  Outcome: Adequate for Discharge     Problem: SAFETY ADULT - FALL  Goal: Free from fall injury  Description: INTERVENTIONS:  - Assess pt frequently for physical needs  - Identify cognitive and physical deficits and behaviors that affect risk of falls.  - [place] fall precautions as indicated by assessment.  - Educate pt/family on patient safety  including physical limitations  - Instruct pt to call for assistance with activity based on assessment  - Modify environment to reduce risk of injury  - Provide assistive devices as appropriate  - Consider OT/PT consult to assist with strengthening/mobility  - Encourage toileting schedule  Outcome: Adequate for Discharge     Problem: DISCHARGE PLANNING  Goal: Discharge to home or other facility with appropriate resources  Description: INTERVENTIONS:  - Identify barriers to discharge w/pt and caregiver  - Include patient/family/discharge partner in discharge planning  - Arrange for needed discharge resources and transportation as appropriate  - Identify discharge learning needs (meds, wound care, etc)  - Arrange for interpreters to assist at discharge as needed  - Consider post-discharge preferences of patient/family/discharge partner  - Complete POLST form as appropriate  - Assess patient's ability to be responsible for managing their own health  - Refer to Case Management Department for coordinating discharge planning if the patient needs post-hospital services based on physician/LIP order or complex needs related to functional status, cognitive ability or social support system  Outcome: Adequate for Discharge     Problem: GASTROINTESTINAL - ADULT  Goal: Minimal or absence of nausea and vomiting  Description: INTERVENTIONS:  - Maintain adequate hydration with IV or PO as ordered and tolerated  - Nasogastric tube to low intermittent suction as ordered  - Evaluate effectiveness of ordered antiemetic medications  - Provide nonpharmacologic comfort measures as appropriate  - Advance diet as tolerated, if ordered  - Obtain nutritional consult as needed  - Evaluate fluid balance  Outcome: Adequate for Discharge  Goal: Maintains or returns to baseline bowel function  Description: INTERVENTIONS:  - Assess bowel function  - Maintain adequate hydration with IV or PO as ordered and tolerated  - Evaluate effectiveness of GI  medications  - Encourage mobilization and activity  - Obtain nutritional consult as needed  - Establish a toileting routine/schedule  - Consider collaborating with pharmacy to review patient's medication profile  Outcome: Adequate for Discharge   Vital signs stable. No complaint of pain, no nausea or vomiting. Tolerates low fiber, low fat diet. Seen by GI and surgeon.  Discharge order written by Dr. Mckeon after examining patient. Saline lock discontinued. Discharge instructions given and discussed with patient. Prescription for norco given to patient, patient aware of 2 medications that was e-prescribed to his pharmacy. Instructions include follow up appointment, medications to continue taking at home and when and how to take the medications. Patient verbalized understanding of all instructions. Discharged home with father, patient in stable condition.

## 2024-04-16 NOTE — PROGRESS NOTES
[facility]  Gastroenterology Progress Note    Jarrett Bal Patient Status:  Inpatient    1975 MRN [id number]   Location [facility] 1W Attending Philipp Garcias MD   Hosp Day # 4 PCP None Pcp     Subjective:  Jarrett Bal is a(n) 49 year old male.    Current complaints: denies pain. Took dose of morphine over night for abdominal cramping. Tolerating clears- hungry    Objective:  Blood pressure 106/65, pulse 66, temperature 97.7 °F (36.5 °C), temperature source Oral, resp. rate 18, height 5' 8\" (1.727 m), weight 123 lb 6.4 oz (56 kg), SpO2 100%.  Respiratory: no labored breathing  CV: RRR clear  Abdomen: nondistended, soft, non tender, BS +  Extremities: no edema  Neurologic: Ox3    Labs:   Recent Labs   Lab 24  0750 24  0546 04/15/24  0538 24  0725   WBC 4.7 5.1 5.0 5.0   HGB 12.2* 12.5* 12.9* 13.0   HCT 35.0* 36.6* 37.9* 40.0   .0 290.0 299.0 358.0   CREATSERUM 0.75 0.72 0.81  --    BUN <5* <5* 7*  --     143 142  --    K 3.6 4.2 4.6  --     112 114*  --    CO2 29.0 26.0 27.0  --    GLU 89 86 95  --    CA 8.6* 8.7 8.8  --    ALB 3.5 3.2 3.4  --    ALKPHO 286* 263* 316*  --    BILT 1.1 0.5 0.3  --    TP 5.5* 5.4* 5.5*  --    * 48* 30  --    * 89* 71*  --        Recent Labs   Lab 24  1308 24  0750 04/15/24  0538   OLAYINKA  --   --  153*   * 129* 129*   ETOH <3  --   --         Imaging:  NM GB HEPATOBILIARY SCAN  (CPT=78226)    Result Date: 4/15/2024  CONCLUSION:  Negative for scintigraphic evidence of acute cholecystitis or biliary obstruction.    Dictated by (CST): Emmanuel Ryan MD on 4/15/2024 at 12:34 PM     Finalized by (CST): Emmanuel Ryan MD on 4/15/2024 at 12:35 PM            Problem list:  Patient Active Problem List   Diagnosis    Hypokalemia    Duodenitis    Gastric mass    Gastric outlet obstruction (HCC)    Prolonged QT interval    Pneumoperitoneum    CARLOS A (acute kidney injury) (HCC)    Hyponatremia    Metabolic  alkalosis    Lightheaded    Weakness generalized    Traumatic injury of head, initial encounter    Hypotension, unspecified hypotension type    Malfunction of jejunostomy tube (HCC)    Hyperglycemia    Acute pancreatitis, unspecified complication status, unspecified pancreatitis type (HCC)    Portal vein thrombosis    Transaminitis    Acute cholecystitis    Ascending cholangitis (HCC)       Assessment  Abdominal pain  -etiology likely due to acute on chronic pancreatitis.  -No evidence of obvious acute cholecystitis given the negative HIDA scan  Elevated liver enzymes  -Suspect that it is from peripancreatic inflammation  -Ultrasound did raise a question of possible bile duct irregularity and possible stone however the recent MRI MRCP was reviewed and the images were very good without evidence of any choledocholithiasis or biliary abnormalities.  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   Had EGD 3/4/2024 with moderate severe duodenitis w/ mild narrowing:  path : chronic peptic duodenitis , no malignancy or h pylori         PLAN:  1. Check Phosphatidylethanol (PETH) level to assure no recent alcohol use.    2. Advance diet to soft low fat- >if no further pain and tolerates consider discharge later today if ok with surgery  3. EUS outpatient with Dr Toussaint in the next 2 weeks.  If EUS is abnormal with bile duct stone then ERCP to be done at that time.  4. Daily PPI    5. Avoid all alcohol. Emphasized smoking cessation.     Antonio Martin

## 2024-04-16 NOTE — CONSULTS
[facility]    PATIENT'S NAME: ARGELIA YBARRA   ATTENDING PHYSICIAN: Philipp Garcias MD   CONSULTING PHYSICIAN: Philipp Garcias MD   PATIENT ACCOUNT#:   [id number]    LOCATION:  [address] [facility]  MEDICAL RECORD #:   [id number]       YOB: 1975  ADMISSION DATE:       04/12/2024      CONSULT DATE:  04/15/2024    REPORT OF CONSULTATION      HISTORY OF PRESENT ILLNESS:  This is a 49-year-old male known to me in the past.  He has a history of EtOH-induced pancreatitis with recurrent pancreatitis.  He did undergo surgery in June 2023 for gastric outlet obstruction secondary to groove pancreatitis.  He did have a gastrojejunostomy.  He did have a G-tube previously which was removed and also removed the pancreatic stent.  He did very well since that time.  It was found, though, he did have recently a portal vein thrombosis which was studied and has been on Eliquis for anticoagulation.  Now, he came in the hospital with severe abdominal pain.  He denies any EtOH use since the last surgery in the summer.  Workup at that time showed fluid around the pancreas consistent with pancreatitis.  The gallbladder was dilated.  Question of hyperemia, although there were no gallstones.  Slight elevation in LFTs, bilirubins were normal and slight elevation of lipase.  He stated he had been having 3 days of abdominal pain, but it got worse.  He did have nausea and vomiting.  No obvious change in bowels.      PHYSICAL EXAMINATION:    GENERAL:  The examination at this time reveals him to be in no acute distress.    LUNGS:  Per attending.  ABDOMEN:  Soft, not distended.  Well-healed wound.    NEUROLOGIC:  Per attending.    ASSESSMENT AND PLAN:  The impression at this time is recurrent pancreatitis, possibility of cholecystitis or choledocholithiasis.  It should be noted he did have an MRCP which was equivocal, did not show a true stone.  I am very concerned with this portal vein thrombosis and also recurrent pancreatitis.   He is to go today for hepatobiliary scan and true ultrasound of the gallbladder to better delineate the anatomy.  Will follow consults.  He is being seen by Dr. Martin from GI.     Dictated By Philipp Garcias MD  d: 04/16/2024 09:01:01  t: 04/16/2024 10:52:14  [facility] [id number]  [facility]/

## 2024-04-16 NOTE — DISCHARGE SUMMARY
[facility]  part of [facility]    Discharge Summary    Jarrett Bal Patient Status:  Inpatient    1975 MRN [id number]   Location [facility] 1W Attending Philipp Garcias MD   Hosp Day # 4 PCP None Pcp     Date of Admission: 2024   Date of Discharge: 2024    Hospital Discharge Diagnoses: Acute Cholecystitis    Lace+ Score: 61  59-90 High Risk  29-58 Medium Risk  0-28   Low Risk.    TCM Follow-Up Recommendation:  LACE > 58: High Risk of readmission after discharge from the hospital.    .    Admitting Diagnosis: Acute cholecystitis [K81.0]  Ascending cholangitis (HCC) [K83.09]  Transaminitis [R74.01]  Acute pancreatitis, unspecified complication status, unspecified pancreatitis type (HCC) [K85.90]    Disposition: Home    Discharge Diagnosis: .Principal Problem:    Acute pancreatitis, unspecified complication status, unspecified pancreatitis type (HCC)  Active Problems:    Transaminitis    Acute cholecystitis    Ascending cholangitis (HCC)      Hospital Course:   Reason for Admission:   Per Dr. 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 Last hospitalization was in 2024. At that time, his liver function tests were slightly elevated. Patient had an upper endoscopy during recent hospitalization which showed severe duodenitis. Stabilized and discharged home. Today, came back with recurrent abdominal pain, this time epigastric and right upper quadrant. Started 3 days ago. CBC today showed white blood cell count of 14.8 with left shift. Chemistry was unremarkable. Liver function tests; AST and  and 100; alkaline phosphatase 283; and direct bilirubin 0.5, slightly elevated. Alcohol level was negative. CT scan of the abdomen showed new markedly inflamed and distended gallbladder with increased fluid and inflammation around the pancreatic head, gastric  pylorus, and duodenum; nonocclusive thrombus in the splenic vein; and resolution of portal vein thrombus; biliary tract common bile duct is 0.9 cm, slightly dilated.     Discharge Physical Exam:   Physical Exam:    General: No acute distress.   Respiratory: Clear to auscultation bilaterally. No wheezes. No rhonchi.  Cardiovascular: S1, S2. Regular rate and rhythm. No murmurs, rubs or gallops.   Abdomen: Soft, nontender, nondistended.  Positive bowel sounds. No rebound or guarding.  Neurologic: No focal neurological deficits.   Musculoskeletal: Moves all extremities.    Hospital Course:      Acute pancreatitis, unspecified complication status, unspecified pancreatitis type (HCC)  Acute Cholecystitis  Chronic  Pain control  Soft diet  Hida scan reviewed- negative for cholecystitis   Hold off on surgery for now  Follow up with Dr. Marc in 10 days  Will do outpatient endoscopy  Discussed with Dr. Marc        Transaminitis  resolved            Quality:  DVT Prophylaxis: heparin  CODE status: Full       Complications: none    Consultants         Provider   Role Specialty     Sanjay Avila MD      Consulting Physician Surgical Oncology     Linden Poe MD      Consulting Physician GASTROENTEROLOGY     Philipp Garcias MD      Consulting Physician SURGERY, GENERAL                Discharge Plan:   Discharge Condition: Stable    Current Discharge Medication List        Home Meds - Modified    Details   Omeprazole 20 MG Oral Tab EC Take 20 mg by mouth daily.      Pancrelipase, Lip-Prot-Amyl, (CREON) 15780-608165 units Oral Cap DR Particles Take 36,000 Units by mouth 3 (three) times daily with meals.      HYDROcodone-acetaminophen  MG Oral Tab Take 1 tablet by mouth every 6 (six) hours as needed for Pain. Watch drowsiness no alcohol           Home Meds - Unchanged    Details   apixaban 5 MG Oral Tab Take 2 tabs (10mg) by mouth twice daily for 7 days, then take 1 tab (5mg) by mouth twice daily thereafter.       acetaminophen 500 MG Oral Tab Take 1 tablet (500 mg total) by mouth every 4 (four) hours as needed for Fever (equal to or greater than 100.4).                 Discharge Diet: Cardiac soft diet     Discharge Activity: As tolerated       Discharge Medications        CHANGE how you take these medications        Instructions Prescription details   apixaban 5 MG Tabs  Commonly known as: Eliquis  What changed:   how much to take  how to take this  when to take this      Take 2 tabs (10mg) by mouth twice daily for 7 days, then take 1 tab (5mg) by mouth twice daily thereafter.   Quantity: 74 tablet  Refills: 0            CONTINUE taking these medications        Instructions Prescription details   acetaminophen 500 MG Tabs  Commonly known as: Tylenol Extra Strength      Take 1 tablet (500 mg total) by mouth every 4 (four) hours as needed for Fever (equal to or greater than 100.4).   Refills: 0     Creon [id number] units Cpep  Generic drug: Pancrelipase (Lip-Prot-Amyl)      Take 36,000 Units by mouth 3 (three) times daily with meals.   Stop taking on: May 29, 2024  Quantity: 180 capsule  Refills: 0     HYDROcodone-acetaminophen  MG Tabs  Commonly known as: Norco      Take 1 tablet by mouth every 6 (six) hours as needed for Pain. Watch drowsiness no alcohol   Quantity: 15 tablet  Refills: 0     Omeprazole 20 MG Tbec      Take 20 mg by mouth daily.   Stop taking on: May 16, 2024  Quantity: 30 tablet  Refills: 0               Where to Get Your Medications        These medications were sent to [id number] - [address] [phone], [phone]  [facility] [address]      Phone: [phone]   Creon [id number] units Cpep  Omeprazole 20 MG Tbec       Please  your prescriptions at the location directed by your doctor or nurse    Bring a paper prescription for each of these medications  HYDROcodone-acetaminophen  MG Tabs         Follow up:      Follow-up  Information       Pcp, None Follow up in 1 week(s).    Contact information:  [place] [id number]                             Follow up Labs and imaging:         Time spent:  > 30 minutes    BISHOP KRAUS MD  4/16/2024

## 2024-04-16 NOTE — PAYOR COMM NOTE
--------------  CONTINUED STAY REVIEW    Payor: TARA NIELSEN POS/MCNP  Subscriber #:  [id number]  Authorization Number: [id number]    Admit date: 4/12/24  Admit time:  4:29 PM    Admitting Physician: Andrez Easley MD  Attending Physician:  Philipp Garcias MD  Primary Care Physician: Pcp, None    REVIEW DOCUMENTATION:  4/15   General Surgery REPORT OF CONSULTATION   ASSESSMENT AND PLAN: The impression at this time is recurrent pancreatitis, possibility of cholecystitis or choledocholithiasis. It should be noted he did have an MRCP which was equivocal, did not show a true stone. I am very concerned with this portal vein thrombosis and also recurrent pancreatitis. He is to go today for hepatobiliary scan and true ultrasound of the gallbladder to better delineate the anatomy. Will follow consults. He is being seen by Dr. Martin from GI.   4/16  Gastroenterology Progress Note   Assessment  Abdominal pain  -etiology likely due to acute on chronic pancreatitis.  -No evidence of obvious acute cholecystitis given the negative HIDA scan  Elevated liver enzymes  -Suspect that it is from peripancreatic inflammation  -Ultrasound did raise a question of possible bile duct irregularity and possible stone however the recent MRI MRCP was reviewed and the images were very good without evidence of any choledocholithiasis or biliary abnormalities.  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   Had EGD 3/4/2024 with moderate severe duodenitis w/ mild narrowing:  path : chronic peptic duodenitis , no malignancy or h pylori         PLAN:  1. Check Phosphatidylethanol (PETH) level to assure no recent alcohol use.    2. Advance diet to soft low fat-   3. EUS outpatient with Dr Toussaint in the next 2 weeks.  If EUS is abnormal with bile duct stone then ERCP to be done at that time.  4. Daily PPI    5. Avoid all alcohol.  Emphasized smoking cessation.       General Surgery Progress Note  Assessment/Plan:      Patient Active Problem List   Diagnosis    Hypokalemia    Duodenitis    Gastric mass    Gastric outlet obstruction (HCC)    Prolonged QT interval    Pneumoperitoneum    CARLOS A (acute kidney injury) (HCC)    Hyponatremia    Metabolic alkalosis    Lightheaded    Weakness generalized    Traumatic injury of head, initial encounter    Hypotension, unspecified hypotension type    Malfunction of jejunostomy tube (HCC)    Hyperglycemia    Acute pancreatitis, unspecified complication status, unspecified pancreatitis type (HCC)    Portal vein thrombosis    Transaminitis    Acute cholecystitis    Ascending cholangitis (HCC)   Hepato-biliary neg  US   ? Choledocholithiasis  LFT's nl except decreasing Alk phos  TB 0.4/DB0.2  Amylase 153  Lipase 129  Pancreatitis  residual  with fluid but no true pseudocyst  Even though gallbladder shows some dilatation and hyperemia  (no cholelithiasis) I feel that this is reactive from pancreatitis     increase po    Discussed with Dr. Martin   possible outpt endoscopic US   Most likely restart anti-coag soon   MEDICATIONS ADMINISTERED IN LAST 1 DAY:  potassium chloride 20 mEq in dextrose 5%-sodium chloride 0.45% 1000mL infusion premix       Date Action Dose Route User    4/15/2024 1944 New Bag (none) Intravenous Nathan French RN          morphINE PF 4 MG/ML injection 4 mg       Date Action Dose Route User    4/15/2024 2150 Given 4 mg Intravenous Nathan French RN          pantoprazole (Protonix) DR tab 40 mg       Date Action Dose Route User    4/16/2024 0500 Given 40 mg Oral Nathan French RN          piperacillin-tazobactam (Zosyn) 3.375 g in dextrose 5% 100 mL IVPB-ADDV       Date Action Dose Route User    4/16/2024 1405 New Bag 3.375 g Intravenous Maddison Mead RN    4/16/2024 0500 New Bag 3.375 g Intravenous Nathan French RN    4/15/2024 2150 New Bag 3.375 g Intravenous Nathan French RN             Vitals (last day)       Date/Time Temp Pulse Resp BP SpO2 Weight O2 Device O2 Flow Rate (L/min) Who          CIWA Scores (since admission)       None              PLEASE FAX DAYS CERTIFIED AND NEXT REVIEW DATE [phone]

## 2024-04-17 ENCOUNTER — PATIENT OUTREACH (OUTPATIENT)
Dept: CASE MANAGEMENT | Age: 49
End: 2024-04-17

## 2024-04-17 DIAGNOSIS — Z02.9 ENCOUNTERS FOR UNSPECIFIED ADMINISTRATIVE PURPOSE: Primary | ICD-10-CM

## 2024-04-17 NOTE — PROGRESS NOTES
Left message on mailbox for pt to call [facility] back for TCM.  [facility] contact information [phone]  included in message.       Discharge Dx:   .Principal Problem:    Acute pancreatitis, unspecified complication status, unspecified pancreatitis type (HCC)  Active Problems:    Transaminitis    Acute cholecystitis    Ascending cholangitis (HCC)    Follow up appointments:    Hida scan reviewed- negative for cholecystitis   Hold off on surgery for now  Follow up with Dr. Marc in 10 days    Follow-up Information    Follow up With Specialties Details Why Contact Info   Pcp, None  Follow up in 1 week(s)  [facility] [id number]     Future Appointments   Date Time Provider Department Center   4/19/2024  8:30 AM eBto Sanderson MD [facility] HEM ONC EMO

## 2024-04-17 NOTE — PROGRESS NOTES
Left message on mailbox for pt to call [facility] back for TCM.  [facility] contact information [phone]  included in message.

## 2024-04-19 ENCOUNTER — OFFICE VISIT (OUTPATIENT)
Dept: HEMATOLOGY/ONCOLOGY | Facility: HOSPITAL | Age: 49
End: 2024-04-19
Attending: INTERNAL MEDICINE
Payer: COMMERCIAL

## 2024-04-19 VITALS
HEIGHT: 68 IN | OXYGEN SATURATION: 100 % | HEART RATE: 71 BPM | WEIGHT: 126.38 LBS | DIASTOLIC BLOOD PRESSURE: 73 MMHG | RESPIRATION RATE: 16 BRPM | TEMPERATURE: 98 F | SYSTOLIC BLOOD PRESSURE: 116 MMHG | BODY MASS INDEX: 19.15 KG/M2

## 2024-04-19 DIAGNOSIS — I81 PORTAL VEIN THROMBOSIS: Primary | ICD-10-CM

## 2024-04-19 DIAGNOSIS — Z79.01 ON APIXABAN THERAPY: ICD-10-CM

## 2024-04-19 PROCEDURE — 99214 OFFICE O/P EST MOD 30 MIN: CPT | Performed by: INTERNAL MEDICINE

## 2024-04-19 NOTE — PROGRESS NOTES
HPI   Jarrett Bal is a 49 year old male here for follow up of Portal vein thrombosis    On apixaban therapy.    Patient been recently admitted for acute pancreatitis in March 2024..  He does have history of chronic pancreatitis for which he had gastrojejunostomy and enterostomy in the past due to gastric outlet obstruction secondary to chronic pancreatitis.  Patient follows regularly with Dr. Ram, gastroenterologist from [facility].  Patient has been having mild episodic flares of his pancreatitis for approximately 2 months.  Since his last admission on 2/18/2024 he has been having more episodes of abdominal pain.  He recently complained of suprapubic pain after eating, which was accompanied by nausea and vomiting.  He has been recommended to take pancreatic enzymes, which he only took 1 time.     During workup this admission on CT scan of the abdomen pelvis, he was found to have mildly increased fluid and inflammation around the pancreatic head and severely thickened gastric pylorus and duodenum.  He was also found to have a new nonocclusive thrombus in the main portal vein and stable thrombosis of the anterior division of the right portal vein.     I have been consulted regarding etiology of the new nonocclusive portal vein thrombosis.  Patient does not have a prior history of VTE despite multiple hospitalizations, no family history of VTE.    He was readmitted on 4/12/24 with recurrent acute pancreatitis.  CT showed resolution of main portal  vein thrombosis and stable tributary thrombus and splenic vein.      Has been compliant with eliquis 5 mg bid.  Does have bruising and bleeding, mostly in the arms.  Works as an  and works on cars.     Review of Systems:     Review of Systems   Constitutional:  Negative for appetite change, fatigue and unexpected weight change.   HENT:   Positive for nosebleeds (from picking).    Respiratory:  Negative for cough and shortness of breath.    Cardiovascular:  Negative  for chest pain.   Gastrointestinal:  Negative for abdominal pain, blood in stool, nausea and vomiting.   Genitourinary:  Negative for hematuria.    Neurological:  Negative for dizziness, headaches and light-headedness.   Hematological:  Bruises/bleeds easily.   Psychiatric/Behavioral:  Negative for sleep disturbance.          Current Outpatient Medications   Medication Sig Dispense Refill    Omeprazole 20 MG Oral Tab EC Take 20 mg by mouth daily. 30 tablet 0    Pancrelipase, Lip-Prot-Amyl, (CREON) 27770-669907 units Oral Cap DR Particles Take 36,000 Units by mouth 3 (three) times daily with meals. 180 capsule 0    HYDROcodone-acetaminophen  MG Oral Tab Take 1 tablet by mouth every 6 (six) hours as needed for Pain. Watch drowsiness no alcohol 15 tablet 0    apixaban 5 MG Oral Tab Take 2 tabs (10mg) by mouth twice daily for 7 days, then take 1 tab (5mg) by mouth twice daily thereafter. (Patient taking differently: Take 1 tablet (5 mg total) by mouth 2 (two) times daily. Take 2 tabs (10mg) by mouth twice daily for 7 days, then take 1 tab (5mg) by mouth twice daily thereafter.) 74 tablet 0    acetaminophen 500 MG Oral Tab Take 1 tablet (500 mg total) by mouth every 4 (four) hours as needed for Fever (equal to or greater than 100.4).       Allergies:   No Known Allergies    Past Medical History:    Anesthesia complication    cramps;nausea after anesthesia when in 3rd grade    Anxiety state    Duodenitis    Pancreatitis (HCC)    PONV (postoperative nausea and vomiting)     Past Surgical History:   Procedure Laterality Date    Biopsy/removal, lymph node(s)      R neck    Egd N/A 03/04/2024    ; duodenitis,hiatal hernia,previous gastric jejunostomy    Gastrostomy/jejunostomy tube N/A 06/2023     Social History     Socioeconomic History    Marital status:    Tobacco Use    Smoking status: Every Day     Current packs/day: 0.00     Average packs/day: 0.5 packs/day for 25.0 years (12.5 ttl pk-yrs)      Types: Cigarettes     Start date: 1998     Last attempt to quit: 2023     Years since quittin.9    Smokeless tobacco: Never    Tobacco comments:     0.05 cigarettes a day, working on quitting   Vaping Use    Vaping status: Never Used   Substance and Sexual Activity    Alcohol use: Not Currently     Comment: not since 2023 (Sober 1 year)    Drug use: Not Currently     Types: Cannabis     Comment: rare    Sexual activity: Not Currently     Social Determinants of Health     Food Insecurity: No Food Insecurity (2024)    Food Insecurity     Food Insecurity: Never true   Transportation Needs: No Transportation Needs (2024)    Transportation Needs     Lack of Transportation: No   Housing Stability: Low Risk  (2024)    Housing Stability     Housing Instability: No         Family History   Problem Relation Age of Onset    Hypertension Mother     Other (Early onset Dementia) Mother     Other (recurrent falls) Mother     Hypertension Father     No Known Problems Brother     Dementia Maternal Grandmother     Other (Alzehimers) Maternal Grandmother     Other (Cardiac arrythmia) Paternal Grandmother     No Known Problems Paternal Grandfather          PHYSICAL EXAM:    /73 (BP Location: Left arm, Patient Position: Sitting, Cuff Size: adult)   Pulse 71   Temp 98 °F (36.7 °C) (Oral)   Resp 16   Ht 1.727 m (5' 8\")   Wt 57.3 kg (126 lb 6.4 oz)   SpO2 100%   BMI 19.22 kg/m²   Wt Readings from Last 6 Encounters:   24 57.3 kg (126 lb 6.4 oz)   24 56 kg (123 lb 6.4 oz)   24 55.8 kg (123 lb)   24 57.1 kg (125 lb 12.8 oz)   24 55.2 kg (121 lb 12.8 oz)   23 59 kg (130 lb)     Physical Exam  General: Patient is alert, not in acute distress.  HEENT: EOMs intact. PERRL. Oropharynx is clear.   Neck: No JVD. No palpable lymphadenopathy. Neck is supple.  Chest: Clear to auscultation.  Heart: Regular rate and rhythm.   Abdomen: Soft, non tender with good bowel  sounds.  Extremities: No edema.  Neurological: Grossly intact.   Lymphatics: There is no palpable lymphadenopathy throughout in the cervical, supraclavicular, axillary, or inguinal regions.  Psych/Depression: nl        ASSESSMENT/PLAN:     1. Portal vein thrombosis    2. On apixaban therapy      Patient with new nonocclusive portal vein thrombosis in the setting of acute pancreatitis.     No indication for hypercoagulable workup, given that this is in the setting of acute pancreatitis.     On imaging the suggestion of portal hypertension.  Recommend the patient be evaluated for varices for Wiele risk assessment prior to initiation of anticoagulation.  I reach out to GI for this purpose.  After this evaluation is completed, recommend to initiate therapeutic doses of low molecular weight heparin and if tolerating, he may be discharged home with a DOAC.  He should complete anticoagulation for 3 to 6 months.  He follows regularly with GI and can follow with GI for anticoagulation.  He is to establish care with a primary care doctor, who could also follow-up for his anticoagulation.    Recent CT on 4/12/2024 with resolution of main portal vein thrombosis.  Patient only has stable thrombosis of the anterior division of the right portal vein.  Also stable nonocclusive thrombus of the splenic vein near the confluence.     Patient can complete total of 3 months of anticoagulation, this will be in June 2024.  Refill sent to complete therapy in June.     Patient does not need further follow-up with hematology unless new issues arise.  He should continue follow-up with his primary care doctor and gastroenterology.    MDM moderate.     No orders of the defined types were placed in this encounter.      Results From Past 48 Hours:  No results found for this or any previous visit (from the past 48 hour(s)).        Imaging & Referrals:  None   No orders of the defined types were placed in this encounter.    Narrative   PROCEDURE: CT  ABDOMEN + PELVIS (CONTRAST ONLY) (CPT=74177)     COMPARISON: [facility], CT ABDOMEN + PELVIS (CONTRAST ONLY) (CPT=74177), 3/02/2024, 9:35 AM.  [facility], CT ABDOMEN + PELVIS (CONTRAST ONLY) (CPT=74177), 2/20/2024, 10:44 AM.     INDICATIONS: abd pain/ vomiting     TECHNIQUE: CT images of the abdomen and pelvis were obtained with non-ionic intravenous contrast material.  Automated exposure control for dose reduction was used. Adjustment of the mA and/or kV was done based on the patient's size. Use of iterative  reconstruction technique for dose reduction was used.  Dose information is transmitted to the ACR (American College of Radiology) NRDR (National Radiology Data Registry) which includes the Dose Index Registry.     FINDINGS:  LOWER THORAX: Coronary artery calcifications cannot be assessed.  No visible pulmonary or pleural disease.  LIVER:   No intrinsic lesion.  Peribiliary edema.  Previously noted thrombus in the main portal vein is not seen.  The right anterior portal vein is not well seen suggesting thrombus.  Persistent nonocclusive thrombus in the splenic vein.  GALLBLADDER: Marked edema and hyperemia of the gallbladder.  BILIARY:   Intrahepatic peribiliary edema.  Enlarged common bile duct measures 0.9 cm, similar to the prior exam.  PANCREAS:   A few calcifications are again noted in the pancreatic head.  Borderline enlarged pancreatic duct measures 4 mm, similar to the prior exam.  SPLEEN:   No enlargement or focal lesion.    ADRENALS:   No mass or enlargement.    KIDNEYS:   No mass, obstruction, or calcification.    RETROPERITONEUM:   No mass or enlarged adenopathy.    AORTA/VASCULAR:   No aneurysm or dissection.  PERITONEUM: No free fluid or air.  GI TRACT/MESENTERY:   Marked thickening and edema of the gastric pylorus and 1st and 2nd portions of the duodenum are again noted, similar or slightly worsened compared to the prior exam.  Cyst at the pancreaticoduodenal  groove is stable compared to the   prior exam measuring 1.8 cm.  There is surrounding edema.    URINARY BLADDER: Unremarkable.  REPRODUCTIVE ORGANS: Unremarkable.  ABDOMINAL WALL:   No acute abnormality.  BONES: No acute fracture.  Mild spondylosis.                  Impression   CONCLUSION:     1. New marked inflammation of the gallbladder and intrahepatic peribiliary edema.     2. Increased fluid and inflammation around the pancreatic head, gastric pylorus, and duodenum.  Stable peripancreatic cyst at the pancreaticoduodenal groove.  No evidence of perforation.     3. Stable nonocclusive thrombus in the splenic vein near the confluence.  Previously noted thrombus in the main portal vein has resolved.  Stable thrombosis of the anterior division of the right portal vein.     4. Additional chronic or incidental findings are described in the body of this report.             Dictated by (CST): Hudson Ambrose MD on 4/12/2024 at 3:36 PM      Finalized by (CST): Hudson Ambrose MD on 4/12/2024 at 3:45 PM

## 2024-04-26 NOTE — PAYOR COMM NOTE
--------------  DISCHARGE REVIEW    Payor: TARA NIELS[employer] POS/MCNP  Subscriber #:  [id number]  Authorization Number: [id number]    Admit date: 24  Admit time:   4:29 PM  Discharge Date: 2024  6:00 PM     Admitting Physician: Andrez Easley MD  Attending Physician:  No att. providers found  Primary Care Physician: José Miguel Toussaint MD         [facility]  part of [facility]    Discharge Summary    Jarrett Bal Patient Status:  Inpatient    1975 MRN [id number]   Location [facility] 1W Attending Philipp Garcias MD   Hosp Day # 4 PCP None Pcp     Date of Admission: 2024   Date of Discharge: 2024    Hospital Discharge Diagnoses: Acute Cholecystitis    Lace+ Score: 61  59-90 High Risk  29-58 Medium Risk  0-28   Low Risk.    TCM Follow-Up Recommendation:  LACE > 58: High Risk of readmission after discharge from the hospital.    .    Admitting Diagnosis: Acute cholecystitis [K81.0]  Ascending cholangitis (HCC) [K83.09]  Transaminitis [R74.01]  Acute pancreatitis, unspecified complication status, unspecified pancreatitis type (HCC) [K85.90]    Disposition: Home    Discharge Diagnosis: .Principal Problem:    Acute pancreatitis, unspecified complication status, unspecified pancreatitis type (HCC)  Active Problems:    Transaminitis    Acute cholecystitis    Ascending cholangitis (HCC)      Hospital Course:   Reason for Admission:   Per Dr. 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 Last hospitalization was in 2024. At that time, his liver function tests were slightly elevated. Patient had an upper endoscopy during recent hospitalization which showed severe duodenitis. Stabilized and discharged home. Today, came back with recurrent abdominal pain, this time epigastric and right upper quadrant. Started 3 days ago. CBC today showed white blood cell  count of 14.8 with left shift. Chemistry was unremarkable. Liver function tests; AST and  and 100; alkaline phosphatase 283; and direct bilirubin 0.5, slightly elevated. Alcohol level was negative. CT scan of the abdomen showed new markedly inflamed and distended gallbladder with increased fluid and inflammation around the pancreatic head, gastric pylorus, and duodenum; nonocclusive thrombus in the splenic vein; and resolution of portal vein thrombus; biliary tract common bile duct is 0.9 cm, slightly dilated.     Discharge Physical Exam:   Physical Exam:    General: No acute distress.   Respiratory: Clear to auscultation bilaterally. No wheezes. No rhonchi.  Cardiovascular: S1, S2. Regular rate and rhythm. No murmurs, rubs or gallops.   Abdomen: Soft, nontender, nondistended.  Positive bowel sounds. No rebound or guarding.  Neurologic: No focal neurological deficits.   Musculoskeletal: Moves all extremities.    Hospital Course:      Acute pancreatitis, unspecified complication status, unspecified pancreatitis type (HCC)  Acute Cholecystitis  Chronic  Pain control  Soft diet  Hida scan reviewed- negative for cholecystitis   Hold off on surgery for now  Follow up with Dr. Marc in 10 days  Will do outpatient endoscopy  Discussed with Dr. Marc        Transaminitis  resolved            Quality:  DVT Prophylaxis: heparin  CODE status: Full       Complications: none    Consultants         Provider   Role Specialty     Sanjay Avila MD      Consulting Physician Surgical Oncology     Linden Poe MD      Consulting Physician GASTROENTEROLOGY     Philipp Garcias MD      Consulting Physician SURGERY, GENERAL                Discharge Plan:   Discharge Condition: Stable    Current Discharge Medication List        Home Meds - Modified    Details   Omeprazole 20 MG Oral Tab EC Take 20 mg by mouth daily.      Pancrelipase, Lip-Prot-Amyl, (CREON) 17746-243102 units Oral Cap DR Particles Take 36,000 Units by mouth  3 (three) times daily with meals.      HYDROcodone-acetaminophen  MG Oral Tab Take 1 tablet by mouth every 6 (six) hours as needed for Pain. Watch drowsiness no alcohol           Home Meds - Unchanged    Details   apixaban 5 MG Oral Tab Take 2 tabs (10mg) by mouth twice daily for 7 days, then take 1 tab (5mg) by mouth twice daily thereafter.      acetaminophen 500 MG Oral Tab Take 1 tablet (500 mg total) by mouth every 4 (four) hours as needed for Fever (equal to or greater than 100.4).                 Discharge Diet: Cardiac soft diet     Discharge Activity: As tolerated       Discharge Medications        CHANGE how you take these medications        Instructions Prescription details   apixaban 5 MG Tabs  Commonly known as: Tarah  What changed:   how much to take  how to take this  when to take this      Take 2 tabs (10mg) by mouth twice daily for 7 days, then take 1 tab (5mg) by mouth twice daily thereafter.   Quantity: 74 tablet  Refills: 0            CONTINUE taking these medications        Instructions Prescription details   acetaminophen 500 MG Tabs  Commonly known as: Tylenol Extra Strength      Take 1 tablet (500 mg total) by mouth every 4 (four) hours as needed for Fever (equal to or greater than 100.4).   Refills: 0     Creon [id number] units Cpep  Generic drug: Pancrelipase (Lip-Prot-Amyl)      Take 36,000 Units by mouth 3 (three) times daily with meals.   Stop taking on: May 29, 2024  Quantity: 180 capsule  Refills: 0     HYDROcodone-acetaminophen  MG Tabs  Commonly known as: Norco      Take 1 tablet by mouth every 6 (six) hours as needed for Pain. Watch drowsiness no alcohol   Quantity: 15 tablet  Refills: 0     Omeprazole 20 MG Tbec      Take 20 mg by mouth daily.   Stop taking on: May 16, 2024  Quantity: 30 tablet  Refills: 0               Where to Get Your Medications        These medications were sent to "[id number] - [address] [phone],  [phone]  [address] [place] [address]      Phone: [phone]   Creon [id number] units Cpep  Omeprazole 20 MG Tbec       Please  your prescriptions at the location directed by your doctor or nurse    Bring a paper prescription for each of these medications  HYDROcodone-acetaminophen  MG Tabs         Follow up:      Follow-up Information       Pcp, None Follow up in 1 week(s).    Contact information:  [place] [address]                             Follow up Labs and imaging:         Time spent:  > 30 minutes

## 2024-04-30 ENCOUNTER — OFFICE VISIT (OUTPATIENT)
Dept: INTERNAL MEDICINE CLINIC | Facility: CLINIC | Age: 49
End: 2024-04-30
Payer: COMMERCIAL

## 2024-04-30 VITALS
TEMPERATURE: 99 F | HEART RATE: 90 BPM | DIASTOLIC BLOOD PRESSURE: 82 MMHG | OXYGEN SATURATION: 98 % | BODY MASS INDEX: 18 KG/M2 | SYSTOLIC BLOOD PRESSURE: 119 MMHG | WEIGHT: 119 LBS

## 2024-04-30 DIAGNOSIS — Z09 HOSPITAL DISCHARGE FOLLOW-UP: Primary | ICD-10-CM

## 2024-04-30 DIAGNOSIS — K86.1 CHRONIC RECURRENT PANCREATITIS (HCC): ICD-10-CM

## 2024-04-30 DIAGNOSIS — K94.13 MALFUNCTION OF JEJUNOSTOMY TUBE (HCC): ICD-10-CM

## 2024-04-30 DIAGNOSIS — F10.90 ALCOHOL USE DISORDER: ICD-10-CM

## 2024-04-30 PROCEDURE — 3074F SYST BP LT 130 MM HG: CPT | Performed by: STUDENT IN AN ORGANIZED HEALTH CARE EDUCATION/TRAINING PROGRAM

## 2024-04-30 PROCEDURE — 99215 OFFICE O/P EST HI 40 MIN: CPT | Performed by: STUDENT IN AN ORGANIZED HEALTH CARE EDUCATION/TRAINING PROGRAM

## 2024-04-30 PROCEDURE — 3079F DIAST BP 80-89 MM HG: CPT | Performed by: STUDENT IN AN ORGANIZED HEALTH CARE EDUCATION/TRAINING PROGRAM

## 2024-04-30 RX ORDER — PANCRELIPASE 36000; 180000; 114000 [USP'U]/1; [USP'U]/1; [USP'U]/1
36000 CAPSULE, DELAYED RELEASE PELLETS ORAL
Qty: 90 CAPSULE | Refills: 5 | Status: SHIPPED | OUTPATIENT
Start: 2024-04-30 | End: 2024-10-07

## 2024-04-30 RX ORDER — HYDROCODONE BITARTRATE AND ACETAMINOPHEN 10; 325 MG/1; MG/1
1 TABLET ORAL EVERY 6 HOURS PRN
Qty: 15 TABLET | Refills: 0 | Status: SHIPPED | OUTPATIENT
Start: 2024-04-30

## 2024-04-30 RX ORDER — NALTREXONE HYDROCHLORIDE 50 MG/1
50 TABLET, FILM COATED ORAL DAILY
Qty: 90 TABLET | Refills: 1 | Status: SHIPPED | OUTPATIENT
Start: 2024-04-30 | End: 2024-10-27

## 2024-04-30 NOTE — PATIENT INSTRUCTIONS
[facility] National Helpline  Confidential free help, from public health agencies, to find substance use treatment and information. Learn more  1-858.130.3337  https://www.findtreatment.gov/    Local center to aid with Opioid prescription management     Dr. Latesha Sewell (Addiction Medicine MD) (Only for Medicare, Medicaid or Un-insured patients)  [address] [address]  Please Call or Visit  [facility] intake and Engagement nohelia Cabrera [phone] and can get you in within 1-2 days if urgent. (Only for Medicare, Medicaid or Uninsured patients)      Services: Substance use treatment; Mental health treatment; Treatment for co-occurring substance use plus either serious mental health illness in adults/serious emotional disturbance in children    Payment Accepted: [facility] or local government funds; Medicare; Medicaid; Other State funds; Cash or self-payment; State mental health agency (or equivalent) funds

## 2024-04-30 NOTE — PROGRESS NOTES
OFFICE NOTE     Patient ID: Jarrett Bal is a 49 year old male.  Today's Date: 24  Chief Complaint: Hospital F/U (Pancreatis and still having abdomen pain, vommitting)    Pt is a 48y/o male with PMHx of Prolonged Qtc, Former Alcohol use disorder (sober 1 year since 2023) followed by Anthony Ram, whom was recently admitted for recurrent Acute on chronic pancreatitis, portal vein thrombosis.  He does have history of chronic pancreatitis for which he had gastrojejunostomy and enterostomy in the past due to gastric outlet obstruction secondary to chronic pancreatitis Last seen by myself . Was readmitted on  for acute pancreatitis. Patient is going thru a divorce and  from children and started drinking again and presented to clinic with pain and seeking help and discussed with his father. Pt drank 5 twisted ice tea last night and causing his pain. Pt is sad and says he starts drinking when he is alone and having a hard time adjusting to life without the kids around.          Discharge Summary  Steph Mckeon MD (Physician)  Internal Medicine  [facility]  part of [facility]     Discharge Summary           Jarrett Bal Patient Status:  Inpatient    1975 MRN [id number]   Location [facility] 1W Attending Philipp Garcias MD   Hosp Day # 4 PCP None Pcp      Date of Admission: 2024     Date of Discharge: 2024     Hospital Discharge Diagnoses: Acute Cholecystitis     Lace+ Score: 61  59-90 High Risk  29-58 Medium Risk  0-28   Low Risk.     TCM Follow-Up Recommendation:  LACE > 58: High Risk of readmission after discharge from the hospital.     .     Admitting Diagnosis: Acute cholecystitis [K81.0]  Ascending cholangitis (HCC) [K83.09]  Transaminitis [R74.01]  Acute pancreatitis, unspecified complication status, unspecified pancreatitis type (HCC) [K85.90]     Disposition: Home     Discharge Diagnosis: .Principal Problem:    Acute  pancreatitis, unspecified complication status, unspecified pancreatitis type (HCC)  Active Problems:    Transaminitis    Acute cholecystitis    Ascending cholangitis (HCC)        Hospital Course:   Reason for Admission:   Per Dr. 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 Last hospitalization was in March 2024. At that time, his liver function tests were slightly elevated. Patient had an upper endoscopy during recent hospitalization which showed severe duodenitis. Stabilized and discharged home. Today, came back with recurrent abdominal pain, this time epigastric and right upper quadrant. Started 3 days ago. CBC today showed white blood cell count of 14.8 with left shift. Chemistry was unremarkable. Liver function tests; AST and  and 100; alkaline phosphatase 283; and direct bilirubin 0.5, slightly elevated. Alcohol level was negative. CT scan of the abdomen showed new markedly inflamed and distended gallbladder with increased fluid and inflammation around the pancreatic head, gastric pylorus, and duodenum; nonocclusive thrombus in the splenic vein; and resolution of portal vein thrombus; biliary tract common bile duct is 0.9 cm, slightly dilated.      Discharge Physical Exam:   Physical Exam:    General: No acute distress.   Respiratory: Clear to auscultation bilaterally. No wheezes. No rhonchi.  Cardiovascular: S1, S2. Regular rate and rhythm. No murmurs, rubs or gallops.   Abdomen: Soft, nontender, nondistended.  Positive bowel sounds. No rebound or guarding.  Neurologic: No focal neurological deficits.   Musculoskeletal: Moves all extremities.     Hospital Course:       Acute pancreatitis, unspecified complication status, unspecified pancreatitis type (HCC)  Acute Cholecystitis  Chronic  Pain control  Soft diet  Hida scan reviewed- negative for cholecystitis   Hold off on surgery for  now  Follow up with Dr. Marc in 10 days  Will do outpatient endoscopy  Discussed with Dr. Marc        Transaminitis  resolved            Quality:  DVT Prophylaxis: heparin  CODE status: Full        Complications: none     Consultants           Provider   Role Specialty      Sanjay Avila MD       Consulting Physician Surgical Oncology      Linden Poe MD       Consulting Physician GASTROENTEROLOGY      Philipp Garcias MD       Consulting Physician SURGERY, GENERAL                      Discharge Plan:   Discharge Condition: Stable     Current Discharge Medication List              Home Meds - Modified     Details   Omeprazole 20 MG Oral Tab EC Take 20 mg by mouth daily.       Pancrelipase, Lip-Prot-Amyl, (CREON) 07835-365313 units Oral Cap DR Particles Take 36,000 Units by mouth 3 (three) times daily with meals.       HYDROcodone-acetaminophen  MG Oral Tab Take 1 tablet by mouth every 6 (six) hours as needed for Pain. Watch drowsiness no alcohol                  Home Meds - Unchanged     Details   apixaban 5 MG Oral Tab Take 2 tabs (10mg) by mouth twice daily for 7 days, then take 1 tab (5mg) by mouth twice daily thereafter.       acetaminophen 500 MG Oral Tab Take 1 tablet (500 mg total) by mouth every 4 (four) hours as needed for Fever (equal to or greater than 100.4).                                                                              Discharge Diet: Cardiac soft diet      Discharge Activity: As tolerated         Discharge Medications          CHANGE how you take these medications         Instructions Prescription details   apixaban 5 MG Tabs  Commonly known as: Tarah  What changed:   how much to take  how to take this  when to take this       Take 2 tabs (10mg) by mouth twice daily for 7 days, then take 1 tab (5mg) by mouth twice daily thereafter.    Quantity: 74 tablet  Refills: 0                CONTINUE taking these medications         Instructions Prescription details    acetaminophen 500 MG Tabs  Commonly known as: Tylenol Extra Strength       Take 1 tablet (500 mg total) by mouth every 4 (four) hours as needed for Fever (equal to or greater than 100.4).    Refills: 0      Creon [id number] units Cpep  Generic drug: Pancrelipase (Lip-Prot-Amyl)       Take 36,000 Units by mouth 3 (three) times daily with meals.    Stop taking on: May 29, 2024  Quantity: 180 capsule  Refills: 0      HYDROcodone-acetaminophen  MG Tabs  Commonly known as: Norco       Take 1 tablet by mouth every 6 (six) hours as needed for Pain. Watch drowsiness no alcohol    Quantity: 15 tablet  Refills: 0      Omeprazole 20 MG Tbec       Take 20 mg by mouth daily.    Stop taking on: May 16, 2024  Quantity: 30 tablet  Refills: 0                    Where to Get Your Medications          These medications were sent to [id number] - [address] [phone], [phone]  [address] [place] [id number]        Phone: [phone]   Creon [id number] units Cpep  Omeprazole 20 MG Tbec         Please  your prescriptions at the location directed by your doctor or nurse    Bring a paper prescription for each of these medications  HYDROcodone-acetaminophen  MG Tabs            Follow up:        Follow-up Information         Pcp, None Follow up in 1 week(s).    Contact information:  [facility] [id number]                                      Follow up Labs and imaging:            Time spent:  > 30 minutes     BISHOP KRAUS MD  4/16/2024        GASTROENTEROLOGY CONSULTATION  [facility]           Jarrett Bal Patient Status:  Inpatient   Date of Birth 2/24/1975 MRN [id number]   Location [facility] 1W Attending Andrez Easley MD   Hosp Day # 0 PCP None Pcp      Date of Consultation:  4/12/2024     History of Present Illness:     Jarrett Bal is a 49 year old male with abdominal pain.     The patient presents to the emergency room today with complaints of  abdominal pain.  He reports epigastric and right upper quadrant pain for the past 4 days.  The pain has waxed and waned.  Because of ongoing pain, he presents to the emergency room today.  The patient was found to have leukocytosis and elevated liver enzymes.  A CT scan of the abdomen reveals a markedly inflamed and distended gallbladder with peribiliary edema.     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  This was diagnosed in 2018.  He has had multiple attacks of pancreatitis. Last year, the patient developed a gastric outlet obstruction secondary to this requiring PEG placement for venting of the stomach.  He also had an ERCP with sphincterotomy at that time.  In June, the patient underwent a exploratory laparotomy with gastrojejunostomy and removal of the PEG tube.     In March, a CT scan of the abdomen and pelvis revealed a nonocclusive thrombus of the main portal vein along with a stable thrombosis of the anterior division of the right portal vein.  An EGD was performed.  No varices were seen.  He was started on anticoagulation.     The patient continues to smoke but states that he is no longer drinking alcohol.     He denies any fever or chills.  There is no nausea or vomiting.     Past Medical History:      Past Medical History       Past Medical History:    Anesthesia complication     cramps;nausea after anesthesia when in 3rd grade    Anxiety state    Duodenitis    Pancreatitis (HCC)    PONV (postoperative nausea and vomiting)            Medications:              Prior to Admission medications    Medication Sig Start Date End Date Taking? Authorizing Provider   HYDROcodone-acetaminophen  MG Oral Tab Take 1 tablet by mouth every 6 (six) hours as needed for Pain. Watch drowsiness no alcohol 3/5/24   Yes Bishop Garcia MD   apixaban 5 MG Oral Tab Take 2 tabs (10mg) by mouth twice daily for 7 days, then take 1 tab (5mg) by  mouth twice daily thereafter.  Patient taking differently: Take 1 tablet (5 mg total) by mouth 2 (two) times daily. Take 2 tabs (10mg) by mouth twice daily for 7 days, then take 1 tab (5mg) by mouth twice daily thereafter. 3/4/24   Yes Bishop Garcia MD   Pancrelipase, Lip-Prot-Amyl, (CREON) [id number] units Oral Cap DR Particles Take 36,000 Units by mouth 3 (three) times daily with meals. 2/29/24 4/12/24 Yes External/Patient, Reported   acetaminophen 500 MG Oral Tab Take 1 tablet (500 mg total) by mouth every 4 (four) hours as needed for Fever (equal to or greater than 100.4). 12/21/23   Yes Yonny Foster MD   Omeprazole 20 MG Oral Tab EC Take 20 mg by mouth daily. 3/5/24 4/4/24   Bishop Garcia MD         Current Hospital Medications          Current Facility-Administered Medications   Medication Dose Route Frequency    sodium chloride 0.9% infusion 1,000 mL  1,000 mL Intravenous Once    piperacillin-tazobactam (Zosyn) 3.375 g in dextrose 5% 100 mL IVPB-ADDV  3.375 g Intravenous Q8H    potassium chloride 20 mEq in dextrose 5%-sodium chloride 0.45% 1000mL infusion premix   Intravenous Continuous    acetaminophen (Tylenol Extra Strength) tab 500 mg  500 mg Oral Q4H PRN    ondansetron (Zofran) 4 MG/2ML injection 4 mg  4 mg Intravenous Q6H PRN    prochlorperazine (Compazine) 10 MG/2ML injection 5 mg  5 mg Intravenous Q8H PRN    morphINE PF 4 MG/ML injection 1 mg  1 mg Intravenous Q2H PRN     Or    morphINE PF 4 MG/ML injection 2 mg  2 mg Intravenous Q2H PRN     Or    morphINE PF 4 MG/ML injection 4 mg  4 mg Intravenous Q2H PRN            Family History:      Family History         Family History   Problem Relation Age of Onset    Hypertension Mother      Other (Early onset Dementia) Mother      Other (recurrent falls) Mother      Hypertension Father      No Known Problems Brother      Dementia Maternal Grandmother      Other (Alzehimers) Maternal Grandmother      Other (Cardiac arrythmia)  Paternal Grandmother      No Known Problems Paternal Grandfather              Social History:     Short Social Hx on File   Social History            Socioeconomic History    Marital status:    Tobacco Use    Smoking status: Every Day       Current packs/day: 0.00       Average packs/day: 0.5 packs/day for 25.0 years (12.5 ttl pk-yrs)       Types: Cigarettes       Start date: 1998       Last attempt to quit: 2023       Years since quittin.9    Smokeless tobacco: Never    Tobacco comments:       0.05 cigarettes a day, working on quitting   Vaping Use    Vaping status: Never Used   Substance and Sexual Activity    Alcohol use: Not Currently       Comment: not since 2023 (Sober 1 year)    Drug use: Not Currently       Types: Cannabis       Comment: rare    Sexual activity: Not Currently      Social Determinants of Health           Food Insecurity: No Food Insecurity (3/2/2024)     Food Insecurity      Food Insecurity: Never true   Transportation Needs: No Transportation Needs (3/2/2024)     Transportation Needs      Lack of Transportation: No   Housing Stability: Low Risk  (3/2/2024)     Housing Stability      Housing Instability: No            ROS:      A comprehensive 10 point review of systems was completed.  Pertinent positives and negatives noted in the the HPI.        Physical Exam:     Vital Signs:       Vitals:     24 1639   BP: 136/83   Pulse: 85   Resp: 20   Temp: 98.7 °F (37.1 °C)      General: Alert, oriented and in no acute distress  HEENT: normocephalic; non-icteric; oral cavity free of lesions  Neck:  Supple; no thyromegaly or adenopathy  Lungs:  Clear to ausculation and percussion  Heart:  Normal S1S2  Abdomen:  Soft; RUQ tenderness with guarding; no masses or hepatosplenomegaly; non-distended  Extremities:  No edema     Labs:             Lab Results   Component Value Date     WBC 14.8 2024     HGB 14.2 2024     HCT 42.4 2024     .0 2024      CREATSERUM 0.73 04/12/2024     BUN 10 04/12/2024      04/12/2024     K 4.2 04/12/2024      04/12/2024     CO2 28.0 04/12/2024      04/12/2024     CA 9.3 04/12/2024     ALB 4.3 04/12/2024     ALKPHO 283 04/12/2024     BILT 0.9 04/12/2024     TP 6.8 04/12/2024      04/12/2024      04/12/2024      04/12/2024     ETOH <3 04/12/2024         RADIOLOGY:     PROCEDURE:CT ABDOMEN + PELVIS (CONTRAST ONLY) (CPT=74177)     COMPARISON: [facility], CT ABDOMEN + PELVIS (CONTRAST ONLY) (CPT=74177), 3/02/2024, 9:35 AM.  [facility], CT ABDOMEN + PELVIS (CONTRAST ONLY) (CPT=74177), 2/20/2024, 10:44 AM.     INDICATIONS:abd pain/ vomiting     TECHNIQUE:    CT images of the abdomen and pelvis were obtained with non-ionic intravenous contrast material.  Automated exposure control for dose reduction was used. Adjustment of the mA and/or kV was done based on the patient's size. Use of iterative   reconstruction technique for dose reduction was used.  Dose information is transmitted to the ACR (American College of Radiology) NRDR (National Radiology Data Registry) which includes the Dose Index Registry.     FINDINGS:          LOWER THORAX:                      Coronary artery calcifications cannot be assessed.  No visible pulmonary or pleural disease.  LIVER:               No intrinsic lesion.  Peribiliary edema.  Previously noted thrombus in the main portal vein is not seen.  The right anterior portal vein is not well seen suggesting thrombus.  Persistent nonocclusive thrombus in the splenic vein.  GALLBLADDER:Marked edema and hyperemia of the gallbladder.  BILIARY:            Intrahepatic peribiliary edema.  Enlarged common bile duct measures 0.9 cm, similar to the prior exam.  PANCREAS:      A few calcifications are again noted in the pancreatic head.  Borderline enlarged pancreatic duct measures 4 mm, similar to the prior exam.  SPLEEN:           No enlargement or  focal lesion.    ADRENALS:      No mass or enlargement.    KIDNEYS:          No mass, obstruction, or calcification.    RETROPERITONEUM:               No mass or enlarged adenopathy.    AORTA/VASCULAR:      No aneurysm or dissection.  PERITONEUM:No free fluid or air.  GI TRACT/MESENTERY:           Marked thickening and edema of the gastric pylorus and 1st and 2nd portions of the duodenum are again noted, similar or slightly worsened compared to the prior exam.  Cyst at the pancreaticoduodenal groove is stable compared to the   prior exam measuring 1.8 cm.  There is surrounding edema.    URINARY BLADDER:Unremarkable.  REPRODUCTIVE ORGANS:Unremarkable.  ABDOMINAL WALL:      No acute abnormality.  BONES:             No acute fracture.  Mild spondylosis.        =====  CONCLUSION:      1. New marked inflammation of the gallbladder and intrahepatic peribiliary edema.     2. Increased fluid and inflammation around the pancreatic head, gastric pylorus, and duodenum.  Stable peripancreatic cyst at the pancreaticoduodenal groove.  No evidence of perforation.     3. Stable nonocclusive thrombus in the splenic vein near the confluence.  Previously noted thrombus in the main portal vein has resolved.  Stable thrombosis of the anterior division of the right portal vein.     4. Additional chronic or incidental findings are described in the body of this report.             Dictated by (CST): Hudson Ambrose MD on 4/12/2024 at 3:36 PM       Finalized by (CST): Hudson Ambrose MD on 4/12/2024 at 3:45 PM     Assessment:     Acute cholecystitis  Elevated liver enzymes  Chronic groove pancreatitis  Portal vein/splenic vein thrombosis  History of gastric outlet obstruction - s/p gastrojejunostomy     The patient presents with acute cholecystitis.  He also has elevated liver enzymes.  I suspect this is secondary to cholecystitis.  His common bile duct is dilated but this is chronic and unchanged from previous.  The biliary ductal dilation is  most likely secondary to his chronic groove pancreatitis.     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  He is not using alcohol at this time but continues to smoke.  He has had multiple episodes of pancreatitis.  This has been complicated by the development of a gastric outlet obstruction requiring gastrojejunostomy and portal and splenic vein thromboses.  He has been on anticoagulation since March.  CT scan today shows resolution of the portal vein thrombosis.  Thrombosis is still noted in the splenic vein as well as the anterior division of the right portal vein.     Plan:     MRCP  Surgical consultation  NPO.  IV antibiotics.   Hold Eliquis.         Linden Poe MD             Electronically signed by Linden Poe MD at 4/12/2024  5:09 PM    Vitals:    04/30/24 1336   BP: 119/82   Pulse: 90   Temp: 98.6 °F (37 °C)   TempSrc: Oral   SpO2: 98%   Weight: 119 lb (54 kg)     body mass index is 18.09 kg/m².  BP Readings from Last 3 Encounters:   04/30/24 119/82   04/19/24 116/73   04/16/24 105/72     The ASCVD Risk score (Micheal DK, et al., 2019) failed to calculate for the following reasons:    The valid total cholesterol range is 130 to 320 mg/dL      Medications reviewed:  Current Outpatient Medications   Medication Sig Dispense Refill    HYDROcodone-acetaminophen  MG Oral Tab Take 1 tablet by mouth every 6 (six) hours as needed for Pain. Watch drowsiness no alcohol 15 tablet 0    Pancrelipase, Lip-Prot-Amyl, (CREON) [id number] units Oral Cap DR Particles Take 36,000 Units by mouth 3 (three) times daily with meals. 90 capsule 5    naltrexone 50 MG Oral Tab Take 1 tablet (50 mg total) by mouth daily. 90 tablet 1    Naltrexone 380 MG Intramuscular Recon Susp Please bring to clinic monthly and Gluteal injection will be done by Dr. Blas 1 each 11    apixaban 5 MG Oral Tab Take 1 tablet (5 mg total) by mouth 2 (two) times daily. Take 2 tabs (10mg) by mouth twice  daily for 7 days, then take 1 tab (5mg) by mouth twice daily thereafter. 60 tablet 1    Omeprazole 20 MG Oral Tab EC Take 20 mg by mouth daily. 30 tablet 0    acetaminophen 500 MG Oral Tab Take 1 tablet (500 mg total) by mouth every 4 (four) hours as needed for Fever (equal to or greater than 100.4).           Assessment & Plan    1. Hospital discharge follow-up (Primary)  2. Malfunction of jejunostomy tube (HCC)  3. Chronic recurrent pancreatitis (HCC)  -     HYDROcodone-Acetaminophen; Take 1 tablet by mouth every 6 (six) hours as needed for Pain. Watch drowsiness no alcohol  Dispense: 15 tablet; Refill: 0  -     Creon; Take 36,000 Units by mouth 3 (three) times daily with meals.  Dispense: 90 capsule; Refill: 5  4. Alcohol use disorder  -     Naltrexone HCl; Take 1 tablet (50 mg total) by mouth daily.  Dispense: 90 tablet; Refill: 1  -     Behavioral Health Consultation  -     Naltrexone; Please bring to clinic monthly and Gluteal injection will be done by Dr. Blas  Dispense: 1 each; Refill: 11  Pt with history of chronic pancreatitis and alcohol use disorder  With pancreatitis pain.   Prescribe oy PRN for next 1-2 days and alcohol absetence.  Plan:  -discussed with patient and father to refer to  and given number to AA in [place].  -Start nalrexone once acute abdominal pain resolves, and plan to administer IM naltrexone monthly (pending insurance approval)  -pt to follow up next week if injection approved if not follow up next month.       Follow Up: As needed/if symptoms worsen or No follow-ups on file..     I spent 45 minutes obtaining pertitent medical history, reviewing pertinent imaging/labs and specialists notes, evaluating patient, discussing differential diagnosis' and various treatment options, reinforcing importance of compliance with treatment plan, and completing documentation.      Objective/ Results:   Physical Exam  Constitutional:       Appearance: He is well-developed.   Cardiovascular:       Rate and Rhythm: Normal rate and regular rhythm.      Heart sounds: Normal heart sounds.   Pulmonary:      Effort: Pulmonary effort is normal.      Breath sounds: Normal breath sounds.   Abdominal:      General: Bowel sounds are normal.      Palpations: Abdomen is soft.      Tenderness: There is abdominal tenderness.   Skin:     General: Skin is warm and dry.   Neurological:      Mental Status: He is alert and oriented to person, place, and time.      Deep Tendon Reflexes: Reflexes are normal and symmetric.        Reviewed:    Patient Active Problem List    Diagnosis    On apixaban therapy    Transaminitis    Acute cholecystitis    Ascending cholangitis (HCC)    Portal vein thrombosis    Hyperglycemia    Acute pancreatitis, unspecified complication status, unspecified pancreatitis type (HCC)    Malfunction of jejunostomy tube (HCC)    Prolonged QT interval    Lightheaded    Weakness generalized    Traumatic injury of head, initial encounter    Hypotension, unspecified hypotension type    Pneumoperitoneum    CARLOS A (acute kidney injury) (HCC)    Hyponatremia    Metabolic alkalosis    Gastric outlet obstruction (HCC)    Gastric mass    Duodenitis    Hypokalemia      No Known Allergies     Social History     Socioeconomic History    Marital status:    Tobacco Use    Smoking status: Every Day     Current packs/day: 0.00     Average packs/day: 0.5 packs/day for 25.0 years (12.5 ttl pk-yrs)     Types: Cigarettes     Start date: 1998     Last attempt to quit: 2023     Years since quittin.9    Smokeless tobacco: Never    Tobacco comments:     0.05 cigarettes a day, working on quitting   Vaping Use    Vaping status: Never Used   Substance and Sexual Activity    Alcohol use: Yes     Alcohol/week: 4.0 standard drinks of alcohol     Types: 4 Cans of beer per week     Comment: not since 2023 (Sober 1 year) - started drinking again off an on    Drug use: Not Currently     Types: Cannabis     Comment: rare     Sexual activity: Not Currently     Social Determinants of Health     Food Insecurity: No Food Insecurity (4/12/2024)    Food Insecurity     Food Insecurity: Never true   Transportation Needs: No Transportation Needs (4/12/2024)    Transportation Needs     Lack of Transportation: No   Housing Stability: Low Risk  (4/12/2024)    Housing Stability     Housing Instability: No      Review of Systems   Constitutional: Negative.    Respiratory: Negative.     Cardiovascular: Negative.    Gastrointestinal:  Positive for abdominal distention and abdominal pain.   Skin: Negative.    Neurological: Negative.      All other systems negative unless otherwise stated in ROS or HPI above.       Tom Blas MD  Internal Medicine       Call office with any questions or seek emergency care if necessary.   Patient understands and agrees to follow directions and advice.      ----------------------------------------- PATIENT INSTRUCTIONS-----------------------------------------     Patient Instructions     [facility] Helpline  Confidential free help, from public health agencies, to find substance use treatment and information. Learn more  [phone]  https://www.findtreatment.gov/    Local center to aid with Opioid prescription management     Dr. Latesha Sewell (Addiction Medicine MD) (Only for Medicare, Medicaid or Un-insured patients)  [address] [address]  Please Call or Visit  MAT intake and Engagement specalist Rick [phone] and can get you in within 1-2 days if urgent. (Only for Medicare, Medicaid or Uninsured patients)      Services: Substance use treatment; Mental health treatment; Treatment for co-occurring substance use plus either serious mental health illness in adults/serious emotional disturbance in children    Payment Accepted: [facility] or local government funds; Medicare; Medicaid; Other State funds; Cash or self-payment; State  mental health agency (or equivalent) funds

## 2024-05-01 ENCOUNTER — TELEPHONE (OUTPATIENT)
Dept: INTERNAL MEDICINE CLINIC | Facility: CLINIC | Age: 49
End: 2024-05-01

## 2024-05-01 NOTE — TELEPHONE ENCOUNTER
[facility] pharmacy rep Kaila called back she was told by specialty department the PCP needs to dial [phone] and request coverage exception.    Spoke with rep Coats there is a form they will fax over to fill out for coverage exception.  Fax back to [phone].  Forms filled out and faxed out.  Waiting for results

## 2024-05-01 NOTE — PROGRESS NOTES
Multiple attempts to reach pt and messages left with no return call.  Patient went in for HFU appt with PCP on 4/30/24.  Encounter closing.

## 2024-05-01 NOTE — TELEPHONE ENCOUNTER
Thank you just called patient. Will plan to start vivtrol injections once approved by insurance and patient picks up from pharmacy. Once that happens can schedule a follow up with myself to given injections monthly.

## 2024-05-01 NOTE — TELEPHONE ENCOUNTER
Kaila from [facility] called back and she said a prior authorization is required.  Read below information from specialty and Kaila will call insurance and see what can be done.

## 2024-05-01 NOTE — TELEPHONE ENCOUNTER
Prior authorization initiated through cover my meds for Vivitrol 380mg suspension  Key: [id number]  Pending results  [employer] is unable to respond with clinical questions. Please see more information at the bottom of the page for next steps.  Original Claim Info  70 [id number]: ABS/SWY SPECIALTY* Specialty mgmt required if pharmacist will administer. Pharmacy, fax copy of TP rejection to [phone]. Urgent requests, call [phone]. RX NOT COVERED NON-FORMULARY DRUG, CONTACT PRESCRIBER  Spoke with rep Alarcon she will call the pharmacy.  No prior authorization is needed. There are some steps the pharmacy needs to do for an override.  If any concerns pharmacy will call us.  This medication has to be refrigerated.  [facility] pharmacy has been notified of above information.

## 2024-05-28 ENCOUNTER — OFFICE VISIT (OUTPATIENT)
Dept: INTERNAL MEDICINE CLINIC | Facility: CLINIC | Age: 49
End: 2024-05-28

## 2024-05-28 VITALS
BODY MASS INDEX: 19.1 KG/M2 | SYSTOLIC BLOOD PRESSURE: 105 MMHG | HEIGHT: 68 IN | HEART RATE: 108 BPM | WEIGHT: 126 LBS | DIASTOLIC BLOOD PRESSURE: 67 MMHG

## 2024-05-28 DIAGNOSIS — F10.90 ALCOHOL USE DISORDER: Primary | ICD-10-CM

## 2024-05-28 DIAGNOSIS — K86.1 CHRONIC RECURRENT PANCREATITIS (HCC): ICD-10-CM

## 2024-05-28 PROCEDURE — 3074F SYST BP LT 130 MM HG: CPT | Performed by: STUDENT IN AN ORGANIZED HEALTH CARE EDUCATION/TRAINING PROGRAM

## 2024-05-28 PROCEDURE — 3078F DIAST BP <80 MM HG: CPT | Performed by: STUDENT IN AN ORGANIZED HEALTH CARE EDUCATION/TRAINING PROGRAM

## 2024-05-28 PROCEDURE — 99215 OFFICE O/P EST HI 40 MIN: CPT | Performed by: STUDENT IN AN ORGANIZED HEALTH CARE EDUCATION/TRAINING PROGRAM

## 2024-05-28 PROCEDURE — 3008F BODY MASS INDEX DOCD: CPT | Performed by: STUDENT IN AN ORGANIZED HEALTH CARE EDUCATION/TRAINING PROGRAM

## 2024-05-28 RX ORDER — HYDROCODONE BITARTRATE AND ACETAMINOPHEN 10; 325 MG/1; MG/1
1 TABLET ORAL EVERY 6 HOURS PRN
Qty: 15 TABLET | Refills: 0 | Status: SHIPPED | OUTPATIENT
Start: 2024-05-28

## 2024-05-28 RX ORDER — NALTREXONE HYDROCHLORIDE 50 MG/1
50 TABLET, FILM COATED ORAL DAILY
Qty: 90 TABLET | Refills: 3 | Status: SHIPPED | OUTPATIENT
Start: 2024-05-28 | End: 2025-05-23

## 2024-05-28 NOTE — PROGRESS NOTES
OFFICE NOTE     Patient ID: Jarrett Bal is a 49 year old male.  Today's Date: 05/28/24  Chief Complaint: Abdominal Pain (Follow up, state not having any pain , last pain/symptom was 3 weeks ago)      Pt is a 50y/o male with PMHx of Prolonged Qtc, Former Alcohol use disorder (sober 1 year since April 2023) followed by Anthony Ram, whom was recently admitted for recurrent Acute on chronic pancreatitis, portal vein thrombosis.  He does have history of chronic pancreatitis for which he had gastrojejunostomy and enterostomy in the past due to gastric outlet obstruction secondary to chronic pancreatitis Last seen by myself march 7th. Was readmitted on 4/12 for acute pancreatitis pt states since last visit. Pt states his insurance declined vivitrol and has had a few episodes of relapses. He saw BRANDY Green here in [place] once but she has left her position and pt was not fond of therapy.  He hasn't called AA yet or enrolled in IOP. Says he drinks at home when he is bored. He still has some pain and seeking medication for flare up.   Pt also states [facility] has been on back order for naltrexone.thus why relapsing.     He has 3 children with his ex -wife: currently 16/12/11 years old        Vitals:    05/28/24 1354   BP: 105/67   Pulse: 108   Weight: 126 lb (57.2 kg)   Height: 5' 8\" (1.727 m)     body mass index is 19.16 kg/m².  BP Readings from Last 3 Encounters:   05/28/24 105/67   04/30/24 119/82   04/19/24 116/73     The ASCVD Risk score (Micheal DK, et al., 2019) failed to calculate for the following reasons:    The valid total cholesterol range is 130 to 320 mg/dL      Medications reviewed:  Current Outpatient Medications   Medication Sig Dispense Refill    naltrexone 50 MG Oral Tab Take 1 tablet (50 mg total) by mouth daily. 90 tablet 3    HYDROcodone-acetaminophen  MG Oral Tab Take 1 tablet by mouth every 6 (six) hours as needed for Pain. Watch drowsiness no alcohol 15 tablet 0     Pancrelipase, Lip-Prot-Amyl, (CREON) 56982-817570 units Oral Cap DR Particles Take 36,000 Units by mouth 3 (three) times daily with meals. 90 capsule 5    apixaban 5 MG Oral Tab Take 1 tablet (5 mg total) by mouth 2 (two) times daily. Take 2 tabs (10mg) by mouth twice daily for 7 days, then take 1 tab (5mg) by mouth twice daily thereafter. (Patient not taking: Reported on 5/28/2024) 60 tablet 1    acetaminophen 500 MG Oral Tab Take 1 tablet (500 mg total) by mouth every 4 (four) hours as needed for Fever (equal to or greater than 100.4).           Assessment & Plan    1. Alcohol use disorder (Primary)  -     Naltrexone HCl; Take 1 tablet (50 mg total) by mouth daily.  Dispense: 90 tablet; Refill: 3  Reinforced patient on need for alcohol cessation for prevent further pancreatitis relapses  Plan;  -encouraged pt to start AA, IOP and work on remodeling cars to keep him busy and follow up with janie lam/other therapist  -start naltrexone 50mg daily (resend to [facility] in [place]) educated pt to call around other pharmacies who has it in stock  to start medication to prevent relapse.   2. Chronic recurrent pancreatitis (HCC)  -     HYDROcodone-Acetaminophen; Take 1 tablet by mouth every 6 (six) hours as needed for Pain. Watch drowsiness no alcohol  Dispense: 15 tablet; Refill: 0  Reinforced patient on need for alcohol cessation for prevent further pancreatitis relapses  Plan;  -encouraged pt to start AA, IOP and work on remodeling cars to keep him busy and follow up with janie lam/other therapist  -start naltrexone 50mg daily (resend to [facility] in [place]) educated pt to call around other pharmacies who has it in stock  to start medication to prevent relapse.   -given Oxy #15 zero refills,for recurrent abdominal pain (in office appointment only for refills.       Follow Up: As needed/if symptoms worsen or Return in about 2 months (around 7/28/2024), or if symptoms worsen or fail to improve..     I spent 42 minutes  obtaining pertitent medical history, reviewing pertinent imaging/labs and specialists notes, evaluating patient, discussing differential diagnosis' and various treatment options, reinforcing importance of compliance with treatment plan, and completing documentation.     Encounter Times  PreChartin minutes    Reviewing/Obtainin minutes      Medical Exam: 2 minutes    Plan: 3 minutes      Notes: 8 minutes    Counseling/Education: 6 minutes      Referring/Communicating:   minutes    Ind Interpretation:   minutes      Care Coordination:   minutes       My total time spent caring for the patient on the day of the encounter: 42 minutes.          Objective/ Results:   Physical Exam  Constitutional:       Appearance: He is well-developed.   Cardiovascular:      Rate and Rhythm: Normal rate and regular rhythm.      Heart sounds: Normal heart sounds.   Pulmonary:      Effort: Pulmonary effort is normal.      Breath sounds: Normal breath sounds.   Abdominal:      General: Bowel sounds are normal.      Palpations: Abdomen is soft.   Skin:     General: Skin is warm and dry.   Neurological:      Mental Status: He is alert and oriented to person, place, and time.      Deep Tendon Reflexes: Reflexes are normal and symmetric.        Reviewed:    Patient Active Problem List    Diagnosis    Alcohol use disorder    Chronic recurrent pancreatitis (HCC)    On apixaban therapy    Transaminitis    Acute cholecystitis    Ascending cholangitis (HCC)    Portal vein thrombosis    Hyperglycemia    Acute pancreatitis, unspecified complication status, unspecified pancreatitis type (HCC)    Malfunction of jejunostomy tube (HCC)    Prolonged QT interval    Lightheaded    Weakness generalized    Traumatic injury of head, initial encounter    Hypotension, unspecified hypotension type    Pneumoperitoneum    CARLOS A (acute kidney injury) (HCC)    Hyponatremia    Metabolic alkalosis    Gastric outlet obstruction (HCC)    Gastric mass    Duodenitis     Hypokalemia      No Known Allergies     Social History     Socioeconomic History    Marital status:    Tobacco Use    Smoking status: Every Day     Current packs/day: 0.00     Average packs/day: 0.5 packs/day for 25.0 years (12.5 ttl pk-yrs)     Types: Cigarettes     Start date: 1998     Last attempt to quit: 2023     Years since quittin.0    Smokeless tobacco: Never    Tobacco comments:     0.05 cigarettes a day, working on quitting   Vaping Use    Vaping status: Never Used   Substance and Sexual Activity    Alcohol use: Not Currently     Alcohol/week: 4.0 standard drinks of alcohol     Types: 4 Cans of beer per week     Comment: not since 2023 (Sober 1 year) - started drinking again off an on    Drug use: Not Currently     Types: Cannabis     Comment: rare    Sexual activity: Not Currently     Social Determinants of Health     Food Insecurity: No Food Insecurity (2024)    Food Insecurity     Food Insecurity: Never true   Transportation Needs: No Transportation Needs (2024)    Transportation Needs     Lack of Transportation: No   Housing Stability: Low Risk  (2024)    Housing Stability     Housing Instability: No      Review of Systems   Constitutional: Negative.    Respiratory: Negative.     Cardiovascular: Negative.    Gastrointestinal: Negative.    Skin: Negative.    Neurological: Negative.    Psychiatric/Behavioral:  The patient is nervous/anxious.      All other systems negative unless otherwise stated in ROS or HPI above.       Tom Blas MD  Internal Medicine       Call office with any questions or seek emergency care if necessary.   Patient understands and agrees to follow directions and advice.      ----------------------------------------- PATIENT INSTRUCTIONS-----------------------------------------     There are no Patient Instructions on file for this visit.      The  [employer] Cures Act makes medical notes available to patients in the interest of  transparency.  However, please be advised that this is a medical document.  It is intended as a peer to peer communication.  It is written in medical language and may contain abbreviations or verbiage that are technical and unfamiliar.  It may appear blunt or direct.  Medical documents are intended to carry relevant information, facts as evident, and the clinical opinion of the practitioner.

## 2024-07-01 ENCOUNTER — TELEPHONE (OUTPATIENT)
Dept: INTERNAL MEDICINE CLINIC | Facility: CLINIC | Age: 49
End: 2024-07-01

## 2024-07-01 DIAGNOSIS — K86.1 CHRONIC RECURRENT PANCREATITIS (HCC): ICD-10-CM

## 2024-07-03 RX ORDER — HYDROCODONE BITARTRATE AND ACETAMINOPHEN 10; 325 MG/1; MG/1
1 TABLET ORAL EVERY 6 HOURS PRN
Qty: 15 TABLET | Refills: 0 | OUTPATIENT
Start: 2024-07-03

## 2024-07-03 NOTE — TELEPHONE ENCOUNTER
Please Review. Protocol Failed; No Protocol   Patient Comment: Have not received the naltrexzone yet. Am out of these which help my chronic pain which has lessened thankfully.       Recent fills: Quantity: 15  05/28/2024 04/30/2024 03/05/2024                                                                      Patient is due  Last Rx written: 05/28/2024  Last Office Visit: 05/28/2024        Requested Prescriptions   Pending Prescriptions Disp Refills    HYDROcodone-acetaminophen  MG Oral Tab 15 tablet 0     Sig: Take 1 tablet by mouth every 6 (six) hours as needed for Pain. Watch drowsiness no alcohol       Controlled Substance Medication Failed - 7/1/2024  7:09 AM        Failed - This medication is a controlled substance - forward to provider to refill                 Recent Outpatient Visits              1 month ago Alcohol use disorder    [facility]    Office Visit    2 months ago Hospital discharge follow-up    [facility] Jaun Byrnes Agim, MD    Office Visit    2 months ago Portal vein thrombosis    [facility] Beto Sanderson MD    Office Visit    3 months ago Encounter to establish care with new doctor    [facility]    Office Visit    5 years ago Acute pancreatitis without infection or necrosis, unspecified pancreatitis type (HCC)    Gastroenterology - Ander Mustafa Rafi M, MD    Office Visit

## 2024-07-16 ENCOUNTER — OFFICE VISIT (OUTPATIENT)
Dept: INTERNAL MEDICINE CLINIC | Facility: CLINIC | Age: 49
End: 2024-07-16
Payer: COMMERCIAL

## 2024-07-16 ENCOUNTER — TELEPHONE (OUTPATIENT)
Dept: INTERNAL MEDICINE CLINIC | Facility: CLINIC | Age: 49
End: 2024-07-16

## 2024-07-16 VITALS
DIASTOLIC BLOOD PRESSURE: 68 MMHG | WEIGHT: 132 LBS | HEIGHT: 68 IN | HEART RATE: 109 BPM | SYSTOLIC BLOOD PRESSURE: 99 MMHG | BODY MASS INDEX: 20 KG/M2

## 2024-07-16 DIAGNOSIS — Z12.11 COLON CANCER SCREENING: ICD-10-CM

## 2024-07-16 DIAGNOSIS — K86.1 CHRONIC RECURRENT PANCREATITIS (HCC): Primary | ICD-10-CM

## 2024-07-16 DIAGNOSIS — F10.90 ALCOHOL USE DISORDER: ICD-10-CM

## 2024-07-16 PROCEDURE — 3008F BODY MASS INDEX DOCD: CPT | Performed by: STUDENT IN AN ORGANIZED HEALTH CARE EDUCATION/TRAINING PROGRAM

## 2024-07-16 PROCEDURE — 3074F SYST BP LT 130 MM HG: CPT | Performed by: STUDENT IN AN ORGANIZED HEALTH CARE EDUCATION/TRAINING PROGRAM

## 2024-07-16 PROCEDURE — 99215 OFFICE O/P EST HI 40 MIN: CPT | Performed by: STUDENT IN AN ORGANIZED HEALTH CARE EDUCATION/TRAINING PROGRAM

## 2024-07-16 PROCEDURE — 3078F DIAST BP <80 MM HG: CPT | Performed by: STUDENT IN AN ORGANIZED HEALTH CARE EDUCATION/TRAINING PROGRAM

## 2024-07-16 RX ORDER — NALTREXONE HYDROCHLORIDE 50 MG/1
50 TABLET, FILM COATED ORAL DAILY
Qty: 90 TABLET | Refills: 3 | Status: SHIPPED | OUTPATIENT
Start: 2024-07-16 | End: 2025-07-11

## 2024-07-16 RX ORDER — HYDROCODONE BITARTRATE AND ACETAMINOPHEN 10; 325 MG/1; MG/1
1 TABLET ORAL EVERY 6 HOURS PRN
Qty: 15 TABLET | Refills: 0 | Status: SHIPPED | OUTPATIENT
Start: 2024-07-16

## 2024-07-16 RX ORDER — PANCRELIPASE 36000; 180000; 114000 [USP'U]/1; [USP'U]/1; [USP'U]/1
36000 CAPSULE, DELAYED RELEASE PELLETS ORAL
Qty: 90 CAPSULE | Refills: 5 | Status: SHIPPED | OUTPATIENT
Start: 2024-07-16 | End: 2024-12-23

## 2024-07-16 NOTE — TELEPHONE ENCOUNTER
Kassandra at [facility] pharmacy indicated that getting medication interaction between naltrexone and hydrocodone. Wanted to verify if ok to dispense both medications? Please advise.

## 2024-07-16 NOTE — TELEPHONE ENCOUNTER
Yes dispense both if they have naltrexone in stock (has been out for last 3 months) by some miracle they have it please fill. He is informed pain meds for now next 3 days till naltrexone comes in (hopefully)

## 2024-07-16 NOTE — PROGRESS NOTES
OFFICE NOTE     Patient ID: Jarrett Bal is a 49 year old male.  Today's Date: 07/16/24  Chief Complaint: Follow - Up (Pt states had an episode on Friday with abdominal pain LRQ. Pain has lessened No pain today)      Pt is a 48y/o male with PMHx of Prolonged Qtc, Former Alcohol use disorder (sober 1 year since April 2023) followed by Anthony Ram, whom was recently admitted for recurrent Acute on chronic pancreatitis, portal vein thrombosis.  He does have history of chronic pancreatitis for which he had gastrojejunostomy and enterostomy in the past due to gastric outlet obstruction secondary to chronic pancreatitis with ongoing on and off pancreatitis flare ups still drinks etoh on and off. But sober last week and had flare up last Friday and took last oxy.  Sober for 1 week (cutting back) and flare up on Friday .    Never had colonoscopy , looking for [facility]       Unfortunately naltrexone is still on backorder and called [facility] pharmacies to confirm .  [email or web address]/drug-[email or web address]-[email or web address]-[email or web address]  Vitals:    07/16/24 1410   BP: 99/68   Pulse: 109   Weight: 132 lb (59.9 kg)   Height: 5' 8\" (1.727 m)     body mass index is 20.07 kg/m².  BP Readings from Last 3 Encounters:   07/16/24 99/68   05/28/24 105/67   04/30/24 119/82     The ASCVD Risk score (Micheal DK, et al., 2019) failed to calculate for the following reasons:    The valid total cholesterol range is 130 to 320 mg/dL      Medications reviewed:  Current Outpatient Medications   Medication Sig Dispense Refill    naltrexone 50 MG Oral Tab Take 1 tablet (50 mg total) by mouth daily. 90 tablet 3    Pancrelipase, Lip-Prot-Amyl, (CREON) 57319-556530 units Oral Cap DR Particles Take 36,000 Units by mouth 3 (three) times daily with meals. 90 capsule 5    HYDROcodone-acetaminophen  MG Oral Tab Take 1 tablet by mouth every 6 (six) hours as needed for Pain. Watch drowsiness no alcohol 15 tablet 0     acetaminophen 500 MG Oral Tab Take 1 tablet (500 mg total) by mouth every 4 (four) hours as needed for Fever (equal to or greater than 100.4).           Assessment & Plan    1. Chronic recurrent pancreatitis (HCC) (Primary)  -     Naltrexone HCl; Take 1 tablet (50 mg total) by mouth daily.  Dispense: 90 tablet; Refill: 3  -     Creon; Take 36,000 Units by mouth 3 (three) times daily with meals.  Dispense: 90 capsule; Refill: 5  -     Addiction Medicine Referral - In Network  -     HYDROcodone-Acetaminophen; Take 1 tablet by mouth every 6 (six) hours as needed for Pain. Watch drowsiness no alcohol  Dispense: 15 tablet; Refill: 0  -     Gastro Referral - In Network  2. Alcohol use disorder  -     Naltrexone HCl; Take 1 tablet (50 mg total) by mouth daily.  Dispense: 90 tablet; Refill: 3  -     Addiction Medicine Referral - In Network  3. Colon cancer screening  -     Gastro Referral - In Network  Pt with chronic pancreatitis and alcohol use disorder and pancreatitis pain  -given 3 days supply of  oxy 10-325mg po PRN #15 no refills without in person appointment  -encouraged alcohol cessation  -refer to addiction medicine for help access to naltrexone and other chronic opioid likely will be needed likely switch to ultram if stable  -Refer to GI for pancreatitis evaluation and due for screening colonoscopy.       Follow Up: As needed/if symptoms worsen or Return in about 2 months (around 2024) for Follow up chronic ..     I spent 41 minutes obtaining pertitent medical history, reviewing pertinent imaging/labs and specialists notes, evaluating patient, discussing differential diagnosis' and various treatment options, reinforcing importance of compliance with treatment plan, and completing documentation.     Encounter Times  PreChartin minutes    Reviewing/Obtainin minutes      Medical Exam: 2 minutes    Plan: 3 minutes      Notes: 8 minutes    Counseling/Education: 6 minutes      Referring/Communicating:    minutes    Ind Interpretation:   minutes      Care Coordination:   minutes       My total time spent caring for the patient on the day of the encounter: 42 minutes.          Objective/ Results:   Physical Exam  Constitutional:       Appearance: He is well-developed.   Cardiovascular:      Rate and Rhythm: Normal rate and regular rhythm.      Heart sounds: Normal heart sounds.   Pulmonary:      Effort: Pulmonary effort is normal.      Breath sounds: Normal breath sounds.   Abdominal:      General: Bowel sounds are normal.      Palpations: Abdomen is soft.   Skin:     General: Skin is warm and dry.   Neurological:      Mental Status: He is alert and oriented to person, place, and time.      Deep Tendon Reflexes: Reflexes are normal and symmetric.        Reviewed:    Patient Active Problem List    Diagnosis    Alcohol use disorder    Chronic recurrent pancreatitis (HCC)    On apixaban therapy    Transaminitis    Acute cholecystitis    Ascending cholangitis (HCC)    Portal vein thrombosis    Hyperglycemia    Acute pancreatitis, unspecified complication status, unspecified pancreatitis type (HCC)    Malfunction of jejunostomy tube (HCC)    Prolonged QT interval    Lightheaded    Weakness generalized    Traumatic injury of head, initial encounter    Hypotension, unspecified hypotension type    Pneumoperitoneum    CARLOS A (acute kidney injury) (HCC)    Hyponatremia    Metabolic alkalosis    Gastric outlet obstruction (HCC)    Gastric mass    Duodenitis    Hypokalemia      No Known Allergies     Social History     Socioeconomic History    Marital status:    Tobacco Use    Smoking status: Every Day     Current packs/day: 0.00     Average packs/day: 0.5 packs/day for 25.0 years (12.5 ttl pk-yrs)     Types: Cigarettes     Start date: 1998     Last attempt to quit: 2023     Years since quittin.2     Passive exposure: Current    Smokeless tobacco: Never    Tobacco comments:     0.05 cigarettes a day, working on  quitting   Vaping Use    Vaping status: Never Used   Substance and Sexual Activity    Alcohol use: Not Currently     Alcohol/week: 4.0 standard drinks of alcohol     Types: 4 Cans of beer per week     Comment: not since april 2023 (Sober 1 year) - started drinking again off an on    Drug use: Not Currently     Types: Cannabis     Comment: rare    Sexual activity: Not Currently     Social Determinants of Health     Food Insecurity: No Food Insecurity (4/12/2024)    Food Insecurity     Food Insecurity: Never true   Transportation Needs: No Transportation Needs (4/12/2024)    Transportation Needs     Lack of Transportation: No   Housing Stability: Low Risk  (4/12/2024)    Housing Stability     Housing Instability: No      Review of Systems   Constitutional: Negative.    Respiratory: Negative.     Cardiovascular: Negative.    Gastrointestinal:  Positive for abdominal distention and abdominal pain.   Skin: Negative.    Neurological: Negative.      All other systems negative unless otherwise stated in ROS or HPI above.       Tom Blas MD  Internal Medicine       Call office with any questions or seek emergency care if necessary.   Patient understands and agrees to follow directions and advice.      ----------------------------------------- PATIENT INSTRUCTIONS-----------------------------------------     There are no Patient Instructions on file for this visit.      The 21st Century Cures Act makes medical notes available to patients in the interest of transparency.  However, please be advised that this is a medical document.  It is intended as a peer to peer communication.  It is written in medical language and may contain abbreviations or verbiage that are technical and unfamiliar.  It may appear blunt or direct.  Medical documents are intended to carry relevant information, facts as evident, and the clinical opinion of the practitioner.

## 2024-07-18 ENCOUNTER — TELEPHONE (OUTPATIENT)
Age: 49
End: 2024-07-18

## 2024-07-18 NOTE — TELEPHONE ENCOUNTER
Hello,     The [facility] Navigation team has attempted to reach you regarding an order from Dr. Blas's office. We are reaching out in order to assist you in coordinating care and resources that may meet your needs. Please give our office a call at [phone]. For more immediate assistance you can contact our 24-hour help line at [phone]. We look forward to hearing from you soon.

## 2024-07-26 ENCOUNTER — TELEPHONE (OUTPATIENT)
Age: 49
End: 2024-07-26

## 2024-07-26 NOTE — TELEPHONE ENCOUNTER
Leighann Farias - I am reaching out from the [facility] Navigation department, following up on an order from your provider's office to assist in connecting you with resources for care. If you would like to discuss this further, please give us a call at [phone], or for more immediate assistance you can contact our 24-hour help line at [phone]. We look forward to hearing from you soon.   Spoke with patient and she will come this week to pick her referral. Referral was recently updated for a different GI specialist.

## 2024-08-07 ENCOUNTER — HOSPITAL ENCOUNTER (EMERGENCY)
Facility: HOSPITAL | Age: 49
Discharge: HOME | End: 2024-08-07
Attending: EMERGENCY MEDICINE
Payer: COMMERCIAL

## 2024-08-07 ENCOUNTER — TELEPHONE (OUTPATIENT)
Dept: INTERNAL MEDICINE CLINIC | Facility: CLINIC | Age: 49
End: 2024-08-07

## 2024-08-07 VITALS
HEART RATE: 68 BPM | WEIGHT: 130 LBS | BODY MASS INDEX: 20 KG/M2 | RESPIRATION RATE: 17 BRPM | DIASTOLIC BLOOD PRESSURE: 88 MMHG | TEMPERATURE: 98 F | OXYGEN SATURATION: 99 % | SYSTOLIC BLOOD PRESSURE: 128 MMHG

## 2024-08-07 DIAGNOSIS — K85.90 ACUTE PANCREATITIS, UNSPECIFIED COMPLICATION STATUS, UNSPECIFIED PANCREATITIS TYPE (HCC): Primary | ICD-10-CM

## 2024-08-07 LAB
ALBUMIN SERPL-MCNC: 5 G/DL (ref 3.2–4.8)
ALP LIVER SERPL-CCNC: 190 U/L
ALT SERPL-CCNC: 11 U/L
ANION GAP SERPL CALC-SCNC: 9 MMOL/L (ref 0–18)
AST SERPL-CCNC: 14 U/L (ref ?–34)
BASOPHILS # BLD AUTO: 0.03 X10(3) UL (ref 0–0.2)
BASOPHILS NFR BLD AUTO: 0.2 %
BILIRUB DIRECT SERPL-MCNC: 0.2 MG/DL (ref ?–0.3)
BILIRUB SERPL-MCNC: 0.5 MG/DL (ref 0.3–1.2)
BUN BLD-MCNC: 12 MG/DL (ref 9–23)
BUN/CREAT SERPL: 10.8 (ref 10–20)
CALCIUM BLD-MCNC: 10.3 MG/DL (ref 8.7–10.4)
CHLORIDE SERPL-SCNC: 101 MMOL/L (ref 98–112)
CO2 SERPL-SCNC: 32 MMOL/L (ref 21–32)
CREAT BLD-MCNC: 1.11 MG/DL
DEPRECATED RDW RBC AUTO: 42.7 FL (ref 35.1–46.3)
EGFRCR SERPLBLD CKD-EPI 2021: 82 ML/MIN/1.73M2 (ref 60–?)
EOSINOPHIL # BLD AUTO: 0.02 X10(3) UL (ref 0–0.7)
EOSINOPHIL NFR BLD AUTO: 0.2 %
ERYTHROCYTE [DISTWIDTH] IN BLOOD BY AUTOMATED COUNT: 12.8 % (ref 11–15)
GLUCOSE BLD-MCNC: 114 MG/DL (ref 70–99)
HCT VFR BLD AUTO: 49.4 %
HGB BLD-MCNC: 17.1 G/DL
IMM GRANULOCYTES # BLD AUTO: 0.05 X10(3) UL (ref 0–1)
IMM GRANULOCYTES NFR BLD: 0.4 %
LIPASE SERPL-CCNC: 128 U/L (ref 12–53)
LYMPHOCYTES # BLD AUTO: 1.64 X10(3) UL (ref 1–4)
LYMPHOCYTES NFR BLD AUTO: 13 %
MCH RBC QN AUTO: 31.8 PG (ref 26–34)
MCHC RBC AUTO-ENTMCNC: 34.6 G/DL (ref 31–37)
MCV RBC AUTO: 92 FL
MONOCYTES # BLD AUTO: 0.94 X10(3) UL (ref 0.1–1)
MONOCYTES NFR BLD AUTO: 7.5 %
NEUTROPHILS # BLD AUTO: 9.9 X10 (3) UL (ref 1.5–7.7)
NEUTROPHILS # BLD AUTO: 9.9 X10(3) UL (ref 1.5–7.7)
NEUTROPHILS NFR BLD AUTO: 78.7 %
OSMOLALITY SERPL CALC.SUM OF ELEC: 295 MOSM/KG (ref 275–295)
PLATELET # BLD AUTO: 412 10(3)UL (ref 150–450)
POTASSIUM SERPL-SCNC: 4.4 MMOL/L (ref 3.5–5.1)
PROT SERPL-MCNC: 7.9 G/DL (ref 5.7–8.2)
RBC # BLD AUTO: 5.37 X10(6)UL
SODIUM SERPL-SCNC: 142 MMOL/L (ref 136–145)
WBC # BLD AUTO: 12.6 X10(3) UL (ref 4–11)

## 2024-08-07 PROCEDURE — 80048 BASIC METABOLIC PNL TOTAL CA: CPT | Performed by: EMERGENCY MEDICINE

## 2024-08-07 PROCEDURE — 80076 HEPATIC FUNCTION PANEL: CPT | Performed by: EMERGENCY MEDICINE

## 2024-08-07 PROCEDURE — 96375 TX/PRO/DX INJ NEW DRUG ADDON: CPT

## 2024-08-07 PROCEDURE — 96374 THER/PROPH/DIAG INJ IV PUSH: CPT

## 2024-08-07 PROCEDURE — 96361 HYDRATE IV INFUSION ADD-ON: CPT

## 2024-08-07 PROCEDURE — 96376 TX/PRO/DX INJ SAME DRUG ADON: CPT

## 2024-08-07 PROCEDURE — 85025 COMPLETE CBC W/AUTO DIFF WBC: CPT | Performed by: EMERGENCY MEDICINE

## 2024-08-07 PROCEDURE — 83690 ASSAY OF LIPASE: CPT | Performed by: EMERGENCY MEDICINE

## 2024-08-07 PROCEDURE — 99284 EMERGENCY DEPT VISIT MOD MDM: CPT

## 2024-08-07 RX ORDER — ONDANSETRON 4 MG/1
4 TABLET, ORALLY DISINTEGRATING ORAL EVERY 4 HOURS PRN
Qty: 10 TABLET | Refills: 0 | Status: SHIPPED | OUTPATIENT
Start: 2024-08-07 | End: 2024-08-14

## 2024-08-07 RX ORDER — MORPHINE SULFATE 4 MG/ML
4 INJECTION, SOLUTION INTRAMUSCULAR; INTRAVENOUS ONCE
Status: COMPLETED | OUTPATIENT
Start: 2024-08-07 | End: 2024-08-07

## 2024-08-07 RX ORDER — HYDROCODONE BITARTRATE AND ACETAMINOPHEN 5; 325 MG/1; MG/1
1-2 TABLET ORAL EVERY 6 HOURS PRN
Qty: 14 TABLET | Refills: 0 | Status: SHIPPED | OUTPATIENT
Start: 2024-08-07 | End: 2024-08-12

## 2024-08-07 RX ORDER — ONDANSETRON 2 MG/ML
4 INJECTION INTRAMUSCULAR; INTRAVENOUS ONCE
Status: COMPLETED | OUTPATIENT
Start: 2024-08-07 | End: 2024-08-07

## 2024-08-07 NOTE — TELEPHONE ENCOUNTER
Pt stated he was seen 7/16/24 -he's vomiting constantly, pain in upper abdomen to the right side , hx pf pancreatitis   Advised ER, verbalized understanding

## 2024-08-07 NOTE — ED PROVIDER NOTES
Patient Seen in: [facility] Emergency Department    History     Chief Complaint   Patient presents with    Abdomen/Flank Pain       HPI    49-year-old male presents ER with complaint of epigastric pain.  Patient with longstanding history of chronic pancreatitis.  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  Patient states the pain started yesterday and has not improved.  Patient states that he was taking Norco but now unable to keep anything down by mouth.  Patient states he is vomiting bile.  Patient also complained of nausea    History reviewed.   Past Medical History:    Anesthesia complication    cramps;nausea after anesthesia when in 3rd grade    Anxiety state    Duodenitis    Pancreatitis (HCC)    PONV (postoperative nausea and vomiting)       History reviewed.   Past Surgical History:   Procedure Laterality Date    Biopsy/removal, lymph node(s)      R neck    Egd N/A 2024    ; duodenitis,hiatal hernia,previous gastric jejunostomy    Gastrostomy/jejunostomy tube N/A 2023         Medications :  (Not in a hospital admission)       Family History   Problem Relation Age of Onset    Hypertension Mother     Other (Early onset Dementia) Mother     Other (recurrent falls) Mother     Hypertension Father     No Known Problems Brother     Dementia Maternal Grandmother     Other (Alzehimers) Maternal Grandmother     Other (Cardiac arrythmia) Paternal Grandmother     No Known Problems Paternal Grandfather        Smoking Status:   Social History     Socioeconomic History    Marital status:    Tobacco Use    Smoking status: Every Day     Current packs/day: 0.00     Average packs/day: 0.5 packs/day for 25.0 years (12.5 ttl pk-yrs)     Types: Cigarettes     Start date: 1998     Last attempt to quit: 2023     Years since quittin.2     Passive exposure: Current    Smokeless tobacco: Never    Tobacco comments:     0.05 cigarettes a day, working on quitting   Vaping Use    Vaping  status: Never Used   Substance and Sexual Activity    Alcohol use: Not Currently     Alcohol/week: 4.0 standard drinks of alcohol     Types: 4 Cans of beer per week     Comment: not since april 2023 (Sober 1 year) - started drinking again off an on    Drug use: Not Currently     Types: Cannabis     Comment: rare    Sexual activity: Not Currently       ROS  All pertinent positives for the review of systems are mentioned in the HPI  All other organ systems are reviewed and are negative.    Constitutional and vital signs reviewed.      Social History and Family History elements reviewed from today, pertinent positives to the presenting problem noted.    Physical Exam     ED Triage Vitals [08/07/24 1133]   /89   Pulse 107   Resp 17   Temp 98 °F (36.7 °C)   Temp src    SpO2 98 %   O2 Device        All measures to prevent infection transmission during my interaction with the patient were taken. The patient was already wearing a droplet mask on my arrival to the room. Personal protective equipment including droplet mask, eye protection, and gloves were worn throughout the duration of the exam.  Handwashing was performed prior to and after the exam.  Stethoscope and any equipment used during my examination was cleaned with super sani-cloth germicidal wipes following the exam.     Physical Exam  Vitals and nursing note reviewed.   Constitutional:       Appearance: He is well-developed.   HENT:      Head: Normocephalic and atraumatic.      Right Ear: External ear normal.      Left Ear: External ear normal.      Nose: Nose normal.   Eyes:      Conjunctiva/sclera: Conjunctivae normal.      Pupils: Pupils are equal, round, and reactive to light.   Cardiovascular:      Rate and Rhythm: Normal rate and regular rhythm.      Heart sounds: Normal heart sounds.   Pulmonary:      Effort: Pulmonary effort is normal.      Breath sounds: Normal breath sounds.   Abdominal:      General: Bowel sounds are normal.      Palpations:  Abdomen is soft.      Tenderness: There is abdominal tenderness in the epigastric area. There is no guarding or rebound.   Musculoskeletal:         General: Normal range of motion.      Cervical back: Normal range of motion and neck supple.   Skin:     General: Skin is warm and dry.   Neurological:      Mental Status: He is alert and oriented to person, place, and time.      Deep Tendon Reflexes: Reflexes are normal and symmetric.   Psychiatric:         Behavior: Behavior normal.         Thought Content: Thought content normal.         Judgment: Judgment normal.         ED Course        Labs Reviewed   BASIC METABOLIC PANEL (8) - Abnormal; Notable for the following components:       Result Value    Glucose 114 (*)     All other components within normal limits   HEPATIC FUNCTION PANEL (7) - Abnormal; Notable for the following components:    Alkaline Phosphatase 190 (*)     Albumin 5.0 (*)     All other components within normal limits   CBC WITH DIFFERENTIAL WITH PLATELET - Abnormal; Notable for the following components:    WBC 12.6 (*)     Neutrophil Absolute Prelim 9.90 (*)     Neutrophil Absolute 9.90 (*)     All other components within normal limits   LIPASE - Abnormal; Notable for the following components:    Lipase 128 (*)     All other components within normal limits         Imaging Results Available and Reviewed while in ED: No results found.  ED Medications Administered:   Medications   morphINE PF 4 MG/ML injection 4 mg (has no administration in time range)   sodium chloride 0.9 % IV bolus 1,000 mL (1,000 mL Intravenous New Bag 8/7/24 1330)   ondansetron (Zofran) 4 MG/2ML injection 4 mg (4 mg Intravenous Given 8/7/24 1329)   morphINE PF 4 MG/ML injection 4 mg (4 mg Intravenous Given 8/7/24 1329)         MDM     Vitals:    08/07/24 1133   BP: 125/89   Pulse: 107   Resp: 17   Temp: 98 °F (36.7 °C)   SpO2: 98%   Weight: 59 kg     *I personally reviewed and interpreted all ED vitals.  I also personally reviewed  all labs and imaging if ordered    Pulse Ox: 98%, Room air, Normal     Monitor Interpretation:   normal sinus rhythm    Differential Diagnosis/ Diagnostic Considerations: Peritonitis, gastritis, cholecystitis    Medical Record Review: I personally reviewed available prior medical records for any recent pertinent discharge summaries, testing, and procedures and reviewed those reports.    Complicating Factors: The patient already has does not have any pertinent problems on file. to contribute to the complexity of this ED evaluation.    Medical Decision Making  49-year-old male presents to ER with complaint of epigastric pain.  Patient with a past medical history of acute on chronic pancreatitis.  Patient given 1 L IV fluids as well as morphine x 2 and Zofran and is able to tolerate p.o.  Patient drinking a water bottle in the exam room.  Patient okay to discharge home and given a prescription for Zofran as well as [place].  Patient's family bedside made aware of the discharge plan and disposition.    Problems Addressed:  Acute pancreatitis, unspecified complication status, unspecified pancreatitis type (HCC): acute illness or injury    Amount and/or Complexity of Data Reviewed  Independent Historian:      Details: Medical history obtained from family were states that he started having pain yesterday but was unable to keep his Norco down because he was vomiting.  Labs: ordered. Decision-making details documented in ED Course.    Risk  Prescription drug management.        Condition upon leaving the department: Stable    Disposition and Plan     Clinical Impression:  1. Acute pancreatitis, unspecified complication status, unspecified pancreatitis type (HCC)        Disposition:  Discharge    Follow-up:  Tom Blas MD  [address] [place] [address]  [phone]    Schedule an appointment as soon as possible for a visit  If symptoms worsen      Medications Prescribed:  Current Discharge Medication List         START taking these medications    Details   HYDROcodone-acetaminophen 5-325 MG Oral Tab Take 1-2 tablets by mouth every 6 (six) hours as needed for Pain.  Qty: 14 tablet, Refills: 0    Associated Diagnoses: Acute pancreatitis, unspecified complication status, unspecified pancreatitis type (HCC)      ondansetron 4 MG Oral Tablet Dispersible Take 1 tablet (4 mg total) by mouth every 4 (four) hours as needed for Nausea.  Qty: 10 tablet, Refills: 0

## 2024-11-14 ENCOUNTER — HOSPITAL ENCOUNTER (EMERGENCY)
Facility: HOSPITAL | Age: 49
Discharge: HOME | End: 2024-11-14
Attending: EMERGENCY MEDICINE
Payer: COMMERCIAL

## 2024-11-14 ENCOUNTER — NURSE TRIAGE (OUTPATIENT)
Dept: INTERNAL MEDICINE CLINIC | Facility: CLINIC | Age: 49
End: 2024-11-14

## 2024-11-14 ENCOUNTER — APPOINTMENT (OUTPATIENT)
Dept: CT IMAGING | Facility: HOSPITAL | Age: 49
End: 2024-11-14
Attending: EMERGENCY MEDICINE
Payer: COMMERCIAL

## 2024-11-14 VITALS
DIASTOLIC BLOOD PRESSURE: 87 MMHG | SYSTOLIC BLOOD PRESSURE: 125 MMHG | BODY MASS INDEX: 20.16 KG/M2 | RESPIRATION RATE: 20 BRPM | HEART RATE: 64 BPM | TEMPERATURE: 98 F | WEIGHT: 133 LBS | HEIGHT: 68 IN | OXYGEN SATURATION: 100 %

## 2024-11-14 DIAGNOSIS — K29.80 DUODENITIS: Primary | ICD-10-CM

## 2024-11-14 LAB
ALBUMIN SERPL-MCNC: 4.4 G/DL (ref 3.2–4.8)
ALBUMIN/GLOB SERPL: 1.6 {RATIO} (ref 1–2)
ALP LIVER SERPL-CCNC: 121 U/L
ALT SERPL-CCNC: 17 U/L
ANION GAP SERPL CALC-SCNC: 9 MMOL/L (ref 0–18)
AST SERPL-CCNC: 22 U/L (ref ?–34)
BASOPHILS # BLD AUTO: 0.07 X10(3) UL (ref 0–0.2)
BASOPHILS NFR BLD AUTO: 0.7 %
BILIRUB SERPL-MCNC: 0.7 MG/DL (ref 0.3–1.2)
BUN BLD-MCNC: 7 MG/DL (ref 9–23)
BUN/CREAT SERPL: 9.3 (ref 10–20)
CALCIUM BLD-MCNC: 9.9 MG/DL (ref 8.7–10.4)
CHLORIDE SERPL-SCNC: 103 MMOL/L (ref 98–112)
CO2 SERPL-SCNC: 26 MMOL/L (ref 21–32)
CREAT BLD-MCNC: 0.75 MG/DL
DEPRECATED RDW RBC AUTO: 39.5 FL (ref 35.1–46.3)
EGFRCR SERPLBLD CKD-EPI 2021: 111 ML/MIN/1.73M2 (ref 60–?)
EOSINOPHIL # BLD AUTO: 0.31 X10(3) UL (ref 0–0.7)
EOSINOPHIL NFR BLD AUTO: 3 %
ERYTHROCYTE [DISTWIDTH] IN BLOOD BY AUTOMATED COUNT: 11.7 % (ref 11–15)
GLOBULIN PLAS-MCNC: 2.8 G/DL (ref 2–3.5)
GLUCOSE BLD-MCNC: 98 MG/DL (ref 70–99)
HCT VFR BLD AUTO: 43.5 %
HGB BLD-MCNC: 15.8 G/DL
IMM GRANULOCYTES # BLD AUTO: 0.02 X10(3) UL (ref 0–1)
IMM GRANULOCYTES NFR BLD: 0.2 %
LIPASE SERPL-CCNC: 60 U/L (ref 12–53)
LYMPHOCYTES # BLD AUTO: 2.09 X10(3) UL (ref 1–4)
LYMPHOCYTES NFR BLD AUTO: 20.4 %
MCH RBC QN AUTO: 33.3 PG (ref 26–34)
MCHC RBC AUTO-ENTMCNC: 36.3 G/DL (ref 31–37)
MCV RBC AUTO: 91.8 FL
MONOCYTES # BLD AUTO: 0.73 X10(3) UL (ref 0.1–1)
MONOCYTES NFR BLD AUTO: 7.1 %
NEUTROPHILS # BLD AUTO: 7.05 X10 (3) UL (ref 1.5–7.7)
NEUTROPHILS # BLD AUTO: 7.05 X10(3) UL (ref 1.5–7.7)
NEUTROPHILS NFR BLD AUTO: 68.6 %
OSMOLALITY SERPL CALC.SUM OF ELEC: 284 MOSM/KG (ref 275–295)
PLATELET # BLD AUTO: 307 10(3)UL (ref 150–450)
POTASSIUM SERPL-SCNC: 3.8 MMOL/L (ref 3.5–5.1)
PROT SERPL-MCNC: 7.2 G/DL (ref 5.7–8.2)
RBC # BLD AUTO: 4.74 X10(6)UL
SODIUM SERPL-SCNC: 138 MMOL/L (ref 136–145)
WBC # BLD AUTO: 10.3 X10(3) UL (ref 4–11)

## 2024-11-14 PROCEDURE — 99284 EMERGENCY DEPT VISIT MOD MDM: CPT

## 2024-11-14 PROCEDURE — 74177 CT ABD & PELVIS W/CONTRAST: CPT | Performed by: EMERGENCY MEDICINE

## 2024-11-14 PROCEDURE — 80053 COMPREHEN METABOLIC PANEL: CPT | Performed by: EMERGENCY MEDICINE

## 2024-11-14 PROCEDURE — 96361 HYDRATE IV INFUSION ADD-ON: CPT

## 2024-11-14 PROCEDURE — 99285 EMERGENCY DEPT VISIT HI MDM: CPT

## 2024-11-14 PROCEDURE — 83690 ASSAY OF LIPASE: CPT | Performed by: EMERGENCY MEDICINE

## 2024-11-14 PROCEDURE — 96374 THER/PROPH/DIAG INJ IV PUSH: CPT

## 2024-11-14 PROCEDURE — 96376 TX/PRO/DX INJ SAME DRUG ADON: CPT

## 2024-11-14 PROCEDURE — 85025 COMPLETE CBC W/AUTO DIFF WBC: CPT | Performed by: EMERGENCY MEDICINE

## 2024-11-14 RX ORDER — MORPHINE SULFATE 4 MG/ML
4 INJECTION, SOLUTION INTRAMUSCULAR; INTRAVENOUS ONCE
Status: COMPLETED | OUTPATIENT
Start: 2024-11-14 | End: 2024-11-14

## 2024-11-14 RX ORDER — HYDROCODONE BITARTRATE AND ACETAMINOPHEN 5; 325 MG/1; MG/1
1 TABLET ORAL EVERY 6 HOURS PRN
Qty: 10 TABLET | Refills: 0 | Status: SHIPPED | OUTPATIENT
Start: 2024-11-14 | End: 2024-11-21

## 2024-11-14 NOTE — TELEPHONE ENCOUNTER
RAMA Blas Patient stated that he has been having abd pain for the past 6 days ago. Patient also throwing up. Patient has not had solids for the past 5 days. Patient stated that is was worse on Sunday. Monday a little better but still had pain and today the pain is a 8/10. Patient denied any fever or diarrhea. Inform patient that due to the constant pain and throwing up he needs to go to the ER. Patient stated that he will go to [facility] ER.      Reason for Disposition   SEVERE abdominal pain (e.g., excruciating)    Protocols used: Abdominal Pain - Male-A-[place]

## 2024-11-14 NOTE — ED INITIAL ASSESSMENT (HPI)
Pt reports right upper abdominal pain that states on Sunday. Pt reports vomiting and nausea today. Pt reports 8/10 abdominal pain. Pt reports no fever.

## 2024-11-14 NOTE — ED PROVIDER NOTES
Patient Seen in: [facility] Emergency Department    History     Chief Complaint   Patient presents with    Abdomen/Flank Pain       HPI    History is provided by patient/independent historian: patient  49 year old male with hx of pancreatitis, duodenitis, previous abdominal surgery with J tube and subsequent reversal, here with RUQ abdominal pain for the last 4 days. + vomiting, + constipation. No fever, no urinary issues.     History reviewed.   Past Medical History:    Anesthesia complication    cramps;nausea after anesthesia when in 3rd grade    Anxiety state    Duodenitis    Pancreatitis (HCC)    PONV (postoperative nausea and vomiting)         History reviewed.   Past Surgical History:   Procedure Laterality Date    Biopsy/removal, lymph node(s)      R neck    Egd N/A 2024    ; duodenitis,hiatal hernia,previous gastric jejunostomy    Gastrostomy/jejunostomy tube N/A 2023         Home Medications reviewed :  Prescriptions Prior to Admission[1]      History reviewed.   Social History     Socioeconomic History    Marital status:    Tobacco Use    Smoking status: Every Day     Current packs/day: 0.00     Average packs/day: 0.5 packs/day for 25.0 years (12.5 ttl pk-yrs)     Types: Cigarettes     Start date: 1998     Last attempt to quit: 2023     Years since quittin.5     Passive exposure: Current    Smokeless tobacco: Never    Tobacco comments:     0.05 cigarettes a day, working on quitting   Vaping Use    Vaping status: Never Used   Substance and Sexual Activity    Alcohol use: Not Currently     Alcohol/week: 4.0 standard drinks of alcohol     Types: 4 Cans of beer per week     Comment: not since 2023 (Sober 1 year) - started drinking again off an on    Drug use: Not Currently     Types: Cannabis     Comment: rare    Sexual activity: Not Currently         ROS  Review of Systems   Respiratory:  Negative for shortness of breath.    Cardiovascular:  Negative for  chest pain.   Gastrointestinal:  Positive for abdominal pain, constipation, nausea and vomiting.   All other systems reviewed and are negative.     All other pertinent organ systems are reviewed and are negative.      Physical Exam     ED Triage Vitals [11/14/24 1135]   /86   Pulse 103   Resp 20   Temp 97.6 °F (36.4 °C)   Temp src Temporal   SpO2 98 %   O2 Device None (Room air)     Vital signs reviewed.      Physical Exam  Vitals and nursing note reviewed.   Cardiovascular:      Pulses: Normal pulses.   Pulmonary:      Effort: No respiratory distress.   Abdominal:      General: There is no distension.      Tenderness: There is abdominal tenderness in the right upper quadrant.   Neurological:      Mental Status: He is alert.         ED Course       Labs:     Labs Reviewed   COMP METABOLIC PANEL (14) - Abnormal; Notable for the following components:       Result Value    BUN 7 (*)     BUN/CREA Ratio 9.3 (*)     Alkaline Phosphatase 121 (*)     All other components within normal limits   LIPASE - Abnormal; Notable for the following components:    Lipase 60 (*)     All other components within normal limits   CBC WITH DIFFERENTIAL WITH PLATELET         My EKG Interpretation:   As reviewed and Interpreted by me      Imaging Results Available and Reviewed while in ED:   CT ABDOMEN+PELVIS(CONTRAST ONLY)(CPT=74177)    Result Date: 11/14/2024  CONCLUSION:    There is slight to moderate concentric mural thickening and inflammatory changes around the 1st and 2nd segments of the duodenum.  There is redemonstration of a small cystic structure at the lateral wall of the 1st segment of the duodenum which could represent a small ulcer.  The findings are nonspecific but could represent a focal duodenitis.  There are no other acute appearing abnormalities clearly identified within the abdomen/pelvis.  Nonacute findings are present and are described within the body of the report   Dictated by (CST): Singh Campo MD on  11/14/2024 at 3:56 PM     Finalized by (CST): Singh Campo MD on 11/14/2024 at 4:05 PM         My review and independent interpretation of CT images: no free fluid. Radiology report corroborates this in addition to other details as reported by them.      Decision rules/scores evaluated: none      Diagnostic labs/tests considered but not ordered: none    ED Medications Administered:   Medications   morphINE PF 4 MG/ML injection 4 mg (has no administration in time range)   morphINE PF 4 MG/ML injection 4 mg (4 mg Intravenous Given 11/14/24 1429)   sodium chloride 0.9 % IV bolus 1,000 mL (1,000 mL Intravenous New Bag 11/14/24 1429)   iopamidol 76% (ISOVUE-370) injection for power injector (80 mL Intravenous Given 11/14/24 1552)                MDM       Medical Decision Making      Differential Diagnosis: After obtaining the patient's history, performing the physical exam and reviewing the diagnostics, multiple initial diagnoses were considered based on the presenting problem including cholecystitis, SBO, pancreatitis    External document review: I personally reviewed available external medical records for any recent pertinent discharge summaries, testing, and procedures - the findings are as follows: 8/7/24 visit with Dr. Riojas for pancreatitis    Complicating Factors: The patient already  has a past medical history of Anesthesia complication, Anxiety state, Duodenitis, Pancreatitis (HCC), and PONV (postoperative nausea and vomiting). to contribute to the complexity of this ED evaluation.    Procedures performed: none    Discussed management with physician/appropriate source: none    Considered admission/deescalation of care for: vomiting    Social determinants of health affecting patient care: none    Prescription medications considered: norco, discussed continuing current medication regimen    The patient requires continuous monitoring for: abdominal pain, vomiting, constipation    Shared decision making:  discussed possible admission        Disposition and Plan     Clinical Impression:  1. Duodenitis        Disposition:  Discharge    Follow-up:  Tom Blas MD  [address] [facility] [address]  [place] [address]  [phone]    Follow up        Medications Prescribed:  Current Discharge Medication List        START taking these medications    Details   !! HYDROcodone-acetaminophen 5-325 MG Oral Tab Take 1 tablet by mouth every 6 (six) hours as needed.  Qty: 10 tablet, Refills: 0    Associated Diagnoses: Duodenitis      !! HYDROcodone-acetaminophen 5-325 MG Oral Tab Take 1 tablet by mouth every 6 (six) hours as needed.  Qty: 10 tablet, Refills: 0    Associated Diagnoses: Duodenitis       !! - Potential duplicate medications found. Please discuss with provider.                         [1] (Not in a hospital admission)

## 2024-11-14 NOTE — TELEPHONE ENCOUNTER
Clinical Impressions      Duodenitis  Disposition      Discharge      ED/IC AVS (Printed 11/14/2024)      Follow-Ups: Follow up with Tom Blas MD (Internal Medicine)

## 2024-11-15 ENCOUNTER — TELEPHONE (OUTPATIENT)
Dept: INTERNAL MEDICINE CLINIC | Facility: CLINIC | Age: 49
End: 2024-11-15

## 2024-11-15 DIAGNOSIS — Z12.11 COLON CANCER SCREENING: Primary | ICD-10-CM

## 2024-11-15 NOTE — TELEPHONE ENCOUNTER
Hello Please inform patient they are due for colorectal cancer screening. Given patient is above the age of +45 with average risk, and desire for non-invasive testing, order for Cologuard which detects stool sample for cancer was placed and will be shipped to your home within the next 48 hours and has a return [employer] pre-paid postage to send back in the mail. Please complete this within the next week to ensure Dr. Blas is delivering High quality/value care to his patients. If you have any questions, please schedule follow up with him and he would gladly discuss.

## 2025-02-08 ENCOUNTER — TELEPHONE (OUTPATIENT)
Dept: INTERNAL MEDICINE CLINIC | Facility: CLINIC | Age: 50
End: 2025-02-08

## 2025-02-08 DIAGNOSIS — Z12.11 COLON CANCER SCREENING: Primary | ICD-10-CM

## 2025-02-08 NOTE — TELEPHONE ENCOUNTER
Hello Please inform patient they are due for colorectal cancer screening and annual physical exam. Given patient is above the age of +45 with average risk, and desire for non-invasive testing, order for Cologuard which detects stool sample for cancer was placed and will be shipped to your home within the next 48 hours and has a return [employer] pre-paid postage to send back in the mail. Please complete this within the next week to ensure Dr. Blas is delivering High quality/value care to his patients. If you have any questions, please schedule follow up with him and he would gladly discuss.

## 2025-06-25 ENCOUNTER — TELEPHONE (OUTPATIENT)
Dept: INTERNAL MEDICINE CLINIC | Facility: CLINIC | Age: 50
End: 2025-06-25

## 2025-06-26 NOTE — TELEPHONE ENCOUNTER
Leighann Bal    This is Dr. Blas  I am reaching out in regards to remind you that  you are due for Annual Physical Exam and Colorectal cancer screening: given your age of greater than 45 I highly recommend you get your colorectal cancer screening if you decline screening colonoscopy a non invasive option is a stool Cologuard test which is done every three years, For average risk patients. Please follow up in clinic and we can discuss which option is best for you. I strive for delivering High quality/value care to my patients. If you have any questions, please schedule follow up with me  If you have already had a colonoscopy or Cologuard recently completed, please send Fax the records to our office [phone] However if you have established care with another provider please call our office [phone] or send us a [employer] message and we will remove your name from our list to indicate you have a new provider and will gladly send records to them if requested. for more information,  A Video on how to collect the cologuard sample on youtube: titled: How to properly use Cologuard colon cancer at-home test kit https://www.[employer].com/watch?v=IyudkXuThhI      Please complete this within the next month as I value providing high value evidence based medical care and prevention and would like to go over the next steps needed In your wellness endeavor.     However if you have established care with another provider please call our office [phone] or send us a [employer] message and we will remove your name from our list to indicate you have a new provider and will gladly send records to them if requested.  However if you have HMO insurance, you will need to call them to remove him from your list and pick any in-network provider.     Wish you all the best and stay healthy  -Dr. Roopa COTTON

## 2025-06-27 ENCOUNTER — HOSPITAL ENCOUNTER (INPATIENT)
Facility: HOSPITAL | Age: 50
LOS: 2 days | Discharge: HOME | End: 2025-06-29
Attending: EMERGENCY MEDICINE | Admitting: HOSPITALIST
Payer: COMMERCIAL

## 2025-06-27 ENCOUNTER — APPOINTMENT (OUTPATIENT)
Dept: CT IMAGING | Facility: HOSPITAL | Age: 50
End: 2025-06-27
Attending: EMERGENCY MEDICINE
Payer: COMMERCIAL

## 2025-06-27 DIAGNOSIS — K29.80 DUODENITIS: Primary | ICD-10-CM

## 2025-06-27 DIAGNOSIS — K85.00 IDIOPATHIC ACUTE PANCREATITIS WITHOUT INFECTION OR NECROSIS (HCC): ICD-10-CM

## 2025-06-27 PROBLEM — K85.90 ACUTE PANCREATITIS (HCC): Status: ACTIVE | Noted: 2025-06-27

## 2025-06-27 LAB
ALBUMIN SERPL-MCNC: 5.3 G/DL (ref 3.2–4.8)
ALBUMIN/GLOB SERPL: 2.3 {RATIO} (ref 1–2)
ALP LIVER SERPL-CCNC: 136 U/L (ref 45–117)
ALT SERPL-CCNC: 27 U/L (ref 10–49)
ANION GAP SERPL CALC-SCNC: 6 MMOL/L (ref 0–18)
AST SERPL-CCNC: 19 U/L (ref ?–34)
BASOPHILS # BLD AUTO: 0.04 X10(3) UL (ref 0–0.2)
BASOPHILS NFR BLD AUTO: 0.5 %
BILIRUB SERPL-MCNC: 0.8 MG/DL (ref 0.3–1.2)
BILIRUB UR QL: NEGATIVE
BUN BLD-MCNC: 11 MG/DL (ref 9–23)
BUN/CREAT SERPL: 10.7 (ref 10–20)
CALCIUM BLD-MCNC: 9.7 MG/DL (ref 8.7–10.4)
CHLORIDE SERPL-SCNC: 99 MMOL/L (ref 98–112)
CLARITY UR: CLEAR
CO2 SERPL-SCNC: 30 MMOL/L (ref 21–32)
CREAT BLD-MCNC: 1.03 MG/DL (ref 0.7–1.3)
DEPRECATED RDW RBC AUTO: 39.2 FL (ref 35.1–46.3)
EGFRCR SERPLBLD CKD-EPI 2021: 89 ML/MIN/1.73M2 (ref 60–?)
EOSINOPHIL # BLD AUTO: 0.07 X10(3) UL (ref 0–0.7)
EOSINOPHIL NFR BLD AUTO: 0.8 %
ERYTHROCYTE [DISTWIDTH] IN BLOOD BY AUTOMATED COUNT: 11.9 % (ref 11–15)
GLOBULIN PLAS-MCNC: 2.3 G/DL (ref 2–3.5)
GLUCOSE BLD-MCNC: 113 MG/DL (ref 70–99)
GLUCOSE UR-MCNC: NORMAL MG/DL
HCT VFR BLD AUTO: 46.7 % (ref 39–53)
HGB BLD-MCNC: 16.1 G/DL (ref 13–17.5)
HGB UR QL STRIP.AUTO: NEGATIVE
IMM GRANULOCYTES # BLD AUTO: 0.02 X10(3) UL (ref 0–1)
IMM GRANULOCYTES NFR BLD: 0.2 %
KETONES UR-MCNC: 40 MG/DL
LEUKOCYTE ESTERASE UR QL STRIP.AUTO: NEGATIVE
LIPASE SERPL-CCNC: 81 U/L (ref 12–53)
LYMPHOCYTES # BLD AUTO: 1.59 X10(3) UL (ref 1–4)
LYMPHOCYTES NFR BLD AUTO: 19 %
MCH RBC QN AUTO: 31 PG (ref 26–34)
MCHC RBC AUTO-ENTMCNC: 34.5 G/DL (ref 31–37)
MCV RBC AUTO: 89.8 FL (ref 80–100)
MONOCYTES # BLD AUTO: 0.6 X10(3) UL (ref 0.1–1)
MONOCYTES NFR BLD AUTO: 7.2 %
NEUTROPHILS # BLD AUTO: 6.03 X10 (3) UL (ref 1.5–7.7)
NEUTROPHILS # BLD AUTO: 6.03 X10(3) UL (ref 1.5–7.7)
NEUTROPHILS NFR BLD AUTO: 72.3 %
NITRITE UR QL STRIP.AUTO: NEGATIVE
OSMOLALITY SERPL CALC.SUM OF ELEC: 280 MOSM/KG (ref 275–295)
PH UR: 7 [PH] (ref 5–8)
PLATELET # BLD AUTO: 349 10(3)UL (ref 150–450)
POTASSIUM SERPL-SCNC: 4.6 MMOL/L (ref 3.5–5.1)
PROT SERPL-MCNC: 7.6 G/DL (ref 5.7–8.2)
PROT UR-MCNC: 20 MG/DL
RBC # BLD AUTO: 5.2 X10(6)UL (ref 4.3–5.7)
SODIUM SERPL-SCNC: 135 MMOL/L (ref 136–145)
SP GR UR STRIP: >1.03 (ref 1–1.03)
UROBILINOGEN UR STRIP-ACNC: NORMAL
WBC # BLD AUTO: 8.4 X10(3) UL (ref 4–11)

## 2025-06-27 PROCEDURE — 99223 1ST HOSP IP/OBS HIGH 75: CPT | Performed by: HOSPITALIST

## 2025-06-27 PROCEDURE — 74177 CT ABD & PELVIS W/CONTRAST: CPT | Performed by: EMERGENCY MEDICINE

## 2025-06-27 RX ORDER — ENOXAPARIN SODIUM 100 MG/ML
40 INJECTION SUBCUTANEOUS DAILY
Status: DISCONTINUED | OUTPATIENT
Start: 2025-06-28 | End: 2025-06-29

## 2025-06-27 RX ORDER — MAGNESIUM HYDROXIDE/ALUMINUM HYDROXICE/SIMETHICONE 120; 1200; 1200 MG/30ML; MG/30ML; MG/30ML
30 SUSPENSION ORAL ONCE
Status: DISCONTINUED | OUTPATIENT
Start: 2025-06-27 | End: 2025-06-29

## 2025-06-27 RX ORDER — SODIUM CHLORIDE, SODIUM LACTATE, POTASSIUM CHLORIDE, CALCIUM CHLORIDE 600; 310; 30; 20 MG/100ML; MG/100ML; MG/100ML; MG/100ML
INJECTION, SOLUTION INTRAVENOUS CONTINUOUS
Status: DISCONTINUED | OUTPATIENT
Start: 2025-06-27 | End: 2025-06-29

## 2025-06-27 RX ORDER — PANCRELIPASE 36000; 180000; 114000 [USP'U]/1; [USP'U]/1; [USP'U]/1
1 CAPSULE, DELAYED RELEASE PELLETS ORAL 3 TIMES DAILY
COMMUNITY

## 2025-06-27 RX ORDER — MORPHINE SULFATE 4 MG/ML
4 INJECTION, SOLUTION INTRAMUSCULAR; INTRAVENOUS EVERY 2 HOUR PRN
Status: DISCONTINUED | OUTPATIENT
Start: 2025-06-27 | End: 2025-06-29

## 2025-06-27 RX ORDER — ONDANSETRON 2 MG/ML
4 INJECTION INTRAMUSCULAR; INTRAVENOUS ONCE
Status: COMPLETED | OUTPATIENT
Start: 2025-06-27 | End: 2025-06-27

## 2025-06-27 RX ORDER — TEMAZEPAM 15 MG/1
15 CAPSULE ORAL NIGHTLY PRN
Status: DISCONTINUED | OUTPATIENT
Start: 2025-06-27 | End: 2025-06-29

## 2025-06-27 RX ORDER — MORPHINE SULFATE 2 MG/ML
1 INJECTION, SOLUTION INTRAMUSCULAR; INTRAVENOUS EVERY 2 HOUR PRN
Status: DISCONTINUED | OUTPATIENT
Start: 2025-06-27 | End: 2025-06-29

## 2025-06-27 RX ORDER — MORPHINE SULFATE 4 MG/ML
4 INJECTION, SOLUTION INTRAMUSCULAR; INTRAVENOUS ONCE
Status: COMPLETED | OUTPATIENT
Start: 2025-06-27 | End: 2025-06-27

## 2025-06-27 RX ORDER — PROCHLORPERAZINE EDISYLATE 5 MG/ML
5 INJECTION INTRAMUSCULAR; INTRAVENOUS EVERY 8 HOURS PRN
Status: DISCONTINUED | OUTPATIENT
Start: 2025-06-27 | End: 2025-06-29

## 2025-06-27 RX ORDER — ONDANSETRON 2 MG/ML
4 INJECTION INTRAMUSCULAR; INTRAVENOUS EVERY 6 HOURS PRN
Status: DISCONTINUED | OUTPATIENT
Start: 2025-06-27 | End: 2025-06-29

## 2025-06-27 RX ORDER — MORPHINE SULFATE 2 MG/ML
2 INJECTION, SOLUTION INTRAMUSCULAR; INTRAVENOUS EVERY 2 HOUR PRN
Status: DISCONTINUED | OUTPATIENT
Start: 2025-06-27 | End: 2025-06-29

## 2025-06-27 RX ORDER — ACETAMINOPHEN 500 MG
500 TABLET ORAL EVERY 4 HOURS PRN
Status: DISCONTINUED | OUTPATIENT
Start: 2025-06-27 | End: 2025-06-29

## 2025-06-27 NOTE — ED INITIAL ASSESSMENT (HPI)
Pt her for abdominal pain and vomiting that started on Wednesday. States he has been vomiting up bile. Unable to eat for 2 days and can keep minimal water down. Hx chronic pancreatitis, abdominal sx. Pt states he has not had any ETOH so he believes it is stress related this time. Denies diarrhea.

## 2025-06-27 NOTE — H&P
[facility]    PATIENT'S NAME: ARGELIA YBARRA   ATTENDING PHYSICIAN: Allen Burris MD   PATIENT ACCOUNT#:   [id number]    LOCATION:  [address]  MEDICAL RECORD #:   [id number]       YOB: 1975  ADMISSION DATE:       06/27/2025    HISTORY AND PHYSICAL EXAMINATION    CHIEF COMPLAINT:  Abdominal pain with recurrent acute duodenitis and groove pancreatitis.    HISTORY OF PRESENT ILLNESS:  Patient is a 50-year-old  male with history of recurrent duodenitis and groove pancreatitis, presented today to the emergency department for evaluation of intense epigastric abdominal pain radiating to the back and right upper quadrant area.  CBC and chemistry were unremarkable.  Alkaline phosphatase 136, normal bilirubin, sodium 135.  Urinalysis showed no evidence of urinary tract infection.  CT scan of the abdomen and pelvis showed chronic circumferential wall thickening of the C loop of the duodenum with surrounding edema and stranding that extend into the pancreatoduodenal groove consistent with duodenitis and groove pancreatitis.  No evidence of bowel obstruction.  Patient was started on IV Protonix, pain medication, and he will be admitted to the hospital for further management.    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  He had developed gastric outlet obstruction with pancreatitis that required venting G-tube and pancreatic stent, eventually had exploratory laparotomy, gastrojejunostomy, G-tube removal.  He had multiple episodes of recurrent groove pancreatitis and duodenitis without alcohol consumption.  Last upper endoscopy March 2024 showed severe peptic duodenitis on pathology.  He has history of portal vein thrombus, anticoagulated with Eliquis.     PAST SURGICAL HISTORY:  As mentioned above.     MEDICATIONS:  Please see medication reconciliation list.      ALLERGIES:  No known drug allergies.    FAMILY HISTORY:  Mother had heart  disease.  Father had hypertension.     SOCIAL HISTORY:  Ex-tobacco user and alcohol user.  No current tobacco, alcohol, or drug use.  Usually independent in his basic activities of daily living.     REVIEW OF SYSTEMS:  Intense epigastric pain, right upper quadrant pain, and back pain for the last 2 days associated with nausea and poor appetite.  No vomiting.  Patient denies any recent alcohol use.       PHYSICAL EXAMINATION:    GENERAL:  Alert, oriented to time, place, and person.  Mild to moderate distress.   VITAL SIGNS:  Temperature 98.4, pulse 64, respiratory rate 16, blood pressure 131/87, pulse ox 97% on room air.    HEENT:  Atraumatic.  Oropharynx clear.  Moist mucous membranes.  Ears, nose normal.  Eyes:  Anicteric sclerae.    NECK:  Supple.  No lymphadenopathy.  Trachea midline.  Full range of motion.    LUNGS:  Clear to auscultation bilaterally.  Normal respiratory effort.   HEART:  Regular rate and rhythm.  S1, S2 auscultated.  No murmur.    ABDOMEN:  Tenderness to superficial palpation epigastric area with hypoactive bowel sounds.  Mild guarding but no rebound tenderness.  EXTREMITIES:  No peripheral edema, clubbing, or cyanosis.    NEUROLOGIC:  Motor and sensory intact.    ASSESSMENT:    1.   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  2.   History of gastrojejunostomy.    PLAN:  Patient will be admitted to general medical floor.  IV fluids, IV Protonix, n.p.o. except sips of clear liquids.  Monitor CBC, CMP, and lipase level.  Obtain gastroenterology consult.  Aggressive IV fluid management.  Further recommendations to follow.    Dictated By Andrez Easley MD  d: 06/27/2025 14:06:20  t: 06/27/2025 14:19:16  [facility] [id number]  FB/    cc: Allen Burris MD

## 2025-06-27 NOTE — CONSULTS
[facility]  part of [facility]    Report of Consultation    Jarrett Bal Patient Status:  Emergency    1975 MRN [id number]   Location [facility] EMERGENCY DEPARTMENT Attending Allen Burris MD   Hosp Day # 0 PCP Tom Blas MD     Date of Admission:  2025  Date of Consult:  2025  Reason for Consultation:   Abdominal pain  History of chronic pancreatitis.  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  Patient states that he has had on and off abdominal discomfort over the last several months.  Using just mild and he will reduce his eating to liquids for a day and the symptoms would resolve.  He noticed worsening symptoms over the last 3 to 4 days.  Epigastric discomfort.  He has had multiple bouts of nausea vomiting over the past few days including today.  No hematemesis.  He is having bowel movements.  No fevers.      Past Medical History  Past Medical History[1]    Past Surgical History  Past Surgical History[2]    Family History  Family History[3]    Social History  Pediatric History   Patient Parents    Rodrigo Bal (Father)     Other Topics Concern    Not on file   Social History Narrative    Not on file           Current Medications:  Current Hospital Medications[4]  Prescriptions Prior to Admission[5]    Allergies  Allergies[6]    Review of Systems:   GENERAL HEALTH: feels well otherwise, denies fever or weight loss  SKIN: denies any unusual skin lesions or rashes  EYES: no visual complaints or deficits  HEENT: denies mouth sores  RESPIRATORY: no shortness of breath    CARDIOVASCULAR:no chest pain   GI: Refer to HPI,       Physical Exam:   Blood pressure 131/87, pulse 64, temperature 98.4 °F (36.9 °C), temperature source Temporal, resp. rate 16, height 5' 8\" (1.727 m), weight 140 lb (63.5 kg), SpO2 97%.  GENERAL: well developed, in no apparent distress  SKIN: no rashes,no suspicious lesions  HEENT: atraumatic, normocephalic, oropharynx clear  NECK: supple,no adenopathy, no masses  LUNGS: clear to auscultation  CARDIO: RRR without murmur  GI: normal active BS, no tenderness on palpation, no masses or ascites, normal liver span/no organomegaly  EXTREMITIES: no calf tenderness  MUSCULOSKELETAL: no calf tenderness  PSYCH: normal affect        Results:     Laboratory Data:  Recent Labs   Lab 06/27/25  1026   WBC 8.4   HGB 16.1   HCT 46.7   .0   CREATSERUM 1.03   BUN 11   *   K 4.6   CL 99   CO2 30.0   *   CA 9.7   ALB 5.3*   ALKPHO 136*   TP 7.6   AST 19   ALT 27       Recent Labs   Lab 06/27/25  1026   LIP 81*        Imaging:  CT ABDOMEN+PELVIS(CONTRAST ONLY)(CPT=74177)  Result Date: 6/27/2025  CONCLUSION: 1.  Chronic circumferential wall thickening of the C-loop of the duodenum with surrounding edema and stranding that extends to the pancreaticoduodenal groove. Overall findings are similar in comparison to prior abdominal CT from November, 2024 and suggest a chronic infectious/inflammatory duodenitis (potentially on the basis of ulcer disease). A mild coexistent groove pancreatitis is difficult to exclude. Please correlate with serum lipase levels. Stable calcifications or less likely suture material at the pancreatic head, which may relate to sequelae of chronic pancreatitis. No well-defined/drainable peripancreatic collection. 2.  Stable postoperative changes of left upper quadrant gastrojejunostomy. No evidence of bowel obstruction. 3.  Scattered prominent but nonenlarged mesenteric lymph nodes are likely reactive. 4.  Colonic diverticulosis. 5.  Lesser  incidental findings as above.  Electronically Verified and Signed by Attending Radiologist: Yaya Watkins MD 6/27/2025 12:58 PM Workstation: [id number]       Impression:   History of recurrent pancreatitis  History of groove pancreatitis  History of severe pancreatitis with resultant gastric outlet obstruction status post gastrojejunostomy in 2023.  Recurrent abdominal pain  -Etiology of the current symptoms are likely related to chronic pancreatitis and chronic inflammation from his previous pancreatitis and surgery.  - Last EGD in 2024 with continued duodenal inflammation.  Biopsies were all negative for malignancy  - CT scan from today reviewed.        Recommendations:    N.p.o. except for ice chips  IV hydration  Pain control  Would recommend clear liquid diet tomorrow if he is feeling better    Time spent in direct patient contact and decision making as well as counseling/coordination of care:  70 minutes  Thank you for allowing me to participate in the care of your patient.    Antonio Martin MD      6/27/2025         [1]   Past Medical History:   Anesthesia complication    cramps;nausea after anesthesia when in 3rd grade    Anxiety state    Duodenitis    Pancreatitis (HCC)    PONV (postoperative nausea and vomiting)   [2]   Past Surgical History:  Procedure Laterality Date    Biopsy/removal, lymph node(s)      R neck    Egd N/A 03/04/2024    ; duodenitis,hiatal hernia,previous gastric jejunostomy    Gastrostomy/jejunostomy tube N/A 06/2023   [3]   Family History  Problem Relation Age of Onset    Hypertension Mother     Other (Early onset Dementia) Mother     Other (recurrent falls) Mother     Hypertension Father     No Known Problems Brother     Dementia Maternal Grandmother     Other (Alzehimers) Maternal Grandmother     Other (Cardiac arrythmia) Paternal Grandmother     No Known Problems Paternal Grandfather    [4]   Current Facility-Administered Medications   Medication Dose Route Frequency     alum-mag hydroxide-simethicone (Maalox) 200-200-20 MG/5ML oral suspension 30 mL  30 mL Oral Once   [5] (Not in a hospital admission)  [6] No Known Allergies

## 2025-06-27 NOTE — ED QUICK NOTES
Orders for admission, patient is aware of plan and ready to go upstairs. Any questions, please call ED RN Eugenie at extension [phone].     Patient Covid vaccination status: Partially vaccinated     COVID Test Ordered in ED: None    COVID Suspicion at Admission: N/A    Running Infusions: Medication Infusions[1]     Mental Status/LOC at time of transport: alert and ox4    Other pertinent information:     CIWA score: N/A   NIH score:  N/A             [1]

## 2025-06-27 NOTE — ED PROVIDER NOTES
Patient Seen in: [facility] Emergency Department        History  Chief Complaint   Patient presents with    Abdomen/Flank Pain     Stated Complaint:     Subjective:   HPI                  Objective:     Past Medical History:    Anesthesia complication    cramps;nausea after anesthesia when in 3rd grade    Anxiety state    Duodenitis    Pancreatitis (HCC)    PONV (postoperative nausea and vomiting)              Past Surgical History:   Procedure Laterality Date    Biopsy/removal, lymph node(s)      R neck    Egd N/A 2024    ; duodenitis,hiatal hernia,previous gastric jejunostomy    Gastrostomy/jejunostomy tube N/A 2023                Social History     Socioeconomic History    Marital status:    Tobacco Use    Smoking status: Every Day     Current packs/day: 0.00     Average packs/day: 0.5 packs/day for 25.0 years (12.5 ttl pk-yrs)     Types: Cigarettes     Start date: 1998     Last attempt to quit: 2023     Years since quittin.1     Passive exposure: Current    Smokeless tobacco: Never    Tobacco comments:     0.05 cigarettes a day, working on quitting   Vaping Use    Vaping status: Never Used   Substance and Sexual Activity    Alcohol use: Not Currently     Alcohol/week: 4.0 standard drinks of alcohol     Types: 4 Cans of beer per week     Comment: not since 2023 (Sober 1 year) - started drinking again off an on    Drug use: Not Currently     Types: Cannabis     Comment: rare    Sexual activity: Not Currently     Social Drivers of Health     Food Insecurity: No Food Insecurity (2024)    Food Insecurity     Food Insecurity: Never true   Transportation Needs: No Transportation Needs (2024)    Transportation Needs     Lack of Transportation: No   Housing Stability: Low Risk  (2024)    Housing Stability     Housing Instability: No                                Physical Exam    ED Triage Vitals [25 0939]   /89   Pulse 106   Resp 19   Temp  98.4 °F (36.9 °C)   Temp src Temporal   SpO2 98 %   O2 Device None (Room air)       Current Vitals:   Vital Signs  BP: 131/87  Pulse: 64  Resp: 16  Temp: 98.4 °F (36.9 °C)  Temp src: Temporal  MAP (mmHg): 100    Oxygen Therapy  SpO2: 97 %  O2 Device: None (Room air)            Physical Exam            ED Course  Labs Reviewed   COMP METABOLIC PANEL (14) - Abnormal; Notable for the following components:       Result Value    Glucose 113 (*)     Sodium 135 (*)     Alkaline Phosphatase 136 (*)     Albumin 5.3 (*)     A/G Ratio 2.3 (*)     All other components within normal limits   LIPASE - Abnormal; Notable for the following components:    Lipase 81 (*)     All other components within normal limits   URINALYSIS WITH CULTURE REFLEX - Abnormal; Notable for the following components:    Spec Gravity >1.030 (*)     Ketones Urine 40 (*)     Protein Urine 20 (*)     All other components within normal limits   CBC WITH DIFFERENTIAL WITH PLATELET   RAINBOW DRAW GOLD   RAINBOW DRAW BLUE                            MDM     50-year-old male with a history of chronic pancreatitis and duodenitis as well as previous obstruction with J-tube placement now reversed presents today with abdominal pain.  Patient states has had increasing epigastric abdominal pain for the last 3 days associated with some nausea and vomiting.  Symptoms similar to previous exacerbations of his pancreatitis/duodenitis.  No fevers.  No bleeding.  No chest pain.    On exam, vitals normal, uncomfortable appearing with tenderness to palpation in the epigastrium with some voluntary guarding but no distention    Differential: Duodenitis, pancreatitis    Patient evaluated with labs which were relatively reassuring.  Mild elevation in lipase.  CT abdomen pelvis with stable inflammation surrounding the duodenum and pancreas to his most recent admission in April.    Patient was given IV fluids, Protonix, Zofran, and morphine    Gastroenterology consulted without  immediate recommendations    Will admit for further management.    Admission disposition: 6/27/2025  1:14 PM           MDM    Disposition and Plan     Clinical Impression:  1. Duodenitis         Disposition:  Admit  6/27/2025  1:14 pm    Follow-up:  No follow-up provider specified.        Medications Prescribed:  Current Discharge Medication List                Supplementary Documentation:         Hospital Problems       Present on Admission  Date Reviewed: 7/16/2024          ICD-10-CM Noted POA    Duodenitis K29.80 1/15/2019 Unknown

## 2025-06-28 LAB
ALBUMIN SERPL-MCNC: 4 G/DL (ref 3.2–4.8)
ALBUMIN/GLOB SERPL: 2.2 {RATIO} (ref 1–2)
ALP LIVER SERPL-CCNC: 96 U/L (ref 45–117)
ALT SERPL-CCNC: 16 U/L (ref 10–49)
ANION GAP SERPL CALC-SCNC: 7 MMOL/L (ref 0–18)
AST SERPL-CCNC: 13 U/L (ref ?–34)
BASOPHILS # BLD AUTO: 0.03 X10(3) UL (ref 0–0.2)
BASOPHILS NFR BLD AUTO: 0.5 %
BILIRUB SERPL-MCNC: 0.8 MG/DL (ref 0.3–1.2)
BUN BLD-MCNC: 8 MG/DL (ref 9–23)
BUN/CREAT SERPL: 10 (ref 10–20)
CALCIUM BLD-MCNC: 8.4 MG/DL (ref 8.7–10.4)
CHLORIDE SERPL-SCNC: 101 MMOL/L (ref 98–112)
CO2 SERPL-SCNC: 30 MMOL/L (ref 21–32)
CREAT BLD-MCNC: 0.8 MG/DL (ref 0.7–1.3)
DEPRECATED RDW RBC AUTO: 39.8 FL (ref 35.1–46.3)
EGFRCR SERPLBLD CKD-EPI 2021: 108 ML/MIN/1.73M2 (ref 60–?)
EOSINOPHIL # BLD AUTO: 0.2 X10(3) UL (ref 0–0.7)
EOSINOPHIL NFR BLD AUTO: 3 %
ERYTHROCYTE [DISTWIDTH] IN BLOOD BY AUTOMATED COUNT: 11.9 % (ref 11–15)
GLOBULIN PLAS-MCNC: 1.8 G/DL (ref 2–3.5)
GLUCOSE BLD-MCNC: 89 MG/DL (ref 70–99)
HCT VFR BLD AUTO: 40.7 % (ref 39–53)
HGB BLD-MCNC: 14 G/DL (ref 13–17.5)
IMM GRANULOCYTES # BLD AUTO: 0.02 X10(3) UL (ref 0–1)
IMM GRANULOCYTES NFR BLD: 0.3 %
LIPASE SERPL-CCNC: 101 U/L (ref 12–53)
LYMPHOCYTES # BLD AUTO: 1.72 X10(3) UL (ref 1–4)
LYMPHOCYTES NFR BLD AUTO: 26 %
MCH RBC QN AUTO: 31.4 PG (ref 26–34)
MCHC RBC AUTO-ENTMCNC: 34.4 G/DL (ref 31–37)
MCV RBC AUTO: 91.3 FL (ref 80–100)
MONOCYTES # BLD AUTO: 0.43 X10(3) UL (ref 0.1–1)
MONOCYTES NFR BLD AUTO: 6.5 %
NEUTROPHILS # BLD AUTO: 4.22 X10 (3) UL (ref 1.5–7.7)
NEUTROPHILS # BLD AUTO: 4.22 X10(3) UL (ref 1.5–7.7)
NEUTROPHILS NFR BLD AUTO: 63.7 %
OSMOLALITY SERPL CALC.SUM OF ELEC: 284 MOSM/KG (ref 275–295)
PLATELET # BLD AUTO: 261 10(3)UL (ref 150–450)
POTASSIUM SERPL-SCNC: 3.7 MMOL/L (ref 3.5–5.1)
PROT SERPL-MCNC: 5.8 G/DL (ref 5.7–8.2)
RBC # BLD AUTO: 4.46 X10(6)UL (ref 4.3–5.7)
SODIUM SERPL-SCNC: 138 MMOL/L (ref 136–145)
WBC # BLD AUTO: 6.6 X10(3) UL (ref 4–11)

## 2025-06-28 PROCEDURE — 99233 SBSQ HOSP IP/OBS HIGH 50: CPT | Performed by: HOSPITALIST

## 2025-06-28 RX ORDER — HYDROCODONE BITARTRATE AND ACETAMINOPHEN 5; 325 MG/1; MG/1
1 TABLET ORAL EVERY 4 HOURS PRN
Status: DISCONTINUED | OUTPATIENT
Start: 2025-06-28 | End: 2025-06-29

## 2025-06-28 RX ORDER — PANTOPRAZOLE SODIUM 40 MG/1
40 TABLET, DELAYED RELEASE ORAL
Status: DISCONTINUED | OUTPATIENT
Start: 2025-06-28 | End: 2025-06-29

## 2025-06-28 NOTE — PROGRESS NOTES
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  -abd pain improved today  -GI consult appreciated  -cont IVF  -advance to CLD    Other:  H/o gastrojejunostomy.  H/o etoh pancreatitis  H/o portal vein thrombus        DVT Mechanical Prophylaxis:        DVT Pharmacologic Prophylaxis   Medication    enoxaparin (Lovenox) 40 MG/0.4ML SUBQ injection 40 mg               Subjective:      Initial Chief Complaint:  abdominal pain    Abd pain improved today. Eager to start CLD. No n/v.     10 point ROS completed and was negative, except for pertinent positive and negatives stated in subjective.      Objective:      Temp:  [97.7 °F (36.5 °C)-98.6 °F (37 °C)] 98.6 °F (37 °C)  Pulse:  [59-72] 72  Resp:  [18-22] 19  BP: (103-127)/(76-88) 103/81  SpO2:  [93 %-100 %] 97 %  General:  Alert, no distress  HEENT:  Normocephalic, atraumatic  Cardiac:  Regular rate, regular rhythm  Pulmonary:  Clear to auscultation bilaterally, respirations unlabored  Gastrointestinal:  Soft, +BS  Musculoskeletal:  No joint swelling  Extremities:  No edema, no cyanosis, no clubbing  Neurologic:  nonfocal  Psychiatric:  Normal affect, calm and appropriate    Intake/Output Summary (Last 24 hours) at 2025 1356  Last data filed at 2025 1047  Gross per 24 hour   Intake 300 ml   Output --   Net 300 ml         Recent Labs   Lab 25  1026 25  0656   WBC 8.4 6.6   HGB 16.1 14.0   HCT 46.7 40.7   .0 261.0   RBC 5.20 4.46   MCV 89.8 91.3   MCH 31.0 31.4   MCHC 34.5 34.4   RDW 11.9 11.9   NEPRELIM 6.03 4.22     Recent Labs   Lab 25  1026 25  0656   BUN 11 8*   CREATSERUM 1.03 0.80   CA 9.7 8.4*   ALB 5.3* 4.0   * 138   K 4.6 3.7   CL 99 101   CO2 30.0 30.0   * 89   BILT 0.8 0.8   AST 19 13   ALT 27 16   ALKPHO 136* 96    TP 7.6 5.8   LIP 81* 101*       CT ABDOMEN+PELVIS(CONTRAST ONLY)(CPT=74177)  Result Date: 6/27/2025  CONCLUSION: 1.  Chronic circumferential wall thickening of the C-loop of the duodenum with surrounding edema and stranding that extends to the pancreaticoduodenal groove. Overall findings are similar in comparison to prior abdominal CT from November, 2024 and suggest a chronic infectious/inflammatory duodenitis (potentially on the basis of ulcer disease). A mild coexistent groove pancreatitis is difficult to exclude. Please correlate with serum lipase levels. Stable calcifications or less likely suture material at the pancreatic head, which may relate to sequelae of chronic pancreatitis. No well-defined/drainable peripancreatic collection. 2.  Stable postoperative changes of left upper quadrant gastrojejunostomy. No evidence of bowel obstruction. 3.  Scattered prominent but nonenlarged mesenteric lymph nodes are likely reactive. 4.  Colonic diverticulosis. 5.  Lesser incidental findings as above.  Electronically Verified and Signed by Attending Radiologist: Yaya Watkins MD 6/27/2025 12:58 PM Workstation: [id number]        Medications:  Scheduled Medications[1]   PRN Meds: PRN Medications[2]    Supplementary Documentation:   DVT Mechanical Prophylaxis:        DVT Pharmacologic Prophylaxis   Medication    enoxaparin (Lovenox) 40 MG/0.4ML SUBQ injection 40 mg                Code Status: Full Code  Carlton: No urinary catheter in place  Carlton Duration (in days):   Central line:    SUMAN: 6/30/2025                    [facility]. Time spent on chart/note review, review of labs/imaging, discussion with patient, physical exam, discussion with staff, consultants, coordinating care, writing progress note, discussion of plan of care.      Chandrakant Kat MD         [1]    pantoprazole  40 mg Oral QAM AC    alum-mag hydroxide-simethicone  30 mL Oral Once    enoxaparin  40 mg Subcutaneous Daily   [2]   acetaminophen    ondansetron     prochlorperazine    temazepam    morphINE **OR** morphINE **OR** morphINE

## 2025-06-28 NOTE — PLAN OF CARE
Patient A/Ox4. Room air. No complaint of nausea. Morphine IV provided for abdominal pain. Low fiber diet tolerated well. IV fluids infusing. Self ambulatory. Call light and safety measures in place.

## 2025-06-28 NOTE — PLAN OF CARE
No acute changes overnight. Pain being managed with PRN morphine. NPO except ice chips and sips of clears. No reports of nausea/vomiting. Up independently.     Problem: Patient Centered Care  Goal: Patient preferences are identified and integrated in the patient's plan of care  Description: Interventions:  - What would you like us to know as we care for you?   - Provide timely, complete, and accurate information to patient/family  - Incorporate patient and family knowledge, values, beliefs, and cultural backgrounds into the planning and delivery of care  - Encourage patient/family to participate in care and decision-making at the level they choose  - Honor patient and family perspectives and choices  Outcome: Progressing     Problem: Patient/Family Goals  Goal: Patient/Family Long Term Goal  Description: Patient's Long Term Goal: to go home    Interventions:  - Pain management   - Tolerate diet advancement   - See additional Care Plan goals for specific interventions  Outcome: Progressing  Goal: Patient/Family Short Term Goal  Description: Patient's Short Term Goal: for my pain to be a 5 or less    Interventions:   - Pain meds as needed   - Nonpharmacologic interventions   - See additional Care Plan goals for specific interventions  Outcome: Progressing     Problem: PAIN - ADULT  Goal: Verbalizes/displays adequate comfort level or patient's stated pain goal  Description: INTERVENTIONS:  - Encourage pt to monitor pain and request assistance  - Assess pain using appropriate pain scale  - Administer analgesics based on type and severity of pain and evaluate response  - Implement non-pharmacological measures as appropriate and evaluate response  - Consider cultural and social influences on pain and pain management  - Manage/alleviate anxiety  - Utilize distraction and/or relaxation techniques  - Monitor for opioid side effects  - Notify MD/LIP if interventions unsuccessful or patient reports new pain  - Anticipate  increased pain with activity and pre-medicate as appropriate  Outcome: Progressing     Problem: GASTROINTESTINAL - ADULT  Goal: Minimal or absence of nausea and vomiting  Description: INTERVENTIONS:  - Maintain adequate hydration with IV or PO as ordered and tolerated  - Nasogastric tube to low intermittent suction as ordered  - Evaluate effectiveness of ordered antiemetic medications  - Provide nonpharmacologic comfort measures as appropriate  - Advance diet as tolerated, if ordered  - Obtain nutritional consult as needed  - Evaluate fluid balance  Outcome: Progressing

## 2025-06-28 NOTE — PLAN OF CARE
Patient A/Ox4. Room air. No complaint of nausea. Morphine IV provided for abdominal pain. NPO , sips of clears. IV fluids infusing. Self ambulatory. Call light and safety measures in place. Plan: Clear liquid diet on Sat if symptoms improve.   Problem: Patient Centered Care  Goal: Patient preferences are identified and integrated in the patient's plan of care  Description: Interventions:  - Provide timely, complete, and accurate information to patient/family  - Incorporate patient and family knowledge, values, beliefs, and cultural backgrounds into the planning and delivery of care  - Encourage patient/family to participate in care and decision-making at the level they choose  - Honor patient and family perspectives and choices  Outcome: Progressing

## 2025-06-28 NOTE — PROGRESS NOTES
[facility]  Gastroenterology Progress Note    Jarrett Bal Patient Status:  Inpatient    1975 MRN [id number]   Location [facility] 4W/SW/SE Attending Chandrakant Kat MD   Hosp Day # 1 PCP Tom Blas MD     Subjective:  Jarrett Bal is a(n) 50 year old male.    Current complaints:   Feeling well.  Did have some mild pain overnight.  No fevers.  Feels hungry.  Wants to go home.    Objective:  Blood pressure 108/82, pulse 63, temperature 98.4 °F (36.9 °C), temperature source Oral, resp. rate 18, height 5' 8\" (1.727 m), weight 140 lb (63.5 kg), SpO2 100%.  Respiratory: no labored breathing  CV: RRR  Abdomen: nondistended, soft, nontender  Extremities: no calf tenderness  Neurologic: Ox3    Labs:   Recent Labs   Lab 25  1026 25  0656   WBC 8.4 6.6   HGB 16.1 14.0   HCT 46.7 40.7   .0 261.0   CREATSERUM 1.03 0.80   BUN 11 8*   * 138   K 4.6 3.7   CL 99 101   CO2 30.0 30.0   * 89   CA 9.7 8.4*   ALB 5.3* 4.0   ALKPHO 136* 96   BILT 0.8 0.8   TP 7.6 5.8   AST 19 13   ALT 27 16       Recent Labs   Lab 25  1026 25  0656   LIP 81* 101*        Imaging:  CT ABDOMEN+PELVIS(CONTRAST ONLY)(CPT=74177)  Result Date: 2025  CONCLUSION: 1.  Chronic circumferential wall thickening of the C-loop of the duodenum with surrounding edema and stranding that extends to the pancreaticoduodenal groove. Overall findings are similar in comparison to prior abdominal CT from  and suggest a chronic infectious/inflammatory duodenitis (potentially on the basis of ulcer disease). A mild coexistent groove pancreatitis is difficult to exclude. Please correlate with serum lipase levels. Stable calcifications or less likely suture material at the pancreatic head, which may relate to sequelae of chronic pancreatitis. No well-defined/drainable peripancreatic collection. 2.  Stable postoperative changes of left upper quadrant gastrojejunostomy. No evidence of bowel obstruction. 3.   Scattered prominent but nonenlarged mesenteric lymph nodes are likely reactive. 4.  Colonic diverticulosis. 5.  Lesser incidental findings as above.  Electronically Verified and Signed by Attending Radiologist: Yaya Watkins MD 6/27/2025 12:58 PM Workstation: [id number]       Problem list:  Problem List[1]    Assessment/Plan:  History of recurrent pancreatitis  History of groove pancreatitis  History of severe pancreatitis with resultant gastric outlet obstruction status post gastrojejunostomy in 2023.  Recurrent abdominal pain  -Etiology of the current symptoms are likely related to chronic pancreatitis and chronic inflammation from his previous pancreatitis and surgery.  - Last EGD in 2024 with continued duodenal inflammation.  Biopsies were all negative for malignancy  - CT scan from admission reviewed.  Still some inflammation in the duodenal C-loop.  This is likely chronic.           Recommendations:     Clear liquid diet   IV hydration  Pain control  If tolerates clear liquid diet then can advance to low-fat soft diet.  Would reattempt pantoprazole 40 mg daily for the next 8 weeks.      If tolerates diet and no significant pain then okay to discharge home today.  Will need follow-up in the office in 2 to 4 weeks.      Antonio Martin MD    6/28/2025  10:12 AM         [1]   Patient Active Problem List  Diagnosis    Hypokalemia    Duodenitis    Gastric mass    Gastric outlet obstruction (HCC)    Prolonged QT interval    Pneumoperitoneum    CARLOS A (acute kidney injury)    Hyponatremia    Metabolic alkalosis    Lightheaded    Weakness generalized    Traumatic injury of head, initial encounter    Hypotension, unspecified hypotension type    Malfunction of jejunostomy tube (HCC)    Hyperglycemia    Acute pancreatitis, unspecified complication status, unspecified pancreatitis type (HCC)    Portal vein thrombosis    Transaminitis    Acute cholecystitis    Ascending cholangitis (HCC)    On apixaban therapy    Alcohol  use disorder    Chronic recurrent pancreatitis (HCC)    Acute pancreatitis (HCC)

## 2025-06-29 VITALS
HEIGHT: 68 IN | BODY MASS INDEX: 21.22 KG/M2 | WEIGHT: 140 LBS | RESPIRATION RATE: 18 BRPM | SYSTOLIC BLOOD PRESSURE: 118 MMHG | DIASTOLIC BLOOD PRESSURE: 83 MMHG | TEMPERATURE: 98 F | HEART RATE: 82 BPM | OXYGEN SATURATION: 98 %

## 2025-06-29 LAB
ALBUMIN SERPL-MCNC: 3.7 G/DL (ref 3.2–4.8)
ALBUMIN/GLOB SERPL: 2.2 {RATIO} (ref 1–2)
ALP LIVER SERPL-CCNC: 95 U/L (ref 45–117)
ALT SERPL-CCNC: 13 U/L (ref 10–49)
ANION GAP SERPL CALC-SCNC: 7 MMOL/L (ref 0–18)
AST SERPL-CCNC: 13 U/L (ref ?–34)
BASOPHILS # BLD AUTO: 0.03 X10(3) UL (ref 0–0.2)
BASOPHILS NFR BLD AUTO: 0.5 %
BILIRUB SERPL-MCNC: 0.5 MG/DL (ref 0.3–1.2)
BUN BLD-MCNC: 6 MG/DL (ref 9–23)
BUN/CREAT SERPL: 7.6 (ref 10–20)
CALCIUM BLD-MCNC: 8.3 MG/DL (ref 8.7–10.4)
CHLORIDE SERPL-SCNC: 105 MMOL/L (ref 98–112)
CO2 SERPL-SCNC: 29 MMOL/L (ref 21–32)
CREAT BLD-MCNC: 0.79 MG/DL (ref 0.7–1.3)
DEPRECATED RDW RBC AUTO: 40.3 FL (ref 35.1–46.3)
EGFRCR SERPLBLD CKD-EPI 2021: 109 ML/MIN/1.73M2 (ref 60–?)
EOSINOPHIL # BLD AUTO: 0.26 X10(3) UL (ref 0–0.7)
EOSINOPHIL NFR BLD AUTO: 4.1 %
ERYTHROCYTE [DISTWIDTH] IN BLOOD BY AUTOMATED COUNT: 11.7 % (ref 11–15)
GLOBULIN PLAS-MCNC: 1.7 G/DL (ref 2–3.5)
GLUCOSE BLD-MCNC: 97 MG/DL (ref 70–99)
HCT VFR BLD AUTO: 39.6 % (ref 39–53)
HGB BLD-MCNC: 13.5 G/DL (ref 13–17.5)
IMM GRANULOCYTES # BLD AUTO: 0.02 X10(3) UL (ref 0–1)
IMM GRANULOCYTES NFR BLD: 0.3 %
LYMPHOCYTES # BLD AUTO: 1.65 X10(3) UL (ref 1–4)
LYMPHOCYTES NFR BLD AUTO: 26.1 %
MCH RBC QN AUTO: 32 PG (ref 26–34)
MCHC RBC AUTO-ENTMCNC: 34.1 G/DL (ref 31–37)
MCV RBC AUTO: 93.8 FL (ref 80–100)
MONOCYTES # BLD AUTO: 0.51 X10(3) UL (ref 0.1–1)
MONOCYTES NFR BLD AUTO: 8.1 %
NEUTROPHILS # BLD AUTO: 3.86 X10 (3) UL (ref 1.5–7.7)
NEUTROPHILS # BLD AUTO: 3.86 X10(3) UL (ref 1.5–7.7)
NEUTROPHILS NFR BLD AUTO: 60.9 %
OSMOLALITY SERPL CALC.SUM OF ELEC: 290 MOSM/KG (ref 275–295)
PLATELET # BLD AUTO: 250 10(3)UL (ref 150–450)
POTASSIUM SERPL-SCNC: 4 MMOL/L (ref 3.5–5.1)
PROT SERPL-MCNC: 5.4 G/DL (ref 5.7–8.2)
RBC # BLD AUTO: 4.22 X10(6)UL (ref 4.3–5.7)
SODIUM SERPL-SCNC: 141 MMOL/L (ref 136–145)
WBC # BLD AUTO: 6.3 X10(3) UL (ref 4–11)

## 2025-06-29 PROCEDURE — 99239 HOSP IP/OBS DSCHRG MGMT >30: CPT | Performed by: HOSPITALIST

## 2025-06-29 RX ORDER — ONDANSETRON 4 MG/1
4 TABLET, FILM COATED ORAL EVERY 6 HOURS PRN
Qty: 10 TABLET | Refills: 1 | Status: SHIPPED | OUTPATIENT
Start: 2025-06-29

## 2025-06-29 RX ORDER — HYDROCODONE BITARTRATE AND ACETAMINOPHEN 5; 325 MG/1; MG/1
1 TABLET ORAL EVERY 6 HOURS PRN
Qty: 8 TABLET | Refills: 0 | Status: SHIPPED | OUTPATIENT
Start: 2025-06-29

## 2025-06-29 RX ORDER — PANTOPRAZOLE SODIUM 40 MG/1
40 TABLET, DELAYED RELEASE ORAL
Qty: 30 TABLET | Refills: 0 | Status: SHIPPED | OUTPATIENT
Start: 2025-06-30

## 2025-06-29 NOTE — DISCHARGE SUMMARY
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  H/o etoh pancreatitis  H/o portal vein thrombus      History of Present Illness:     Copied from Admission H&P:    Patient is a 50-year-old  male with history of recurrent duodenitis and groove pancreatitis, presented today to the emergency department for evaluation of intense epigastric abdominal pain radiating to the back and right upper quadrant area. CBC and chemistry were unremarkable. Alkaline phosphatase 136, normal bilirubin, sodium 135. Urinalysis showed no evidence of urinary tract infection. CT scan of the abdomen and pelvis showed chronic circumferential wall thickening of the C loop of the duodenum with surrounding edema and stranding that extend into the pancreatoduodenal groove consistent with duodenitis and groove pancreatitis. No evidence of bowel obstruction. Patient was started on IV Protonix, pain medication, and he will be admitted to the hospital for further management.       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  -abd pain improved - tolerating low fat soft diet  -GI consult appreciated  -off IVF  -stable for discharge     Other:  H/o gastrojejunostomy.  H/o etoh pancreatitis  H/o portal vein thrombus     Consultants         Provider   Role Specialty     Antonio Martin MD      Consulting Physician GASTROENTEROLOGY     Joaquin Bergman MD      Consulting Physician GASTROENTEROLOGY             Physical Exam:   Blood pressure 118/83, pulse 82, temperature 97.8 °F (36.6 °C), temperature source Oral, resp. rate 18, height 5' 8\" (1.727 m), weight 140 lb (63.5 kg), SpO2 98%.  General:  Alert, no  distress  HEENT:  Normocephalic, atraumatic  Cardiac:  Regular rate, regular rhythm  Pulmonary:  Clear to auscultation bilaterally, respirations unlabored  Gastrointestinal:  Soft, non-tender, normal bowel sounds  Musculoskeletal:  No joint swelling  Extremities:  No edema, no cyanosis, no clubbing  Neurologic:  nonfocal  Psychiatric:  Normal affect, calm and appropriate         Discharge Medications        START taking these medications        Instructions Prescription details   HYDROcodone-acetaminophen 5-325 MG Tabs  Commonly known as: Norco      Take 1 tablet by mouth every 6 (six) hours as needed for Pain.   Quantity: 8 tablet  Refills: 0     ondansetron 4 mg tablet  Commonly known as: Zofran      Take 1 tablet (4 mg total) by mouth every 6 (six) hours as needed for Nausea.   Quantity: 10 tablet  Refills: 1     pantoprazole 40 MG Tbec  Commonly known as: Protonix  Start taking on: June 30, 2025      Take 1 tablet (40 mg total) by mouth every morning before breakfast.   Quantity: 30 tablet  Refills: 0            CONTINUE taking these medications        Instructions Prescription details   acetaminophen 500 MG Tabs  Commonly known as: Tylenol Extra Strength      Take 1 tablet (500 mg total) by mouth every 4 (four) hours as needed for Fever (equal to or greater than 100.4).   Refills: 0     Creon [id number] units Cpep  Generic drug: Pancrelipase (Lip-Prot-Amyl)      Take 1 capsule by mouth in the morning, at noon, and at bedtime.   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    Lace+ Score: 26  59-90 High Risk  29-58 Medium Risk  0-28   Low Risk.    TCM Follow-Up Recommendation:  LACE < 29: Low Risk of readmission after discharge from the hospital. No TCM follow-up needed.        I spent >30 minutes on this discharge. Discussed treatment and discharge plans.       Chandrakant Kat MD

## 2025-06-29 NOTE — PLAN OF CARE
Patient's abd pain managed with morphine prn. RA. IVF infusing. He denies of n/v. Up self, voiding freely. Plan for home today. Safety precautions in place.     Problem: GASTROINTESTINAL - ADULT  Goal: Minimal or absence of nausea and vomiting  Description: INTERVENTIONS:  - Maintain adequate hydration with IV or PO as ordered and tolerated  - Nasogastric tube to low intermittent suction as ordered  - Evaluate effectiveness of ordered antiemetic medications  - Provide nonpharmacologic comfort measures as appropriate  - Advance diet as tolerated, if ordered  - Obtain nutritional consult as needed  - Evaluate fluid balance  Outcome: Progressing     Problem: PAIN - ADULT  Goal: Verbalizes/displays adequate comfort level or patient's stated pain goal  Description: INTERVENTIONS:  - Encourage pt to monitor pain and request assistance  - Assess pain using appropriate pain scale  - Administer analgesics based on type and severity of pain and evaluate response  - Implement non-pharmacological measures as appropriate and evaluate response  - Consider cultural and social influences on pain and pain management  - Manage/alleviate anxiety  - Utilize distraction and/or relaxation techniques  - Monitor for opioid side effects  - Notify MD/LIP if interventions unsuccessful or patient reports new pain  - Anticipate increased pain with activity and pre-medicate as appropriate  Outcome: Progressing

## 2025-06-29 NOTE — PROGRESS NOTES
[facility]  Gastroenterology Progress Note    Jarrett Bal Patient Status:  Inpatient    1975 MRN [id number]   Location [facility] 4W/SW/SE Attending Chandrakant Kat MD   Hosp Day # 2 PCP Tom Blas MD     Subjective:  Jarrett Bal is a(n) 50 year old male.    Current complaints:   Feeling fine.  Did have some mild abdominal pain overnight.  No pain today.  Tolerated diet well.  No nausea or vomiting.  No fevers.    Objective:  Blood pressure 118/83, pulse 82, temperature 97.8 °F (36.6 °C), temperature source Oral, resp. rate 18, height 5' 8\" (1.727 m), weight 140 lb (63.5 kg), SpO2 98%.  Respiratory: no labored breathing  CV: RRR  Abdomen: nondistended, soft, nontender  Extremities: no calf tenderness  Neurologic: Ox3    Labs:   Recent Labs   Lab 25  1026 25  0656 25  0646   WBC 8.4 6.6 6.3   HGB 16.1 14.0 13.5   HCT 46.7 40.7 39.6   .0 261.0 250.0   CREATSERUM 1.03 0.80 0.79   BUN 11 8* 6*   * 138 141   K 4.6 3.7 4.0   CL 99 101 105   CO2 30.0 30.0 29.0   * 89 97   CA 9.7 8.4* 8.3*   ALB 5.3* 4.0 3.7   ALKPHO 136* 96 95   BILT 0.8 0.8 0.5   TP 7.6 5.8 5.4*   AST 19 13 13   ALT 27 16 13       Recent Labs   Lab 25  1026 25  0656   LIP 81* 101*        Imaging:  CT ABDOMEN+PELVIS(CONTRAST ONLY)(CPT=74177)  Result Date: 2025  CONCLUSION: 1.  Chronic circumferential wall thickening of the C-loop of the duodenum with surrounding edema and stranding that extends to the pancreaticoduodenal groove. Overall findings are similar in comparison to prior abdominal CT from  and suggest a chronic infectious/inflammatory duodenitis (potentially on the basis of ulcer disease). A mild coexistent groove pancreatitis is difficult to exclude. Please correlate with serum lipase levels. Stable calcifications or less likely suture material at the pancreatic head, which may relate to sequelae of chronic pancreatitis. No well-defined/drainable  peripancreatic collection. 2.  Stable postoperative changes of left upper quadrant gastrojejunostomy. No evidence of bowel obstruction. 3.  Scattered prominent but nonenlarged mesenteric lymph nodes are likely reactive. 4.  Colonic diverticulosis. 5.  Lesser incidental findings as above.  Electronically Verified and Signed by Attending Radiologist: Yaya Watkins MD 6/27/2025 12:58 PM Workstation: [email or web address]       Problem list:  Problem List[1]    Assessment/Plan:  History of recurrent pancreatitis  History of groove pancreatitis  History of severe pancreatitis with resultant gastric outlet obstruction status post gastrojejunostomy in 2023.  Recurrent abdominal pain  -Etiology of the current symptoms are likely related to chronic pancreatitis and chronic inflammation from his previous pancreatitis and surgery.  - Last EGD in 2024 with continued duodenal inflammation.  Biopsies were all negative for malignancy  - CT scan from admission reviewed.  Still some inflammation in the duodenal C-loop.  This is likely chronic.           Recommendations:     Continue low-fat soft diet  Pantoprazole 40 mg daily.  He will need to be on this for at least 2 to 3 months.  Avoid all alcohol stressed with patient    If tolerates diet and no significant pain then okay to discharge home today.  Will need follow-up in the office in 2 to 4 weeks.      Antonio Martin MD             [1]   Patient Active Problem List  Diagnosis    Hypokalemia    Duodenitis    Gastric mass    Gastric outlet obstruction (HCC)    Prolonged QT interval    Pneumoperitoneum    CARLOS A (acute kidney injury)    Hyponatremia    Metabolic alkalosis    Lightheaded    Weakness generalized    Traumatic injury of head, initial encounter    Hypotension, unspecified hypotension type    Malfunction of jejunostomy tube (HCC)    Hyperglycemia    Acute pancreatitis, unspecified complication status, unspecified pancreatitis type (HCC)    Portal vein thrombosis    Transaminitis     Acute cholecystitis    Ascending cholangitis (HCC)    On apixaban therapy    Alcohol use disorder    Chronic recurrent pancreatitis (HCC)    Acute pancreatitis (HCC)

## 2025-06-30 NOTE — PAYOR COMM NOTE
--------------  ADMISSION REVIEW     Payor: TARA NIELSEN POS/MCNP  Subscriber #:  [id number]  Authorization Number: [id number]    Admit date: 6/27/25  Admit time:  3:43 PM       REVIEW DOCUMENTATION:     ED Provider Notes        ED Provider Notes signed by Allen Burris MD at 6/27/2025  2:05 PM    Patient Seen in: [facility] Emergency Department  History  Chief Complaint   Patient presents with    Abdomen/Flank Pain     Stated Complaint:     Subjective:   HPI    Objective:     Past Medical History:    Anesthesia complication    cramps;nausea after anesthesia when in 3rd grade    Anxiety state    Duodenitis    Pancreatitis (HCC)    PONV (postoperative nausea and vomiting)     Past Surgical History:   Procedure Laterality Date    Biopsy/removal, lymph node(s)      R neck    Egd N/A 03/04/2024    ; duodenitis,hiatal hernia,previous gastric jejunostomy    Gastrostomy/jejunostomy tube N/A 06/2023   Physical Exam    ED Triage Vitals [06/27/25 0939]   /89   Pulse 106   Resp 19   Temp 98.4 °F (36.9 °C)   Temp src Temporal   SpO2 98 %   O2 Device None (Room air)       Current Vitals:   Vital Signs  BP: 131/87  Pulse: 64  Resp: 16  Temp: 98.4 °F (36.9 °C)  Temp src: Temporal  MAP (mmHg): 100    Oxygen Therapy  SpO2: 97 %  O2 Device: None (Room air)    Physical Exam  ED Course  Labs Reviewed   COMP METABOLIC PANEL (14) - Abnormal; Notable for the following components:       Result Value    Glucose 113 (*)     Sodium 135 (*)     Alkaline Phosphatase 136 (*)     Albumin 5.3 (*)     A/G Ratio 2.3 (*)     All other components within normal limits   LIPASE - Abnormal; Notable for the following components:    Lipase 81 (*)     All other components within normal limits   URINALYSIS WITH CULTURE REFLEX - Abnormal; Notable for the following components:    Spec Gravity >1.030 (*)     Ketones Urine 40 (*)     Protein Urine 20 (*)     All other components within normal limits   CBC WITH DIFFERENTIAL WITH PLATELET    RAINBOW DRAW GOLD   RAINBOW DRAW BLUE     [facility]     50-year-old male with a history of chronic pancreatitis and duodenitis as well as previous obstruction with J-tube placement now reversed presents today with abdominal pain.  Patient states has had increasing epigastric abdominal pain for the last 3 days associated with some nausea and vomiting.  Symptoms similar to previous exacerbations of his pancreatitis/duodenitis.  No fevers.  No bleeding.  No chest pain.    On exam, vitals normal, uncomfortable appearing with tenderness to palpation in the epigastrium with some voluntary guarding but no distention    Differential: Duodenitis, pancreatitis    Patient evaluated with labs which were relatively reassuring.  Mild elevation in lipase.  CT abdomen pelvis with stable inflammation surrounding the duodenum and pancreas to his most recent admission in April.    Patient was given IV fluids, Protonix, Zofran, and morphine    Gastroenterology consulted without immediate recommendations    Will admit for further management.    Admission disposition: 6/27/2025  1:14 PM           [facility]    Disposition and Plan     Clinical Impression:  1. Duodenitis         Disposition:  Admit  6/27/2025  1:14 pm  Supplementary Documentation:         Hospital Problems       Present on Admission  Date Reviewed: 7/16/2024          ICD-10-CM Noted POA    Duodenitis K29.80 1/15/2019 Unknown            Signed by Allen Burris MD on 6/27/2025  2:05 PM       H&P    H&P signed by Andrez Easley MD at 6/28/2025  7:50 AM       HISTORY AND PHYSICAL EXAMINATION    HISTORY AND PHYSICAL EXAMINATION    CHIEF COMPLAINT:  Abdominal pain with recurrent acute duodenitis and groove pancreatitis.    HISTORY OF PRESENT ILLNESS:  Patient is a 50-year-old  male with history of recurrent duodenitis and groove pancreatitis, presented today to the emergency department for evaluation of intense epigastric abdominal pain radiating to the back and right upper  quadrant area.  CBC and chemistry were unremarkable.  Alkaline phosphatase 136, normal bilirubin, sodium 135.  Urinalysis showed no evidence of urinary tract infection.  CT scan of the abdomen and pelvis showed chronic circumferential wall thickening of the C loop of the duodenum with surrounding edema and stranding that extend into the pancreatoduodenal groove consistent with duodenitis and groove pancreatitis.  No evidence of bowel obstruction.  Patient was started on IV Protonix, pain medication, and he will be admitted to the hospital for further management.    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  He had developed gastric outlet obstruction with pancreatitis that required venting G-tube and pancreatic stent, eventually had exploratory laparotomy, gastrojejunostomy, G-tube removal.  He had multiple episodes of recurrent groove pancreatitis and duodenitis without alcohol consumption.  Last upper endoscopy March 2024 showed severe peptic duodenitis on pathology.  He has history of portal vein thrombus, anticoagulated with Eliquis.     PAST SURGICAL HISTORY:  As mentioned above.     MEDICATIONS:  Please see medication reconciliation list.      ALLERGIES:  No known drug allergies.    FAMILY HISTORY:  Mother had heart disease.  Father had hypertension.     SOCIAL HISTORY:  Ex-tobacco user and alcohol user.  No current tobacco, alcohol, or drug use.  Usually independent in his basic activities of daily living.     REVIEW OF SYSTEMS:  Intense epigastric pain, right upper quadrant pain, and back pain for the last 2 days associated with nausea and poor appetite.  No vomiting.  Patient denies any recent alcohol use.       PHYSICAL EXAMINATION:    GENERAL:  Alert, oriented to time, place, and person.  Mild to moderate distress.   VITAL SIGNS:  Temperature 98.4, pulse 64, respiratory rate 16, blood pressure 131/87, pulse ox 97% on room air.    HEENT:  Atraumatic.  Oropharynx  clear.  Moist mucous membranes.  Ears, nose normal.  Eyes:  Anicteric sclerae.    NECK:  Supple.  No lymphadenopathy.  Trachea midline.  Full range of motion.    LUNGS:  Clear to auscultation bilaterally.  Normal respiratory effort.   HEART:  Regular rate and rhythm.  S1, S2 auscultated.  No murmur.    ABDOMEN:  Tenderness to superficial palpation epigastric area with hypoactive bowel sounds.  Mild guarding but no rebound tenderness.  EXTREMITIES:  No peripheral edema, clubbing, or cyanosis.    NEUROLOGIC:  Motor and sensory intact.    ASSESSMENT:    1.   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  2.   History of gastrojejunostomy.    PLAN:  Patient will be admitted to general medical floor.  IV fluids, IV Protonix, n.p.o. except sips of clear liquids.  Monitor CBC, CMP, and lipase level.  Obtain gastroenterology consult.  Aggressive IV fluid management.  Further recommendations to follow.    Dictated By Andrez Easley MD  d: 06/27/2025 14:06:20  t: 06/27/2025 14:19:16  Job [id number]  /    cc: Allen Burris MD      Electronically signed by Andrez Easley MD on 6/28/2025  7:50 AM       CONSULTS     Impression:   History of recurrent pancreatitis  History of groove pancreatitis  History of severe pancreatitis with resultant gastric outlet obstruction status post gastrojejunostomy in 2023.  Recurrent abdominal pain  -Etiology of the current symptoms are likely related to chronic pancreatitis and chronic inflammation from his previous pancreatitis and surgery.  - Last EGD in 2024 with continued duodenal inflammation.  Biopsies were all negative for malignancy  - CT scan from today reviewed.           Recommendations:     N.p.o. except for ice chips  IV hydration  Pain control  Would recommend clear liquid diet tomorrow if he is feeling better     Time spent in direct patient contact and decision making as well as counseling/coordination of care:  70 minutes  Thank you for  allowing me to participate in the care of your patient.     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  -abd pain improved today  -GI consult appreciated  -cont IVF  -advance to CLD     Other:  H/o gastrojejunostomy.  H/o etoh pancreatitis  H/o portal vein thrombus     DVT Mechanical Prophylaxis:            DVT Pharmacologic Prophylaxis   Medication    enoxaparin (Lovenox) 40 MG/0.4ML SUBQ injection 40 mg               Subjective:  Initial Chief Complaint:  abdominal pain     Abd pain improved today. Eager to start CLD. No n/v.      10 point ROS completed and was negative, except for pertinent positive and negatives stated in subjective.        Objective:  Temp:  [97.7 °F (36.5 °C)-98.6 °F (37 °C)] 98.6 °F (37 °C)  Pulse:  [59-72] 72  Resp:  [18-22] 19  BP: (103-127)/(76-88) 103/81  SpO2:  [93 %-100 %] 97 %  General:  Alert, no distress  HEENT:  Normocephalic, atraumatic  Cardiac:  Regular rate, regular rhythm  Pulmonary:  Clear to auscultation bilaterally, respirations unlabored  Gastrointestinal:  Soft, +BS  Musculoskeletal:  No joint swelling  Extremities:  No edema, no cyanosis, no clubbing  Neurologic:  nonfocal  Psychiatric:  Normal affect, calm and appropriate     Intake/Output Summary (Last 24 hours) at 6/28/2025 1356  Last data filed at 6/28/2025 1047      Gross per 24 hour   Intake 300 ml   Output --   Net 300 ml                 Recent Labs   Lab 06/27/25  1026 06/28/25  0656   WBC 8.4 6.6   HGB 16.1 14.0   HCT 46.7 40.7   .0 261.0   RBC 5.20 4.46   MCV 89.8 91.3   MCH 31.0 31.4   MCHC 34.5 34.4   RDW 11.9 11.9   NEPRELIM 6.03 4.22           Recent Labs   Lab 06/27/25  1026 06/28/25  0656   BUN 11 8*   CREATSERUM 1.03 0.80   CA 9.7 8.4*   ALB 5.3* 4.0   * 138   K 4.6 3.7   CL 99 101   CO2  30.0 30.0   * 89   BILT 0.8 0.8   AST 19 13   ALT 27 16   ALKPHO 136* 96   TP 7.6 5.8   LIP 81* 101*         CT ABDOMEN+PELVIS(CONTRAST ONLY)(CPT=74177)  Result Date: 6/27/2025  CONCLUSION: 1.  Chronic circumferential wall thickening of the C-loop of the duodenum with surrounding edema and stranding that extends to the pancreaticoduodenal groove. Overall findings are similar in comparison to prior abdominal CT from November, 2024 and suggest a chronic infectious/inflammatory duodenitis (potentially on the basis of ulcer disease). A mild coexistent groove pancreatitis is difficult to exclude. Please correlate with serum lipase levels. Stable calcifications or less likely suture material at the pancreatic head, which may relate to sequelae of chronic pancreatitis. No well-defined/drainable peripancreatic collection. 2.  Stable postoperative changes of left upper quadrant gastrojejunostomy. No evidence of bowel obstruction. 3.  Scattered prominent but nonenlarged mesenteric lymph nodes are likely reactive. 4.  Colonic diverticulosis. 5.  Lesser incidental findings as above.  Electronically Verified and Signed by Attending Radiologist: Yaya Watkins MD 6/27/2025 12:58 PM Workstation: [id number]         Medications:  [Scheduled Medications]     [Scheduled Medications]   pantoprazole  40 mg Oral QAM AC    alum-mag hydroxide-simethicone  30 mL Oral Once    enoxaparin  40 mg Subcutaneous Daily     PRN Meds: [PRN Medications]    [PRN Medications]    acetaminophen    ondansetron    prochlorperazine    temazepam    morphINE **OR** morphINE **OR** morphINE     Supplementary Documentation:  DVT Mechanical Prophylaxis:            DVT Pharmacologic Prophylaxis   Medication    enoxaparin (Lovenox) 40 MG/0.4ML SUBQ injection 40 mg        MDM High. Time spent on chart/note review, review of labs/imaging, discussion with patient, physical exam, discussion with staff, consultants, coordinating care, writing progress note,  discussion of plan of care.        Chandrakant Kat MD                    MEDS  Scheduled Meds Sorted by Name   Medications 06/27/25 06/28/25 06/29/25               lactated ringers IV bolus 1,000 mL  Dose: 1,000 mL  Freq: Once Route: IV  Last Dose: Stopped (06/27/25 1132)  Start: 06/27/25 1021 End: 06/27/25 1132    1032 KT-New Bag   1132 KT-Stopped           morphINE PF 4 MG/ML injection 4 mg  Dose: 4 mg  Freq: Once Route: IV  Start: 06/27/25 1305 End: 06/27/25 1313    1313 KT-Given            morphINE PF 4 MG/ML injection 4 mg  Dose: 4 mg  Freq: Once Route: IV  Start: 06/27/25 1043 End: 06/27/25 1046    1046 KT-Given            ondansetron (Zofran) 4 MG/2ML injection 4 mg  Dose: 4 mg  Freq: Once Route: IV  Start: 06/27/25 1021 End: 06/27/25 1032    1032 KT-Given            pantoprazole (Protonix) 40 mg in sodium chloride 0.9% PF 10 mL IV push  Dose: 40 mg  Freq: Daily Route: IV  Start: 06/27/25 1545 End: 06/28/25 0946   Admin Instructions:   Dilute with 10 ml NS; IV push over 2 minutes    1557 KW-Given        0900 KW-Given   0946-D/C'd        pantoprazole (Protonix) 40 mg in sodium chloride 0.9% PF 10 mL IV push  Dose: 40 mg  Freq: Once Route: IV  Start: 06/27/25 1021 End: 06/27/25 1040   Admin Instructions:   Dilute with 10 ml NS; IV push over 2 minutes    1038 KT-Given            pantoprazole (Protonix) DR tab 40 mg  Dose: 40 mg  Freq: Every morning before breakfast Route: OR  Start: 06/28/25 1000 End: 06/29/25 1500   Admin Instructions:   Do not crush     (1000 KW)-Not Given [C]        0538 JA-Given   1500-D/C'd         Continuous Meds Sorted by Name     Medications 06/27/25 06/28/25 06/29/25   lactated ringers infusion  Rate: 125 mL/hr  Freq: Continuous Route: IV  Start: 06/27/25 1545 End: 06/29/25 1500    1545 KW-New Bag        1007 KW-New Bag        1500-D/C'd          PRN Meds Sorted by Name     Medications 06/27/25 06/28/25 06/29/25               iopamidol 76% (ISOVUE-370) injection for power injector  Dose: 80  mL  Freq: IMG once as needed Route: IV  PRN Reason: contrast  Start: 06/27/25 1119 End: 06/27/25 1119    1119 IV-Given                 Or   morphINE PF 2 MG/ML injection 2 mg  Dose: 2 mg  Freq: Every 2 hour PRN Route: IV  PRN Reason: moderate pain  Start: 06/27/25 1543 End: 06/29/25 1500   Admin Instructions:   Use PRN reason as a guide and follow range order policy. If oral pain meds are ordered and patient can tolerate oral intake, start with PRN oral pain medications first.    1557 KW-Given               1339 KW-Given   1949 JA-Given      1500-D/C'd        Or   morphINE PF 4 MG/ML injection 4 mg  Dose: 4 mg  Freq: Every 2 hour PRN Route: IV  PRN Reason: severe pain  Start: 06/27/25 1543 End: 06/29/25 1500   Admin Instructions:   Use PRN reason as a guide and follow range order policy. If oral pain meds are ordered and patient can tolerate oral intake, start with PRN oral pain medications first.       1808 KW-Given   2058 EA-Given      0616 EA-Given            1500-D/C'd          Vitals (last 3 days) before discharge    Date/Time Temp Pulse Resp BP SpO2 Weight O2 Device O2 Flow Rate (L/min) Who   06/29/25 0837 97.8 °F (36.6 °C) 82 18 118/83 98 % -- None (Room air) -- KW   06/29/25 0420 97.8 °F (36.6 °C) 63 18 103/83 96 % -- None (Room air) -- DA   06/28/25 1947 98.3 °F (36.8 °C) 75 16 112/73 98 % -- None (Room air) -- JA   06/28/25 1130 98.6 °F (37 °C) 72 19 103/81 97 % -- None (Room air) -- LM   06/28/25 0825 98.4 °F (36.9 °C) 63 18 108/82 100 % -- None (Room air) -- KW   06/28/25 0524 98.4 °F (36.9 °C) 68 18 104/76 94 % -- -- -- AC   06/27/25 2057 97.7 °F (36.5 °C) 67 20 120/81 93 % -- None (Room air) -- EA   06/27/25 1601 -- -- -- -- -- 140 lb (63.5 kg) -- -- CW   06/27/25 1548 98.3 °F (36.8 °C) 59 18 127/88 98 % -- None (Room air) -- KW   06/27/25 1424 -- 67 22 118/88 96 % -- None (Room air) -- KT   06/27/25 1354 -- 60 16 116/76 98 % -- -- -- KT   06/27/25 1324 -- 67 16 127/94 Abnormal  98 % -- -- -- KT    25 1300 -- -- -- -- -- -- None (Room air) -- KT   25 1254 -- 64 16 131/87 97 % -- None (Room air) -- KT   25 1124 -- 80 12 134/91 Abnormal  98 % -- None (Room air) -- KT   25 1054 -- 68 16 140/97 Abnormal  100 % -- -- -- KT   25 1024 -- 89 15 145/109 Abnormal  97 % -- -- -- KT   25 1009 -- 96 13 153/107 Abnormal  99 % -- -- -- KT   25 0939 98.4 °F (36.9 °C) 106 19 122/89 98 % 140 lb (63.5 kg) None (Room air) -- KE     --------------  DISCHARGE REVIEW    Payor: [facility] POS/MCNP  Subscriber #:  [id number]  Authorization Number: [id number]    Admit date: 25  Admit time:   3:43 PM  Discharge Date: 2025  1:00 PM     Admitting Physician: Andrez Easley MD  Attending Physician:  No att. 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  H/o etoh pancreatitis  H/o portal vein thrombus      History of Present Illness:     Copied from Admission H&P:    Patient is a 50-year-old  male with history of recurrent duodenitis and groove pancreatitis, presented today to the emergency department for evaluation of intense epigastric abdominal pain radiating to the back and right upper quadrant area. CBC and chemistry were unremarkable. Alkaline phosphatase 136, normal bilirubin, sodium 135. Urinalysis showed no evidence of urinary tract infection. CT scan of the abdomen and pelvis  showed chronic circumferential wall thickening of the C loop of the duodenum with surrounding edema and stranding that extend into the pancreatoduodenal groove consistent with duodenitis and groove pancreatitis. No evidence of bowel obstruction. Patient was started on IV Protonix, pain medication, and he will be admitted to the hospital for further management.       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  -abd pain improved - tolerating low fat soft diet  -GI consult appreciated  -off IVF  -stable for discharge     Other:  H/o gastrojejunostomy.  H/o etoh pancreatitis  H/o portal vein thrombus     Consultants         Provider   Role Specialty     Antonio Martin MD      Consulting Physician GASTROENTEROLOGY     Joaquin Bergman MD      Consulting Physician GASTROENTEROLOGY             Physical Exam:   Blood pressure 118/83, pulse 82, temperature 97.8 °F (36.6 °C), temperature source Oral, resp. rate 18, height 5' 8\" (1.727 m), weight 140 lb (63.5 kg), SpO2 98%.  General:  Alert, no distress  HEENT:  Normocephalic, atraumatic  Cardiac:  Regular rate, regular rhythm  Pulmonary:  Clear to auscultation bilaterally, respirations unlabored  Gastrointestinal:  Soft, non-tender, normal bowel sounds  Musculoskeletal:  No joint swelling  Extremities:  No edema, no cyanosis, no clubbing  Neurologic:  nonfocal  Psychiatric:  Normal affect, calm and appropriate         Discharge Medications        START taking these medications        Instructions Prescription details   HYDROcodone-acetaminophen 5-325 MG Tabs  Commonly known as: Norco      Take 1 tablet by mouth every 6 (six) hours as needed for Pain.   Quantity: 8 tablet  Refills: 0     ondansetron 4 mg tablet  Commonly known as: Zofran      Take 1 tablet (4 mg total) by mouth every 6 (six) hours as needed for Nausea.   Quantity: 10 tablet  Refills: 1     pantoprazole 40 MG Tbec  Commonly known as: Protonix  Start taking on: June 30,  2025      Take 1 tablet (40 mg total) by mouth every morning before breakfast.   Quantity: 30 tablet  Refills: 0            CONTINUE taking these medications        Instructions Prescription details   acetaminophen 500 MG Tabs  Commonly known as: Tylenol Extra Strength      Take 1 tablet (500 mg total) by mouth every 4 (four) hours as needed for Fever (equal to or greater than 100.4).   Refills: 0     Creon [id number] units Cpep  Generic drug: Pancrelipase (Lip-Prot-Amyl)      Take 1 capsule by mouth in the morning, at noon, and at bedtime.   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    Lace+ Score: 26  59-90 High Risk  29-58 Medium Risk  0-28   Low Risk.    TCM Follow-Up Recommendation:  LACE < 29: Low Risk of readmission after discharge from the hospital. No TCM follow-up needed.        I spent >30 minutes on this discharge. Discussed treatment and discharge plans.       Chandrakant Kat MD      Electronically signed by Chandrakant Kat MD on 6/29/2025 12:10 PM         REVIEWER COMMENTS     clear to auscultation bilaterally

## 2025-07-01 ENCOUNTER — PATIENT OUTREACH (OUTPATIENT)
Dept: CASE MANAGEMENT | Age: 50
End: 2025-07-01

## 2025-07-01 NOTE — PROGRESS NOTES
Hospital Follow up for PCP (Discharge 6/29 [facility])       PCP   Tom Blas   [address] [address]   [phone]   Attempt #1:  Left message on voicemail for patient to call transitions specialist back to schedule follow up appointments. Provided Transitions specialist scheduling phone number [phone].

## 2025-07-02 NOTE — PROGRESS NOTES
PCP appointment request (discharged 06/29)    Dr Tom Blas  Internal Medicine  [address] [facility] [address]  [place] [address]  [phone]    Attempt #2:  Left message on voicemail for patient to call transitions specialist back to schedule follow up appointments. Provided Transitions specialist scheduling phone number [phone].

## 2025-07-03 NOTE — PROGRESS NOTES
PCP appointment request (discharged 06/29)     Dr Tom Blas  Internal Medicine  [address] [facility] [address]  [place] [address]  [phone]  Multiple attempts w/no calls back; no appointment made    Attempt #3:  Left message on voicemail for patient to call transitions specialist back to schedule follow up appointments. Provided Transitions specialist scheduling phone number [phone]. Closing encounter. Will re-open if patient returns call.

## 2025-08-31 ENCOUNTER — TELEPHONE (OUTPATIENT)
Dept: INTERNAL MEDICINE CLINIC | Facility: CLINIC | Age: 50
End: 2025-08-31

## (undated) DEVICE — JEJUNAL FEEDING SET: Brand: PEG 24

## (undated) DEVICE — Device: Brand: DUAL NARE NASAL CANNULAE FEMALE LUER CON 7FT O2 TUBE

## (undated) DEVICE — SUT SILK 2-0 SH C016D

## (undated) DEVICE — STRYKER HARMONIC 36CM REPRCSED

## (undated) DEVICE — KIT ENDO ORCAPOD 160/180/190

## (undated) DEVICE — PUSHING CATHETER: Brand: COOK

## (undated) DEVICE — MEDI-VAC NON-CONDUCTIVE SUCTION TUBING 6MM X 1.8M (6FT.) L: Brand: CARDINAL HEALTH

## (undated) DEVICE — SUT VICRYL 2-0 SH J417H

## (undated) DEVICE — 60 ML SYRINGE REGULAR TIP: Brand: MONOJECT

## (undated) DEVICE — MEDI-VAC NON-CONDUCTIVE SUCTION TUBING: Brand: CARDINAL HEALTH

## (undated) DEVICE — SUT PDS II 0 CTX Z990G

## (undated) DEVICE — BINDER ABDOMINAL 46-62IN X 9IN

## (undated) DEVICE — ERCP CANNULA - 5-4-3 TIP: Brand: CONTOUR ERCP CANNULA

## (undated) DEVICE — KIT CLEAN ENDOKIT 1.1OZ GOWNX2

## (undated) DEVICE — NEEDLE BIOPSY ACQUIRE OD22 GA

## (undated) DEVICE — YANKAUER SUCTION INSTRUMENT NO CONTROL VENT, BULB TIP, CLEAR: Brand: YANKAUER

## (undated) DEVICE — ALL PURPOSE SPONGES,NONWOVEN, 4 PLY: Brand: CURITY

## (undated) DEVICE — YANKAUER,BULB TIP,W/O VENT,RIGID,STERILE: Brand: MEDLINE

## (undated) DEVICE — SIDE ARM ADAPTER: Brand: COOK

## (undated) DEVICE — GUIDEWIRE NOVAGOLD STRGHT TIP

## (undated) DEVICE — GUIDEWIRE DREAM STR .035 450

## (undated) DEVICE — CONMED SCOPE SAVER BITE BLOCK, 20X27 MM: Brand: SCOPE SAVER

## (undated) DEVICE — Device

## (undated) DEVICE — FORCEP RADIAL JAW 4

## (undated) DEVICE — SUT PLAIN GUT 3-0 CT-1 842H

## (undated) DEVICE — SPONGE: SPECIALTY PEANUT XR 100/CS: Brand: MEDICAL ACTION INDUSTRIES

## (undated) DEVICE — PACK SURG UNIVERSAL DRAPE

## (undated) DEVICE — SUT PROLENE 1 CTX 8455H

## (undated) DEVICE — PROXIMATE RELOADABLE LINEAR CUTTER WITH SAFETY LOCK-OUT-100MM: Brand: PROXIMATE

## (undated) DEVICE — DRAIN SPONGES,6 PLY: Brand: EXCILON

## (undated) DEVICE — NASAL JEJUNAL FEEDING TUBE: Brand: COOK

## (undated) DEVICE — ENDOSCOPIC ULTRASOUND ASPIRATION NEEDLE: Brand: EXPECT SLIMLINE SL

## (undated) DEVICE — GIJAW SINGLE-USE BIOPSY FORCEPS WITH NEEDLE: Brand: GIJAW

## (undated) DEVICE — RAT TOOTH GRASPING FORCEPS 8MM

## (undated) DEVICE — SUT SILK 2-0 SA85H

## (undated) DEVICE — A P RESECTION: Brand: MEDLINE INDUSTRIES, INC.

## (undated) DEVICE — STERILE LATEX POWDER-FREE SURGICAL GLOVESWITH NITRILE COATING: Brand: PROTEXIS

## (undated) DEVICE — SUT CHROMIC GUT 2-0 SH G123H

## (undated) DEVICE — SOL NACL IRRIG 0.9% 1000ML BTL

## (undated) DEVICE — HARMONIC 1100 SHEARS, 20CM SHAFT LENGTH: Brand: HARMONIC

## (undated) DEVICE — DRAPE SHEET LAPCHOLE 124X100X7

## (undated) DEVICE — SET FD AMT BRIDLE 10FR TUBE

## (undated) DEVICE — HOLDER LIMB POSEY WRIST/ANKLE

## (undated) DEVICE — PROXIMATE RELOADABLE LINEAR STAPLER, 30MM: Brand: PROXIMATE

## (undated) DEVICE — ENCORE® LATEX MICRO SIZE 8.5, STERILE LATEX POWDER-FREE SURGICAL GLOVE: Brand: ENCORE

## (undated) DEVICE — SUT SILK 0 A306H

## (undated) DEVICE — LINE MNTR ADLT SET O2 INTMD

## (undated) DEVICE — PROXIMATE LINEAR STAPLER RELOADS, 90MM: Brand: PROXIMATE

## (undated) DEVICE — PROXIMATE RH ROTATING HEAD SKIN STAPLERS (35 WIDE) CONTAINS 35 STAINLESS STEEL STAPLES: Brand: PROXIMATE

## (undated) DEVICE — CANNULATING SPHINCTEROTOME: Brand: TRUETOME JAG 39

## (undated) DEVICE — HEXAGONAL CAPTIVATOR STIFF 13M

## (undated) NOTE — LETTER
[address], [place]  Authorization for Invasive Procedures  Date: 6/11/2023         Time: 10:36AM    I hereby authorize Dr. Saba Stratton, my physician and his/her assistants (if applicable), which may include medical students, residents, and/or fellows, to perform the following surgical operation/ procedure and administer such anesthesia as may be determined necessary by my physician: Esophagogastroduodenoscopy on [address]  2.   I recognize that during the surgical operation/procedure, unforeseen conditions may necessitate additional or different procedures than those listed above. I, therefore, further authorize and request that the above-named surgeon, assistants, or designees perform such procedures as are, in their judgment, necessary and desirable. 3.   My surgeon/physician has discussed prior to my surgery the potential benefits, risks and side effects of this procedure; the likelihood of achieving goals; and potential problems that might occur during recuperation. They also discussed reasonable alternatives to the procedure, including risks, benefits, and side effects related to the alternatives and risks related to not receiving this procedure. I have had all my questions answered and I acknowledge that no guarantee has been made as to the result that may be obtained. 4.   Should the need arise during my operation/procedure, which includes change of level of care prior to discharge, I also consent to the administration of blood and/or blood products. Further, I understand that despite careful testing and screening of blood or blood products by collecting agencies, I may still be subject to ill effects as a result of receiving a blood transfusion and/or blood products.   The following are some, but not all, of the potential risks that can occur: fever and allergic reactions, hemolytic reactions, transmission of diseases such as Hepatitis, AIDS and Cytomegalovirus (CMV) and fluid overload. In the event that I wish to have an autologous transfusion of my own blood, or a directed donor transfusion, I will discuss this with my physician. Check only if Refusing Blood or Blood Products  I understand refusal of blood or blood products as deemed necessary by my physician may have serious consequences to my condition to include possible death. I hereby assume responsibility for my refusal and release the hospital, its personnel, and my physicians from any responsibility for the consequences of my refusal.         o  Refuse         5. I authorize the use of any specimen, organs, tissues, body parts or foreign objects that may be removed from my body during the operation/procedure for diagnosis, research or teaching purposes and their subsequent disposal by hospital authorities. I also authorize the release of specimen test results and/or written reports to my treating physician on the hospital medical staff or other referring or consulting physicians involved in my care, at the discretion of the Pathologist or my treating physician. 6.   I consent to the photographing or videotaping of the operations or procedures to be performed, including appropriate portions of my body for medical, scientific, or educational purposes, provided my identity is not revealed by the pictures or by descriptive texts accompanying them. If the procedure has been photographed/videotaped, the surgeon will obtain the original picture, image, videotape or CD. The hospital will not be responsible for storage, release or maintenance of the picture, image, tape or CD.    7.   I consent to the presence of a  or observers in the operating room as deemed necessary by my physician or their designees. 8.   I recognize that in the event my procedure results in extended X-Ray/fluoroscopy time, I may develop a skin reaction. 9.  If I have a Do Not Attempt Resuscitation (DNAR) order in place, that status will be suspended while in the operating room, procedural suite, and during the recovery period unless otherwise explicitly stated by me (or a person authorized to consent on my behalf). The surgeon or my attending physician will determine when the applicable recovery period ends for purposes of reinstating the DNAR order. 10. Patients having a sterilization procedure: I understand that if the procedure is successful the results will be permanent and it will therefore be impossible for me to inseminate, conceive, or bear children. I also understand that the procedure is intended to result in sterility, although the result has not been guaranteed. 11. I acknowledge that my physician has explained sedation/analgesia administration to me including the risk and benefits I consent to the administration of sedation/analgesia as may be necessary or desirable in the judgment of my physician. I CERTIFY THAT I HAVE READ AND FULLY UNDERSTAND THE ABOVE CONSENT TO OPERATION and/or OTHER PROCEDURE.        ____________________________________       _________________________________      ______________________________  Signature of Patient         Signature of Responsible Person        Printed Name of Responsible Person        ____________________________________      _________________________________      ______________________________       Signature of Witness          Relationship to Patient                       Date                                       Time    Patient Name: Jeremy Amaro     : 1975                 Printed: 2023      Medical Record #: [id number]                      Page 1 of 2          [place] My signature below affirms that prior to the time of the procedure; I have explained to the patient and/or his/her legal representative, the risks and benefits involved in the proposed treatment and any reasonable alternative to the proposed treatment.  I have also explained the risks and benefits involved in refusal of the proposed treatment and alternatives to the proposed treatment and have answered the patient's questions.  If I have a significant financial interest in a co-management agreement or a significant financial interest in any product or implant, or other significant relationship used in this procedure/surgery, I have disclosed this and had a discussion with my patient.     _______________________________________________________________ _____________________________  Alicia Listen of Physician)                                                                                         (Date)                                   (Time)    Patient Name: Woody Fong     : 1975                 Printed: 2023      Medical Record #: [id number]                      Page 2 of 2

## (undated) NOTE — LETTER
[address], [place], [place]  Authorization for Invasive Procedure                                                                                           I hereby authorize DR. SOUZA my physician and his/her assistants (if applicable), which may include medical students, residents, and/or fellows, to perform the following surgical operation/ procedure and administer such anesthesia as may be determined necessary by my physician: Operation/Procedure name (s)  ENDOSCOPIC RETROGRADE CHOLANGIOPANCREATOGRAPHY, PERCUTANEOUS ENDOSCOPIC GASTROSTOMY TUBE PLACEMENT WITH JEJUNOSTOMY  FEEDING TUBE [address] [facility]   2.   I recognize that during the surgical operation/procedure, unforeseen conditions may necessitate additional or different procedures than those listed above. I, therefore, further authorize and request that the above-named surgeon, assistants, or designees perform such procedures as are, in their judgment, necessary and desirable. 3.   My surgeon/physician has discussed prior to my surgery the potential benefits, risks and side effects of this procedure; the likelihood of achieving goals; and potential problems that might occur during recuperation. They also discussed reasonable alternatives to the procedure, including risks, benefits, and side effects related to the alternatives and risks related to not receiving this procedure. I have had all my questions answered and I acknowledge that no guarantee has been made as to the result that may be obtained. 4.   Should the need arise during my operation/procedure, which includes change of level of care prior to discharge, I also consent to the administration of blood and/or blood products. Further, I understand that despite careful testing and screening of blood or blood products by collecting agencies, I may still be subject to ill effects as a result of receiving a blood transfusion and/or blood products. The following are some, but not all, of the potential risks that can occur: fever and allergic reactions, hemolytic reactions, transmission of diseases such as Hepatitis, AIDS and Cytomegalovirus (CMV) and fluid overload. In the event that I wish to have an autologous transfusion of my own blood, or a directed donor transfusion, I will discuss this with my physician. Check only if Refusing Blood or Blood Products  I understand refusal of blood or blood products as deemed necessary by my physician may have serious consequences to my condition to include possible death. I hereby assume responsibility for my refusal and release the hospital, its personnel, and my physicians from any responsibility for the consequences of my refusal.    o  Refuse   5. I authorize the use of any specimen, organs, tissues, body parts or foreign objects that may be removed from my body during the operation/procedure for diagnosis, research or teaching purposes and their subsequent disposal by hospital authorities. I also authorize the release of specimen test results and/or written reports to my treating physician on the hospital medical staff or other referring or consulting physicians involved in my care, at the discretion of the Pathologist or my treating physician. 6.   I consent to the photographing or videotaping of the operations or procedures to be performed, including appropriate portions of my body for medical, scientific, or educational purposes, provided my identity is not revealed by the pictures or by descriptive texts accompanying them. If the procedure has been photographed/videotaped, the surgeon will obtain the original picture, image, videotape or CD. The hospital will not be responsible for storage, release or maintenance of the picture, image, tape or CD.    7.   I consent to the presence of a  or observers in the operating room as deemed necessary by my physician or their designees.     8. I recognize that in the event my procedure results in extended X-Ray/fluoroscopy time, I may develop a skin reaction. 9. If I have a Do Not Attempt Resuscitation (DNAR) order in place, that status will be suspended while in the operating room, procedural suite, and during the recovery period unless otherwise explicitly stated by me (or a person authorized to consent on my behalf). The surgeon or my attending physician will determine when the applicable recovery period ends for purposes of reinstating the DNAR order. 10. Patients having a sterilization procedure: I understand that if the procedure is successful the results will be permanent and it will therefore be impossible for me to inseminate, conceive, or bear children. I also understand that the procedure is intended to result in sterility, although the result has not been guaranteed. 11. I acknowledge that my physician has explained sedation/analgesia administration to me including the risk and benefits I consent to the administration of sedation/analgesia as may be necessary or desirable in the judgment of my physician. I CERTIFY THAT I HAVE READ AND FULLY UNDERSTAND THE ABOVE CONSENT TO OPERATION and/or OTHER PROCEDURE.     _________________________________________ _________________________________     ___________________________________  Signature of Patient     Signature of Responsible Person                   Printed Name of Responsible Person                              _________________________________________ ______________________________        ___________________________________  Signature of Witness         Date  Time         Relationship to Patient    STATEMENT OF PHYSICIAN My signature below affirms that prior to the time of the procedure; I have explained to the patient and/or his/her legal representative, the risks and benefits involved in the proposed treatment and any reasonable alternative to the proposed treatment.  I have also explained the risks and benefits involved in refusal of the proposed treatment and alternatives to the proposed treatment and have answered the patient's questions.  If I have a significant financial interest in a co-management agreement or a significant financial interest in any product or implant, or other significant relationship used in this procedure/surgery, I have disclosed this and had a discussion with my patient.     _______________________________________________________________ _____________________________  Brendia Fat of Physician)                                                                                         (Date)                                   (Time)  Patient Name: Jamee Garrido    : 1975   Printed: 2023      Medical Record #: [id number]                                              Page 1 of 1

## (undated) NOTE — LETTER
[address]  Authorization for Surgical Operation and Procedure                                                                                           I hereby authorize Susan Vo MD, my physician and his/her assistants (if applicable), which may include medical students, residents, and/or fellows, to perform the following surgical operation/ procedure and administer such anesthesia as may be determined necessary by my physician: Operation/Procedure name (s) ESOPHAGOGASTRODUODENOSCOPY (EGD) with placement of nasoduodenal tube on [address]   2.   I recognize that during the surgical operation/procedure, unforeseen conditions may necessitate additional or different procedures than those listed above. I, therefore, further authorize and request that the above-named surgeon, assistants, or designees perform such procedures as are, in their judgment, necessary and desirable. 3.   My surgeon/physician has discussed prior to my surgery the potential benefits, risks and side effects of this procedure; the likelihood of achieving goals; and potential problems that might occur during recuperation. They also discussed reasonable alternatives to the procedure, including risks, benefits, and side effects related to the alternatives and risks related to not receiving this procedure. I have had all my questions answered and I acknowledge that no guarantee has been made as to the result that may be obtained. 4.   Should the need arise during my operation/procedure, which includes change of level of care prior to discharge, I also consent to the administration of blood and/or blood products. Further, I understand that despite careful testing and screening of blood or blood products by collecting agencies, I may still be subject to ill effects as a result of receiving a blood transfusion and/or blood products.   The following are some, but not all, of the potential risks that can occur: fever and allergic reactions, hemolytic reactions, transmission of diseases such as Hepatitis, AIDS and Cytomegalovirus (CMV) and fluid overload. In the event that I wish to have an autologous transfusion of my own blood, or a directed donor transfusion, I will discuss this with my physician. Check only if Refusing Blood or Blood Products  I understand refusal of blood or blood products as deemed necessary by my physician may have serious consequences to my condition to include possible death. I hereby assume responsibility for my refusal and release the hospital, its personnel, and my physicians from any responsibility for the consequences of my refusal.    o  Refuse   5. I authorize the use of any specimen, organs, tissues, body parts or foreign objects that may be removed from my body during the operation/procedure for diagnosis, research or teaching purposes and their subsequent disposal by hospital authorities. I also authorize the release of specimen test results and/or written reports to my treating physician on the hospital medical staff or other referring or consulting physicians involved in my care, at the discretion of the Pathologist or my treating physician. 6.   I consent to the photographing or videotaping of the operations or procedures to be performed, including appropriate portions of my body for medical, scientific, or educational purposes, provided my identity is not revealed by the pictures or by descriptive texts accompanying them. If the procedure has been photographed/videotaped, the surgeon will obtain the original picture, image, videotape or CD. The hospital will not be responsible for storage, release or maintenance of the picture, image, tape or CD.    7.   I consent to the presence of a  or observers in the operating room as deemed necessary by my physician or their designees.     8.   I recognize that in the event my procedure results in extended X-Ray/fluoroscopy time, I may develop a skin reaction. 9. If I have a Do Not Attempt Resuscitation (DNAR) order in place, that status will be suspended while in the operating room, procedural suite, and during the recovery period unless otherwise explicitly stated by me (or a person authorized to consent on my behalf). The surgeon or my attending physician will determine when the applicable recovery period ends for purposes of reinstating the DNAR order. 10. Patients having a sterilization procedure: I understand that if the procedure is successful the results will be permanent and it will therefore be impossible for me to inseminate, conceive, or bear children. I also understand that the procedure is intended to result in sterility, although the result has not been guaranteed. 11. I acknowledge that my physician has explained sedation/analgesia administration to me including the risk and benefits I consent to the administration of sedation/analgesia as may be necessary or desirable in the judgment of my physician. I CERTIFY THAT I HAVE READ AND FULLY UNDERSTAND THE ABOVE CONSENT TO OPERATION and/or OTHER PROCEDURE.     _________________________________________ _________________________________     ___________________________________  Signature of Patient     Signature of Responsible Person                   Printed Name of Responsible Person                              _________________________________________ ______________________________        ___________________________________  Signature of Witness         Date  Time         Relationship to Patient    STATEMENT OF PHYSICIAN My signature below affirms that prior to the time of the procedure; I have explained to the patient and/or his/her legal representative, the risks and benefits involved in the proposed treatment and any reasonable alternative to the proposed treatment.  I have also explained the risks and benefits involved in refusal of the proposed treatment and alternatives to the proposed treatment and have answered the patient's questions.  If I have a significant financial interest in a co-management agreement or a significant financial interest in any product or implant, or other significant relationship used in this procedure/surgery, I have disclosed this and had a discussion with my patient.     _______________________________________________________________ _____________________________  Roopa Coke of Physician)                                                                                         (Date)                                   (Time)  Patient Name: Ludin Pereyra    : 1975   Printed: 5/3/2023      Medical Record #: [id number]                                              Page 1 of 1

## (undated) NOTE — LETTER
8/15/2018          [id number]    Dear Harrison Jackson,       Here are the biopsy/pathology findings from your recent EGD (Upper  Endoscopy): negative.      If you need any further assistance, please feel free to call [phone]

## (undated) NOTE — LETTER
[address]  Authorization for Surgical Operation and Procedure                                                                                           I hereby authorize Deep Barger MD, my physician and his/her assistants (if applicable), which may include medical students, residents, and/or fellows, to perform the following surgical operation/ procedure and administer such anesthesia as may be determined necessary by my physician: Operation/Procedure name (s) ESOPHAGOGASTRODUODENOSCOPY (EGD) with pancreatic stent removal on Jarrett Bal   2.   I recognize that during the surgical operation/procedure, unforeseen conditions may necessitate additional or different procedures than those listed above. I, therefore, further authorize and request that the above-named surgeon, assistants, or designees perform such procedures as are, in their judgment, necessary and desirable. 3.   My surgeon/physician has discussed prior to my surgery the potential benefits, risks and side effects of this procedure; the likelihood of achieving goals; and potential problems that might occur during recuperation. They also discussed reasonable alternatives to the procedure, including risks, benefits, and side effects related to the alternatives and risks related to not receiving this procedure. I have had all my questions answered and I acknowledge that no guarantee has been made as to the result that may be obtained. 4.   Should the need arise during my operation/procedure, which includes change of level of care prior to discharge, I also consent to the administration of blood and/or blood products. Further, I understand that despite careful testing and screening of blood or blood products by collecting agencies, I may still be subject to ill effects as a result of receiving a blood transfusion and/or blood products.   The following are some, but not all, of the potential risks that can occur: fever and allergic reactions, hemolytic reactions, transmission of diseases such as Hepatitis, AIDS and Cytomegalovirus (CMV) and fluid overload. In the event that I wish to have an autologous transfusion of my own blood, or a directed donor transfusion, I will discuss this with my physician. Check only if Refusing Blood or Blood Products  I understand refusal of blood or blood products as deemed necessary by my physician may have serious consequences to my condition to include possible death. I hereby assume responsibility for my refusal and release the hospital, its personnel, and my physicians from any responsibility for the consequences of my refusal.    o  Refuse   5. I authorize the use of any specimen, organs, tissues, body parts or foreign objects that may be removed from my body during the operation/procedure for diagnosis, research or teaching purposes and their subsequent disposal by hospital authorities. I also authorize the release of specimen test results and/or written reports to my treating physician on the hospital medical staff or other referring or consulting physicians involved in my care, at the discretion of the Pathologist or my treating physician. 6.   I consent to the photographing or videotaping of the operations or procedures to be performed, including appropriate portions of my body for medical, scientific, or educational purposes, provided my identity is not revealed by the pictures or by descriptive texts accompanying them. If the procedure has been photographed/videotaped, the surgeon will obtain the original picture, image, videotape or CD. The hospital will not be responsible for storage, release or maintenance of the picture, image, tape or CD.    7.   I consent to the presence of a  or observers in the operating room as deemed necessary by my physician or their designees.     8.   I recognize that in the event my procedure results in extended X-Ray/fluoroscopy time, I may develop a skin reaction. 9. If I have a Do Not Attempt Resuscitation (DNAR) order in place, that status will be suspended while in the operating room, procedural suite, and during the recovery period unless otherwise explicitly stated by me (or a person authorized to consent on my behalf). The surgeon or my attending physician will determine when the applicable recovery period ends for purposes of reinstating the DNAR order. 10. Patients having a sterilization procedure: I understand that if the procedure is successful the results will be permanent and it will therefore be impossible for me to inseminate, conceive, or bear children. I also understand that the procedure is intended to result in sterility, although the result has not been guaranteed. 11. I acknowledge that my physician has explained sedation/analgesia administration to me including the risk and benefits I consent to the administration of sedation/analgesia as may be necessary or desirable in the judgment of my physician. I CERTIFY THAT I HAVE READ AND FULLY UNDERSTAND THE ABOVE CONSENT TO OPERATION and/or OTHER PROCEDURE.     _________________________________________ _________________________________     ___________________________________  Signature of Patient     Signature of Responsible Person                   Printed Name of Responsible Person                              _________________________________________ ______________________________        ___________________________________  Signature of Witness         Date  Time         Relationship to Patient    STATEMENT OF PHYSICIAN My signature below affirms that prior to the time of the procedure; I have explained to the patient and/or his/her legal representative, the risks and benefits involved in the proposed treatment and any reasonable alternative to the proposed treatment.  I have also explained the risks and benefits involved in refusal of the proposed treatment and alternatives to the proposed treatment and have answered the patient's questions.  If I have a significant financial interest in a co-management agreement or a significant financial interest in any product or implant, or other significant relationship used in this procedure/surgery, I have disclosed this and had a discussion with my patient.     _______________________________________________________________ _____________________________  Karissa Her of Physician)                                                                                         (Date)                                   (Time)  Patient Name: Roberta Joseph    : 1975   Printed: 2023      Medical Record #: [id number]                                              Page 1 of 1

## (undated) NOTE — LETTER
[address], [place]  Authorization for Invasive Procedures  Date: 06/13/2023         Time: 1400    I hereby authorize Toyin Tejada MD, my physician and his/her assistants (if applicable), which may include medical students, residents, and/or fellows, to perform the following surgical operation/ procedure and administer such anesthesia as may be determined necessary by my physician: exploratory laparotomy  gastro-jejunostomy   possible insertion of jejunostomy tube  possible removal of gastrostomy   multiple intra-abdominal biopsies on Jarrett Bal  2.   I recognize that during the surgical operation/procedure, unforeseen conditions may necessitate additional or different procedures than those listed above. I, therefore, further authorize and request that the above-named surgeon, assistants, or designees perform such procedures as are, in their judgment, necessary and desirable. 3.   My surgeon/physician has discussed prior to my surgery the potential benefits, risks and side effects of this procedure; the likelihood of achieving goals; and potential problems that might occur during recuperation. They also discussed reasonable alternatives to the procedure, including risks, benefits, and side effects related to the alternatives and risks related to not receiving this procedure. I have had all my questions answered and I acknowledge that no guarantee has been made as to the result that may be obtained. 4.   Should the need arise during my operation/procedure, which includes change of level of care prior to discharge, I also consent to the administration of blood and/or blood products. Further, I understand that despite careful testing and screening of blood or blood products by collecting agencies, I may still be subject to ill effects as a result of receiving a blood transfusion and/or blood products.   The following are some, but not all, of the potential risks that can occur: fever and allergic reactions, hemolytic reactions, transmission of diseases such as Hepatitis, AIDS and Cytomegalovirus (CMV) and fluid overload. In the event that I wish to have an autologous transfusion of my own blood, or a directed donor transfusion, I will discuss this with my physician. Check only if Refusing Blood or Blood Products  I understand refusal of blood or blood products as deemed necessary by my physician may have serious consequences to my condition to include possible death. I hereby assume responsibility for my refusal and release the hospital, its personnel, and my physicians from any responsibility for the consequences of my refusal.         o  Refuse         5. I authorize the use of any specimen, organs, tissues, body parts or foreign objects that may be removed from my body during the operation/procedure for diagnosis, research or teaching purposes and their subsequent disposal by hospital authorities. I also authorize the release of specimen test results and/or written reports to my treating physician on the hospital medical staff or other referring or consulting physicians involved in my care, at the discretion of the Pathologist or my treating physician. 6.   I consent to the photographing or videotaping of the operations or procedures to be performed, including appropriate portions of my body for medical, scientific, or educational purposes, provided my identity is not revealed by the pictures or by descriptive texts accompanying them. If the procedure has been photographed/videotaped, the surgeon will obtain the original picture, image, videotape or CD. The hospital will not be responsible for storage, release or maintenance of the picture, image, tape or CD.    7.   I consent to the presence of a  or observers in the operating room as deemed necessary by my physician or their designees.     8.   I recognize that in the event my procedure results in extended X-Ray/fluoroscopy time, I may develop a skin reaction. 9. If I have a Do Not Attempt Resuscitation (DNAR) order in place, that status will be suspended while in the operating room, procedural suite, and during the recovery period unless otherwise explicitly stated by me (or a person authorized to consent on my behalf). The surgeon or my attending physician will determine when the applicable recovery period ends for purposes of reinstating the DNAR order. 10. Patients having a sterilization procedure: I understand that if the procedure is successful the results will be permanent and it will therefore be impossible for me to inseminate, conceive, or bear children. I also understand that the procedure is intended to result in sterility, although the result has not been guaranteed. 11. I acknowledge that my physician has explained sedation/analgesia administration to me including the risk and benefits I consent to the administration of sedation/analgesia as may be necessary or desirable in the judgment of my physician.     I CERTIFY THAT I HAVE READ AND FULLY UNDERSTAND THE ABOVE CONSENT TO OPERATION and/or OTHER PROCEDURE.        ____________________________________       _________________________________      ______________________________  Signature of Patient         Signature of Responsible Person        Printed Name of Responsible Person        ____________________________________      _________________________________      ______________________________       Signature of Witness          Relationship to Patient                       Date                                       Time    Patient Name: Allan Dahl     : 1975                 Printed: 2023      Medical Record #: [id number]                      Page 1 of 2          [place] My signature below affirms that prior to the time of the procedure; I have explained to the patient and/or his/her legal representative, the risks and benefits involved in the proposed treatment and any reasonable alternative to the proposed treatment. I have also explained the risks and benefits involved in refusal of the proposed treatment and alternatives to the proposed treatment and have answered the patient's questions.  If I have a significant financial interest in a co-management agreement or a significant financial interest in any product or implant, or other significant relationship used in this procedure/surgery, I have disclosed this and had a discussion with my patient.     _______________________________________________________________ _____________________________  Raquel Wayne of Physician)                                                                                         (Date)                                   (Time)    Patient Name: Allan Dahl     : 1975                 Printed: 2023      Medical Record #: [id number]                      Page 2 of 2

## (undated) NOTE — LETTER
Hospital Discharge Documentation  Please phone to schedule a hospital follow up appointment. From: [address] Hospital[facility] Office  Phone: [phone]    Patient discharged time/date: 2023  4:09 PM  Patient discharge disposition:  Home or Self Care       Discharge Summary - D/C Summary        Discharge Summary signed by Marcello Dumont MD at 2023  1:01 PM  Version 1 of 1      Author: Marcello Dumont MD Service: Internal Medicine Author Type: Physician    Filed: 2023  1:01 PM Date of Service: 2023 12:57 PM Status: Signed    : Marcello Dumont MD (Physician)         [facility]    Discharge Summary    [address] Patient Status:  Inpatient    1975 MRN [id number]   Location [facility] 4W/SW/SE Attending Marcello Dumont MD   Hosp Day # 21 PCP Michael Lombardo MD     Date of Admission: 2023   Date of Discharge: 2023    Hospital Discharge Diagnoses: Malfunction of the jejunostomy    Lace+ Score: 66  59-90 High Risk  29-58 Medium Risk  0-28   Low Risk. TCM Follow-Up Recommendation:  LACE > 58: High Risk of readmission after discharge from the hospital.      Admitting Diagnosis: [facility] [K94.13]    Disposition: Home    Discharge Diagnosis: . Principal Problem:    Malfunction of jejunostomy tube [facility])  Active Problems:    Gastric outlet obstruction      Hospital Course:   Reason for Admission:   Per Dr. Moi Snow    This is an unfortunate 18-year-old gentleman with a past medical history of alcohol-induced groove pancreatitis who was hospitalized in April with gastric outlet obstruction. Imaging studies showed groove pancreatitis with external compression of the gastric outlet antrum. He subsequently had placement of a venting gastric tube and a jejunal feeding tube insertion. He was admitted to our institution again last month on May 19. At that time he was dehydrated, very hypokalemic, and was hypotensive.   He was found to have an antral fluid collection which was not clearly felt to be an abscess. He tolerated J-tube feedings and was discharged home. He presents to the emergency room today because the jejunostomy tube is clogged. He tried opening it with hot water and that did not help. He presented to the emergency room, where they tried pancrelipase, coke, and sodium bicarbonate, none of which helped to open up the tube. He was therefore admitted for further evaluation by Dr. Vijaya Ko, who could potentially replace the G and J-tubes and remove pancreatic stent as well. Labs in the emergency room included a glucose of 72, sodium of 142, potassium of 3.5, chloride of 102, CO2 of 34, BUN of 17 with a creatinine of 1.13. Calcium 8.5. Anion gap of 6. Lipase 313, which is down. White count 10 with a hemoglobin of 10.7 and a platelet count of 331,147; there were 68% neutrophils. The patient reports that despite tube feedings, he has been losing weight. He is very discouraged by what he feels to be a lack of progress in his condition. He has not had significant pain, however. Discharge Physical Exam:   Physical Exam:    General: No acute distress. Respiratory: Clear to auscultation bilaterally. No wheezes. No rhonchi. Cardiovascular: S1, S2. Regular rate and rhythm. No murmurs, rubs or gallops. Abdomen: Soft, nontender, nondistended. Positive bowel sounds. No rebound or guarding. Neurologic: No focal neurological deficits. Musculoskeletal: Moves all extremities. Hospital Course:   Malfunction of J tube   # Low pancreatitis with gastric outlet obstruction s/p decompressive G tube and feeding J tube  - s/p ERCP with pancreatic sphincterotomy and placement of pancreatic duct stent 5/24 - failed conservative treatment. Placement of GJ tube   - s/p upper endoscopy with removal of pancreatic duct stent, removal of jejunal tube.  Gastrostomy tube in good position 6/13  - s/p Ex lap, gastric jejunostomy, entero-enterostomy, removal of gastrostomy tube 6/14   - GI and General surgery on consult. 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 - stable. See above. - multiple prev biopsies negative      # Small splenic infarct seen on CT.   - stable. # Acute on Chronic anemia  - baseline Hb 10-12. - IV venofer. - rpt H/H in AM.      # Sinus tachycrdia  - metoprolol IV added. - lasix 20mg IV x 1   - net + 3.7L      # Tobacco abuse  - counseled on cessation. # Severe malnutrition   - BMI 15  - dietitian following      DVT proph- Lovenox     Full code    Complications: none    Consultants         Provider   Role Specialty     Daryle How, MD  Consulting Physician Arcadio Jones MD  Consulting Physician Rowena Chaudhary MD  Consulting Physician Aram Melara MD  Consulting Physician Mathew Durand MD  Consulting Physician GASTROENTEROLOGY     Freddy Rojas MD  Consulting Physician SURGERY, GENERAL     Brayan Coles MD  Consulting Physician SURGERY, GENERAL          Surgical Procedures       Case IDs Date Procedure Surgeon Location Status    [id number] 6/12/23 ESOPHAGOGASTRODUODENOSCOPY (EGD) with pancreatic stent removal Arcelia Mendoza MD [address] [place] [address] ENDOSCOPY Comp    [id number] 6/14/23 Exploratory laparotomy, gastric jejunostomy, entero-enterostomy, removal of gastrostomy tube Brayan Coles MD [address] [place] [address] MAIN OR Comp              Discharge Plan:   Discharge Condition: Stable    Current Discharge Medication List    New Orders    HYDROcodone-acetaminophen 5-325 MG Oral Tab  Take 1 tablet by mouth every 6 (six) hours as needed.               Discharge Diet: low fiber soft     Discharge Activity: As tolerated       Discharge Medications        START taking these medications        Instructions Prescription details   HYDROcodone-acetaminophen 5-325 MG Tabs  Commonly known as: Norco      Take 1 tablet by mouth every 6 (six) hours as needed. Quantity: 28 tablet  Refills: 0            STOP taking these medications      omeprazole 2 mg/mL Susp  Commonly known as: PriLOSEC        pancrelipase (Lip-Prot-Amyl) 60434-68581 units Cpep  Commonly known as: Zenpep        sodium bicarbonate 325 MG Tabs        traMADol 50 MG Tabs  Commonly known as: Ultram                  Where to Get Your Medications        Please  your prescriptions at the location directed by your doctor or nurse    Bring a paper prescription for each of these medications  HYDROcodone-acetaminophen 5-325 MG Tabs         Follow up: Follow-up Information       Sujatha Snyder MD Follow up in 2 week(s).     Specialty: Internal Medicine  Contact information:  [facility] [address] [phone]                             Follow up Labs and imaging:         Time spent:  > 30 minutes    Luís Farooq MD  6/23/2023    Electronically signed by Tomy Sethi MD on 6/23/2023  1:01 PM

## (undated) NOTE — LETTER
[facility]  Administration of Anesthesia  IJarrett agree to be cared for by a physician anesthesiologist alone and/or with a nurse anesthetist, who is specially trained to monitor me and give me medicine to put me to sleep or keep me comfortable during my procedure    I understand that my anesthesiologist and/or anesthetist is not an employee or agent of [facility] or [employer]. He or she works for [facility]    As the patient asking for anesthesia services, I agree to:  Allow the anesthesiologist (anesthesia doctor) to give me medicine and do additional procedures as necessary. Some examples are: Starting or using an “IV” to give me medicine, fluids or blood during my procedure, and having a breathing tube placed to help me breathe when I’m asleep (intubation). In the event that my heart stops working properly, I understand that my anesthesiologist will make every effort to sustain my life, unless otherwise directed by [facility] Do Not Resuscitate documents.  Tell my anesthesia doctor before my procedure:  If I am pregnant.  The last time that I ate or drank.  iii. All of the medicines I take (including prescriptions, herbal supplements, and pills I can buy without a prescription (including street drugs/illegal medications). Failure to inform my anesthesiologist about these medicines may increase my risk of anesthetic complications.  iv.If I am allergic to anything or have had a reaction to anesthesia before.  I understand how the anesthesia medicine will help me (benefits).  I understand that with any type of anesthesia medicine there are risks:  The most common risks are: nausea, vomiting, sore throat, muscle soreness, damage to my eyes, mouth, or teeth (from breathing tube placement).  Rare risks include: remembering what happened during my procedure, allergic reactions to medications, injury to my airway, heart, lungs, vision, nerves, or muscles  and in extremely rare instances death.  My doctor has explained to me other choices available to me for my care (alternatives).  Pregnant Patients (“epidural”):  I understand that the risks of having an epidural (medicine given into my back to help control pain during labor), include itching, low blood pressure, difficulty urinating, headache or slowing of the baby’s heart. Very rare risks include infection, bleeding, seizure, irregular heart rhythms and nerve injury.  Regional Anesthesia (“spinal”, “epidural”, & “nerve blocks”):  I understand that rare but potential complications include headache, bleeding, infection, seizure, irregular heart rhythms, and nerve injury.    _____________________________________________________________________________  Patient (or Representative) Signature/Relationship to Patient  Date   Time    _____________________________________________________________________________   Name (if used)    Language/Organization   Time    _____________________________________________________________________________  Nurse Anesthetist Signature     Date   Time  _____________________________________________________________________________  Anesthesiologist Signature     Date   Time  I have discussed the procedure and information above with the patient (or patient’s representative) and answered their questions. The patient or their representative has agreed to have anesthesia services.    _____________________________________________________________________________  Witness        Date   Time  I have verified that the signature is that of the patient or patient’s representative, and that it was signed before the procedure  Patient Name: Jarrett Bal     : 1975                 Printed: 3/4/2024 at 8:58 AM    Medical Record #: [id number]                                            Page 1 of 1  ----------ANESTHESIA CONSENT----------

## (undated) NOTE — LETTER
[address], [place]  Authorization for Invasive Procedures  1.  I hereby authorize Dr. Fitzgerald , my physician and whomever may be designated as the doctor's assistant, to perform the following operation and/or procedure:  Esophagogast performed for the purposes of advancing medicine, science, and/or education, provided my identity is not revealed. If the procedure has been videotaped, the physician/surgeon will obtain the original videotape.  The hospital will not be responsible for stor My signature below affirms that prior to the time of the procedure, I have explained to the patient and/or his legal representative, the risks and benefits involved in the proposed treatment and any reasonable alternative to the proposed treatment.  I have

## (undated) NOTE — LETTER
[facility]  [address], [place], [place]  Authorization for Surgical Operation and Procedure                                                                                           I hereby authorize Antonio aMrtin MD, my physician and his/her assistants (if applicable), which may include medical students, residents, and/or fellows, to perform the following surgical operation/ procedure and administer such anesthesia as may be determined necessary by my physician: Operation/Procedure name (s) ESOPHAGOGASTRODUODENOSCOPY (EGD) on Jarrett Bal   2.   I recognize that during the surgical operation/procedure, unforeseen conditions may necessitate additional or different procedures than those listed above.  I, therefore, further authorize and request that the above-named surgeon, assistants, or designees perform such procedures as are, in their judgment, necessary and desirable.    3.   My surgeon/physician has discussed prior to my surgery the potential benefits, risks and side effects of this procedure; the likelihood of achieving goals; and potential problems that might occur during recuperation.  They also discussed reasonable alternatives to the procedure, including risks, benefits, and side effects related to the alternatives and risks related to not receiving this procedure.  I have had all my questions answered and I acknowledge that no guarantee has been made as to the result that may be obtained.    4.   Should the need arise during my operation/procedure, which includes change of level of care prior to discharge, I also consent to the administration of blood and/or blood products.  Further, I understand that despite careful testing and screening of blood or blood products by collecting agencies, I may still be subject to ill effects as a result of receiving a blood transfusion and/or blood products.  The following are some, but not all, of the potential risks that can occur: fever and  allergic reactions, hemolytic reactions, transmission of diseases such as Hepatitis, AIDS and Cytomegalovirus (CMV) and fluid overload.  In the event that I wish to have an autologous transfusion of my own blood, or a directed donor transfusion, I will discuss this with my physician.  Check only if Refusing Blood or Blood Products  I understand refusal of blood or blood products as deemed necessary by my physician may have serious consequences to my condition to include possible death. I hereby assume responsibility for my refusal and release the hospital, its personnel, and my physicians from any responsibility for the consequences of my refusal.    o  Refuse   5.   I authorize the use of any specimen, organs, tissues, body parts or foreign objects that may be removed from my body during the operation/procedure for diagnosis, research or teaching purposes and their subsequent disposal by hospital authorities.  I also authorize the release of specimen test results and/or written reports to my treating physician on the hospital medical staff or other referring or consulting physicians involved in my care, at the discretion of the Pathologist or my treating physician.    6.   I consent to the photographing or videotaping of the operations or procedures to be performed, including appropriate portions of my body for medical, scientific, or educational purposes, provided my identity is not revealed by the pictures or by descriptive texts accompanying them.  If the procedure has been photographed/videotaped, the surgeon will obtain the original picture, image, videotape or CD.  The hospital will not be responsible for storage, release or maintenance of the picture, image, tape or CD.    7.   I consent to the presence of a  or observers in the operating room as deemed necessary by my physician or their designees.    8.   I recognize that in the event my procedure results in extended X-Ray/fluoroscopy  time, I may develop a skin reaction.    9. If I have a Do Not Attempt Resuscitation (DNAR) order in place, that status will be suspended while in the operating room, procedural suite, and during the recovery period unless otherwise explicitly stated by me (or a person authorized to consent on my behalf). The surgeon or my attending physician will determine when the applicable recovery period ends for purposes of reinstating the DNAR order.  10. Patients having a sterilization procedure: I understand that if the procedure is successful the results will be permanent and it will therefore be impossible for me to inseminate, conceive, or bear children.  I also understand that the procedure is intended to result in sterility, although the result has not been guaranteed.   11. I acknowledge that my physician has explained sedation/analgesia administration to me including the risk and benefits I consent to the administration of sedation/analgesia as may be necessary or desirable in the judgment of my physician.    I CERTIFY THAT I HAVE READ AND FULLY UNDERSTAND THE ABOVE CONSENT TO OPERATION and/or OTHER PROCEDURE.     _________________________________________ _________________________________     ___________________________________  Signature of Patient     Signature of Responsible Person                   Printed Name of Responsible Person                              _________________________________________ ______________________________        ___________________________________  Signature of Witness         Date  Time         Relationship to Patient    STATEMENT OF PHYSICIAN My signature below affirms that prior to the time of the procedure; I have explained to the patient and/or his/her legal representative, the risks and benefits involved in the proposed treatment and any reasonable alternative to the proposed treatment. I have also explained the risks and benefits involved in refusal of the proposed treatment and  alternatives to the proposed treatment and have answered the patient's questions. If I have a significant financial interest in a co-management agreement or a significant financial interest in any product or implant, or other significant relationship used in this procedure/surgery, I have disclosed this and had a discussion with my patient.     _______________________________________________________________ _____________________________  (Signature of Physician)                                                                                         (Date)                                   (Time)  Patient Name: Jarrett Bal    : 1975   Printed: 3/4/2024      Medical Record #: [id number]                                              Page 1 of

## (undated) NOTE — LETTER
[address], [place]  Authorization for Invasive Procedures  Date: 5/3/2023           Time: 0800    I hereby authorize Bairon Barrett, my physician and his/her assistants (if applicable), which may include medical students, residents, and/or fellows, to perform the following surgical operation/ procedure and administer such anesthesia as may be determined necessary by my physician:  Esophagogastroduodenoscopy on [address]  2.   I recognize that during the surgical operation/procedure, unforeseen conditions may necessitate additional or different procedures than those listed above. I, therefore, further authorize and request that the above-named surgeon, assistants, or designees perform such procedures as are, in their judgment, necessary and desirable. 3.   My surgeon/physician has discussed prior to my surgery the potential benefits, risks and side effects of this procedure; the likelihood of achieving goals; and potential problems that might occur during recuperation. They also discussed reasonable alternatives to the procedure, including risks, benefits, and side effects related to the alternatives and risks related to not receiving this procedure. I have had all my questions answered and I acknowledge that no guarantee has been made as to the result that may be obtained. 4.   Should the need arise during my operation/procedure, which includes change of level of care prior to discharge, I also consent to the administration of blood and/or blood products. Further, I understand that despite careful testing and screening of blood or blood products by collecting agencies, I may still be subject to ill effects as a result of receiving a blood transfusion and/or blood products.   The following are some, but not all, of the potential risks that can occur: fever and allergic reactions, hemolytic reactions, transmission of diseases such as Hepatitis, AIDS and Cytomegalovirus (CMV) and fluid overload. In the event that I wish to have an autologous transfusion of my own blood, or a directed donor transfusion, I will discuss this with my physician. Check only if Refusing Blood or Blood Products  I understand refusal of blood or blood products as deemed necessary by my physician may have serious consequences to my condition to include possible death. I hereby assume responsibility for my refusal and release the hospital, its personnel, and my physicians from any responsibility for the consequences of my refusal.         o  Refuse         5. I authorize the use of any specimen, organs, tissues, body parts or foreign objects that may be removed from my body during the operation/procedure for diagnosis, research or teaching purposes and their subsequent disposal by hospital authorities. I also authorize the release of specimen test results and/or written reports to my treating physician on the hospital medical staff or other referring or consulting physicians involved in my care, at the discretion of the Pathologist or my treating physician. 6.   I consent to the photographing or videotaping of the operations or procedures to be performed, including appropriate portions of my body for medical, scientific, or educational purposes, provided my identity is not revealed by the pictures or by descriptive texts accompanying them. If the procedure has been photographed/videotaped, the surgeon will obtain the original picture, image, videotape or CD. The hospital will not be responsible for storage, release or maintenance of the picture, image, tape or CD.    7.   I consent to the presence of a  or observers in the operating room as deemed necessary by my physician or their designees. 8.   I recognize that in the event my procedure results in extended X-Ray/fluoroscopy time, I may develop a skin reaction. 9.  If I have a Do Not Attempt Resuscitation (DNAR) order in place, that status will be suspended while in the operating room, procedural suite, and during the recovery period unless otherwise explicitly stated by me (or a person authorized to consent on my behalf). The surgeon or my attending physician will determine when the applicable recovery period ends for purposes of reinstating the DNAR order. 10. Patients having a sterilization procedure: I understand that if the procedure is successful the results will be permanent and it will therefore be impossible for me to inseminate, conceive, or bear children. I also understand that the procedure is intended to result in sterility, although the result has not been guaranteed. 11. I acknowledge that my physician has explained sedation/analgesia administration to me including the risk and benefits I consent to the administration of sedation/analgesia as may be necessary or desirable in the judgment of my physician. I CERTIFY THAT I HAVE READ AND FULLY UNDERSTAND THE ABOVE CONSENT TO OPERATION and/or OTHER PROCEDURE.        ____________________________________       _________________________________      ______________________________  Signature of Patient         Signature of Responsible Person        Printed Name of Responsible Person        ____________________________________      _________________________________      ______________________________       Signature of Witness          Relationship to Patient                       Date                                       Time    Patient Name: Maciel Mandel     : 1975                 Printed: May 3, 2023      Medical Record #: [id number]                      Page 1 of 2          STATEMENT OF PHYSICIAN My signature below affirms that prior to the time of the procedure; I have explained to the patient and/or his/her legal representative, the risks and benefits involved in the proposed treatment and any reasonable alternative to the proposed treatment.  I have also explained the risks and benefits involved in refusal of the proposed treatment and alternatives to the proposed treatment and have answered the patient's questions.  If I have a significant financial interest in a co-management agreement or a significant financial interest in any product or implant, or other significant relationship used in this procedure/surgery, I have disclosed this and had a discussion with my patient.     _______________________________________________________________ _____________________________  [place] of Physician)                                                                                         (Date)                                   (Time)    Patient Name: Allan Dahl     : 1975                 Printed: May 3, 2023      Medical Record #: [id number]                      Page 2 of 2

## (undated) NOTE — LETTER
[address], [place]  Authorization for Invasive Procedures  Date: 5/3/2023           Time: 0800      I hereby authorize Epi Vera, my physician and his/her assistants (if applicable), which may include medical students, residents, and/or fellows, to perform the following surgical operation/ procedure and administer such anesthesia as may be determined necessary by my physician: Esophagogastroduodenoscopy  on [address]  2.   I recognize that during the surgical operation/procedure, unforeseen conditions may necessitate additional or different procedures than those listed above. I, therefore, further authorize and request that the above-named surgeon, assistants, or designees perform such procedures as are, in their judgment, necessary and desirable. 3.   My surgeon/physician has discussed prior to my surgery the potential benefits, risks and side effects of this procedure; the likelihood of achieving goals; and potential problems that might occur during recuperation. They also discussed reasonable alternatives to the procedure, including risks, benefits, and side effects related to the alternatives and risks related to not receiving this procedure. I have had all my questions answered and I acknowledge that no guarantee has been made as to the result that may be obtained. 4.   Should the need arise during my operation/procedure, which includes change of level of care prior to discharge, I also consent to the administration of blood and/or blood products. Further, I understand that despite careful testing and screening of blood or blood products by collecting agencies, I may still be subject to ill effects as a result of receiving a blood transfusion and/or blood products.   The following are some, but not all, of the potential risks that can occur: fever and allergic reactions, hemolytic reactions, transmission of diseases such as Hepatitis, AIDS and Cytomegalovirus (CMV) and fluid overload. In the event that I wish to have an autologous transfusion of my own blood, or a directed donor transfusion, I will discuss this with my physician. Check only if Refusing Blood or Blood Products  I understand refusal of blood or blood products as deemed necessary by my physician may have serious consequences to my condition to include possible death. I hereby assume responsibility for my refusal and release the hospital, its personnel, and my physicians from any responsibility for the consequences of my refusal.         o  Refuse         5. I authorize the use of any specimen, organs, tissues, body parts or foreign objects that may be removed from my body during the operation/procedure for diagnosis, research or teaching purposes and their subsequent disposal by hospital authorities. I also authorize the release of specimen test results and/or written reports to my treating physician on the hospital medical staff or other referring or consulting physicians involved in my care, at the discretion of the Pathologist or my treating physician. 6.   I consent to the photographing or videotaping of the operations or procedures to be performed, including appropriate portions of my body for medical, scientific, or educational purposes, provided my identity is not revealed by the pictures or by descriptive texts accompanying them. If the procedure has been photographed/videotaped, the surgeon will obtain the original picture, image, videotape or CD. The hospital will not be responsible for storage, release or maintenance of the picture, image, tape or CD.    7.   I consent to the presence of a  or observers in the operating room as deemed necessary by my physician or their designees. 8.   I recognize that in the event my procedure results in extended X-Ray/fluoroscopy time, I may develop a skin reaction. 9.  If I have a Do Not Attempt Resuscitation (DNAR) order in place, that status will be suspended while in the operating room, procedural suite, and during the recovery period unless otherwise explicitly stated by me (or a person authorized to consent on my behalf). The surgeon or my attending physician will determine when the applicable recovery period ends for purposes of reinstating the DNAR order. 10. Patients having a sterilization procedure: I understand that if the procedure is successful the results will be permanent and it will therefore be impossible for me to inseminate, conceive, or bear children. I also understand that the procedure is intended to result in sterility, although the result has not been guaranteed. 11. I acknowledge that my physician has explained sedation/analgesia administration to me including the risk and benefits I consent to the administration of sedation/analgesia as may be necessary or desirable in the judgment of my physician. I CERTIFY THAT I HAVE READ AND FULLY UNDERSTAND THE ABOVE CONSENT TO OPERATION and/or OTHER PROCEDURE.        ____________________________________       _________________________________      ______________________________  Signature of Patient         Signature of Responsible Person        Printed Name of Responsible Person        ____________________________________      _________________________________      ______________________________       Signature of Witness          Relationship to Patient                       Date                                       Time    Patient Name: José Miguel Shah     : 1975                 Printed: May 3, 2023      Medical Record #: [id number]                      Page 1 of 2          STATEMENT OF PHYSICIAN My signature below affirms that prior to the time of the procedure; I have explained to the patient and/or his/her legal representative, the risks and benefits involved in the proposed treatment and any reasonable alternative to the proposed treatment.  I have also explained the risks and benefits involved in refusal of the proposed treatment and alternatives to the proposed treatment and have answered the patient's questions.  If I have a significant financial interest in a co-management agreement or a significant financial interest in any product or implant, or other significant relationship used in this procedure/surgery, I have disclosed this and had a discussion with my patient.     _______________________________________________________________ _____________________________  Walda Roof of Physician)                                                                                         (Date)                                   (Time)    Patient Name: Pool Nicholas     : 1975                 Printed: May 3, 2023      Medical Record #: [id number]                      Page 2 of 2

## (undated) NOTE — LETTER
[facility]  Administration of Anesthesia  IJarrett agree to be cared for by a physician anesthesiologist alone and/or with a nurse anesthetist, who is specially trained to monitor me and give me medicine to put me to sleep or keep me comfortable during my procedure    I understand that my anesthesiologist and/or anesthetist is not an employee or agent of [facility] or [employer]. He or she works for [facility]    As the patient asking for anesthesia services, I agree to:  Allow the anesthesiologist (anesthesia doctor) to give me medicine and do additional procedures as necessary. Some examples are: Starting or using an “IV” to give me medicine, fluids or blood during my procedure, and having a breathing tube placed to help me breathe when I’m asleep (intubation). In the event that my heart stops working properly, I understand that my anesthesiologist will make every effort to sustain my life, unless otherwise directed by [facility] Do Not Resuscitate documents.  Tell my anesthesia doctor before my procedure:  If I am pregnant.  The last time that I ate or drank.  iii. All of the medicines I take (including prescriptions, herbal supplements, and pills I can buy without a prescription (including street drugs/illegal medications). Failure to inform my anesthesiologist about these medicines may increase my risk of anesthetic complications.  iv.If I am allergic to anything or have had a reaction to anesthesia before.  I understand how the anesthesia medicine will help me (benefits).  I understand that with any type of anesthesia medicine there are risks:  The most common risks are: nausea, vomiting, sore throat, muscle soreness, damage to my eyes, mouth, or teeth (from breathing tube placement).  Rare risks include: remembering what happened during my procedure, allergic reactions to medications, injury to my airway, heart, lungs, vision, nerves, or muscles  and in extremely rare instances death.  My doctor has explained to me other choices available to me for my care (alternatives).  Pregnant Patients (“epidural”):  I understand that the risks of having an epidural (medicine given into my back to help control pain during labor), include itching, low blood pressure, difficulty urinating, headache or slowing of the baby’s heart. Very rare risks include infection, bleeding, seizure, irregular heart rhythms and nerve injury.  Regional Anesthesia (“spinal”, “epidural”, & “nerve blocks”):  I understand that rare but potential complications include headache, bleeding, infection, seizure, irregular heart rhythms, and nerve injury.    _____________________________________________________________________________  Patient (or Representative) Signature/Relationship to Patient  Date   Time    _____________________________________________________________________________   Name (if used)    Language/Organization   Time    _____________________________________________________________________________  Nurse Anesthetist Signature     Date   Time  _____________________________________________________________________________  Anesthesiologist Signature     Date   Time  I have discussed the procedure and information above with the patient (or patient’s representative) and answered their questions. The patient or their representative has agreed to have anesthesia services.    _____________________________________________________________________________  Witness        Date   Time  I have verified that the signature is that of the patient or patient’s representative, and that it was signed before the procedure  Patient Name: Jarrett Bal     : 1975                 Printed: 3/4/2024 at 10:58 AM    Medical Record #: [id number]                                            Page 1 of 1  ----------ANESTHESIA CONSENT----------